# Patient Record
Sex: FEMALE | Race: WHITE | NOT HISPANIC OR LATINO | Employment: OTHER | URBAN - METROPOLITAN AREA
[De-identification: names, ages, dates, MRNs, and addresses within clinical notes are randomized per-mention and may not be internally consistent; named-entity substitution may affect disease eponyms.]

---

## 2017-01-03 ENCOUNTER — TELEPHONE (OUTPATIENT)
Dept: PSYCHIATRY | Facility: CLINIC | Age: 32
End: 2017-01-03

## 2017-01-03 DIAGNOSIS — F25.0 SCHIZOAFFECTIVE DISORDER, BIPOLAR TYPE (H): Primary | ICD-10-CM

## 2017-01-03 DIAGNOSIS — R46.89 AGGRESSION: ICD-10-CM

## 2017-01-03 RX ORDER — BENZTROPINE MESYLATE 0.5 MG/1
0.5 TABLET ORAL 2 TIMES DAILY
Qty: 60 TABLET | Refills: 0 | Status: SHIPPED | OUTPATIENT
Start: 2017-01-03 | End: 2017-01-30

## 2017-01-03 RX ORDER — LURASIDONE HYDROCHLORIDE 80 MG/1
160 TABLET, FILM COATED ORAL
Qty: 60 TABLET | Refills: 0 | Status: SHIPPED | OUTPATIENT
Start: 2017-01-03 | End: 2017-01-30

## 2017-01-03 RX ORDER — PERPHENAZINE 8 MG/1
8 TABLET ORAL 3 TIMES DAILY
Qty: 90 TABLET | Refills: 0 | Status: SHIPPED | OUTPATIENT
Start: 2017-01-03 | End: 2017-01-30

## 2017-01-03 NOTE — TELEPHONE ENCOUNTER
Per Dr. Kiser: Please refill all of them, thanks!    Meds filled under supervisor d/t pt's insurance

## 2017-01-03 NOTE — TELEPHONE ENCOUNTER
Last appt: 9/30/16  RTC: 6-8 weeks   Can: none  No show: 11/16/16  Pen: 1/26/17    Rec'd refill request for from [facility] for:  - Latuda 160 mg QD with dinner, 30 d/s last filled 12/2/16  - Perphenazine 8 mg TID, 30 d/s last filled 12/2/16  - Cognetin 0.5 mg BID, 30 d/s last filled 12/9/16    As pt has missed an appt, routed to provider for auth to RF per  Protocol.

## 2017-01-06 ENCOUNTER — HOSPITAL ENCOUNTER (OUTPATIENT)
Facility: CLINIC | Age: 32
Discharge: HOME | End: 2017-01-06
Attending: PSYCHIATRY & NEUROLOGY | Admitting: PSYCHIATRY & NEUROLOGY
Payer: MEDICAID

## 2017-01-06 ENCOUNTER — HOSPITAL ENCOUNTER (OUTPATIENT)
Dept: INTERVENTIONAL RADIOLOGY/VASCULAR | Facility: CLINIC | Age: 32
End: 2017-01-06
Attending: PSYCHIATRY & NEUROLOGY
Payer: MEDICAID

## 2017-01-06 PROCEDURE — 25000125 ZZHC RX 250: Performed by: RADIOLOGY

## 2017-01-06 PROCEDURE — 36598 INJ W/FLUOR EVAL CV DEVICE: CPT | Mod: RT

## 2017-01-06 PROCEDURE — 25500064 ZZH RX 255 OP 636: Performed by: RADIOLOGY

## 2017-01-06 RX ORDER — IOPAMIDOL 612 MG/ML
12 INJECTION, SOLUTION INTRAVASCULAR ONCE
Status: COMPLETED | OUTPATIENT
Start: 2017-01-06 | End: 2017-01-06

## 2017-01-06 RX ORDER — HEPARIN SODIUM (PORCINE) LOCK FLUSH IV SOLN 100 UNIT/ML 100 UNIT/ML
5 SOLUTION INTRAVENOUS ONCE
Status: COMPLETED | OUTPATIENT
Start: 2017-01-06 | End: 2017-01-06

## 2017-01-06 RX ADMIN — IOPAMIDOL 12 ML: 612 INJECTION, SOLUTION INTRAVENOUS at 14:30

## 2017-01-06 RX ADMIN — SODIUM CHLORIDE, PRESERVATIVE FREE 5 ML: 5 INJECTION INTRAVENOUS at 14:30

## 2017-01-27 ENCOUNTER — HOSPITAL ENCOUNTER (OUTPATIENT)
Dept: BEHAVIORAL HEALTH | Facility: CLINIC | Age: 32
End: 2017-01-27
Attending: PSYCHIATRY & NEUROLOGY
Payer: MEDICAID

## 2017-01-27 VITALS
RESPIRATION RATE: 16 BRPM | SYSTOLIC BLOOD PRESSURE: 122 MMHG | TEMPERATURE: 98.8 F | HEART RATE: 102 BPM | OXYGEN SATURATION: 96 % | DIASTOLIC BLOOD PRESSURE: 83 MMHG

## 2017-01-27 DIAGNOSIS — F25.1 SCHIZOAFFECTIVE DISORDER, DEPRESSIVE TYPE (H): ICD-10-CM

## 2017-01-27 RX ORDER — HEPARIN SODIUM (PORCINE) LOCK FLUSH IV SOLN 100 UNIT/ML 100 UNIT/ML
5 SOLUTION INTRAVENOUS
Status: ACTIVE | OUTPATIENT
Start: 2017-01-27 | End: 2017-01-27

## 2017-01-30 ENCOUNTER — OFFICE VISIT (OUTPATIENT)
Dept: PSYCHIATRY | Facility: CLINIC | Age: 32
End: 2017-01-30
Attending: PSYCHIATRY & NEUROLOGY
Payer: MEDICAID

## 2017-01-30 VITALS
SYSTOLIC BLOOD PRESSURE: 131 MMHG | BODY MASS INDEX: 44.83 KG/M2 | HEART RATE: 93 BPM | DIASTOLIC BLOOD PRESSURE: 92 MMHG | WEIGHT: 269.4 LBS

## 2017-01-30 DIAGNOSIS — F25.0 SCHIZOAFFECTIVE DISORDER, BIPOLAR TYPE (H): Primary | ICD-10-CM

## 2017-01-30 DIAGNOSIS — R46.89 AGGRESSION: ICD-10-CM

## 2017-01-30 PROCEDURE — 99212 OFFICE O/P EST SF 10 MIN: CPT | Mod: ZF

## 2017-01-30 RX ORDER — PERPHENAZINE 8 MG/1
8 TABLET ORAL 3 TIMES DAILY
Qty: 90 TABLET | Refills: 0 | Status: SHIPPED | OUTPATIENT
Start: 2017-01-30 | End: 2017-03-07

## 2017-01-30 RX ORDER — LURASIDONE HYDROCHLORIDE 80 MG/1
160 TABLET, FILM COATED ORAL
Qty: 60 TABLET | Refills: 0 | Status: SHIPPED | OUTPATIENT
Start: 2017-01-30 | End: 2017-02-27

## 2017-01-30 RX ORDER — BENZTROPINE MESYLATE 0.5 MG/1
0.5 TABLET ORAL 2 TIMES DAILY
Qty: 60 TABLET | Refills: 0 | Status: SHIPPED | OUTPATIENT
Start: 2017-01-30 | End: 2017-04-07

## 2017-01-30 RX ORDER — TRAZODONE HYDROCHLORIDE 50 MG/1
50 TABLET, FILM COATED ORAL AT BEDTIME
Qty: 30 TABLET | Refills: 2 | Status: SHIPPED | OUTPATIENT
Start: 2017-01-30 | End: 2017-04-07
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
## 2017-01-30 NOTE — NURSING NOTE
Chief Complaint   Patient presents with     Recheck Medication     schizaffective disorder, depressive type     Reviewed allergies, smoking status, and pharmacy preference  Administered abuse screening questions   Obtained weight, blood pressure and heart rate

## 2017-02-03 ENCOUNTER — ANESTHESIA (OUTPATIENT)
Dept: BEHAVIORAL HEALTH | Facility: CLINIC | Age: 32
End: 2017-02-03

## 2017-02-03 ENCOUNTER — ANESTHESIA EVENT (OUTPATIENT)
Dept: BEHAVIORAL HEALTH | Facility: CLINIC | Age: 32
End: 2017-02-03

## 2017-02-03 ENCOUNTER — HOSPITAL ENCOUNTER (OUTPATIENT)
Dept: BEHAVIORAL HEALTH | Facility: CLINIC | Age: 32
End: 2017-02-03
Attending: PSYCHIATRY & NEUROLOGY
Payer: MEDICAID

## 2017-02-03 VITALS
OXYGEN SATURATION: 96 % | TEMPERATURE: 98.4 F | SYSTOLIC BLOOD PRESSURE: 140 MMHG | HEART RATE: 96 BPM | DIASTOLIC BLOOD PRESSURE: 64 MMHG | RESPIRATION RATE: 20 BRPM

## 2017-02-03 DIAGNOSIS — F25.1 SCHIZOAFFECTIVE DISORDER, DEPRESSIVE TYPE (H): ICD-10-CM

## 2017-02-03 PROCEDURE — 40000671 ZZH STATISTIC ANESTHESIA CASE

## 2017-02-03 PROCEDURE — 25000125 ZZHC RX 250: Performed by: ANESTHESIOLOGY

## 2017-02-03 PROCEDURE — 25000128 H RX IP 250 OP 636: Performed by: PSYCHIATRY & NEUROLOGY

## 2017-02-03 PROCEDURE — 90870 ELECTROCONVULSIVE THERAPY: CPT

## 2017-02-03 RX ORDER — HEPARIN SODIUM (PORCINE) LOCK FLUSH IV SOLN 100 UNIT/ML 100 UNIT/ML
5 SOLUTION INTRAVENOUS
Status: COMPLETED | OUTPATIENT
Start: 2017-02-03 | End: 2017-02-03

## 2017-02-03 RX ORDER — ETOMIDATE 2 MG/ML
INJECTION INTRAVENOUS PRN
Status: DISCONTINUED | OUTPATIENT
Start: 2017-02-03 | End: 2017-02-03

## 2017-02-03 RX ADMIN — ETOMIDATE 14 MG: 2 INJECTION INTRAVENOUS at 11:58

## 2017-02-03 RX ADMIN — SODIUM CHLORIDE, PRESERVATIVE FREE 5 ML: 5 INJECTION INTRAVENOUS at 12:34

## 2017-02-03 RX ADMIN — SUCCINYLCHOLINE CHLORIDE 80 MG: 20 INJECTION, SOLUTION INTRAMUSCULAR; INTRAVENOUS at 11:58

## 2017-02-03 ASSESSMENT — PATIENT HEALTH QUESTIONNAIRE - PHQ9: SUM OF ALL RESPONSES TO PHQ QUESTIONS 1-9: 4

## 2017-02-03 NOTE — IP AVS SNAPSHOT
[facility]    [facility] [address] [address]    Phone:  [phone]                                       After Visit Summary   2/3/2017    Karolyn Banerjee    MRN: [id number]           After Visit Summary Signature Page     I have received my discharge instructions, and my questions have been answered. I have discussed any challenges I see with this plan with the nurse or doctor.    ..........................................................................................................................................  Patient/Patient Representative Signature      ..........................................................................................................................................  Patient Representative Print Name and Relationship to Patient    ..................................................               ................................................  Date                                            Time    ..........................................................................................................................................  Reviewed by Signature/Title    ...................................................              ..............................................  Date                                                            Time

## 2017-02-03 NOTE — ANESTHESIA PREPROCEDURE EVALUATION
Anesthesia Evaluation     .        ROS/MED HX    ENT/Pulmonary:     (+)asthma , cystic fibrosis . .    Neurologic:  - neg neurologic ROS     Cardiovascular:  - neg cardiovascular ROS       METS/Exercise Tolerance:     Hematologic:  - neg hematologic  ROS       Musculoskeletal:         GI/Hepatic:  - neg GI/hepatic ROS       Renal/Genitourinary:         Endo:  - neg endo ROS       Psychiatric:         Infectious Disease:         Malignancy:         Other:               Physical Exam  Normal systems: cardiovascular and pulmonary    Airway   Mallampati: II  TM distance: >3 FB  Neck ROM: full    Dental     Cardiovascular       Pulmonary                     Anesthesia Plan      History & Physical Review  History and physical reviewed and following examination; no interval change.    ASA Status:  3 .    NPO Status:  > 8 hours    Plan for General with Intravenous induction.          Postoperative Care  Postoperative pain management:  IV analgesics and Oral pain medications.      Consents  Anesthetic plan, risks, benefits and alternatives discussed with:  Patient..

## 2017-02-03 NOTE — IP AVS SNAPSHOT
MRN:[id number]                      After Visit Summary   2/3/2017    Karolyn Banerjee    MRN: [id number]           Visit Information        Provider Department      2/3/2017 10:15 AM Osiel Amos MD [facility]           Review of your medicines      CONTINUE these medicines which have NOT CHANGED        Dose / Directions    ACETAMINOPHEN PO        Dose:  1000 mg   Take 1,000 mg by mouth every 8 hours as needed for pain   Refills:  0       albuterol (5 MG/ML) 0.5% neb solution   Commonly known as:  PROVENTIL        Dose:  2.5 mg   Inhale 2.5 mg into the lungs every 4 hours as needed for wheezing or shortness of breath / dyspnea (2 puffs)   Refills:  0       benztropine 0.5 MG tablet   Commonly known as:  COGENTIN   Used for:  Schizoaffective disorder, bipolar type (H), Aggression        Dose:  0.5 mg   Take 1 tablet (0.5 mg) by mouth 2 times daily   Quantity:  60 tablet   Refills:  0       cetirizine 10 MG tablet   Commonly known as:  zyrTEC        Dose:  10 mg   Take 10 mg by mouth daily   Refills:  0       [employer] FISH OIL 1000 MG Caps   Used for:  Schizoaffective disorder, bipolar type (H)        Dose:  1000 mg   Take 1,000 mg by mouth daily   Quantity:  30 capsule   Refills:  5       diphenhydrAMINE 25 MG tablet   Commonly known as:  BENADRYL        Dose:  25 mg   Take 25 mg by mouth every 6 hours as needed for itching or allergies   Refills:  0       hydrOXYzine 25 MG tablet   Commonly known as:  ATARAX   Used for:  Schizoaffective disorder, bipolar type (H), Aggression        Dose:  25-50 mg   Take 1-2 tablets (25-50 mg) by mouth every 4 hours as needed for anxiety   Quantity:  60 tablet   Refills:  2       lurasidone 80 MG Tabs tablet   Commonly known as:  LATUDA   Used for:  Schizoaffective disorder, bipolar type (H), Aggression        Dose:  160 mg   Take 2 tablets (160 mg) by mouth daily (with dinner)   Quantity:  60 tablet   Refills:  0        norethindrone-ethinyl estradiol 1-20 MG-MCG per tablet   Commonly known as:  MICROGESTIN 1/20   Used for:  Unspecified contraceptive management        Dose:  1 tablet   Take 1 tablet by mouth daily   Quantity:  28 tablet   Refills:  0       OLANZapine 10 MG tablet   Commonly known as:  zyPREXA   Used for:  Schizoaffective disorder, bipolar type (H), Aggression        Dose:  10 mg   Take 1 tablet (10 mg) by mouth 2 times daily as needed   Quantity:  30 tablet   Refills:  0       perphenazine 8 MG tablet   Used for:  Schizoaffective disorder, bipolar type (H), Aggression        Dose:  8 mg   Take 1 tablet (8 mg) by mouth 3 times daily   Quantity:  90 tablet   Refills:  0       traZODone 50 MG tablet   Commonly known as:  DESYREL   Used for:  Schizoaffective disorder, bipolar type (H), Aggression        Dose:  50 mg   Take 1 tablet (50 mg) by mouth At Bedtime   Quantity:  30 tablet   Refills:  2       warfarin 5 MG tablet   Commonly known as:  COUMADIN   Used for:  Blood clot of axillary vein in shoulder area, right (H)        Dose:  10 mg   Take 2 tablets (10 mg) by mouth daily   Quantity:  100 tablet   Refills:  1                Protect others around you: Learn how to safely use, store and throw away your medicines at www.disposemymeds.org.         Follow-ups after your visit        Your next 10 appointments already scheduled     Mar 10, 2017 10:15 AM   Electroconvulsive Therapy with Osiel Amos MD   [facility] ([facility])    [address] [facility] [address]   [phone]            Apr 07, 2017  3:00 PM   Schizophrenia Follow Up with Mariel Kiser MD   [facility] ([facility])    [address]  [address] [facility] [address]   [phone]               Care Instructions        Further instructions from your care team       ECT Discharge Instructions      During your ECT  "series:      Do not drive or work heavy equipment until 7 days after your last treatment.    Do not drink alcohol or use street drugs (illicit drugs) while you are having treatments.    Do not make important decisions, including legal decisions.    After each treatment:      Get plenty of rest. A responsible adult must stay with your for at least 6 hours.    Avoid heavy or risky activities for 24 hours.    If you feel light-headed, sit for a few minutes before standing. Have someone help you get up.    If you have nausea (feel sick to your stomach): Drink only clear liquids such as apple juice, ginger ale, broth or 7UP, Be sure to drink plenty of liquids. Move to a normal diet as you feel able.     If you received Toradol, wait 6 hours before taking ibuprofen.    Call your doctor if:     You have a fever over 100F (37.7 C) (taken under the tongue), or a fever that last more than 24 hours.    Your IV site is red, swollen, very painful or is getting more tender.    You have nausea that gets very bad or does not improve.      If you have any symptoms after ECT, tell our staff before your next treatment.    The ECT Department can be reached at [phone].  The ECT Department is open Mondays, Wednesdays and Fridays from 7:00 AM to 2:00 PM.    To speak to a doctor, call: Dr. Osiel Amos: Office # [phone] and Fax # [phone]    Transported by: Staff      _________________________________________________  ________________________  Patient/Responsible party Signature      Date          ________________________________________________   ________________________  Nurse Signature        Date     Additional Information About Your Visit        Belly Ballot Information     Belly Ballot lets you send messages to your doctor, view your test results, renew your prescriptions, schedule appointments and more. To sign up, go to [email or web address] . Click on \"Log in\" on the left side of the screen, which will take you to the " "Welcome page. Then click on \"Sign up Now\" on the right side of the page.     You will be asked to enter the access code listed below, as well as some personal information. Please follow the directions to create your username and password.     Your access code is: [email or web address]  Expires: 2017  2:38 PM     Your access code will  in 90 days. If you need help or a new code, please call your [facility] clinic or [phone].        Care EveryWhere ID     This is your Care EveryWhere ID. This could be used by other organizations to access your [facility] medical records  [id number]        Your Vitals Were     Blood Pressure Pulse Temperature Respirations Pulse Oximetry       148/90 mmHg 98 98.4  F (36.9  C) (Tympanic) 18 95%        Primary Care Provider Office Phone # Fax #    Suzie Mariscal [phone]      Thank you!     Thank you for choosing [facility] for your care. Our goal is always to provide you with excellent care. Hearing back from our patients is one way we can continue to improve our services. Please take a few minutes to complete the written survey that you may receive in the mail after you visit with us. Thank you!             Medication List: This is a list of all your medications and when to take them. Check marks below indicate your daily home schedule. Keep this list as a reference.      Medications           Morning Afternoon Evening Bedtime As Needed    ACETAMINOPHEN PO   Take 1,000 mg by mouth every 8 hours as needed for pain                                albuterol (5 MG/ML) 0.5% neb solution   Commonly known as:  PROVENTIL   Inhale 2.5 mg into the lungs every 4 hours as needed for wheezing or shortness of breath / dyspnea (2 puffs)                                benztropine 0.5 MG tablet   Commonly known as:  COGENTIN   Take 1 tablet (0.5 mg) by mouth 2 times daily                                cetirizine 10 MG tablet   Commonly known as:  zyrTEC   Take 10 mg by mouth " daily                                CVS FISH OIL 1000 MG Caps   Take 1,000 mg by mouth daily                                diphenhydrAMINE 25 MG tablet   Commonly known as:  BENADRYL   Take 25 mg by mouth every 6 hours as needed for itching or allergies                                hydrOXYzine 25 MG tablet   Commonly known as:  ATARAX   Take 1-2 tablets (25-50 mg) by mouth every 4 hours as needed for anxiety                                lurasidone 80 MG Tabs tablet   Commonly known as:  LATUDA   Take 2 tablets (160 mg) by mouth daily (with dinner)                                norethindrone-ethinyl estradiol 1-20 MG-MCG per tablet   Commonly known as:  MICROGESTIN 1/20   Take 1 tablet by mouth daily                                OLANZapine 10 MG tablet   Commonly known as:  zyPREXA   Take 1 tablet (10 mg) by mouth 2 times daily as needed                                perphenazine 8 MG tablet   Take 1 tablet (8 mg) by mouth 3 times daily                                traZODone 50 MG tablet   Commonly known as:  DESYREL   Take 1 tablet (50 mg) by mouth At Bedtime                                warfarin 5 MG tablet   Commonly known as:  COUMADIN   Take 2 tablets (10 mg) by mouth daily

## 2017-02-03 NOTE — ANESTHESIA POSTPROCEDURE EVALUATION
Patient: Karolyn Banerjee    * No procedures listed *    Diagnosis:Schizoaffective disorder, depressive type (H) [F25.1]  Diagnosis Additional Information: No value filed.    Anesthesia Type:  General    Note:  Anesthesia Post Evaluation    Patient location during evaluation: PACU  Patient participation: Able to fully participate in evaluation  Level of consciousness: awake and alert  Pain management: adequate  Airway patency: patent  Cardiovascular status: acceptable  Respiratory status: acceptable  Hydration status: balanced  PONV: none     Anesthetic complications: None          Last vitals:  Filed Vitals:    02/03/17 1212 02/03/17 1222 02/03/17 1232   BP: 134/90 148/90 140/64   Pulse: 100 98 96   Temp: 36.9  C (98.4  F)     Resp: 18 18 20   SpO2: 93% 95% 96%         Electronically Signed By: Deandre Jackson MD  February 3, 2017  12:40 PM

## 2017-02-03 NOTE — DISCHARGE INSTRUCTIONS
ECT Discharge Instructions      During your ECT series:      Do not drive or work heavy equipment until 7 days after your last treatment.    Do not drink alcohol or use street drugs (illicit drugs) while you are having treatments.    Do not make important decisions, including legal decisions.    After each treatment:      Get plenty of rest. A responsible adult must stay with your for at least 6 hours.    Avoid heavy or risky activities for 24 hours.    If you feel light-headed, sit for a few minutes before standing. Have someone help you get up.    If you have nausea (feel sick to your stomach): Drink only clear liquids such as apple juice, ginger ale, broth or 7UP, Be sure to drink plenty of liquids. Move to a normal diet as you feel able.     If you received Toradol, wait 6 hours before taking ibuprofen.    Call your doctor if:     You have a fever over 100F (37.7 C) (taken under the tongue), or a fever that last more than 24 hours.    Your IV site is red, swollen, very painful or is getting more tender.    You have nausea that gets very bad or does not improve.      If you have any symptoms after ECT, tell our staff before your next treatment.    The ECT Department can be reached at [phone].  The ECT Department is open Mondays, Wednesdays and Fridays from 7:00 AM to 2:00 PM.    To speak to a doctor, call: Dr. Osiel Amos: Office # [phone] and Fax # [phone]    Transported by: Staff      _________________________________________________  ________________________  Patient/Responsible party Signature      Date          ________________________________________________   ________________________  Nurse Signature        Date
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
## 2017-02-27 ENCOUNTER — TELEPHONE (OUTPATIENT)
Dept: PSYCHIATRY | Facility: CLINIC | Age: 32
End: 2017-02-27

## 2017-02-27 DIAGNOSIS — R46.89 AGGRESSION: ICD-10-CM

## 2017-02-27 DIAGNOSIS — F25.0 SCHIZOAFFECTIVE DISORDER, BIPOLAR TYPE (H): ICD-10-CM

## 2017-02-27 RX ORDER — LURASIDONE HYDROCHLORIDE 80 MG/1
160 TABLET, FILM COATED ORAL
Qty: 60 TABLET | Refills: 1 | Status: SHIPPED | OUTPATIENT
Start: 2017-02-27 | End: 2017-04-07

## 2017-02-27 NOTE — TELEPHONE ENCOUNTER
Medication Requested: Latuda 80 mg tabs  Last Written RX Date: 1-30-17  Last Pharmacy Fill Date: 1-30-17  Last RX Quantity: 60,   # refills: 0    Last Office Visit: 1-30-17  Recommended RTC: 4 wks  Next Scheduled Office Visit: 4-07-17    Since last Visit: # of CX 0 ,# of NOS 0    Last Visit Recommendations for:  Medications: continue  Labs: 0      Will route to provider due to delay in follow up.    Kathleen M Doege RN

## 2017-03-06 ENCOUNTER — TELEPHONE (OUTPATIENT)
Dept: PSYCHIATRY | Facility: CLINIC | Age: 32
End: 2017-03-06

## 2017-03-06 DIAGNOSIS — R46.89 AGGRESSION: ICD-10-CM

## 2017-03-06 DIAGNOSIS — F25.0 SCHIZOAFFECTIVE DISORDER, BIPOLAR TYPE (H): ICD-10-CM

## 2017-03-06 NOTE — TELEPHONE ENCOUNTER
Medication Requested: Perphenazine 8 mg tabs  Last Written RX Date: 1-30-17  Last Pharmacy Fill Date: 1-30-17  Last RX Quantity: 90,   # refills: 0    Last Office Visit: 1-30-17  Recommended RTC: 4 wks  Next Scheduled Office Visit: 4-7-17    Since last Visit: # of CX 0 ,# of NOS 0    Last Visit Recommendations for:  Medications: continue  Labs: 0    Will route to provider due to delay in follow up.        Kathleen M Doege RN

## 2017-03-07 RX ORDER — PERPHENAZINE 8 MG/1
8 TABLET ORAL 3 TIMES DAILY
Qty: 93 TABLET | Refills: 0 | Status: SHIPPED | OUTPATIENT
Start: 2017-03-07 | End: 2017-04-07

## 2017-03-10 ENCOUNTER — ANESTHESIA (OUTPATIENT)
Dept: BEHAVIORAL HEALTH | Facility: CLINIC | Age: 32
End: 2017-03-10

## 2017-03-10 ENCOUNTER — ANESTHESIA EVENT (OUTPATIENT)
Dept: BEHAVIORAL HEALTH | Facility: CLINIC | Age: 32
End: 2017-03-10

## 2017-03-10 ENCOUNTER — HOSPITAL ENCOUNTER (OUTPATIENT)
Dept: BEHAVIORAL HEALTH | Facility: CLINIC | Age: 32
End: 2017-03-10
Attending: PSYCHIATRY & NEUROLOGY
Payer: MEDICAID

## 2017-03-10 VITALS
DIASTOLIC BLOOD PRESSURE: 79 MMHG | RESPIRATION RATE: 16 BRPM | HEART RATE: 101 BPM | SYSTOLIC BLOOD PRESSURE: 127 MMHG | OXYGEN SATURATION: 94 % | TEMPERATURE: 98.1 F

## 2017-03-10 DIAGNOSIS — F25.1 SCHIZOAFFECTIVE DISORDER, DEPRESSIVE TYPE (H): ICD-10-CM

## 2017-03-10 PROCEDURE — 90870 ELECTROCONVULSIVE THERAPY: CPT

## 2017-03-10 PROCEDURE — 25000125 ZZHC RX 250: Performed by: NURSE ANESTHETIST, CERTIFIED REGISTERED

## 2017-03-10 PROCEDURE — 25000128 H RX IP 250 OP 636: Performed by: PSYCHIATRY & NEUROLOGY

## 2017-03-10 PROCEDURE — 40000671 ZZH STATISTIC ANESTHESIA CASE

## 2017-03-10 RX ORDER — HEPARIN SODIUM (PORCINE) LOCK FLUSH IV SOLN 100 UNIT/ML 100 UNIT/ML
5 SOLUTION INTRAVENOUS
Status: CANCELLED
Start: 2017-03-10 | End: 2017-03-10

## 2017-03-10 RX ORDER — HEPARIN SODIUM (PORCINE) LOCK FLUSH IV SOLN 100 UNIT/ML 100 UNIT/ML
5 SOLUTION INTRAVENOUS
Status: COMPLETED | OUTPATIENT
Start: 2017-03-10 | End: 2017-03-10

## 2017-03-10 RX ORDER — ETOMIDATE 2 MG/ML
INJECTION INTRAVENOUS PRN
Status: DISCONTINUED | OUTPATIENT
Start: 2017-03-10 | End: 2017-03-10

## 2017-03-10 RX ADMIN — ETOMIDATE 14 MG: 2 INJECTION INTRAVENOUS at 11:19

## 2017-03-10 RX ADMIN — SUCCINYLCHOLINE CHLORIDE 50 MG: 20 INJECTION, SOLUTION INTRAMUSCULAR; INTRAVENOUS at 11:16

## 2017-03-10 RX ADMIN — SODIUM CHLORIDE, PRESERVATIVE FREE 5 ML: 5 INJECTION INTRAVENOUS at 11:51

## 2017-03-10 RX ADMIN — SUCCINYLCHOLINE CHLORIDE 30 MG: 20 INJECTION, SOLUTION INTRAMUSCULAR; INTRAVENOUS at 11:20

## 2017-03-10 NOTE — ANESTHESIA PREPROCEDURE EVALUATION
Anesthesia Evaluation     .        ROS/MED HX    ENT/Pulmonary:     (+)asthma , cystic fibrosis . .    Neurologic:  - neg neurologic ROS     Cardiovascular:  - neg cardiovascular ROS       METS/Exercise Tolerance:     Hematologic:  - neg hematologic  ROS       Musculoskeletal:         GI/Hepatic:  - neg GI/hepatic ROS       Renal/Genitourinary:         Endo:  - neg endo ROS       Psychiatric:     (+) psychiatric history depression      Infectious Disease:         Malignancy:         Other:               Physical Exam  Normal systems: cardiovascular and pulmonary    Airway   Mallampati: III  TM distance: >3 FB  Neck ROM: full    Dental     Cardiovascular       Pulmonary                         Anesthesia Plan      History & Physical Review  History and physical reviewed and following examination; no interval change.    ASA Status:  3 .    NPO Status:  > 8 hours    Plan for General with Intravenous induction.          Postoperative Care  Postoperative pain management:  IV analgesics and Oral pain medications.      Consents  Anesthetic plan, risks, benefits and alternatives discussed with:  Patient..
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
## 2017-03-10 NOTE — IP AVS SNAPSHOT
[facility]    [facility] [address] [address]    Phone:  [phone]                                       After Visit Summary   3/10/2017    Karolyn Banerjee    MRN: [id number]           After Visit Summary Signature Page     I have received my discharge instructions, and my questions have been answered. I have discussed any challenges I see with this plan with the nurse or doctor.    ..........................................................................................................................................  Patient/Patient Representative Signature      ..........................................................................................................................................  Patient Representative Print Name and Relationship to Patient    ..................................................               ................................................  Date                                            Time    ..........................................................................................................................................  Reviewed by Signature/Title    ...................................................              ..............................................  Date                                                            Time

## 2017-03-10 NOTE — ANESTHESIA POSTPROCEDURE EVALUATION
Patient: Karolyn Banerjee    * No procedures listed *    Diagnosis:Severe recurrent major depression without psychotic features (H) [F33.2]  Diagnosis Additional Information: No value filed.    Anesthesia Type:  General    Note:  Anesthesia Post Evaluation    Patient location during evaluation: PACU  Patient participation: Able to fully participate in evaluation  Level of consciousness: awake and alert  Pain management: adequate  Airway patency: patent  Cardiovascular status: acceptable  Respiratory status: acceptable  Hydration status: acceptable  PONV: none     Anesthetic complications: None          Last vitals:  Vitals:    03/10/17 1125 03/10/17 1130 03/10/17 1140   BP:   127/81   Pulse: 103 109 109   Resp: 16 16 16   Temp: 36.7  C (98.1  F)     SpO2: 99% 95% 94%         Electronically Signed By: Duane F. Szczepanski, MD  March 10, 2017  11:46 AM

## 2017-03-13 ENCOUNTER — MEDICAL CORRESPONDENCE (OUTPATIENT)
Dept: HEALTH INFORMATION MANAGEMENT | Facility: CLINIC | Age: 32
End: 2017-03-13

## 2017-03-16 ENCOUNTER — TELEPHONE (OUTPATIENT)
Dept: PSYCHIATRY | Facility: CLINIC | Age: 32
End: 2017-03-16

## 2017-03-16 NOTE — TELEPHONE ENCOUNTER
Rec'd fax from [facility] (Thea Webb), including:    - SILVER signed by legal guardian on 2/15/17  - [facility] Diagnostic Assessment form  - WHODAS 2.0    Copy of SILVER sent to scanning    Routed to Dr. Kiser    Upon completion, forms to be faxed to [facility] at [phone]

## 2017-03-20 ENCOUNTER — TRANSFERRED RECORDS (OUTPATIENT)
Dept: HEALTH INFORMATION MANAGEMENT | Facility: CLINIC | Age: 32
End: 2017-03-20

## 2017-03-20 DIAGNOSIS — F25.0 SCHIZOAFFECTIVE DISORDER, BIPOLAR TYPE (H): ICD-10-CM

## 2017-03-20 DIAGNOSIS — R46.89 AGGRESSION: ICD-10-CM

## 2017-03-21 ENCOUNTER — TELEPHONE (OUTPATIENT)
Dept: PSYCHIATRY | Facility: CLINIC | Age: 32
End: 2017-03-21

## 2017-03-21 NOTE — TELEPHONE ENCOUNTER
On 03/20/2017 Dr. Kiser faxed a diagnostic assessment form to [facility] to [phone]. I sent this document to scanning and a copy was kept in psychiatry until scanning completed/confirmed. Chela Wells LPN

## 2017-04-07 ENCOUNTER — OFFICE VISIT (OUTPATIENT)
Dept: PSYCHIATRY | Facility: CLINIC | Age: 32
End: 2017-04-07
Attending: PSYCHIATRY & NEUROLOGY
Payer: MEDICAID

## 2017-04-07 ENCOUNTER — TELEPHONE (OUTPATIENT)
Dept: PSYCHIATRY | Facility: CLINIC | Age: 32
End: 2017-04-07

## 2017-04-07 VITALS
HEART RATE: 83 BPM | DIASTOLIC BLOOD PRESSURE: 98 MMHG | SYSTOLIC BLOOD PRESSURE: 142 MMHG | BODY MASS INDEX: 44.86 KG/M2 | WEIGHT: 269.6 LBS

## 2017-04-07 DIAGNOSIS — R46.89 AGGRESSION: ICD-10-CM

## 2017-04-07 DIAGNOSIS — F25.0 SCHIZOAFFECTIVE DISORDER, BIPOLAR TYPE (H): ICD-10-CM

## 2017-04-07 DIAGNOSIS — Z51.81 ENCOUNTER FOR THERAPEUTIC DRUG MONITORING: Primary | ICD-10-CM

## 2017-04-07 PROCEDURE — 93005 ELECTROCARDIOGRAM TRACING: CPT | Mod: ZF | Performed by: PSYCHIATRY & NEUROLOGY

## 2017-04-07 PROCEDURE — 99212 OFFICE O/P EST SF 10 MIN: CPT | Mod: ZF

## 2017-04-07 RX ORDER — TRAZODONE HYDROCHLORIDE 50 MG/1
50 TABLET, FILM COATED ORAL AT BEDTIME
Qty: 30 TABLET | Refills: 2 | Status: SHIPPED | OUTPATIENT
Start: 2017-04-07 | End: 2017-07-25

## 2017-04-07 RX ORDER — LURASIDONE HYDROCHLORIDE 80 MG/1
160 TABLET, FILM COATED ORAL
Qty: 60 TABLET | Refills: 2 | Status: SHIPPED | OUTPATIENT
Start: 2017-04-07 | End: 2017-06-02

## 2017-04-07 RX ORDER — BENZTROPINE MESYLATE 0.5 MG/1
0.5 TABLET ORAL 2 TIMES DAILY
Qty: 60 TABLET | Refills: 2 | Status: SHIPPED | OUTPATIENT
Start: 2017-04-07 | End: 2017-07-11

## 2017-04-07 RX ORDER — PERPHENAZINE 8 MG/1
8 TABLET ORAL 3 TIMES DAILY
Qty: 93 TABLET | Refills: 2 | Status: SHIPPED | OUTPATIENT
Start: 2017-04-07 | End: 2017-06-02

## 2017-04-07 NOTE — TELEPHONE ENCOUNTER
Social Work Referral Response  [facility]    Data/Intervention:  Patient Name:  Karolyn Banerjee  /Age:  1985 (31 year old)    Referral Source:  Psychiatrist, Dr. Mariel Kiser  Reason for Referral:  [facility] referral    Collaborated With:    -Psychiatrist  -ACP    SW submitted online referral requesting [facility] with ACP.       Plan:  Provided ACP with writer's contact information and availability, and clinic main number due to SW extended absence.     Will route to patient's psychiatric provider(s) as an FYI.     Thank you for the referral!  Please call or page if needs or concerns arise.

## 2017-04-07 NOTE — PROGRESS NOTES
"PSYCHIATRY CLINIC PROGRESS NOTE   30 minute medication management     The initial DIAG EVAL date was prior to 2008.  Date of most recent Transfer Evaluation is 07/06/2016.    Guardian: Mary \"Swetha\" Chrsis (mother)- [phone]  Karolyn is committed with Torres and Peña-Wilkins in place (most recently reviewed in court and supported on 11/6/2015 for a 12 month term).    INTERIM HISTORY                                                 PSYCH CRITICAL ITEM HISTORY:  suicide attempt , suicidal ideation, SIB , psychosis [sxs include AH, delusions], mutiple psychotropic trials, violent behavior and legal guardianship (mother is legal guardian) More than 5 psychiatric hospitalizations.     Karolyn Banerjee is a 31 year old female who was last seen in clinic on01/30/17 at which time no medication changes were made.  The patient reports good treatment adherence.   History was provided by the patient and group home staff who were a adequate historian.  Since the last visit:  - Feeling more down today, can't tell me why  - Occassionally feelnng light headed and dizzy upon standing, difficult to know how frequent this is, no falls  - Yesterday had a significant conflict with peer at the .    Kristel (another  resident) said she didn't believe in zack, Karolyn tried to hit herself then cut herself with a knife (butter knife), then attempted to ingest dish detergent. Staff stopped her.    - Hearing AH of the \"mohsen at [employer]\".  Believes that this person on [employer] wants to ask her out and he is talking to his friends about her.  Unclear how often Karolyn is hearing this.  - Using prn olanzapine as much as 2 times per week, other times not at all for a week  - Denies thoughts of pregnancy  RECENT SYMPTOMS:   DAVID/HYPOMANIA: None  PSYCHOSIS:  hallucinations- AH of a catia voice from [employer] who is interested in her  , delusion that this man can read her thoughts  Denies-suicidal ideation, SIB, SIB urges, david and aggressive " urges    SUBSTANCE USE:     ALCOHOL-  never       TOBACCO- none        CAFFEINE- one sodas/day                      CANNABIS- none  OTHER ILLICIT DRUGS- none    Financial Support- social security disability     Living Situation- group home ([place])         Children- none     MEDICAL ROS:  Reports sleepiness, occasional light headedness  Denies muscle twitches, excessive diaphoresis, restlessness and tremor and akathisia, muscle stiffness and tremor [action or resting].    PSYCH and CD Critical Summary Points since July 2016           None  PAST PSYCH MED TRIALS   see EMR Problem List: Hx of psychiatric care     MEDICAL / SURGICAL HISTORY                                   MEDICAL TEAM:          PCP- Dr. Isamar Spear                    Therapist- none    Pregnant or breastfeeding:  NO      Contraception- is on OCP    Patient Active Problem List   Diagnosis     Mild cognitive impairment     Borderline personality disorder     Asthma     Nonorganic enuresis     Schizoaffective disorder, depressive type (H)     Fx humerus shaft-closed     Port catheter in place     MENTAL HEALTH     Suicidal ideation     Hx of psychiatric care     DVT (deep vein thrombosis) in pregnancy (H)       ALLERGY                                Clozapine; Risperdal; and Tape [adhesive tape]    MEDICATIONS                               Current Outpatient Prescriptions   Medication Sig Dispense Refill     perphenazine 8 MG tablet Take 1 tablet (8 mg) by mouth 3 times daily 93 tablet 0     lurasidone (LATUDA) 80 MG TABS tablet Take 2 tablets (160 mg) by mouth daily (with dinner) 60 tablet 1     benztropine (COGENTIN) 0.5 MG tablet Take 1 tablet (0.5 mg) by mouth 2 times daily 60 tablet 0     traZODone (DESYREL) 50 MG tablet Take 1 tablet (50 mg) by mouth At Bedtime 30 tablet 2     ACETAMINOPHEN PO Take 1,000 mg by mouth every 8 hours as needed for pain       OLANZapine (ZYPREXA) 10 MG tablet Take 1 tablet (10 mg) by mouth 2 times daily as needed 30  "tablet 0     hydrOXYzine (ATARAX) 25 MG tablet Take 1-2 tablets (25-50 mg) by mouth every 4 hours as needed for anxiety 60 tablet 2     diphenhydrAMINE (BENADRYL) 25 MG tablet Take 25 mg by mouth every 6 hours as needed for itching or allergies        Omega-3 Fatty Acids ([employer] FISH OIL) 1000 MG CAPS Take 1,000 mg by mouth daily 30 capsule 5     albuterol (PROVENTIL) (5 MG/ML) 0.5% nebulizer solution Inhale 2.5 mg into the lungs every 4 hours as needed for wheezing or shortness of breath / dyspnea (2 puffs)        cetirizine (ZYRTEC) 10 MG tablet Take 10 mg by mouth daily       norethindrone-ethinyl estradiol (MICROGESTIN 1/20) 1-20 MG-MCG per tablet Take 1 tablet by mouth daily 28 tablet 0       VITALS   BP (!) 142/98  Pulse 83  Wt 122.3 kg (269 lb 9.6 oz)  BMI 44.86 kg/m2   MENTAL STATUS EXAM                                                             Alertness: alert  and oriented  Appearance: casually groomed  Behavior/Demeanor: cooperative, with good  eye contact, somewhat less engaged with interview today  Speech: increased latency of response  Language: intact  Psychomotor: mildly slowed  Mood:  \"more down\"  Affect: blunted; was not congruent to mood; was not congruent to content  Thought Process/Associations: Somewhat perseverative, at times difficulty answering direct questions  Thought Content:  Denies suicidal ideation  Perception:  Reports auditory hallucinations [command-NO]  Insight: poor  Judgment: fair  Cognition:  does appear grossly intact however below average; formal cognitive testing was not done    LABS and DATA     ANTIPSYCHOTIC LABS   [glu, A1C, lipids (focus LDL), liver enzymes, WBC, ANEU, Hgb, plts]    q12 mo  Recent Labs   Lab Test  11/19/16   2132  10/20/15   0853   GLC  110*  130*     Recent Labs   Lab Test  10/20/15   0853  03/11/15   0820   CHOL  185  213*   TRIG  134  87   LDL  115  142*   HDL  43*  54     Recent Labs   Lab Test  10/20/15   0853  12/05/14   0831   AST  13  13   ALT  " 35  28   ALKPHOS  67  68     Recent Labs   Lab Test  11/23/16   0807  11/21/16   0554  11/19/16   2132   10/20/15   0853   WBC  6.2  6.1  8.2   < >  6.7   ANEU   --    --   6.2   --   5.0   HGB  15.2  13.9  14.0   < >  15.7   PLT  150  129*  153   < >  146*    < > = values in this interval not displayed.       PHQ9 TODAY = 6  PHQ-9 SCORE 8/15/2016 9/30/2016 1/30/2017   Total Score - - -   Total Score 5 6 4       PSYCHIATRIC DIAGNOSES                                                                                                 Schizoaffective disorder, bipolar type  Unspecified neurocognitive disorder    ASSESSMENT                                   Pertinent background:   See most recent Transfer Evaluation as dated above.      Additionally, she has multiple medication trials and hospitalizations treating aggression and perceptual disturbances (including clozapine with neutropenia x2).  ECT combined with multiple medications has been most helpful in marinating stability.  She is under commitment and Torres. Peña-Wilkins order also in place (all renewed in 11/13/2017 for 12 months), authorizes treatment up to two times per week for maintenance.  10/28/2013: full scale IQ test of 62.  After neutropenia with clozapine x's 2, has required multiple neuroleptics as well as ECT to maintain stability.      TODAY: Karolyn continues to experince interpersonal conflict at her GH as a significant source of stress, although staff work to minimize this.  Psychosis symptoms appear to be at or near baseline with persistent AH and no pseudocyesis.  She reports feeling more sad at our visit today, but it difficult to make a case for a current mood episode with hx given. Cognitive difficulties and low coping skills are contributing.  No change today.  Will defer to Dr. Amos regarding ECT frequency.  Pt is aware I am going on maternity leave, will be covered by Dr. Rangel prior to new resident in July.      Medical/abnormal labs:  "Karolyn continues to have eye \"flutter\" and feels as if her L eyelid is heavy, have recommended follow up with PCP for this, does not appear to be a typical neuroleptic associated abnormal movement but will continue to monitor.  Pt still has not completed AP monitoring labs, have reminded  staff of importance of this today, will also get EKG.                             Psychotropic drug interactions:   Hydroxyzine and olanzapine or perphenazine or trazodone may result in increased risk of QT interval prolongation.    Perpheazine and benztropine may result in decreased phenothiazine serum concentrations, decreased phenothiazine effectiveness, enhanced anticholinergic effects (ileus, hyperpyrexia, sedation, dry mouth).   Trazodone and perphenazine may result in hypotension.  Management:  Monitoring for adverse effects and patient is aware of risks     SUICIDE RISK ASSESSMENT:  Today Karolyn R Helppi rebecca passive/active SI. In addition, she has notable risk factors for self-harm, including SIB, previous suicide attempt and psychosis. However, risk is mitigated by sobriety, commitment to family, good social support, stable housing and h/o seeking help when needed. Therefore, based on all available evidence including the factors cited above, she does not appear to be at imminent risk for self-harm, does not meet criteria for a 72-hr hold, and therefore involuntary hospitalization will not be pursued at this  time. However, based on degree of symptoms, voluntary referral for frequent clinic visits was recommended. she accepted this offer.  Additional steps to minimize risk: coordination with group home staff during visit.     PLAN                                                                                                       1) PSYCHOTROPIC MEDICATIONS: no changes today, due to pt insurance medications must be ordered by attending      - lurasidone 160 mg with dinner      - olanzapine 10 mg BID prn for " psychosis      - perphenazine 8 mg TID      - benztropine 0.5 mg BID      - hydroxyzine 25-50 mg q4 hrs prn for anxiety      2) THERAPY:  No change, none currently     3) LABS NEXT DUE: Pt has not yet finished AP labs, reminded staff again today and had labs sent to [facility] as well as our lab here.  EKG today         RATING SCALES:    AIMS    4) REFERRALS [CD, medical, other]:  none    5) : Appreciate clinic  help in setting up [facility] services for pt    6) RTC: 4-8 weeks with Dr. Rangel    7) CRISIS NUMBERS: Provided routinely in the AVS    TREATMENT RISK STATEMENT:  The risks, benefits, alternatives and potential adverse effects have been discussed and are understood by the patient/ patient's guardian. The pt understands the risks of using street drugs or alcohol.  There are no medical contraindications, the pt agrees to treatment with the ability to do so.  The patient understands to call 911 or come to the nearest ED if life threatening or urgent symptoms present.       RESIDENT:   Mariel Kiser MD    Patient staffed in clinic with Dr. Bangura who will sign the note.  Supervisor is Dr. Dukes.    I saw the patient with the resident, and participated in key portions of the service, including the mental status examination and developing the plan of care. I reviewed key portions of the history with the resident. I agree with the findings and plan as documented in this note.    Lesly Bangura MD

## 2017-04-07 NOTE — MR AVS SNAPSHOT
After Visit Summary   2017    Karolyn Banerjee    MRN: [id number]           Patient Information     Date Of Birth          1985        Visit Information        Provider Department      2017 3:00 PM Mariel Kiser MD Psychiatry Clinic        Today's Diagnoses     Encounter for therapeutic drug monitoring    -  1    Schizoaffective disorder, bipolar type (H)        Aggression           Follow-ups after your visit        Follow-up notes from your care team     Return in about 6 weeks (around 2017).      Your next 10 appointments already scheduled     2017  3:25 PM CDT   Schizophrenia Follow Up with Akbar Rangel MD   Psychiatry Clinic ([facility])    [address] [facility] [address] [phone]              Who to contact     Please call your clinic at [phone] to:    Ask questions about your health    Make or cancel appointments    Discuss your medicines    Learn about your test results    Speak to your doctor   If you have compliments or concerns about an experience at your clinic, or if you wish to file a complaint, please contact [facility] Patient Relations at [phone] or email us at [email or web address]         Additional Information About Your Visit        MyChart Information     [email or web address] is an electronic gateway that provides easy, online access to your medical records. With [employer], you can request a clinic appointment, read your test results, renew a prescription or communicate with your care team.     To sign up for [email or web address] visit the website at [email or web address]   You will be asked to enter the access code listed below, as well as some personal information. Please follow the directions to create your username and password.     Your access code is: [email or web address]  Expires: 2017 12:25 PM     Your access code will  in 90 days. If you need  help or a new code, please contact your [facility] or call [phone] for assistance.        Care EveryWhere ID     This is your Care EveryWhere ID. This could be used by other organizations to access your [place] medical records  [id number]        Your Vitals Were     Pulse BMI (Body Mass Index)                83 44.86 kg/m2           Blood Pressure from Last 3 Encounters:   04/14/17 (!) 140/98   04/07/17 (!) 142/98   03/10/17 127/79    Weight from Last 3 Encounters:   04/07/17 122.3 kg (269 lb 9.6 oz)   01/30/17 122.2 kg (269 lb 6.4 oz)   11/22/16 126.9 kg (279 lb 12.8 oz)              We Performed the Following     EKG 12-lead complete w/read - Clinics     EKG 12-lead complete w/read - Clinics          Where to get your medicines      These medications were sent to [facility] [place], [place] - [address]  [address] [facility] [address]     Phone:  [phone]     benztropine 0.5 MG tablet    lurasidone 80 MG Tabs tablet    perphenazine 8 MG tablet    traZODone 50 MG tablet          Primary Care Provider Office Phone # Fax #    Suzie Mariscal [phone]       [address] [address]        Thank you!     Thank you for choosing PSYCHIATRY CLINIC  for your care. Our goal is always to provide you with excellent care. Hearing back from our patients is one way we can continue to improve our services. Please take a few minutes to complete the written survey that you may receive in the mail after your visit with us. Thank you!             Your Updated Medication List - Protect others around you: Learn how to safely use, store and throw away your medicines at www.disposemymeds.org.          This list is accurate as of: 4/7/17 11:59 PM.  Always use your most recent med list.                   Brand Name Dispense Instructions for use    ACETAMINOPHEN PO      Take 1,000 mg by mouth every 8 hours as  needed for pain       albuterol (5 MG/ML) 0.5% neb solution    PROVENTIL     Inhale 2.5 mg into the lungs every 4 hours as needed for wheezing or shortness of breath / dyspnea (2 puffs)       benztropine 0.5 MG tablet    COGENTIN    60 tablet    Take 1 tablet (0.5 mg) by mouth 2 times daily       cetirizine 10 MG tablet    zyrTEC     Take 10 mg by mouth daily       diphenhydrAMINE 25 MG tablet    BENADRYL     Take 25 mg by mouth every 6 hours as needed for itching or allergies       hydrOXYzine 25 MG tablet    ATARAX    60 tablet    Take 1-2 tablets (25-50 mg) by mouth every 4 hours as needed for anxiety       lurasidone 80 MG Tabs tablet    LATUDA    60 tablet    Take 2 tablets (160 mg) by mouth daily (with dinner)       OLANZapine 10 MG tablet    zyPREXA    30 tablet    Take 1 tablet (10 mg) by mouth 2 times daily as needed       perphenazine 8 MG tablet     93 tablet    Take 1 tablet (8 mg) by mouth 3 times daily       traZODone 50 MG tablet    DESYREL    30 tablet    Take 1 tablet (50 mg) by mouth At Bedtime

## 2017-04-10 ENCOUNTER — TELEPHONE (OUTPATIENT)
Dept: PSYCHIATRY | Facility: CLINIC | Age: 32
End: 2017-04-10

## 2017-04-10 LAB — INTERPRETATION ECG - MUSE: NORMAL

## 2017-04-10 NOTE — TELEPHONE ENCOUNTER
Lab orders by were successfully faxed to Carloz Busbybury fernanda at [phone] on 4/7/2017  Original placed back in provider's folder on 4/10/2017.Isabella Owens/LUCY

## 2017-04-10 NOTE — TELEPHONE ENCOUNTER
Brief Psychiatry Telephone Note    Phone call time: 5:30 PM  Clinic Provider: Dr. Mariel Kiser    S:  Group home staff member (Chrystal) called with question regarding potential follow-up labs from patient's most recent appointment 4/7.  Staff member specifically inquiring regarding whether patient needed further lab draws prior to next ECT appointment 4/14.  Staff member was under impression that labs could be drawn at [facility] in [place] and was asking that we place outpatient lab order.    A/P:  Staff member informed that we cannot place lab orders for [facility].  Staff recommended to call back tomorrow to discuss matter further w/ Dr. Kiser for clarification.    Harvey Joyce MD  PGY-1 Psychiatry Resident    Case was discussed with G2 resident Dr. Allen.  Mitchel Allen MD  PGY-2 Psychiatry Resident

## 2017-04-11 ENCOUNTER — TELEPHONE (OUTPATIENT)
Dept: PSYCHIATRY | Facility: CLINIC | Age: 32
End: 2017-04-11

## 2017-04-11 DIAGNOSIS — F25.0 SCHIZOAFFECTIVE DISORDER, BIPOLAR TYPE (H): ICD-10-CM

## 2017-04-11 DIAGNOSIS — Z51.81 ENCOUNTER FOR THERAPEUTIC DRUG MONITORING: Primary | ICD-10-CM

## 2017-04-11 ASSESSMENT — PATIENT HEALTH QUESTIONNAIRE - PHQ9: SUM OF ALL RESPONSES TO PHQ QUESTIONS 1-9: 6

## 2017-04-11 NOTE — TELEPHONE ENCOUNTER
"Per note dated 4/10/17 \"Lab orders by were successfully faxed to Carloz michael at [phone] on 4/7/2017\"    Rec'd different fax number for Carloz Robles below    Reprinted labs ordered on 4/7/17    Faxed to [facility] lab per message below  "

## 2017-04-11 NOTE — TELEPHONE ENCOUNTER
----- Message from Amber Ulloa sent at 4/11/2017  4:18 PM CDT -----  Regarding: Lab Orders Need to be FAXED  Contact: [phone]  Garrick Taylor    Chrystal, from the patient's group home, is calling to check on the request to re-fax Dr. Kiser's lab orders to the [facility] at [phone] (attn: Lab).  Chrystal says that they have an appointment in the AM to have labs drawn, and Dr. Kiser requested that these be done before her ECT treatment tomorrow.  Chrystal can be reached at [phone], with questions.      Thanks,  Amber

## 2017-04-12 ENCOUNTER — TRANSFERRED RECORDS (OUTPATIENT)
Dept: HEALTH INFORMATION MANAGEMENT | Facility: CLINIC | Age: 32
End: 2017-04-12

## 2017-04-13 ENCOUNTER — TELEPHONE (OUTPATIENT)
Dept: PSYCHIATRY | Facility: CLINIC | Age: 32
End: 2017-04-13

## 2017-04-13 DIAGNOSIS — Z51.81 ENCOUNTER FOR THERAPEUTIC DRUG MONITORING: ICD-10-CM

## 2017-04-13 DIAGNOSIS — F25.0 SCHIZOAFFECTIVE DISORDER, BIPOLAR TYPE (H): ICD-10-CM

## 2017-04-13 LAB
CHOL/HDLC RATIO - QUEST: 4.81
CHOLEST SERPL-MCNC: 207 MG/DL
HBA1C MFR BLD: 5 % (ref 0–5.7)
HDLC SERPL-MCNC: 43 MG/DL
LDLC SERPL CALC-MCNC: 135 MG/DL
NONHDLC SERPL-MCNC: 164 MG/DL
TRIGL SERPL-MCNC: 143 MG/DL

## 2017-04-13 RX ORDER — NORETHINDRONE ACETATE AND ETHINYL ESTRADIOL 1MG-20(21)
1 KIT ORAL
COMMUNITY
Start: 2017-03-08 | End: 2017-10-04

## 2017-04-13 NOTE — TELEPHONE ENCOUNTER
Received results of CBC w/diff recevied on 4/12/2017 from [facility] drawn 4/12/2017  Results entered into EMR by Isabella Owens/LUCY   on 4/13/2107  Routed to  and Claudia Mohamud, RNCC for review  Document placed in scanning

## 2017-04-13 NOTE — TELEPHONE ENCOUNTER
Received results on 4/12/2017 of Lipid Panel from [facility]  drawn 4/12/2017  Results entered into EMR by Isabella Owens/LUCY   on 4/13/2017  Routed to  and Claudia Mohamud, DHRUVCC for review  Document placed in scanning

## 2017-04-14 ENCOUNTER — ANESTHESIA EVENT (OUTPATIENT)
Dept: BEHAVIORAL HEALTH | Facility: CLINIC | Age: 32
End: 2017-04-14

## 2017-04-14 ENCOUNTER — ANESTHESIA (OUTPATIENT)
Dept: BEHAVIORAL HEALTH | Facility: CLINIC | Age: 32
End: 2017-04-14

## 2017-04-14 ENCOUNTER — TELEPHONE (OUTPATIENT)
Dept: PSYCHIATRY | Facility: CLINIC | Age: 32
End: 2017-04-14

## 2017-04-14 ENCOUNTER — HOSPITAL ENCOUNTER (OUTPATIENT)
Dept: BEHAVIORAL HEALTH | Facility: CLINIC | Age: 32
End: 2017-04-14
Attending: PSYCHIATRY & NEUROLOGY
Payer: MEDICAID

## 2017-04-14 VITALS
HEART RATE: 102 BPM | TEMPERATURE: 99 F | DIASTOLIC BLOOD PRESSURE: 98 MMHG | SYSTOLIC BLOOD PRESSURE: 140 MMHG | OXYGEN SATURATION: 94 % | RESPIRATION RATE: 20 BRPM

## 2017-04-14 DIAGNOSIS — F25.1 SCHIZOAFFECTIVE DISORDER, DEPRESSIVE TYPE (H): ICD-10-CM

## 2017-04-14 DIAGNOSIS — F25.0 SCHIZOAFFECTIVE DISORDER, BIPOLAR TYPE (H): ICD-10-CM

## 2017-04-14 DIAGNOSIS — Z51.81 ENCOUNTER FOR THERAPEUTIC DRUG MONITORING: ICD-10-CM

## 2017-04-14 LAB
ABSOLUTE BASOPHILS (EXTERNAL): 0
ALBUMIN SERPL-MCNC: 4 G/DL
ALBUMIN/GLOB SERPL: 1.3 {RATIO}
ALP SERPL-CCNC: 52 U/L
ALT SERPL-CCNC: 21 U/L
ANION GAP SERPL CALCULATED.3IONS-SCNC: 9 MMOL/L
AST SERPL-CCNC: 21 U/L
BASOPHILS NFR BLD AUTO: 0.2 %
BILIRUB SERPL-MCNC: 0.8 MG/DL
BUN SERPL-MCNC: 14 MG/DL
CALCIUM SERPL-MCNC: 9 MG/DL
CHLORIDE SERPLBLD-SCNC: 107 MMOL/L
CO2 SERPL-SCNC: 24 MMOL/L
CREAT SERPL-MCNC: 0.86 MG/DL
EOSINOPHIL # BLD AUTO: 0.1 10*3/UL
EOSINOPHIL NFR BLD AUTO: 1.5 %
ERYTHROCYTE [DISTWIDTH] IN BLOOD BY AUTOMATED COUNT: 13.4 %
GFR SERPL CREATININE-BSD FRML MDRD: >60 ML/MIN/1.73M2
GLOBULIN: 3.2 G/DL
GLUCOSE SERPL-MCNC: 83 MG/DL (ref 70–99)
HCT VFR BLD AUTO: 43.3 %
HEMOGLOBIN: 14.8 G/DL (ref 11.7–15.7)
LYMPHOCYTES # BLD AUTO: 1.2 10*3/UL
LYMPHOCYTES NFR BLD AUTO: 20.6 %
MCH RBC QN AUTO: 30.4 PG
MCHC RBC AUTO-ENTMCNC: 34.2 G/DL
MCV RBC AUTO: 89 FL
MONOCYTES # BLD AUTO: 0.4 10*3/UL
MONOCYTES NFR BLD AUTO: 7.3 %
NEUTROPHILS # BLD AUTO: 4.2 10*3/UL
NEUTROPHILS NFR BLD AUTO: 70.4 %
PLATELET COUNT - QUEST: 145 10^9/L (ref 150–450)
PMV BLD: 9.8 FL
POTASSIUM SERPL-SCNC: 4.3 MMOL/L
PROT SERPL-MCNC: 7.2 G/DL
RBC # BLD AUTO: 4.87 10^12/L
SODIUM SERPL-SCNC: 140 MMOL/L
WBC # BLD AUTO: 6 10^9/L

## 2017-04-14 PROCEDURE — 90870 ELECTROCONVULSIVE THERAPY: CPT

## 2017-04-14 PROCEDURE — 25000125 ZZHC RX 250: Performed by: ANESTHESIOLOGY

## 2017-04-14 PROCEDURE — 25000128 H RX IP 250 OP 636: Performed by: PSYCHIATRY & NEUROLOGY

## 2017-04-14 PROCEDURE — 40000671 ZZH STATISTIC ANESTHESIA CASE

## 2017-04-14 PROCEDURE — 25000128 H RX IP 250 OP 636: Performed by: ANESTHESIOLOGY

## 2017-04-14 RX ORDER — HEPARIN SODIUM (PORCINE) LOCK FLUSH IV SOLN 100 UNIT/ML 100 UNIT/ML
5 SOLUTION INTRAVENOUS
Status: COMPLETED | OUTPATIENT
Start: 2017-04-14 | End: 2017-04-14

## 2017-04-14 RX ORDER — HEPARIN SODIUM (PORCINE) LOCK FLUSH IV SOLN 100 UNIT/ML 100 UNIT/ML
5 SOLUTION INTRAVENOUS
Status: CANCELLED
Start: 2017-04-14 | End: 2017-04-14

## 2017-04-14 RX ORDER — LABETALOL HYDROCHLORIDE 5 MG/ML
INJECTION, SOLUTION INTRAVENOUS PRN
Status: DISCONTINUED | OUTPATIENT
Start: 2017-04-14 | End: 2017-04-14

## 2017-04-14 RX ORDER — ETOMIDATE 2 MG/ML
INJECTION INTRAVENOUS PRN
Status: DISCONTINUED | OUTPATIENT
Start: 2017-04-14 | End: 2017-04-14

## 2017-04-14 RX ADMIN — ETOMIDATE 14 MG: 2 INJECTION INTRAVENOUS at 12:42

## 2017-04-14 RX ADMIN — SUCCINYLCHOLINE CHLORIDE 80 MG: 20 INJECTION, SOLUTION INTRAMUSCULAR; INTRAVENOUS at 12:42

## 2017-04-14 RX ADMIN — LABETALOL HYDROCHLORIDE 5 MG: 5 INJECTION, SOLUTION INTRAVENOUS at 12:52

## 2017-04-14 RX ADMIN — SODIUM CHLORIDE, PRESERVATIVE FREE 5 ML: 5 INJECTION INTRAVENOUS at 13:17

## 2017-04-14 NOTE — TELEPHONE ENCOUNTER
Received results of CBC w/diff from [facility] drawn 4-  Results entered into EMR by Isabella Owens/LUCY   on 4/14/2017  Routed to  and Claudia Mohamud RN for review  Document placed in scanning

## 2017-04-14 NOTE — IP AVS SNAPSHOT
MRN:[id number]                      After Visit Summary   4/14/2017    Karolyn Banerjee    MRN: [id number]           Visit Information        Provider Department      4/14/2017 10:30 AM Osiel Amos MD [facility]           Review of your medicines      CONTINUE these medicines which have NOT CHANGED        Dose / Directions    ACETAMINOPHEN PO        Dose:  1000 mg   Take 1,000 mg by mouth every 8 hours as needed for pain   Refills:  0       albuterol (5 MG/ML) 0.5% neb solution   Commonly known as:  PROVENTIL        Dose:  2.5 mg   Inhale 2.5 mg into the lungs every 4 hours as needed for wheezing or shortness of breath / dyspnea (2 puffs)   Refills:  0       benztropine 0.5 MG tablet   Commonly known as:  COGENTIN   Used for:  Schizoaffective disorder, bipolar type (H), Aggression        Dose:  0.5 mg   Take 1 tablet (0.5 mg) by mouth 2 times daily   Quantity:  60 tablet   Refills:  2       cetirizine 10 MG tablet   Commonly known as:  zyrTEC        Dose:  10 mg   Take 10 mg by mouth daily   Refills:  0       diphenhydrAMINE 25 MG tablet   Commonly known as:  BENADRYL        Dose:  25 mg   Take 25 mg by mouth every 6 hours as needed for itching or allergies   Refills:  0       FISH OIL PO        Refills:  0       hydrOXYzine 25 MG tablet   Commonly known as:  ATARAX   Used for:  Schizoaffective disorder, bipolar type (H), Aggression        Dose:  25-50 mg   Take 1-2 tablets (25-50 mg) by mouth every 4 hours as needed for anxiety   Quantity:  60 tablet   Refills:  2       lurasidone 80 MG Tabs tablet   Commonly known as:  LATUDA   Used for:  Schizoaffective disorder, bipolar type (H), Aggression        Dose:  160 mg   Take 2 tablets (160 mg) by mouth daily (with dinner)   Quantity:  60 tablet   Refills:  2       norethindrone-ethinyl estradiol 1-20 MG-MCG per tablet   Commonly known as:  JUNEL FE 1/20        Dose:  1 tablet   Take 1 tablet by mouth   Refills:  0        OLANZapine 10 MG tablet   Commonly known as:  zyPREXA   Used for:  Schizoaffective disorder, bipolar type (H), Aggression        Dose:  10 mg   Take 1 tablet (10 mg) by mouth 2 times daily as needed   Quantity:  30 tablet   Refills:  0       perphenazine 8 MG tablet   Used for:  Schizoaffective disorder, bipolar type (H), Aggression        Dose:  8 mg   Take 1 tablet (8 mg) by mouth 3 times daily   Quantity:  93 tablet   Refills:  2       traZODone 50 MG tablet   Commonly known as:  DESYREL   Used for:  Schizoaffective disorder, bipolar type (H), Aggression        Dose:  50 mg   Take 1 tablet (50 mg) by mouth At Bedtime   Quantity:  30 tablet   Refills:  2                Protect others around you: Learn how to safely use, store and throw away your medicines at www.disposemymeds.org.         Follow-ups after your visit        Your next 10 appointments already scheduled     Jun 02, 2017  3:25 PM CDT   Schizophrenia Follow Up with Akbar Rangel MD   Psychiatry Clinic ([facility])    [address]  [address] [facility] [id number] [phone]               Care Instructions        Further instructions from your care team       ECT Discharge Instructions      During your ECT series:      Do not drive or work heavy equipment until 7 days after your last treatment.    Do not drink alcohol or use street drugs (illicit drugs) while you are having treatments.    Do not make important decisions, including legal decisions.    After each treatment:      Get plenty of rest. A responsible adult must stay with your for at least 6 hours.    Avoid heavy or risky activities for 24 hours.    If you feel light-headed, sit for a few minutes before standing. Have someone help you get up.    If you have nausea (feel sick to your stomach): Drink only clear liquids such as apple juice, ginger ale, broth or 7UP, Be sure to drink plenty of liquids. Move to a normal diet as you feel able.  "    If you received Toradol, wait 6 hours before taking ibuprofen.    Call your doctor if:     You have a fever over 100F (37.7 C) (taken under the tongue), or a fever that last more than 24 hours.    Your IV site is red, swollen, very painful or is getting more tender.    You have nausea that gets very bad or does not improve.      If you have any symptoms after ECT, tell our staff before your next treatment.    The ECT Department can be reached at [phone].  The ECT Department is open ,  and  from 7:00 AM to 2:00 PM.    Your next ECT treatment will be: in 4-6 weeks, Dr. Amos will discuss with clinic resident.  Call Dr. Amos's clinic if you have not heard back in a couple weeks.  Thank-You.    To speak to a doctor, call: Dr. Osiel Amos: Office # [phone] and fax # [phone]     Additional Information About Your Visit        [employer] Software Information     [employer] lets you send messages to your doctor, view your test results, renew your prescriptions, schedule appointments and more. To sign up, go to [email or web address] . Click on \"Log in\" on the left side of the screen, which will take you to the Welcome page. Then click on \"Sign up Now\" on the right side of the page.     You will be asked to enter the access code listed below, as well as some personal information. Please follow the directions to create your username and password.     Your access code is: [email or web address]  Expires: 2017 12:25 PM     Your access code will  in 90 days. If you need help or a new code, please call your [facility] clinic or [phone].        Care EveryWhere ID     This is your Care EveryWhere ID. This could be used by other organizations to access your [facility] medical records  [id number]        Your Vitals Were     Blood Pressure Pulse Temperature Respirations Pulse Oximetry       140/98 98 99  F (37.2  C) (Tympanic) 22 94%        Primary Care Provider Office Phone # Fax #    Suzie Roy " [place] [phone]      Thank you!     Thank you for choosing [facility] for your care. Our goal is always to provide you with excellent care. Hearing back from our patients is one way we can continue to improve our services. Please take a few minutes to complete the written survey that you may receive in the mail after you visit with us. Thank you!             Medication List: This is a list of all your medications and when to take them. Check marks below indicate your daily home schedule. Keep this list as a reference.      Medications           Morning Afternoon Evening Bedtime As Needed    ACETAMINOPHEN PO   Take 1,000 mg by mouth every 8 hours as needed for pain                                albuterol (5 MG/ML) 0.5% neb solution   Commonly known as:  PROVENTIL   Inhale 2.5 mg into the lungs every 4 hours as needed for wheezing or shortness of breath / dyspnea (2 puffs)                                benztropine 0.5 MG tablet   Commonly known as:  COGENTIN   Take 1 tablet (0.5 mg) by mouth 2 times daily                                cetirizine 10 MG tablet   Commonly known as:  zyrTEC   Take 10 mg by mouth daily                                diphenhydrAMINE 25 MG tablet   Commonly known as:  BENADRYL   Take 25 mg by mouth every 6 hours as needed for itching or allergies                                FISH OIL PO                                hydrOXYzine 25 MG tablet   Commonly known as:  ATARAX   Take 1-2 tablets (25-50 mg) by mouth every 4 hours as needed for anxiety                                lurasidone 80 MG Tabs tablet   Commonly known as:  LATUDA   Take 2 tablets (160 mg) by mouth daily (with dinner)                                norethindrone-ethinyl estradiol 1-20 MG-MCG per tablet   Commonly known as:  JUNEL FE 1/20   Take 1 tablet by mouth                                OLANZapine 10 MG tablet   Commonly known as:  zyPREXA   Take 1 tablet (10 mg) by mouth 2 times daily as needed                                 perphenazine 8 MG tablet   Take 1 tablet (8 mg) by mouth 3 times daily                                traZODone 50 MG tablet   Commonly known as:  DESYREL   Take 1 tablet (50 mg) by mouth At Bedtime

## 2017-04-14 NOTE — TELEPHONE ENCOUNTER
Received results of CMP from [facility] drawn 4/12/2017  Results entered into EMR by Isabella Owens/LUCY   on 4/14/2017  Routed to  and Claudia Mohamud, DHRUVCC for review  Document placed in scanning
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
## 2017-04-14 NOTE — ANESTHESIA PREPROCEDURE EVALUATION
Anesthesia Evaluation     . Pt has had prior anesthetic. Type: General           ROS/MED HX    ENT/Pulmonary:     (+)JOSE risk factors obese, Intermittent asthma Treatment: Inhaler prn,  , cystic fibrosis . .    Neurologic:       Cardiovascular:         METS/Exercise Tolerance:     Hematologic:         Musculoskeletal:         GI/Hepatic:         Renal/Genitourinary:         Endo:     (+) Obesity, .      Psychiatric:     (+) psychiatric history schizophrenia, depression and other (comment) (BL personality d/o)      Infectious Disease:         Malignancy:         Other:                     Physical Exam  Normal systems: cardiovascular, pulmonary and dental    Airway   Mallampati: II  TM distance: <3 FB  Neck ROM: full    Dental     Cardiovascular       Pulmonary                     Anesthesia Plan      History & Physical Review  History and physical reviewed and following examination; no interval change.    ASA Status:  3 .        Plan for General with Intravenous induction.          Postoperative Care      Consents  Anesthetic plan, risks, benefits and alternatives discussed with:  Patient..

## 2017-04-14 NOTE — IP AVS SNAPSHOT
[facility]    [facility] [address] [address]    Phone:  [phone]                                       After Visit Summary   4/14/2017    Karolyn Banerjee    MRN: [id number]           After Visit Summary Signature Page     I have received my discharge instructions, and my questions have been answered. I have discussed any challenges I see with this plan with the nurse or doctor.    ..........................................................................................................................................  Patient/Patient Representative Signature      ..........................................................................................................................................  Patient Representative Print Name and Relationship to Patient    ..................................................               ................................................  Date                                            Time    ..........................................................................................................................................  Reviewed by Signature/Title    ...................................................              ..............................................  Date                                                            Time

## 2017-04-14 NOTE — ANESTHESIA POSTPROCEDURE EVALUATION
Patient: Karolyn Banerjee    * No procedures listed *    Diagnosis:Schizoaffective disorder, depressive type (H) [F25.1]  Diagnosis Additional Information: No value filed.    Anesthesia Type:  No value filed.    Note:  Anesthesia Post Evaluation    Patient location during evaluation: PACU  Patient participation: Able to fully participate in evaluation  Level of consciousness: awake and alert  Pain management: adequate  Airway patency: patent  Respiratory status: acceptable  Hydration status: acceptable  PONV: none     Anesthetic complications: None          Last vitals:  Vitals:    04/14/17 1059   BP: (!) 140/98   Pulse: 92   Resp: 16   Temp: 37.1  C (98.7  F)   SpO2: 96%         Electronically Signed By: Shane Barrientos MD, MD  April 14, 2017  11:41 AM

## 2017-04-14 NOTE — DISCHARGE INSTRUCTIONS
ECT Discharge Instructions      During your ECT series:      Do not drive or work heavy equipment until 7 days after your last treatment.    Do not drink alcohol or use street drugs (illicit drugs) while you are having treatments.    Do not make important decisions, including legal decisions.    After each treatment:      Get plenty of rest. A responsible adult must stay with your for at least 6 hours.    Avoid heavy or risky activities for 24 hours.    If you feel light-headed, sit for a few minutes before standing. Have someone help you get up.    If you have nausea (feel sick to your stomach): Drink only clear liquids such as apple juice, ginger ale, broth or 7UP, Be sure to drink plenty of liquids. Move to a normal diet as you feel able.     If you received Toradol, wait 6 hours before taking ibuprofen.    Call your doctor if:     You have a fever over 100F (37.7 C) (taken under the tongue), or a fever that last more than 24 hours.    Your IV site is red, swollen, very painful or is getting more tender.    You have nausea that gets very bad or does not improve.      If you have any symptoms after ECT, tell our staff before your next treatment.    The ECT Department can be reached at [phone].  The ECT Department is open Mondays, Wednesdays and Fridays from 7:00 AM to 2:00 PM.    Your next ECT treatment will be: in 4-6 weeks, Dr. Amos will discuss with clinic resident.  Call Dr. Amos's clinic if you have not heard back in a couple weeks.  Thank-You.    To speak to a doctor, call: Dr. Osiel Amos: Office # [phone] and fax # [phone]

## 2017-05-03 ENCOUNTER — TELEPHONE (OUTPATIENT)
Dept: PSYCHIATRY | Facility: CLINIC | Age: 32
End: 2017-05-03

## 2017-05-03 NOTE — TELEPHONE ENCOUNTER
----- Message from Juan Salazar sent at 5/3/2017 10:44 AM CDT -----  Regarding: Pt Care  Contact: [phone]  Pt  [facility] call 5/3/17@10:45am regarding ECT Therapy scheduling. For further information please contact caller.    Thanks

## 2017-05-03 NOTE — TELEPHONE ENCOUNTER
Returned call to  staff.  Confirmed they were calling to schedule pt's next ECT treatment.  Shared that writer did not have access to the schedule in order to assist.  Directed them to contact ECT directly to schedule and phone number is provided.  Explained that I was uncertain if they would be able to schedule an appt if pt wasn't needing to return for a few weeks but that ECT staff could better able to speak to this when calling.

## 2017-05-05 ENCOUNTER — TELEPHONE (OUTPATIENT)
Dept: PSYCHIATRY | Facility: CLINIC | Age: 32
End: 2017-05-05

## 2017-05-05 NOTE — TELEPHONE ENCOUNTER
Social Work Note: Incoming Call  [facility]    Incoming Call From:  Ayanna with [facility]  Reason for Call:  Ayanna stated she was calling to inform that DA was completed for Karolyn this morning and that [facility] will be working to assign her an [facility] worker as soon as possible.     At request of provider, FELIX Houston submitted [facility] referral on 4/7/17 using [facility]'s online form.     Will route to patient's current psychiatric provider(s) as an FYI.   Please call or page with any immediate needs or concerns.    MARCELLE Barrett, [facility]   Direct: [phone]  Fax: [phone]

## 2017-05-08 ENCOUNTER — TELEPHONE (OUTPATIENT)
Dept: PSYCHIATRY | Facility: CLINIC | Age: 32
End: 2017-05-08

## 2017-05-08 DIAGNOSIS — R46.89 AGGRESSION: ICD-10-CM

## 2017-05-08 DIAGNOSIS — F25.0 SCHIZOAFFECTIVE DISORDER, BIPOLAR TYPE (H): ICD-10-CM

## 2017-05-08 NOTE — TELEPHONE ENCOUNTER
Medication Requested: Olazapine 10 mg tabs  Last Written RX Date: 7-6-16  Last Pharmacy Fill Date: 9-30-16  Last RX Quantity: 30,   # refills: 0    Last Office Visit: 4-7-17  Recommended RTC: 4-8-wks  Next Scheduled Office Visit: 6-2-17    Since last Visit: # of CX 0 ,# of NOS 0    Last Visit Recommendations for:  Medications: no change  Labs: 0    Will route to provider to determine the attending provider to enter as ordering due to insurance reasons.        Kathleen M Doege RN

## 2017-05-10 RX ORDER — OLANZAPINE 10 MG/1
10 TABLET ORAL 2 TIMES DAILY PRN
Qty: 30 TABLET | Refills: 0 | Status: SHIPPED | OUTPATIENT
Start: 2017-05-10 | End: 2017-06-02

## 2017-05-23 ENCOUNTER — TELEPHONE (OUTPATIENT)
Dept: PSYCHIATRY | Facility: CLINIC | Age: 32
End: 2017-05-23

## 2017-05-23 NOTE — TELEPHONE ENCOUNTER
"Social Work Note: Outgoing Call  [facility]    Outgoing Call To:  Joan at [facility], Karolyn's [facility] worker   Reason for Call:  SW returning Joan's call requesting to speak to SW for \"more info\" about pt and reason for referral.     Joan informed that she recently met with Karolyn for initial appt. Joan reported that Karolyn was interested [facility] because she wants help \"going places to do things\" and with getting a job. Joan reviewed with Karolyn the services an [facility] worker can provide (e.g. \"Homemaking skills\", building a resume) and that Karolyn did not appear interested in this support, reiterating that she wanted someone who could get her out of the house and take her out to do things. Based on initial visit, Joan stated that Karolyn doesn't appear to be a good fit for [facility] services at this time. Joan said she is planning to meet with her supervisor to review these concerns and identify next steps. Joan and Karolyn do not have any scheduled appts in the future.     Writer identified that she would follow up with Karolyn to review why she was referred for an [facility] worker and talk about Karolyn's expectations. Joan said she would be open to working with Karolyn as long as she had a clear understanding of services offered, realistic expectations, and is willing to work on developing skills to promote her mental health and well-being. Writer will follow up with Joan after speaking to Karolyn. Writer informed Karolyn's next clinic visit is on 6/2/17.     Plan:    Will route to patient's current psychiatric provider(s) as an FYI.   SW will reach out to Karolyn and then follow up with Joan.     MARCELLE Barrett, [facility]   Direct: [phone]          "

## 2017-05-25 ENCOUNTER — TELEPHONE (OUTPATIENT)
Dept: PSYCHIATRY | Facility: CLINIC | Age: 32
End: 2017-05-25

## 2017-05-26 ENCOUNTER — ANESTHESIA EVENT (OUTPATIENT)
Dept: BEHAVIORAL HEALTH | Facility: CLINIC | Age: 32
End: 2017-05-26

## 2017-05-26 ENCOUNTER — HOSPITAL ENCOUNTER (OUTPATIENT)
Dept: BEHAVIORAL HEALTH | Facility: CLINIC | Age: 32
End: 2017-05-26
Attending: PSYCHIATRY & NEUROLOGY
Payer: MEDICAID

## 2017-05-26 ENCOUNTER — ANESTHESIA (OUTPATIENT)
Dept: BEHAVIORAL HEALTH | Facility: CLINIC | Age: 32
End: 2017-05-26

## 2017-05-26 VITALS
TEMPERATURE: 98.5 F | OXYGEN SATURATION: 94 % | HEART RATE: 95 BPM | RESPIRATION RATE: 16 BRPM | SYSTOLIC BLOOD PRESSURE: 163 MMHG | DIASTOLIC BLOOD PRESSURE: 97 MMHG

## 2017-05-26 DIAGNOSIS — F25.1 SCHIZOAFFECTIVE DISORDER, DEPRESSIVE TYPE (H): ICD-10-CM

## 2017-05-26 PROCEDURE — 25000125 ZZHC RX 250: Performed by: ANESTHESIOLOGY

## 2017-05-26 PROCEDURE — 40000671 ZZH STATISTIC ANESTHESIA CASE

## 2017-05-26 PROCEDURE — 25000128 H RX IP 250 OP 636: Performed by: PSYCHIATRY & NEUROLOGY

## 2017-05-26 PROCEDURE — 90870 ELECTROCONVULSIVE THERAPY: CPT

## 2017-05-26 RX ORDER — HEPARIN SODIUM (PORCINE) LOCK FLUSH IV SOLN 100 UNIT/ML 100 UNIT/ML
5 SOLUTION INTRAVENOUS
Status: COMPLETED | OUTPATIENT
Start: 2017-05-26 | End: 2017-05-26

## 2017-05-26 RX ORDER — ETOMIDATE 2 MG/ML
INJECTION INTRAVENOUS PRN
Status: DISCONTINUED | OUTPATIENT
Start: 2017-05-26 | End: 2017-05-26

## 2017-05-26 RX ORDER — HEPARIN SODIUM (PORCINE) LOCK FLUSH IV SOLN 100 UNIT/ML 100 UNIT/ML
5 SOLUTION INTRAVENOUS
Status: CANCELLED
Start: 2017-05-26 | End: 2017-05-26

## 2017-05-26 RX ADMIN — SUCCINYLCHOLINE CHLORIDE 80 MG: 20 INJECTION, SOLUTION INTRAMUSCULAR; INTRAVENOUS at 10:28

## 2017-05-26 RX ADMIN — SODIUM CHLORIDE, PRESERVATIVE FREE 5 ML: 5 INJECTION INTRAVENOUS at 10:44

## 2017-05-26 RX ADMIN — ETOMIDATE 14 MG: 2 INJECTION INTRAVENOUS at 10:28

## 2017-05-26 NOTE — IP AVS SNAPSHOT
MRN:[id number]                      After Visit Summary   5/26/2017    Karolyn Banerjee    MRN: [id number]           Visit Information        Provider Department      5/26/2017 10:00 AM Osiel Amos MD [facility]           Review of your medicines      CONTINUE these medicines which have NOT CHANGED        Dose / Directions    ACETAMINOPHEN PO        Dose:  1000 mg   Take 1,000 mg by mouth every 8 hours as needed for pain   Refills:  0       albuterol (5 MG/ML) 0.5% neb solution   Commonly known as:  PROVENTIL        Dose:  2.5 mg   Inhale 2.5 mg into the lungs every 4 hours as needed for wheezing or shortness of breath / dyspnea (2 puffs)   Refills:  0       benztropine 0.5 MG tablet   Commonly known as:  COGENTIN   Used for:  Schizoaffective disorder, bipolar type (H), Aggression        Dose:  0.5 mg   Take 1 tablet (0.5 mg) by mouth 2 times daily   Quantity:  60 tablet   Refills:  2       cetirizine 10 MG tablet   Commonly known as:  zyrTEC        Dose:  10 mg   Take 10 mg by mouth daily   Refills:  0       diphenhydrAMINE 25 MG tablet   Commonly known as:  BENADRYL        Dose:  25 mg   Take 25 mg by mouth every 6 hours as needed for itching or allergies   Refills:  0       FISH OIL PO        Refills:  0       hydrOXYzine 25 MG tablet   Commonly known as:  ATARAX   Used for:  Schizoaffective disorder, bipolar type (H), Aggression        Dose:  25-50 mg   Take 1-2 tablets (25-50 mg) by mouth every 4 hours as needed for anxiety   Quantity:  60 tablet   Refills:  2       lurasidone 80 MG Tabs tablet   Commonly known as:  LATUDA   Used for:  Schizoaffective disorder, bipolar type (H), Aggression        Dose:  160 mg   Take 2 tablets (160 mg) by mouth daily (with dinner)   Quantity:  60 tablet   Refills:  2       norethindrone-ethinyl estradiol 1-20 MG-MCG per tablet   Commonly known as:  JUNEL FE 1/20        Dose:  1 tablet   Take 1 tablet by mouth   Refills:  0        OLANZapine 10 MG tablet   Commonly known as:  zyPREXA   Used for:  Schizoaffective disorder, bipolar type (H), Aggression        Dose:  10 mg   Take 1 tablet (10 mg) by mouth 2 times daily as needed   Quantity:  30 tablet   Refills:  0       perphenazine 8 MG tablet   Used for:  Schizoaffective disorder, bipolar type (H), Aggression        Dose:  8 mg   Take 1 tablet (8 mg) by mouth 3 times daily   Quantity:  93 tablet   Refills:  2       traZODone 50 MG tablet   Commonly known as:  DESYREL   Used for:  Schizoaffective disorder, bipolar type (H), Aggression        Dose:  50 mg   Take 1 tablet (50 mg) by mouth At Bedtime   Quantity:  30 tablet   Refills:  2                Protect others around you: Learn how to safely use, store and throw away your medicines at www.disposemymeds.org.         Follow-ups after your visit        Your next 10 appointments already scheduled     Jun 02, 2017  3:00 PM CDT   SOCIAL WORK with JOSE ANGEL Barrett   Psychiatry Clinic ([facility])    [address]  [address] [facility] [address]   [phone]            Jun 02, 2017  3:25 PM CDT   Schizophrenia Follow Up with Akbar Rangel MD   Psychiatry Clinic ([facility])    [address]  [address] [facility] [address]   [phone]            Jul 14, 2017 10:00 AM CDT   Electroconvulsive Therapy with Osiel Amos MD   [facility] ([facility])    [address]  [facility] [address]   [phone]               Care Instructions        Further instructions from your care team       ECT Discharge Instructions      During your ECT series:      Do not drive or work heavy equipment until 7 days after your last treatment.    Do not drink alcohol or use street drugs (illicit drugs) while you are having treatments.    Do not make important decisions, including legal  "decisions.    After each treatment:      Get plenty of rest. A responsible adult must stay with your for at least 6 hours.    Avoid heavy or risky activities for 24 hours.    If you feel light-headed, sit for a few minutes before standing. Have someone help you get up.    If you have nausea (feel sick to your stomach): Drink only clear liquids such as apple juice, ginger ale, broth or 7UP, Be sure to drink plenty of liquids. Move to a normal diet as you feel able.     If you received Toradol, wait 6 hours before taking ibuprofen.    Call your doctor if:     You have a fever over 100F (37.7 C) (taken under the tongue), or a fever that last more than 24 hours.    Your IV site is red, swollen, very painful or is getting more tender.    You have nausea that gets very bad or does not improve.      If you have any symptoms after ECT, tell our staff before your next treatment.    The ECT Department can be reached at [phone].  The ECT Department is open ,  and  from 7:00 AM to 2:00 PM.    To speak to a doctor, call: Dr Osiel Amos: Office # [phone] and Fax # [phone]    Transported by: Amber Do           Additional Information About Your Visit        [employer]izer Information     [facility] lets you send messages to your doctor, view your test results, renew your prescriptions, schedule appointments and more. To sign up, go to [email or web address] . Click on \"Log in\" on the left side of the screen, which will take you to the Welcome page. Then click on \"Sign up Now\" on the right side of the page.     You will be asked to enter the access code listed below, as well as some personal information. Please follow the directions to create your username and password.     Your access code is: [email or web address]  Expires: 2017 12:25 PM     Your access code will  in 90 days. If you need help or a new code, please call your [facility] clinic or [phone].        Care EveryWhere ID     This " is your Care EveryWhere ID. This could be used by other organizations to access your [facility] medical records  [id number]        Your Vitals Were     Blood Pressure Pulse Temperature Respirations Pulse Oximetry       162/92 (BP Location: Left arm) 95 98.5  F (36.9  C) (Temporal) 16 93%        Primary Care Provider Office Phone # Fax #    Suzie Mariscal [phone]      Thank you!     Thank you for choosing [facility] for your care. Our goal is always to provide you with excellent care. Hearing back from our patients is one way we can continue to improve our services. Please take a few minutes to complete the written survey that you may receive in the mail after you visit with us. Thank you!             Medication List: This is a list of all your medications and when to take them. Check marks below indicate your daily home schedule. Keep this list as a reference.      Medications           Morning Afternoon Evening Bedtime As Needed    ACETAMINOPHEN PO   Take 1,000 mg by mouth every 8 hours as needed for pain                                albuterol (5 MG/ML) 0.5% neb solution   Commonly known as:  PROVENTIL   Inhale 2.5 mg into the lungs every 4 hours as needed for wheezing or shortness of breath / dyspnea (2 puffs)                                benztropine 0.5 MG tablet   Commonly known as:  COGENTIN   Take 1 tablet (0.5 mg) by mouth 2 times daily                                cetirizine 10 MG tablet   Commonly known as:  zyrTEC   Take 10 mg by mouth daily                                diphenhydrAMINE 25 MG tablet   Commonly known as:  BENADRYL   Take 25 mg by mouth every 6 hours as needed for itching or allergies                                FISH OIL PO                                hydrOXYzine 25 MG tablet   Commonly known as:  ATARAX   Take 1-2 tablets (25-50 mg) by mouth every 4 hours as needed for anxiety                                lurasidone 80 MG Tabs tablet   Commonly known  as:  LATUDA   Take 2 tablets (160 mg) by mouth daily (with dinner)                                norethindrone-ethinyl estradiol 1-20 MG-MCG per tablet   Commonly known as:  JUNEL FE 1/20   Take 1 tablet by mouth                                OLANZapine 10 MG tablet   Commonly known as:  zyPREXA   Take 1 tablet (10 mg) by mouth 2 times daily as needed                                perphenazine 8 MG tablet   Take 1 tablet (8 mg) by mouth 3 times daily                                traZODone 50 MG tablet   Commonly known as:  DESYREL   Take 1 tablet (50 mg) by mouth At Bedtime

## 2017-05-26 NOTE — ANESTHESIA PREPROCEDURE EVALUATION
Anesthesia Evaluation     . Pt has had prior anesthetic. Type: General           ROS/MED HX    ENT/Pulmonary:     (+)JOSE risk factors obese, Intermittent asthma Treatment: Inhaler prn,  , cystic fibrosis . .    Neurologic:       Cardiovascular:         METS/Exercise Tolerance:     Hematologic:         Musculoskeletal:         GI/Hepatic:         Renal/Genitourinary:         Endo:     (+) Obesity, .      Psychiatric:     (+) psychiatric history schizophrenia, depression and other (comment) (BL personality d/o)      Infectious Disease:         Malignancy:         Other:                     Physical Exam  Normal systems: cardiovascular and pulmonary    Airway   Mallampati: III  TM distance: >3 FB    Dental   (+) chipped and missing    Cardiovascular       Pulmonary                     Anesthesia Plan      History & Physical Review  History and physical reviewed and following examination; no interval change.    ASA Status:  3 .    NPO Status:  > 8 hours    Plan for General with Intravenous induction.          Postoperative Care      Consents  Anesthetic plan, risks, benefits and alternatives discussed with:  Patient..

## 2017-05-26 NOTE — ANESTHESIA POSTPROCEDURE EVALUATION
Patient: Karolyn Banerjee    * No procedures listed *    Diagnosis:Schizoaffective disorder, depressive type (H) [F25.1]  Diagnosis Additional Information: No value filed.    Anesthesia Type:  General    Note:  Anesthesia Post Evaluation    Patient location during evaluation: ECT PACU  Patient participation: Able to fully participate in evaluation  Level of consciousness: awake and alert  Pain management: adequate  Airway patency: patent  Cardiovascular status: acceptable  Respiratory status: acceptable  Hydration status: acceptable  PONV: none             Last vitals:  Vitals:    05/26/17 1047 05/26/17 1057 05/26/17 1107   BP: (!) 174/112 (!) 162/92 (!) 163/97   Pulse:      Resp: 16 16 16   Temp:      SpO2: 93% 93% 94%         Electronically Signed By: Antonia Horton MD  May 26, 2017  11:15 AM
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
## 2017-05-26 NOTE — IP AVS SNAPSHOT
[facility]    [facility] [address] [address]    Phone:  [phone]                                       After Visit Summary   5/26/2017    Karolyn Banerjee    MRN: [id number]           After Visit Summary Signature Page     I have received my discharge instructions, and my questions have been answered. I have discussed any challenges I see with this plan with the nurse or doctor.    ..........................................................................................................................................  Patient/Patient Representative Signature      ..........................................................................................................................................  Patient Representative Print Name and Relationship to Patient    ..................................................               ................................................  Date                                            Time    ..........................................................................................................................................  Reviewed by Signature/Title    ...................................................              ..............................................  Date                                                            Time

## 2017-05-26 NOTE — DISCHARGE INSTRUCTIONS
ECT Discharge Instructions      During your ECT series:      Do not drive or work heavy equipment until 7 days after your last treatment.    Do not drink alcohol or use street drugs (illicit drugs) while you are having treatments.    Do not make important decisions, including legal decisions.    After each treatment:      Get plenty of rest. A responsible adult must stay with your for at least 6 hours.    Avoid heavy or risky activities for 24 hours.    If you feel light-headed, sit for a few minutes before standing. Have someone help you get up.    If you have nausea (feel sick to your stomach): Drink only clear liquids such as apple juice, ginger ale, broth or 7UP, Be sure to drink plenty of liquids. Move to a normal diet as you feel able.     If you received Toradol, wait 6 hours before taking ibuprofen.    Call your doctor if:     You have a fever over 100F (37.7 C) (taken under the tongue), or a fever that last more than 24 hours.    Your IV site is red, swollen, very painful or is getting more tender.    You have nausea that gets very bad or does not improve.      If you have any symptoms after ECT, tell our staff before your next treatment.    The ECT Department can be reached at [phone].  The ECT Department is open Mondays, Wednesdays and Fridays from 7:00 AM to 2:00 PM.    To speak to a doctor, call: Dr Osiel Amos: Office # [phone] and Fax # [phone]    Transported by: StaffAmber

## 2017-06-02 ENCOUNTER — OFFICE VISIT (OUTPATIENT)
Dept: PSYCHIATRY | Facility: CLINIC | Age: 32
End: 2017-06-02
Attending: PSYCHIATRY & NEUROLOGY
Payer: MEDICAID

## 2017-06-02 VITALS
SYSTOLIC BLOOD PRESSURE: 130 MMHG | WEIGHT: 266 LBS | BODY MASS INDEX: 44.26 KG/M2 | HEART RATE: 90 BPM | DIASTOLIC BLOOD PRESSURE: 95 MMHG

## 2017-06-02 DIAGNOSIS — R46.89 AGGRESSION: ICD-10-CM

## 2017-06-02 DIAGNOSIS — F25.0 SCHIZOAFFECTIVE DISORDER, BIPOLAR TYPE (H): Primary | ICD-10-CM

## 2017-06-02 PROCEDURE — 99212 OFFICE O/P EST SF 10 MIN: CPT | Mod: ZF

## 2017-06-02 NOTE — NURSING NOTE
Chief Complaint   Patient presents with     Recheck Medication     Schizoaffective disorder, bipolar type     Reviewed allergies, smoking status, and pharmacy preference  Administered some abuse screening questions   Obtained weight, blood pressure and heart rate

## 2017-06-02 NOTE — MR AVS SNAPSHOT
After Visit Summary   6/2/2017    Karolyn Banerjee    MRN: [id number]           Patient Information     Date Of Birth          1985        Visit Information        Provider Department      6/2/2017 3:25 PM Akbar Rangel MD [facility]        Today's Diagnoses     Schizoaffective disorder, bipolar type (H)    -  1    Aggression           Follow-ups after your visit        Follow-up notes from your care team     Return in about 6 weeks (around 7/14/2017).      Your next 10 appointments already scheduled     Jul 14, 2017 10:00 AM CDT   Electroconvulsive Therapy with Osiel Amos MD   [facility] ([facility])    [address] [address] [phone]            Aug 04, 2017  3:15 PM CDT   Schizophrenia Follow Up with Akbar Rangel MD   [facility] ([facility])    [address]  [address] [facility] [id number] [phone]              Who to contact     Please call your clinic at [phone] to:    Ask questions about your health    Make or cancel appointments    Discuss your medicines    Learn about your test results    Speak to your doctor   If you have compliments or concerns about an experience at your clinic, or if you wish to file a complaint, please contact [facility] Patient Relations at [phone] or email us at [email or web address]         Additional Information About Your Visit        MyChart Information     [employer] is an electronic gateway that provides easy, online access to your medical records. With [employer], you can request a clinic appointment, read your test results, renew a prescription or communicate with your care team.     To sign up for [employer] visit the website at [email or web address]   You will be asked to enter the access code listed below, as well as some personal  information. Please follow the directions to create your username and password.     Your access code is: [email or web address]  Expires: 2017 10:51 PM     Your access code will  in 90 days. If you need help or a new code, please contact your [facility] or call [phone] for assistance.        Care EveryWhere ID     This is your Care EveryWhere ID. This could be used by other organizations to access your [place] medical records  [id number]        Your Vitals Were     Pulse BMI (Body Mass Index)                90 44.26 kg/m2           Blood Pressure from Last 3 Encounters:   17 (!) 130/95   17 (!) 163/97   17 (!) 140/98    Weight from Last 3 Encounters:   17 120.7 kg (266 lb)   17 122.3 kg (269 lb 9.6 oz)   17 122.2 kg (269 lb 6.4 oz)              Today, you had the following     No orders found for display         Where to get your medicines      These medications were sent to [facility] [place], [place] -  [email or web address].  [email or web address] [facility] [address]     Phone:  [phone]     lurasidone 80 MG Tabs tablet    OLANZapine 10 MG tablet    perphenazine 8 MG tablet          Primary Care Provider Office Phone # Fax #    Suzie Mariscal [phone]       [address] [address]        Thank you!     Thank you for choosing [facility]  for your care. Our goal is always to provide you with excellent care. Hearing back from our patients is one way we can continue to improve our services. Please take a few minutes to complete the written survey that you may receive in the mail after your visit with us. Thank you!             Your Updated Medication List - Protect others around you: Learn how to safely use, store and throw away your medicines at www.disposemymeds.org.          This list is accurate as of: 17 11:59 PM.  Always use your most recent med list.                    Brand Name Dispense Instructions for use    ACETAMINOPHEN PO      Take 1,000 mg by mouth every 8 hours as needed for pain       albuterol (5 MG/ML) 0.5% neb solution    PROVENTIL     Inhale 2.5 mg into the lungs every 4 hours as needed for wheezing or shortness of breath / dyspnea (2 puffs)       benztropine 0.5 MG tablet    COGENTIN    60 tablet    Take 1 tablet (0.5 mg) by mouth 2 times daily       cetirizine 10 MG tablet    zyrTEC     Take 10 mg by mouth daily       diphenhydrAMINE 25 MG tablet    BENADRYL     Take 25 mg by mouth every 6 hours as needed for itching or allergies       FISH OIL PO          hydrOXYzine 25 MG tablet    ATARAX    60 tablet    Take 1-2 tablets (25-50 mg) by mouth every 4 hours as needed for anxiety       lurasidone 80 MG Tabs tablet    LATUDA    60 tablet    Take 2 tablets (160 mg) by mouth daily (with dinner)       norethindrone-ethinyl estradiol 1-20 MG-MCG per tablet    JUNEL FE 1/20     Take 1 tablet by mouth       OLANZapine 10 MG tablet    zyPREXA    30 tablet    Take 1 tablet (10 mg) by mouth 2 times daily as needed       perphenazine 8 MG tablet     93 tablet    Take 1 tablet (8 mg) by mouth 3 times daily       traZODone 50 MG tablet    DESYREL    30 tablet    Take 1 tablet (50 mg) by mouth At Bedtime
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
## 2017-06-07 ENCOUNTER — TELEPHONE (OUTPATIENT)
Dept: PSYCHIATRY | Facility: CLINIC | Age: 32
End: 2017-06-07

## 2017-06-07 NOTE — TELEPHONE ENCOUNTER
Social Work Note: Left Voicemail  [facility] Psychiatry Clinic    Left Voicemail Message for Karolyn Franco's ARMHS worker with ACP  Content of Voicemail:  FELIX LVM for Joan informing that Karolyn's group home called  and asked to reschedule SW appt from today, 6/7/17 at 2pm to Friday, 6/9 @ 11am. SW informed will follow up with Joan after meeting with patient to review role of ARMHS worker and pt's expectations, and clarify if she would like to continue working with Joan.     Included SW's direct contact information and requested a callback.     Will route to patient's current psychiatric provider(s) as an FYI.   Please call or page with any immediate needs or concerns.    MARCELLE Barrett, [facility]  Clinic   Direct: [phone]  Fax: [phone]

## 2017-06-09 ENCOUNTER — ALLIED HEALTH/NURSE VISIT (OUTPATIENT)
Dept: PSYCHIATRY | Facility: CLINIC | Age: 32
End: 2017-06-09
Attending: PSYCHIATRY & NEUROLOGY
Payer: MEDICAID

## 2017-06-09 DIAGNOSIS — Z71.89 COUNSELING AND COORDINATION OF CARE: Primary | ICD-10-CM

## 2017-06-09 NOTE — PROGRESS NOTES
Social Work Visit (Brief)  [facility] Psychiatry Clinic    Data/Intervention:  Patient Name:  Karolyn Banerjee  /Age:  1985 (31 year old)    Reason for Visit:  Karolyn came to meet SW in clinic today to review why provider referred to [facility] services and review role of ARMHS worker.     Karolyn was accompanied in clinic today by two female staff from her group home. SW met with all 3 together in an available clinic room.     Collaborated With:    -Karolyn, patient  -Staff (x2)  -Dr. Rangel, Psychiatry provider     SW identified reason for visit is to review Karolyn's thoughts about working with an ARMHS worker. SW reviewed role of ARMHS worker and provided examples of ways an ARMHS worker may provide support. SW also acknowledged and validated Karolyn's disappointment about her expectation that an ARMHS worker would drive her places in the community. SW and two staff present prompted Karolyn to identify activities that she enjoys that would not require transportation. Karolyn reported she enjoyed going for walks outside, coloring and art, cooking, and baking. Writer pointed out how regularly engaging in leisure activities will promote her mental health, well-being and recovery. Writer asked Karolyn if she was open to meeting with the ARMHS worker again. Karolyn agreed and made plan for SW to contact ARM worker.     Assessment:  Karolyn was minimally engaged and made infrequent eye contact with writer. Karolyn primarily provided brief responses to writer's questions, and occasionally declined to answer.     Karolyn appeared receptive to SW visit.     Plan:  Provided patient/family with writer's contact information and availability.   SW will follow up with Karolyn's ARMHS worker to review the above information and request that ARMHS worker call Karolyn to schedule their next appt.     Will route to patient's psychiatric provider(s) as an FYI.   Please call or page if needs or concerns arise.     MARCELLE Barrett, [facility]  Clinic    Direct: [phone]  Fax: [phone]    This is a non-billable encounter as it was solely for the purposes of outreach and/or care coordination.

## 2017-06-09 NOTE — MR AVS SNAPSHOT
After Visit Summary   6/9/2017    Karolyn Banerjee    MRN: [id number]           Patient Information     Date Of Birth          1985        Visit Information        Provider Department      6/9/2017 11:00 AM Andreina [facility] LGSW Psychiatry Clinic        Today's Diagnoses     Counseling and coordination of care    -  1       Follow-ups after your visit        Your next 10 appointments already scheduled     Jul 14, 2017 10:00 AM CDT   Electroconvulsive Therapy with Osiel Amos MD   [facility] ([facility])    [address] [facility] [id number] [phone]            Aug 04, 2017  3:15 PM CDT   Schizophrenia Follow Up with Akbar Rangel MD   Psychiatry Clinic ([facility])    [address] [facility] [id number] [phone]              Who to contact     Please call your clinic at [phone] to:    Ask questions about your health    Make or cancel appointments    Discuss your medicines    Learn about your test results    Speak to your doctor   If you have compliments or concerns about an experience at your clinic, or if you wish to file a complaint, please contact [facility] Patient Relations at [phone] or email us at [email or web address]         Additional Information About Your Visit        MyChart Information     CriticalBlue is an electronic gateway that provides easy, online access to your medical records. With CriticalBlue, you can request a clinic appointment, read your test results, renew a prescription or communicate with your care team.     To sign up for Mob[employer] visit the website at [email or web address]   You will be asked to enter the access code listed below, as well as some personal information. Please follow the directions to create your username and password.     Your access code is: [email or web address]  Expires:  2017 10:51 PM     Your access code will  in 90 days. If you need help or a new code, please contact your [facility] or call [phone] for assistance.        Care EveryWhere ID     This is your Care EveryWhere ID. This could be used by other organizations to access your [place] medical records  [id number]         Blood Pressure from Last 3 Encounters:   17 (!) 130/95   17 (!) 163/97   17 (!) 140/98    Weight from Last 3 Encounters:   17 120.7 kg (266 lb)   17 122.3 kg (269 lb 9.6 oz)   17 122.2 kg (269 lb 6.4 oz)              Today, you had the following     No orders found for display       Primary Care Provider Office Phone # Fax #    Suzie Mariscal [phone]       [address] [address]        Thank you!     Thank you for choosing PSYCHIATRY CLINIC  for your care. Our goal is always to provide you with excellent care. Hearing back from our patients is one way we can continue to improve our services. Please take a few minutes to complete the written survey that you may receive in the mail after your visit with us. Thank you!             Your Updated Medication List - Protect others around you: Learn how to safely use, store and throw away your medicines at www.disposemymeds.org.          This list is accurate as of: 17 11:59 PM.  Always use your most recent med list.                   Brand Name Dispense Instructions for use    ACETAMINOPHEN PO      Take 1,000 mg by mouth every 8 hours as needed for pain       albuterol (5 MG/ML) 0.5% neb solution    PROVENTIL     Inhale 2.5 mg into the lungs every 4 hours as needed for wheezing or shortness of breath / dyspnea (2 puffs)       benztropine 0.5 MG tablet    COGENTIN    60 tablet    Take 1 tablet (0.5 mg) by mouth 2 times daily       cetirizine 10 MG tablet    zyrTEC     Take 10 mg by mouth daily        diphenhydrAMINE 25 MG tablet    BENADRYL     Take 25 mg by mouth every 6 hours as needed for itching or allergies       FISH OIL PO          hydrOXYzine 25 MG tablet    ATARAX    60 tablet    Take 1-2 tablets (25-50 mg) by mouth every 4 hours as needed for anxiety       lurasidone 80 MG Tabs tablet    LATUDA    60 tablet    Take 2 tablets (160 mg) by mouth daily (with dinner)       norethindrone-ethinyl estradiol 1-20 MG-MCG per tablet    JUNEL FE 1/20     Take 1 tablet by mouth       OLANZapine 10 MG tablet    zyPREXA    30 tablet    Take 1 tablet (10 mg) by mouth 2 times daily as needed       perphenazine 8 MG tablet     93 tablet    Take 1 tablet (8 mg) by mouth 3 times daily       traZODone 50 MG tablet    DESYREL    30 tablet    Take 1 tablet (50 mg) by mouth At Bedtime

## 2017-06-11 RX ORDER — LURASIDONE HYDROCHLORIDE 80 MG/1
160 TABLET, FILM COATED ORAL
Qty: 60 TABLET | Refills: 2 | Status: SHIPPED | OUTPATIENT
Start: 2017-06-11 | End: 2017-08-07

## 2017-06-11 RX ORDER — OLANZAPINE 10 MG/1
10 TABLET ORAL 2 TIMES DAILY PRN
Qty: 30 TABLET | Refills: 0 | Status: SHIPPED | OUTPATIENT
Start: 2017-06-11 | End: 2017-08-07

## 2017-06-11 RX ORDER — PERPHENAZINE 8 MG/1
8 TABLET ORAL 3 TIMES DAILY
Qty: 93 TABLET | Refills: 2 | Status: SHIPPED | OUTPATIENT
Start: 2017-06-11 | End: 2017-08-07

## 2017-06-12 ENCOUNTER — TELEPHONE (OUTPATIENT)
Dept: PSYCHIATRY | Facility: CLINIC | Age: 32
End: 2017-06-12

## 2017-06-12 NOTE — TELEPHONE ENCOUNTER
Social Work Note: Left Voicemail  [facility] Psychiatry Clinic    Left Voicemail Message for Joan, ARMHS worker with ACP  Content of Voicemail:  FELIX and Joan did initially connect to briefly review meeting on Friday, but were disconnected. SW called back x3, and then LVM for Joan indicating that SW reviewed role of ARMHS worker and Karolyn was receptive to working on skills to promote her mental health. FELIX reviewed that Karolyn requested she would be most comfortable if Joan called her group home to schedule their next appt.      Included FELIX's direct contact information and requested a callback if any questions or concerns about this information or continuing [facility] services with Karolyn.     Will route to patient's current psychiatric provider(s) as an FYI.   Please call or page with any immediate needs or concerns.

## 2017-07-11 ENCOUNTER — HOSPITAL ENCOUNTER (EMERGENCY)
Facility: CLINIC | Age: 32
Discharge: HOME | End: 2017-07-11
Attending: PSYCHIATRY & NEUROLOGY | Admitting: PSYCHIATRY & NEUROLOGY
Payer: MEDICAID

## 2017-07-11 VITALS
RESPIRATION RATE: 18 BRPM | BODY MASS INDEX: 44.28 KG/M2 | HEART RATE: 95 BPM | SYSTOLIC BLOOD PRESSURE: 156 MMHG | TEMPERATURE: 97.8 F | WEIGHT: 266.1 LBS | OXYGEN SATURATION: 95 % | DIASTOLIC BLOOD PRESSURE: 88 MMHG

## 2017-07-11 DIAGNOSIS — F60.3 BORDERLINE PERSONALITY DISORDER (H): ICD-10-CM

## 2017-07-11 DIAGNOSIS — F25.1 SCHIZOAFFECTIVE DISORDER, DEPRESSIVE TYPE (H): ICD-10-CM

## 2017-07-11 DIAGNOSIS — G31.84 MILD COGNITIVE IMPAIRMENT: ICD-10-CM

## 2017-07-11 DIAGNOSIS — R46.89 AGGRESSION: ICD-10-CM

## 2017-07-11 DIAGNOSIS — F25.0 SCHIZOAFFECTIVE DISORDER, BIPOLAR TYPE (H): ICD-10-CM

## 2017-07-11 LAB
AMPHETAMINES UR QL SCN: NORMAL
BARBITURATES UR QL: NORMAL
BENZODIAZ UR QL: NORMAL
CANNABINOIDS UR QL SCN: NORMAL
COCAINE UR QL: NORMAL
ETHANOL UR QL SCN: NORMAL
HCG UR QL: NEGATIVE
OPIATES UR QL SCN: NORMAL

## 2017-07-11 PROCEDURE — 80320 DRUG SCREEN QUANTALCOHOLS: CPT | Performed by: EMERGENCY MEDICINE

## 2017-07-11 PROCEDURE — 99285 EMERGENCY DEPT VISIT HI MDM: CPT | Mod: 25

## 2017-07-11 PROCEDURE — 99284 EMERGENCY DEPT VISIT MOD MDM: CPT | Mod: Z6 | Performed by: PSYCHIATRY & NEUROLOGY

## 2017-07-11 PROCEDURE — 81025 URINE PREGNANCY TEST: CPT | Performed by: EMERGENCY MEDICINE

## 2017-07-11 PROCEDURE — 80307 DRUG TEST PRSMV CHEM ANLYZR: CPT | Mod: 59 | Performed by: EMERGENCY MEDICINE

## 2017-07-11 PROCEDURE — 80307 DRUG TEST PRSMV CHEM ANLYZR: CPT | Performed by: EMERGENCY MEDICINE

## 2017-07-11 PROCEDURE — 90791 PSYCH DIAGNOSTIC EVALUATION: CPT

## 2017-07-11 RX ORDER — BENZTROPINE MESYLATE 0.5 MG/1
0.5 TABLET ORAL 2 TIMES DAILY
Qty: 60 TABLET | Refills: 0 | Status: SHIPPED | OUTPATIENT
Start: 2017-07-11 | End: 2017-08-07

## 2017-07-11 RX ORDER — ALBUTEROL SULFATE 90 UG/1
2 AEROSOL, METERED RESPIRATORY (INHALATION) EVERY 6 HOURS PRN
COMMUNITY
End: 2017-10-04

## 2017-07-11 ASSESSMENT — ENCOUNTER SYMPTOMS
HALLUCINATIONS: 0
BACK PAIN: 0
FEVER: 0
NERVOUS/ANXIOUS: 0
SHORTNESS OF BREATH: 0
DIZZINESS: 0
ABDOMINAL PAIN: 0
CHEST TIGHTNESS: 0
DYSPHORIC MOOD: 1

## 2017-07-11 NOTE — ED AVS SNAPSHOT
[facility], Emergency Department    [address]    [place] [address]    Phone:  [phone]    Fax:  [phone]                                       Karolyn Banerjee   MRN: [id number]    Department:  [facility], Emergency Department   Date of Visit:  7/11/2017           Patient Information     Date Of Birth          1985        Your diagnoses for this visit were:     Schizoaffective disorder, depressive type (H)     Mild cognitive impairment     Borderline personality disorder        You were seen by Raheem Singh MD.        Discharge Instructions       Follow up with your established providers    Future Appointments        Provider Department Dept Phone Center    7/14/2017 10:00 AM Osiel Amos MD [facility] [phone] [place]    8/4/2017 3:15 PM Akbar Rangel MD Psych[facility]ry Clinic [phone] [facility]    8/7/2017 12:45 PM Dana Sandoval MD Psychiatry Clinic [phone] [facility]      24 Hour Appointment Hotline       To make an appointment at any [facility], call [phone] ([phone]). If you don't have a family doctor or clinic, we will help you find one. [facility] are conveniently located to serve the needs of you and your family.             Review of your medicines      Our records show that you are taking the medicines listed below. If these are incorrect, please call your family doctor or clinic.        Dose / Directions Last dose taken    ACETAMINOPHEN PO   Dose:  1000 mg        Take 1,000 mg by mouth every 8 hours as needed for pain   Refills:  0        * albuterol (5 MG/ML) 0.5% neb solution   Commonly known as:  PROVENTIL   Dose:  2.5 mg        Inhale 2.5 mg into the lungs every 4 hours as needed for wheezing or shortness of breath / dyspnea (2 puffs)   Refills:  0        * albuterol 108 (90 BASE) MCG/ACT Inhaler   Commonly known as:  PROAIR HFA/PROVENTIL HFA/VENTOLIN HFA   Dose:  2 puff        Inhale 2  puffs into the lungs every 6 hours as needed for shortness of breath / dyspnea or wheezing   Refills:  0        benztropine 0.5 MG tablet   Commonly known as:  COGENTIN   Dose:  0.5 mg   Quantity:  60 tablet        Take 1 tablet (0.5 mg) by mouth 2 times daily   Refills:  0        cetirizine 10 MG tablet   Commonly known as:  zyrTEC   Dose:  10 mg        Take 10 mg by mouth daily   Refills:  0        diphenhydrAMINE 25 MG tablet   Commonly known as:  BENADRYL   Dose:  25 mg        Take 25 mg by mouth every 6 hours as needed for itching or allergies   Refills:  0        FISH OIL PO        Refills:  0        hydrOXYzine 25 MG tablet   Commonly known as:  ATARAX   Dose:  25-50 mg   Quantity:  60 tablet        Take 1-2 tablets (25-50 mg) by mouth every 4 hours as needed for anxiety   Refills:  2        lurasidone 80 MG Tabs tablet   Commonly known as:  LATUDA   Dose:  160 mg   Quantity:  60 tablet        Take 2 tablets (160 mg) by mouth daily (with dinner)   Refills:  2        norethindrone-ethinyl estradiol 1-20 MG-MCG per tablet   Commonly known as:  JUNEL FE 1/20   Dose:  1 tablet        Take 1 tablet by mouth   Refills:  0        OLANZapine 10 MG tablet   Commonly known as:  zyPREXA   Dose:  10 mg   Quantity:  30 tablet        Take 1 tablet (10 mg) by mouth 2 times daily as needed   Refills:  0        perphenazine 8 MG tablet   Dose:  8 mg   Quantity:  93 tablet        Take 1 tablet (8 mg) by mouth 3 times daily   Refills:  2        traZODone 50 MG tablet   Commonly known as:  DESYREL   Dose:  50 mg   Quantity:  30 tablet        Take 1 tablet (50 mg) by mouth At Bedtime   Refills:  2        * Notice:  This list has 2 medication(s) that are the same as other medications prescribed for you. Read the directions carefully, and ask your doctor or other care provider to review them with you.            Procedures and tests performed during your visit     Drug abuse screen 6 urine (tox)    HCG qualitative urine      Orders  "Needing Specimen Collection     None      Pending Results     No orders found from 2017 to 2017.            Pending Culture Results     No orders found from 2017 to 2017.            Pending Results Instructions     If you had any lab results that were not finalized at the time of your Discharge, you can call the ED Lab Result RN at [phone]. You will be contacted by this team for any positive Lab results or changes in treatment. The nurses are available 7 days a week from 10A to 6:30P.  You can leave a message 24 hours per day and they will return your call.        Thank you for choosing [facility]       Thank you for choosing [facility] for your care. Our goal is always to provide you with excellent care. Hearing back from our patients is one way we can continue to improve our services. Please take a few minutes to complete the written survey that you may receive in the mail after you visit with us. Thank you!        [employer] Information     [facility] lets you send messages to your doctor, view your test results, renew your prescriptions, schedule appointments and more. To sign up, go to [email or web address] . Click on \"Log in\" on the left side of the screen, which will take you to the Welcome page. Then click on \"Sign up Now\" on the right side of the page.     You will be asked to enter the access code listed below, as well as some personal information. Please follow the directions to create your username and password.     Your access code is: [email or web address]  Expires: 2017 10:51 PM     Your access code will  in 90 days. If you need help or a new code, please call your [facility] clinic or [phone].        Care EveryWhere ID     This is your Care EveryWhere ID. This could be used by other organizations to access your [facility] medical records  [id number]        Equal Access to Services     KAYA STRICKLAND AH: lauren Ames, nando resendiz " michelet pizarro ah. So [facility] [phone].    ATENCIÓN: Si habla español, tiene a stauffer disposición servicios gratuitos de asistencia lingüística. Llame al [phone].    We comply with applicable federal civil rights laws and [place] laws. We do not discriminate on the basis of race, color, national origin, age, disability sex, sexual orientation or gender identity.            After Visit Summary       This is your record. Keep this with you and show to your community pharmacist(s) and doctor(s) at your next visit.

## 2017-07-11 NOTE — ED AVS SNAPSHOT
[facility], Emergency Department    [address]    [facility] [address]    Phone:  [phone]    Fax:  [phone]                                       Karolyn Banerjee   MRN: [id number]    Department:  [facility], Emergency Department   Date of Visit:  7/11/2017           After Visit Summary Signature Page     I have received my discharge instructions, and my questions have been answered. I have discussed any challenges I see with this plan with the nurse or doctor.    ..........................................................................................................................................  Patient/Patient Representative Signature      ..........................................................................................................................................  Patient Representative Print Name and Relationship to Patient    ..................................................               ................................................  Date                                            Time    ..........................................................................................................................................  Reviewed by Signature/Title    ...................................................              ..............................................  Date                                                            Time

## 2017-07-11 NOTE — TELEPHONE ENCOUNTER
Medication requested: Benztropine 0.5 mg tabs  Last refilled: 6-6-17  Qty: 60      Last seen: 6-2-17  RTC: 6-8 wks  Cancel: 0  No-show: 0  Next appt: 8-4-17    Refill decision: Refilled for 30 days per protocol.      Kathleen M Doege RN

## 2017-07-12 NOTE — ED PROVIDER NOTES
"  History     Chief Complaint   Patient presents with     Suicidal     Pt states that \"Meme is making fun of me and it is making me want to kill myself\". Pt went to pond and was going to go in. Police brought pt to hospital     The history is provided by the patient, medical records and a caregiver.     Karolyn Banerjee is a 31 year old female who comes in due to her getting upset over what another resident at the group home said to her.  This other resident picks on Karolyn and today told her that they went out to eat in order to get away from Karolyn and that they did not want Karolyn with them.  This led to Karolyn having suicidal thoughts of drowning herself in the pond near the group home.  This is her de facto plan.  She is well known to this provider due to her multiple visits to the ED.  She has been diagnosed with schizoaffective disorder, borderline personality disorder and mild intellectual disability.  She has a guardian who is her mom.  She has a Torres and Peña Wilkins as well.  She has been doing better behavior wise.  The staff states that this other resident is new to the group home and has targeted Karolyn for some reason.  The staff thinks that they might need to get the new resident another place to go due to her behavior toward Karolyn.  Karolyn has calmed down and is no longer suicidal.  She is calm and cooperative.      Please see the 's assessment in [facility] from today for further details.    I have reviewed the Medications, Allergies, Past Medical and Surgical History, and Social History in the Epic system.    Review of Systems   Constitutional: Negative for fever.   Eyes: Negative for visual disturbance.   Respiratory: Negative for chest tightness and shortness of breath.    Cardiovascular: Negative for chest pain.   Gastrointestinal: Negative for abdominal pain.   Musculoskeletal: Negative for back pain.   Neurological: Negative for dizziness.   Psychiatric/Behavioral: Positive for " dysphoric mood and suicidal ideas (now gone). Negative for hallucinations and self-injury. The patient is not nervous/anxious.    All other systems reviewed and are negative.      Physical Exam   BP: (!) 131/95  Heart Rate: 112  Temp: 99.3  F (37.4  C)  Resp: 18  Weight: 120.7 kg (266 lb 1.5 oz)  SpO2: 97 %  Physical Exam   Constitutional: She is oriented to person, place, and time. She appears well-developed and well-nourished.   HENT:   Head: Normocephalic and atraumatic.   Mouth/Throat: Oropharynx is clear and moist.   Eyes: Pupils are equal, round, and reactive to light.   Neck: Normal range of motion. Neck supple.   Cardiovascular: Normal rate, regular rhythm and normal heart sounds.    Pulmonary/Chest: Effort normal and breath sounds normal.   Abdominal: Soft. Bowel sounds are normal.   Musculoskeletal: Normal range of motion.   Neurological: She is alert and oriented to person, place, and time.   Skin: Skin is warm and dry.   Psychiatric: She has a normal mood and affect. Her speech is normal and behavior is normal. Judgment and thought content normal. She is not actively hallucinating. Thought content is not paranoid and not delusional. Cognition and memory are impaired. She expresses no homicidal and no suicidal ideation. She expresses no suicidal plans and no homicidal plans.   Karolyn is a 30 y/o female who looks her age.  She is well groomed with good eye contact.   Nursing note and vitals reviewed.      ED Course     ED Course     Procedures               Labs Ordered and Resulted from Time of ED Arrival Up to the Time of Departure from the ED   DRUG ABUSE SCREEN 6 CHEM DEP URINE ([facility])   HCG QUALITATIVE URINE            Assessments & Plan (with Medical Decision Making)   Karolyn will be discharged back to the group home.  She is not an imminent risk to herself or others. She has calmed down and is no longer suicidal.  She would like to go back.  She will follow up with her established providers.    I  have reviewed the nursing notes.    I have reviewed the findings, diagnosis, plan and need for follow up with the patient.    New Prescriptions    No medications on file       Final diagnoses:   Schizoaffective disorder, depressive type (H)   Mild cognitive impairment   Borderline personality disorder       7/11/2017   [facility], [place], EMERGENCY DEPARTMENT     Raheem Singh MD  07/11/17 [id number]

## 2017-07-12 NOTE — ED NOTES
Bed: ED17  Expected date:   Expected time:   Means of arrival:   Comments:  [facility], 33yr F mental health, [facility] cooperative

## 2017-07-14 ENCOUNTER — ANESTHESIA (OUTPATIENT)
Dept: BEHAVIORAL HEALTH | Facility: CLINIC | Age: 32
End: 2017-07-14

## 2017-07-14 ENCOUNTER — HOSPITAL ENCOUNTER (OUTPATIENT)
Dept: BEHAVIORAL HEALTH | Facility: CLINIC | Age: 32
End: 2017-07-14
Attending: PSYCHIATRY & NEUROLOGY
Payer: MEDICAID

## 2017-07-14 ENCOUNTER — ANESTHESIA EVENT (OUTPATIENT)
Dept: BEHAVIORAL HEALTH | Facility: CLINIC | Age: 32
End: 2017-07-14

## 2017-07-14 VITALS
RESPIRATION RATE: 16 BRPM | HEART RATE: 93 BPM | SYSTOLIC BLOOD PRESSURE: 148 MMHG | OXYGEN SATURATION: 97 % | TEMPERATURE: 98.5 F | DIASTOLIC BLOOD PRESSURE: 81 MMHG

## 2017-07-14 DIAGNOSIS — F25.1 SCHIZOAFFECTIVE DISORDER, DEPRESSIVE TYPE (H): ICD-10-CM

## 2017-07-14 PROCEDURE — 25000125 ZZHC RX 250: Performed by: STUDENT IN AN ORGANIZED HEALTH CARE EDUCATION/TRAINING PROGRAM

## 2017-07-14 PROCEDURE — 40000671 ZZH STATISTIC ANESTHESIA CASE

## 2017-07-14 PROCEDURE — 90870 ELECTROCONVULSIVE THERAPY: CPT

## 2017-07-14 PROCEDURE — 25000128 H RX IP 250 OP 636: Performed by: STUDENT IN AN ORGANIZED HEALTH CARE EDUCATION/TRAINING PROGRAM

## 2017-07-14 PROCEDURE — 25000128 H RX IP 250 OP 636: Performed by: PSYCHIATRY & NEUROLOGY

## 2017-07-14 RX ORDER — HEPARIN SODIUM (PORCINE) LOCK FLUSH IV SOLN 100 UNIT/ML 100 UNIT/ML
5 SOLUTION INTRAVENOUS
Status: COMPLETED | OUTPATIENT
Start: 2017-07-14 | End: 2017-07-14

## 2017-07-14 RX ORDER — ETOMIDATE 2 MG/ML
INJECTION INTRAVENOUS PRN
Status: DISCONTINUED | OUTPATIENT
Start: 2017-07-14 | End: 2017-07-14

## 2017-07-14 RX ORDER — HEPARIN SODIUM (PORCINE) LOCK FLUSH IV SOLN 100 UNIT/ML 100 UNIT/ML
5 SOLUTION INTRAVENOUS
Status: CANCELLED
Start: 2017-07-14 | End: 2017-07-14

## 2017-07-14 RX ADMIN — SUCCINYLCHOLINE CHLORIDE 80 MG: 20 INJECTION, SOLUTION INTRAMUSCULAR; INTRAVENOUS at 09:31

## 2017-07-14 RX ADMIN — ETOMIDATE 14 MG: 2 INJECTION INTRAVENOUS at 09:31

## 2017-07-14 RX ADMIN — SODIUM CHLORIDE, PRESERVATIVE FREE 5 ML: 5 INJECTION INTRAVENOUS at 09:49
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
## 2017-07-14 NOTE — IP AVS SNAPSHOT
[facility]    [facility] [address] [address]    Phone:  [phone]                                       After Visit Summary   7/14/2017    Karolyn Banerjee    MRN: [id number]           After Visit Summary Signature Page     I have received my discharge instructions, and my questions have been answered. I have discussed any challenges I see with this plan with the nurse or doctor.    ..........................................................................................................................................  Patient/Patient Representative Signature      ..........................................................................................................................................  Patient Representative Print Name and Relationship to Patient    ..................................................               ................................................  Date                                            Time    ..........................................................................................................................................  Reviewed by Signature/Title    ...................................................              ..............................................  Date                                                            Time

## 2017-07-14 NOTE — ANESTHESIA PREPROCEDURE EVALUATION
Anesthesia Evaluation     . Pt has had prior anesthetic. Type: General           ROS/MED HX    ENT/Pulmonary:     (+)JOES risk factors obese, Intermittent asthma Treatment: Inhaler prn,  , cystic fibrosis . .    Neurologic:       Cardiovascular:         METS/Exercise Tolerance:     Hematologic:         Musculoskeletal:         GI/Hepatic:         Renal/Genitourinary:         Endo:     (+) Obesity, .      Psychiatric:     (+) psychiatric history schizophrenia, depression and other (comment) (BL personality d/o)      Infectious Disease:         Malignancy:         Other:                     Physical Exam  Normal systems: cardiovascular and pulmonary    Airway   Mallampati: III  TM distance: >3 FB    Dental   (+) chipped and missing    Cardiovascular       Pulmonary                         Anesthesia Plan      History & Physical Review  History and physical reviewed and following examination; no interval change.    ASA Status:  3 .    NPO Status:  > 8 hours    Plan for General with Intravenous induction.          Postoperative Care      Consents  Anesthetic plan, risks, benefits and alternatives discussed with:  Patient..        Zaheer Van MD   July 14, 2017

## 2017-07-14 NOTE — IP AVS SNAPSHOT
MRN:[id number]                      After Visit Summary   7/14/2017    Karolyn Banerjee    MRN: [id number]           Visit Information        Provider Department      7/14/2017  9:15 AM Osiel Amos MD [facility]           Review of your medicines      CONTINUE these medicines which have NOT CHANGED        Dose / Directions    ACETAMINOPHEN PO        Dose:  1000 mg   Take 1,000 mg by mouth every 8 hours as needed for pain   Refills:  0       * albuterol (5 MG/ML) 0.5% neb solution   Commonly known as:  PROVENTIL        Dose:  2.5 mg   Inhale 2.5 mg into the lungs every 4 hours as needed for wheezing or shortness of breath / dyspnea (2 puffs)   Refills:  0       * albuterol 108 (90 BASE) MCG/ACT Inhaler   Commonly known as:  PROAIR HFA/PROVENTIL HFA/VENTOLIN HFA        Dose:  2 puff   Inhale 2 puffs into the lungs every 6 hours as needed for shortness of breath / dyspnea or wheezing   Refills:  0       benztropine 0.5 MG tablet   Commonly known as:  COGENTIN   Used for:  Schizoaffective disorder, bipolar type (H), Aggression        Dose:  0.5 mg   Take 1 tablet (0.5 mg) by mouth 2 times daily   Quantity:  60 tablet   Refills:  0       cetirizine 10 MG tablet   Commonly known as:  zyrTEC        Dose:  10 mg   Take 10 mg by mouth daily   Refills:  0       diphenhydrAMINE 25 MG tablet   Commonly known as:  BENADRYL        Dose:  25 mg   Take 25 mg by mouth every 6 hours as needed for itching or allergies   Refills:  0       FISH OIL PO        Refills:  0       hydrOXYzine 25 MG tablet   Commonly known as:  ATARAX   Used for:  Schizoaffective disorder, bipolar type (H), Aggression        Dose:  25-50 mg   Take 1-2 tablets (25-50 mg) by mouth every 4 hours as needed for anxiety   Quantity:  60 tablet   Refills:  2       lurasidone 80 MG Tabs tablet   Commonly known as:  LATUDA   Used for:  Schizoaffective disorder, bipolar type (H), Aggression        Dose:  160 mg   Take 2  tablets (160 mg) by mouth daily (with dinner)   Quantity:  60 tablet   Refills:  2       norethindrone-ethinyl estradiol 1-20 MG-MCG per tablet   Commonly known as:  JUNEL FE 1/20        Dose:  1 tablet   Take 1 tablet by mouth   Refills:  0       OLANZapine 10 MG tablet   Commonly known as:  zyPREXA   Used for:  Schizoaffective disorder, bipolar type (H), Aggression        Dose:  10 mg   Take 1 tablet (10 mg) by mouth 2 times daily as needed   Quantity:  30 tablet   Refills:  0       perphenazine 8 MG tablet   Used for:  Schizoaffective disorder, bipolar type (H), Aggression        Dose:  8 mg   Take 1 tablet (8 mg) by mouth 3 times daily   Quantity:  93 tablet   Refills:  2       traZODone 50 MG tablet   Commonly known as:  DESYREL   Used for:  Schizoaffective disorder, bipolar type (H), Aggression        Dose:  50 mg   Take 1 tablet (50 mg) by mouth At Bedtime   Quantity:  30 tablet   Refills:  2       * Notice:  This list has 2 medication(s) that are the same as other medications prescribed for you. Read the directions carefully, and ask your doctor or other care provider to review them with you.             Protect others around you: Learn how to safely use, store and throw away your medicines at www.disposemymeds.org.         Follow-ups after your visit        Your next 10 appointments already scheduled     Aug 04, 2017  3:15 PM CDT   Schizophrenia Follow Up with Akbar Rangel MD   [facility] ([facility])    [address]  [address] [facility] [address]   [phone]            Aug 07, 2017 12:45 PM CDT   Schizophrenia Follow Up with Dana Sandoval MD   [facility] ([facility])    [address]  [address] [facility] [address]   [phone]            Aug 25, 2017  9:15 AM CDT   Electroconvulsive Therapy with Osiel Amos MD   [facility] ([facility]  "[facility])    [address]  [facility] [address]   [phone]               Care Instructions        Further instructions from your care team       ECT Discharge Instructions      During your ECT series:      Do not drive or work heavy equipment until 7 days after your last treatment.    Do not drink alcohol or use street drugs (illicit drugs) while you are having treatments.    Do not make important decisions, including legal decisions.    After each treatment:      Get plenty of rest. A responsible adult must stay with your for at least 6 hours.    Avoid heavy or risky activities for 24 hours.    If you feel light-headed, sit for a few minutes before standing. Have someone help you get up.    If you have nausea (feel sick to your stomach): Drink only clear liquids such as apple juice, ginger ale, broth or 7UP, Be sure to drink plenty of liquids. Move to a normal diet as you feel able.     If you received Toradol, wait 6 hours before taking ibuprofen.    Call your doctor if:     You have a fever over 100F (37.7 C) (taken under the tongue), or a fever that last more than 24 hours.    Your IV site is red, swollen, very painful or is getting more tender.    You have nausea that gets very bad or does not improve.      If you have any symptoms after ECT, tell our staff before your next treatment.    The ECT Department can be reached at [phone].  The ECT Department is open Mondays, Wednesdays and Fridays from 7:00 AM to 2:00 PM.       Additional Information About Your Visit        [employer] Information     [employer] lets you send messages to your doctor, view your test results, renew your prescriptions, schedule appointments and more. To sign up, go to [email or web address] . Click on \"Log in\" on the left side of the screen, which will take you to the Welcome page. Then click on \"Sign up Now\" on the right side of the page.     You will be asked to enter the access code listed below, as well " as some personal information. Please follow the directions to create your username and password.     Your access code is: [email or web address]  Expires: 2017 10:51 PM     Your access code will  in 90 days. If you need help or a new code, please call your [facility] clinic or [phone].        Care EveryWhere ID     This is your Care EveryWhere ID. This could be used by other organizations to access your [facility] medical records  [id number]        Your Vitals Were     Blood Pressure Pulse Temperature Respirations Last Period Pulse Oximetry    148/81 96 98.5  F (36.9  C) (Temporal) 16 (LMP Unknown) 97%       Primary Care Provider Office Phone # Fax #    Suzie Mariscal [phone]      Equal Access to Services     [facility] : Hadii reginald herndon Sotawanda, waaxda luqadaha, qaybta kaalmada adeegyada, nando pizarro . So [facility] [phone].    ATENCIÓN: Si habla español, tiene a stauffer disposición servicios gratuitos de asistencia lingüística. Llame al [phone].    We comply with applicable federal civil rights laws and [place] laws. We do not discriminate on the basis of race, color, national origin, age, disability sex, sexual orientation or gender identity.            Thank you!     Thank you for choosing [facility] for your care. Our goal is always to provide you with excellent care. Hearing back from our patients is one way we can continue to improve our services. Please take a few minutes to complete the written survey that you may receive in the mail after you visit with us. Thank you!             Medication List: This is a list of all your medications and when to take them. Check marks below indicate your daily home schedule. Keep this list as a reference.      Medications           Morning Afternoon Evening Bedtime As Needed    ACETAMINOPHEN PO   Take 1,000 mg by mouth every 8 hours as needed for pain                                * albuterol (5 MG/ML) 0.5%  neb solution   Commonly known as:  PROVENTIL   Inhale 2.5 mg into the lungs every 4 hours as needed for wheezing or shortness of breath / dyspnea (2 puffs)                                * albuterol 108 (90 BASE) MCG/ACT Inhaler   Commonly known as:  PROAIR HFA/PROVENTIL HFA/VENTOLIN HFA   Inhale 2 puffs into the lungs every 6 hours as needed for shortness of breath / dyspnea or wheezing                                benztropine 0.5 MG tablet   Commonly known as:  COGENTIN   Take 1 tablet (0.5 mg) by mouth 2 times daily                                cetirizine 10 MG tablet   Commonly known as:  zyrTEC   Take 10 mg by mouth daily                                diphenhydrAMINE 25 MG tablet   Commonly known as:  BENADRYL   Take 25 mg by mouth every 6 hours as needed for itching or allergies                                FISH OIL PO                                hydrOXYzine 25 MG tablet   Commonly known as:  ATARAX   Take 1-2 tablets (25-50 mg) by mouth every 4 hours as needed for anxiety                                lurasidone 80 MG Tabs tablet   Commonly known as:  LATUDA   Take 2 tablets (160 mg) by mouth daily (with dinner)                                norethindrone-ethinyl estradiol 1-20 MG-MCG per tablet   Commonly known as:  JUNEL FE 1/20   Take 1 tablet by mouth                                OLANZapine 10 MG tablet   Commonly known as:  zyPREXA   Take 1 tablet (10 mg) by mouth 2 times daily as needed                                perphenazine 8 MG tablet   Take 1 tablet (8 mg) by mouth 3 times daily                                traZODone 50 MG tablet   Commonly known as:  DESYREL   Take 1 tablet (50 mg) by mouth At Bedtime                                * Notice:  This list has 2 medication(s) that are the same as other medications prescribed for you. Read the directions carefully, and ask your doctor or other care provider to review them with you.

## 2017-07-14 NOTE — DISCHARGE INSTRUCTIONS
ECT Discharge Instructions      During your ECT series:      Do not drive or work heavy equipment until 7 days after your last treatment.    Do not drink alcohol or use street drugs (illicit drugs) while you are having treatments.    Do not make important decisions, including legal decisions.    After each treatment:      Get plenty of rest. A responsible adult must stay with your for at least 6 hours.    Avoid heavy or risky activities for 24 hours.    If you feel light-headed, sit for a few minutes before standing. Have someone help you get up.    If you have nausea (feel sick to your stomach): Drink only clear liquids such as apple juice, ginger ale, broth or 7UP, Be sure to drink plenty of liquids. Move to a normal diet as you feel able.     If you received Toradol, wait 6 hours before taking ibuprofen.    Call your doctor if:     You have a fever over 100F (37.7 C) (taken under the tongue), or a fever that last more than 24 hours.    Your IV site is red, swollen, very painful or is getting more tender.    You have nausea that gets very bad or does not improve.      If you have any symptoms after ECT, tell our staff before your next treatment.    The ECT Department can be reached at [phone].  The ECT Department is open Mondays, Wednesdays and Fridays from 7:00 AM to 2:00 PM.

## 2017-07-14 NOTE — ANESTHESIA POSTPROCEDURE EVALUATION
Patient: Karolyn Banerjee    * No procedures listed *    Diagnosis:Schizoaffective disorder, depressive type (H) [F25.1]  Diagnosis Additional Information: No value filed.    Anesthesia Type:  General    Note:  Anesthesia Post Evaluation    Patient location during evaluation: ECT PACU  Patient participation: Able to fully participate in evaluation  Level of consciousness: awake  Pain management: adequate  Airway patency: patent  Cardiovascular status: acceptable  Respiratory status: acceptable  Hydration status: acceptable  PONV: none     Anesthetic complications: None          Last vitals:  Vitals:    07/14/17 0921 07/14/17 0945 07/14/17 0950   BP:  130/90 (!) 135/94   Pulse:  101 97   Resp: 16 16 16   Temp:      SpO2:  98% 99%         Electronically Signed By: Zaheer Van MD  July 14, 2017  10:01 AM

## 2017-07-25 DIAGNOSIS — R46.89 AGGRESSION: ICD-10-CM

## 2017-07-25 DIAGNOSIS — F25.0 SCHIZOAFFECTIVE DISORDER, BIPOLAR TYPE (H): ICD-10-CM

## 2017-07-25 RX ORDER — TRAZODONE HYDROCHLORIDE 50 MG/1
50 TABLET, FILM COATED ORAL AT BEDTIME
Qty: 30 TABLET | Refills: 0 | Status: SHIPPED | OUTPATIENT
Start: 2017-07-25 | End: 2017-08-07

## 2017-07-25 NOTE — TELEPHONE ENCOUNTER
Medication requested: Trazodone 50 mg tabs  Last refilled: 6-16-17  Qty: 30      Last seen: 6-2-17  RTC: 6-8 wks  Cancel: 0  No-show: 0  Next appt: 8-4-17    Refill decision: Refilled for 30 days per protocol.      Kathleen M Doege RN

## 2017-08-07 ENCOUNTER — OFFICE VISIT (OUTPATIENT)
Dept: PSYCHIATRY | Facility: CLINIC | Age: 32
End: 2017-08-07
Attending: PSYCHIATRY & NEUROLOGY
Payer: MEDICAID

## 2017-08-07 VITALS
DIASTOLIC BLOOD PRESSURE: 85 MMHG | BODY MASS INDEX: 43.9 KG/M2 | SYSTOLIC BLOOD PRESSURE: 121 MMHG | WEIGHT: 263.8 LBS | HEART RATE: 92 BPM

## 2017-08-07 DIAGNOSIS — F25.0 SCHIZOAFFECTIVE DISORDER, BIPOLAR TYPE (H): Primary | ICD-10-CM

## 2017-08-07 DIAGNOSIS — R46.89 AGGRESSION: ICD-10-CM

## 2017-08-07 PROCEDURE — 99212 OFFICE O/P EST SF 10 MIN: CPT | Mod: ZF

## 2017-08-07 RX ORDER — LURASIDONE HYDROCHLORIDE 80 MG/1
160 TABLET, FILM COATED ORAL
Qty: 60 TABLET | Refills: 1 | Status: SHIPPED | OUTPATIENT
Start: 2017-08-07 | End: 2018-04-09

## 2017-08-07 RX ORDER — BENZTROPINE MESYLATE 0.5 MG/1
0.5 TABLET ORAL 2 TIMES DAILY
Qty: 60 TABLET | Refills: 1 | Status: SHIPPED | OUTPATIENT
Start: 2017-08-07 | End: 2018-04-09

## 2017-08-07 RX ORDER — OLANZAPINE 10 MG/1
10 TABLET ORAL 2 TIMES DAILY PRN
Qty: 30 TABLET | Refills: 0 | Status: SHIPPED | OUTPATIENT
Start: 2017-08-07 | End: 2018-04-09

## 2017-08-07 RX ORDER — PERPHENAZINE 8 MG/1
8 TABLET ORAL 3 TIMES DAILY
Qty: 93 TABLET | Refills: 1 | Status: ON HOLD | OUTPATIENT
Start: 2017-08-07 | End: 2017-09-25

## 2017-08-07 RX ORDER — TRAZODONE HYDROCHLORIDE 50 MG/1
50 TABLET, FILM COATED ORAL AT BEDTIME
Qty: 30 TABLET | Refills: 1 | Status: SHIPPED | OUTPATIENT
Start: 2017-08-07 | End: 2018-04-09

## 2017-08-07 ASSESSMENT — ABNORMAL INVOLUNTARY MOVEMENT SCALE (AIMS)
PATIENT_WEARS_DENTURES: NO
TONGUE: 0
AIMS_PATIENT_AWARENESS: NO AWARENESS
CURRENT_PROBLEMS_TEETH_DENTURES: NO
EDENTIA: NO
AIMS_DISAPPEAR_SLEEPING: YES

## 2017-08-07 ASSESSMENT — PATIENT HEALTH QUESTIONNAIRE - PHQ9: SUM OF ALL RESPONSES TO PHQ QUESTIONS 1-9: 7

## 2017-08-07 NOTE — PATIENT INSTRUCTIONS
-continue current medications, no changes  -continue ECT  -return to clinic in 8 weeks or sooner if needed        SAFETY PLAN    Completed on 8/7/17 at which time the following was reported: no suicidal thoughts .  It was determined that ongoing outpatient care was appropriate in addition to establishing the following care plan:     1. Triggers which can possibly cause thoughts of self-harm:   Thoughts- thinking she would be better off dead  Mood or behavior- feeling upset about fights with roommates  Situation-conflict with roommates       2. Coping strategies:  ice pack on face- reviewed  paced breathing- reviewed and discussed briefly  intense exercise- reviewed  other- coloring, reading or taking a nap, going to [employer] and spending time with boyfriend     3. Ways to increase safety:  -go up to room instead of trying to go outside  -ask for a PRN  -talking to staff about unsafe thoughts    4. People I can call for help: (friends, family and/or professionals)    Name: Yamileth Alberto (boyfriend)  Phone: [phone]    Name: Staff who are in the   Phone: N/A    Name: Dana Sandoval DO              Phone: [facility] [phone] (clinic)                                                                                                 [phone] (after hours)

## 2017-08-07 NOTE — NURSING NOTE
Chief Complaint   Patient presents with     Recheck Medication     Schizoaffective disorder, bipolar type      Reviewed allergies, smoking status, and pharmacy preference  DID NOT Administer abuse screening questions   Obtained weight, blood pressure and heart rate

## 2017-08-07 NOTE — PROGRESS NOTES
PSYCHIATRY CLINIC TRANSFER NOTE   The initial diagnostic evaluation was on prior to 2008.  Date of the most recent transfer of care montse is 8/7/17.    Pertinent Background:  This patient first experienced mental health issues as a child and has received treatment for Schizoaffective disorder bipolar type, borderline personality disorder and unspecified neurocognitive disorder.  See transfer evaluation for detailed history.  Notably, she has multiple medication trials and hospitalizations treating aggression and perceptual disturbances (including clozapine with neutropenia x2).  ECT combined with multiple medications has been most helpful in maintaining stability.  She is under commitment and Torres. Peña-Wilkins order also in place (all renewed in 11/3/2016 for 12 months), authorizes treatment up to two times per week for maintenance.  10/28/2013: full scale IQ test of 62.  After neutropenia with clozapine x's 2, has required multiple neuroleptics as well as ECT to maintain stability.       Psych critical item history includes suicide attempt [multiple], suicidal ideation, SIB [indicated per chart review, pt denies], psychosis [sxs include AH, delusions relating to pregnancy], mutiple psychotropic trials, severe med reaction, psych hosp (>5), commitment and ECT.    INTERIM HISTORY                                                 Karolyn Banerjee is a 31 year old female who was last seen for medication management on 6/2/17 at which time no changes were made.  The patient reports good treatment adherence.  History was provided by the patient and  staff who was a fair historian.  Since the last visit:  -was evaluated in the ED on July 11th due to altercation with roommate, reported SI at that time with plan to walk into the pond and drown self, did go outside and was stopped by staff and police. SI resolved while in the ED and she was discharged to home, denies several times having any thoughts of wanting to harm  "self since that time despite reporting otherwise on the PHQ-9  -reports her mood is \"okay\" and denies feeling depressed overall  -last ECT 7/14/17, plan for every 6 weeks (next 8/25/17), she reports it helps with her AH but does not help her mood much  -taking olanzapine PRN \"almost every day\", mostly due to hearing voices, these are not command in nature,will often make negative comments about her boyfriend  -no thoughts of being pregnant  -staff have noted a trend in patient requiring PRN medications or having increasing SI following conflicts with peers at the , as what had occurred on 7/11/17 prior to ED visit  -will use coping techniques such as reading or take a nap, coloring  -seeing her boyfriend 2x per week, has plans to go out to breakfast for her birthday, this is a positive and safe relationship for her, she notes that her mood is much improved when she is spending time with him  -likes to read love stories  -sleeping well, no concerns   -going to [facility] every Tuesday and Thursday     RECENT SYMPTOMS:   DEPRESSION:  reports-depressed mood, anhedonia, low energy, insomnia , excessive guilt and poor concentration /memory;  DENIES- suicidal ideation , weight/ appetite change (decrease or increase) and psychomotor change observed by others (none)  JUDE/HYPOMANIA:  reports-none;  DENIES- increased energy, decreased sleep need, increased activity and grandiosity  PSYCHOSIS:  reports-auditory hallucinations without commands [details in Interim History];  DENIES- delusions and visual hallucinations  DYSREGULATION:  reports-none;  DENIES- suicidal ideation, violent ideation and SIB  PANIC ATTACK:  none   ANXIETY:  some worry in context of depression and increased voices  TRAUMA RELATED:  none  COMPULSIVE:  none  SLEEP:  no difficulties    EATING DISORDER: none    RECENT SUBSTANCE USE:     ALCOHOL- none          TOBACCO- none               CAFFEINE- 2 sodas/day  OPIOIDS- none       NARCAN KIT- N/A     "   CANNABIS- none          OTHER ILLICIT DRUGS- none     CURRENT SOCIAL HISTORY:  FINANCIAL SUPPORT- social security disability       CHILDREN- none       LIVING SITUATION- lives in group home ([facility]) in [place]      SOCIAL/ SPIRITUAL SUPPORT- her boyfriend Remy, [employer] worker, CM, [facility] staff       FEELS SAFE AT HOME- Yes     MEDICAL ROS:  Reports sedation      Denies GI sxs [N/V/D], weight gain, sexual dysfunction [none] and akathisia, muscle problems [stiffness], unusual movements, polydipsia, polyphagia and Parkinsonian-type symptoms [shuffling gait, masked facies, stooped posture, speech change, writing change]    SUBSTANCE USE HISTORY                                                                             Past Use- none  Treatment [#, most recent] - none  Medical Consequences [withdrawal, sz etc] - none  HIV/Hepatitis- none  Legal Consequences- none    PSYCHIATRIC HISTORY     SIB [method, most recent]- none per pt, historically per chart review has had SIB  Suicidal Ideation Hx [passive, active]- has a history of having suicidal thoughts, often passive, does have active SI with plan to drown self in the pond behind group home  Suicide Attempt [#, recent, method]:   #- several per chart review, pt states only attempt was when she recently walked towards the pond   Most Recent- July 2017    Violence/Aggression Hx- pt denies, per chart review had some aggression during hospitalization  Psychosis Hx- AH that are not command and delusions related to pregnancy  Psych Hosp [ #, most recent, committed]- multiple, last was in October of 2015, currently on commitment with Torres and Peña Wilkins  ECT [#, most recent]- yes, ongoing for many years, currently [id number]    Eating Disorder: none    Outpatient Programs [ DBT, Day Treatment, Eating Disorder Tx etc] : denies     SOCIAL and FAMILY HISTORY                                          patient reported   Financial Support- social security disability  Living  Situation/Family/Relationships- living in a group home, has a boyfriend Remy for 3 years;  Children- none  Trauma History (self-report)- none  Legal- none  Cultural/ Social/ Spiritual Support- boyfriend,  staff, [facility] worker, is Pat[place]  Early History/Education-  Born in [place], moved to [place] when she was a baby due to her asthma, lived in [place] until starting living in group homes, completed HS in [place]; has 8 brothers and 2 sisters--she is second oldest    Family Mental Health History-  Per chart review pts father with mild developmental disorder, she has an uncle with schizophrenia; one brother with depression    PAST PSYCH MED TRIALS   see EMR Problem List: Hx of psychiatric care    MEDICAL / SURGICAL HISTORY                                   CARE TEAM:   PCP- Dr. Isamar Spear          Therapist- none    [facility]: Joan through [facility] meets 1x/week    Pregnant or breastfeeding:  No      Contraception- on OCP    Neurologic Hx [head injury etc]:  Denies seizure hx or head injury with LOC  Patient Active Problem List   Diagnosis     Mild cognitive impairment     Borderline personality disorder     Asthma     Nonorganic enuresis     Schizoaffective disorder, depressive type (H)     Fx humerus shaft-closed     Port catheter in place     MENTAL HEALTH     Suicidal ideation     Hx of psychiatric care     DVT (deep vein thrombosis) in pregnancy (H)       ALLERGY                                Clozapine; Risperdal; and Tape [adhesive tape]  MEDICATIONS                               Current Outpatient Prescriptions   Medication Sig Dispense Refill     traZODone (DESYREL) 50 MG tablet Take 1 tablet (50 mg) by mouth At Bedtime 30 tablet 0     benztropine (COGENTIN) 0.5 MG tablet Take 1 tablet (0.5 mg) by mouth 2 times daily 60 tablet 0     lurasidone (LATUDA) 80 MG TABS tablet Take 2 tablets (160 mg) by mouth daily (with dinner) 60 tablet 2     OLANZapine (ZYPREXA) 10 MG tablet Take 1 tablet (10 mg) by mouth 2 times daily  as needed (Patient taking differently: Take 10 mg by mouth 2 times daily as needed (Per group home list order is every 2 hrs prn) ) 30 tablet 0     perphenazine 8 MG tablet Take 1 tablet (8 mg) by mouth 3 times daily 93 tablet 2     Omega-3 Fatty Acids (FISH OIL PO)        norethindrone-ethinyl estradiol (JUNEL FE 1/20) 1-20 MG-MCG per tablet Take 1 tablet by mouth       ACETAMINOPHEN PO Take 1,000 mg by mouth every 8 hours as needed for pain       hydrOXYzine (ATARAX) 25 MG tablet Take 1-2 tablets (25-50 mg) by mouth every 4 hours as needed for anxiety 60 tablet 2     diphenhydrAMINE (BENADRYL) 25 MG tablet Take 25 mg by mouth every 6 hours as needed for itching or allergies        cetirizine (ZYRTEC) 10 MG tablet Take 10 mg by mouth daily       albuterol (PROAIR HFA/PROVENTIL HFA/VENTOLIN HFA) 108 (90 BASE) MCG/ACT Inhaler Inhale 2 puffs into the lungs every 6 hours as needed for shortness of breath / dyspnea or wheezing       albuterol (PROVENTIL) (5 MG/ML) 0.5% nebulizer solution Inhale 2.5 mg into the lungs every 4 hours as needed for wheezing or shortness of breath / dyspnea (2 puffs)        VITALS   /85  Pulse 92  Wt 119.7 kg (263 lb 12.8 oz)  LMP  (LMP Unknown)  BMI 43.9 kg/m2   MENTAL STATUS EXAM                                                           Alertness: alert , oriented and slow to respond  Appearance: adequately groomed  Behavior/Demeanor: cooperative and calm, with fair  eye contact   Speech: increased latency of response  Language: no problems  Psychomotor: slowed somewhat  Mood: description consistent with euthymia, some increase in depression symptoms around stressors/conflicts  Affect: flat; was congruent to mood; was congruent to content  Thought Process/Associations: unremarkable  Thought Content:  Reports none;  Denies suicidal ideation, violent ideation and delusions  Perception:  Reports auditory hallucinations without commands [details in Interim History];  Denies visual  hallucinations  Insight: limited  Judgment: fair  Cognition: does  appear grossly intact however below average; formal cognitive testing was not done    LABS and DATA   RATING SCALES:  AIMS: done 8/7/17 with total score of 0    PHQ9 TODAY = 7  PHQ-9 SCORE 9/30/2016 1/30/2017 4/7/2017   Total Score - - -   Total Score 6 4 6     ANTIPSYCHOTIC LABS   [glu, A1C, lipids (focus LDL), liver enzymes, WBC, ANEU, Hgb, plts]    q12 mo  Recent Labs   Lab Test  04/12/17   1024  11/19/16   2132   GLC  83  110*   A1C  5.0   --      Recent Labs   Lab Test  04/12/17   1024  10/20/15   0853   CHOL  207*  185   TRIG  143  134   LDL  135*  115   HDL  43  43*     Recent Labs   Lab Test  04/12/17   1024  10/20/15   0853   AST  21  13   ALT  21  35   ALKPHOS  52  67     Recent Labs   Lab Test  04/12/17   1024  11/23/16   0807   11/19/16   2132   WBC  6.0  6.2   < >  8.2   ANEU  4.2   --    --   6.2   HGB  14.8  15.2   < >  14.0   PLT  145*  150   < >  153    < > = values in this interval not displayed.     DIAGNOSIS     Schizoaffective disorder, bipolar type, currently with mild symptoms of depression and psychosis symptoms are at baseline with chronic AH  Unspecified neurocognitive disorder     ASSESSMENT                                     TODAY Karolyn reports a stability in symptoms over all. She continues to have some auditory hallucinations that appear to be at her baseline, she reports that ECT helps with these, which then causes her to require less PRN medications. She does not report that ECT helps with mood but previous notes have indicated that  staff feel she is less irritable since starting ECT. She was evaluated in the ED almost a month ago due to having suicidal thoughts with plan to go into the pond behind the group home and drown herself, she did go outside and needed to be stopped by staff. This occurred, like many other occurrences in the past, following a conflict with another peer at the Group Home. She denies having  any safety concerns today and reports not having any SI with plan since her evaluation in the ED. Given her continued report of some mild symptoms of depression may consider an addition of antidepressant to target mood if needed, however further observation warranted at this point and she denies feeling depressed a majority of the time and reports that she has several days per week where she does not feel depressed (usually when spending time with her boyfriend). She is tolerating her current medications, which still requiring some PRN doses of olanzapine, asked group home to monitor how many doses she is using and to track this around ECT treatments as well. Will plan to evaluate patient and file forms for continued commitment and roth along with Casey Wilkins at the next appointment, no changes today. Will also plan to recheck lipid panel in October to follow up with abnormal results from April.              SUICIDE RISK ASSESSMENT [details described above]:  Today Karolyn Banerjee reports having some passive thoughts about death but no SI with plan or intent since being evaluated in the ED on July 11th.  In addition, she has notable risk factors for self-harm including hallucinations [NOT command], relationship conflict and previous suicide attempt.  However, risk is mitigated by no plan or intent, describes a safety plan, h/o seeking help when needed, future oriented, none to minimal alcohol use , good social support   and stable housing.  Based on all available evidence she does not appear to be at imminent risk for self-harm therefore does not meet criteria for a 72-hr hold/  involuntary hospitalization.  However, based on degree of symptoms close psych FU was recommended which the pt did agree to.  Additional steps to minimize risk include: SAFETY PLAN completed 8/7/17 and frequent clinic visits.  Safety Plan placed in AVS: Yes.    [place] PRESCRIPTION MONITORING PROGRAM [] was not checked today:  not using  controlled substances.    PSYCHOTROPIC DRUG INTERACTIONS:   Hydroxyzine and olanzapine or perphenazine or trazodone may result in increased risk of QT interval prolongation.    Perpheazine and benztropine may result in decreased phenothiazine serum concentrations, decreased phenothiazine effectiveness, enhanced anticholinergic effects (ileus, hyperpyrexia, sedation, dry mouth).   Trazodone and perphenazine may result in hypotension.  MANAGEMENT:  Monitoring for adverse effects, routine vitals, periodic EKGs and patient is aware of risks     PLAN                                                                                                       1) PSYCHOTROPIC MEDICATIONS: no changes today, due to pt insurance medications must be ordered by attending      - lurasidone 160 mg with dinner      - olanzapine 10 mg BID prn for psychosis      - perphenazine 8 mg TID      - benztropine 0.5 mg BID      - trazodone 50mg at bedtime      - hydroxyzine 25-50 mg q4 hrs prn for anxiety     Continue ECT treatments [id number] with Dr. Amos    2) THERAPY:    None currently  TREATMENT PLAN   Date revised  -  n/a   Date next due- next visit    3) NEXT DUE:    Labs- AP labs due Oct 2017 2/2 abnormal TG level when last checked April 2017  EKG- PRN with medication changes (last completed April 2017 which showed QTc of 424ms)  Rating Scales- AIMS next due in Feb 2018    4) REFERRALS:    NONE    5) RTC: 8 weeks, will need to fill out renewal for Commitment and Torres plus Peña Wilkins following this appointment    6) CRISIS NUMBERS:   Provided routinely in AVS.  Especially emphasized:  [facility] [phone] (clinic)    [phone] (after hours)    TREATMENT RISK STATEMENT:  The risks, benefits, alternatives and potential adverse effects have been discussed and are understood by the pt. The pt understands the risks of using street drugs or alcohol. There are no medical contraindications, the pt agrees to treatment with the ability  to do so. The pt knows to call the clinic for any problems or to access emergency care if needed.  Medical and substance use concerns are documented above.  Psychotropic drug interaction check was done, including changes made today.    WHODAS 2.0  08/07/17  total score = pt declined to fill out; [a 12-item WHODAS 2.0 assessment has not been completed by the pt and/or recorded in EPIC].    RESIDENT:   Dana [place], DO    Patient staffed in clinic with Dr. Durant who will sign the note.  Supervisor is Dr. Toussaint.  I saw the patient with the resident, and participated in key portions of the service, including the mental status examination and developing the plan of care. I reviewed key portions of the history with the resident. I agree with the findings and plan as documented in this note.    Tika Durant

## 2017-08-07 NOTE — MR AVS SNAPSHOT
After Visit Summary   8/7/2017    Karolyn Banerjee    MRN: [id number]           Patient Information     Date Of Birth          1985        Visit Information        Provider Department      8/7/2017 12:45 PM Dana Sandoval MD [facility]        Today's Diagnoses     Schizoaffective disorder, bipolar type (H)    -  1    Aggression          Care Instructions    -continue current medications, no changes  -continue ECT  -return to clinic in 8 weeks or sooner if needed        SAFETY PLAN    Completed on 8/7/17 at which time the following was reported: no suicidal thoughts .  It was determined that ongoing outpatient care was appropriate in addition to establishing the following care plan:     1. Triggers which can possibly cause thoughts of self-harm:   Thoughts- thinking she would be better off dead  Mood or behavior- feeling upset about fights with roommates  Situation-conflict with roommates       2. Coping strategies:  ice pack on face- reviewed  paced breathing- reviewed and discussed briefly  intense exercise- reviewed  other- coloring, reading or taking a nap, going to [facility] and spending time with boyfriend     3. Ways to increase safety:  -go up to room instead of trying to go outside  -ask for a PRN  -talking to staff about unsafe thoughts    4. People I can call for help: (friends, family and/or professionals)    Name: Call Remy (boyfriend)  Phone: [phone]    Name: Staff who are in the   Phone: N/A    Name: Dana Sandoval DO              Phone: [facility] [phone] (clinic)                                                                                                 [phone] (after hours)            Follow-ups after your visit        Follow-up notes from your care team     Return in about 2 months (around 10/7/2017) for 60mfu.      Your next 10 appointments already scheduled     Aug 25, 2017  9:15 AM CDT   Electroconvulsive Therapy with Osiel Amos,  MD [facility] ([facility])    [address]  [facility] [id number] [phone]            Oct 02, 2017 12:45 PM CDT   Schizophrenia Follow Up with Dana Sandoval MD   Psychiatry Clinic ([facility])    [address]  [address]  [facility] [id number] [phone]              Who to contact     Please call your clinic at [phone] to:    Ask questions about your health    Make or cancel appointments    Discuss your medicines    Learn about your test results    Speak to your doctor   If you have compliments or concerns about an experience at your clinic, or if you wish to file a complaint, please contact [facility] Patient Relations at [phone] or email us at [email or web address]         Additional Information About Your Visit        BioStable Information     [employer] is an electronic gateway that provides easy, online access to your medical records. With BioStable, you can request a clinic appointment, read your test results, renew a prescription or communicate with your care team.     To sign up for Bio[employer] visit the website at [email or web address]   You will be asked to enter the access code listed below, as well as some personal information. Please follow the directions to create your username and password.     Your access code is: [email or web address]  Expires: 2017 10:51 PM     Your access code will  in 90 days. If you need help or a new code, please contact your [facility] or call [phone] for assistance.        Care EveryWhere ID     This is your Care EveryWhere ID. This could be used by other organizations to access your [place] medical records  [id number]        Your Vitals Were     Pulse Last Period BMI (Body Mass Index)             92 (LMP Unknown) 43.9 kg/m2          Blood Pressure from Last 3  Encounters:   08/07/17 121/85   07/14/17 148/81   07/11/17 156/88    Weight from Last 3 Encounters:   08/07/17 119.7 kg (263 lb 12.8 oz)   07/11/17 120.7 kg (266 lb 1.5 oz)   06/02/17 120.7 kg (266 lb)                 Today's Medication Changes          These changes are accurate as of: 8/7/17 11:59 PM.  If you have any questions, ask your nurse or doctor.               These medicines have changed or have updated prescriptions.        Dose/Directions    OLANZapine 10 MG tablet   Commonly known as:  zyPREXA   This may have changed:  reasons to take this   Used for:  Schizoaffective disorder, bipolar type (H), Aggression        Dose:  10 mg   Take 1 tablet (10 mg) by mouth 2 times daily as needed   Quantity:  30 tablet   Refills:  0            Where to get your medicines      These medications were sent to [facility] [address]  [address], [facility] [address]     Phone:  [phone]     benztropine 0.5 MG tablet    lurasidone 80 MG Tabs tablet    OLANZapine 10 MG tablet    perphenazine 8 MG tablet    traZODone 50 MG tablet                Primary Care Provider Office Phone # Fax #    Suzie Mariscal [phone]       [address] [address]        Equal Access to Services     KAYA STRICKLAND AH: Hadii reginald argueta hadasho Sot[place]anda, waaxda luqadaha, qaybta kaalmada nando church. So [facility] [phone].    ATENCIÓN: Si habla español, tiene a stauffer disposición servicios gratuitos de asistencia lingüística. Kiera al [phone].    We comply with applicable federal civil rights laws and [place] laws. We do not discriminate on the basis of race, color, national origin, age, disability sex, sexual orientation or gender identity.            Thank you!     Thank you for choosing [facility]  for your care. Our goal is always to provide you with excellent care. Hearing back from our patients is  one way we can continue to improve our services. Please take a few minutes to complete the written survey that you may receive in the mail after your visit with us. Thank you!             Your Updated Medication List - Protect others around you: Learn how to safely use, store and throw away your medicines at www.disposemymeds.org.          This list is accurate as of: 8/7/17 11:59 PM.  Always use your most recent med list.                   Brand Name Dispense Instructions for use Diagnosis    ACETAMINOPHEN PO      Take 1,000 mg by mouth every 8 hours as needed for pain        * albuterol (5 MG/ML) 0.5% neb solution    PROVENTIL     Inhale 2.5 mg into the lungs every 4 hours as needed for wheezing or shortness of breath / dyspnea (2 puffs)        * albuterol 108 (90 BASE) MCG/ACT Inhaler    PROAIR HFA/PROVENTIL HFA/VENTOLIN HFA     Inhale 2 puffs into the lungs every 6 hours as needed for shortness of breath / dyspnea or wheezing        benztropine 0.5 MG tablet    COGENTIN    60 tablet    Take 1 tablet (0.5 mg) by mouth 2 times daily    Schizoaffective disorder, bipolar type (H), Aggression       cetirizine 10 MG tablet    zyrTEC     Take 10 mg by mouth daily        diphenhydrAMINE 25 MG tablet    BENADRYL     Take 25 mg by mouth every 6 hours as needed for itching or allergies        FISH OIL PO           hydrOXYzine 25 MG tablet    ATARAX    60 tablet    Take 1-2 tablets (25-50 mg) by mouth every 4 hours as needed for anxiety    Schizoaffective disorder, bipolar type (H), Aggression       lurasidone 80 MG Tabs tablet    LATUDA    60 tablet    Take 2 tablets (160 mg) by mouth daily (with dinner)    Schizoaffective disorder, bipolar type (H), Aggression       norethindrone-ethinyl estradiol 1-20 MG-MCG per tablet    JUNEL FE 1/20     Take 1 tablet by mouth        OLANZapine 10 MG tablet    zyPREXA    30 tablet    Take 1 tablet (10 mg) by mouth 2 times daily as needed    Schizoaffective disorder, bipolar type (H),  Aggression       perphenazine 8 MG tablet     93 tablet    Take 1 tablet (8 mg) by mouth 3 times daily    Schizoaffective disorder, bipolar type (H), Aggression       traZODone 50 MG tablet    DESYREL    30 tablet    Take 1 tablet (50 mg) by mouth At Bedtime    Schizoaffective disorder, bipolar type (H), Aggression       * Notice:  This list has 2 medication(s) that are the same as other medications prescribed for you. Read the directions carefully, and ask your doctor or other care provider to review them with you.

## 2017-08-14 ENCOUNTER — TELEPHONE (OUTPATIENT)
Dept: PSYCHIATRY | Facility: CLINIC | Age: 32
End: 2017-08-14

## 2017-08-14 NOTE — TELEPHONE ENCOUNTER
Received a fax from [facility] requesting an order for year-long refills on the medications that patient takes.  They are required by law to update prescriber orders for the group homes they serve.  The attached prescription form indicated that the meds were last filled on 7/26/17, but per med tab, refills were ordered during the most recent office visit on 8/7/17.  These medications were sent to [facility] in [place].    Called patient, who confirmed that she now gets her medications from [facility].  Faxed the request back to [facility] with the information that patient is using another pharmacy.

## 2017-08-15 NOTE — TELEPHONE ENCOUNTER
"Kya Sullivan Victoria, RN        Phone Number: [phone]                     Khadar from [facility] is calling to let us know they are the new pharmacy for this pt's group home.  States they sent us physicians orders to be signed and they were sent back to [facility] stating \"Pt uses a different pharmacy\".  He'll resend the physicians orders shortly to get a signature from us.       Feel free to call them at [phone] with any additional questions.         "

## 2017-08-15 NOTE — TELEPHONE ENCOUNTER
Called Khadar who explained that in two weeks, they will be taking over as the pharmacy for the patient's group home, and patient will no longer be getting medications from [facility]  Explained that orders were sent to [facility] on 8/7/17, but mostly for 30 d/s, so no refills can really be transferred.  He said that he will be going to the group home to take an inventory of the medications so they can determine when they need to start sending refills.      Upon review of med tab, only olanzapine is 30 d/s with no refill, but all other medications (Trilafon, Latuda, Cogentin, Desyrel) have 1 refill on file.    Will forward the form/prescription order to Dr. Sandoval for approval and signature.

## 2017-08-16 NOTE — TELEPHONE ENCOUNTER
Faxed the signed order form to Khadar [facility] (fax # [phone]).

## 2017-08-25 ENCOUNTER — ANESTHESIA EVENT (OUTPATIENT)
Dept: BEHAVIORAL HEALTH | Facility: CLINIC | Age: 32
End: 2017-08-25

## 2017-08-25 ENCOUNTER — ANESTHESIA (OUTPATIENT)
Dept: BEHAVIORAL HEALTH | Facility: CLINIC | Age: 32
End: 2017-08-25

## 2017-08-25 ENCOUNTER — HOSPITAL ENCOUNTER (OUTPATIENT)
Dept: BEHAVIORAL HEALTH | Facility: CLINIC | Age: 32
End: 2017-08-25
Attending: PSYCHIATRY & NEUROLOGY
Payer: MEDICAID

## 2017-08-25 ENCOUNTER — CARE COORDINATION (OUTPATIENT)
Dept: PSYCHIATRY | Facility: CLINIC | Age: 32
End: 2017-08-25

## 2017-08-25 VITALS
DIASTOLIC BLOOD PRESSURE: 92 MMHG | RESPIRATION RATE: 18 BRPM | OXYGEN SATURATION: 93 % | TEMPERATURE: 98.9 F | HEART RATE: 106 BPM | SYSTOLIC BLOOD PRESSURE: 139 MMHG

## 2017-08-25 DIAGNOSIS — F25.1 SCHIZOAFFECTIVE DISORDER, DEPRESSIVE TYPE (H): ICD-10-CM

## 2017-08-25 PROCEDURE — 40000671 ZZH STATISTIC ANESTHESIA CASE

## 2017-08-25 PROCEDURE — 90870 ELECTROCONVULSIVE THERAPY: CPT

## 2017-08-25 PROCEDURE — 25000128 H RX IP 250 OP 636: Performed by: ANESTHESIOLOGY

## 2017-08-25 PROCEDURE — 25000128 H RX IP 250 OP 636: Performed by: PSYCHIATRY & NEUROLOGY

## 2017-08-25 PROCEDURE — 25000125 ZZHC RX 250: Performed by: ANESTHESIOLOGY

## 2017-08-25 RX ORDER — HEPARIN SODIUM (PORCINE) LOCK FLUSH IV SOLN 100 UNIT/ML 100 UNIT/ML
5 SOLUTION INTRAVENOUS
Status: COMPLETED | OUTPATIENT
Start: 2017-08-25 | End: 2017-08-25

## 2017-08-25 RX ORDER — HEPARIN SODIUM (PORCINE) LOCK FLUSH IV SOLN 100 UNIT/ML 100 UNIT/ML
5 SOLUTION INTRAVENOUS
Status: CANCELLED
Start: 2017-08-25 | End: 2017-08-25

## 2017-08-25 RX ADMIN — METHOHEXITAL SODIUM 16 MG: 500 INJECTION, POWDER, LYOPHILIZED, FOR SOLUTION INTRAMUSCULAR; INTRAVENOUS; RECTAL at 12:17

## 2017-08-25 RX ADMIN — Medication 100 MG: at 12:17

## 2017-08-25 RX ADMIN — SODIUM CHLORIDE, PRESERVATIVE FREE 5 ML: 5 INJECTION INTRAVENOUS at 12:29

## 2017-08-25 NOTE — IP AVS SNAPSHOT
[facility]    [facility] [address] [address]    Phone:  [phone]                                       After Visit Summary   8/25/2017    Karolyn Banerjee    MRN: [id number]           After Visit Summary Signature Page     I have received my discharge instructions, and my questions have been answered. I have discussed any challenges I see with this plan with the nurse or doctor.    ..........................................................................................................................................  Patient/Patient Representative Signature      ..........................................................................................................................................  Patient Representative Print Name and Relationship to Patient    ..................................................               ................................................  Date                                            Time    ..........................................................................................................................................  Reviewed by Signature/Title    ...................................................              ..............................................  Date                                                            Time

## 2017-08-25 NOTE — PROGRESS NOTES
Spoke with Thea from [facility], who said that the letter can be faxed to her attention and she will make sure that it gets to the correct .  Patient's CM is Linda, who is out of the office, and Hien is covering for her.    Faxed the signed letter to her at [phone].    Will send the letter to scanning.

## 2017-08-25 NOTE — PROGRESS NOTES
Message received from Bre, from [facility], asking for a letter for court advocating for continued ECT.

## 2017-08-25 NOTE — ANESTHESIA PREPROCEDURE EVALUATION
Anesthesia Evaluation     . Pt has had prior anesthetic. Type: General           ROS/MED HX    ENT/Pulmonary:     (+)JOSE risk factors obese, Intermittent asthma Treatment: Inhaler prn,  , . .    Neurologic:       Cardiovascular:         METS/Exercise Tolerance:     Hematologic:         Musculoskeletal:         GI/Hepatic:         Renal/Genitourinary:         Endo:     (+) Obesity, .      Psychiatric:     (+) psychiatric history schizophrenia, depression and other (comment) (BL personality d/o)      Infectious Disease:         Malignancy:         Other:                     Physical Exam  Normal systems: cardiovascular and pulmonary    Airway   Mallampati: III  TM distance: >3 FB    Dental   (+) chipped and missing    Cardiovascular       Pulmonary                         Anesthesia Plan      History & Physical Review  History and physical reviewed and following examination; no interval change.    ASA Status:  3 .    NPO Status:  > 8 hours    Plan for General with Intravenous induction.          Postoperative Care      Consents  Anesthetic plan, risks, benefits and alternatives discussed with:  Patient..        Darcy Bingham MD   July 14, 2017

## 2017-08-25 NOTE — ANESTHESIA PREPROCEDURE EVALUATION
Anesthesia Evaluation     . Pt has had prior anesthetic. Type: General           ROS/MED HX    ENT/Pulmonary:     (+)JOSE risk factors obese, Intermittent asthma Treatment: Inhaler prn,  , cystic fibrosis . .    Neurologic:       Cardiovascular:         METS/Exercise Tolerance:     Hematologic:         Musculoskeletal:         GI/Hepatic:         Renal/Genitourinary:         Endo:     (+) Obesity, .      Psychiatric:     (+) psychiatric history schizophrenia, depression and other (comment) ( personality d/o)      Infectious Disease:         Malignancy:         Other:                     Physical Exam  Normal systems: cardiovascular and pulmonary    Airway   Mallampati: III  TM distance: >3 FB    Dental   (+) chipped and missing    Cardiovascular       Pulmonary                         Anesthesia Plan      History & Physical Review  History and physical reviewed and following examination; no interval change.    ASA Status:  3 .    NPO Status:  > 8 hours    Plan for General with Intravenous induction.          Postoperative Care      Consents  Anesthetic plan, risks, benefits and alternatives discussed with:  Patient..        Zaheer Van MD   July 14, 2017  Anesthesia Evaluation     . Pt has had prior anesthetic. Type: General           ROS/MED HX    ENT/Pulmonary:     (+)JOSE risk factors obese, Intermittent asthma Treatment: Inhaler prn,  , cystic fibrosis . .    Neurologic:       Cardiovascular:         METS/Exercise Tolerance:     Hematologic:         Musculoskeletal:         GI/Hepatic:         Renal/Genitourinary:         Endo:     (+) Obesity, .      Psychiatric:     (+) psychiatric history schizophrenia, depression and other (comment) (BL personality d/o)      Infectious Disease:         Malignancy:         Other:                     Physical Exam  Normal systems: cardiovascular and pulmonary    Airway   Mallampati: III  TM distance: >3 FB    Dental   (+) chipped and  missing    Cardiovascular       Pulmonary                         Anesthesia Plan      History & Physical Review  History and physical reviewed and following examination; no interval change.    ASA Status:  3 .    NPO Status:  > 8 hours    Plan for General with Intravenous induction.          Postoperative Care      Consents  Anesthetic plan, risks, benefits and alternatives discussed with:  Patient..        Darcy Bingham MD   July 14, 2017

## 2017-08-25 NOTE — ANESTHESIA POSTPROCEDURE EVALUATION
Patient: Karolyn Banerjee    * No procedures listed *    Diagnosis:Schizoaffective disorder, depressive type (H) [F25.1]  Diagnosis Additional Information: No value filed.    Anesthesia Type:  General    Note:  Anesthesia Post Evaluation    Patient location during evaluation: PACU  Patient participation: Able to fully participate in evaluation  Level of consciousness: awake and alert  Pain management: adequate  Airway patency: patent  Cardiovascular status: acceptable  Respiratory status: acceptable  Hydration status: acceptable  PONV: none     Anesthetic complications: None          Last vitals:  Vitals:    08/25/17 1010 08/25/17 1226 08/25/17 1231   BP: 165/74 (!) 150/107 147/88   Pulse: 98 111 113   Resp: 16 20 16   Temp: 36.9  C (98.4  F) 37.2  C (98.9  F)    SpO2: 97% 97% 97%         Electronically Signed By: Darcy Bingham MD  August 25, 2017  12:36 PM

## 2017-08-25 NOTE — IP AVS SNAPSHOT
MRN:[id number]                      After Visit Summary   8/25/2017    Karolyn Banerjee    MRN: [id number]           Visit Information        Provider Department      8/25/2017  9:15 AM Osiel Amos MD [facility]           Review of your medicines      CONTINUE these medicines which have NOT CHANGED        Dose / Directions    ACETAMINOPHEN PO        Dose:  1000 mg   Take 1,000 mg by mouth every 8 hours as needed for pain   Refills:  0       * albuterol (5 MG/ML) 0.5% neb solution   Commonly known as:  PROVENTIL        Dose:  2.5 mg   Inhale 2.5 mg into the lungs every 4 hours as needed for wheezing or shortness of breath / dyspnea (2 puffs)   Refills:  0       * albuterol 108 (90 BASE) MCG/ACT Inhaler   Commonly known as:  PROAIR HFA/PROVENTIL HFA/VENTOLIN HFA        Dose:  2 puff   Inhale 2 puffs into the lungs every 6 hours as needed for shortness of breath / dyspnea or wheezing   Refills:  0       benztropine 0.5 MG tablet   Commonly known as:  COGENTIN   Used for:  Schizoaffective disorder, bipolar type (H), Aggression        Dose:  0.5 mg   Take 1 tablet (0.5 mg) by mouth 2 times daily   Quantity:  60 tablet   Refills:  1       cetirizine 10 MG tablet   Commonly known as:  zyrTEC        Dose:  10 mg   Take 10 mg by mouth daily   Refills:  0       diphenhydrAMINE 25 MG tablet   Commonly known as:  BENADRYL        Dose:  25 mg   Take 25 mg by mouth every 6 hours as needed for itching or allergies   Refills:  0       FISH OIL PO        Refills:  0       hydrOXYzine 25 MG tablet   Commonly known as:  ATARAX   Used for:  Schizoaffective disorder, bipolar type (H), Aggression        Dose:  25-50 mg   Take 1-2 tablets (25-50 mg) by mouth every 4 hours as needed for anxiety   Quantity:  60 tablet   Refills:  2       lurasidone 80 MG Tabs tablet   Commonly known as:  LATUDA   Used for:  Schizoaffective disorder, bipolar type (H), Aggression        Dose:  160 mg   Take 2  tablets (160 mg) by mouth daily (with dinner)   Quantity:  60 tablet   Refills:  1       norethindrone-ethinyl estradiol 1-20 MG-MCG per tablet   Commonly known as:  JUNEL FE 1/20        Dose:  1 tablet   Take 1 tablet by mouth   Refills:  0       OLANZapine 10 MG tablet   Commonly known as:  zyPREXA   Used for:  Schizoaffective disorder, bipolar type (H), Aggression        Dose:  10 mg   Take 1 tablet (10 mg) by mouth 2 times daily as needed   Quantity:  30 tablet   Refills:  0       perphenazine 8 MG tablet   Used for:  Schizoaffective disorder, bipolar type (H), Aggression        Dose:  8 mg   Take 1 tablet (8 mg) by mouth 3 times daily   Quantity:  93 tablet   Refills:  1       traZODone 50 MG tablet   Commonly known as:  DESYREL   Used for:  Schizoaffective disorder, bipolar type (H), Aggression        Dose:  50 mg   Take 1 tablet (50 mg) by mouth At Bedtime   Quantity:  30 tablet   Refills:  1       * Notice:  This list has 2 medication(s) that are the same as other medications prescribed for you. Read the directions carefully, and ask your doctor or other care provider to review them with you.             Protect others around you: Learn how to safely use, store and throw away your medicines at www.disposemymeds.org.         Follow-ups after your visit        Your next 10 appointments already scheduled     Oct 02, 2017 12:45 PM CDT   Schizophrenia Follow Up with Dana Sandoval MD   Psychiatry Clinic ([facility])    [address]  [address] [facility] [address]   [phone]            Oct 06, 2017  9:15 AM CDT   Electroconvulsive Therapy with Osiel Amos MD   [facility] ([facility])    [address] [facility] [address]   [phone]               Care Instructions        Further instructions from your care team       ECT Discharge Instructions      During your ECT series:      Do  "not drive or work heavy equipment until 7 days after your last treatment.    Do not drink alcohol or use street drugs (illicit drugs) while you are having treatments.    Do not make important decisions, including legal decisions.    After each treatment:      Get plenty of rest. A responsible adult must stay with your for at least 6 hours.    Avoid heavy or risky activities for 24 hours.    If you feel light-headed, sit for a few minutes before standing. Have someone help you get up.    If you have nausea (feel sick to your stomach): Drink only clear liquids such as apple juice, ginger ale, broth or 7UP, Be sure to drink plenty of liquids. Move to a normal diet as you feel able.     If you received Toradol, wait 6 hours before taking ibuprofen.    Call your doctor if:     You have a fever over 100F (37.7 C) (taken under the tongue), or a fever that last more than 24 hours.    Your IV site is red, swollen, very painful or is getting more tender.    You have nausea that gets very bad or does not improve.      If you have any symptoms after ECT, tell our staff before your next treatment.    The ECT Department can be reached at [phone].  The ECT Department is open Mondays, Wednesdays and Fridays from 7:00 AM to 2:00 PM.     To speak to a doctor, call: Dr Amos [phone]    Other: Instruct group home staff to NOT give AM meds before ECT    Transported by: patients staffJoan [phone]      _________________________________________________  ________________________  Patient/Responsible party Signature      Date          ________________________________________________   ________________________  Nurse Signature        Date     Additional Information About Your Visit        [email or web address] Information     [email or web address] lets you send messages to your doctor, view your test results, renew your prescriptions, schedule appointments and more. To sign up, go to [email or web address] . Click on \"Log in\" on the left side of the " "screen, which will take you to the Welcome page. Then click on \"Sign up Now\" on the right side of the page.     You will be asked to enter the access code listed below, as well as some personal information. Please follow the directions to create your username and password.     Your access code is: [email or web address]  Expires: 2017 10:51 PM     Your access code will  in 90 days. If you need help or a new code, please call your [facility] clinic or [phone].        Care EveryWhere ID     This is your Care EveryWhere ID. This could be used by other organizations to access your [facility] medical records  [id number]        Your Vitals Were     Blood Pressure Pulse Temperature Respirations Pulse Oximetry       139/92 106 98.9  F (37.2  C) (Temporal) 18 93%        Primary Care Provider Office Phone # Fax #    Suzie Mariscal [phone]      Equal Access to Services     [facility] : Hadii aad ku hadasho Soomaali, waaxda luqadaha, qaybta kaalmada adeegyada, waxay shinein hayharvinder pizarro . So [facility] [phone].    ATENCIÓN: Si habla español, tiene a stauffer disposición servicios gratuitos de asistencia lingüística. Llame al [phone].    We comply with applicable federal civil rights laws and [place] laws. We do not discriminate on the basis of race, color, national origin, age, disability sex, sexual orientation or gender identity.            Thank you!     Thank you for choosing [facility] for your care. Our goal is always to provide you with excellent care. Hearing back from our patients is one way we can continue to improve our services. Please take a few minutes to complete the written survey that you may receive in the mail after you visit with us. Thank you!             Medication List: This is a list of all your medications and when to take them. Check marks below indicate your daily home schedule. Keep this list as a reference.      Medications           Morning Afternoon " Evening Bedtime As Needed    ACETAMINOPHEN PO   Take 1,000 mg by mouth every 8 hours as needed for pain                                * albuterol (5 MG/ML) 0.5% neb solution   Commonly known as:  PROVENTIL   Inhale 2.5 mg into the lungs every 4 hours as needed for wheezing or shortness of breath / dyspnea (2 puffs)                                * albuterol 108 (90 BASE) MCG/ACT Inhaler   Commonly known as:  PROAIR HFA/PROVENTIL HFA/VENTOLIN HFA   Inhale 2 puffs into the lungs every 6 hours as needed for shortness of breath / dyspnea or wheezing                                benztropine 0.5 MG tablet   Commonly known as:  COGENTIN   Take 1 tablet (0.5 mg) by mouth 2 times daily                                cetirizine 10 MG tablet   Commonly known as:  zyrTEC   Take 10 mg by mouth daily                                diphenhydrAMINE 25 MG tablet   Commonly known as:  BENADRYL   Take 25 mg by mouth every 6 hours as needed for itching or allergies                                FISH OIL PO                                hydrOXYzine 25 MG tablet   Commonly known as:  ATARAX   Take 1-2 tablets (25-50 mg) by mouth every 4 hours as needed for anxiety                                lurasidone 80 MG Tabs tablet   Commonly known as:  LATUDA   Take 2 tablets (160 mg) by mouth daily (with dinner)                                norethindrone-ethinyl estradiol 1-20 MG-MCG per tablet   Commonly known as:  JUNEL FE 1/20   Take 1 tablet by mouth                                OLANZapine 10 MG tablet   Commonly known as:  zyPREXA   Take 1 tablet (10 mg) by mouth 2 times daily as needed                                perphenazine 8 MG tablet   Take 1 tablet (8 mg) by mouth 3 times daily                                traZODone 50 MG tablet   Commonly known as:  DESYREL   Take 1 tablet (50 mg) by mouth At Bedtime                                * Notice:  This list has 2 medication(s) that are the same as other medications  prescribed for you. Read the directions carefully, and ask your doctor or other care provider to review them with you.

## 2017-08-25 NOTE — LETTER
August 25, 2017    RE: Karolyn Banerjee  [address]  [place] [address]       To whom it may concern,    This letter is in support of the renewal of the Price Wilkins Order for Ms. Karolyn Banerjee, a 32-year old woman diagnosed with schizoaffective disorder, depressed type, and with a history of frequent suicidal ideation and attempts.  She has been in treatment at the [facility] since 2008.  I saw her for an evaluation on 8/7/17.  The discussion with her care team and a review of her history warrants the renewal of her Price Wilkins Order given her ongoing positive response to ECT.     The ECT treatments have been helpful in reducing aggression and irritability.  She remains on several neuroleptic medications, and together with ECT, she has experienced some stability and diminished perceptual disturbances, as well as a reduction in aggression towards others and reduced suicidal ideation.  She has had several medication trials and have had life-threatening side effects, but has tolerated the current medications together with ECT.  Historically, medications alone have not been adequate in managing her symptoms.    In my professional opinion, ECT should continue as currently ordered, which is up to two times a week as needed.  She will not necessarily have ECT at this frequency throughout the coming year, but may be required should she have acute decompensation.   The care team and this provider will regularly assess the use of ECT in the treatment of Ms. Banerjee to ensure that the benefits continue to outweigh potential complications.  She is currently receiving ECT treatments approximately every 6 weeks, which have been adequate at this time.      Thank you for your consideration on this matter.      Sincerely,      Dana Sandoval, DO

## 2017-08-25 NOTE — PROGRESS NOTES
Pt states she vomited in bathroom around 11am, staff from [facility] had left her to wait in the car, so the vomiting was unwitnessed. States she feels better now and wants to continue ect.
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
## 2017-08-25 NOTE — DISCHARGE INSTRUCTIONS
ECT Discharge Instructions      During your ECT series:      Do not drive or work heavy equipment until 7 days after your last treatment.    Do not drink alcohol or use street drugs (illicit drugs) while you are having treatments.    Do not make important decisions, including legal decisions.    After each treatment:      Get plenty of rest. A responsible adult must stay with your for at least 6 hours.    Avoid heavy or risky activities for 24 hours.    If you feel light-headed, sit for a few minutes before standing. Have someone help you get up.    If you have nausea (feel sick to your stomach): Drink only clear liquids such as apple juice, ginger ale, broth or 7UP, Be sure to drink plenty of liquids. Move to a normal diet as you feel able.     If you received Toradol, wait 6 hours before taking ibuprofen.    Call your doctor if:     You have a fever over 100F (37.7 C) (taken under the tongue), or a fever that last more than 24 hours.    Your IV site is red, swollen, very painful or is getting more tender.    You have nausea that gets very bad or does not improve.      If you have any symptoms after ECT, tell our staff before your next treatment.    The ECT Department can be reached at [phone].  The ECT Department is open Mondays, Wednesdays and Fridays from 7:00 AM to 2:00 PM.     To speak to a doctor, call: Dr Amos [phone]    Other: Instruct group home staff to NOT give AM meds before ECT    Transported by: patients staff, Joan [phone]      _________________________________________________  ________________________  Patient/Responsible party Signature      Date          ________________________________________________   ________________________  Nurse Signature        Date

## 2017-08-31 ENCOUNTER — TELEPHONE (OUTPATIENT)
Dept: PSYCHIATRY | Facility: CLINIC | Age: 32
End: 2017-08-31

## 2017-08-31 NOTE — TELEPHONE ENCOUNTER
----- Message from Shona Arreaga RN sent at 8/28/2017  1:50 PM CDT -----  Regarding: FW: examiner's statement needed/Dandre  Contact: [phone]      ----- Message -----     From: Amber Gomez     Sent: 8/28/2017   1:41 PM       To: Shona Arreaga RN  Subject: examiner's statement needed/Dandre alvarez), from pt's group home called because pt needs an examiners statement done. Not sure if this is something that should be scheduled under a [facility] or if it's something you can help complete with pt without an appt. Let me know if you want me to go ahead and try to schedule.    Thanks

## 2017-09-01 NOTE — TELEPHONE ENCOUNTER
Scheduled an appointment for a 60-minute evaluation for 9/6 at 9:05 am.  Patient only needs a 30-minute appointment.  Called and left  for Sole ([phone]) to reschedule.

## 2017-09-05 ENCOUNTER — TELEPHONE (OUTPATIENT)
Dept: PSYCHIATRY | Facility: CLINIC | Age: 32
End: 2017-09-05

## 2017-09-05 NOTE — TELEPHONE ENCOUNTER
Received a call from Shahzad pharmacist at [facility], regarding insurance requirement for medications to be ordered by an attending MD.  Asked him to resubmit under Dr. Kerr, who is Dr. Sandoval's supervisor.    Will route to Dr. Sandoval for FYI.

## 2017-09-06 ENCOUNTER — OFFICE VISIT (OUTPATIENT)
Dept: PSYCHIATRY | Facility: CLINIC | Age: 32
End: 2017-09-06
Attending: PSYCHIATRY & NEUROLOGY
Payer: MEDICAID

## 2017-09-06 VITALS
SYSTOLIC BLOOD PRESSURE: 136 MMHG | DIASTOLIC BLOOD PRESSURE: 92 MMHG | BODY MASS INDEX: 43.97 KG/M2 | HEART RATE: 130 BPM | WEIGHT: 264.2 LBS

## 2017-09-06 DIAGNOSIS — R46.89 AGGRESSION: ICD-10-CM

## 2017-09-06 DIAGNOSIS — F25.0 SCHIZOAFFECTIVE DISORDER, BIPOLAR TYPE (H): ICD-10-CM

## 2017-09-06 PROCEDURE — 99212 OFFICE O/P EST SF 10 MIN: CPT | Mod: ZF

## 2017-09-06 RX ORDER — TRAZODONE HYDROCHLORIDE 50 MG/1
50 TABLET, FILM COATED ORAL AT BEDTIME
Qty: 30 TABLET | Refills: 1 | Status: CANCELLED | OUTPATIENT
Start: 2017-09-06

## 2017-09-06 ASSESSMENT — PATIENT HEALTH QUESTIONNAIRE - PHQ9: SUM OF ALL RESPONSES TO PHQ QUESTIONS 1-9: 2

## 2017-09-06 NOTE — MR AVS SNAPSHOT
After Visit Summary   9/6/2017    Karolyn Banerjee    MRN: [id number]           Patient Information     Date Of Birth          1985        Visit Information        Provider Department      9/6/2017 9:05 AM Dana Sandoval MD [facility]iat[facility]        Today's Diagnoses     Schizoaffective disorder, bipolar type (H)        Aggression          Care Instructions    -continue current medications no changes  -continue ECT with Dr. Amos  -get lab work completed in October (before next appointment) you will need to be fasting for this lab work (nothing by mouth except for water after midnight) and this can be completed at any [facility] facility   -return to clinic in 2 months or sooner if needed            Follow-ups after your visit        Follow-up notes from your care team     Return in about 2 months (around 11/6/2017) for 30 MFU.      Your next 10 appointments already scheduled     Oct 06, 2017 10:15 AM CDT   Electroconvulsive Therapy with Osiel Amos MD   [facility] ([facility])    [address] [facility] [address] [phone]            Nov 06, 2017 12:45 PM CST   Schizophrenia Follow Up with Dana Sandoval MD   Psychiatry Clinic ([facility])    [address] [facility] [address] [phone]              Who to contact     Please call your clinic at [phone] to:    Ask questions about your health    Make or cancel appointments    Discuss your medicines    Learn about your test results    Speak to your doctor   If you have compliments or concerns about an experience at your clinic, or if you wish to file a complaint, please contact [facility] Patient Relations at [phone] or email us at [email or web address]         Additional Information About Your Visit        MyChart Information     MyChart is an electronic  gateway that provides easy, online access to your medical records. With [employer], you can request a clinic appointment, read your test results, renew a prescription or communicate with your care team.     To sign up for [employer] visit the website at [email or web address]   You will be asked to enter the access code listed below, as well as some personal information. Please follow the directions to create your username and password.     Your access code is: [email or web address]  Expires: 2017 10:51 PM     Your access code will  in 90 days. If you need help or a new code, please contact your [facility] or call [phone] for assistance.        Care EveryWhere ID     This is your Care EveryWhere ID. This could be used by other organizations to access your [place] medical records  [id number]        Your Vitals Were     Pulse                   130            Blood Pressure from Last 3 Encounters:   17 (!) 136/92   17 (!) 139/92   17 121/85    Weight from Last 3 Encounters:   17 119.7 kg (263 lb 12.8 oz)   17 120.7 kg (266 lb 1.5 oz)   17 120.7 kg (266 lb)              Today, you had the following     No orders found for display       Primary Care Provider Office Phone # Fax #    Suzie Mariscal [phone]       [address] [address]        Equal Access to Services     KAYA STRICKLAND AH: Hadii aad ku hadasho Soomaali, waaxda luqadaha, qaybta kaalmada adeegyada, nando idiin haychristinen janay pizarro . So [facility] [phone].    ATENCIÓN: Si habla español, tiene a stauffer disposición servicios gratuitos de asistencia lingüística. Llame al [phone].    We comply with applicable federal civil rights laws and [place] laws. We do not discriminate on the basis of race, color, national origin, age, disability sex, sexual orientation or gender identity.            Thank you!     Thank you  for choosing PSYCHIATRY CLINIC  for your care. Our goal is always to provide you with excellent care. Hearing back from our patients is one way we can continue to improve our services. Please take a few minutes to complete the written survey that you may receive in the mail after your visit with us. Thank you!             Your Updated Medication List - Protect others around you: Learn how to safely use, store and throw away your medicines at www.disposemymeds.org.          This list is accurate as of: 9/6/17 10:28 AM.  Always use your most recent med list.                   Brand Name Dispense Instructions for use Diagnosis    ACETAMINOPHEN PO      Take 1,000 mg by mouth every 8 hours as needed for pain        * albuterol (5 MG/ML) 0.5% neb solution    PROVENTIL     Inhale 2.5 mg into the lungs every 4 hours as needed for wheezing or shortness of breath / dyspnea (2 puffs)        * albuterol 108 (90 BASE) MCG/ACT Inhaler    PROAIR HFA/PROVENTIL HFA/VENTOLIN HFA     Inhale 2 puffs into the lungs every 6 hours as needed for shortness of breath / dyspnea or wheezing        benztropine 0.5 MG tablet    COGENTIN    60 tablet    Take 1 tablet (0.5 mg) by mouth 2 times daily    Schizoaffective disorder, bipolar type (H), Aggression       cetirizine 10 MG tablet    zyrTEC     Take 10 mg by mouth daily        diphenhydrAMINE 25 MG tablet    BENADRYL     Take 25 mg by mouth every 6 hours as needed for itching or allergies        FISH OIL PO           hydrOXYzine 25 MG tablet    ATARAX    60 tablet    Take 1-2 tablets (25-50 mg) by mouth every 4 hours as needed for anxiety    Schizoaffective disorder, bipolar type (H), Aggression       lurasidone 80 MG Tabs tablet    LATUDA    60 tablet    Take 2 tablets (160 mg) by mouth daily (with dinner)    Schizoaffective disorder, bipolar type (H), Aggression       norethindrone-ethinyl estradiol 1-20 MG-MCG per tablet    JUNEL FE 1/20     Take 1 tablet by mouth        OLANZapine 10 MG  tablet    zyPREXA    30 tablet    Take 1 tablet (10 mg) by mouth 2 times daily as needed    Schizoaffective disorder, bipolar type (H), Aggression       perphenazine 8 MG tablet     93 tablet    Take 1 tablet (8 mg) by mouth 3 times daily    Schizoaffective disorder, bipolar type (H), Aggression       traZODone 50 MG tablet    DESYREL    30 tablet    Take 1 tablet (50 mg) by mouth At Bedtime    Schizoaffective disorder, bipolar type (H), Aggression       * Notice:  This list has 2 medication(s) that are the same as other medications prescribed for you. Read the directions carefully, and ask your doctor or other care provider to review them with you.

## 2017-09-06 NOTE — NURSING NOTE
Chief Complaint   Patient presents with     Recheck Medication     Schizoaffective disorder, bipolar type      Reviewed allergies, smoking status, and pharmacy preference     Obtained weight, blood pressure and heart rate

## 2017-09-06 NOTE — PROGRESS NOTES
PSYCHIATRY CLINIC PROGRESS NOTE   The initial diagnostic evaluation was on prior to 2008.  Date of the most recent transfer of care montse is 8/7/17.    Pertinent Background:  This patient first experienced mental health issues as a child and has received treatment for Schizoaffective disorder bipolar type, borderline personality disorder and unspecified neurocognitive disorder.  See transfer evaluation for detailed history.  Notably, she has multiple medication trials and hospitalizations treating aggression and perceptual disturbances (including clozapine with neutropenia x2).  ECT combined with multiple medications has been most helpful in maintaining stability.  She is under commitment and Torres. Peña-Wilkins order also in place (all renewed in 11/3/2016 for 12 months), authorizes treatment up to two times per week for maintenance.  10/28/2013: full scale IQ test of 62.  After neutropenia with clozapine x's 2, has required multiple neuroleptics as well as ECT to maintain stability.       Psych critical item history includes suicide attempt [multiple], suicidal ideation, SIB [indicated per chart review, pt denies], psychosis [sxs include AH, delusions relating to pregnancy], mutiple psychotropic trials, severe med reaction, psych hosp (>5), commitment and ECT.    INTERIM HISTORY                                                 Karolyn Banerjee is a 32 year old female who was last seen for medication management on 8/7/17 at which time no changes were made.  The patient reports good treatment adherence.  History was provided by the patient and  who was a fair historian.  Since the last visit:  -feeling too tired, when she wakes up in the morning and doesn't have a busy day will feel more tired, does get adequate sleep, sometimes has bad dreams   -a few days ago one of her roommates wasn't following the rules recently and she became upset with her did engage verbally but did not get into physical  "altercations, did not tell staff about this  -mood has been \"okay\", denies having any safety concerns or suicidal thoughts  -having some auditory hallucinations \"a couple days a week\" that tell her to break up with her boyfriend  -olanzapine is \"sometimes helpful\" for this  -still continues to feel safe in relationship with her boyfriend (Sees him Tues and Thursday) Chani   -continues to color and read books   - reports group home staff have noticed increasing irritability and self harm and SI thoughts the few days before she is due for her next ECT treatment, altercation with roommates have also increased days prior to ECT appointments. Pt reports after ECT treatments she only feels tired but is unsure if it helps with her mood or other symptoms, denies any other discomforts or side effects from ECT    RECENT SYMPTOMS:   DEPRESSION:  reports-low energy and insomnia ;  DENIES- suicidal ideation , depressed mood, anhedonia, weight/ appetite change (decrease or increase), excessive guilt, psychomotor change observed by others (none) and poor concentration /memory  JUDE/HYPOMANIA:  reports-none;  DENIES- increased energy, decreased sleep need, increased activity and grandiosity  PSYCHOSIS:  reports-auditory hallucinations with commands [details in Interim History];  DENIES- delusions and visual hallucinations  DYSREGULATION:  reports-none;  DENIES- suicidal ideation, violent ideation and SIB  PANIC ATTACK:  none   ANXIETY:  some worry in context of depression and increased voices  TRAUMA RELATED:  none  COMPULSIVE:  none  SLEEP:  as above    EATING DISORDER: none    RECENT SUBSTANCE USE:     ALCOHOL- none          TOBACCO- none               CAFFEINE- 2 sodas/day  OPIOIDS- none       NARCAN KIT- N/A       CANNABIS- none          OTHER ILLICIT DRUGS- none     CURRENT SOCIAL HISTORY:  FINANCIAL SUPPORT- social security disability       CHILDREN- none       LIVING SITUATION- lives in group home ([facility]) in " [place]      SOCIAL/ SPIRITUAL SUPPORT- her boyfriend JAC Alberto worker, CM, [facility] staff       FEELS SAFE AT HOME- Yes     MEDICAL ROS:  Reports sedation and constipation      Denies GI sxs [N/V/D], weight gain, sexual dysfunction [none] and akathisia, muscle problems [stiffness], unusual movements, polydipsia, polyphagia and Parkinsonian-type symptoms [shuffling gait, masked facies, stooped posture, speech change, writing change]  PSYCH and CD Critical Summary Points since July 2017           None    PAST PSYCH MED TRIALS   see EMR Problem List: Hx of psychiatric care    MEDICAL / SURGICAL HISTORY                                   CARE TEAM:   PCP- Dr. Isamar Spear          Therapist- none    [facility]: [facility] through [facility] meets 1x/week    Pregnant or breastfeeding:  No      Contraception- on OCP    Neurologic Hx [head injury etc]:  Denies seizure hx or head injury with LOC  Patient Active Problem List   Diagnosis     Mild cognitive impairment     Borderline personality disorder     Asthma     Nonorganic enuresis     Schizoaffective disorder, depressive type (H)     Fx humerus shaft-closed     Port catheter in place     MENTAL HEALTH     Suicidal ideation     Hx of psychiatric care     DVT (deep vein thrombosis) in pregnancy (H)       ALLERGY                                Clozapine; Risperdal; and Tape [adhesive tape]  MEDICATIONS                               Current Outpatient Prescriptions   Medication Sig Dispense Refill     traZODone (DESYREL) 50 MG tablet Take 1 tablet (50 mg) by mouth At Bedtime 30 tablet 1     benztropine (COGENTIN) 0.5 MG tablet Take 1 tablet (0.5 mg) by mouth 2 times daily 60 tablet 1     lurasidone (LATUDA) 80 MG TABS tablet Take 2 tablets (160 mg) by mouth daily (with dinner) 60 tablet 1     OLANZapine (ZYPREXA) 10 MG tablet Take 1 tablet (10 mg) by mouth 2 times daily as needed 30 tablet 0     perphenazine 8 MG tablet Take 1 tablet (8 mg) by mouth 3 times daily 93 tablet 1     albuterol  (PROAIR HFA/PROVENTIL HFA/VENTOLIN HFA) 108 (90 BASE) MCG/ACT Inhaler Inhale 2 puffs into the lungs every 6 hours as needed for shortness of breath / dyspnea or wheezing       Omega-3 Fatty Acids (FISH OIL PO)        norethindrone-ethinyl estradiol (JUNEL FE 1/20) 1-20 MG-MCG per tablet Take 1 tablet by mouth       ACETAMINOPHEN PO Take 1,000 mg by mouth every 8 hours as needed for pain       hydrOXYzine (ATARAX) 25 MG tablet Take 1-2 tablets (25-50 mg) by mouth every 4 hours as needed for anxiety 60 tablet 2     diphenhydrAMINE (BENADRYL) 25 MG tablet Take 25 mg by mouth every 6 hours as needed for itching or allergies        albuterol (PROVENTIL) (5 MG/ML) 0.5% nebulizer solution Inhale 2.5 mg into the lungs every 4 hours as needed for wheezing or shortness of breath / dyspnea (2 puffs)        cetirizine (ZYRTEC) 10 MG tablet Take 10 mg by mouth daily       VITALS   BP (!) 136/92  Pulse 130   MENTAL STATUS EXAM                                                           Alertness: alert , oriented and slow to respond  Appearance: adequately groomed  Behavior/Demeanor: cooperative and calm, with fair  eye contact   Speech: increased latency of response  Language: no problems  Psychomotor: slowed somewhat  Mood: description consistent with euthymia, some increase in depression symptoms around stressors/conflicts  Affect: flat; was congruent to mood; was congruent to content  Thought Process/Associations: unremarkable  Thought Content:  Reports none;  Denies suicidal ideation, violent ideation and delusions  Perception:  Reports auditory hallucinations with commands [details in Interim History];  Denies visual hallucinations  Insight: limited  Judgment: fair  Cognition: does  appear grossly intact however below average; formal cognitive testing was not done    LABS and DATA   RATING SCALES:  AIMS: done 8/7/17 with total score of 0    EKG:  April 2017 which showed QTc of 424ms; next EKG due April 2018    PHQ9 TODAY =  2  PHQ-9 SCORE 1/30/2017 4/7/2017 8/7/2017   Total Score - - -   Total Score 4 6 7     ANTIPSYCHOTIC LABS   [glu, A1C, lipids (focus LDL), liver enzymes, WBC, ANEU, Hgb, plts]    q12 mo  Recent Labs   Lab Test  04/12/17   1024  11/19/16   2132   GLC  83  110*   A1C  5.0   --      Recent Labs   Lab Test  04/12/17   1024  10/20/15   0853   CHOL  207*  185   TRIG  143  134   LDL  135*  115   HDL  43  43*     Recent Labs   Lab Test  04/12/17   1024  10/20/15   0853   AST  21  13   ALT  21  35   ALKPHOS  52  67     Recent Labs   Lab Test  04/12/17   1024  11/23/16   0807   11/19/16   2132   WBC  6.0  6.2   < >  8.2   ANEU  4.2   --    --   6.2   HGB  14.8  15.2   < >  14.0   PLT  145*  150   < >  153    < > = values in this interval not displayed.     DIAGNOSIS     Schizoaffective disorder, bipolar type, currently with mild symptoms of depression and psychosis symptoms are at baseline with chronic AH  Unspecified neurocognitive disorder     ASSESSMENT                                     TODAY Karolyn reports a stability in symptoms over all. She continues to have some auditory hallucinations that appear to be at her baseline, she had previously reported that ECT helps with these, which then causes her to require less PRN medications, however today she states she is unsure. She does not report that ECT helps with mood but previous notes have indicated that  staff feel she is less irritable since starting ECT, also there is a noticeable change in her level of irritability, aggression and suicidal/self harm urges/thoughts the days prior to her ECT appointments which improve after her treatment. She was evaluated in the ED almost 2 months ago due to having suicidal thoughts with plan to go into the pond behind the group home and drown herself, she did go outside and needed to be stopped by staff. This occurred, like many other occurrences in the past, following a conflict with another peer at the Group Home just days prior  to her next ECT treatment. Which again, after ECT she tends to have less conflict with others. She denies having any safety concerns today, including SI. Given her continued report of some mild symptoms of depression may consider an addition of antidepressant to target mood if needed, however further observation warranted at this point and she denies feeling depressed a majority of the time and reports that she has several days per week where she does not feel depressed (usually when spending time with her boyfriend). She is tolerating her current medications, which still requiring some PRN doses of olanzapine, asked group home to monitor how many doses she is using and to track this around ECT treatments as well. Will also continue to evaluate the need for treatment with 3 neuroleptic medications, however previous trials towards simplified medication regimens or monotherapy led to decompensation.No medication changes today. Forms for continued commitment and roth along with Casey Wilkins were completed and faxed today after appointment. Will also plan to recheck lipid panel in October to follow up with abnormal results from April.              SUICIDE RISK ASSESSMENT [details described above]:  Today Karolyn Banerjee reports having no suicidal ideation.  In addition, she has notable risk factors for self-harm including hallucinations [command], relationship conflict and previous suicide attempt.  However, risk is mitigated by no plan or intent, describes a safety plan, h/o seeking help when needed, future oriented, none to minimal alcohol use, good social support  and stable housing.  Based on all available evidence she does not appear to be at imminent risk for self-harm therefore does not meet criteria for a 72-hr hold/  involuntary hospitalization.  However, based on degree of symptoms close psych FU was recommended which the pt did agree to.  Additional steps to minimize risk include: SAFETY PLAN completed  8/7/17 and frequent clinic visits.  Safety Plan placed in AVS: Yes at previous appointment.    [place] PRESCRIPTION MONITORING PROGRAM [] was not checked today:  not using controlled substances.    PSYCHOTROPIC DRUG INTERACTIONS:   Hydroxyzine and olanzapine or perphenazine or trazodone may result in increased risk of QT interval prolongation.    Perpheazine and benztropine may result in decreased phenothiazine serum concentrations, decreased phenothiazine effectiveness, enhanced anticholinergic effects (ileus, hyperpyrexia, sedation, dry mouth).   Trazodone and perphenazine may result in hypotension.  MANAGEMENT:  Monitoring for adverse effects, routine vitals, periodic EKGs and patient is aware of risks     PLAN                                                                                                       1) PSYCHOTROPIC MEDICATIONS: no changes today, due to pt insurance medications must be ordered by attending      - lurasidone 160 mg with dinner      - olanzapine 10 mg BID prn for psychosis      - perphenazine 8 mg TID      - benztropine 0.5 mg BID      - trazodone 50mg at bedtime      - hydroxyzine 25-50 mg q4 hrs prn for anxiety     Continue ECT treatments r1tecye with Dr. Amos    2) THERAPY:    None currently  TREATMENT PLAN   Date revised  -  n/a   Date next due- next visit    3) NEXT DUE:    Labs-Lipid panel due Oct 2017; Full AP labs due April 2018  EKG- April 2018 or PRN with medication changes  Rating Scales- AIMS next due in Feb 2018    4) REFERRALS:    NONE    5) RTC: 8 weeks    6) CRISIS NUMBERS:   Provided routinely in AVS.  Especially emphasized:  [facility] [phone] (clinic)    [phone] (after hours)    TREATMENT RISK STATEMENT:  The risks, benefits, alternatives and potential adverse effects have been discussed and are understood by the pt. The pt understands the risks of using street drugs or alcohol. There are no medical contraindications, the pt agrees to treatment with the  ability to do so. The pt knows to call the clinic for any problems or to access emergency care if needed.  Medical and substance use concerns are documented above.  Psychotropic drug interaction check was done, including changes made today.    RESIDENT:   Dana Sandoval, DO    Patient staffed in clinic with Dr. Durant who will sign the note.  Supervisor is Dr. Toussaint.  I saw the patient with the resident, and participated in key portions of the service, including the mental status examination and developing the plan of care. I reviewed key portions of the history with the resident. I agree with the findings and plan as documented in this note.    Tika Durant

## 2017-09-15 NOTE — PROGRESS NOTES
Evaluation forms for Nisha faxed to Bre,  at [facility] on 9/6/17.  Original completed by Dr. Sandoval forwarded to scanning.

## 2017-09-19 ENCOUNTER — HOSPITAL ENCOUNTER (INPATIENT)
Facility: CLINIC | Age: 32
LOS: 5 days | Discharge: NURSING, REHAB OR LONG-TERM CARE | End: 2017-09-25
Attending: EMERGENCY MEDICINE | Admitting: PSYCHIATRY & NEUROLOGY
Payer: MEDICAID

## 2017-09-19 DIAGNOSIS — R45.851 SUICIDAL IDEATION: ICD-10-CM

## 2017-09-19 DIAGNOSIS — F25.1 SCHIZOAFFECTIVE DISORDER, DEPRESSIVE TYPE (H): ICD-10-CM

## 2017-09-19 LAB
AMPHETAMINES UR QL SCN: NEGATIVE
BARBITURATES UR QL: NEGATIVE
BENZODIAZ UR QL: NEGATIVE
CANNABINOIDS UR QL SCN: NEGATIVE
COCAINE UR QL: NEGATIVE
ETHANOL UR QL SCN: NEGATIVE
HCG UR QL: NEGATIVE
OPIATES UR QL SCN: NEGATIVE

## 2017-09-19 PROCEDURE — 80320 DRUG SCREEN QUANTALCOHOLS: CPT | Performed by: FAMILY MEDICINE

## 2017-09-19 PROCEDURE — 99284 EMERGENCY DEPT VISIT MOD MDM: CPT | Mod: Z6 | Performed by: EMERGENCY MEDICINE

## 2017-09-19 PROCEDURE — 81025 URINE PREGNANCY TEST: CPT | Performed by: FAMILY MEDICINE

## 2017-09-19 PROCEDURE — 80307 DRUG TEST PRSMV CHEM ANLYZR: CPT | Performed by: FAMILY MEDICINE

## 2017-09-19 PROCEDURE — 99285 EMERGENCY DEPT VISIT HI MDM: CPT | Mod: 25 | Performed by: EMERGENCY MEDICINE

## 2017-09-19 ASSESSMENT — ENCOUNTER SYMPTOMS
DECREASED CONCENTRATION: 1
DYSPHORIC MOOD: 1
CONSTITUTIONAL NEGATIVE: 1
HALLUCINATIONS: 1
NERVOUS/ANXIOUS: 1

## 2017-09-19 NOTE — IP AVS SNAPSHOT
MRN:[id number]                      After Visit Summary   9/19/2017    Karolyn Banerjee    MRN: [id number]           Thank you!     Thank you for choosing [facility] for your care. Our goal is always to provide you with excellent care.        Patient Information     Date Of Birth          1985        Designated Caregiver       Most Recent Value    Caregiver    Will someone help with your care after discharge? yes    Name of designated caregiver Staff at [facility]    Phone number of caregiver [phone]    Caregiver address [address] [id number]      About your hospital stay     You were admitted on:  September 20, 2017 You last received care in the:  [facility]    You were discharged on:  September 25, 2017       Who to Call     For medical emergencies, please call 911.  For non-urgent questions about your medical care, please call your primary care provider or clinic, [phone]          Attending Provider     Provider Specialty    Shane Flores MD Emergency Medicine    [place], Tim Ulrich MD Emergency Medicine    Robinson, Sharif CORADO MD Emergency Medicine    [facility], Candido Dela Cruz MD Psychiatry    [facility], Cristobal Mcgee MD Psychiatry       Primary Care Provider Office Phone # Fax #    Suzie Mariscal [phone]      Your next 10 appointments already scheduled     Oct 06, 2017 10:15 AM CDT   Electroconvulsive Therapy with Osiel Amos MD   [facility] ([facility])    [address] [facility] [id number] [phone]            Nov 06, 2017 12:45 PM CST   Schizophrenia Follow Up with Dana Sandoval MD   Psychiatry Clinic ([facility])    [address]  [address] [facility] [address] [phone]              Further instructions from your care team        Behavioral Discharge Planning and  Instructions      Summary:  You were admitted on 9/19/2017  due to Schizophrenia.  You were treated by Dr Cristobal Price MD and discharged on 9/24/17 from [facility] to Group Home [facility] in [place] with staff transporting you.      Principal Diagnosis: Schizophrenia; Cognitive Impairment      Health Care Follow-up Appointments:  Dr. Dana Sandoval - Next Appt on November 3rd at 12:45pm  [facility]  [address] [place] [address]  [phone]    Return to your group home [facility] [phone]  [address] [place] [address]    Major Treatments, Procedures and Findings:  You were provided with: a psychiatric assessment, assessed for medical stability, medication evaluation and/or management, group therapy and milieu management    Symptoms to Report: mood getting worse or thoughts of suicide    Early warning signs can include: increased thoughts or behaviors of suicide or self-harm  increased unusual thinking, such as paranoia or hearing voices    Safety and Wellness:  Take all medicines as directed.  Make no changes unless your doctor suggests them.      Follow treatment recommendations.  Refrain from alcohol and non-prescribed drugs.  If there is a concern for safety, call 911.    Resources:   Crisis Intervention: 769.749.7280 or 299-385-6394 (TTY: 395.473.6606).  Call anytime for help.  [employer] [place] [employer] Mental Illness (www.[place].chelo.org): 579.977.1585 or 907-598-1583.    The treatment team has appreciated the opportunity to work with you.     If you have any questions or concerns our unit number is [phone].        Pending Results     No orders found from 9/17/2017 to 9/20/2017.            Admission Information     Date & Time Provider Department Dept. Phone    9/19/2017 Cristobal Price MD  10[facility] [phone]      Your Vitals Were     Blood Pressure Pulse Temperature Respirations Height Weight    141/88 95 99.1  F  "(37.3  C) (Oral) 18 1.638 m (5' 4.5\") 120.3 kg (265 lb 4.8 oz)    Pulse Oximetry BMI (Body Mass Index)                97% 44.84 kg/m2          "[employer] Information     "[employer] lets you send messages to your doctor, view your test results, renew your prescriptions, schedule appointments and more. To sign up, go to [email or web address] . Click on \"Log in\" on the left side of the screen, which will take you to the Welcome page. Then click on \"Sign up Now\" on the right side of the page.     You will be asked to enter the access code listed below, as well as some personal information. Please follow the directions to create your username and password.     Your access code is: [email or web address]  Expires: 2017  9:39 AM     Your access code will  in 90 days. If you need help or a new code, please call your [facility] clinic or [phone].        Care EveryWhere ID     This is your Care EveryWhere ID. This could be used by other organizations to access your [facility] medical records  [id number]        Equal Access to Services     KAYA STRICKLAND : Isaac Roman, wajaiden gallegos, qageni kaw[employer], nando mcguire. So [facility] [phone].    ATENCIÓN: Si habla español, tiene a stauffer disposición servicios gratuitos de asistencia lingüística. Kiera al [phone].    We comply with applicable federal civil rights laws and [place] laws. We do not discriminate on the basis of race, color, national origin, age, disability sex, sexual orientation or gender identity.               Review of your medicines      CONTINUE these medicines which may have CHANGED, or have new prescriptions. If we are uncertain of the size of tablets/capsules you have at home, strength may be listed as something that might have changed.        Dose / Directions    perphenazine 16 MG tablet   This may have changed:    - medication strength  - how much to take  - when to take this   Used for:  " Schizoaffective disorder, depressive type (H)        Dose:  16 mg   Take 1 tablet (16 mg) by mouth 2 times daily   Quantity:  60 tablet   Refills:  0         CONTINUE these medicines which have NOT CHANGED        Dose / Directions    ACETAMINOPHEN PO        Dose:  1000 mg   Take 1,000 mg by mouth every 8 hours as needed for pain   Refills:  0       * albuterol (5 MG/ML) 0.5% neb solution   Commonly known as:  PROVENTIL        Dose:  2.5 mg   Inhale 2.5 mg into the lungs every 4 hours as needed for wheezing or shortness of breath / dyspnea (2 puffs)   Refills:  0       * albuterol 108 (90 BASE) MCG/ACT Inhaler   Commonly known as:  PROAIR HFA/PROVENTIL HFA/VENTOLIN HFA        Dose:  2 puff   Inhale 2 puffs into the lungs every 6 hours as needed for shortness of breath / dyspnea or wheezing   Refills:  0       benztropine 0.5 MG tablet   Commonly known as:  COGENTIN   Used for:  Schizoaffective disorder, bipolar type (H), Aggression        Dose:  0.5 mg   Take 1 tablet (0.5 mg) by mouth 2 times daily   Quantity:  60 tablet   Refills:  1       cetirizine 10 MG tablet   Commonly known as:  zyrTEC        Dose:  10 mg   Take 10 mg by mouth daily   Refills:  0       diphenhydrAMINE 25 MG tablet   Commonly known as:  BENADRYL        Dose:  25 mg   Take 25 mg by mouth every 6 hours as needed for itching or allergies   Refills:  0       FISH OIL PO        Dose:  1000 mg   Take 1,000 mg by mouth daily   Refills:  0       hydrOXYzine 25 MG tablet   Commonly known as:  ATARAX   Used for:  Schizoaffective disorder, bipolar type (H), Aggression        Dose:  25-50 mg   Take 1-2 tablets (25-50 mg) by mouth every 4 hours as needed for anxiety   Quantity:  60 tablet   Refills:  2       lurasidone 80 MG Tabs tablet   Commonly known as:  LATUDA   Used for:  Schizoaffective disorder, bipolar type (H), Aggression        Dose:  160 mg   Take 2 tablets (160 mg) by mouth daily (with dinner)   Quantity:  60 tablet   Refills:  1        norethindrone-ethinyl estradiol 1-20 MG-MCG per tablet   Commonly known as:  JUNEL FE 1/20        Dose:  1 tablet   Take 1 tablet by mouth   Refills:  0       OLANZapine 10 MG tablet   Commonly known as:  zyPREXA   Used for:  Schizoaffective disorder, bipolar type (H), Aggression        Dose:  10 mg   Take 1 tablet (10 mg) by mouth 2 times daily as needed   Quantity:  30 tablet   Refills:  0       traZODone 50 MG tablet   Commonly known as:  DESYREL   Used for:  Schizoaffective disorder, bipolar type (H), Aggression        Dose:  50 mg   Take 1 tablet (50 mg) by mouth At Bedtime   Quantity:  30 tablet   Refills:  1       * Notice:  This list has 2 medication(s) that are the same as other medications prescribed for you. Read the directions carefully, and ask your doctor or other care provider to review them with you.         Where to get your medicines      These medications were sent to [facility] [address]  [address] [facility] [address]     Phone:  [phone]     perphenazine 16 MG tablet                Protect others around you: Learn how to safely use, store and throw away your medicines at www.disposemymeds.org.             Medication List: This is a list of all your medications and when to take them. Check marks below indicate your daily home schedule. Keep this list as a reference.      Medications           Morning Afternoon Evening Bedtime As Needed    ACETAMINOPHEN PO   Take 1,000 mg by mouth every 8 hours as needed for pain                                * albuterol (5 MG/ML) 0.5% neb solution   Commonly known as:  PROVENTIL   Inhale 2.5 mg into the lungs every 4 hours as needed for wheezing or shortness of breath / dyspnea (2 puffs)                                * albuterol 108 (90 BASE) MCG/ACT Inhaler   Commonly known as:  PROAIR HFA/PROVENTIL HFA/VENTOLIN HFA   Inhale 2 puffs into the lungs every 6 hours as needed for shortness of breath /  dyspnea or wheezing                                benztropine 0.5 MG tablet   Commonly known as:  COGENTIN   Take 1 tablet (0.5 mg) by mouth 2 times daily   Last time this was given:  0.5 mg on 9/25/2017  9:00 AM                                cetirizine 10 MG tablet   Commonly known as:  zyrTEC   Take 10 mg by mouth daily   Last time this was given:  10 mg on 9/25/2017  9:00 AM                                diphenhydrAMINE 25 MG tablet   Commonly known as:  BENADRYL   Take 25 mg by mouth every 6 hours as needed for itching or allergies                                FISH OIL PO   Take 1,000 mg by mouth daily   Last time this was given:  1,000 mg on 9/25/2017  9:00 AM                                hydrOXYzine 25 MG tablet   Commonly known as:  ATARAX   Take 1-2 tablets (25-50 mg) by mouth every 4 hours as needed for anxiety                                lurasidone 80 MG Tabs tablet   Commonly known as:  LATUDA   Take 2 tablets (160 mg) by mouth daily (with dinner)   Last time this was given:  160 mg on 9/24/2017  6:51 PM                                norethindrone-ethinyl estradiol 1-20 MG-MCG per tablet   Commonly known as:  JUNEL FE 1/20   Take 1 tablet by mouth   Last time this was given:  1 tablet on 9/25/2017  9:01 AM                                OLANZapine 10 MG tablet   Commonly known as:  zyPREXA   Take 1 tablet (10 mg) by mouth 2 times daily as needed   Last time this was given:  10 mg on 9/24/2017 12:42 PM                                perphenazine 16 MG tablet   Take 1 tablet (16 mg) by mouth 2 times daily   Last time this was given:  16 mg on 9/25/2017  9:00 AM                                traZODone 50 MG tablet   Commonly known as:  DESYREL   Take 1 tablet (50 mg) by mouth At Bedtime   Last time this was given:  50 mg on 9/24/2017  9:45 PM                                * Notice:  This list has 2 medication(s) that are the same as other medications prescribed for you. Read the directions  carefully, and ask your doctor or other care provider to review them with you.

## 2017-09-19 NOTE — IP AVS SNAPSHOT
[address] [address]    Phone:  [phone]                                       After Visit Summary   9/19/2017    Karolyn Banerjee    MRN: [id number]           After Visit Summary Signature Page     I have received my discharge instructions, and my questions have been answered. I have discussed any challenges I see with this plan with the nurse or doctor.    ..........................................................................................................................................  Patient/Patient Representative Signature      ..........................................................................................................................................  Patient Representative Print Name and Relationship to Patient    ..................................................               ................................................  Date                                            Time    ..........................................................................................................................................  Reviewed by Signature/Title    ...................................................              ..............................................  Date                                                            Time

## 2017-09-20 PROBLEM — T14.91XA SUICIDE ATTEMPT (H): Status: ACTIVE | Noted: 2017-09-20

## 2017-09-20 PROCEDURE — 25000132 ZZH RX MED GY IP 250 OP 250 PS 637: Performed by: EMERGENCY MEDICINE

## 2017-09-20 PROCEDURE — 12400001 ZZH R&B MH UMMC

## 2017-09-20 PROCEDURE — 25000132 ZZH RX MED GY IP 250 OP 250 PS 637: Performed by: PSYCHIATRY & NEUROLOGY

## 2017-09-20 RX ORDER — PERPHENAZINE 2 MG/1
8 TABLET ORAL 3 TIMES DAILY
Status: DISCONTINUED | OUTPATIENT
Start: 2017-09-20 | End: 2017-09-21

## 2017-09-20 RX ORDER — BISACODYL 10 MG
10 SUPPOSITORY, RECTAL RECTAL DAILY PRN
Status: DISCONTINUED | OUTPATIENT
Start: 2017-09-20 | End: 2017-09-25 | Stop reason: HOSPADM

## 2017-09-20 RX ORDER — DIPHENHYDRAMINE HCL 25 MG
25 TABLET ORAL EVERY 6 HOURS PRN
Status: DISCONTINUED | OUTPATIENT
Start: 2017-09-20 | End: 2017-09-25 | Stop reason: HOSPADM

## 2017-09-20 RX ORDER — ALBUTEROL SULFATE 90 UG/1
2 AEROSOL, METERED RESPIRATORY (INHALATION) EVERY 6 HOURS PRN
Status: DISCONTINUED | OUTPATIENT
Start: 2017-09-20 | End: 2017-09-25 | Stop reason: HOSPADM

## 2017-09-20 RX ORDER — PERPHENAZINE 2 MG/1
8 TABLET ORAL 2 TIMES DAILY
Status: DISCONTINUED | OUTPATIENT
Start: 2017-09-20 | End: 2017-09-20

## 2017-09-20 RX ORDER — ACETAMINOPHEN 500 MG
1000 TABLET ORAL EVERY 6 HOURS PRN
Status: DISCONTINUED | OUTPATIENT
Start: 2017-09-20 | End: 2017-09-25 | Stop reason: HOSPADM

## 2017-09-20 RX ORDER — TRAZODONE HYDROCHLORIDE 50 MG/1
50 TABLET, FILM COATED ORAL AT BEDTIME
Status: DISCONTINUED | OUTPATIENT
Start: 2017-09-20 | End: 2017-09-25 | Stop reason: HOSPADM

## 2017-09-20 RX ORDER — LURASIDONE HYDROCHLORIDE 80 MG/1
160 TABLET, FILM COATED ORAL
Status: DISCONTINUED | OUTPATIENT
Start: 2017-09-20 | End: 2017-09-25 | Stop reason: HOSPADM

## 2017-09-20 RX ORDER — OLANZAPINE 10 MG/1
10 TABLET ORAL
Status: DISCONTINUED | OUTPATIENT
Start: 2017-09-20 | End: 2017-09-20

## 2017-09-20 RX ORDER — NORETHINDRONE ACETATE AND ETHINYL ESTRADIOL 1MG-20(21)
1 KIT ORAL DAILY
Status: DISCONTINUED | OUTPATIENT
Start: 2017-09-21 | End: 2017-09-25 | Stop reason: HOSPADM

## 2017-09-20 RX ORDER — BENZTROPINE MESYLATE 0.5 MG/1
0.5 TABLET ORAL 2 TIMES DAILY
Status: DISCONTINUED | OUTPATIENT
Start: 2017-09-20 | End: 2017-09-20

## 2017-09-20 RX ORDER — CETIRIZINE HYDROCHLORIDE 5 MG/1
10 TABLET ORAL DAILY
Status: DISCONTINUED | OUTPATIENT
Start: 2017-09-21 | End: 2017-09-25 | Stop reason: HOSPADM

## 2017-09-20 RX ORDER — OLANZAPINE 5 MG/1
10 TABLET ORAL ONCE
Status: COMPLETED | OUTPATIENT
Start: 2017-09-20 | End: 2017-09-20

## 2017-09-20 RX ORDER — BENZTROPINE MESYLATE 0.5 MG/1
0.5 TABLET ORAL 2 TIMES DAILY
Status: DISCONTINUED | OUTPATIENT
Start: 2017-09-20 | End: 2017-09-25 | Stop reason: HOSPADM

## 2017-09-20 RX ORDER — OLANZAPINE 10 MG/1
10 TABLET ORAL 2 TIMES DAILY PRN
Status: DISCONTINUED | OUTPATIENT
Start: 2017-09-20 | End: 2017-09-25 | Stop reason: HOSPADM

## 2017-09-20 RX ORDER — ALUMINA, MAGNESIA, AND SIMETHICONE 2400; 2400; 240 MG/30ML; MG/30ML; MG/30ML
30 SUSPENSION ORAL EVERY 4 HOURS PRN
Status: DISCONTINUED | OUTPATIENT
Start: 2017-09-20 | End: 2017-09-25 | Stop reason: HOSPADM

## 2017-09-20 RX ADMIN — OLANZAPINE 10 MG: 10 TABLET, FILM COATED ORAL at 22:21

## 2017-09-20 RX ADMIN — BENZTROPINE MESYLATE 0.5 MG: 0.5 TABLET ORAL at 10:08

## 2017-09-20 RX ADMIN — LURASIDONE HYDROCHLORIDE 160 MG: 80 TABLET, FILM COATED ORAL at 19:14

## 2017-09-20 RX ADMIN — OLANZAPINE 10 MG: 5 TABLET, FILM COATED ORAL at 01:43

## 2017-09-20 RX ADMIN — PERPHENAZINE 8 MG: 8 TABLET, FILM COATED ORAL at 10:08

## 2017-09-20 RX ADMIN — PERPHENAZINE 8 MG: 2 TABLET, FILM COATED ORAL at 19:16

## 2017-09-20 RX ADMIN — BENZTROPINE MESYLATE 0.5 MG: 0.5 TABLET ORAL at 19:14

## 2017-09-20 RX ADMIN — TRAZODONE HYDROCHLORIDE 50 MG: 50 TABLET ORAL at 19:16

## 2017-09-20 ASSESSMENT — ACTIVITIES OF DAILY LIVING (ADL)
LAUNDRY: UNABLE TO COMPLETE
DRESS: INDEPENDENT
ORAL_HYGIENE: INDEPENDENT
GROOMING: INDEPENDENT

## 2017-09-20 NOTE — ED PROVIDER NOTES
History     Chief Complaint   Patient presents with     Suicidal     feeling increasingly depressed for past month; plan to drown self in pond by her house. Pt's brother completed suicide,  was yesterday.     HPI  Karolyn Banerjee is a 32 year old female with a history of schizoaffective disorder who presents to the ED for evaluation of suicidal ideation. Patient presents with a group home staff member who notes that the patient was walking barefoot to the pond outside the group home today around 6 PM. She also notes that the patients brother committed suicide last week and notes that she feels the patient was triggered by the event. The patient notes that she is also having auditory hallucinations from a mohsen from [employer] who is telling her to dump her current boyfriend and to date him instead.     I have reviewed the Medications, Allergies, Past Medical and Surgical History, and Social History in the [facility] system.    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 Gardner     Mild developmental delay      Myopia      Neuroleptic-induced tardive dyskinesia      Nonorganic enuresis      Refractive amblyopia      Schizoaffective disorder, bipolar type (H)     Follows with Dr. Cooley at her group home.      Sleep disorder        Past Surgical History:   Procedure Laterality Date     HRW PORT A CATH Right      NO HISTORY OF SURGERY         Family History   Problem Relation Age of Onset     MENTAL ILLNESS Father      mild developmental delay     Schizophrenia Other      uncle       Social History   Substance Use Topics     Smoking status: Never Smoker     Smokeless tobacco: Never Used     Alcohol use No       No current  facility-administered medications for this encounter.      Current Outpatient Prescriptions   Medication     traZODone (DESYREL) 50 MG tablet     benztropine (COGENTIN) 0.5 MG tablet     lurasidone (LATUDA) 80 MG TABS tablet     OLANZapine (ZYPREXA) 10 MG tablet     perphenazine 8 MG tablet     albuterol (PROAIR HFA/PROVENTIL HFA/VENTOLIN HFA) 108 (90 BASE) MCG/ACT Inhaler     Omega-3 Fatty Acids (FISH OIL PO)     norethindrone-ethinyl estradiol (JUNEL FE 1/20) 1-20 MG-MCG per tablet     ACETAMINOPHEN PO     hydrOXYzine (ATARAX) 25 MG tablet     diphenhydrAMINE (BENADRYL) 25 MG tablet     albuterol (PROVENTIL) (5 MG/ML) 0.5% nebulizer solution     cetirizine (ZYRTEC) 10 MG tablet          Allergies   Allergen Reactions     Clozapine      Agranulocytosis x 2, cannot be re trialed      Risperdal Other (See Comments)     dystonia     Tape [Adhesive Tape] Rash     Plastic tape       Review of Systems   Constitutional: Negative.    Psychiatric/Behavioral: Positive for behavioral problems, decreased concentration, dysphoric mood, hallucinations (auditory ) and suicidal ideas. Negative for self-injury. The patient is nervous/anxious.    All other systems reviewed and are negative.      Physical Exam   BP: 131/83  Pulse: 94  Temp: 98.2  F (36.8  C)  Resp: 16  SpO2: 93 %  Physical Exam   Constitutional: She is oriented to person, place, and time. No distress.   Cardiovascular: Normal rate, regular rhythm and normal heart sounds.    Pulmonary/Chest: Breath sounds normal.   Neurological: She is alert and oriented to person, place, and time.   Psychiatric: Her speech is delayed. She is slowed and withdrawn. Cognition and memory are normal. She expresses impulsivity and inappropriate judgment. She exhibits a depressed mood. She expresses suicidal ideation. She expresses suicidal plans.   Nursing note and vitals reviewed.      ED Course     ED Course     Procedures               Labs Ordered and Resulted from Time of ED Arrival  Up to the Time of Departure from the ED   DRUG ABUSE SCREEN 6 CHEM DEP URINE ([facility])   HCG QUALITATIVE URINE            Assessments & Plan (with Medical Decision Making)   32-year-old female with history of schizoaffective disorder here endorsing suicidal ideation with a plan to drown herself.  Stressors include the suicide of her brother a few days ago.  She is in a group home and staff are concerned and say that she attempted to walk away from the facility barefoot and the patient said she was walking towards a pond in order to drown herself.  Here she is cooperative. She does endorse hearing voices that are a person making comments about her boyfriend- not command hallucinations.  Given her unpredictability and the recent stress of the death of her sibling, I will admit her to inpatient psych. She is medically stable and has not done anything to harm herself. Patient is cooperative.    I have reviewed the nursing notes.    I have reviewed the findings, diagnosis, plan and need for follow up with the patient.    New Prescriptions    No medications on file       Final diagnoses:   Suicidal ideation   Schizoaffective disorder, depressive type (H)   I, Sherry Conner, am serving as a trained medical scribe to document services personally performed by Jaspreet Flores MD, based on the provider's statements to me.    IJaspreet MD, was physically present and have reviewed and verified the accuracy of this note documented by Sherry Conner.     9/19/2017   [facility] [place], EMERGENCY DEPARTMENT     Shane Flores MD  09/20/17 0011       Shane Flores MD  09/20/17 0121

## 2017-09-20 NOTE — PROGRESS NOTES
09/20/17 1716   Patient Belongings   Did you bring any home meds/supplements to the hospital?  No   Patient Belongings other (see comments)   Disposition of Belongings On patient, in locker, in security   Belongings Search Yes   Clothing Search Yes   Second Staff JAQUI Grady       In locker: [place] Identification Card, multicolored pouch, black pouch, green pouch, loose change, 1 key and keychain, pink tennis shoes, black shirt, black leggings, bra, socks, purple purse, 2 tweezers, 2 nail clippers, 2 compacts w/ mirror, 1 hair brush w/ mirror, miscellaneous cards    On patient: underwear, eyeglasses    In security envelope #[id number]: [place] EBT card #[id number]    A               Admission:  I am responsible for any personal items that are not sent to the safe or pharmacy.  [facility] is not responsible for loss, theft or damage of any property in my possession.    Signature:  _________________________________ Date: _______  Time: _____                                              Staff Signature:  ____________________________ Date: ________  Time: _____      2nd Staff person, if patient is unable/unwilling to sign:    Signature: ________________________________ Date: ________  Time: _____     Discharge:  [facility] has returned all of my personal belongings:    Signature: _________________________________ Date: ________  Time: _____                                          Staff Signature:  ____________________________ Date: ________  Time: _____

## 2017-09-20 NOTE — PHARMACY-ADMISSION MEDICATION HISTORY
Admission medication history interview status for the 9/19/2017 admission is complete. See Epic admission navigator for allergy information, pharmacy, prior to admission medications and immunization status.     Medication history interview sources:  Patient unable to help me with her medications, I called [facility] [phone] for the following information:    Changes made to PTA medication list (reason)  Added: dose of omega 3  Deleted: none  Changed: none    Additional medication history information (including reliability of information, actions taken by pharmacist):[facility] was able to provide me with the following medications: benzotropine, cetirizine, lurasidone, Junel BCP, olanzapine, omega 3, perphenazine, and trazadone. They did not has prescriptions for tylenol, albuterol, benadryl, or hydroxyzine.        Prior to Admission medications    Medication Sig Last Dose Taking? Auth Provider   traZODone (DESYREL) 50 MG tablet Take 1 tablet (50 mg) by mouth At Bedtime   Dana Sandoval MD   benztropine (COGENTIN) 0.5 MG tablet Take 1 tablet (0.5 mg) by mouth 2 times daily   Dana Sandoval MD   lurasidone (LATUDA) 80 MG TABS tablet Take 2 tablets (160 mg) by mouth daily (with dinner)   Dana Sandovla MD   OLANZapine (ZYPREXA) 10 MG tablet Take 1 tablet (10 mg) by mouth 2 times daily as needed   Dana Sandoval MD   perphenazine 8 MG tablet Take 1 tablet (8 mg) by mouth 3 times daily   Dana Sandoval MD   albuterol (PROAIR HFA/PROVENTIL HFA/VENTOLIN HFA) 108 (90 BASE) MCG/ACT Inhaler Inhale 2 puffs into the lungs every 6 hours as needed for shortness of breath / dyspnea or wheezing   Reported, Patient   Omega-3 Fatty Acids (FISH OIL PO) Take 1,000 mg by mouth daily    Reported, Patient   norethindrone-ethinyl estradiol (JUNEL FE 1/20) 1-20 MG-MCG per tablet Take 1 tablet by mouth   Reported, Patient   ACETAMINOPHEN PO Take 1,000 mg by mouth every 8 hours as needed for pain    Reported, Patient   hydrOXYzine (ATARAX) 25 MG tablet Take 1-2 tablets (25-50 mg) by mouth every 4 hours as needed for anxiety   Sabas Ley,    diphenhydrAMINE (BENADRYL) 25 MG tablet Take 25 mg by mouth every 6 hours as needed for itching or allergies    Reported, Patient   albuterol (PROVENTIL) (5 MG/ML) 0.5% nebulizer solution Inhale 2.5 mg into the lungs every 4 hours as needed for wheezing or shortness of breath / dyspnea (2 puffs)    Reported, Patient   cetirizine (ZYRTEC) 10 MG tablet Take 10 mg by mouth daily   Reported, Patient         Medication history completed by: Matthias Archibald September 20, 2017

## 2017-09-21 LAB
ALBUMIN SERPL-MCNC: 3.5 G/DL (ref 3.4–5)
ALP SERPL-CCNC: 76 U/L (ref 40–150)
ALT SERPL W P-5'-P-CCNC: 21 U/L (ref 0–50)
ANION GAP SERPL CALCULATED.3IONS-SCNC: 12 MMOL/L (ref 3–14)
AST SERPL W P-5'-P-CCNC: 18 U/L (ref 0–45)
BASOPHILS # BLD AUTO: 0 10E9/L (ref 0–0.2)
BASOPHILS NFR BLD AUTO: 0 %
BILIRUB SERPL-MCNC: 0.5 MG/DL (ref 0.2–1.3)
BUN SERPL-MCNC: 14 MG/DL (ref 7–30)
CALCIUM SERPL-MCNC: 8.7 MG/DL (ref 8.5–10.1)
CHLORIDE SERPL-SCNC: 108 MMOL/L (ref 94–109)
CHOLEST SERPL-MCNC: 181 MG/DL
CO2 SERPL-SCNC: 21 MMOL/L (ref 20–32)
CREAT SERPL-MCNC: 0.64 MG/DL (ref 0.52–1.04)
DEPRECATED CALCIDIOL+CALCIFEROL SERPL-MC: 29 UG/L (ref 20–75)
DIFFERENTIAL METHOD BLD: ABNORMAL
EOSINOPHIL # BLD AUTO: 0.1 10E9/L (ref 0–0.7)
EOSINOPHIL NFR BLD AUTO: 1.9 %
ERYTHROCYTE [DISTWIDTH] IN BLOOD BY AUTOMATED COUNT: 12.5 % (ref 10–15)
GFR SERPL CREATININE-BSD FRML MDRD: >90 ML/MIN/1.7M2
GLUCOSE SERPL-MCNC: 80 MG/DL (ref 70–99)
HCT VFR BLD AUTO: 44.6 % (ref 35–47)
HDLC SERPL-MCNC: 44 MG/DL
HGB BLD-MCNC: 15.3 G/DL (ref 11.7–15.7)
IMM GRANULOCYTES # BLD: 0.1 10E9/L (ref 0–0.4)
IMM GRANULOCYTES NFR BLD: 0.8 %
LDLC SERPL CALC-MCNC: 108 MG/DL
LYMPHOCYTES # BLD AUTO: 1.4 10E9/L (ref 0.8–5.3)
LYMPHOCYTES NFR BLD AUTO: 22 %
MCH RBC QN AUTO: 30.5 PG (ref 26.5–33)
MCHC RBC AUTO-ENTMCNC: 34.3 G/DL (ref 31.5–36.5)
MCV RBC AUTO: 89 FL (ref 78–100)
MONOCYTES # BLD AUTO: 0.6 10E9/L (ref 0–1.3)
MONOCYTES NFR BLD AUTO: 9.1 %
NEUTROPHILS # BLD AUTO: 4.2 10E9/L (ref 1.6–8.3)
NEUTROPHILS NFR BLD AUTO: 66.2 %
NONHDLC SERPL-MCNC: 137 MG/DL
NRBC # BLD AUTO: 0 10*3/UL
NRBC BLD AUTO-RTO: 0 /100
PLATELET # BLD AUTO: 118 10E9/L (ref 150–450)
PLATELET # BLD EST: ABNORMAL 10*3/UL
POTASSIUM SERPL-SCNC: 5 MMOL/L (ref 3.4–5.3)
PROT SERPL-MCNC: 7.1 G/DL (ref 6.8–8.8)
RBC # BLD AUTO: 5.02 10E12/L (ref 3.8–5.2)
RBC MORPH BLD: NORMAL
SODIUM SERPL-SCNC: 141 MMOL/L (ref 133–144)
TRIGL SERPL-MCNC: 144 MG/DL
TSH SERPL DL<=0.005 MIU/L-ACNC: 2.55 MU/L (ref 0.4–4)
VIT B12 SERPL-MCNC: 387 PG/ML (ref 193–986)
WBC # BLD AUTO: 6.3 10E9/L (ref 4–11)

## 2017-09-21 PROCEDURE — 25000132 ZZH RX MED GY IP 250 OP 250 PS 637: Performed by: PSYCHIATRY & NEUROLOGY

## 2017-09-21 PROCEDURE — 99222 1ST HOSP IP/OBS MODERATE 55: CPT | Mod: AI | Performed by: PSYCHIATRY & NEUROLOGY

## 2017-09-21 PROCEDURE — 82607 VITAMIN B-12: CPT | Performed by: PSYCHIATRY & NEUROLOGY

## 2017-09-21 PROCEDURE — 84443 ASSAY THYROID STIM HORMONE: CPT | Performed by: PSYCHIATRY & NEUROLOGY

## 2017-09-21 PROCEDURE — 36416 COLLJ CAPILLARY BLOOD SPEC: CPT | Performed by: PSYCHIATRY & NEUROLOGY

## 2017-09-21 PROCEDURE — 12400001 ZZH R&B MH UMMC

## 2017-09-21 PROCEDURE — 82306 VITAMIN D 25 HYDROXY: CPT | Performed by: PSYCHIATRY & NEUROLOGY

## 2017-09-21 PROCEDURE — 90853 GROUP PSYCHOTHERAPY: CPT

## 2017-09-21 PROCEDURE — 80061 LIPID PANEL: CPT | Performed by: PSYCHIATRY & NEUROLOGY

## 2017-09-21 PROCEDURE — 99207 ZZC CDG-CODE INCORRECT PER BILLING BASED ON TIME: CPT | Performed by: PSYCHIATRY & NEUROLOGY

## 2017-09-21 PROCEDURE — 80053 COMPREHEN METABOLIC PANEL: CPT | Performed by: PSYCHIATRY & NEUROLOGY

## 2017-09-21 PROCEDURE — 36415 COLL VENOUS BLD VENIPUNCTURE: CPT | Performed by: PSYCHIATRY & NEUROLOGY

## 2017-09-21 PROCEDURE — 85025 COMPLETE CBC W/AUTO DIFF WBC: CPT | Performed by: PSYCHIATRY & NEUROLOGY

## 2017-09-21 RX ORDER — OLANZAPINE 10 MG/1
10 TABLET ORAL 2 TIMES DAILY PRN
Status: DISCONTINUED | OUTPATIENT
Start: 2017-09-21 | End: 2017-09-25 | Stop reason: HOSPADM

## 2017-09-21 RX ORDER — CHLORAL HYDRATE 500 MG
1000 CAPSULE ORAL DAILY
Status: DISCONTINUED | OUTPATIENT
Start: 2017-09-21 | End: 2017-09-25 | Stop reason: HOSPADM

## 2017-09-21 RX ORDER — PERPHENAZINE 16 MG/1
16 TABLET ORAL 2 TIMES DAILY
Status: DISCONTINUED | OUTPATIENT
Start: 2017-09-22 | End: 2017-09-25 | Stop reason: HOSPADM

## 2017-09-21 RX ADMIN — LURASIDONE HYDROCHLORIDE 160 MG: 80 TABLET, FILM COATED ORAL at 18:26

## 2017-09-21 RX ADMIN — BENZTROPINE MESYLATE 0.5 MG: 0.5 TABLET ORAL at 08:33

## 2017-09-21 RX ADMIN — PERPHENAZINE 8 MG: 2 TABLET, FILM COATED ORAL at 14:07

## 2017-09-21 RX ADMIN — BENZTROPINE MESYLATE 0.5 MG: 0.5 TABLET ORAL at 18:26

## 2017-09-21 RX ADMIN — PERPHENAZINE 8 MG: 2 TABLET, FILM COATED ORAL at 08:35

## 2017-09-21 RX ADMIN — TRAZODONE HYDROCHLORIDE 50 MG: 50 TABLET ORAL at 19:48

## 2017-09-21 RX ADMIN — CETIRIZINE HYDROCHLORIDE 10 MG: 5 TABLET, FILM COATED ORAL at 08:34

## 2017-09-21 RX ADMIN — NORETHINDRONE ACETATE AND ETHINYL ESTRADIOL AND FERROUS FUMARATE 1 TABLET: KIT at 08:35

## 2017-09-21 RX ADMIN — Medication 1000 MG: at 13:12

## 2017-09-21 ASSESSMENT — ACTIVITIES OF DAILY LIVING (ADL)
HYGIENE/GROOMING: INDEPENDENT
ORAL_HYGIENE: INDEPENDENT
ORAL_HYGIENE: INDEPENDENT
DRESS: SCRUBS (BEHAVIORAL HEALTH)
GROOMING: INDEPENDENT
LAUNDRY: UNABLE TO COMPLETE
DRESS: SCRUBS (BEHAVIORAL HEALTH)

## 2017-09-21 NOTE — PLAN OF CARE
Problem: Patient Care Overview  Goal: Team Discussion  Team Plan:   BEHAVIORAL TEAM DISCUSSION     Participants: Dr. Cristobal Price; Nelda ZARCO; Alex VELASQUEZ/SHAI/ Sarahy MCDANIELS RN     Progress: Patient has attended several groups today.  She is asking for a private room but will not be given one and we don't have one.     Continued Stay Criteria/Rationale: New admit     Medical/Physical: See Consult     Precautions:   Behavioral Orders   Procedures     Code 1 - Restrict to Unit     Routine Programming       As clinically indicated     Status 15       Every 15 minutes.     Suicide precautions     Plan: Stabilize her mood and send her back to the group home to continue ECT as an outpatient.     Rationale for change in precautions or plan: No change        Problem: General Plan of Care (Inpatient Behavioral)  Goal: Team Discussion  Team Plan:   BEHAVIORAL TEAM DISCUSSION     Participants: Dr. Cristobal Price; Nelda ZARCO; Alex VELASQUEZ/SHAI/ Sarahy MCDANIELS RN     Progress: Patient has attended several groups today.  She is asking for a private room but will not be given one and we don't have one.     Continued Stay Criteria/Rationale: New admit     Medical/Physical: See Consult     Precautions:   Behavioral Orders   Procedures     Code 1 - Restrict to Unit     Routine Programming       As clinically indicated     Status 15       Every 15 minutes.     Suicide precautions     Plan: Stabilize her mood and send her back to the group home to continue ECT as an outpatient.     Rationale for change in precautions or plan: No change

## 2017-09-21 NOTE — PROGRESS NOTES
Mgr William of patient's group home called again and needed the time of admit to Station [facility].  She said that the patient gets only one ECT every 4-6 weeks.  She did not have the date of the last ECT.
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
## 2017-09-21 NOTE — PROGRESS NOTES
Left message with the Manager of patient's group home MDM Stacey (Jamir) [phone] who requested the date she was admitted.
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
## 2017-09-21 NOTE — PROGRESS NOTES
Attended 0.5 of 3.0 OT groups offered. Presented to groups focused on positive coping skills and creative expressions. Sat with the groups attentive but, did not add to discussion. Winnie affect with no interaction. Pt was given and completed a written self assessment. OT purpose was explained with a value of having involvement in tx plan, and provided options to meet self identified goals. Will assess further in the areas of organization, problem solving, and concentration. Will encourage consistent group attendance and participation.

## 2017-09-21 NOTE — PLAN OF CARE
Problem: General Plan of Care (Inpatient Behavioral)  Goal: Individualization/Patient Specific Goal (IP Behavioral)  The patient and/or their representative will achieve their patient-specific goals related to the plan of care.    The patient-specific goals include:   Reasons for hospitalization:  Suicidal I wanted to drown myself  My brother   Auditory Hallucinations  Goals for discharge  Safe to go home.  Illness Management Recovery model: Personal Plan of Care     Patient completed Personal Plan of Care, identifying reasons for hospitalization and goals for discharge. Form reviewed in team meeting and signed by patient, physician, writer and RN. Form given to [facility] to be scanned into [employer].

## 2017-09-21 NOTE — PROGRESS NOTES
"Pt has been in and out of the milieu.  She has had some peer interaction and attended some groups.  Pt reports her depression level is 8/9 of 10 with 10 the most.  She denies feeling any anxiety.  Pt reports that she is hopeful that the increase in \"the new medicine with help\" with the suicidal thinking.  Pt is hoping that the medication increase will be helpful and she can be discharged soon.  Pt denies any SI or SIB thinking at this time.  She does admit to hearing voices but reports that \"they are ok\" and was not willing to say what they are saying.     09/21/17 1407   Behavioral Health   Hallucinations auditory   Thinking distractable   Orientation person: oriented;place: oriented;date: oriented   Insight (limited)   Judgement (limited)   Eye Contact into space;at examiner   Affect blunted, flat   Mood depressed   Physical Appearance/Attire attire appropriate to age and situation   Hygiene neglected grooming - unclean body, hair, teeth   Suicidality (denies SI at this time)   Self Injury (denies SIB thinking at this time)   Elopement (none observed)   Activity (in the milieu/some peer interaction/attended groups)   Speech clear;coherent  (slow to respond)   Psychomotor / Gait balanced;steady   Activities of Daily Living   Hygiene/Grooming independent   Oral Hygiene independent   Dress scrubs (behavioral health)   Room Organization independent        09/21/17 1407   Behavioral Health   Hallucinations auditory   Thinking distractable   Orientation person: oriented;place: oriented;date: oriented   Insight (limited)   Judgement (limited)   Eye Contact into space;at examiner   Affect blunted, flat   Mood depressed   Physical Appearance/Attire attire appropriate to age and situation   Hygiene neglected grooming - unclean body, hair, teeth   Suicidality (denies SI at this time)   Self Injury (denies SIB thinking at this time)   Elopement (none observed)   Activity (in the milieu/some peer interaction/attended groups) "   Speech clear;coherent  (slow to respond)   Psychomotor / Gait balanced;steady   Activities of Daily Living   Hygiene/Grooming independent   Oral Hygiene independent   Dress scrubs (behavioral health)   Room Organization independent

## 2017-09-22 PROCEDURE — 12400007 ZZH R&B MH INTERMEDIATE UMMC

## 2017-09-22 PROCEDURE — 25000132 ZZH RX MED GY IP 250 OP 250 PS 637: Performed by: PSYCHIATRY & NEUROLOGY

## 2017-09-22 PROCEDURE — 99231 SBSQ HOSP IP/OBS SF/LOW 25: CPT | Performed by: PSYCHIATRY & NEUROLOGY

## 2017-09-22 PROCEDURE — 97150 GROUP THERAPEUTIC PROCEDURES: CPT | Mod: GO

## 2017-09-22 PROCEDURE — 25000128 H RX IP 250 OP 636: Performed by: PSYCHIATRY & NEUROLOGY

## 2017-09-22 PROCEDURE — 90853 GROUP PSYCHOTHERAPY: CPT

## 2017-09-22 PROCEDURE — 90686 IIV4 VACC NO PRSV 0.5 ML IM: CPT | Performed by: PSYCHIATRY & NEUROLOGY

## 2017-09-22 RX ADMIN — PERPHENAZINE 16 MG: 16 TABLET, FILM COATED ORAL at 20:16

## 2017-09-22 RX ADMIN — LURASIDONE HYDROCHLORIDE 160 MG: 80 TABLET, FILM COATED ORAL at 18:12

## 2017-09-22 RX ADMIN — CETIRIZINE HYDROCHLORIDE 10 MG: 5 TABLET, FILM COATED ORAL at 08:47

## 2017-09-22 RX ADMIN — NORETHINDRONE ACETATE AND ETHINYL ESTRADIOL AND FERROUS FUMARATE 1 TABLET: KIT at 08:50

## 2017-09-22 RX ADMIN — BENZTROPINE MESYLATE 0.5 MG: 0.5 TABLET ORAL at 08:47

## 2017-09-22 RX ADMIN — TRAZODONE HYDROCHLORIDE 50 MG: 50 TABLET ORAL at 20:16

## 2017-09-22 RX ADMIN — BENZTROPINE MESYLATE 0.5 MG: 0.5 TABLET ORAL at 18:12

## 2017-09-22 RX ADMIN — PERPHENAZINE 16 MG: 16 TABLET, FILM COATED ORAL at 08:47

## 2017-09-22 RX ADMIN — Medication 1000 MG: at 08:47

## 2017-09-22 RX ADMIN — INFLUENZA A VIRUS A/MICHIGAN/45/2015 X-275 (H1N1) ANTIGEN (FORMALDEHYDE INACTIVATED), INFLUENZA A VIRUS A/HONG KONG/4801/2014 X-263B (H3N2) ANTIGEN (FORMALDEHYDE INACTIVATED), INFLUENZA B VIRUS B/PHUKET/3073/2013 ANTIGEN (FORMALDEHYDE INACTIVATED), AND INFLUENZA B VIRUS B/BRISBANE/60/2008 ANTIGEN (FORMALDEHYDE INACTIVATED) 0.5 ML: 15; 15; 15; 15 INJECTION, SUSPENSION INTRAMUSCULAR at 12:16

## 2017-09-22 ASSESSMENT — ACTIVITIES OF DAILY LIVING (ADL)
LAUNDRY: UNABLE TO COMPLETE
LAUNDRY: UNABLE TO COMPLETE
ORAL_HYGIENE: INDEPENDENT;PROMPTS
GROOMING: INDEPENDENT
DRESS: SCRUBS (BEHAVIORAL HEALTH)
ORAL_HYGIENE: INDEPENDENT
DRESS: INDEPENDENT;PROMPTS
GROOMING: INDEPENDENT;PROMPTS

## 2017-09-22 NOTE — PROGRESS NOTES
Behavioral Health  Note    Behavioral Health  Spirituality Group Note    [address]    Name: Karolyn Banerjee YOB: 1985   MRN: [id number] Age: 32 year old      Patient attended -led group, which included discussion of spirituality, coping with illness and building resilience.    Patient attended group for 1 hrs.    patient minimally participated, but was respectful to the group process.  Topic was rest.      Poonam Russ  Chaplain Resident  Pager [phone]

## 2017-09-22 NOTE — PROGRESS NOTES
"[facility], [place], [place]   Psychiatric Progress Note        Interim History:   Reason for Hospitalization:Karolyn is a 32 year old female with the above mentioned psychiatric history who was admitted under re-commitment for stabilization of mood and suicidal ideation.    Subjective: \"I'm ok\"     As per today's interview: Patient was seen walking around in the milieu, where she was seen attending groups. She was calm upon approach. Denied any current SI/HI/AH/VH. Denied any intent or plans to harm herself. Denied any side effects from her mediations. Paden safe in hospital.          Medications:       fish oil-omega-3 fatty acids  1,000 mg Oral Daily     perphenazine  16 mg Oral BID     benztropine  0.5 mg Oral BID     cetirizine  10 mg Oral Daily     lurasidone  160 mg Oral Daily with supper     norethindrone-ethinyl estradiol  1 tablet Oral Daily     traZODone  50 mg Oral At Bedtime          Allergies:     Allergies   Allergen Reactions     Clozapine      Agranulocytosis x 2, cannot be re trialed      Risperdal Other (See Comments)     dystonia     Tape [Adhesive Tape] Rash     Plastic tape          Labs:     Recent Results (from the past 48 hour(s))   CBC with platelets differential    Collection Time: 09/21/17  7:50 AM   Result Value Ref Range    WBC 6.3 4.0 - 11.0 10e9/L    RBC Count 5.02 3.8 - 5.2 10e12/L    Hemoglobin 15.3 11.7 - 15.7 g/dL    Hematocrit 44.6 35.0 - 47.0 %    MCV 89 78 - 100 fl    MCH 30.5 26.5 - 33.0 pg    MCHC 34.3 31.5 - 36.5 g/dL    RDW 12.5 10.0 - 15.0 %    Platelet Count 118 (L) 150 - 450 10e9/L    Diff Method Automated Method     % Neutrophils 66.2 %    % Lymphocytes 22.0 %    % Monocytes 9.1 %    % Eosinophils 1.9 %    % Basophils 0.0 %    % Immature Granulocytes 0.8 %    Nucleated RBCs 0 0 /100    Absolute Neutrophil 4.2 1.6 - 8.3 10e9/L    Absolute Lymphocytes 1.4 0.8 - 5.3 10e9/L    Absolute Monocytes 0.6 0.0 - 1.3 10e9/L    Absolute Eosinophils 0.1 0.0 - 0.7 " "10e9/L    Absolute Basophils 0.0 0.0 - 0.2 10e9/L    Abs Immature Granulocytes 0.1 0 - 0.4 10e9/L    Absolute Nucleated RBC 0.0     RBC Morphology Normal     Platelet Estimate Decreased    Comprehensive metabolic panel    Collection Time: 09/21/17  7:50 AM   Result Value Ref Range    Sodium 141 133 - 144 mmol/L    Potassium 5.0 3.4 - 5.3 mmol/L    Chloride 108 94 - 109 mmol/L    Carbon Dioxide 21 20 - 32 mmol/L    Anion Gap 12 3 - 14 mmol/L    Glucose 80 70 - 99 mg/dL    Urea Nitrogen 14 7 - 30 mg/dL    Creatinine 0.64 0.52 - 1.04 mg/dL    GFR Estimate >90 >60 mL/min/1.7m2    GFR Estimate If Black >90 >60 mL/min/1.7m2    Calcium 8.7 8.5 - 10.1 mg/dL    Bilirubin Total 0.5 0.2 - 1.3 mg/dL    Albumin 3.5 3.4 - 5.0 g/dL    Protein Total 7.1 6.8 - 8.8 g/dL    Alkaline Phosphatase 76 40 - 150 U/L    ALT 21 0 - 50 U/L    AST 18 0 - 45 U/L   Lipid panel    Collection Time: 09/21/17  7:50 AM   Result Value Ref Range    Cholesterol 181 <200 mg/dL    Triglycerides 144 <150 mg/dL    HDL Cholesterol 44 (L) >49 mg/dL    LDL Cholesterol Calculated 108 (H) <100 mg/dL    Non HDL Cholesterol 137 (H) <130 mg/dL   TSH with free T4 reflex and/or T3 as indicated    Collection Time: 09/21/17  7:50 AM   Result Value Ref Range    TSH 2.55 0.40 - 4.00 mU/L   Vitamin B12    Collection Time: 09/21/17  9:46 AM   Result Value Ref Range    Vitamin B12 387 193 - 986 pg/mL   Vitamin D Deficiency    Collection Time: 09/21/17  9:46 AM   Result Value Ref Range    Vitamin D Deficiency screening 29 20 - 75 ug/L          Psychiatric Examination:   /88  Pulse 95  Temp 98  F (36.7  C) (Oral)  Resp 16  Ht 1.638 m (5' 4.5\")  Wt 120.2 kg (265 lb 1.6 oz)  SpO2 97%  BMI 44.8 kg/m2  Weight is 265 lbs 1.6 oz  Body mass index is 44.8 kg/(m^2).  Appearance: Obese white female. Wears glasses. Mildly groomed. No acute distress.   Attitude: Poorly attentive, minimally interactive.   Eye Contact:  Poor   Mood:  \"Sad\"   Affect:  Flat  Speech:  Low in tone, " slow in rate, with increased latency in response.   Language: fluent and intact in English  Psychomotor, Gait, Musculoskeletal:  Unremarkable for any tremors, rigidity or restlessness.   Throught Process:  Sparse, and concrete.   Associations:  No loosening   Thought Content:  Denies suicidal ideation, no active intent or plan. Some intermittent AH, no VH. No HI.   Insight:  Poor   Judgement:  Poor   Oriented to:  Person, place.   Attention Span and Concentration:  Limited   Recent and Remote Memory: Not formally tested   Fund of Knowledge:  Below average       Assessment & Plan       Principal Diagnosis:   Schizoaffective disorder, depressive type   Borderline Personality Disorder  Unspecified Intellectual Disability     Assessment:   (Initial Assessment 9/21): Karolyn is a 32 year old female with the above mentioned psychiatric history who was admitted under re-commitment for stabilization of mood and suicidal ideation. After reviewing her chart is seems she has multiple prior admissions for similar circumstances, with attempting/ideating [place] her group home her de facto method. Recently a contributory stressor is the death of her brother (by suicide). She denies being close with her brother, but is saddened by his death. She has intermittent psychotic symptoms and has been followed by an outpatient provider along with court mandated ECT every 1.5 months (next ECT as per Dr. Amos's notes is on 10/3). The ECT's appear to be to attenuate her delusions and depressive moods, although is it unclear how much true benefit the patient is having from ECT. There is some thought that the secondary effect of cognitive dulling from ECT allows her to be more easily managed in her group (opposed to targeting her psychiatric symptoms). Neverthless, it appears as though she will continue with ECT in Oct 3rd. I will inform Dr. Amos about the patient's being in the hospital if we project she will be here till  10/3 (the time of her next ECT). I discussed with the patient the need to increasing her perphenazine to further provide mood stability, and attenuation of her delusions/AH.     Update 9/22: Patient denies any ongoing SI/HI/AH/VH. Tolerating medication changes. Will plan to discharge patient on Monday. Patient will plan to have ECT on 10/3 (outpatient basis).     Plan:   - Perphenazine 16 mg BID  - Continue Latuda 160 mg   - Cogentin 0.5 BID  - Trazodone 50 mg HS      Legal Status: Committed    Safety Assessment:   Checks: Status 15  Precautions: Suicide  Self-harm  Pt has not required locked seclusion or restraints in the past 24 hours to maintain safety, please refer to RN documentation for further details.       The risks, benefits, alternatives and side effects have been discussed and are understood by the patient and other caregivers.       Anticipated Disposition/Discharge Date: Next Monday     Attestation:  Patient has been seen and evaluated by me,  Cristobal Price MD   Spent 15 minutes on encounter, >50% of which was spent in counseling and/or coordination of care, consisting of reviewing changes, medication changes, depressive symptoms, and safety.

## 2017-09-22 NOTE — PLAN OF CARE
"Problem: Depressive Symptoms  Goal: Social and Therapeutic (Depression)  Signs and symptoms of listed problems will be absent or manageable.         Patient attended 3 of 3 scheduled OT sessions today. Pt. Participated in a Life Skills session which focussed on a review of the week and planning for the weekend. Pt. Actively participated in goal directed task session. Pt was quick to engage in group game activity.  Demonstrated an understanding of the rules  and strategy of the game.  Appeared to enjoy the group interaction. Pt very attentive to details of the activity and stresses fairness with peers.  Affect flat but brightens at times when relating to humor of peers.  Talked about the death of her brother and how uncomfortable she felt with others giving their condolences during the . Stated that she feels that she needs to \"deal with my depression and not just hold it in like my brother did\".      "

## 2017-09-22 NOTE — PROGRESS NOTES
Pt is withdrawn from peers and mostly isolative to her room. Pt displays a flat affect. No behavorial concerns to be noted this shift.        09/21/17 5307   Behavioral Health   Thinking poor concentration   Orientation person: oriented;place: oriented   Insight poor   Judgement impaired   Eye Contact into space   Affect blunted, flat   Mood depressed   Physical Appearance/Attire disheveled   Hygiene neglected grooming - unclean body, hair, teeth   Suicidality (WDL) WDL   Self Injury (WDL) WDL   Elopement (WDL) WDL   Activity isolative;withdrawn   Speech (WDL) WDL   Psychomotor Gait (WDL) WDL   Activities of Daily Living   Hygiene/Grooming independent   Oral Hygiene independent   Dress scrubs (behavioral health)   Laundry unable to complete   Room Organization independent

## 2017-09-23 PROCEDURE — 90853 GROUP PSYCHOTHERAPY: CPT

## 2017-09-23 PROCEDURE — 25000132 ZZH RX MED GY IP 250 OP 250 PS 637: Performed by: PSYCHIATRY & NEUROLOGY

## 2017-09-23 PROCEDURE — 12400007 ZZH R&B MH INTERMEDIATE UMMC

## 2017-09-23 RX ADMIN — PERPHENAZINE 16 MG: 16 TABLET, FILM COATED ORAL at 20:46

## 2017-09-23 RX ADMIN — TRAZODONE HYDROCHLORIDE 50 MG: 50 TABLET ORAL at 20:46

## 2017-09-23 RX ADMIN — PERPHENAZINE 16 MG: 16 TABLET, FILM COATED ORAL at 08:33

## 2017-09-23 RX ADMIN — BENZTROPINE MESYLATE 0.5 MG: 0.5 TABLET ORAL at 18:55

## 2017-09-23 RX ADMIN — LURASIDONE HYDROCHLORIDE 160 MG: 80 TABLET, FILM COATED ORAL at 18:54

## 2017-09-23 RX ADMIN — Medication 1000 MG: at 08:35

## 2017-09-23 RX ADMIN — CETIRIZINE HYDROCHLORIDE 10 MG: 5 TABLET, FILM COATED ORAL at 08:35

## 2017-09-23 RX ADMIN — BENZTROPINE MESYLATE 0.5 MG: 0.5 TABLET ORAL at 08:35

## 2017-09-23 RX ADMIN — NORETHINDRONE ACETATE AND ETHINYL ESTRADIOL AND FERROUS FUMARATE 1 TABLET: KIT at 08:33

## 2017-09-23 ASSESSMENT — ACTIVITIES OF DAILY LIVING (ADL)
ORAL_HYGIENE: INDEPENDENT
DRESS: INDEPENDENT
ORAL_HYGIENE: INDEPENDENT
DRESS: SCRUBS (BEHAVIORAL HEALTH)
GROOMING: HANDWASHING
GROOMING: INDEPENDENT

## 2017-09-23 NOTE — PROGRESS NOTES
09/23/17 1129   Behavioral Health   Hallucinations appears responding   Thinking poor concentration   Orientation time: oriented;date: oriented;place: oriented;person: oriented   Memory baseline memory   Insight poor   Judgement impaired   Eye Contact at examiner   Affect blunted, flat   Mood mood is calm   Physical Appearance/Attire attire appropriate to age and situation   Hygiene neglected grooming - unclean body, hair, teeth   Suicidality other (see comments)  (denied)   Self Injury other (see comment)  (denied)   Elopement (nothing was observed)   Activity isolative   Speech coherent;clear   Medication Sensitivity no observed side effects;no stated side effects   Psychomotor / Gait balanced;steady   Psycho Education   Type of Intervention 1:1 intervention   Response participates, initiates socially appropriate   Hours 0.5   Treatment Detail check in    Activities of Daily Living   Hygiene/Grooming handwashing   Oral Hygiene independent   Dress scrubs (behavioral health)   Room Organization independent   Activity   Activity Assistance Provided independent   Behavioral Health Interventions   Depression maintain safety precautions;monitor need to revise level of observation;maintain safe secure environment   Social and Therapeutic Interventions (Depression) encourage socialization with peers;encourage effective boundaries with peers;encourage participation in therapeutic groups and milieu activities     Pt was isolative for most of the shift. Pt denied SI/SIB, depressed or anxious. Pt goal today is to attended groups.

## 2017-09-23 NOTE — PLAN OF CARE
Problem: Depressive Symptoms  Goal: Depressive Symptoms  Signs and symptoms of listed problems will be absent or manageable.   Outcome: Therapy, progress toward functional goals is gradual     RN ASSESSMENT     Pt out for meals and medications. Withdrawn and little interaction with peers otherwise. Did not attend community meeting. Affect flat. Avoids eye contact. Energy appears low. During check-in with her staff, pt looked at the floor and denied all symptoms when asked, but appears depressed. Denied SI/SIB. Med-compliant. Appetite appears good. Reports good sleep. No physical complaints. Continue with current treatment plan and recommendations. Continue to monitor and reassess symptoms. Monitor response to medications. Monitor progress towards treatment goals. Encourage groups and participation.

## 2017-09-24 PROCEDURE — 90853 GROUP PSYCHOTHERAPY: CPT

## 2017-09-24 PROCEDURE — 25000132 ZZH RX MED GY IP 250 OP 250 PS 637: Performed by: PSYCHIATRY & NEUROLOGY

## 2017-09-24 PROCEDURE — 12400007 ZZH R&B MH INTERMEDIATE UMMC

## 2017-09-24 RX ADMIN — PERPHENAZINE 16 MG: 16 TABLET, FILM COATED ORAL at 08:04

## 2017-09-24 RX ADMIN — TRAZODONE HYDROCHLORIDE 50 MG: 50 TABLET ORAL at 21:45

## 2017-09-24 RX ADMIN — BENZTROPINE MESYLATE 0.5 MG: 0.5 TABLET ORAL at 08:04

## 2017-09-24 RX ADMIN — PERPHENAZINE 16 MG: 16 TABLET, FILM COATED ORAL at 21:45

## 2017-09-24 RX ADMIN — LURASIDONE HYDROCHLORIDE 160 MG: 80 TABLET, FILM COATED ORAL at 18:51

## 2017-09-24 RX ADMIN — OLANZAPINE 10 MG: 10 TABLET, FILM COATED ORAL at 12:42

## 2017-09-24 RX ADMIN — Medication 1000 MG: at 08:04

## 2017-09-24 RX ADMIN — CETIRIZINE HYDROCHLORIDE 10 MG: 5 TABLET, FILM COATED ORAL at 08:04

## 2017-09-24 RX ADMIN — NORETHINDRONE ACETATE AND ETHINYL ESTRADIOL AND FERROUS FUMARATE 1 TABLET: KIT at 08:04

## 2017-09-24 RX ADMIN — BENZTROPINE MESYLATE 0.5 MG: 0.5 TABLET ORAL at 18:51

## 2017-09-24 ASSESSMENT — ACTIVITIES OF DAILY LIVING (ADL)
DRESS: PROMPTS
GROOMING: PROMPTS
ORAL_HYGIENE: PROMPTS
LAUNDRY: UNABLE TO COMPLETE

## 2017-09-24 NOTE — PROGRESS NOTES
09/23/17 2251   Behavioral Health   Hallucinations auditory   Thinking poor concentration   Orientation person: oriented;place: oriented;date: oriented   Memory baseline memory   Insight poor   Judgement impaired   Eye Contact at examiner   Affect blunted, flat   Mood mood is calm;depressed   Physical Appearance/Attire attire appropriate to age and situation;posture slouched   Hygiene neglected grooming - unclean body, hair, teeth   Suicidality other (see comments)  (Denies)   Self Injury other (see comment)  (Denies)   Elopement (nothing to report)   Activity isolative;withdrawn   Speech clear;coherent   Medication Sensitivity no observed side effects   Psychomotor / Gait balanced;steady;slow   Psycho Education   Type of Intervention 1:1 intervention   Response participates, initiates socially appropriate   Hours 0.5   Treatment Detail (CHeck In)   Activities of Daily Living   Hygiene/Grooming independent   Oral Hygiene independent   Dress independent   Room Organization independent   Behavioral Health Interventions   Depression provide emotional support   Social and Therapeutic Interventions (Depression) encourage socialization with peers   Patient was in her room sleeping/napping most of the shift.  Patient did ome out for dinner and staying in the lounge area for about an hour watching TV. The patient then returned to her room and remained their the rest of the shift.  The patient was slow to respond to conversation/questions during our check in and did not seem interested in engaging.  The patient endorsed auditory hallucinations, but did not or would not talk about them in detail with me.  When trying to converse with the patient she would often not answer and stare blankly.  The patient did deny SI or SiB urges.  The patient reported that she did not shower and did not seem responsive to psychoeducational conversation about the importance of hygrine.

## 2017-09-24 NOTE — PROGRESS NOTES
"Pt has been visible all shift and attended both of the groups. Pt reported hearing voices that were making her \"agitated\" around 1330. Pt received a medication, then took a shower about 15 minutes later with staff's encouragement. Pt said she felt better at 1350. Pt denies SI and SIB thoughts.     09/24/17 1301   Behavioral Health   Hallucinations denies / not responding to hallucinations  (as of 1200)   Thinking (fair)   Orientation person: oriented;place: oriented;date: oriented;time: oriented   Memory baseline memory   Insight poor   Judgement impaired   Eye Contact at examiner   Affect blunted, flat   Mood depressed;anxious   Physical Appearance/Attire appears stated age;attire appropriate to age and situation;untidy   Hygiene neglected grooming - unclean body, hair, teeth   Suicidality (denies)   Self Injury (denies)   Elopement (no signs of eloping)   Activity (WDL) WDL   Activity (attended groups but very quiet)   Speech clear;coherent   Medication Sensitivity no stated side effects;no observed side effects   Psychomotor / Gait balanced;steady   Psycho Education   Type of Intervention 1:1 intervention   Response (not able to do so at this time)   Hours 0.5   Treatment Detail (self-reflection and planning)   Behavioral Health Interventions   Depression maintain safety precautions;monitor need to revise level of observation;maintain safe secure environment;encourage nutrition and hydration;encourage participation / independence with adls;provide emotional support;establish therapeutic relationship;assist with developing and utilizing healthy coping strategies;assess patient response to medication;assess medication adherance   Social and Therapeutic Interventions (Depression) encourage socialization with peers;encourage effective boundaries with peers;encourage participation in therapeutic groups and milieu activities     "

## 2017-09-24 NOTE — PLAN OF CARE
Problem: Depressive Symptoms  Goal: Depressive Symptoms  Signs and symptoms of listed problems will be absent or manageable.      Attended OT group. Participated in activity requiring quick and creative thinking. Comments were more concrete and rule based. She also noted details on task that were accurate. She appeared clam and interested in participating. Asked for clarifications when seeming to need them. Pleasant with redirections.

## 2017-09-25 VITALS
HEIGHT: 65 IN | TEMPERATURE: 99.1 F | WEIGHT: 265.3 LBS | DIASTOLIC BLOOD PRESSURE: 88 MMHG | HEART RATE: 95 BPM | OXYGEN SATURATION: 97 % | SYSTOLIC BLOOD PRESSURE: 141 MMHG | RESPIRATION RATE: 18 BRPM | BODY MASS INDEX: 44.2 KG/M2

## 2017-09-25 PROCEDURE — 99238 HOSP IP/OBS DSCHRG MGMT 30/<: CPT | Performed by: PSYCHIATRY & NEUROLOGY

## 2017-09-25 PROCEDURE — 90853 GROUP PSYCHOTHERAPY: CPT

## 2017-09-25 PROCEDURE — H2032 ACTIVITY THERAPY, PER 15 MIN: HCPCS

## 2017-09-25 PROCEDURE — 25000132 ZZH RX MED GY IP 250 OP 250 PS 637: Performed by: PSYCHIATRY & NEUROLOGY

## 2017-09-25 RX ORDER — PERPHENAZINE 16 MG/1
16 TABLET ORAL 2 TIMES DAILY
Qty: 60 TABLET | Refills: 0 | Status: SHIPPED | OUTPATIENT
Start: 2017-09-25 | End: 2017-09-25

## 2017-09-25 RX ORDER — PERPHENAZINE 16 MG/1
16 TABLET ORAL 2 TIMES DAILY
Qty: 60 TABLET | Refills: 0 | Status: SHIPPED | OUTPATIENT
Start: 2017-09-25 | End: 2017-10-26

## 2017-09-25 RX ADMIN — NORETHINDRONE ACETATE AND ETHINYL ESTRADIOL AND FERROUS FUMARATE 1 TABLET: KIT at 09:01

## 2017-09-25 RX ADMIN — PERPHENAZINE 16 MG: 16 TABLET, FILM COATED ORAL at 09:00

## 2017-09-25 RX ADMIN — CETIRIZINE HYDROCHLORIDE 10 MG: 5 TABLET, FILM COATED ORAL at 09:00

## 2017-09-25 RX ADMIN — Medication 1000 MG: at 09:00

## 2017-09-25 RX ADMIN — BENZTROPINE MESYLATE 0.5 MG: 0.5 TABLET ORAL at 09:00

## 2017-09-25 ASSESSMENT — ACTIVITIES OF DAILY LIVING (ADL)
GROOMING: PROMPTS
ORAL_HYGIENE: PROMPTS
DRESS: PROMPTS

## 2017-09-25 NOTE — PROGRESS NOTES
Patient discharged to  with a plan to follow up psychiatry and ECT. Discharge AVS reviewed, as well as discharge medications.  staff did not have any more questions and is aware to call the unit should further questions arise.

## 2017-09-25 NOTE — PROGRESS NOTES
The patient is discharging back to her group home today.  Their staff will be here at 2pm to transport her.  Any medication(s) need to be escripted to [facility].

## 2017-09-25 NOTE — DISCHARGE SUMMARY
[facility], [place]   Psychiatric Discharge Summary      Karolyn Banerjee MRN# [id number]   Age: 32 year old YOB: 1985     Date of Admission:  9/19/2017  Date of Discharge:  09/25/17  Admitting Physician:  Cristobal Price MD    Discharge Physician:  Cristobal Price MD          Summary/Hospital Course/Disposition:   Summary: Karolyn is a 32 year old female with history of Schizoaffective Disorder, depressive type was admitted on  9/19/2017 under recommitment for stabilization of mood and suicidal ideation. After reviewing her chart, it seems she has multiple prior admissions for similar circumstances, with attempting/ideating [place] her [place] her de facto method. Recently a contributory stressor is the death of her brother (by suicide). She denies being close with her brother, but is saddened by his death. She has intermittent psychotic symptoms and has been followed by an outpatient provider along with court mandated ECT every 1.5 months (next ECT as per Dr. Amos's notes is on 10/3). The ECT's appear to be to attenuate her delusions and depressive moods, although is it unclear how much true benefit the patient is having from ECT. There is some thought that the secondary effect of cognitive dulling from ECT allows her to be more easily managed in her group (opposed to targeting her psychiatric symptoms). Neverthless, it appears as though she will continue with ECT in Oct 3rd. I discussed with the patient the need to increasing her perphenazine to further provide mood stability, and attenuation of her delusions/AH. She seemed to understand and agree to medication change. She tolerated medication change, and appeared to subjectively benefit. She was observed to attend groups. She denied any suicidal ideation, intent or plan. She denied any HI/AH/VH. She denied any intolerable side effects to her medications.     Med Changes: Perphenazine was  increased to 16 mg BID.    Disposition: Patient discharged back to [facility].         DIagnoses:   Schizoaffective disorder, depressive type   Borderline Personality Disorder  Unspecified Intellectual Disability         Labs:   No results found for this or any previous visit (from the past 24 hour(s)).         Consults:   None            Discharge Medications:        Review of your medicines      CONTINUE these medicines which may have CHANGED, or have new prescriptions. If we are uncertain of the size of tablets/capsules you have at home, strength may be listed as something that might have changed.       Dose / Directions    perphenazine 16 MG tablet   This may have changed:    - medication strength  - how much to take  - when to take this   Used for:  Schizoaffective disorder, depressive type (H)        Dose:  16 mg   Take 1 tablet (16 mg) by mouth 2 times daily   Quantity:  60 tablet   Refills:  0         CONTINUE these medicines which have NOT CHANGED       Dose / Directions    ACETAMINOPHEN PO        Dose:  1000 mg   Take 1,000 mg by mouth every 8 hours as needed for pain   Refills:  0       * albuterol (5 MG/ML) 0.5% neb solution   Commonly known as:  PROVENTIL        Dose:  2.5 mg   Inhale 2.5 mg into the lungs every 4 hours as needed for wheezing or shortness of breath / dyspnea (2 puffs)   Refills:  0       * albuterol 108 (90 BASE) MCG/ACT Inhaler   Commonly known as:  PROAIR HFA/PROVENTIL HFA/VENTOLIN HFA        Dose:  2 puff   Inhale 2 puffs into the lungs every 6 hours as needed for shortness of breath / dyspnea or wheezing   Refills:  0       benztropine 0.5 MG tablet   Commonly known as:  COGENTIN   Used for:  Schizoaffective disorder, bipolar type (H), Aggression        Dose:  0.5 mg   Take 1 tablet (0.5 mg) by mouth 2 times daily   Quantity:  60 tablet   Refills:  1       cetirizine 10 MG tablet   Commonly known as:  zyrTEC        Dose:  10 mg   Take 10 mg by mouth daily   Refills:  0        diphenhydrAMINE 25 MG tablet   Commonly known as:  BENADRYL        Dose:  25 mg   Take 25 mg by mouth every 6 hours as needed for itching or allergies   Refills:  0       FISH OIL PO        Dose:  1000 mg   Take 1,000 mg by mouth daily   Refills:  0       hydrOXYzine 25 MG tablet   Commonly known as:  ATARAX   Used for:  Schizoaffective disorder, bipolar type (H), Aggression        Dose:  25-50 mg   Take 1-2 tablets (25-50 mg) by mouth every 4 hours as needed for anxiety   Quantity:  60 tablet   Refills:  2       lurasidone 80 MG Tabs tablet   Commonly known as:  LATUDA   Used for:  Schizoaffective disorder, bipolar type (H), Aggression        Dose:  160 mg   Take 2 tablets (160 mg) by mouth daily (with dinner)   Quantity:  60 tablet   Refills:  1       norethindrone-ethinyl estradiol 1-20 MG-MCG per tablet   Commonly known as:  JUNEL FE 1/20        Dose:  1 tablet   Take 1 tablet by mouth   Refills:  0       OLANZapine 10 MG tablet   Commonly known as:  zyPREXA   Used for:  Schizoaffective disorder, bipolar type (H), Aggression        Dose:  10 mg   Take 1 tablet (10 mg) by mouth 2 times daily as needed   Quantity:  30 tablet   Refills:  0       traZODone 50 MG tablet   Commonly known as:  DESYREL   Used for:  Schizoaffective disorder, bipolar type (H), Aggression        Dose:  50 mg   Take 1 tablet (50 mg) by mouth At Bedtime   Quantity:  30 tablet   Refills:  1       * Notice:  This list has 2 medication(s) that are the same as other medications prescribed for you. Read the directions carefully, and ask your doctor or other care provider to review them with you.         Where to get your medicines      These medications were sent to [facility] - [place], [place] - [address]  [address] [facility] [address]     Phone:  [phone]      perphenazine 16 MG tablet                  Mental Status Examination:   Appearance: Obese white female. Wears glasses. Mildly groomed. No acute  "distress.   Attitude:  Inattentive , withdrawn, minimally interactive.   Eye Contact:  Poor   Mood:  \"Better\"   Affect:  Flat  Speech:  Low in tone, slow in rate, with increased latency in response.   Language: fluent and intact in English  Psychomotor, Gait, Musculoskeletal:  Unremarkable for any tremors, rigidity or restlessness.   Throught Process:  Sparse, and concrete.   Associations:  No loosening   Thought Content:  No suicidal ideation, no active intent or plan. Some intermittent AH, no VH. No HI.   Insight:  Poor   Judgement:  Poor   Oriented to:  Person, place.   Attention Span and Concentration:  Limited   Recent and Remote Memory: Not formally tested   Fund of Knowledge:  Below average         Discharge Plan:   No discharge procedures on file.    1.) Perphenazine was increased to 16 mg BID  2. ) Patient discharged back to group home.     Cristobal Price MD  [facility] Psychiatry    Spent  20  minutes on encounter, >50% of which was spent in counseling and/or coordination of care, consisting of discussing medication effects, and discharge plan.     "

## 2017-09-25 NOTE — PLAN OF CARE
Problem: Depressive Symptoms  Goal: Depressive Symptoms  Signs and symptoms of listed problems will be absent or manageable.   Outcome: Therapy, progress toward functional goals is gradual     RN ASSESSMENT     Pt continues to isolate to room much of evening, napping. Visible for meds, meals, snacks. Affect flat, mood appears depressed. Eye contact poor, stares down at floor. Reported AH earlier today; received Zyprexa 10 mg po PRN which she stated had helped. Reported that AH's increase her anxiety. Denied AH this evening however. Denied all other MH sx as well and appears reluctant to divulge information. Med-compliant. Irritable x 1 with staff while requesting different scrubs this evening but redirectable. Hygiene adequate. Sleep and appetite good. Denies physical complaints. Continue with current treatment plan and recommendations. Continue to monitor and reassess symptoms. Monitor response to medications. Monitor progress towards treatment goals. Encourage groups and participation.

## 2017-09-25 NOTE — DISCHARGE INSTRUCTIONS
Behavioral Discharge Planning and Instructions      Summary:  You were admitted on 9/19/2017  due to Schizophrenia.  You were treated by Dr Cristobal Price MD and discharged on 9/24/17 from [facility] to [facility] in [place] with staff transporting you.      Principal Diagnosis: Schizophrenia; Cognitive Impairment      Health Care Follow-up Appointments:  Dr. Dana Sandoval - Next Appt on November 3rd at 12:45pm  [facility]  [address] [place] [address]  [phone]    Return to your group home [facility] [phone]  [address] [place] [address]    Major Treatments, Procedures and Findings:  You were provided with: a psychiatric assessment, assessed for medical stability, medication evaluation and/or management, group therapy and milieu management    Symptoms to Report: mood getting worse or thoughts of suicide    Early warning signs can include: increased thoughts or behaviors of suicide or self-harm  increased unusual thinking, such as paranoia or hearing voices    Safety and Wellness:  Take all medicines as directed.  Make no changes unless your doctor suggests them.      Follow treatment recommendations.  Refrain from alcohol and non-prescribed drugs.  If there is a concern for safety, call 911.    Resources:   Crisis Intervention: 367.921.5525 or 102-809-7980 (TTY: 101.783.3304).  Call anytime for help.  National [employer] on Mental Illness (www.[place].chelo.org): 588.887.8794 or 987-433-5942.    The treatment team has appreciated the opportunity to work with you.     If you have any questions or concerns our unit number is [phone].

## 2017-09-26 ENCOUNTER — CARE COORDINATION (OUTPATIENT)
Dept: PSYCHIATRY | Facility: CLINIC | Age: 32
End: 2017-09-26

## 2017-09-26 ENCOUNTER — TELEPHONE (OUTPATIENT)
Dept: PSYCHIATRY | Facility: CLINIC | Age: 32
End: 2017-09-26

## 2017-09-26 NOTE — TELEPHONE ENCOUNTER
A court order was faxed from [facility]  to [facility] on 9/25/2017.  The order authorizes the release of records from [facility] to the [facility].  I sent the order to medical records via the [employer] system on 9/26/2017.Isabella Owens/LUCY

## 2017-09-26 NOTE — PROGRESS NOTES
Rcvd Discharge Summary:   Compare Pt Discharge summary to that in EPIC:  Able to get meds filled:   Mood:   SE:     DC Plan:  Admit Date:   Discharge date:   Next appt:   Referrals (therapy, daytx, homecare, ect):   Crisis Plan:    Contact Numbers Reviewed:    TCM:   Direct contact made with pt within 2 business days?   Pt is schedule in clinic within 14 days of discharge?   Pt qualifies for TCM visit?        Called patient but she was not available at the group home as she has an activity.  Advised to call at 1:30 pm to speak with Sarah, the supervisor.

## 2017-09-26 NOTE — PROGRESS NOTES
Called and left  for  asking for a return call to follow up on how patient is doing after being discharged from the hospital.

## 2017-10-02 NOTE — PROGRESS NOTES
Dana Sandoval MD Valena, Victoria, RN FYI I put my urgent slots on Wednesday 0905 on hold for her to schedule a follow up, are you able to call the  and see if she can make this appointment?              Called the patient's group home and spoke with Sarah - she took down the appointment time and said that she will figure out staffing to make sure that patient can attend the appointment.    Will route to Dr. Sandoval for FYI.

## 2017-10-04 ENCOUNTER — HOSPITAL ENCOUNTER (INPATIENT)
Facility: CLINIC | Age: 32
LOS: 7 days | Discharge: NURSING, REHAB OR LONG-TERM CARE | End: 2017-10-11
Attending: FAMILY MEDICINE | Admitting: PSYCHIATRY & NEUROLOGY
Payer: MEDICAID

## 2017-10-04 ENCOUNTER — OFFICE VISIT (OUTPATIENT)
Dept: PSYCHIATRY | Facility: CLINIC | Age: 32
End: 2017-10-04
Attending: PSYCHIATRY & NEUROLOGY
Payer: MEDICAID

## 2017-10-04 VITALS
SYSTOLIC BLOOD PRESSURE: 168 MMHG | BODY MASS INDEX: 44.89 KG/M2 | HEART RATE: 92 BPM | DIASTOLIC BLOOD PRESSURE: 93 MMHG | WEIGHT: 265.6 LBS

## 2017-10-04 DIAGNOSIS — F25.0 SCHIZOAFFECTIVE DISORDER, BIPOLAR TYPE (H): ICD-10-CM

## 2017-10-04 DIAGNOSIS — R07.9 CHEST PAIN, UNSPECIFIED TYPE: ICD-10-CM

## 2017-10-04 DIAGNOSIS — F25.0 SCHIZOAFFECTIVE DISORDER, BIPOLAR TYPE (H): Primary | ICD-10-CM

## 2017-10-04 DIAGNOSIS — R45.851 SUICIDAL IDEATION: ICD-10-CM

## 2017-10-04 DIAGNOSIS — F25.1 SCHIZOAFFECTIVE DISORDER, DEPRESSIVE TYPE (H): ICD-10-CM

## 2017-10-04 LAB — INTERPRETATION ECG - MUSE: NORMAL

## 2017-10-04 PROCEDURE — 25000132 ZZH RX MED GY IP 250 OP 250 PS 637

## 2017-10-04 PROCEDURE — 93010 ELECTROCARDIOGRAM REPORT: CPT | Mod: Z6 | Performed by: FAMILY MEDICINE

## 2017-10-04 PROCEDURE — 12400007 ZZH R&B MH INTERMEDIATE UMMC

## 2017-10-04 PROCEDURE — 99285 EMERGENCY DEPT VISIT HI MDM: CPT | Mod: 25 | Performed by: FAMILY MEDICINE

## 2017-10-04 PROCEDURE — 93005 ELECTROCARDIOGRAM TRACING: CPT | Performed by: FAMILY MEDICINE

## 2017-10-04 RX ORDER — ACETAMINOPHEN 500 MG
1000 TABLET ORAL EVERY 8 HOURS PRN
COMMUNITY
End: 2017-10-04

## 2017-10-04 RX ORDER — CHLORAL HYDRATE 500 MG
1000 CAPSULE ORAL DAILY
Status: DISCONTINUED | OUTPATIENT
Start: 2017-10-05 | End: 2017-10-11 | Stop reason: HOSPADM

## 2017-10-04 RX ORDER — BENZTROPINE MESYLATE 0.5 MG/1
0.5 TABLET ORAL 2 TIMES DAILY
Status: DISCONTINUED | OUTPATIENT
Start: 2017-10-04 | End: 2017-10-11 | Stop reason: HOSPADM

## 2017-10-04 RX ORDER — PERPHENAZINE 16 MG/1
16 TABLET ORAL 2 TIMES DAILY
Status: DISCONTINUED | OUTPATIENT
Start: 2017-10-04 | End: 2017-10-11 | Stop reason: HOSPADM

## 2017-10-04 RX ORDER — NORETHINDRONE ACETATE AND ETHINYL ESTRADIOL .02; 1 MG/1; MG/1
1 TABLET ORAL DAILY
COMMUNITY

## 2017-10-04 RX ORDER — NORETHINDRONE ACETATE AND ETHINYL ESTRADIOL .02; 1 MG/1; MG/1
1 TABLET ORAL DAILY
Status: DISCONTINUED | OUTPATIENT
Start: 2017-10-05 | End: 2017-10-11 | Stop reason: HOSPADM

## 2017-10-04 RX ORDER — LURASIDONE HYDROCHLORIDE 80 MG/1
160 TABLET, FILM COATED ORAL
Status: DISCONTINUED | OUTPATIENT
Start: 2017-10-04 | End: 2017-10-11 | Stop reason: HOSPADM

## 2017-10-04 RX ORDER — OLANZAPINE 10 MG/2ML
10 INJECTION, POWDER, FOR SOLUTION INTRAMUSCULAR
Status: DISCONTINUED | OUTPATIENT
Start: 2017-10-04 | End: 2017-10-11 | Stop reason: HOSPADM

## 2017-10-04 RX ORDER — OLANZAPINE 10 MG/1
10 TABLET ORAL
Status: DISCONTINUED | OUTPATIENT
Start: 2017-10-04 | End: 2017-10-11 | Stop reason: HOSPADM

## 2017-10-04 RX ORDER — CETIRIZINE HYDROCHLORIDE 10 MG/1
10 TABLET ORAL DAILY
Status: DISCONTINUED | OUTPATIENT
Start: 2017-10-05 | End: 2017-10-11 | Stop reason: HOSPADM

## 2017-10-04 RX ORDER — HYDROXYZINE HYDROCHLORIDE 25 MG/1
25-50 TABLET, FILM COATED ORAL EVERY 4 HOURS PRN
Status: DISCONTINUED | OUTPATIENT
Start: 2017-10-04 | End: 2017-10-11 | Stop reason: HOSPADM

## 2017-10-04 RX ORDER — TRAZODONE HYDROCHLORIDE 50 MG/1
50 TABLET, FILM COATED ORAL AT BEDTIME
Status: DISCONTINUED | OUTPATIENT
Start: 2017-10-04 | End: 2017-10-11 | Stop reason: HOSPADM

## 2017-10-04 RX ORDER — ACETAMINOPHEN 325 MG/1
650 TABLET ORAL EVERY 4 HOURS PRN
Status: DISCONTINUED | OUTPATIENT
Start: 2017-10-04 | End: 2017-10-11 | Stop reason: HOSPADM

## 2017-10-04 RX ADMIN — BENZTROPINE MESYLATE 0.5 MG: 0.5 TABLET ORAL at 20:46

## 2017-10-04 RX ADMIN — TRAZODONE HYDROCHLORIDE 50 MG: 50 TABLET ORAL at 20:48

## 2017-10-04 RX ADMIN — HYDROXYZINE HYDROCHLORIDE 25 MG: 25 TABLET ORAL at 20:46

## 2017-10-04 RX ADMIN — PERPHENAZINE 16 MG: 16 TABLET, FILM COATED ORAL at 22:17

## 2017-10-04 RX ADMIN — LURASIDONE HYDROCHLORIDE 160 MG: 80 TABLET, FILM COATED ORAL at 20:46

## 2017-10-04 ASSESSMENT — ACTIVITIES OF DAILY LIVING (ADL)
WHICH_OF_THE_ABOVE_FUNCTIONAL_RISKS_HAD_A_RECENT_ONSET_OR_CHANGE?: AMBULATION
AMBULATION: 0-->INDEPENDENT
SWALLOWING: 0-->SWALLOWS FOODS/LIQUIDS WITHOUT DIFFICULTY
COGNITION: 0 - NO COGNITION ISSUES REPORTED
TOILETING: 0-->INDEPENDENT
BATHING: 0-->INDEPENDENT
RETIRED_EATING: 0-->INDEPENDENT
DRESS: 0-->INDEPENDENT
FALL_HISTORY_WITHIN_LAST_SIX_MONTHS: YES
TRANSFERRING: 0-->INDEPENDENT
RETIRED_COMMUNICATION: 0-->UNDERSTANDS/COMMUNICATES WITHOUT DIFFICULTY

## 2017-10-04 ASSESSMENT — PATIENT HEALTH QUESTIONNAIRE - PHQ9: SUM OF ALL RESPONSES TO PHQ QUESTIONS 1-9: 7

## 2017-10-04 NOTE — IP AVS SNAPSHOT
[address] [address]    Phone:  [phone]                                       After Visit Summary   10/4/2017    Karolyn Banerjee    MRN: [id number]           After Visit Summary Signature Page     I have received my discharge instructions, and my questions have been answered. I have discussed any challenges I see with this plan with the nurse or doctor.    ..........................................................................................................................................  Patient/Patient Representative Signature      ..........................................................................................................................................  Patient Representative Print Name and Relationship to Patient    ..................................................               ................................................  Date                                            Time    ..........................................................................................................................................  Reviewed by Signature/Title    ...................................................              ..............................................  Date                                                            Time

## 2017-10-04 NOTE — PROGRESS NOTES
"PSYCHIATRY CLINIC PROGRESS NOTE   The initial diagnostic evaluation was on prior to 2008.  Date of the most recent transfer of care montse is 8/7/17.    Guardian: Mary \"Swetha\" Chriss (mother)- [phone] (okay to leave message) or [phone]    Pertinent Background:  This patient first experienced mental health issues as a child and has received treatment for Schizoaffective disorder bipolar type, borderline personality disorder and unspecified neurocognitive disorder.  See transfer evaluation for detailed history.  Notably, she has multiple medication trials and hospitalizations treating aggression and perceptual disturbances (including clozapine with neutropenia x2).  ECT combined with multiple medications has been most helpful in maintaining stability.  She is under commitment and Torres. Peña-Wilkins order also in place (paperwork filed for renewal in September, decision to be made November 2017), authorizes treatment up to two times per week for maintenance.  10/28/2013: full scale IQ test of 62.  After neutropenia with clozapine x's 2, has required multiple neuroleptics as well as ECT to maintain stability.       Psych critical item history includes suicide attempt [multiple], suicidal ideation, SIB [indicated per chart review, pt denies], psychosis [sxs include AH, delusions relating to pregnancy], mutiple psychotropic trials, severe med reaction, psych hosp (>5), commitment and ECT.    INTERIM HISTORY                                                 Karolyn Banerjee is a 32 year old female who was last seen for medication management on 9/6/17 at which time no changes were made, however was hospitalized 2 weeks later and medication changes made as below.  The patient reports good treatment adherence.  History was provided by the patient and  staff Jamir who was a fair historians.  Since the last visit:  -was hospitalized 9/19/17-9/25/17 for suicidal ideation following the passing of her brother " "(completed suicide) a week prior, her perphenazine was increased during hospital stay, no other changes  -has not been doing well since she left the hospital, tried to run away 2 days ago, was planning on going to the highway and walk into traffic, but it was raining so much that she decided to go back home with staff  -has been isolating in her room and sleeping   -does not feel that her medications are helping, wants to be able to enjoy things  -she does have suicidal thoughts except when she is with her boyfriend on Tuesday and Thursday, states she has a lot of plans, thinks of \"alot of different ways\" and depending on how depressed she is would determine which plan she would use, endorses having suicidal thoughts currently during appointment with plan and intent  -auditory hallucinations have not been as severe recently, therefore she has been using less PRNs, she believes this could be attributed to medication change during inpatient stay  -BP elevated today, states she is having some intermittent pain in her chest that has been present since hospitalization, no other symptoms, no headaches, vision changes, she has this in the morning after medications, gets better throughout the day and then returns in the evening    RECENT SYMPTOMS:   DEPRESSION:  reports-suicidal ideation with plan; with intent [details in Interim History], low energy and insomnia ;  DENIES- anhedonia, weight/ appetite change (decrease or increase), excessive guilt, psychomotor change observed by others (none) and poor concentration /memory  JUDE/HYPOMANIA:  reports-none;  DENIES- increased energy, decreased sleep need, increased activity and grandiosity  PSYCHOSIS:  reports-auditory hallucinations with commands [details in Interim History];  DENIES- delusions and visual hallucinations  DYSREGULATION:  reports-SIB (urges);  DENIES- violent ideation  PANIC ATTACK:  none   ANXIETY:  none  TRAUMA RELATED:  none  COMPULSIVE:  none  SLEEP:  as " above    EATING DISORDER: none    RECENT SUBSTANCE USE:     ALCOHOL- none          TOBACCO- none               CAFFEINE- 2 sodas/day  OPIOIDS- none       NARCAN KIT- N/A       CANNABIS- none          OTHER ILLICIT DRUGS- none     CURRENT SOCIAL HISTORY:  FINANCIAL SUPPORT- social security disability       CHILDREN- none       LIVING SITUATION- lives in group home ([facility]) in [place]      SOCIAL/ SPIRITUAL SUPPORT- her boyfriend JAC Alberto worker, CM, [facility] staff       FEELS SAFE AT HOME- Yes     MEDICAL ROS:  Reports sedation      Denies GI sxs [N/V/D], weight gain, sexual dysfunction [none] and akathisia, muscle problems [stiffness], unusual movements, polydipsia, polyphagia and Parkinsonian-type symptoms [shuffling gait, masked facies, stooped posture, speech change, writing change]  PSYCH and CD Critical Summary Points since July 2017           Pt hospitalized for suicidal ideation/attempt September 19-25 following death of brother  Perphenazine increased to 16mg BID during hospitalization    PAST PSYCH MED TRIALS   see EMR Problem List: Hx of psychiatric care    MEDICAL / SURGICAL HISTORY                                   CARE TEAM:   PCP- Dr. Isamar Spear          Therapist- none    [facility]: [facility] through [facility] meets 1x/week    Pregnant or breastfeeding:  No      Contraception- on OCP    Neurologic Hx [head injury etc]:  Denies seizure hx or head injury with LOC  Patient Active Problem List   Diagnosis     Mild cognitive impairment     Borderline personality disorder     Asthma     Nonorganic enuresis     Schizoaffective disorder, depressive type (H)     Fx humerus shaft-closed     Port catheter in place     MENTAL HEALTH     Suicidal ideation     Hx of psychiatric care     DVT (deep vein thrombosis) in pregnancy (H)     Suicide attempt       ALLERGY                                Clozapine; Risperdal; and Tape [adhesive tape]  MEDICATIONS                               Current Outpatient Prescriptions   Medication  Sig Dispense Refill     perphenazine 16 MG tablet Take 1 tablet (16 mg) by mouth 2 times daily 60 tablet 0     traZODone (DESYREL) 50 MG tablet Take 1 tablet (50 mg) by mouth At Bedtime 30 tablet 1     benztropine (COGENTIN) 0.5 MG tablet Take 1 tablet (0.5 mg) by mouth 2 times daily 60 tablet 1     lurasidone (LATUDA) 80 MG TABS tablet Take 2 tablets (160 mg) by mouth daily (with dinner) 60 tablet 1     OLANZapine (ZYPREXA) 10 MG tablet Take 1 tablet (10 mg) by mouth 2 times daily as needed 30 tablet 0     albuterol (PROAIR HFA/PROVENTIL HFA/VENTOLIN HFA) 108 (90 BASE) MCG/ACT Inhaler Inhale 2 puffs into the lungs every 6 hours as needed for shortness of breath / dyspnea or wheezing       Omega-3 Fatty Acids (FISH OIL PO) Take 1,000 mg by mouth daily        norethindrone-ethinyl estradiol (JUNEL FE 1/20) 1-20 MG-MCG per tablet Take 1 tablet by mouth       ACETAMINOPHEN PO Take 1,000 mg by mouth every 8 hours as needed for pain       hydrOXYzine (ATARAX) 25 MG tablet Take 1-2 tablets (25-50 mg) by mouth every 4 hours as needed for anxiety 60 tablet 2     diphenhydrAMINE (BENADRYL) 25 MG tablet Take 25 mg by mouth every 6 hours as needed for itching or allergies        albuterol (PROVENTIL) (5 MG/ML) 0.5% nebulizer solution Inhale 2.5 mg into the lungs every 4 hours as needed for wheezing or shortness of breath / dyspnea (2 puffs)        cetirizine (ZYRTEC) 10 MG tablet Take 10 mg by mouth daily       VITALS   BP (!) 168/93  Pulse 92  Wt 120.5 kg (265 lb 9.6 oz)  BMI 44.89 kg/m2   MENTAL STATUS EXAM                                                           Alertness: alert , oriented and slow to respond  Appearance: adequately groomed  Behavior/Demeanor: cooperative and calm, with fair  eye contact   Speech: increased latency of response  Language: no problems  Psychomotor: slowed somewhat  Mood: depressed  Affect: flat; was congruent to mood; was congruent to content  Thought Process/Associations:  unremarkable  Thought Content:  Reports suicidal ideation with plan; with intent [details in Interim History];  Denies violent ideation and delusions  Perception:  Reports auditory hallucinations with commands [details in Interim History];  Denies visual hallucinations  Insight: limited  Judgment: limited  Cognition: does  appear grossly intact however below average; formal cognitive testing was not done    LABS and DATA   RATING SCALES:  AIMS: done 8/7/17 with total score of 0    EKG:  April 2017 which showed QTc of 424ms; next EKG due April 2018    PHQ9 TODAY = 7  PHQ-9 SCORE 4/7/2017 8/7/2017 9/6/2017   Total Score - - -   Total Score 6 7 2     ANTIPSYCHOTIC LABS   [glu, A1C, lipids (focus LDL), liver enzymes, WBC, ANEU, Hgb, plts]    q12 mo  Recent Labs   Lab Test  09/21/17   0750  04/12/17   1024   GLC  80  83   A1C   --   5.0     Recent Labs   Lab Test  09/21/17   0750  04/12/17   1024   CHOL  181  207*   TRIG  144  143   LDL  108*  135*   HDL  44*  43     Recent Labs   Lab Test  09/21/17   0750  04/12/17   1024   AST  18  21   ALT  21  21   ALKPHOS  76  52     Recent Labs   Lab Test  09/21/17   0750  04/12/17   1024   WBC  6.3  6.0   ANEU  4.2  4.2   HGB  15.3  14.8   PLT  118*  145*     DIAGNOSIS     Schizoaffective disorder, bipolar type, currently with suicidal ideation and plan, psychosis symptoms at baseline  Unspecified neurocognitive disorder     ASSESSMENT                                     TODAY Karolyn reports a worsening of her symptoms in regards to mood and suicidal ideation. She reports that she has almost daily suicidal thoughts, is having them currently and reported having various plans, would not disclose specifics but stated she did leave her group home two days ago with the intent [place] a car. She was stopped by staff prior to making attempt and was agreeable to returning to group home due to the rain. Per group home staff reports, her mood and  suicidal statements and actions have increased in frequency and intensity since the death of Corby brother (completed suicide) in the middle of September. Her  staff (Jamir) who is present today has been working with Karolyn for several years and reports that Karolyn appears to have decompensated to where current symptoms are similar to her presentation when she was requiring weekly ECT treatments. Pt was recently hospitalized from 9/19/17-9/25/17 where her perphenazine was increased but otherwise no other medication changes were made. She has been tolerating this medication change and reports her auditory hallucinations have improved with this medication change but is now complaining of some intermittent chest pain (BP is also elevated) that she has not had before (see interim hx above). She is scheduled for ECT this Friday and it was discussed with Karolyn and her  staff that hospitalization for safety was recommended along with recommendation that Karolyn receive an ECT treatment during hospitalization along with evaluation for need of increased frequency of ECT treatments during inpatient stay given increasing mood symptoms and suicidality. Given reports of chest pain will have her escorted to ED for medical clearance with recommendation of inpatient psychiatric admission. Writer also contacted pts mother and legal guardian Mary to update her on plan.              SUICIDE RISK ASSESSMENT [details described above]:  Today Karolyn Banerjee reports active suicidal ideation with many plans and intent.  In addition, she has notable risk factors for self-harm including suicide plan, worsening symptoms, hopelessness, new/ worsening medical issue, recent loss, relationship conflict, previous suicide attempt and recent inpt stay.  However, risk is mitigated by none to minimal alcohol use  and stable housing.  Based on all available evidence she does appear to be at imminent risk for self-harm therefore does meet  criteria for a 72-hr hold/  involuntary hospitalization.  However, based on degree of symptoms voluntary hospitalization was recommended which the pt did agree to.      [place] PRESCRIPTION MONITORING PROGRAM [] was not checked today:  not using controlled substances.    PSYCHOTROPIC DRUG INTERACTIONS:   Hydroxyzine and olanzapine or perphenazine or trazodone may result in increased risk of QT interval prolongation.    Perpheazine and benztropine may result in decreased phenothiazine serum concentrations, decreased phenothiazine effectiveness, enhanced anticholinergic effects (ileus, hyperpyrexia, sedation, dry mouth).   Trazodone and perphenazine may result in hypotension.  MANAGEMENT:  Monitoring for adverse effects, routine vitals, periodic EKGs and patient is aware of risks     PLAN                                                                                                       1) Recommend inpatient psychiatric hospitalization to maintain safety, will have pt escorted to ED by security for medical clearance    2) PSYCHOTROPIC MEDICATIONS: no changes today, due to pt insurance medications must be ordered by attending      - lurasidone 160 mg with dinner      - olanzapine 10 mg BID prn for psychosis      - perphenazine 16 mg BID      - benztropine 0.5 mg BID      - trazodone 50mg at bedtime      - hydroxyzine 25-50 mg q4 hrs prn for anxiety     Continue ECT treatments, currently [id number], with Dr. Amos, evaluate need for more frequent treatments    2) THERAPY:    None currently  TREATMENT PLAN   Date revised  -  n/a   Date next due- next visit    3) NEXT DUE:    Labs-neuroleptic labs due September 2018  EKG- April 2018 or PRN with medication changes  Rating Scales- AIMS next due in Feb 2018    4) REFERRALS:    NONE    5) RTC: TBD following pending hospitalization    6) CRISIS NUMBERS:   Provided routinely in AVS.  Especially emphasized:  [facility] [phone] (clinic)    [phone] (after  hours)    TREATMENT RISK STATEMENT:  The risks, benefits, alternatives and potential adverse effects have been discussed and are understood by the pt. The pt understands the risks of using street drugs or alcohol. There are no medical contraindications, the pt agrees to treatment with the ability to do so. The pt knows to call the clinic for any problems or to access emergency care if needed.  Medical and substance use concerns are documented above.  Psychotropic drug interaction check was done, including changes made today.    PSYCHIATRY CLINIC [facility] FOLLOW-UP  Hospital:  [facility]   Date of Admission: 9/19/17  Date of Discharge: 9/25/17  Reason(s) for Admission: suicidal ideation and attempt       Problems taking medications regularly:  No, managed by  staff  Medication changes since discharge: none  Problems adhering to non-medication therapy:  no  Medications reviewed by: Dr. Sandoval, Dr. Durant    I have reviewed the RN telephone encounter dated: Not Applicable    Summary of hospitalization:  Pt was hospitalized [facility], this occurred in context of brothers suicide 1 week prior. Medication changes while inpt were increase in perphenazine to 16mg BID. Pt stabilized and denied having any safety concerns including SI/HI, reported psychosis at baseline. She was discharged back to group home with plan to follow up with outpatient provider and continue ECT every 6 weeks.  Diagnostic Tests/Treatments reviewed.  Follow up needed: YES- continued ECT on 10/6/17  Other Healthcare Providers Involved in Patient s Care: Not Applicable  Update since discharge: Has continued to report increasing suicidal ideation with plan and intent, had another attempt 2 days ago where she left group home with plan to walk into traffic but was stopped by  staff.     Post Discharge Medication Reconciliation: Discharge medications reconciled and changed,  per note/orders (see AVS).    Plan of care communicated with patient and family:  YES  This writer will contact home health nurse to further coordinate care: Not Applicable    RESIDENT:   Dana Sandoval, DO    Patient staffed in clinic with Dr. Durant who will sign the note.  Supervisor is Dr. Toussaint.  I saw the patient with the resident, and participated in key portions of the service, including the mental status examination and developing the plan of care. I reviewed key portions of the history with the resident. I agree with the findings and plan as documented in this note.  Unfortunately, this pt is still very depressed and SI is stronger than her baseline thoughts of death. She is never free of these thoughts, but they are more intense now. We hope that she can receive one ECT treatment before discharge and then DC to regular maintenance regimen.  In the wake of her brother's suicide, her current mental status is not surprising. This pt has historically done better when tx'd with ECT and there already was a treatment set up for the 6th.      Tika Durant

## 2017-10-04 NOTE — ED PROVIDER NOTES
History     Chief Complaint   Patient presents with     Suicidal     suicidal thoughts, reports started to walk to the highway with polans to jump in front of a car,  discharged from inpatient Sept 25th,  brought down from psych clinic, Dr ingram is arranging for admission bed     Chest Pain     c/o intermittent lecft sided chest pain, no pain currently usually ocurs after takling her medication     HPI  Karolyn Banerjee is a 32 year old female with a history of borderline personality disorder, schizoaffective disorder, DVT, suicidal ideation, asthma and mild cognitive impairment who presents to the Emergency Department for evaluation of suicidal ideation and chest pain. The patient presents from clinic with her PCA for medical clearance before admission for suicidal ideation. She presented to her outpatient psychiatrist today reporting that she had suicidal plans yesterday. She says she started to walk to the highway with plans to jump in front of a car, but ended up returning to the group home. In regard to her chest pain, she states she experiences chest discomfort after taking her medications in the morning. She says it usually resolves by itself after a few hours and has been happening for the past few months.    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 Gardner     Mild developmental delay      Myopia      Neuroleptic-induced tardive dyskinesia      Nonorganic enuresis      Refractive amblyopia      Schizoaffective disorder, bipolar type (H)     Follows with Dr. Cooley at her group home.      Sleep disorder        Past Surgical History:   Procedure Laterality Date     HRW PORT A CATH Right      NO HISTORY OF SURGERY          Family History   Problem Relation Age of Onset     MENTAL ILLNESS Father      mild developmental delay     Schizophrenia Other      uncle       Social History   Substance Use Topics     Smoking status: Never Smoker     Smokeless tobacco: Never Used     Alcohol use No       No current facility-administered medications for this encounter.      Current Outpatient Prescriptions   Medication     perphenazine 16 MG tablet     traZODone (DESYREL) 50 MG tablet     benztropine (COGENTIN) 0.5 MG tablet     lurasidone (LATUDA) 80 MG TABS tablet     OLANZapine (ZYPREXA) 10 MG tablet     albuterol (PROAIR HFA/PROVENTIL HFA/VENTOLIN HFA) 108 (90 BASE) MCG/ACT Inhaler     Omega-3 Fatty Acids (FISH OIL PO)     norethindrone-ethinyl estradiol (JUNEL FE 1/20) 1-20 MG-MCG per tablet     ACETAMINOPHEN PO     hydrOXYzine (ATARAX) 25 MG tablet     diphenhydrAMINE (BENADRYL) 25 MG tablet     albuterol (PROVENTIL) (5 MG/ML) 0.5% nebulizer solution     cetirizine (ZYRTEC) 10 MG tablet        Allergies   Allergen Reactions     Clozapine      Agranulocytosis x 2, cannot be re trialed      Risperdal Other (See Comments)     dystonia     Tape [Adhesive Tape] Rash     Plastic tape         I have reviewed the Medications, Allergies, Past Medical and Surgical History, and Social History in the Epic system.    Review of Systems   Cardiovascular: Positive for chest pain (discomfort following morning medications).   Psychiatric/Behavioral: Positive for suicidal ideas.   All other systems were reviewed and are negative      Physical Exam   BP: 138/81  Pulse: 84  Temp: 98.1  F (36.7  C)  Resp: 16  SpO2: 94 %  Physical Exam   Constitutional: She is oriented to person, place, and time. She appears well-developed and well-nourished.   HENT:   Head: Normocephalic and atraumatic.   Mouth/Throat: Oropharynx is clear and moist.   Eyes: EOM are normal. Pupils are equal, round, and reactive to light.   Neck: Normal range of motion. Neck supple. No  tracheal deviation present. No thyromegaly present.   Cardiovascular: Normal rate, regular rhythm, normal heart sounds and intact distal pulses.  Exam reveals no gallop and no friction rub.    No murmur heard.  Pulmonary/Chest: Effort normal and breath sounds normal. She exhibits no tenderness.   Abdominal: Soft. Bowel sounds are normal. She exhibits no distension and no mass. There is no tenderness.   Musculoskeletal: She exhibits no edema or tenderness.   Neurological: She is alert and oriented to person, place, and time. No cranial nerve deficit. Coordination normal.   Skin: Skin is warm and dry. No rash noted.   Psychiatric: Her speech is normal and behavior is normal. Cognition and memory are impaired (baseline cognitive impairment). She expresses impulsivity. She exhibits a depressed mood. She expresses suicidal ideation. She expresses suicidal plans.   Nursing note and vitals reviewed.      ED Course     ED Course     Procedures                EKG Interpretation:      Interpreted by Loyd Prabhakar  Time reviewed:1045   Symptoms at time of EKG: Intermittent chest pain   Rhythm: Normal sinus   Rate: Normal  Axis: Normal  Ectopy: None  Conduction: Normal  ST Segments/ T Waves: No ST-T wave changes, pooR R wave progression, and No acute ischemic changes  Q Waves: None  Comparison to prior: Unchanged from 4/2017    Clinical Impression: Sinus rhythm, poor R-wave progression, otherwise negative and no acute changes        Critical Care time:  none             Labs Ordered and Resulted from Time of ED Arrival Up to the Time of Departure from the ED - No data to display         Assessments & Plan (with Medical Decision Making)   Patient with an extensive history of mental illness was seen by her psychiatrist today, complained of severe mental health symptoms including suicidal ideation with active plan and intent.  A bed was ordered, and she is being held in the emergency department until a bed is available.  We were  asked to help provide medical clearance.  She currently is asymptomatic and denies any significant medical problems other than asthma.  He did report chest pains which occur after she takes her medications in the morning.  She does not have the pain at present.  An EKG was obtained which shows a poor R-wave progression, however this is not an acute change when compared to prior EKGs.  No evidence of acute ischemia or other emergent concerns.  I do not believe she needs further emergent workup.  The patient appears medically stable for psychiatric admission as planned by her outpatient psychiatrist.    I have reviewed the nursing notes.    I have reviewed the findings, diagnosis, plan and need for follow up with the patient.    New Prescriptions    No medications on file       Final diagnoses:   Schizoaffective disorder, bipolar type (H)   Suicidal ideation   I, Devonte Huntley, am serving as a trained medical scribe to document services personally performed by Dirk Prabhakar MD, based on the provider's statements to me.      I, Dirk Prabhakar MD, was physically present and have reviewed and verified the accuracy of this note documented by Devonte Huntley.       10/4/2017   [facility], [place], EMERGENCY DEPARTMENT     Loyd Prabhakar MD  10/04/17 [id number]

## 2017-10-04 NOTE — PHARMACY-ADMISSION MEDICATION HISTORY
Admission medication history interview status for the October 4, 2017 admission is complete. See Epic admission navigator for allergy information, pharmacy, prior to admission medications and immunization status.     Medication history interview sources:  Patient's Group Home ([place]; [phone]), [facility] ([phone])    Medication compliance: good per group home staff    Changes made to PTA medication list (reason)  Added: none  Deleted: acetaminophen, albuterol, diphenhydramine (group home had no record of these medications)  Changed: Junel FE 1/20 one tab daily --> Microgestin 1/20 (without iron) one tab daily (order correction, verified correct product with pharmacy)    Additional medication history information (including reliability of information, actions taken by pharmacist):  - Patient unable to provide a reliable medication history, she reported taking her morning medications, but was unable to provide specifics. Verified last doses with group home staff.     Prior to Admission medications    Medication Sig Last Dose Taking? Auth Provider   norethindrone-ethinyl estradiol (MICROGESTIN 1/20) 1-20 MG-MCG per tablet Take 1 tablet by mouth daily 10/4/2017 at AM Yes Unknown, Entered By History   perphenazine 16 MG tablet Take 1 tablet (16 mg) by mouth 2 times daily 10/4/2017 at AM Yes Candido Case MD   traZODone (DESYREL) 50 MG tablet Take 1 tablet (50 mg) by mouth At Bedtime 10/3/2017 at PM Yes Dana Sandoval MD   benztropine (COGENTIN) 0.5 MG tablet Take 1 tablet (0.5 mg) by mouth 2 times daily 10/4/2017 at AM Yes Dana Sandoval MD   lurasidone (LATUDA) 80 MG TABS tablet Take 2 tablets (160 mg) by mouth daily (with dinner) 10/3/2017 at 5 pm Yes Dana Sandoval MD   OLANZapine (ZYPREXA) 10 MG tablet Take 1 tablet (10 mg) by mouth 2 times daily as needed 10/3/2017 at prn Yes Dana Sandoval MD   Omega-3 Fatty Acids (FISH OIL PO) Take 1,000 mg by mouth daily   10/4/2017 at AM Yes Reported, Patient   cetirizine (ZYRTEC) 10 MG tablet Take 10 mg by mouth daily 10/4/2017 at AM Yes Reported, Patient   hydrOXYzine (ATARAX) 25 MG tablet Take 1-2 tablets (25-50 mg) by mouth every 4 hours as needed for anxiety no active prescription  Sabas Ley, DO       Time spent: 20 minutes    Medication history completed by:   Alejandra Vallejo, PharmD

## 2017-10-04 NOTE — H&P
"  -----------------------------------------------------------------------------------------------------------  Psychiatry History & Physical      Karolyn Banerjee MRN# [id number]   Age: 32 year old YOB: 1985     Date of Admission: 10/4/2017     Interviewed at 3:06 PM          Contacts:   Primary Outpatient Psychiatrist: Dana Sandoval DO, [facility]  Primary Physician:  Suzie Mariscal  Therapist: None  Legal Guardian (Mother): Mary \"Swetha\" Nilsa @ [phone] (home), [phone] ([facility])   Group Home: [facility] [phone]         Chief Complaint:   \"Staff brought me here because they worried about me\"         History of Present Illness:   History obtained from patient interview, chart review, and patient's Mother.      This patient is a 32 year old female with a history of schizoaffective disorder-bipolar type, borderline personality disorder, and unspecified neurocognitive disorder (IQ 62), who presents with suicidal ideation in the context of a recent completed suicide in the family.       The patient was at her baseline until recently, when she learned of the completed suicide of her brother on 2017. The patient initially appeared to be processing the grief appropriately. However, upon returning to her group home after the , she appeared struck by the reality of the events, and stated to staff \"he took his own life\". Shortly after, her mood worsened considerably, and she began to feel suicidal herself. She was admitted at [facility] from -17 with suicidal ideation. During this hospitalization, her Perphenazine was increased to 16 mg per day, and her SI improved. This last  (10/1), her Mother noticed the patient appeared to not be doing well. She kept her eyes closed much of the time, which typically indicates she is struggling with auditory hallucinations.     On Monday, the patient ran away from the group home in the rain with the " "intention of running into traffic. She was stopped by staff prior to doing this. Staff also note she has been isolating in her room and sleeping much of the day. She feels like her medications are not working. She notes she has frequent suicidal thoughts, except when she is with her boyfriend on Tuesdays and Thursdays. She endorsed having plans, and thinks of \"a lot of different ways\" she could hurt herself. She continues to have AH, although they are not as severe recetly, and she has bene using less prns. She was seen in clinic today, and inpatient hospitalization was recommended given her current SI.    Upon interview, the patient provided minimal answers, and there was considerable delay in responding to questions. She did respond affirmatively that she has been suicidal recently, with the most recent plan of walking into traffic. She endorses AH, and she denies VH and paranoia. She also endorses low mood, but she was unable to elaborate. She notes that her biggest problem is \"boredom\". When asked why she was brought to the hospital, she stated \"Staff brought me here because they worried about me\". She identifies boredom as a trigger for her.     Of note, the patient has a price-beyer, and she has received ECT numerous times since 2007. She currently receives maintenance ECT every 6 weeks for attenuation of psychosis and impulsivity, with her next ECT due this Friday (10/6). Her Mother feels that her symptoms worsen just prior to ECT, as if the effects are wearing off, and she is wondering if ECT needs to be done more frequently. There has been concern that ECT has been used to induce cognitive impairment that allows her to be more easily managed, in addition to targeting her psychiatric symptoms.     The patient was evaluated in the ED prior to admission for intermittent chest pain. No acute changes on EKG, and she was medically cleared for admission to inpatient psychiatric unit.         Psychiatric Review " of Systems:     Depression:   Reports: depressed mood, suicidal ideation, decreased interest,  guilt, hopelessness, helplessness, impaired concentration, decreased energy, irritability.   Denies: changes in sleep, changes in appetite  Ernestina:   Denies: sleeplessness, impulsiveness (spending, driving recklessly, change in sexual activity), racing thoughts, increased goal-directed activities, pressured speech, grandiose delusions.  Psychosis:   Reports:  auditory hallucinations  Denies: visual hallucinations, paranoia, grandiosity, ideas of reference, thought insertions, thought broadcasting.  Anxiety:   Reports: worries         Medical Review of Systems:   The Review of Systems is negative other than noted in the HPI         Psychiatric History:     Prior Diagnoses: Schizoaffective disorder-bipolar type, borderline personality disorder, unspecified neurocognitive disorder with an IQ of 62.     Past Hospitalizations: Numerous, most recently from 09/19-09/25/2017 at [facility]    Prior use of Psychotropic Medication: Clozapine: had 2 trials that resulted in agranulocytosis; no longer eligible for retrial                           Ziprasidone                            olanzapine                            Quetiapine                           Divalproex                           aripiprazole (ineffective)                           risperidone (dystonia)                           Haloperidol (developed parkinsonism)                           Sertraline                           Venlafaxine    ECT/TMS: Receives ECT q6 weeks (next due 10/6/2017),  Total treatments 227 since 2007. Júnior authorized up to 2 treatments per week for maintenance.     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 Gardner     Mild developmental delay      Myopia      Neuroleptic-induced tardive dyskinesia      Nonorganic enuresis      Refractive amblyopia      Schizoaffective disorder, bipolar type (H)     Follows with Dr. Cooley at her group home.      Sleep disorder      Past Surgical History:   Procedure Laterality Date     HRW PORT A CATH Right      NO HISTORY OF SURGERY            Allergies:      Allergies   Allergen Reactions     Clozapine      Agranulocytosis x 2, cannot be re trialed      Risperdal Other (See Comments)     dystonia     Tape [Adhesive Tape] Rash     Plastic tape          Medications:     Current Outpatient Prescriptions   Medication Sig Dispense Refill     norethindrone-ethinyl estradiol (MICROGESTIN 1/20) 1-20 MG-MCG per tablet Take 1 tablet by mouth daily       perphenazine 16 MG tablet Take 1 tablet (16 mg) by mouth 2 times daily 60 tablet 0     traZODone (DESYREL) 50 MG tablet Take 1 tablet (50 mg) by mouth At Bedtime 30 tablet 1     benztropine (COGENTIN) 0.5 MG tablet Take 1 tablet (0.5 mg) by mouth 2 times daily 60 tablet 1     lurasidone (LATUDA) 80 MG TABS tablet Take 2 tablets (160 mg) by mouth daily (with dinner) 60 tablet 1     OLANZapine (ZYPREXA) 10 MG tablet Take 1 tablet (10 mg) by mouth 2 times daily as needed 30 tablet 0     Omega-3 Fatty Acids (FISH OIL PO) Take 1,000 mg by mouth daily        cetirizine (ZYRTEC) 10 MG tablet Take 10 mg by mouth daily       hydrOXYzine (ATARAX) 25 MG tablet Take 1-2 tablets (25-50 mg) by mouth every 4 hours as needed for anxiety 60 tablet 2           Social History:     The patient currently resides in a  group home, [facility] in [place]. She has a boyfriend (Remy), whom she enjoys spending time with. She also has an [employer] worker and a . Her Mother, Swetha Banerjee, is her guardian. She is on social security disability.          Family History:     Recent completed suicide in brother (on 09/11/2017)    Family History   Problem Relation Age of Onset     MENTAL ILLNESS Father      mild developmental delay     Schizophrenia Other      uncle            Labs:     No results found for this or any previous visit (from the past 24 hour(s)).         Psychiatric Examination:     /81  Pulse 84  Temp 98.1  F (36.7  C) (Oral)  Resp 16  SpO2 94%    Appearance:  awake, alert, wearing hospital scrubs, watching TV  Attitude:  guarded  Eye Contact:  fair  Mood:  depressed  Affect:  mood congruent, blunted  Speech:  clear, coherent, poverty of speech, marked speech latency  Psychomotor Behavior:  no evidence of tardive dyskinesia, dystonia, or tics  Thought Process:  logical and linear  Associations:  no loose associations  Thought Content: Reports suicidal ideation and AH. Denies VH and paranoia  Insight:  limited  Judgment:  limited  Oriented to:  time, person, and place  Attention Span and Concentration:  fair  Recent and Remote Memory:  fair  Language:  english with appropriate syntax and vocabulary  Fund of Knowledge: delayed  Muscle Strength and Tone: normal  Gait and Station: Did not assess         Physical Exam:     See ED assessment note by Loyd Prabhakar MD, on 10/04/2017          Assessment   This patient is a 32 year old female with history of schizoaffective disorder-bipolar type, borderline personality disorder, and unspecified neurocognitive disorder (IQ 62) who presented to [facility] ED on 10/04/2017 with suicidal ideation with a plan to run into traffic. This is in the context of her brother recently completing suicide. MSE was notable for marked speech latency, and blunted affect, and poverty of  speech. The patient's Family and staff from her group home all report the patient has exhibited worsening depressive symptoms after the death of her brother. She endorses continued AH, but these appear to be no worse than her baseline. She is currently under Commitment, Torres, and Casey-Calixto, and she receives q6 week maintenance ECT. She will benefit from further evaluation to determine if she is a candidate for increasing the frequency of her maintenance ECT.     Reason for inpatient hospitalization is due to suicidal ideation with a plan. Disposition pending clinical stabilization, medication optimization, and formulation of safe discharge plan.          Plan   Admit to Unit [address] with Attending Physician Apurva. To be staffed by Dr. Mixon in the AM.     Principal psychiatric diagnosis:   # Schizoaffective disorder - bipolar type    Secondary psychiatric diagnoses:   # Borderline personality disorder  # Unspecified neurocognitive disorder (IQ 62)    Medications:   New: Olanzapine 10 mg po/IM for psychiatric emergencies  Continued:  - Lurasidone 160 mg with dinner  - Perphenazine 16 mg BId  - Benztropine 0.5 mg bid  - Trazodone 50 mg HS  - Hydroxyzine 25-50 mg q4hrs prn for anxiety  - Fish oil  -ECT q6 weeks with Dr. Amos  Held: None    Laboratory/Imaging: Utox, UPT    Relevant psychosocial stressors: Recent suicide in family    Legal Status: Torres  Committed, and Casey-Calixto    Safety Assessment:      Pt has not required locked seclusion or restraints in the past 24 hours to maintain safety, please refer to RN documentation for further details.  Precautions: Suicide    - Patient will be treated in therapeutic milieu with appropriate individual and group therapies.  - medications as above    Medical diagnoses:      # Contraception   - Continue PTA Microgestin 1/20    # Seasonal allergies  - Continue PTA cetirizine 10 mg daily    # History of DVT: Not currently on anticoagulation  # History of Asthma:  Stable, not currently on asthma medications    Consult: none  Labs: None         Dispo: unknown pending medication management and clinical stabilization    -------------------------------------------------------  Tarik Tineo MD  Psychiatry PGY-1  [phone]      Attestation:  10/5/17  Please see progress note 10/5/17.  Robe Mixon MD

## 2017-10-04 NOTE — MR AVS SNAPSHOT
After Visit Summary   10/4/2017    Karolyn Banerjee    MRN: [id number]           Patient Information     Date Of Birth          1985        Visit Information        Provider Department      10/4/2017 9:05 AM Dana Sandoval MD Psychiatry Clinic        Today's Diagnoses     Schizoaffective disorder, bipolar type (H)    -  1      Care Instructions    -pt to present to ED for medical clearance and voluntary inpatient psychiatric hospitalization          Follow-ups after your visit        Your next 10 appointments already scheduled     Oct 06, 2017 10:15 AM CDT   Electroconvulsive Therapy with Osiel Amos MD   [facility] ([facility])    [address] [facility] [id number] [phone]            Nov 06, 2017 12:45 PM CST   Schizophrenia Follow Up with Dana Sandoval MD   Psychiatry Clinic ([facility])    [address] [facility] [id number] [phone]              Who to contact     Please call your clinic at [phone] to:    Ask questions about your health    Make or cancel appointments    Discuss your medicines    Learn about your test results    Speak to your doctor   If you have compliments or concerns about an experience at your clinic, or if you wish to file a complaint, please contact [facility] Patient Relations at [phone] or email us at [email or web address]         Additional Information About Your Visit        MyChart Information     theScoret is an electronic gateway that provides easy, online access to your medical records. With [employer], you can request a clinic appointment, read your test results, renew a prescription or communicate with your care team.     To sign up for [employer] visit the website at [email or web address]   You will be asked to enter the access code listed below, as well as some  personal information. Please follow the directions to create your username and password.     Your access code is: [email or web address]  Expires: 2017  9:39 AM     Your access code will  in 90 days. If you need help or a new code, please contact your [facility] or call [phone] for assistance.        Care EveryWhere ID     This is your Care EveryWhere ID. This could be used by other organizations to access your [place] medical records  [id number]        Your Vitals Were     Pulse BMI (Body Mass Index)                92 44.89 kg/m2           Blood Pressure from Last 3 Encounters:   10/04/17 137/85   10/04/17 (!) 168/93   17 141/88    Weight from Last 3 Encounters:   10/04/17 120.5 kg (265 lb 9.6 oz)   17 120.3 kg (265 lb 4.8 oz)   17 119.8 kg (264 lb 3.2 oz)              Today, you had the following     No orders found for display       Primary Care Provider Office Phone # Fax #    Suzie Mariscal [phone]       [place] [address]        Equal Access to Services     KAYA STRICKLAND AH: Hadii aad ku hadasho Soomaali, waaxda luqadaha, qaybta kaalmada adeegyada, waxay shinein haychristinen janay mcguire. So [facility] [phone].    ATENCIÓN: Si habla español, tiene a stauffer disposición servicios gratuitos de asistencia lingüística. Llame al [phone].    We comply with applicable federal civil rights laws and [place] laws. We do not discriminate on the basis of race, color, national origin, age, disability, sex, sexual orientation, or gender identity.            Thank you!     Thank you for choosing PSYCHIATRY CLINIC  for your care. Our goal is always to provide you with excellent care. Hearing back from our patients is one way we can continue to improve our services. Please take a few minutes to complete the written survey that you may receive in the mail after your visit with us. Thank you!              Your Updated Medication List - Protect others around you: Learn how to safely use, store and throw away your medicines at www.disposemymeds.org.          This list is accurate as of: 10/4/17  7:54 PM.  Always use your most recent med list.                   Brand Name Dispense Instructions for use Diagnosis    benztropine 0.5 MG tablet    COGENTIN    60 tablet    Take 1 tablet (0.5 mg) by mouth 2 times daily    Schizoaffective disorder, bipolar type (H), Aggression       cetirizine 10 MG tablet    zyrTEC     Take 10 mg by mouth daily        FISH OIL PO      Take 1,000 mg by mouth daily        hydrOXYzine 25 MG tablet    ATARAX    60 tablet    Take 1-2 tablets (25-50 mg) by mouth every 4 hours as needed for anxiety    Schizoaffective disorder, bipolar type (H), Aggression       lurasidone 80 MG Tabs tablet    LATUDA    60 tablet    Take 2 tablets (160 mg) by mouth daily (with dinner)    Schizoaffective disorder, bipolar type (H), Aggression       norethindrone-ethinyl estradiol 1-20 MG-MCG per tablet    MICROGESTIN 1/20     Take 1 tablet by mouth daily        OLANZapine 10 MG tablet    zyPREXA    30 tablet    Take 1 tablet (10 mg) by mouth 2 times daily as needed    Schizoaffective disorder, bipolar type (H), Aggression       perphenazine 16 MG tablet     60 tablet    Take 1 tablet (16 mg) by mouth 2 times daily    Schizoaffective disorder, depressive type (H)       traZODone 50 MG tablet    DESYREL    30 tablet    Take 1 tablet (50 mg) by mouth At Bedtime    Schizoaffective disorder, bipolar type (H), Aggression

## 2017-10-04 NOTE — IP AVS SNAPSHOT
MRN:[id number]                      After Visit Summary   10/4/2017    Karolyn Banerjee    MRN: [id number]           Thank you!     Thank you for choosing [facility] for your care. Our goal is always to provide you with excellent care.        Patient Information     Date Of Birth          1985        Designated Caregiver       Most Recent Value    Caregiver    Will someone help with your care after discharge? yes    Name of designated caregiver Staff at Group home    Phone number of caregiver n/a    Caregiver address n/a      About your hospital stay     You were admitted on:  October 4, 2017 You last received care in the:  [facility]    You were discharged on:  October 11, 2017       Who to Call     For medical emergencies, please call 911.  For non-urgent questions about your medical care, please call your primary care provider or clinic, [phone]          Attending Provider     Provider Specialty    Loyd Prabhakar MD [facility]    Nancy De Luna MD Emergency Medicine    Christina Arreola MD Internal Medicine    Robe Mixon MD Psychiatry       Primary Care Provider Office Phone # Fax #    Suzie Mariscal [phone]      Your next 10 appointments already scheduled     Nov 06, 2017 12:45 PM CST   Schizophrenia Follow Up with Dana Sandoval MD   Psychiatry Clinic ([facility])    [place] [id number] [facility] [address] [phone]              Further instructions from your care team       Behavioral Discharge Planning and Instructions    Summary:   You were admitted to [facility] on 10/4/17 with worsening depression, auditory hallucination and suicidal ideation      under the care of Dr. Mixon with         You met with Dr. Mixon and his team daily for ongoing psychiatric assessment and medication management.  You had opportunities to participate in therapeutic groups on the unit.   At this time you  report your mood has stabilized and you report you are not having thoughts or intent to harm yourself or others. You will be discharged home and will resume care with your outpatient providers.    Disposition:    [facility]:  [phone] [address] [place] [address]      Diagnosis:   Schizophrenia; Cognitive Impairment    Major Treatments, Procedures and Findings:   Medications were  managed throughout your stay. An internal medicine consult was completed during your stay. You had the opportunity to participate in treatment programming while on the unit including occupational therapy, mental health support and education and spiritual services.   Patient completed 2 ECT treatment without complication.    Symptoms to Report:   Please report if you are experiencing increased aggression and/or confusion, problematic loss of sleep, worsening mood, or thoughts of suicide to your treatment team or notify your primary provider.   IF THE SYMPTOMS YOU ARE EXPERIENCING ARE A MEDICAL EMERGENCY, CALL 911 IMMEDIATELY    Lifestyle Adjustment:   1. Adjust your lifestyle to get enough sleep, relaxation, exercise and good nutrition.  Continue to develop healthy coping skills to decrease stress and promote a healthy  lifestyle.  2. Abstain from all substances of abuse.  3. Take medications as prescribed.  Please work with your doctor to discuss any concerns you have with your medications or side effects you may be experiencing.  4. Follow up with appointments as scheduled.      Health Care Follow-up   Appointments: Dr. Dana Sandoval - 11/3/17 12:45pm  [facility] 2nd Floor [facility]  [address]  [place] [address]  [phone]     Resources:   [facility] Crisis: home-based therapeutic intervention from the Mobile Crisis Unit, or utilization of the crisis clinic for emergency counseling located at the [facility]. Please call [phone]  *Crisis Connection:  "([phone])  24-hour confidential telephone counseling   *[facility] Emergency Room: [phone]    General Medication Instructions:   See your medication sheet(s) for instructions.   Take all medicines as directed.  Make no changes unless your doctor suggests them.   Go to all your doctor visits.  Be sure to have all your required lab tests. This way, your medicines can be refilled on time.  Do not use any drugs not prescribed by your doctor.    The treatment team has appreciated the opportunity to work with you.  We wish you the best in the future.    If you have any questions or concerns our unit number is [phone].       Pending Results     Date and Time Order Name Status Description    10/9/2017 1026 X-ray Chest 2 vws* Preliminary             Statement of Approval     Ordered          10/11/17 1053  I have reviewed and agree with all the recommendations and orders detailed in this document.  EFFECTIVE NOW     Approved and electronically signed by:  Dave Aguero MD             Admission Information     Date & Time Provider Department Dept. Phone    10/4/2017 Robe Mixon MD  [facility] [phone]      Your Vitals Were     Blood Pressure Pulse Temperature Respirations Weight Pulse Oximetry    141/88 (BP Location: Left arm) 80 98.8  F (37.1  C) (Temporal) 20 121.1 kg (267 lb) 92%    BMI (Body Mass Index)                   45.12 kg/m2           MyChart Information     [employer] lets you send messages to your doctor, view your test results, renew your prescriptions, schedule appointments and more. To sign up, go to [email or web address] . Click on \"Log in\" on the left side of the screen, which will take you to the Welcome page. Then click on \"Sign up Now\" on the right side of the page.     You will be asked to enter the access code listed below, as well as some personal information. Please follow the directions to create your username and password.     Your access code " is: [email or web address]  Expires: 2017  9:39 AM     Your access code will  in 90 days. If you need help or a new code, please call your [place] clinic or [phone].        Care EveryWhere ID     This is your Care EveryWhere ID. This could be used by other organizations to access your [place] medical records  [id number]        Equal Access to Services     MAISHA STRICKLAND : Hadii aad ku hadasho Soomaali, waaxda luqadaha, qaybta kaalmada adeegyada, waxay shinein haychristinen maksonya neal laKingsleyharvinder . So [facility] [phone].    ATENCIÓN: Si clemencia pegn, tiene a stauffer disposición servicios gratuitos de asistencia lingüística. Llame al [phone].    We comply with applicable federal civil rights laws and [place] laws. We do not discriminate on the basis of race, color, national origin, age, disability, sex, sexual orientation, or gender identity.               Review of your medicines      CONTINUE these medicines which have NOT CHANGED        Dose / Directions    benztropine 0.5 MG tablet   Commonly known as:  COGENTIN   Used for:  Schizoaffective disorder, bipolar type (H), Aggression        Dose:  0.5 mg   Take 1 tablet (0.5 mg) by mouth 2 times daily   Quantity:  60 tablet   Refills:  1       cetirizine 10 MG tablet   Commonly known as:  zyrTEC        Dose:  10 mg   Take 10 mg by mouth daily   Refills:  0       FISH OIL PO        Dose:  1000 mg   Take 1,000 mg by mouth daily   Refills:  0       hydrOXYzine 25 MG tablet   Commonly known as:  ATARAX   Used for:  Schizoaffective disorder, bipolar type (H), Aggression        Dose:  25-50 mg   Take 1-2 tablets (25-50 mg) by mouth every 4 hours as needed for anxiety   Quantity:  60 tablet   Refills:  2       lurasidone 80 MG Tabs tablet   Commonly known as:  LATUDA   Used for:  Schizoaffective disorder, bipolar type (H), Aggression        Dose:  160 mg   Take 2 tablets (160 mg) by mouth daily (with dinner)   Quantity:  60 tablet   Refills:  1       norethindrone-ethinyl  estradiol 1-20 MG-MCG per tablet   Commonly known as:  MICROGESTIN 1/20        Dose:  1 tablet   Take 1 tablet by mouth daily   Refills:  0       OLANZapine 10 MG tablet   Commonly known as:  zyPREXA   Used for:  Schizoaffective disorder, bipolar type (H), Aggression        Dose:  10 mg   Take 1 tablet (10 mg) by mouth 2 times daily as needed   Quantity:  30 tablet   Refills:  0       perphenazine 16 MG tablet   Used for:  Schizoaffective disorder, depressive type (H)        Dose:  16 mg   Take 1 tablet (16 mg) by mouth 2 times daily   Quantity:  60 tablet   Refills:  0       traZODone 50 MG tablet   Commonly known as:  DESYREL   Used for:  Schizoaffective disorder, bipolar type (H), Aggression        Dose:  50 mg   Take 1 tablet (50 mg) by mouth At Bedtime   Quantity:  30 tablet   Refills:  1                Protect others around you: Learn how to safely use, store and throw away your medicines at www.disposemymeds.org.             Medication List: This is a list of all your medications and when to take them. Check marks below indicate your daily home schedule. Keep this list as a reference.      Medications           Morning Afternoon Evening Bedtime As Needed    benztropine 0.5 MG tablet   Commonly known as:  COGENTIN   Take 1 tablet (0.5 mg) by mouth 2 times daily   Last time this was given:  0.5 mg on 10/11/2017  9:28 AM                                cetirizine 10 MG tablet   Commonly known as:  zyrTEC   Take 10 mg by mouth daily   Last time this was given:  10 mg on 10/11/2017  9:28 AM                                FISH OIL PO   Take 1,000 mg by mouth daily   Last time this was given:  1,000 mg on 10/11/2017  9:28 AM                                hydrOXYzine 25 MG tablet   Commonly known as:  ATARAX   Take 1-2 tablets (25-50 mg) by mouth every 4 hours as needed for anxiety   Last time this was given:  50 mg on 10/10/2017  1:53 PM                                lurasidone 80 MG Tabs tablet   Commonly known  as:  LATUDA   Take 2 tablets (160 mg) by mouth daily (with dinner)   Last time this was given:  160 mg on 10/10/2017  4:47 PM                                norethindrone-ethinyl estradiol 1-20 MG-MCG per tablet   Commonly known as:  MICROGESTIN 1/20   Take 1 tablet by mouth daily   Last time this was given:  1 tablet on 10/11/2017  9:31 AM                                OLANZapine 10 MG tablet   Commonly known as:  zyPREXA   Take 1 tablet (10 mg) by mouth 2 times daily as needed   Last time this was given:  10 mg on 10/9/2017  3:11 PM                                perphenazine 16 MG tablet   Take 1 tablet (16 mg) by mouth 2 times daily   Last time this was given:  16 mg on 10/11/2017  9:27 AM                                traZODone 50 MG tablet   Commonly known as:  DESYREL   Take 1 tablet (50 mg) by mouth At Bedtime   Last time this was given:  50 mg on 10/10/2017  7:21 PM

## 2017-10-05 LAB
ALBUMIN SERPL-MCNC: 3.6 G/DL (ref 3.4–5)
ALP SERPL-CCNC: 64 U/L (ref 40–150)
ALT SERPL W P-5'-P-CCNC: 22 U/L (ref 0–50)
ANION GAP SERPL CALCULATED.3IONS-SCNC: 9 MMOL/L (ref 3–14)
AST SERPL W P-5'-P-CCNC: 18 U/L (ref 0–45)
BASOPHILS # BLD AUTO: 0 10E9/L (ref 0–0.2)
BASOPHILS NFR BLD AUTO: 0 %
BILIRUB SERPL-MCNC: 0.4 MG/DL (ref 0.2–1.3)
BUN SERPL-MCNC: 11 MG/DL (ref 7–30)
CALCIUM SERPL-MCNC: 9.3 MG/DL (ref 8.5–10.1)
CHLORIDE SERPL-SCNC: 105 MMOL/L (ref 94–109)
CO2 SERPL-SCNC: 22 MMOL/L (ref 20–32)
CREAT SERPL-MCNC: 0.78 MG/DL (ref 0.52–1.04)
DIFFERENTIAL METHOD BLD: NORMAL
EOSINOPHIL # BLD AUTO: 0.1 10E9/L (ref 0–0.7)
EOSINOPHIL NFR BLD AUTO: 1.6 %
ERYTHROCYTE [DISTWIDTH] IN BLOOD BY AUTOMATED COUNT: 12.7 % (ref 10–15)
GFR SERPL CREATININE-BSD FRML MDRD: 86 ML/MIN/1.7M2
GLUCOSE SERPL-MCNC: 81 MG/DL (ref 70–99)
HCT VFR BLD AUTO: 45.8 % (ref 35–47)
HGB BLD-MCNC: 15.5 G/DL (ref 11.7–15.7)
IMM GRANULOCYTES # BLD: 0 10E9/L (ref 0–0.4)
IMM GRANULOCYTES NFR BLD: 0.3 %
LYMPHOCYTES # BLD AUTO: 1.4 10E9/L (ref 0.8–5.3)
LYMPHOCYTES NFR BLD AUTO: 20.5 %
MCH RBC QN AUTO: 31.1 PG (ref 26.5–33)
MCHC RBC AUTO-ENTMCNC: 33.8 G/DL (ref 31.5–36.5)
MCV RBC AUTO: 92 FL (ref 78–100)
MONOCYTES # BLD AUTO: 0.7 10E9/L (ref 0–1.3)
MONOCYTES NFR BLD AUTO: 10.2 %
NEUTROPHILS # BLD AUTO: 4.7 10E9/L (ref 1.6–8.3)
NEUTROPHILS NFR BLD AUTO: 67.4 %
NRBC # BLD AUTO: 0 10*3/UL
NRBC BLD AUTO-RTO: 0 /100
PLATELET # BLD AUTO: 154 10E9/L (ref 150–450)
POTASSIUM SERPL-SCNC: 4.2 MMOL/L (ref 3.4–5.3)
PROT SERPL-MCNC: 7.7 G/DL (ref 6.8–8.8)
RBC # BLD AUTO: 4.99 10E12/L (ref 3.8–5.2)
SODIUM SERPL-SCNC: 136 MMOL/L (ref 133–144)
WBC # BLD AUTO: 7 10E9/L (ref 4–11)

## 2017-10-05 PROCEDURE — 85025 COMPLETE CBC W/AUTO DIFF WBC: CPT

## 2017-10-05 PROCEDURE — 99223 1ST HOSP IP/OBS HIGH 75: CPT | Mod: GC | Performed by: PSYCHIATRY & NEUROLOGY

## 2017-10-05 PROCEDURE — 36415 COLL VENOUS BLD VENIPUNCTURE: CPT

## 2017-10-05 PROCEDURE — 12400007 ZZH R&B MH INTERMEDIATE UMMC

## 2017-10-05 PROCEDURE — 80053 COMPREHEN METABOLIC PANEL: CPT

## 2017-10-05 PROCEDURE — 25000132 ZZH RX MED GY IP 250 OP 250 PS 637

## 2017-10-05 PROCEDURE — 90853 GROUP PSYCHOTHERAPY: CPT

## 2017-10-05 RX ADMIN — PERPHENAZINE 16 MG: 16 TABLET, FILM COATED ORAL at 20:05

## 2017-10-05 RX ADMIN — Medication 1000 MG: at 08:38

## 2017-10-05 RX ADMIN — CETIRIZINE HYDROCHLORIDE 10 MG: 10 TABLET, FILM COATED ORAL at 08:38

## 2017-10-05 RX ADMIN — LURASIDONE HYDROCHLORIDE 160 MG: 80 TABLET, FILM COATED ORAL at 16:59

## 2017-10-05 RX ADMIN — TRAZODONE HYDROCHLORIDE 50 MG: 50 TABLET ORAL at 20:05

## 2017-10-05 RX ADMIN — BENZTROPINE MESYLATE 0.5 MG: 0.5 TABLET ORAL at 08:39

## 2017-10-05 RX ADMIN — BENZTROPINE MESYLATE 0.5 MG: 0.5 TABLET ORAL at 20:05

## 2017-10-05 RX ADMIN — NORETHINDRONE ACETATE AND ETHINYL ESTRADIOL 1 TABLET: 1; 20 TABLET ORAL at 09:42

## 2017-10-05 RX ADMIN — PERPHENAZINE 16 MG: 16 TABLET, FILM COATED ORAL at 08:38

## 2017-10-05 ASSESSMENT — ACTIVITIES OF DAILY LIVING (ADL)
HYGIENE/GROOMING: INDEPENDENT
DRESS: SCRUBS (BEHAVIORAL HEALTH);INDEPENDENT
HYGIENE/GROOMING: INDEPENDENT
ORAL_HYGIENE: INDEPENDENT
ORAL_HYGIENE: INDEPENDENT
DRESS: INDEPENDENT
LAUNDRY: WITH SUPERVISION

## 2017-10-05 NOTE — PROGRESS NOTES
"INITIAL OT ATTENDANCE: Attended 2.0  Of 3 OT groups today & remained for full session.    Pt. attended and actively participated in an Occupational Therapy Life Skills session which focussed on exercise.  Pt. participated in a one mile walk and a discussion on the guidelines and  benefits of exercise.  She said she normally does not engage in physical activity and initially found the activity \"boring\" but she continued to participate and as the activity increased in complexity, her affect brightened and she laughed in context of recognizing that she was making some mistakes in movement (crossing midline) and not able to correct on her own.  She adapted upper extremity techniques to accommodate her \"port\" with minimal verbal cueing.    She initiated conversation with OTR and was spontaneous in communication during group session.      Pt has been given a Self Assessment form, explained the purpose of including them in their treatment plan and offered options for meeting their needs.  Note: unlike prior hospitalizations and encounters with OTR, the pt independently completed the self-assessment form.  She completed it in less than 10 minutes, faster than other peers in attendance. All questions were answered and her handwriting quality was neat and organized.       Pt identified reason for hospitalization (\"trying to kill myself\"), minimal coping skills (talking to someone, relaxation, hobbies), FAIR supports outside the hospital (boyfriend, mom, staff) and personal strengths (\"listening\").      Pt verbalized understanding of symptoms on function. Pt identified experiencing the following symptoms, selected from checklist provided:     Feelings - sadness,   mood swings,   helpless, depressed,  despair, overwhelmed, restless,       Thoughts - negative,   trouble   making decisions, hearing voices,  self-harm, suicidal,      Behaviors - withdrawal / spending too much time alone,    suicidal gestures     Pt identifies as " need area:    Identify & Express Feelings  Improve self-esteem/confidence  Improve decision making  Improve problem solving skills    In the past week, pt identified being   NOT BOTHERED due to lack of energy because of poor sleep and identified sleep satisfaction as EXCELLENT      Over the last 2 weeks, pt identified severity of insomnia as follows:     MILD  difficulty falling asleep, staying asleep  & waking up too early     Pt considers sleep problems as   SOMEWHAT INTERFERING   with daily function.      OT PLAN:  Encourage ongoing attendance to support pt self-stated goals. Provide opportunities for education, reinforcement and practice of positive coping skills, engage in graded opportunities for success, and provide ongoing assessment as needed.

## 2017-10-05 NOTE — PLAN OF CARE
Problem: General Plan of Care (Inpatient Behavioral)  Goal: Individualization/Patient Specific Goal (IP Behavioral)  The patient and/or their representative will achieve their patient-specific goals related to the plan of care.    The patient-specific goals include:  Patient's goals:  Be less depressed     Plan for admission:  1. Stabilization of mood disorder symptoms  2. Absence of SI- safe with self  3. Medication management per MD's  4. Coordination of care with outpatient providers, family  5. Psychiatric follow up care in place

## 2017-10-05 NOTE — PLAN OF CARE
Problem: Patient Care Overview  Goal: Team Discussion  Team Plan:   BEHAVIORAL TEAM DISCUSSION     Participants:  Dr. Mixon, Dr. Aguero, Dr. Tineo, Dr. Kat, Mia Ortiz MA.LP, med students, Pharm student     Continued Stay Criteria/Rationale:   Worsening depression, HI, SI     Medical/Physical:  H/O DVT, H/O Asthma     Precautions:   Behavioral Orders   Procedures     Assault precautions     Code 1 - Restrict to Unit     Electroconvulsive therapy       Patient was admitted to [facility] on 10/4/2017. Scheduled for outpatient ECT.     Casey Calixto     Routine Programming       As clinically indicated     Self Injury Precaution     Status 15       Every 15 minutes.     Suicide precautions     Plan:   Patient will have ongoing psychiatric assessment.  Medications will be reviewed and adjusted per MD as indicated. Outpatient providers and family will be contacted for care coordination.  Hospital staff will provide a safe environment and a therapeutic milieu. Patient will be encouraged to participate in unit groups and activities.   CTC will continue to assess needs and  ensure appropriate follow up care is in place.        Rationale for change in precautions or plan:   No changes in plan/precautions at this time     Illness Management Recovery model: Personal Plan of Care     Patient will complete Personal Plan of Care, identifying reasons for hospitalization and goals for discharge. Form will be reviewed in team meeting and signed by patient, physician, writer and RN. Form will be given to [facility] to be scanned into EPIC.    Problem: General Plan of Care (Inpatient Behavioral)  Goal: Team Discussion  Team Plan:   BEHAVIORAL TEAM DISCUSSION     Participants:  Dr. Mixon, Dr. Aguero, Dr. Tineo, Dr. Kat, Mia Ortiz MA.LP, med students, Pharm student     Continued Stay Criteria/Rationale:   Worsening depression, HI, SI     Medical/Physical:  H/O DVT, H/O Asthma     Precautions:   Behavioral Orders   Procedures      Assault precautions     Code 1 - Restrict to Unit     Electroconvulsive therapy       Patient was admitted to [facility] on 10/4/2017. Scheduled for outpatient ECT.     Peña Calixto     Routine Programming       As clinically indicated     Self Injury Precaution     Status 15       Every 15 minutes.     Suicide precautions     Plan:   Patient will have ongoing psychiatric assessment.  Medications will be reviewed and adjusted per MD as indicated. Outpatient providers and family will be contacted for care coordination.  Hospital staff will provide a safe environment and a therapeutic milieu. Patient will be encouraged to participate in unit groups and activities.   CTC will continue to assess needs and  ensure appropriate follow up care is in place.        Rationale for change in precautions or plan:   No changes in plan/precautions at this time     Illness Management Recovery model: Personal Plan of Care     Patient will complete Personal Plan of Care, identifying reasons for hospitalization and goals for discharge. Form will be reviewed in team meeting and signed by patient, physician, writer and RN. Form will be given to [facility] to be scanned into [employer].

## 2017-10-05 NOTE — PROGRESS NOTES
----------------------------------------------------------------------------------------------------------  [facility], [place]   Psychiatric Progress Note  Hospital Day #1     Assessment    Presentation: Karolyn Banerjee, a 32 year old female, with history of schizoaffective disorder - bipolar type and an unspecified neurocognitive disorder (IQ 62), presented to the ED due to suicidal ideation in the context of a recent suicide in her family and a plan to run out into traffic.    Diagnostic Impression: Patient was admitted with SI and a plan to run out into traffic. The patient's most recent  psychiatric hospitalization was in 9/2017 for similar circumstances shortly after learning about her brother's suicide.  She also has a history of numerous hospitalizations in the past with similar presentations, with impulsivity and SI after psychosocial stressors. The patient has a history of neurocognitive disorder, and this may be inhibiting her ability to learn and apply appropriate coping skills. A major psychosocial stressor that is likely contributing to her decompensation is the recent completed suicide by her brother. 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 Discussion with the patient's mother suggests that she tends to be worse just before ECT, and her mother wonders if she may need ECT more often. She continues to endorse AH, although these appear to be unchanged from her baseline. She will benefit from assessment of her current ECT schedule and consideration of its frequency.    Hospital course: Karolyn Banerjee was admitted to station 20 as a committed patient under the care of attending Dr. Mixon.  PTA medications were continued. After discussion with treatment team, decision was made to continue scheduled ECT during this hospitalization.    Medical  course The patient was medically evaluated in the ED for chest pain. No acute changes on EKG, and she was medically cleared to be admitted to a psychiatric unit.    Plan     Principal Diagnosis:   # Schizoaffective disorder - bipolar type    -Continue ECT as scheduled on 10/6. Ongoing assessment of frequency of ECT.     Secondary psychiatric diagnoses of concern this admission:   # Unspecified neurocognitive disorder (IQ 62)    Psychotropic Medications:  - Lurasidone 160 mg with dinner  - Perphenazine 16 mg BID  - Benztropine 0.5 mg BID  - Olanzapine 10 mg PO/IM PRN for emergencies  - Trazodone 50 mg HS  - Hydroxyzine 25-50 mg q4hrs PRN for anxiety  - ECT q6wks with Dr. Amos    Laboratory/Imaging: Utox, UPT pending, CMP, CBC   Consults: none  Patient will be treated in therapeutic milieu with appropriate individual and group therapies as described.      Medical diagnoses to be addressed this admission:    # Contraception   - Continue PTA Microgestin 1/20     # Seasonal allergies  - Continue PTA cetirizine 10 mg daily     # History of DVT: Not currently on anticoagulation  # History of Asthma: Stable, not currently on asthma medications    Consults: none    Relevant psychosocial stressors: Her brother committed suicide in September, the patient currently lives in a group home.    Legal Status: Committed, Torres, Peña-Wilkins    Safety Assessment:   Checks: Status 15  Precautions: Suicide  Pt has not required locked seclusion or restraints in the past 24 hours to maintain safety, please refer to RN documentation for further details.     The risks, benefits, alternatives and side effects have been discussed and are understood by the patient and other caregivers.    Anticipated Disposition/Discharge Date: TBD pending medication management and clinical stabilization.    ------------------------------------------------------------------------------  Scribed by Vivian Alfaro, MS3, for Dr. Tarik Tineo, Resident.    I  "have reviewed and edited the documentation recorded by the scribe.  This documentation accurately reflects the services I personally performed and treatment decisions made by me in consultation with the attending physician Robe Mixon MD.    Tarik Tineo MD  Psychiatry PGY-1  [phone]        Attestation:  I have reviewed the resident admit note and confirmed by my exam today the HPI, past psych, PMH, ROS, meds, allergies, family and social histories.  I have also reviewed all available labs and vital signs.  I, Robe Mixon, saw and evaluated the patient with the resident physician.  I agree with the findings and plan of care as documented in the resident note.  I have reviewed all labs and vital signs.           Interim History:   The patient's care was discussed with the treatment team and chart notes were reviewed. VSS. Slept 7 hours.  PRNs: hydroxyzine 25 mg     Patient interview: Patient was interviewed in her room this morning while she was lying in bed. There was marked speech latency when responding to questions, and she answered with brief responses. She reported that she was feeling \"down\" today and that it was unchanged since yesterday. She had slept \"okay\". Patient endorses AH, and says that they are about the same as her baseline. They have been telling her to break up with her boyfriend because they love her more and she should be with them instead. She says that she knows the voice is a man at her [employer]. When asked about her medications, she says she doesn't know how they're doing, and that she sometimes experiences chest pain after taking them. We also discussed ECT and the possibility of increasing the frequency and she said that she doesn't really think it helps much and that it makes her tired all day. She did say that it might help with the AH.      Review of systems:     ROS was negative unless noted above.          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    Appearance:  awake and alert, wearing scrubs, lying in bed covered with a blanket  Attitude:  guarded  Eye Contact:  fair  Mood: \"down\"  Affect:  mood congruent, intensity is blunted and guarded  Speech: clear and coherent with marked speech latency, poverty of speech  Psychomotor Behavior:  no evidence of tardive dyskinesia, dystonia, or tics  Thought Process:  logical and linear, some circumstantial  Associations:  no loose associations  Thought Content:  SI and AH, denies VH and paranoia.  Insight:  limited  Judgment:  limited  Oriented to:  time, person, and place  Attention Span and Concentration:  fair  Recent and Remote Memory:  fair  Language: english with appropriate syntax and vocabulary  Fund of Knowledge: delayed  Muscle Strength and Tone: grossly normal, did not formally assess  Gait and Station: did not assess         Labs:     "

## 2017-10-05 NOTE — PROGRESS NOTES
"INITIAL PSYCHOSOCIAL ASSESSMENT   I have reviewed the chart met with the patient, and developed Care Plan.  Information for assessment was obtained from: Patient/patient chart    Presenting Problem:   The patient is a 32 year old female with a history of schizoaffective disorder-bipolar type, and unspecified neurocognitive disorder (IQ 62), who presents with suicidal ideation in the context of a recent completed suicide in the family.        The patient was at her baseline until she learned of the completed suicide of her brother on 2017. The patient initially appeared to be processing the grief appropriately. However, upon returning to her group home after the , she appeared struck by the reality of the events, and stated to staff \"he took his own life\". Shortly after, her mood worsened considerably, and she began to feel suicidal herself. She was admitted at [facility] from -17 with suicidal ideation.This last  (10/1), her Mother noticed the patient appeared to not be doing well. She kept her eyes closed much of the time, which typically indicates she is struggling with auditory hallucinations.  On Monday, the patient ran away from the group home in the rain with the intention of running into traffic. She was stopped by staff prior to doing this. Staff also note she has been isolating in her room and sleeping much of the day. She feels like her medications are not working. She notes she has frequent suicidal thoughts, except when she is with her boyfriend 2x/week. She endorsed having plans, and thinks of \"a lot of different ways\" she could hurt herself.    The following areas have been assessed:  History of Mental Health and Chemical Dependency:  Multiple inpatient admissions over many years at [facility]- last admission .  17- 17.  Ongoing ECT treatments here at  administered by Dr. Osiel Amos.   No current or past history of alcohol or illicit substance use.     Family " Description (Constellation, Family Psychiatric History):  Patient was born/raised in [place]. She is second oldest of 11 children.  As noted,  one of her brothers recently committed suicide.   Patient is never , no children    Family history is significant for father with mild developmental disorder and uncle with schizophrenia.     Significant Life Events (Illness, Abuse, Trauma, Death):  None known     Living Situation:  Patient resides in a group home in [place].  She has lived there since May 29, 2014.  [facility] is [facility] at [phone]     Educational Background:  High School Graduate w/ Special Education     Occupational History:  Disabled     Financial Status:  SSDI     Legal Issues:  Patient's mother Mary Banerjee is patient's legal guardian  Patient currently under a [facility] Recommitment as mentally ill with a Torres Order and also a [facility] through November 6th, 2017.     Ethnic/Cultural Issues:  None     Spiritual Orientation:  Cheondoism     Makstr Service History:  None     Social Functioning (organization, interests):  Shopping, walking, being with family.     Current Treatment Providers are:  Dr. Dana Sandoval - Next Appt on November 3rd at 12:45pm  [facility] [phone]   Sandra Bone - KEVIN Marfa  [phone]  DD  is [facility]    [facility] Assessment/Plan:  Patient will have ongoing psychiatric assessment.  Medications will be reviewed and adjusted per MD as indicated. Patient's mother and outpatient providers will be contacted for care coordination.  Hospital staff will provide a safe environment and a therapeutic milieu. Patient will be encouraged to participate in unit groups and activities.    [facility] will continue to assess needs  and ensure appropriate follow up care is in place.

## 2017-10-05 NOTE — PROGRESS NOTES
10/04/17 1956   Patient Belongings   Did you bring any home meds/supplements to the hospital?  No   Patient Belongings clothing;shoes   Belongings Search Yes   Clothing Search Yes   Second Staff Crystal CORRALES Pt locker: Mountain Dew, Black pants, bra, t-shirt, Shoes, socks    No belongings sent to security   A               Admission:  I am responsible for any personal items that are not sent to the safe or pharmacy.  [facility] is not responsible for loss, theft or damage of any property in my possession.    Signature:  _________________________________ Date: _______  Time: _____                                              Staff Signature:  ____________________________ Date: ________  Time: _____      2nd Staff person, if patient is unable/unwilling to sign:    Signature: ________________________________ Date: ________  Time: _____     Discharge:  [facility] has returned all of my personal belongings:    Signature: _________________________________ Date: ________  Time: _____                                          Staff Signature:  ____________________________ Date: ________  Time: _____

## 2017-10-05 NOTE — PROGRESS NOTES
"Pt was admitted to floor for having SI. She told writer that she had a plan to run again from her Groups home to the highway and jump in front of a car. She is unable to identify a stress other then  \"beign so bored all day\". She feels it will be much better if  she gets a job. She has been able to contract for safety since arriving on the unit. She has been calm cooperative and pleasant since arriving on the unit.    "

## 2017-10-05 NOTE — PROGRESS NOTES
10/05/17 1453   Behavioral Health   Hallucinations denies / not responding to hallucinations   Thinking poor concentration   Orientation date: oriented;person: oriented;place: oriented   Memory baseline memory   Insight poor   Judgement impaired   Eye Contact at examiner   Affect blunted, flat   Mood mood is calm   Physical Appearance/Attire appears stated age   Suicidality other (see comments)  (DNAIEL)   Self Injury other (see comment)  (DANIEL)   Elopement (Does not appear)   Activity withdrawn;other (see comment)  (Participates in groups )   Speech clear   Medication Sensitivity no observed side effects   Psychomotor / Gait balanced;steady   Psycho Education   Type of Intervention 1:1 intervention   Response participates with encouragement   Hours 0.5   Activities of Daily Living   Hygiene/Grooming independent   Oral Hygiene independent   Dress independent   Laundry with supervision   Room Organization independent   Behavioral Health Interventions   Depression build upon strengths;establish therapeutic relationship;provide emotional support   Social and Therapeutic Interventions (Depression) encourage socialization with peers;encourage participation in therapeutic groups and milieu activities      Pt was visible in the milieu for the majority of the shift. Pt attended and participated in the community meeting and two OT groups. When this author approached the pt to check in, pt ignored staff and continued to color. Due to this, pt was unable to be assessed for SI, SIB, and depression.

## 2017-10-06 ENCOUNTER — ANESTHESIA (OUTPATIENT)
Dept: BEHAVIORAL HEALTH | Facility: CLINIC | Age: 32
End: 2017-10-06

## 2017-10-06 ENCOUNTER — ANESTHESIA EVENT (OUTPATIENT)
Dept: BEHAVIORAL HEALTH | Facility: CLINIC | Age: 32
End: 2017-10-06

## 2017-10-06 PROCEDURE — 25000128 H RX IP 250 OP 636: Performed by: ANESTHESIOLOGY

## 2017-10-06 PROCEDURE — 12400007 ZZH R&B MH INTERMEDIATE UMMC

## 2017-10-06 PROCEDURE — 90853 GROUP PSYCHOTHERAPY: CPT

## 2017-10-06 PROCEDURE — 25000125 ZZHC RX 250: Performed by: ANESTHESIOLOGY

## 2017-10-06 PROCEDURE — 90870 ELECTROCONVULSIVE THERAPY: CPT

## 2017-10-06 PROCEDURE — 25000132 ZZH RX MED GY IP 250 OP 250 PS 637

## 2017-10-06 PROCEDURE — 40000671 ZZH STATISTIC ANESTHESIA CASE

## 2017-10-06 PROCEDURE — 97150 GROUP THERAPEUTIC PROCEDURES: CPT | Mod: GO

## 2017-10-06 RX ORDER — HEPARIN SODIUM (PORCINE) LOCK FLUSH IV SOLN 100 UNIT/ML 100 UNIT/ML
5 SOLUTION INTRAVENOUS
Status: ACTIVE | OUTPATIENT
Start: 2017-10-06 | End: 2017-10-06

## 2017-10-06 RX ORDER — ETOMIDATE 2 MG/ML
INJECTION INTRAVENOUS PRN
Status: DISCONTINUED | OUTPATIENT
Start: 2017-10-06 | End: 2017-10-06

## 2017-10-06 RX ADMIN — ETOMIDATE 16 MG: 2 INJECTION INTRAVENOUS at 12:21

## 2017-10-06 RX ADMIN — TRAZODONE HYDROCHLORIDE 50 MG: 50 TABLET ORAL at 23:00

## 2017-10-06 RX ADMIN — Medication 1000 MG: at 13:15

## 2017-10-06 RX ADMIN — PERPHENAZINE 16 MG: 16 TABLET, FILM COATED ORAL at 22:59

## 2017-10-06 RX ADMIN — CETIRIZINE HYDROCHLORIDE 10 MG: 10 TABLET, FILM COATED ORAL at 13:14

## 2017-10-06 RX ADMIN — BENZTROPINE MESYLATE 0.5 MG: 0.5 TABLET ORAL at 23:00

## 2017-10-06 RX ADMIN — PERPHENAZINE 16 MG: 16 TABLET, FILM COATED ORAL at 13:14

## 2017-10-06 RX ADMIN — Medication 100 MG: at 12:21

## 2017-10-06 RX ADMIN — NORETHINDRONE ACETATE AND ETHINYL ESTRADIOL 1 TABLET: 1; 20 TABLET ORAL at 13:15

## 2017-10-06 RX ADMIN — LURASIDONE HYDROCHLORIDE 160 MG: 80 TABLET, FILM COATED ORAL at 23:01

## 2017-10-06 ASSESSMENT — ACTIVITIES OF DAILY LIVING (ADL)
ORAL_HYGIENE: INDEPENDENT
DRESS: INDEPENDENT
GROOMING: INDEPENDENT

## 2017-10-06 NOTE — PROGRESS NOTES
"Brief Psychiatry Progress Note  10/6/2017    Subjective: Patient was met out in milieu and spoken with in her room. She had some speech latency and gave brief responses to questions. Patient was expecting to be taken to ECT later today. She reports that things have been going \"okay\" in the hospital, but that emotionally she feels worse than she did at admission. She says that the reason for this is that she does not have anything to do here and she is bored. Patient denies SI today.    -It was noted in ECT that staff was not able to obtain venous access through her port-a-cath.     Objective:  /76  Pulse 110  Temp 98.2  F (36.8  C) (Oral)  Resp 18  Wt 121.1 kg (267 lb)  SpO2 97%  BMI 45.12 kg/m2    Appearance:  awake and alert, wearing scrubs, met in hallway, spoke while sitting on the edge of her bed  Attitude:  guarded  Eye Contact:  fair  Mood: \"okay\"  Affect:  mood congruent, intensity is blunted and guarded  Speech: clear and coherent with marked speech latency, poverty of speech  Psychomotor Behavior:  no evidence of tardive dyskinesia, dystonia, or tics  Thought Process:  logical and linear, some circumstantial  Associations:  no loose associations  Thought Content: denies SI  Insight:  limited  Judgment:  limited  Oriented to:  time, person, and place  Attention Span and Concentration:  fair  Recent and Remote Memory:  fair  Language: english with appropriate syntax and vocabulary  Fund of Knowledge: delayed  Muscle Strength and Tone: grossly normal, did not formally assess  Gait and Station: normal    Labs:  CBC wnl  CMP wnl    Assessment and Plan:  - continue with ECT as scheduled  - continue medications without changes:   - Lurasidone 160 mg with dinner   - Perphenazine 16 mg BID   - Benztropine 0.5 mg BID   - Olanzapine 10 mg PO/IM PRN for emergencies   - Trazodone 50 mg HS   - Hydroxyzine 25-50 mg q4hrs PRN for anxiety  - will assess status after the weekend to see the effect of ECT  - Will " touch base with IR early next week for evaluation of Port-A-Cath    --------------------------------------------------------------------------------  Scribed by Vivian Alfaro, MS3, for Dr. Tarik Tineo, Resident.    I have reviewed and edited the documentation recorded by the scribe.  This documentation accurately reflects the services I personally performed and treatment decisions made by me.    Tarik Tineo MD  Psychiatry PGY-1  [phone]

## 2017-10-06 NOTE — PROGRESS NOTES
Participated in 1.5 of 3 OT groups today due to ECT. Pt independent in selection of creative activities and with clean up of work space.
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
## 2017-10-06 NOTE — PROGRESS NOTES
Pt was out in milieu and saw instances of pt socializing with peers. However, when staffs approach, pt does not acknowledge us and is not responsive therefore writer unable to talk with pt. She was in milieu most of shift coloring and watching movies. No major concerns or complaints were heard. Otherwise, calm but reserved and guarded this shift.          10/05/17 5362   Behavioral Health   Hallucinations denies / not responding to hallucinations   Thinking distractable   Orientation person: oriented;place: oriented;date: oriented;time: oriented   Memory baseline memory   Insight poor   Judgement impaired   Eye Contact at examiner   Affect blunted, flat   Mood depressed   Physical Appearance/Attire attire appropriate to age and situation;appears stated age   Hygiene other (see comment)  (adequate)   Elopement (none observed)   Activity other (see comment)  (present in milieu)   Speech clear;coherent   Medication Sensitivity no observed side effects;no stated side effects   Psychomotor / Gait balanced;steady   Activities of Daily Living   Hygiene/Grooming independent   Oral Hygiene independent   Dress scrubs (behavioral health);independent   Room Organization independent

## 2017-10-06 NOTE — ANESTHESIA POSTPROCEDURE EVALUATION
Patient: Karolyn Banerjee    * No procedures listed *    Diagnosis:Schizoaffective disorder, depressive type (H) [F25.1]  Diagnosis Additional Information: No value filed.    Anesthesia Type:  General    Note:  Anesthesia Post Evaluation    Patient location during evaluation: ECT PACU and ED  Patient participation: Able to fully participate in evaluation  Level of consciousness: awake  Pain management: adequate  Airway patency: patent  Cardiovascular status: acceptable  Respiratory status: acceptable  Hydration status: acceptable  PONV: none     Anesthetic complications: None          Last vitals:  Vitals:    10/06/17 1234 10/06/17 1239 10/06/17 1249   BP: (!) 160/112 (!) 138/100 (!) 143/100   Pulse: 110 110 103   Resp: 16 16 20   Temp: 37.3  C (99.2  F)     SpO2:  95% 95%         Electronically Signed By: Angélica Barrera MD  October 6, 2017  12:52 PM

## 2017-10-06 NOTE — PLAN OF CARE
Problem: General Plan of Care (Inpatient Behavioral)  Goal: Individualization/Patient Specific Goal (IP Behavioral)  The patient and/or their representative will achieve their patient-specific goals related to the plan of care.    The patient-specific goals include: Illness Management Recovery model: Objectives    Patient will identify reason(s) for hospitalization from their perspective.  Patient will identify a minimum of three goals for discharge.  Patient will identify a minimum of three triggers that may increase their symptoms.  Patient will identify a minimum of three coping skills they can do to stay well.   Patient will identify their support system to demonstrate readiness for discharge.  Outcome: No Change  48 hour assessment- Pt had ECT today.  Upon return to unit from ECT pt agitated.  Not wanting to talk.  Pt did eat.  Prior to ECT pt affect bright talked to staff about Mountain Dew.  Pt keeps to self, no physical aggression.

## 2017-10-06 NOTE — ANESTHESIA PREPROCEDURE EVALUATION
Anesthesia Evaluation     . Pt has had prior anesthetic. Type: General           ROS/MED HX    ENT/Pulmonary:     (+)JOSE risk factors obese, Intermittent asthma Treatment: Inhaler prn,  , . .    Neurologic:       Cardiovascular:         METS/Exercise Tolerance:     Hematologic:         Musculoskeletal:         GI/Hepatic:         Renal/Genitourinary:         Endo:     (+) Obesity, .      Psychiatric:     (+) psychiatric history schizophrenia, depression and other (comment) (BL personality d/o)      Infectious Disease:         Malignancy:         Other:                     Physical Exam  Normal systems: cardiovascular and pulmonary    Airway   Mallampati: III  TM distance: >3 FB    Dental   (+) chipped and missing    Cardiovascular       Pulmonary                         Anesthesia Plan      History & Physical Review  History and physical reviewed and following examination; no interval change.    ASA Status:  3 .    NPO Status:  > 8 hours    Plan for General with Intravenous induction.          Postoperative Care      Consents  Anesthetic plan, risks, benefits and alternatives discussed with:  Patient..        Angélica Barrera MD   July 14, 2017

## 2017-10-06 NOTE — PROGRESS NOTES
Patient will be going to ECT tomorrow morning at 10:00 am and she is to remain NPO from midnight. Patient was informed and is aware.

## 2017-10-07 PROCEDURE — 12400007 ZZH R&B MH INTERMEDIATE UMMC

## 2017-10-07 PROCEDURE — 90853 GROUP PSYCHOTHERAPY: CPT

## 2017-10-07 PROCEDURE — 25000132 ZZH RX MED GY IP 250 OP 250 PS 637

## 2017-10-07 RX ADMIN — BENZTROPINE MESYLATE 0.5 MG: 0.5 TABLET ORAL at 08:46

## 2017-10-07 RX ADMIN — NORETHINDRONE ACETATE AND ETHINYL ESTRADIOL 1 TABLET: 1; 20 TABLET ORAL at 08:46

## 2017-10-07 RX ADMIN — LURASIDONE HYDROCHLORIDE 160 MG: 80 TABLET, FILM COATED ORAL at 16:48

## 2017-10-07 RX ADMIN — CETIRIZINE HYDROCHLORIDE 10 MG: 10 TABLET, FILM COATED ORAL at 08:45

## 2017-10-07 RX ADMIN — BENZTROPINE MESYLATE 0.5 MG: 0.5 TABLET ORAL at 20:35

## 2017-10-07 RX ADMIN — Medication 1000 MG: at 08:46

## 2017-10-07 RX ADMIN — PERPHENAZINE 16 MG: 16 TABLET, FILM COATED ORAL at 20:34

## 2017-10-07 RX ADMIN — PERPHENAZINE 16 MG: 16 TABLET, FILM COATED ORAL at 08:45

## 2017-10-07 RX ADMIN — TRAZODONE HYDROCHLORIDE 50 MG: 50 TABLET ORAL at 20:35

## 2017-10-07 ASSESSMENT — ACTIVITIES OF DAILY LIVING (ADL)
GROOMING: INDEPENDENT
DRESS: INDEPENDENT
ORAL_HYGIENE: INDEPENDENT

## 2017-10-07 NOTE — PROGRESS NOTES
"This writer was unable to have a 1:1 with the pt because she went to bed while this writer was on break.  The pt went to community meeting, but couldn't respond to any of the questions posed to her in  Any other way besides \"I don't know.\"  The pt had no visitors, ate dinner and was mostly isolative to her room.  She was appropriate and pleasant upon approach, but wasn't social with any of the other pts.  "

## 2017-10-07 NOTE — PROGRESS NOTES
"Karolyn participated in a Health & Coping group this a.m. on imaginative writing. She wrote a story of her future starting the day of discharge. Made a series of loose comments about her boyfriend, whom she plans to , as well as an \"other mohsen\" who has reportedly been saying strange things to her at [employer], \"bugging [her],\" for example \"talking about [her] body.\"     "

## 2017-10-07 NOTE — PROGRESS NOTES
10/07/17 1316   Behavioral Health   Hallucinations denies / not responding to hallucinations   Thinking poor concentration   Orientation person: oriented   Memory baseline memory   Insight insight appropriate to situation   Judgement impaired   Affect blunted, flat   Mood mood is calm   Suicidality other (see comments)  (pt denies)   Self Injury other (see comment)  (pt denies)   Activities of Daily Living   Hygiene/Grooming independent   Oral Hygiene independent   Dress independent   Room Organization independent   Behavioral Health Interventions   Depression maintain safety precautions;maintain safe secure environment;provide emotional support   Social and Therapeutic Interventions (Depression) encourage socialization with peers;encourage effective boundaries with peers;encourage participation in therapeutic groups and milieu activities

## 2017-10-08 PROCEDURE — 12400007 ZZH R&B MH INTERMEDIATE UMMC

## 2017-10-08 PROCEDURE — 25000132 ZZH RX MED GY IP 250 OP 250 PS 637

## 2017-10-08 RX ADMIN — CETIRIZINE HYDROCHLORIDE 10 MG: 10 TABLET, FILM COATED ORAL at 08:09

## 2017-10-08 RX ADMIN — BENZTROPINE MESYLATE 0.5 MG: 0.5 TABLET ORAL at 08:09

## 2017-10-08 RX ADMIN — PERPHENAZINE 16 MG: 16 TABLET, FILM COATED ORAL at 19:22

## 2017-10-08 RX ADMIN — NORETHINDRONE ACETATE AND ETHINYL ESTRADIOL 1 TABLET: 1; 20 TABLET ORAL at 08:10

## 2017-10-08 RX ADMIN — Medication 1000 MG: at 08:09

## 2017-10-08 RX ADMIN — LURASIDONE HYDROCHLORIDE 160 MG: 80 TABLET, FILM COATED ORAL at 17:35

## 2017-10-08 RX ADMIN — PERPHENAZINE 16 MG: 16 TABLET, FILM COATED ORAL at 08:09

## 2017-10-08 RX ADMIN — BENZTROPINE MESYLATE 0.5 MG: 0.5 TABLET ORAL at 19:22

## 2017-10-08 RX ADMIN — TRAZODONE HYDROCHLORIDE 50 MG: 50 TABLET ORAL at 19:22

## 2017-10-08 ASSESSMENT — ACTIVITIES OF DAILY LIVING (ADL)
HYGIENE/GROOMING: INDEPENDENT
DRESS: SCRUBS (BEHAVIORAL HEALTH);INDEPENDENT
ORAL_HYGIENE: INDEPENDENT

## 2017-10-08 NOTE — PROGRESS NOTES
10/08/17 1421   Behavioral Health   Hallucinations denies / not responding to hallucinations   Thinking distractable;poor concentration   Orientation person: oriented;place: oriented;date: oriented   Memory baseline memory   Insight poor   Judgement impaired   Eye Contact at examiner   Affect blunted, flat   Mood mood is calm   Physical Appearance/Attire attire appropriate to age and situation   Hygiene well groomed   Suicidality other (see comments)  (denies)   Self Injury other (see comment)  (denies)   Elopement (nothing to report)   Activity withdrawn   Speech (slow responce)   Medication Sensitivity no observed side effects   Psychomotor / Gait balanced;steady   Psycho Education   Type of Intervention 1:1 intervention   Response participates, initiates socially appropriate   Hours 0.5   Treatment Detail (Check In)   Behavioral Health Interventions   Depression provide emotional support   Social and Therapeutic Interventions (Depression) encourage socialization with peers   Psychotic Symptoms provide emotional support   Social and Therapeutic Interventions (Psychotic Symptoms) encourage socialization with peers     Patient was our of her room for most of the shift, but took a 2 hours nap in the late morning.  When in the milieu the patient was isolative from peers.  The patient attended community group and had a goal to attend the following group, but only made it to the last 10 minutes of the focus group.  The patient had a slow response speed and appear to not follow conversations or fully understand questions completely.  The patient would answer question or converse with simple words and non-complete sentences.  The patient denies SI and SiB.  The patient is not patience when making request with staff and has a tense affect during most interactions.

## 2017-10-08 NOTE — PLAN OF CARE
"Problem: Psychotic Symptoms  Goal: Psychotic Symptoms  Signs and symptoms of listed problems will be absent or manageable.  Outcome: No Change  Patient reported a voice, described it as a male voice which she hears when listening to music, and also this was the voice that talks to her in the Oriental orthodox when she goes in the back to the bathroom alone. She said that her mother who has been with her does not hear the voice. Patient thinks that it is a real voice and a real person, who follows her in the Oriental orth[employer] and talks about her body, her dress, and that she is \"fat\". The voice also tells her that he is better than her boy friend Remy and that she soul be with him. Patient said that she has a boy friend for 3 yrs and that her mother thinks he is good for her.   Patient denied depression or anxiety, denied SI at this time. Denied felling angry or frustrated. Her mood was blunted and calm. She responds to questions with marked delay. No eye contact for most part. Patient denied pain and all other physical issues, appetite is very good. Patient denied problems with sleep at night. She took medications as prescribed, no side effects reported or assessed.       "

## 2017-10-09 ENCOUNTER — APPOINTMENT (OUTPATIENT)
Dept: GENERAL RADIOLOGY | Facility: CLINIC | Age: 32
End: 2017-10-09
Payer: MEDICAID

## 2017-10-09 ENCOUNTER — CARE COORDINATION (OUTPATIENT)
Dept: PSYCHIATRY | Facility: CLINIC | Age: 32
End: 2017-10-09

## 2017-10-09 PROCEDURE — 99232 SBSQ HOSP IP/OBS MODERATE 35: CPT | Mod: GC | Performed by: PSYCHIATRY & NEUROLOGY

## 2017-10-09 PROCEDURE — 97150 GROUP THERAPEUTIC PROCEDURES: CPT | Mod: GO

## 2017-10-09 PROCEDURE — 25000132 ZZH RX MED GY IP 250 OP 250 PS 637

## 2017-10-09 PROCEDURE — 12400007 ZZH R&B MH INTERMEDIATE UMMC

## 2017-10-09 PROCEDURE — 71020 XR CHEST 2 VW: CPT

## 2017-10-09 PROCEDURE — 90853 GROUP PSYCHOTHERAPY: CPT

## 2017-10-09 RX ADMIN — PERPHENAZINE 16 MG: 16 TABLET, FILM COATED ORAL at 08:56

## 2017-10-09 RX ADMIN — BENZTROPINE MESYLATE 0.5 MG: 0.5 TABLET ORAL at 08:56

## 2017-10-09 RX ADMIN — NORETHINDRONE ACETATE AND ETHINYL ESTRADIOL 1 TABLET: 1; 20 TABLET ORAL at 08:57

## 2017-10-09 RX ADMIN — OLANZAPINE 10 MG: 10 TABLET, FILM COATED ORAL at 15:11

## 2017-10-09 RX ADMIN — Medication 1000 MG: at 08:56

## 2017-10-09 RX ADMIN — LURASIDONE HYDROCHLORIDE 160 MG: 80 TABLET, FILM COATED ORAL at 17:25

## 2017-10-09 RX ADMIN — CETIRIZINE HYDROCHLORIDE 10 MG: 10 TABLET, FILM COATED ORAL at 08:56

## 2017-10-09 ASSESSMENT — ACTIVITIES OF DAILY LIVING (ADL)
ORAL_HYGIENE: INDEPENDENT
DRESS: INDEPENDENT;SCRUBS (BEHAVIORAL HEALTH)
HYGIENE/GROOMING: INDEPENDENT

## 2017-10-09 NOTE — PROGRESS NOTES
Pt participated in OT clinic and was able to initiate task, follow through with plan and ask for help as needed.    Attended 2.50 of 3.0 OT groups today.

## 2017-10-09 NOTE — PROGRESS NOTES
10/09/17 1300   General Information   Date Initially Attended OT 10/05/17   Clinical Impression   Affect Flat;Restricted   Orientation Oriented to person, place and time   Appearance and ADLs General cleanliness observed in most areas   Attention to Internal Stimuli No observed signs;Needs further assessment   Interaction Skills Interacts appropriately with staff;Initiates appropriately with staff;Interacts appropriately with peers   Ability to Communicate Needs Independent;Does so with prompts   Verbal Content [place]   Ability to Maintain Boundaries Maintains appropriate physical boundaries;Maintains appropriate verbal boundaries   Participation Initiates participation;Independently participates;Participates with minimal encouragement   Concentration Concentrates 20-30 minutes;Needs further assessment   Ability to Concentrate With structure   Follows and Comprehends Directions Independently follows 2 step verbal directions;Needs further assessment   Memory Delayed and immediate recall intact   Organization Independently organizes simple tasks   Decision Making Independent;Needs further assessment   Planning and Problem Solving Occasionally needs assist/feedback   Ability to Apply and Learn Concepts Comprehends concepts, but needs assist to apply;Needs further assessment   Frustrations / Stress Tolerance Needs further assessment   Level of Insight Identifies needs with structure/support   Self Esteem Takes risks with support and encouragement;Accepts positive feedback   Social Supports Identifies utilizing supports

## 2017-10-09 NOTE — PROGRESS NOTES
Met with patient and team today.  Patient reports that she has been feeling good- affect much brighter and speech more spontaneous.  Patient reported how she is looking forward to discharge and seeing her boyfriend (whom she sees Tues and Thurs at [facility])  MD';s informed her of plan to get one more ECT  On Wed. and then discharge back to her .  I spoke to  Manager Mau ([phone]).  She stated that they are comfortable with taking patient back home on Wednesday and will pick her up.  She requested I call her back on Wed.to arrange a time.  Patient and team informed.

## 2017-10-09 NOTE — PROGRESS NOTES
FW: court testimony/Lori  Received: Today       Shona Arreaga, RN  Shona Arreaga, RN       Phone Number: [phone]                       Previous Messages       ----- Message -----      From: Amber Gomez      Sent: 10/9/2017  11:39 AM        To: Yaneth Figueroa RN   Subject: court testimony/Lori                           Joan, from [facility] Attorney's office is the caller. She said she is trying to line up a provider for testimony via telephone for pt's hearing on Friday at 9:15am (could be resident or their attending). She said there is no guarantee they will contact the doctor, but in case there are questions about the pt's care she needs a pager number, or direct line to contact them. Please follow up with Joan about this when you can. Ok to lvm.

## 2017-10-09 NOTE — PROGRESS NOTES
Port Access:  Accessed patient right arm port on Station #[id number] at about 12:00 today.  Flushed easily with 20 ml of Normal Saline.  No discomfort per patient and no infiltration noted.  Attempted to get blood return when accessing but no blood return noted.  According to unit staff patient will be getting a new port sometime on 10/10/17.  Patient tolerated procedure well without any complaints.

## 2017-10-09 NOTE — PROGRESS NOTES
----------------------------------------------------------------------------------------------------------  [facility], [place]   Psychiatric Progress Note  Hospital Day #5     Assessment    Presentation: Karolyn Banerjee, a 32 year old female, with history of schizoaffective disorder - bipolar type and an unspecified neurocognitive disorder (IQ 62), presented to the ED due to suicidal ideation in the context of a recent suicide in her family and a plan to run out into traffic.    Diagnostic Impression: Patient was admitted with SI and a plan to run out into traffic. The patient's most recent  psychiatric hospitalization was in 9/2017 for similar circumstances shortly after learning about her brother's suicide.  She also has a history of numerous hospitalizations in the past with similar presentations, with impulsivity and SI after psychosocial stressors. The patient has a history of neurocognitive disorder, and this may be inhibiting her ability to learn and apply appropriate coping skills. A major psychosocial stressor that is likely contributing to her decompensation is the recent completed suicide by her brother. 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 Discussion with the patient's mother suggests that she tends to be worse just before ECT, and her mother wonders if she may need ECT more often. She continues to endorse AH, although these appear to be unchanged from her baseline. She will benefit from assessment of her current ECT schedule and consideration of its frequency.    Hospital course: Karolyn Banerjee was admitted to station 20 as a committed patient under the care of attending Dr. Mixon.  PTA medications were continued. After discussion with treatment team, decision was made to continue scheduled ECT during this hospitalization. Patient  underwent scheduled ECT on 10/6. The procedure went well, though her port-a-cath was noted at the time to be clogged. After ECT, patient's mood improved, and her SI resolved. Her affect appeared more bright, and she was more verbal during interviews.     Medical course The patient was medically evaluated in the ED for chest pain. No acute changes on EKG, and she was medically cleared to be admitted to a psychiatric unit.    Plan     Principal Diagnosis:   # Schizoaffective disorder - bipolar type    - Had ECT as scheduled on 10/6.    - Plan to have another ECT session on 10/11.    Secondary psychiatric diagnoses of concern this admission:   # Unspecified neurocognitive disorder (IQ 62)    Psychotropic Medications:  - Lurasidone 160 mg with dinner  - Perphenazine 16 mg BID  - Benztropine 0.5 mg BID  - Olanzapine 10 mg PO/IM PRN for emergencies  - Trazodone 50 mg HS  - Hydroxyzine 25-50 mg q4hrs PRN for anxiety  - ECT q6wks with Dr. Amos  *Will hold AM meds morning of ECT    Laboratory/Imaging: Utox, UPT pending  Consults: none  Patient will be treated in therapeutic milieu with appropriate individual and group therapies as described.      Medical diagnoses to be addressed this admission:    # Contraception   - Continue PTA Microgestin 1/20     # Seasonal allergies  - Continue PTA cetirizine 10 mg daily     # History of DVT: Not currently on anticoagulation  # History of Asthma: Stable, not currently on asthma medications    Consults: none    Relevant psychosocial stressors: Her brother committed suicide in September, the patient currently lives in a group home.    Legal Status: Committed, Torres, Peña-Wilkins    Safety Assessment:   Checks: Status 15  Precautions: Suicide  Pt has not required locked seclusion or restraints in the past 24 hours to maintain safety, please refer to RN documentation for further details.     The risks, benefits, alternatives and side effects have been discussed and are understood by  "the patient and other caregivers.    Anticipated Disposition/Discharge Date: Likely 10/11 after ECT. Dispo group home.     ------------------------------------------------------------------------------  Scribed by Vivian Alfaro, MS3, for Dr. Tarik Tineo, Resident.    I have reviewed and edited the documentation recorded by the scribe.  This documentation accurately reflects the services I personally performed and treatment decisions made by me in consultation with the attending physician Robe Mixon MD.    Tarik Tineo MD  Psychiatry PGY-1  [phone]        Attestation:  I, Robe Mixon, saw and evaluated the patient with the resident physician.  I agree with the findings and plan of care as documented in the resident note.  I have reviewed all labs and vital signs.         Interim History:   The patient's care was discussed with the treatment team and chart notes were reviewed. VSS. Slept 6 hours.  PRNs: none     Staff report: The patient was noted to still be quiet and slow to respond, but was pleasant and involved all weekend. Many commented that she is better than they've seen her in the past. She attended OT groups and community meetings.    Patient interview: Patient was interviewed in the workroom. Speech latency that was noted previously was still there, but not as marked. Patient reported that her mood over the weekend was \"fine\" and that the ECT treatment and weekend had both been fine. She asked about when she would be able to go and when she was told Thursday she asked if it could be Wednesday instead so that she'd be able to go see her boyfriend on Thursday. She explained that she doesn't think that she is depressed, but that she has episodes when she feels down on the days when she has nothing to do - normally she is sees her boyfriend Tue/Thu, her mom on Sundays, and goes shopping on Wednesdays. Monday, Friday, and Saturday are bad days for her. A consult from IR was discussed to address " "the port clog and she was aware and expecting that. The team also discussed an additional ECT session on Wednesday to which she was amenable.      Review of systems:     ROS was negative unless noted above.          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    Appearance:  awake and alert, wearing scrubs  Attitude:  guarded  Eye Contact:  fair  Mood: \"fine\"  Affect:  mood congruent, intensity is blunted and guarded  Speech: clear and coherent with some speech latency, poverty of speech  Psychomotor Behavior:  no evidence of tardive dyskinesia, dystonia, or tics  Thought Process:  logical and linear, some circumstantial  Associations:  no loose associations  Thought Content: denied SI/SIB today  Insight:  limited  Judgment:  limited  Oriented to:  time, person, and place  Attention Span and Concentration:  fair  Recent and Remote Memory:  fair  Language: english with appropriate syntax and vocabulary  Fund of Knowledge: delayed  Muscle Strength and Tone: grossly normal, did not formally assess  Gait and Station: did not assess         Labs:   CBC wnl  CMP wnl          "

## 2017-10-09 NOTE — PROGRESS NOTES
Spoke with Joan who shares that pt has upcoming Torres and Peña Wilkins hearing this Friday morning.  May not need to contact provider at all but sometimes questions arise especially regarding ECT.  Will notify Dr. Amos to see if he would be agreeable to being contact for Friday's hearing and seek permission to provided 's office with his pager number.  Will call back with response and leave detailed msg if Joan is unavailable.

## 2017-10-09 NOTE — PROGRESS NOTES
Pt calm and pleasant this shift. Still slow to respond or does not respond to writer during check-in. However, pt came to community meeting and contributed that she hopes to d/c this week. Pt out and about in milieu and with others watching sports and movies this shift. Otherwise, pt was intrusive in wanting to know who coded and what happened but pt was redirectable in conversation. No SI/SIB observed or stated this shift.        10/08/17 2127   Behavioral Health   Hallucinations denies / not responding to hallucinations   Thinking distractable   Orientation place: oriented;time: oriented;date: oriented   Memory baseline memory   Insight poor   Judgement impaired   Eye Contact at examiner   Affect blunted, flat   Mood mood is calm;depressed   Physical Appearance/Attire appears stated age;attire appropriate to age and situation   Hygiene other (see comment)  (appropriate)   Suicidality other (see comments)  (none stated/observed)   Self Injury other (see comment)  (none stated/observed)   Elopement (none)   Activity other (see comment)  (present in milieu)   Speech other (see comments)  (slow)   Medication Sensitivity no observed side effects;no stated side effects   Psychomotor / Gait steady;balanced   Psycho Education   Type of Intervention structured groups   Response participates, initiates socially appropriate   Hours 0.5   Activities of Daily Living   Hygiene/Grooming independent   Oral Hygiene independent   Dress scrubs (behavioral health);independent   Room Organization independent

## 2017-10-10 ENCOUNTER — APPOINTMENT (OUTPATIENT)
Dept: INTERVENTIONAL RADIOLOGY/VASCULAR | Facility: CLINIC | Age: 32
End: 2017-10-10
Payer: MEDICAID

## 2017-10-10 PROCEDURE — 12400007 ZZH R&B MH INTERMEDIATE UMMC

## 2017-10-10 PROCEDURE — 25000128 H RX IP 250 OP 636: Performed by: RADIOLOGY

## 2017-10-10 PROCEDURE — 25000132 ZZH RX MED GY IP 250 OP 250 PS 637: Performed by: PSYCHIATRY & NEUROLOGY

## 2017-10-10 PROCEDURE — 90853 GROUP PSYCHOTHERAPY: CPT

## 2017-10-10 PROCEDURE — 27210904 IR PORT CHECK RIGHT

## 2017-10-10 PROCEDURE — 99232 SBSQ HOSP IP/OBS MODERATE 35: CPT | Mod: GC | Performed by: PSYCHIATRY & NEUROLOGY

## 2017-10-10 PROCEDURE — 25000132 ZZH RX MED GY IP 250 OP 250 PS 637

## 2017-10-10 PROCEDURE — H2032 ACTIVITY THERAPY, PER 15 MIN: HCPCS

## 2017-10-10 PROCEDURE — 97150 GROUP THERAPEUTIC PROCEDURES: CPT | Mod: GO

## 2017-10-10 RX ORDER — HEPARIN SODIUM (PORCINE) LOCK FLUSH IV SOLN 100 UNIT/ML 100 UNIT/ML
5 SOLUTION INTRAVENOUS EVERY 8 HOURS
Status: DISCONTINUED | OUTPATIENT
Start: 2017-10-10 | End: 2017-10-11 | Stop reason: HOSPADM

## 2017-10-10 RX ORDER — IOPAMIDOL 612 MG/ML
4 INJECTION, SOLUTION INTRATHECAL ONCE
Status: COMPLETED | OUTPATIENT
Start: 2017-10-10 | End: 2017-10-10

## 2017-10-10 RX ADMIN — IOPAMIDOL 4 ML: 612 INJECTION, SOLUTION INTRATHECAL at 09:45

## 2017-10-10 RX ADMIN — LURASIDONE HYDROCHLORIDE 160 MG: 80 TABLET, FILM COATED ORAL at 16:47

## 2017-10-10 RX ADMIN — Medication 1000 MG: at 08:21

## 2017-10-10 RX ADMIN — BENZTROPINE MESYLATE 0.5 MG: 0.5 TABLET ORAL at 08:21

## 2017-10-10 RX ADMIN — NORETHINDRONE ACETATE AND ETHINYL ESTRADIOL 1 TABLET: 1; 20 TABLET ORAL at 08:22

## 2017-10-10 RX ADMIN — CETIRIZINE HYDROCHLORIDE 10 MG: 10 TABLET, FILM COATED ORAL at 08:21

## 2017-10-10 RX ADMIN — TRAZODONE HYDROCHLORIDE 50 MG: 50 TABLET ORAL at 19:21

## 2017-10-10 RX ADMIN — PERPHENAZINE 16 MG: 16 TABLET, FILM COATED ORAL at 01:12

## 2017-10-10 RX ADMIN — HYDROXYZINE HYDROCHLORIDE 50 MG: 25 TABLET ORAL at 13:53

## 2017-10-10 RX ADMIN — SODIUM CHLORIDE, PRESERVATIVE FREE 5 ML: 5 INJECTION INTRAVENOUS at 17:34

## 2017-10-10 RX ADMIN — PERPHENAZINE 16 MG: 16 TABLET, FILM COATED ORAL at 19:21

## 2017-10-10 RX ADMIN — SODIUM CHLORIDE, PRESERVATIVE FREE 5 ML: 5 INJECTION INTRAVENOUS at 09:40

## 2017-10-10 RX ADMIN — TRAZODONE HYDROCHLORIDE 50 MG: 50 TABLET ORAL at 01:12

## 2017-10-10 RX ADMIN — PERPHENAZINE 16 MG: 16 TABLET, FILM COATED ORAL at 08:20

## 2017-10-10 RX ADMIN — BENZTROPINE MESYLATE 0.5 MG: 0.5 TABLET ORAL at 19:21

## 2017-10-10 RX ADMIN — BENZTROPINE MESYLATE 0.5 MG: 0.5 TABLET ORAL at 01:12

## 2017-10-10 ASSESSMENT — ACTIVITIES OF DAILY LIVING (ADL)
DRESS: INDEPENDENT
GROOMING: INDEPENDENT
ORAL_HYGIENE: INDEPENDENT
LAUNDRY: WITH SUPERVISION

## 2017-10-10 NOTE — PROGRESS NOTES
Pt more isolative and withdrawn this shift. Mostly kept to room, only coming out for dinner therefore unable to check-in. However, no concerns or major complaints heard from pt.        10/09/17 6648   Behavioral Health   Hallucinations denies / not responding to hallucinations   Thinking distractable   Orientation person: oriented;place: oriented;date: oriented;time: oriented   Memory baseline memory   Insight poor   Judgement impaired   Eye Contact at examiner   Affect blunted, flat   Mood mood is calm   Physical Appearance/Attire appears stated age;attire appropriate to age and situation   Hygiene well groomed   Elopement (none)   Activity withdrawn;isolative   Speech clear;coherent   Medication Sensitivity no stated side effects;no observed side effects   Psychomotor / Gait balanced;steady   Activities of Daily Living   Hygiene/Grooming independent   Oral Hygiene independent   Dress independent;scrubs (behavioral health)   Room Organization independent

## 2017-10-10 NOTE — IR NOTE
Interventional Radiology Intra-procedural Nursing Note    Patient Name: Karolyn Banerjee  Medical Record Number: [id number]  Today's Date: October 10, 2017    Start Time: 0922  End of procedure time: 0945  Procedure: Port check  Report given to: Desiree bartlett on station 20  Time pt departs:  0950  :     Other Notes: Port was accessed, heparinized and left access. Report to Desiree BARTLETT and ok'd to leave accessed.    Terri Cristobal

## 2017-10-10 NOTE — PROGRESS NOTES
10/10/17 1405   Behavioral Health   Hallucinations denies / not responding to hallucinations   Thinking distractable;poor concentration   Orientation person: oriented;place: oriented;date: oriented;time: oriented   Memory baseline memory   Insight poor   Judgement impaired   Eye Contact at examiner   Affect blunted, flat   Mood mood is calm   Physical Appearance/Attire attire appropriate to age and situation   Hygiene neglected grooming - unclean body, hair, teeth   Suicidality (none stated)   Self Injury (none stated)   Elopement (none shown)   Activity withdrawn   Speech clear;coherent   Medication Sensitivity no stated side effects   Psychomotor / Gait balanced   Psycho Education   Type of Intervention 1:1 intervention   Response participates with cues/redirection   Hours 0.5   Activities of Daily Living   Hygiene/Grooming independent   Oral Hygiene independent   Dress independent   Laundry with supervision   Room Organization independent   pt visible in milieu and withdrawn. Pt attending groups and participating. Pt went off unit with staff to get her port checked out. Pt needs hygiene care. Pt had no incidences and was calm and cooperative during shift.

## 2017-10-10 NOTE — PROGRESS NOTES
Pt participated in dance/movement therapy (DMT) with some apprehension, though affect brightened with greater physical engagement and social connection. Minimal verbal expression, but both led and followed movement with some physical creative expression.  Pt was most responsive to interactive movement (non-verbal social communication and connection.)

## 2017-10-10 NOTE — PROCEDURES
Interventional Radiology Brief Post Procedure Note    Procedure: IR PORT CHECK RIGHT    Proceduralist: Sherwin Iverson MD    Assistant: None    Time Out: Prior to the start of the procedure and with procedural staff participation, I verbally confirmed the patient s identity using two indicators, relevant allergies, that the procedure was appropriate and matched the consent or emergent situation, and that the correct equipment/implants were available. Immediately prior to starting the procedure I conducted the Time Out with the procedural staff and re-confirmed the patient s name, procedure, and site/side. (The Joint Commission universal protocol was followed.)  Yes    Medications   Medication Event Details Admin User Admin Time       Sedation: None.    Findings: Port aspirates and flushes normally.  DSA revealed no evidence of a fibrin sheath.      Estimated Blood Loss: Minimal    Fluoroscopy Time: 0.1 minute(s)    SPECIMENS: None    Complications: 1. None     Condition: Stable    Plan: Port is working at this point.  DSA revealed no evidence of fibrin sheath.  Port maybe positional, if aspiration is an issue please try changing patient position.     Comments: See dictated procedure note for full details.    Sherwin Iverson MD

## 2017-10-10 NOTE — PLAN OF CARE
Problem: Psychotic Symptoms  Goal: Psychotic Symptoms  Signs and symptoms of listed problems will be absent or manageable.   Outcome: Improving  Pt s mood was calm and had full range of affect. Pt was social with others briefly in the lounge. Pt denied having SI or SIB. Pt went to bed early. Pt was sleeping and did not take her hs medications.

## 2017-10-10 NOTE — PLAN OF CARE
Problem: Depressive Symptoms  Goal: Social and Therapeutic (Depression)  Signs and symptoms of listed problems will be absent or manageable.         Pt. Attended and actively participated in 2 of 2 scheduled OT sessions today. Pt. Actively participated in goal directed task session. Pt asked directly for needed supplies. Worked independently on task.  Accepted compliments on her work. Pt. Attended and actively participated in group life skills activity. Pt was quick to engage in group game activity.  Demonstrated an understanding of the rules  and strategy of the game.  Appeared to enjoy the group interaction. Pt.was cooperative and pleasant throughout session. Affect appeared brighter.  Several times talked about her boyfriend  and looking forward to seeing him.  Pt.was cooperative and pleasant throughout session.

## 2017-10-11 ENCOUNTER — ANESTHESIA EVENT (OUTPATIENT)
Dept: BEHAVIORAL HEALTH | Facility: CLINIC | Age: 32
End: 2017-10-11

## 2017-10-11 ENCOUNTER — ANESTHESIA (OUTPATIENT)
Dept: BEHAVIORAL HEALTH | Facility: CLINIC | Age: 32
End: 2017-10-11

## 2017-10-11 VITALS
RESPIRATION RATE: 20 BRPM | WEIGHT: 267 LBS | OXYGEN SATURATION: 92 % | BODY MASS INDEX: 45.12 KG/M2 | SYSTOLIC BLOOD PRESSURE: 141 MMHG | DIASTOLIC BLOOD PRESSURE: 88 MMHG | HEART RATE: 80 BPM | TEMPERATURE: 98.8 F

## 2017-10-11 PROCEDURE — 25000132 ZZH RX MED GY IP 250 OP 250 PS 637: Performed by: PSYCHIATRY & NEUROLOGY

## 2017-10-11 PROCEDURE — 90870 ELECTROCONVULSIVE THERAPY: CPT

## 2017-10-11 PROCEDURE — 25000128 H RX IP 250 OP 636: Performed by: PSYCHIATRY & NEUROLOGY

## 2017-10-11 PROCEDURE — H2032 ACTIVITY THERAPY, PER 15 MIN: HCPCS

## 2017-10-11 PROCEDURE — GZB2ZZZ ELECTROCONVULSIVE THERAPY, BILATERAL-SINGLE SEIZURE: ICD-10-PCS | Performed by: PSYCHIATRY & NEUROLOGY

## 2017-10-11 PROCEDURE — 40000671 ZZH STATISTIC ANESTHESIA CASE

## 2017-10-11 PROCEDURE — 25000128 H RX IP 250 OP 636: Performed by: ANESTHESIOLOGY

## 2017-10-11 PROCEDURE — 90853 GROUP PSYCHOTHERAPY: CPT

## 2017-10-11 PROCEDURE — 25000125 ZZHC RX 250: Performed by: ANESTHESIOLOGY

## 2017-10-11 PROCEDURE — 99238 HOSP IP/OBS DSCHRG MGMT 30/<: CPT | Mod: 25 | Performed by: PSYCHIATRY & NEUROLOGY

## 2017-10-11 RX ORDER — HEPARIN SODIUM (PORCINE) LOCK FLUSH IV SOLN 100 UNIT/ML 100 UNIT/ML
5 SOLUTION INTRAVENOUS
Status: COMPLETED | OUTPATIENT
Start: 2017-10-11 | End: 2017-10-11

## 2017-10-11 RX ORDER — HEPARIN SODIUM (PORCINE) LOCK FLUSH IV SOLN 100 UNIT/ML 100 UNIT/ML
5 SOLUTION INTRAVENOUS
Status: CANCELLED
Start: 2017-10-11 | End: 2017-10-11

## 2017-10-11 RX ORDER — ETOMIDATE 2 MG/ML
INJECTION INTRAVENOUS PRN
Status: DISCONTINUED | OUTPATIENT
Start: 2017-10-11 | End: 2017-10-11

## 2017-10-11 RX ADMIN — CETIRIZINE HYDROCHLORIDE 10 MG: 10 TABLET, FILM COATED ORAL at 09:28

## 2017-10-11 RX ADMIN — BENZTROPINE MESYLATE 0.5 MG: 0.5 TABLET ORAL at 09:28

## 2017-10-11 RX ADMIN — NORETHINDRONE ACETATE AND ETHINYL ESTRADIOL 1 TABLET: 1; 20 TABLET ORAL at 09:31

## 2017-10-11 RX ADMIN — Medication 100 MG: at 08:36

## 2017-10-11 RX ADMIN — ETOMIDATE 16 MG: 2 INJECTION INTRAVENOUS at 08:36

## 2017-10-11 RX ADMIN — SODIUM CHLORIDE, PRESERVATIVE FREE 5 ML: 5 INJECTION INTRAVENOUS at 08:59

## 2017-10-11 RX ADMIN — Medication 1000 MG: at 09:28

## 2017-10-11 RX ADMIN — PERPHENAZINE 16 MG: 16 TABLET, FILM COATED ORAL at 09:27

## 2017-10-11 ASSESSMENT — ACTIVITIES OF DAILY LIVING (ADL)
ORAL_HYGIENE: INDEPENDENT
GROOMING: INDEPENDENT;SHOWER
DRESS: INDEPENDENT

## 2017-10-11 NOTE — ANESTHESIA PREPROCEDURE EVALUATION
Anesthesia Evaluation     . Pt has had prior anesthetic. Type: General           ROS/MED HX    ENT/Pulmonary:     (+)JOSE risk factors obese, Intermittent asthma Treatment: Inhaler prn,  , cystic fibrosis . .    Neurologic:       Cardiovascular:         METS/Exercise Tolerance:     Hematologic:         Musculoskeletal:         GI/Hepatic:         Renal/Genitourinary:         Endo:     (+) Obesity, .      Psychiatric:     (+) psychiatric history schizophrenia, depression and other (comment) (BL personality d/o)      Infectious Disease:         Malignancy:         Other:                     Physical Exam  Normal systems: cardiovascular, pulmonary and dental    Airway   Mallampati: II  TM distance: >3 FB  Neck ROM: full    Dental     Cardiovascular       Pulmonary                     Anesthesia Plan      History & Physical Review  History and physical reviewed and following examination; no interval change.    ASA Status:  2 .        Plan for General with Intravenous induction.          Postoperative Care      Consents  Anesthetic plan, risks, benefits and alternatives discussed with:  Patient..

## 2017-10-11 NOTE — PROGRESS NOTES
Notified by intake staff that Joan with [facility] Attorney's office was calling.  Text page placed to Dr. Amos for approval to provide her with his pager number for hearing on Friday.

## 2017-10-11 NOTE — PROGRESS NOTES
Spoke with Dr. Amos via phone.  He will be in ECT during time of hearing.  Asks if Dr. Sandoval may be more available or Dr. Durant if she is not.  Will forward to Dr. Sandoval.

## 2017-10-11 NOTE — PROGRESS NOTES
Pt s mood was calm and had full range of affect. Pt was out in the dining area coloring this afternoon. Pt participated in group. Pt was evaluated by IR this morning for her port and was flushed with heparin per physician order this afternoon. Pt denied having SI or SIB but anxious. Pt is scheduled for ECT in the morning. NPO after 12 midnight.

## 2017-10-11 NOTE — PROGRESS NOTES
The pt was withdrawn but visible in the milieu.  She played cards with this writer and talked about looking forward to discharging.

## 2017-10-11 NOTE — PROGRESS NOTES
call regarding this pt  Received: Today       Celeste Barboza, Shona HUTCHISON RN       Phone Number: [phone]                     Hi Joan Franco from the [facility] attorney's office called regarding this pt, she said she spoke with you yesterday, and she was wondering what Dr. Amos's availability is on Friday, and what a good contact number for him on Friday would be. The attached number is Joan's. Thanks!
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
## 2017-10-11 NOTE — DISCHARGE INSTRUCTIONS
Behavioral Discharge Planning and Instructions    Summary:   You were admitted to [facility] on 10/4/17 with worsening depression, auditory hallucination and suicidal ideation      under the care of Dr. Mixon with         You met with Dr. Mixon and his team daily for ongoing psychiatric assessment and medication management.  You had opportunities to participate in therapeutic groups on the unit.   At this time you report your mood has stabilized and you report you are not having thoughts or intent to harm yourself or others. You will be discharged home and will resume care with your outpatient providers.    Disposition:    [facility]:  [phone] [address] [place] [address]      Diagnosis:   Schizophrenia; Cognitive Impairment    Major Treatments, Procedures and Findings:   Medications were  managed throughout your stay. An internal medicine consult was completed during your stay. You had the opportunity to participate in treatment programming while on the unit including occupational therapy, mental health support and education and spiritual services.   Patient completed 2 ECT treatment without complication.    Symptoms to Report:   Please report if you are experiencing increased aggression and/or confusion, problematic loss of sleep, worsening mood, or thoughts of suicide to your treatment team or notify your primary provider.   IF THE SYMPTOMS YOU ARE EXPERIENCING ARE A MEDICAL EMERGENCY, CALL 911 IMMEDIATELY    Lifestyle Adjustment:   1. Adjust your lifestyle to get enough sleep, relaxation, exercise and good nutrition.  Continue to develop healthy coping skills to decrease stress and promote a healthy  lifestyle.  2. Abstain from all substances of abuse.  3. Take medications as prescribed.  Please work with your doctor to discuss any concerns you have with your medications or side effects you may be experiencing.  4. Follow up with appointments as scheduled.      Health Care  Follow-up   Appointments: Dr. Dana Sandoval - 11/3/17 12:45pm  [facility] [address]  [address]  [place] [address]  [phone]     Resources:   [facility] Crisis: home-based therapeutic intervention from the Mobile Crisis Unit, or utilization of the crisis clinic for emergency counseling located at the [facility]. Please call [phone]  *[facility]: ([phone])  24-hour confidential telephone counseling   *[facility] Emergency Room: [phone]    General Medication Instructions:   See your medication sheet(s) for instructions.   Take all medicines as directed.  Make no changes unless your doctor suggests them.   Go to all your doctor visits.  Be sure to have all your required lab tests. This way, your medicines can be refilled on time.  Do not use any drugs not prescribed by your doctor.    The treatment team has appreciated the opportunity to work with you.  We wish you the best in the future.    If you have any questions or concerns our unit number is [phone].

## 2017-10-11 NOTE — DISCHARGE SUMMARY
----------------------------------------------------------------------------------------------------------  [facility], [place]   Discharge Summary  Hospital Day #7      Karolyn Banerjee MRN# [id number]   Age: 32 year old YOB: 1985     Date of Admission:  10/4/2017  Date of Discharge:  10/11/2017  Admitting Physician:  Robe Mixon MD  Discharge Physician:  Robe Mixon MD         Event Leading to Hospitalization:     Presentation: Karolyn Banerjee, a 32 year old female, with history of schizoaffective disorder - bipolar type and an unspecified neurocognitive disorder (IQ 62), presented to the ED due to suicidal ideation in the context of a recent suicide in her family and a plan to run out into traffic.     Diagnostic Impression: Patient was admitted with SI and a plan to run out into traffic. The patient's most recent [facility] psychiatric hospitalization was in 9/2017 for similar circumstances shortly after learning about her brother's suicide.  She also has a history of numerous hospitalizations in the past with similar presentations, with impulsivity and SI after psychosocial stressors. The patient has a history of neurocognitive disorder, and this may be inhibiting her ability to learn and apply appropriate coping skills. A major psychosocial stressor that is likely contributing to her decompensation is the recent completed suicide by her brother. 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 Discussion with the patient's mother suggests that she tends to be worse just before ECT, and her mother wonders if she may need ECT more often. She continues to endorse AH, although these appear to be unchanged from her baseline. She will benefit from assessment of her current ECT schedule and consideration of its frequency.       See Admission note by  Robe Mixon MD on 10/04/2017 for additional details.          Diagnoses:     # Schizoaffective disorder - bipolar type  # Unspecified neurocognitive disorder         Labs:     CBC wnl  CMP wnl         Consults:     None         Hospital Course:   Psychiatric Course:  Karolyn Banerjee was admitted to station 20 as a committed patient under the care of attending Dr. Mixon.  PTA medications were continued. After discussion with treatment team, decision was made to continue scheduled ECT during this hospitalization. Patient underwent scheduled ECT on 10/6. After ECT, patient's mood improved, and her SI resolved. Her affect appeared more bright, and she was more verbal during interviews. Two days after ECT she was more withdrawn and less communicative again. She underwent an additional ECT treatment on 10/11 with resolution of SI and improvement in psychotic symptoms (AH). No medication changes were made during this hospitalization. Note: patient's commitment and Peña-Wilkins will  before her next scheduled ECT treatment.      Medical Course:   -The patient was medically evaluated in the ED for chest pain. No acute changes on EKG, and she was medically cleared to be admitted to a psychiatric unit.  -Unable to obtain blood return from Port-A-Cath. Evaluated by IR. Port-A-Cath was found to be functional, but in a difficult position.     Suicide Risk Assessment: Today Karolyn Banerjee reports no SI. In addition, Karolyn Banerjee has notable risk factors for self-harm, including previous suicide attempts, psychosis and unspecified neurocognitive disorder. However, risk is mitigated by sobriety and ability to volunteer a safety plan. Therefore, based on all available evidence including the factors cited above, Karolyn Banerjee does not appear to be at imminent risk for self-harm, and is appropriate for outpatient level of care.     Karolyn Banerjee was discharged to group home. At the time of discharge Karolyn BAXTER  Chriss was determined to not be a danger to herself or others.    This document serves as a transfer of care to Karolyn Banerjee's outpatient providers.         Discharge Medications:        Review of your medicines      CONTINUE these medicines which have NOT CHANGED       Dose / Directions    benztropine 0.5 MG tablet   Commonly known as:  COGENTIN   Used for:  Schizoaffective disorder, bipolar type (H), Aggression        Dose:  0.5 mg   Take 1 tablet (0.5 mg) by mouth 2 times daily   Quantity:  60 tablet   Refills:  1       cetirizine 10 MG tablet   Commonly known as:  zyrTEC        Dose:  10 mg   Take 10 mg by mouth daily   Refills:  0       FISH OIL PO        Dose:  1000 mg   Take 1,000 mg by mouth daily   Refills:  0       hydrOXYzine 25 MG tablet   Commonly known as:  ATARAX   Used for:  Schizoaffective disorder, bipolar type (H), Aggression        Dose:  25-50 mg   Take 1-2 tablets (25-50 mg) by mouth every 4 hours as needed for anxiety   Quantity:  60 tablet   Refills:  2       lurasidone 80 MG Tabs tablet   Commonly known as:  LATUDA   Used for:  Schizoaffective disorder, bipolar type (H), Aggression        Dose:  160 mg   Take 2 tablets (160 mg) by mouth daily (with dinner)   Quantity:  60 tablet   Refills:  1       norethindrone-ethinyl estradiol 1-20 MG-MCG per tablet   Commonly known as:  MICROGESTIN 1/20        Dose:  1 tablet   Take 1 tablet by mouth daily   Refills:  0       OLANZapine 10 MG tablet   Commonly known as:  zyPREXA   Used for:  Schizoaffective disorder, bipolar type (H), Aggression        Dose:  10 mg   Take 1 tablet (10 mg) by mouth 2 times daily as needed   Quantity:  30 tablet   Refills:  0       perphenazine 16 MG tablet   Used for:  Schizoaffective disorder, depressive type (H)        Dose:  16 mg   Take 1 tablet (16 mg) by mouth 2 times daily   Quantity:  60 tablet   Refills:  0       traZODone 50 MG tablet   Commonly known as:  DESYREL   Used for:  Schizoaffective disorder,  "bipolar type (H), Aggression        Dose:  50 mg   Take 1 tablet (50 mg) by mouth At Bedtime   Quantity:  30 tablet   Refills:  1                  Psychiatric Examination:   Appearance:  awake and alert, wearing scrubs, sat hunched over  Attitude:  guarded  Eye Contact:  fair  Mood: \"fine\"  Affect:  intensity is flat  Speech: clear and coherent with marked speech latency  Psychomotor Behavior:  no evidence of tardive dyskinesia, dystonia, or tics  Thought Process: mostly linear  Associations:  somehwat loose  Thought Content: marked poverty of content, no SI/SIB, no AH  Insight:  limited  Judgment:  limited  Oriented to:  time, person, and place  Attention Span and Concentration:  fair  Recent and Remote Memory:  fair  Language: english with appropriate syntax and vocabulary  Fund of Knowledge: delayed  Muscle Strength and Tone: normal  Gait and Station: normal station, gait is slowed         Discharge Plan:     Appointments: Dr. Dana Sandoval - 11/3/17 12:45pm  [facility] 2nd Floor [facility]  [address] [place] [id number]  [phone]      ------------------------------------------------------------------------------------------------------  Patient seen and discussed with attending psychiatrist Robe Mixon, who agrees with my assessment and plan.    Dave Aguero PGY-2  pg [phone]   10:49 AM 10/11/2017     ATTENDING:  Robe STAPLETON, saw and evaluated the patient with the resident physician.  I agree with the findings and plan of care as documented in the resident note.  I have reviewed all labs and vital signs.  I, Robe Mixon, have reviewed this summary and agree with the findings and discharge plan as written.        "

## 2017-10-11 NOTE — ANESTHESIA POSTPROCEDURE EVALUATION
Patient: Karolyn Banerjee    * No procedures listed *    Diagnosis:Schizoaffective disorder, depressive type (H) [F25.1]  Diagnosis Additional Information: No value filed.    Anesthesia Type:  General    Note:  Anesthesia Post Evaluation    Patient location during evaluation: PACU  Patient participation: Able to fully participate in evaluation  Level of consciousness: awake and alert  Pain management: adequate  Airway patency: patent  Cardiovascular status: acceptable  Respiratory status: acceptable  Hydration status: acceptable  PONV: none     Anesthetic complications: None          Last vitals:  Vitals:    10/10/17 0809 10/10/17 2000 10/11/17 0638   BP:  154/84 142/89   Pulse:      Resp:      Temp: 36.8  C (98.3  F)  36.8  C (98.3  F)   SpO2:   92%         Electronically Signed By: Shane Barrientos MD, MD  October 11, 2017  8:06 AM

## 2017-10-11 NOTE — PROGRESS NOTES
Patient is being discharged back to  today.  Patient completed ECT this morning without complication.  She has continued to do well on the unit and appears to be stable.  Patient reports mood is good and she denies any further thoughts of self harm   I spoke to  staff and they pick her up this afternoon.  Dr. Tineo spoke to patient's mother and she is comfortable with this plan.  Patient will resume outpatient care with Dr Sandoval.

## 2017-10-12 ENCOUNTER — CARE COORDINATION (OUTPATIENT)
Dept: PSYCHIATRY | Facility: CLINIC | Age: 32
End: 2017-10-12

## 2017-10-12 NOTE — PROGRESS NOTES
Rec'd responses from Dr. Sandoval and Dr. Durant that they may be available tomorrow but can not be guaranteed.  Contacted 's office speaking with Galina.  Provided contact information for Dr. Sandoval, Dr. Durant, and Dr. Amos.  Explained that each provider may not be readily available d/t other commitments.  Also provided my direct number if unable to reach any of these providers.

## 2017-10-12 NOTE — PROGRESS NOTES
"  Rcvd Discharge Summary: yes  Compare Pt Discharge summary to that in EPIC: slightly different - the date for the follow up is listed as 11/3 in EPIC but 11/6 in the document provided the patient   Able to get meds filled: yes   Mood: \"good\"  Her group home staff said that she has been doing very well, and participated in a day program beginning yesterday.  Her mood is reported to be stable.  SE: none reported, staff (Sarah) reported that she has never had problems with her meds    DC Plan:  Admit Date: 10/04/17  Discharge date: 10/11/17  Next appt: 10/18/17 at 9:35 am with Dr. Sandoval, 11/6 at 12:45 pm on discharge papers  Referrals (therapy, daytx, homecare, ect): continue with ECT  Crisis Plan: in place   Contact Numbers Reviewed: in AVS     TCM:   Direct contact made with pt within 2 business days? yes  Pt is schedule in clinic within 14 days of discharge? yes  Pt qualifies for TCM visit? no       Patient has a hearing on Friday, 10/13 for Brian and Júnior.  "

## 2017-10-12 NOTE — PROGRESS NOTES
Pt discharged as per order.  Staff from pts group home came and picked pt up. Pt denied SI. Pt had ECT pt alert and oriented upon return to unit, denied headache, nausea,  Ate breakfast.  Reviewed discharge paperwork with pt.  Pt discharged with all belongings, discharge paperwork.

## 2017-10-21 ENCOUNTER — HOSPITAL ENCOUNTER (EMERGENCY)
Facility: CLINIC | Age: 32
Discharge: HOME | End: 2017-10-21
Attending: PSYCHIATRY & NEUROLOGY | Admitting: PSYCHIATRY & NEUROLOGY
Payer: MEDICAID

## 2017-10-21 VITALS
DIASTOLIC BLOOD PRESSURE: 94 MMHG | HEART RATE: 84 BPM | TEMPERATURE: 96.8 F | RESPIRATION RATE: 16 BRPM | SYSTOLIC BLOOD PRESSURE: 142 MMHG | OXYGEN SATURATION: 96 %

## 2017-10-21 DIAGNOSIS — Z65.8 RELATIONAL PROBLEM: ICD-10-CM

## 2017-10-21 DIAGNOSIS — F60.3 BORDERLINE PERSONALITY DISORDER (H): ICD-10-CM

## 2017-10-21 PROCEDURE — 80307 DRUG TEST PRSMV CHEM ANLYZR: CPT | Mod: 59 | Performed by: FAMILY MEDICINE

## 2017-10-21 PROCEDURE — 81025 URINE PREGNANCY TEST: CPT | Performed by: FAMILY MEDICINE

## 2017-10-21 PROCEDURE — 99284 EMERGENCY DEPT VISIT MOD MDM: CPT | Mod: Z6 | Performed by: PSYCHIATRY & NEUROLOGY

## 2017-10-21 PROCEDURE — 80320 DRUG SCREEN QUANTALCOHOLS: CPT | Performed by: FAMILY MEDICINE

## 2017-10-21 PROCEDURE — 90791 PSYCH DIAGNOSTIC EVALUATION: CPT

## 2017-10-21 PROCEDURE — 80307 DRUG TEST PRSMV CHEM ANLYZR: CPT | Performed by: FAMILY MEDICINE

## 2017-10-21 PROCEDURE — 99285 EMERGENCY DEPT VISIT HI MDM: CPT | Mod: 25 | Performed by: PSYCHIATRY & NEUROLOGY

## 2017-10-21 ASSESSMENT — ENCOUNTER SYMPTOMS
CARDIOVASCULAR NEGATIVE: 1
HALLUCINATIONS: 0
CONSTITUTIONAL NEGATIVE: 1
HEMATOLOGIC/LYMPHATIC NEGATIVE: 1
GASTROINTESTINAL NEGATIVE: 1
EYES NEGATIVE: 1
MUSCULOSKELETAL NEGATIVE: 1
ENDOCRINE NEGATIVE: 1
RESPIRATORY NEGATIVE: 1
NEUROLOGICAL NEGATIVE: 1
NERVOUS/ANXIOUS: 1

## 2017-10-21 NOTE — ED NOTES
Per [facility] police report, pt was standing in traffic and actively asking people to hit her. Pt comes into [facility] ED voluntarily.

## 2017-10-21 NOTE — ED AVS SNAPSHOT
[facility], Emergency Department    [address]    [place] [address]    Phone:  [phone]    Fax:  [phone]                                       Karolyn Banerjee   MRN: [id number]    Department:  [facility], Emergency Department   Date of Visit:  10/21/2017           Patient Information     Date Of Birth          1985        Your diagnoses for this visit were:     Borderline personality disorder     Relational problem        You were seen by Chester Lou MD.      Follow-up Information     Follow up with Suzie Mariscal.    Specialty:  Family Practice    Contact information:    [facility]  [address] [facility] [address] [phone]          Discharge Instructions       Work on using your Support and Coping Plan  Follow-up established care and services.    Future Appointments        Provider Department Dept Phone Center    11/6/2017 12:45 PM Dana Sandoval MD Psychiatry Clinic [phone] [facility]      24 Hour Appointment Hotline       To make an appointment at any [facility], call [phone] ([phone]). If you don't have a family doctor or clinic, we will help you find one. [facility] are conveniently located to serve the needs of you and your family.             Review of your medicines      Our records show that you are taking the medicines listed below. If these are incorrect, please call your family doctor or clinic.        Dose / Directions Last dose taken    benztropine 0.5 MG tablet   Commonly known as:  COGENTIN   Dose:  0.5 mg   Quantity:  60 tablet        Take 1 tablet (0.5 mg) by mouth 2 times daily   Refills:  1        cetirizine 10 MG tablet   Commonly known as:  zyrTEC   Dose:  10 mg        Take 10 mg by mouth daily   Refills:  0        FISH OIL PO   Dose:  1000 mg        Take 1,000 mg by mouth daily   Refills:  0        hydrOXYzine 25 MG tablet   Commonly known as:  ATARAX   Dose:  25-50 mg   Quantity:   60 tablet        Take 1-2 tablets (25-50 mg) by mouth every 4 hours as needed for anxiety   Refills:  2        lurasidone 80 MG Tabs tablet   Commonly known as:  LATUDA   Dose:  160 mg   Quantity:  60 tablet        Take 2 tablets (160 mg) by mouth daily (with dinner)   Refills:  1        norethindrone-ethinyl estradiol 1-20 MG-MCG per tablet   Commonly known as:  MICROGESTIN 1/20   Dose:  1 tablet        Take 1 tablet by mouth daily   Refills:  0        OLANZapine 10 MG tablet   Commonly known as:  zyPREXA   Dose:  10 mg   Quantity:  30 tablet        Take 1 tablet (10 mg) by mouth 2 times daily as needed   Refills:  0        perphenazine 16 MG tablet   Dose:  16 mg   Quantity:  60 tablet        Take 1 tablet (16 mg) by mouth 2 times daily   Refills:  0        traZODone 50 MG tablet   Commonly known as:  DESYREL   Dose:  50 mg   Quantity:  30 tablet        Take 1 tablet (50 mg) by mouth At Bedtime   Refills:  1                Procedures and tests performed during your visit     Drug abuse screen 6 urine (tox)    HCG qualitative urine      Orders Needing Specimen Collection     None      Pending Results     No orders found from 10/19/2017 to 10/22/2017.            Pending Culture Results     No orders found from 10/19/2017 to 10/22/2017.            Pending Results Instructions     If you had any lab results that were not finalized at the time of your Discharge, you can call the ED Lab Result RN at [phone]. You will be contacted by this team for any positive Lab results or changes in treatment. The nurses are available 7 days a week from 10A to 6:30P.  You can leave a message 24 hours per day and they will return your call.        Thank you for choosing [facility]       Thank you for choosing [facility] for your care. Our goal is always to provide you with excellent care. Hearing back from our patients is one way we can continue to improve our services. Please take a few minutes to complete the written survey that you  "may receive in the mail after you visit with us. Thank you!        [facility] Information     [facility] lets you send messages to your doctor, view your test results, renew your prescriptions, schedule appointments and more. To sign up, go to [email or web address] . Click on \"Log in\" on the left side of the screen, which will take you to the Welcome page. Then click on \"Sign up Now\" on the right side of the page.     You will be asked to enter the access code listed below, as well as some personal information. Please follow the directions to create your username and password.     Your access code is: [email or web address]  Expires: 2017  9:39 AM     Your access code will  in 90 days. If you need help or a new code, please call your [facility] clinic or [phone].        Care EveryWhere ID     This is your Care EveryWhere ID. This could be used by other organizations to access your [facility] medical records  [id number]        Equal Access to Services     MAISHA STRICKLAND : Hadii reginald christiansono Sotawanda, waaxda luqadaha, qaybta kaalmada adejerry, nando pizarro . So [facility] [phone].    ATENCIÓN: Si habla español, tiene a stauffer disposición servicios gratuitos de asistencia lingüística. Llame al [phone].    We comply with applicable federal civil rights laws and [place] laws. We do not discriminate on the basis of race, color, national origin, age, disability, sex, sexual orientation, or gender identity.            After Visit Summary       This is your record. Keep this with you and show to your community pharmacist(s) and doctor(s) at your next visit.                  "

## 2017-10-21 NOTE — ED NOTES
Bed: HW01  Expected date:   Expected time:   Means of arrival:   Comments:  Margaret PD  32 female  LUCIEN salas

## 2017-10-21 NOTE — ED NOTES
Pt arrives to [facility]. Psych Associate explains process to pt; they will meet with a doctor and a mental health accessor and a plan will be determined from there.  Pt offered nutrition, fluids and comfort measures and told it may be a 2-5 hour time frame for complete assessment.

## 2017-10-21 NOTE — ED AVS SNAPSHOT
[facility], Emergency Department    [address]    [facility] [address]    Phone:  [phone]    Fax:  [phone]                                       Karolyn Banerjee   MRN: [id number]    Department:  [facility], Emergency Department   Date of Visit:  10/21/2017           After Visit Summary Signature Page     I have received my discharge instructions, and my questions have been answered. I have discussed any challenges I see with this plan with the nurse or doctor.    ..........................................................................................................................................  Patient/Patient Representative Signature      ..........................................................................................................................................  Patient Representative Print Name and Relationship to Patient    ..................................................               ................................................  Date                                            Time    ..........................................................................................................................................  Reviewed by Signature/Title    ...................................................              ..............................................  Date                                                            Time

## 2017-10-22 NOTE — ED PROVIDER NOTES
History     Chief Complaint   Patient presents with     Suicidal     The history is provided by the patient and medical records.     Karolyn Banerjee is a 32 year old female who is here accompanied by staff from her group home. Patient had gone out for a walk and started to go out in the streets, asking drivers to run her over. Patient was brought here by EMS as she was making suicidal threats. She reports conflict with her roommate (Jenni who was telling patient to mind her own business). She was hospitalized recently and discharged 10 days ago due to making suicidal threats. She has history of borderline personality disorder and schizoaffective disorder. She reports wanting to be admitted as she feels depressed. I discussed that her depression is chronic and does not justify admission. She then reported that she is suicidal. She admits the trigger is conflict with her group home peers. I discussed with her that admission will not alter the situation as she will still need to deal with the conflict when discharged home. She is not exhibiting a thought disorder, nor psychosis. She has been taking her meds.    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 Gardner     Mild developmental delay      Myopia      Neuroleptic-induced tardive dyskinesia      Nonorganic enuresis      Refractive amblyopia      Schizoaffective disorder, bipolar type (H)     Follows with Dr. Cooley at her group home.      Sleep disorder      PAST SURGICAL HISTORY  Past Surgical History:   Procedure Laterality Date     HRW PORT A CATH Right      NO HISTORY OF SURGERY       FAMILY HISTORY  Family History   Problem Relation Age of Onset     MENTAL ILLNESS Father      mild  developmental delay     Schizophrenia Other      uncle     SOCIAL HISTORY  Social History   Substance Use Topics     Smoking status: Never Smoker     Smokeless tobacco: Never Used     Alcohol use No     MEDICATIONS  No current facility-administered medications for this encounter.      Current Outpatient Prescriptions   Medication     norethindrone-ethinyl estradiol (MICROGESTIN 1/20) 1-20 MG-MCG per tablet     perphenazine 16 MG tablet     traZODone (DESYREL) 50 MG tablet     benztropine (COGENTIN) 0.5 MG tablet     lurasidone (LATUDA) 80 MG TABS tablet     OLANZapine (ZYPREXA) 10 MG tablet     Omega-3 Fatty Acids (FISH OIL PO)     hydrOXYzine (ATARAX) 25 MG tablet     cetirizine (ZYRTEC) 10 MG tablet     ALLERGIES  Allergies   Allergen Reactions     Clozapine      Agranulocytosis x 2, cannot be re trialed      Risperdal Other (See Comments)     dystonia     Tape [Adhesive Tape] Rash     Plastic tape         I have reviewed the Medications, Allergies, Past Medical and Surgical History, and Social History in the Epic system.    Review of Systems   Constitutional: Negative.    HENT: Negative.    Eyes: Negative.    Respiratory: Negative.    Cardiovascular: Negative.    Gastrointestinal: Negative.    Endocrine: Negative.    Genitourinary: Negative.    Musculoskeletal: Negative.    Skin: Negative.    Neurological: Negative.    Hematological: Negative.    Psychiatric/Behavioral: Positive for behavioral problems and suicidal ideas. Negative for hallucinations. The patient is nervous/anxious.    All other systems reviewed and are negative.      Physical Exam   BP: (!) 130/93  Pulse: 93  Temp: 98.5  F (36.9  C)  Resp: 16  SpO2: 98 %      Physical Exam   Constitutional: She appears well-developed and well-nourished.   HENT:   Head: Normocephalic.   Eyes: Pupils are equal, round, and reactive to light.   Pulmonary/Chest: Effort normal.   Musculoskeletal: Normal range of motion.   Neurological: She is alert.   Psychiatric: Her  "speech is normal. Thought content normal. Her affect is blunt and inappropriate. She is withdrawn. She is not agitated, not aggressive, not hyperactive and not combative. Thought content is not paranoid and not delusional. Cognition and memory are normal. She expresses inappropriate judgment. She expresses no homicidal ideation. She is inattentive.   Nursing note and vitals reviewed.      ED Course     ED Course     Procedures      Labs Ordered and Resulted from Time of ED Arrival Up to the Time of Departure from the ED   DRUG ABUSE SCREEN 6 CHEM DEP URINE ([facility])   HCG QUALITATIVE URINE            Assessments & Plan (with Medical Decision Making)   Patient with borderline personality disorder and limited coping. She was having conflict with her roommate. She now feels better upon learning that a supportive staff is here. She was shown her \"Support and Coping Plan\" signed on discharge on 10/11/17. She is agreeing to abiding to the coping strategies listed. She can be discharged. She is to follow-up established care and services.    I have reviewed the nursing notes.    I have reviewed the findings, diagnosis, plan and need for follow up with the patient.    New Prescriptions    No medications on file       Final diagnoses:   Borderline personality disorder   Relational problem       10/21/2017   [facility], [place], EMERGENCY DEPARTMENT     Chester Lou MD  10/21/17 2116    "

## 2017-10-26 DIAGNOSIS — F25.1 SCHIZOAFFECTIVE DISORDER, DEPRESSIVE TYPE (H): ICD-10-CM

## 2017-10-27 RX ORDER — PERPHENAZINE 16 MG/1
16 TABLET ORAL 2 TIMES DAILY
Qty: 60 TABLET | Refills: 0 | Status: SHIPPED | OUTPATIENT
Start: 2017-10-27 | End: 2017-11-22

## 2017-10-27 NOTE — TELEPHONE ENCOUNTER
Medication requested: perphenazine 16 MG tablet  Last refilled: 9-25-17  Qty: 60      Last seen: 10-4-17  RTC: tbd  Cancel: 1  No-show: 0  Next appt: 11-6-17    Refill decision: Refill pended and routed to the provider for review/determination due to cancellation x1.      Kathleen M Doege RN

## 2017-11-06 ENCOUNTER — OFFICE VISIT (OUTPATIENT)
Dept: PSYCHIATRY | Facility: CLINIC | Age: 32
End: 2017-11-06
Attending: PSYCHIATRY & NEUROLOGY
Payer: MEDICAID

## 2017-11-06 VITALS
SYSTOLIC BLOOD PRESSURE: 121 MMHG | BODY MASS INDEX: 44.92 KG/M2 | DIASTOLIC BLOOD PRESSURE: 79 MMHG | WEIGHT: 265.8 LBS | HEART RATE: 86 BPM

## 2017-11-06 DIAGNOSIS — F25.0 SCHIZOAFFECTIVE DISORDER, BIPOLAR TYPE (H): Primary | ICD-10-CM

## 2017-11-06 PROCEDURE — 99212 OFFICE O/P EST SF 10 MIN: CPT | Mod: ZF

## 2017-11-06 ASSESSMENT — PATIENT HEALTH QUESTIONNAIRE - PHQ9: SUM OF ALL RESPONSES TO PHQ QUESTIONS 1-9: 4

## 2017-11-06 NOTE — MR AVS SNAPSHOT
After Visit Summary   2017    Karolyn Banerjee    MRN: [id number]           Patient Information     Date Of Birth          1985        Visit Information        Provider Department      2017 12:45 PM Dana Sandoval MD Psychiatry Clinic        Today's Diagnoses     Schizoaffective disorder, bipolar type (H)    -  1      Care Instructions    -continue current medications, no changes  -please contact clinic with new  information and any update regarding court proceedings for commitment, Torres and Peña Wilkins  -return to clinic in 4 weeks or sooner if needed            Follow-ups after your visit        Follow-up notes from your care team     Return in about 4 weeks (around 2017) for 30 MFU.      Who to contact     Please call your clinic at [phone] to:    Ask questions about your health    Make or cancel appointments    Discuss your medicines    Learn about your test results    Speak to your doctor   If you have compliments or concerns about an experience at your clinic, or if you wish to file a complaint, please contact [facility] Patient Relations at [phone] or email us at [email or web address]         Additional Information About Your Visit        MyChart Information     WiFastt is an electronic gateway that provides easy, online access to your medical records. With [employer], you can request a clinic appointment, read your test results, renew a prescription or communicate with your care team.     To sign up for WiFastt visit the website at [email or web address]   You will be asked to enter the access code listed below, as well as some personal information. Please follow the directions to create your username and password.     Your access code is: [email or web address]  Expires: 2017  8:39 AM     Your access code will  in 90 days. If you need help or a new code, please contact your [facility] or call [phone] for assistance.        Care EveryWhere ID     This is your Care EveryWhere ID. This could be used by other organizations to access your [place] medical records  [id number]        Your Vitals Were     Pulse BMI (Body Mass Index)                86 44.92 kg/m2           Blood Pressure from Last 3 Encounters:   11/06/17 121/79   10/21/17 (!) 142/94   10/11/17 141/88    Weight from Last 3 Encounters:   11/06/17 120.6 kg (265 lb 12.8 oz)   10/10/17 121.1 kg (267 lb)   10/04/17 120.5 kg (265 lb 9.6 oz)              Today, you had the following     No orders found for display       Primary Care Provider Office Phone # Fax #    Suzie Mariscal [phone]       [place] [address]        Equal Access to Services     KAYA STRICKLAND : Hadii aad ku hadasho Soomaali, waaxda luqadaha, qaybta kaalmada adeegyada, waxay shinein hayharvinder pizarro . So [facility] [phone].    ATENCIÓN: Si habla español, tiene a stauffer disposición servicios gratuitos de asistencia lingüística. Kiera al [phone].    We comply with applicable federal civil rights laws and [place] laws. We do not discriminate on the basis of race, color, national origin, age, disability, sex, sexual orientation, or gender identity.            Thank you!     Thank you for choosing PSYCHIATRY CLINIC  for your care. Our goal is always to provide you with excellent care. Hearing back from our patients is one way we can continue to improve our services. Please take a few minutes to complete the written survey that you may receive in the mail after your visit with us. Thank you!             Your Updated Medication List - Protect others around you: Learn how to safely use, store and throw away your medicines at www.disposemymeds.org.          This list is accurate as of: 11/6/17 11:59 PM.  Always use your most recent med list.                   Brand Name Dispense  Instructions for use Diagnosis    benztropine 0.5 MG tablet    COGENTIN    60 tablet    Take 1 tablet (0.5 mg) by mouth 2 times daily    Schizoaffective disorder, bipolar type (H), Aggression       cetirizine 10 MG tablet    zyrTEC     Take 10 mg by mouth daily        FISH OIL PO      Take 1,000 mg by mouth daily        hydrOXYzine 25 MG tablet    ATARAX    60 tablet    Take 1-2 tablets (25-50 mg) by mouth every 4 hours as needed for anxiety    Schizoaffective disorder, bipolar type (H), Aggression       lurasidone 80 MG Tabs tablet    LATUDA    60 tablet    Take 2 tablets (160 mg) by mouth daily (with dinner)    Schizoaffective disorder, bipolar type (H), Aggression       norethindrone-ethinyl estradiol 1-20 MG-MCG per tablet    MICROGESTIN 1/20     Take 1 tablet by mouth daily        OLANZapine 10 MG tablet    zyPREXA    30 tablet    Take 1 tablet (10 mg) by mouth 2 times daily as needed    Schizoaffective disorder, bipolar type (H), Aggression       perphenazine 16 MG tablet     60 tablet    Take 1 tablet (16 mg) by mouth 2 times daily    Schizoaffective disorder, depressive type (H)       traZODone 50 MG tablet    DESYREL    30 tablet    Take 1 tablet (50 mg) by mouth At Bedtime    Schizoaffective disorder, bipolar type (H), Aggression

## 2017-11-06 NOTE — PATIENT INSTRUCTIONS
-continue current medications, no changes  -please contact clinic with new  information and any update regarding court proceedings for commitment, Brian and Casey Wilkins  -return to clinic in 4 weeks or sooner if needed

## 2017-11-06 NOTE — PROGRESS NOTES
"PSYCHIATRY CLINIC PROGRESS NOTE   The initial diagnostic evaluation was on prior to 2008.  Date of the most recent transfer of care montse is 8/7/17.    Guardian: Mary \"Swetha\" Chriss (mother)- [phone] (okay to leave message) or [phone]    Pertinent Background:  This patient first experienced mental health issues as a child and has received treatment for Schizoaffective disorder bipolar type, borderline personality disorder and unspecified neurocognitive disorder.  See transfer evaluation for detailed history.  Notably, she has multiple medication trials and hospitalizations treating aggression and perceptual disturbances (including clozapine with neutropenia x2).  ECT combined with multiple medications has been most helpful in maintaining stability.  She is under commitment and Torres. Peña-Wilkins order also in place (paperwork filed for renewal in September, decision to be made November 2017), authorizes treatment up to two times per week for maintenance.  10/28/2013: full scale IQ test of 62.  After neutropenia with clozapine x's 2, has required multiple neuroleptics as well as ECT to maintain stability.       Psych critical item history includes suicide attempt [multiple], suicidal ideation, SIB [indicated per chart review, pt denies], psychosis [sxs include AH, delusions relating to pregnancy], mutiple psychotropic trials, severe med reaction, psych hosp (>5), commitment and ECT.    INTERIM HISTORY                                                 Karolyn Banerjee is a 32 year old female who was last seen for medication management on 10/4/17 for hospital follow up at which time no changes were made, however she was agreeable to voluntary hospitalization from that appointment due to continued suicidal ideation with several plans and intent. The patient reports good treatment adherence.  History was provided by the patient and a new  staff person who was a fair to poor historians.  Since the last " visit:  -this clinic attempted to have pt reschedule for earlier appointment given recent hospitalization but did not receive call back from patient or  staff  -was hospitalized 10/4/17- 10/11/17, no medication changes were made but she received 2 ECT treatments to target continued depression and suicidal ideation, both of which improved following second ECT treatment  -she, however, states she is unsure if ECT is helpful for her symptoms  -had her hearing for renewal of commitment, Brian and Casey Wilkins, they did not receive any notice yet about the decision therefore no upcoming appointment for ECT  -continues to see her boyfriend on Tuesday and Thursday and recently went to lunch with him and a friend  -no safety concerns today, denies having any recent suicidal thoughts or plans  -was evaluated in ED at the end of October for increasing SI with plan after getting into an argument with another  member  -no recent SIB  -denies having any side effects to medications  -no physical health concerns    RECENT SYMPTOMS:   DEPRESSION:  reports-anhedonia, low energy, insomnia , hypersomnia and poor concentration /memory;  DENIES- suicidal ideation , depressed mood, weight/ appetite change (decrease or increase), excessive guilt and psychomotor change observed by others (none)  JUDE/HYPOMANIA:  reports-none;  DENIES- increased energy, decreased sleep need, increased activity and grandiosity  PSYCHOSIS:  reports-auditory hallucinations with commands [details in Interim History];  DENIES- delusions and visual hallucinations  DYSREGULATION:  reports-mood dysregulation and impulsive;  DENIES- suicidal ideation, violent ideation and SIB  PANIC ATTACK:  none   ANXIETY:  none  TRAUMA RELATED:  none  COMPULSIVE:  none  SLEEP:  as above    EATING DISORDER: none    RECENT SUBSTANCE USE:     ALCOHOL- none          TOBACCO- none               CAFFEINE- 2 sodas/day  OPIOIDS- none       NARCAN KIT- N/A       CANNABIS- none           OTHER ILLICIT DRUGS- none     CURRENT SOCIAL HISTORY:  FINANCIAL SUPPORT- social security disability       CHILDREN- none       LIVING SITUATION- lives in group home ([facility]) in [place]      SOCIAL/ SPIRITUAL SUPPORT- her boyfriend Remy, [employer] worker, CM, [facility] staff       FEELS SAFE AT HOME- Yes     MEDICAL ROS:  Reports sedation      Denies GI sxs [N/V/D], weight gain, sexual dysfunction [none] and akathisia, muscle problems [stiffness], unusual movements, polydipsia, polyphagia and Parkinsonian-type symptoms [shuffling gait, masked facies, stooped posture, speech change, writing change]  PSYCH and CD Critical Summary Points since July 2017           Pt hospitalized for suicidal ideation/attempt September 19-25 following death of brother  Perphenazine increased to 16mg BID during hospitalization  Inpatient psychiatric treatment 10/4/17 to 10/11/17 due to active suicidal ideation with plans and intent - no medication change, received ECT x 2    PAST PSYCH MED TRIALS   see EMR Problem List: Hx of psychiatric care    MEDICAL / SURGICAL HISTORY                                   CARE TEAM:   PCP- Dr. Isamar Spear          Therapist- none    [facility]: Joan through [facility] meets 1x/week  through [facility] recently changed    Pregnant or breastfeeding:  No      Contraception- on OCP    Neurologic Hx [head injury etc]:  Denies seizure hx or head injury with LOC  Patient Active Problem List   Diagnosis     Mild cognitive impairment     Borderline personality disorder     Asthma     Nonorganic enuresis     Schizoaffective disorder, depressive type (H)     Fx humerus shaft-closed     Port catheter in place     MENTAL HEALTH     Suicidal ideation     Hx of psychiatric care     DVT (deep vein thrombosis) in pregnancy (H)     Suicide attempt       ALLERGY                                Clozapine; Risperdal; and Tape [adhesive tape]  MEDICATIONS                               Current Outpatient Prescriptions   Medication  Sig Dispense Refill     perphenazine 16 MG tablet Take 1 tablet (16 mg) by mouth 2 times daily 60 tablet 0     norethindrone-ethinyl estradiol (MICROGESTIN 1/20) 1-20 MG-MCG per tablet Take 1 tablet by mouth daily       traZODone (DESYREL) 50 MG tablet Take 1 tablet (50 mg) by mouth At Bedtime 30 tablet 1     benztropine (COGENTIN) 0.5 MG tablet Take 1 tablet (0.5 mg) by mouth 2 times daily 60 tablet 1     lurasidone (LATUDA) 80 MG TABS tablet Take 2 tablets (160 mg) by mouth daily (with dinner) 60 tablet 1     OLANZapine (ZYPREXA) 10 MG tablet Take 1 tablet (10 mg) by mouth 2 times daily as needed 30 tablet 0     Omega-3 Fatty Acids (FISH OIL PO) Take 1,000 mg by mouth daily        hydrOXYzine (ATARAX) 25 MG tablet Take 1-2 tablets (25-50 mg) by mouth every 4 hours as needed for anxiety 60 tablet 2     cetirizine (ZYRTEC) 10 MG tablet Take 10 mg by mouth daily       VITALS   /79  Pulse 86  Wt 120.6 kg (265 lb 12.8 oz)  BMI 44.92 kg/m2   MENTAL STATUS EXAM                                                           Alertness: alert , oriented and slow to respond  Appearance: adequately groomed  Behavior/Demeanor: cooperative and calm, with fair  eye contact   Speech: increased latency of response  Language: no problems  Psychomotor: slowed somewhat  Mood: depressed  Affect: flat; was congruent to mood; was congruent to content  Thought Process/Associations: unremarkable  Thought Content: Denies suicidal ideation currently [details re: recent suicidal ideation in Interim History]; violent ideation and delusions  Perception:  Reports auditory hallucinations with commands [details in Interim History];  Denies visual hallucinations  Insight: limited  Judgment: limited  Cognition: does  appear grossly intact however below average; formal cognitive testing was not done    LABS and DATA   RATING SCALES:  AIMS: done 8/7/17 with total score of 0    EKG:  April 2017 which showed QTc of 424ms; next EKG due April  2018    PHQ9 TODAY = 7  PHQ-9 SCORE 8/7/2017 9/6/2017 10/4/2017   Total Score - - -   Total Score 7 2 7     ANTIPSYCHOTIC LABS   [glu, A1C, lipids (focus LDL), liver enzymes, WBC, ANEU, Hgb, plts]    q12 mo  Recent Labs   Lab Test  10/05/17   1108  09/21/17   0750  04/12/17   1024   GLC  81  80  83   A1C   --    --   5.0     Recent Labs   Lab Test  09/21/17   0750  04/12/17   1024   CHOL  181  207*   TRIG  144  143   LDL  108*  135*   HDL  44*  43     Recent Labs   Lab Test  10/05/17   1108  09/21/17   0750   AST  18  18   ALT  22  21   ALKPHOS  64  76     Recent Labs   Lab Test  10/05/17   1108  09/21/17   0750   WBC  7.0  6.3   ANEU  4.7  4.2   HGB  15.5  15.3   PLT  154  118*     DIAGNOSIS     Schizoaffective disorder, bipolar type, depressed episode, mild to moderate, with psychosis symptoms at baseline  Unspecified neurocognitive disorder     ASSESSMENT                                     TODAY Karolyn reports stability in symptoms since being discharged from the hospital which include chronic intermittent suicidal thoughts in context of relationship conflicts but today denies having any safety concerns. She continues to wait for official ruling on renewal of commitment, Torres and Peña Wilkins. She advocates for no medication changes today, which is reasonable. She is unsure if ECT treatments have been helpful, but did have some benefit noted with the 2 treatments she received while hospitalized. She had a new  staff member with her today at her appointment who was not as familiar with Karolyn's case but did check during appointment to see if they had received any word from the court regarding her hearing, which they did not. They agree to contact clinic should they receive update and then will plan to discuss next date of ECT treatment. Would plan to resume maintenance treatment schedule every 6 weeks as she had been receiving prior to increased stressor surrouding her brother's death but again will wait  for official decision of Casey Wilkins prior to scheduling. She will return to clinic in 4 weeks or sooner if needed.              SUICIDE RISK ASSESSMENT [details described above]:  Today Karolyn Banerjee reports no suicidal ideation or urges for self harm or thoughts to harm others.  In addition, she has notable risk factors for self-harm including recent loss, relationship conflict, previous suicide attempt and recent inpt stay.  However, risk is mitigated by no plan or intent, symptom improvement, none to minimal alcohol use  and stable housing.  Based on all available evidence she does not appear to be at imminent risk for self-harm therefore does not meet criteria for a 72-hr hold/involuntary hospitalization.  However, based on degree of symptoms close psych FU was recommended which the pt did agree to.      [place] PRESCRIPTION MONITORING PROGRAM [] was not checked today:  not using controlled substances.    PSYCHOTROPIC DRUG INTERACTIONS:   Hydroxyzine and olanzapine or perphenazine or trazodone may result in increased risk of QT interval prolongation.    Perphenazine and benztropine may result in decreased phenothiazine serum concentrations, decreased phenothiazine effectiveness, enhanced anticholinergic effects (ileus, hyperpyrexia, sedation, dry mouth).   Trazodone and perphenazine may result in hypotension.  MANAGEMENT:  Monitoring for adverse effects, routine vitals, periodic EKGs and patient is aware of risks     PLAN                                                                                                       1) PSYCHOTROPIC MEDICATIONS: no changes today, due to pt insurance medications must be ordered by attending      - lurasidone 160 mg with dinner      - olanzapine 10 mg BID prn for psychosis      - perphenazine 16 mg BID      - benztropine 0.5 mg BID      - trazodone 50mg at bedtime      - hydroxyzine 25-50 mg q4 hrs prn for anxiety     Continue ECT treatments (pending renewal of Price  Franky), currently [id number], with Dr. Amos, continue to evaluate need for more frequent treatments    2) THERAPY:    None currently  TREATMENT PLAN   Date revised  -  n/a   Date next due- next visit    3) NEXT DUE:    Labs-neuroleptic labs due September 2018  EKG- April 2018 or PRN with medication changes  Rating Scales- AIMS next due in Feb 2018    4) REFERRALS:    NONE    5) RTC: 4 weeks    6) CRISIS NUMBERS:   Provided routinely in AVS.  Especially emphasized:  [facility] [phone] (clinic)    [phone] (after hours)    TREATMENT RISK STATEMENT:  The risks, benefits, alternatives and potential adverse effects have been discussed and are understood by the pt/pt's guardian. The pt understands the risks of using street drugs or alcohol. There are no medical contraindications, the pt agrees to treatment with the ability to do so. The pt knows to call the clinic for any problems or to access emergency care if needed.  Medical and substance use concerns are documented above.  Psychotropic drug interaction check was done, including changes made today.    PSYCHIATRY CLINIC [facility] FOLLOW-UP  Hospital:  [facility]   Date of Admission: 10/4/17  Date of Discharge: 10/11/17  Reason(s) for Admission: suicidal ideation and attempt       Problems taking medications regularly:  No, managed by  staff  Medication changes since discharge: none  Problems adhering to non-medication therapy:  no  Medications reviewed by: Dr. Sandoval    I have reviewed the RN telephone encounter dated: They were not able to reach pt with several attempts.    Summary of hospitalization:  Per discharge summary signed by Dr. Mixon: Psychiatric Course:  Karolyn Banerjee was admitted to station 20 as a committed patient under the care of attending Dr. Mixon.  PTA medications were continued. After discussion with treatment team, decision was made to continue scheduled ECT during this hospitalization. Patient  underwent scheduled ECT on 10/6. After ECT, patient's mood improved, and her SI resolved. Her affect appeared more bright, and she was more verbal during interviews. Two days after ECT she was more withdrawn and less communicative again. She underwent an additional ECT treatment on 10/11 with resolution of SI and improvement in psychotic symptoms (AH). No medication changes were made during this hospitalization. Note: patient's commitment and Peña-Wilkins will  before her next scheduled ECT treatment.       Medical Course:   -The patient was medically evaluated in the ED for chest pain. No acute changes on EKG, and she was medically cleared to be admitted to a psychiatric unit.  -Unable to obtain blood return from Port-A-Cath. Evaluated by IR. Port-A-Cath was found to be functional, but in a difficult position.   Diagnostic Tests/Treatments reviewed.   Follow up needed: YES- continued ECT pending decision on renewal of Pñea Wilkins ruling  Other Healthcare Providers Involved in Patient s Care: Not Applicable  Update since discharge: Has continued to have stability in symptoms, has had periods of increasing SI in the context of arguments with other residents of the group home which is chronic for her, was evaluated 1x in the ED due to this after discharge and was discharged back to [facility]. Currently reports doing well with no SI, psychosis symptoms as baseline. Awaiting results of commitment, Torres and Peña Wilkins renewal hearing.    Post Discharge Medication Reconciliation: Discharge medications reconciled and changed, per note/orders (see AVS).    Plan of care communicated with patient and family:  YES  This writer will contact home health nurse to further coordinate care: Not Applicable    RESIDENT:   Dana Sandoval, DO    Patient staffed in clinic with Dr. Kerr who will sign the note.  Supervisor is Dr. Toussaint.    Supervisor Attestation:  I saw the patient with the resident, and  participated in key portions of the service, including the mental status examination and developing the plan of care. I reviewed key portions of the history with the resident. I agree with the findings and plan as documented in this note.  Freddy Kerr MD

## 2017-11-22 DIAGNOSIS — F25.1 SCHIZOAFFECTIVE DISORDER, DEPRESSIVE TYPE (H): ICD-10-CM

## 2017-11-22 RX ORDER — PERPHENAZINE 16 MG/1
16 TABLET ORAL 2 TIMES DAILY
Qty: 60 TABLET | Refills: 0 | Status: SHIPPED | OUTPATIENT
Start: 2017-11-22 | End: 2018-01-19

## 2017-11-22 NOTE — TELEPHONE ENCOUNTER
Medication requested: Perphenazine 16 mg tabs  Last refilled: 10-27-17  Qty: 60      Last seen: 11-6-17  RTC: 4 wks  Cancel: 0  No-show: 0  Next appt: none    Refill decision: 30 day piter refill due to no scheduled appointment sent to the pharmacy - including instructions for patient to call the clinic and schedule an appointment.  Scheduling notified to contact pt for appointment.      Kathleen M Doege RN

## 2017-11-29 ENCOUNTER — OFFICE VISIT (OUTPATIENT)
Dept: PSYCHIATRY | Facility: CLINIC | Age: 32
End: 2017-11-29
Attending: PSYCHIATRY & NEUROLOGY
Payer: MEDICAID

## 2017-11-29 VITALS
SYSTOLIC BLOOD PRESSURE: 120 MMHG | HEART RATE: 112 BPM | WEIGHT: 267.8 LBS | DIASTOLIC BLOOD PRESSURE: 76 MMHG | BODY MASS INDEX: 45.26 KG/M2

## 2017-11-29 DIAGNOSIS — F25.1 SCHIZOAFFECTIVE DISORDER, DEPRESSIVE TYPE (H): Primary | ICD-10-CM

## 2017-11-29 PROCEDURE — 99212 OFFICE O/P EST SF 10 MIN: CPT | Mod: ZF

## 2017-11-29 ASSESSMENT — PATIENT HEALTH QUESTIONNAIRE - PHQ9: SUM OF ALL RESPONSES TO PHQ QUESTIONS 1-9: 7

## 2017-11-29 NOTE — NURSING NOTE
Chief Complaint   Patient presents with     Recheck Medication     Schizoaffective disorder,     Reviewed allergies, smoking status, and pharmacy preference   Obtained weight, blood pressure and heart rate

## 2017-11-29 NOTE — PATIENT INSTRUCTIONS
-continue current medications, no change  -will coordinate with ECT to get Karolyn on schedule  -return to clinic in 6 weeks or sooner if needed    Thank you for coming to the PSYCHIATRY CLINIC.    Lab Testing:  If you had lab testing today and your results are reassuring or normal they will be mailed to you or sent through [employer] within 7 days.   If the lab tests need quick action we will call you with the results.  The phone number we will call with results is # [phone] (home) . If this is not the best number please call our clinic and change the number.    Medication Refills:  If you need any refills please call your pharmacy and they will contact us. Our fax number for refills is [phone]. Please allow three business for refill processing.   If you need to  your refill at a new pharmacy, please contact the new pharmacy directly. The new pharmacy will help you get your medications transferred.     Scheduling:  If you have any concerns about today's visit or wish to schedule another appointment please call our office during normal business hours [phone] (8-5:00 M-F)    Contact Us:  Please call [phone] during business hours (8-5:00 M-F).  If after clinic hours, or on the weekend, please call  [phone].    Financial Assistance [phone]  [employer] Billing [phone]  [facility] Billing [phone]  Medical Records [phone]      MENTAL HEALTH CRISIS NUMBERS:  [facility]:   [facility] - [phone]   Crisis Residence [facility] - [phone]   Walk-In Counseling Center [facility] - [phone]   COPE 24/7 [facility] Mobile Team for Adults - [[phone]]; Child - [[phone]]     Crisis Connection - [phone]     [facility]:   [facility] - [phone]   Walk-in counseling [facility] - [phone]   Walk-in counseling [facility] - [phone]   Crisis Residence [facility]  Located within [facility] [phone]   Urgent Care Adult Mental Health:   --Drop-in, 24/7 crisis line, and [place] [facility] Mobile Team [[phone]]    CRISIS TEXT LINE: Text [phone] from anywhere, anytime, any crisis 24/7;    OR SEE www.[email or web address]     Poison Control Center - [phone]    CHILD: [facility] needs assessment team - [phone]     [facility] - [phone]; or [facility] - [phone]    If you have a medical emergency please call 911or go to the nearest ER.                    _____________________________________________    Again thank you for choosing PSYCHIATRY CLINIC and please let us know how we can best partner with you to improve you and your family's health.  You may be receiving a survey in the mail regarding this appointment. We would love to have your feedback, both positive and negative, so please fill out the survey and return it using the provided envolpe. The survey is done by an external company, so your answers are anonymous.

## 2017-11-29 NOTE — MR AVS SNAPSHOT
After Visit Summary   11/29/2017    Karolyn Banerjee    MRN: [id number]           Patient Information     Date Of Birth          1985        Visit Information        Provider Department      11/29/2017 9:25 AM Dana Sandoval MD Psychiatry Clinic        Care Instructions    -continue current medications, no change  -will coordinate with ECT to get Karolyn on schedule  -return to clinic in 6 weeks or sooner if needed    Thank you for coming to the PSYCHIATRY CLINIC.    Lab Testing:  If you had lab testing today and your results are reassuring or normal they will be mailed to you or sent through [employer] within 7 days.   If the lab tests need quick action we will call you with the results.  The phone number we will call with results is # [phone] (home) . If this is not the best number please call our clinic and change the number.    Medication Refills:  If you need any refills please call your pharmacy and they will contact us. Our fax number for refills is [phone]. Please allow three business for refill processing.   If you need to  your refill at a new pharmacy, please contact the new pharmacy directly. The new pharmacy will help you get your medications transferred.     Scheduling:  If you have any concerns about today's visit or wish to schedule another appointment please call our office during normal business hours [phone] (8-5:00 M-F)    Contact Us:  Please call [phone] during business hours (8-5:00 M-F).  If after clinic hours, or on the weekend, please call  [phone].    Financial Assistance [phone]  [employer] Billing [phone]  [facility] Billing [phone]  Medical Records [phone]      MENTAL HEALTH CRISIS NUMBERS:  [facility]:   [facility] - [phone]   Crisis Residence [facility] - [phone]   Walk-In Counseling Center [facility] - [phone]   COPE 24/7 [facility] Mobile Team for Adults -  [[phone]]; Child - [[phone]]     Crisis Connection - [phone]     [facility]:   [facility] - [phone]   Walk-in counseling [facility] - [phone]   Walk-in counseling [facility] - [phone]   Crisis Residence [facility] - [phone]   Urgent Care Adult Mental Health:   --Drop-in, 24/7 crisis line, and [place] Mobile Team [[phone]]    CRISIS TEXT LINE: Text [phone] from anywhere, anytime, any crisis 24/7;    OR SEE www.crisistextline.org     Poison Control Center - [phone]    CHILD: [facility] needs assessment team - [phone]     [facility] Lif[facility] - [phone]; or [employer] [facility] - [phone]    If you have a medical emergency please call 911or go to the nearest ER.                    _____________________________________________    Again thank you for choosing PSYCHIATRY CLINIC and please let us know how we can best partner with you to improve you and your family's health.  You may be receiving a survey in the mail regarding this appointment. We would love to have your feedback, both positive and negative, so please fill out the survey and return it using the provided envolpe. The survey is done by an external company, so your answers are anonymous.             Follow-ups after your visit        Follow-up notes from your care team     Return in about 6 weeks (around 1/10/2018) for 30 MFU.      Your next 10 appointments already scheduled     Jan 18, 2018  9:55 AM CST   Schizophrenia Follow Up with Dana Sandoval MD   Psychiatry Clinic ([facility])    [address] [facility] [address] [phone]              Who to contact     Please call your clinic at [phone] to:    Ask questions about your health    Make or cancel appointments    Discuss your medicines    Learn about your test results    Speak to your doctor   If  you have compliments or concerns about an experience at your clinic, or if you wish to file a complaint, please contact [facility] Patient Relations at [phone] or email us at [email or web address]         Additional Information About Your Visit        [employer] Information     [employer] is an electronic gateway that provides easy, online access to your medical records. With [employer], you can request a clinic appointment, read your test results, renew a prescription or communicate with your care team.     To sign up for [employer] visit the website at [email or web address]   You will be asked to enter the access code listed below, as well as some personal information. Please follow the directions to create your username and password.     Your access code is: [email or web address]  Expires: 2017  8:39 AM     Your access code will  in 90 days. If you need help or a new code, please contact your [facility] or call [phone] for assistance.        Care EveryWhere ID     This is your Care EveryWhere ID. This could be used by other organizations to access your [place] medical records  [id number]        Your Vitals Were     Pulse BMI (Body Mass Index)                112 45.26 kg/m2           Blood Pressure from Last 3 Encounters:   17 120/76   17 121/79   10/21/17 (!) 142/94    Weight from Last 3 Encounters:   17 121.5 kg (267 lb 12.8 oz)   17 120.6 kg (265 lb 12.8 oz)   10/10/17 121.1 kg (267 lb)              Today, you had the following     No orders found for display       Primary Care Provider Office Phone # Fax #    Suzie Roy Loida [phone]       [address] [address]        Equal Access to Services     KAYA STRICKLAND : Isaac herndon Sotawanda, waaxda luqadaha, qaybta kaalmada adeegyajanice, nando mcguire. So [facility]  [phone].    ATENCIÓN: Si clemencia peng, tiene a stauffer disposición servicios gratuitos de asistencia lingüística. Kiera biswas [phone].    We comply with applicable federal civil rights laws and [place] laws. We do not discriminate on the basis of race, color, national origin, age, disability, sex, sexual orientation, or gender identity.            Thank you!     Thank you for choosing PSYCHIATRY CLINIC  for your care. Our goal is always to provide you with excellent care. Hearing back from our patients is one way we can continue to improve our services. Please take a few minutes to complete the written survey that you may receive in the mail after your visit with us. Thank you!             Your Updated Medication List - Protect others around you: Learn how to safely use, store and throw away your medicines at www.disposemymeds.org.          This list is accurate as of: 11/29/17 10:06 AM.  Always use your most recent med list.                   Brand Name Dispense Instructions for use Diagnosis    benztropine 0.5 MG tablet    COGENTIN    60 tablet    Take 1 tablet (0.5 mg) by mouth 2 times daily    Schizoaffective disorder, bipolar type (H), Aggression       cetirizine 10 MG tablet    zyrTEC     Take 10 mg by mouth daily        FISH OIL PO      Take 1,000 mg by mouth daily        hydrOXYzine 25 MG tablet    ATARAX    60 tablet    Take 1-2 tablets (25-50 mg) by mouth every 4 hours as needed for anxiety    Schizoaffective disorder, bipolar type (H), Aggression       lurasidone 80 MG Tabs tablet    LATUDA    60 tablet    Take 2 tablets (160 mg) by mouth daily (with dinner)    Schizoaffective disorder, bipolar type (H), Aggression       norethindrone-ethinyl estradiol 1-20 MG-MCG per tablet    MICROGESTIN 1/20     Take 1 tablet by mouth daily        OLANZapine 10 MG tablet    zyPREXA    30 tablet    Take 1 tablet (10 mg) by mouth 2 times daily as needed    Schizoaffective disorder, bipolar type (H), Aggression        perphenazine 16 MG tablet     60 tablet    Take 1 tablet (16 mg) by mouth 2 times daily    Schizoaffective disorder, depressive type (H)       traZODone 50 MG tablet    DESYREL    30 tablet    Take 1 tablet (50 mg) by mouth At Bedtime    Schizoaffective disorder, bipolar type (H), Aggression

## 2017-11-29 NOTE — PROGRESS NOTES
"PSYCHIATRY CLINIC PROGRESS NOTE   The initial diagnostic evaluation was on prior to 2008.  Date of the most recent transfer of care montse is 8/7/17.    Guardian: Mary \"Swetha\" Chriss (mother)- [phone] (okay to leave message) or [phone]    Pertinent Background:  This patient first experienced mental health issues as a child and has received treatment for Schizoaffective disorder bipolar type, borderline personality disorder and unspecified neurocognitive disorder.  See transfer evaluation for detailed history.  Notably, she has multiple medication trials and hospitalizations treating aggression and perceptual disturbances (including clozapine with neutropenia x2).  ECT combined with multiple medications has been most helpful in maintaining stability.  She is under commitment and Torres--renewed until Nov 2, 2018. Peña-Wilkins order also in place  authorizes treatment up to three times per week for 4 weeks and then up to 1x per week for maintenance.  10/28/2013: full scale IQ test of 62.  After neutropenia with clozapine x's 2, has required multiple neuroleptics as well as ECT to maintain stability.       Psych critical item history includes suicide attempt [multiple], suicidal ideation, SIB [indicated per chart review, pt denies], psychosis [sxs include AH, delusions relating to pregnancy], mutiple psychotropic trials, severe med reaction, psych hosp (>5), commitment and ECT.    INTERIM HISTORY                                                 Karolyn Banerjee is a 32 year old female who was last seen for medication management on 11/6/17 for hospital follow up at which time no changes were made. The patient reports good treatment adherence.  History was provided by the patient and  staff who was a fair historians.  Since the last visit:  -she reports she has not been doing too well,  staff state she has been doing better, no irritability, poor behavior or trying to leave the house and run away "   -medications are going well, no missed doses, she continues to take some PRN olanzapine for her auditory hallucinations about 1x per day, her AH have not changed, will at times have commands, the PRN is effective  -has physically been feeling okay   -recommitment was put in place in October along with Brian and Casey Wilkins  -has not had ECT treatment since this was received, will coordinate with ECT and Dr. Amos  -continues to see her boyfriend on Tuesdays and Thursday  -having some passive suicidal thoughts today, no plan or intent     RECENT SYMPTOMS:   DEPRESSION:  reports-suicidal ideation without plan; without intent [details in Interim History], depressed mood, anhedonia, low energy, insomnia , hypersomnia, excessive guilt and poor concentration /memory;  DENIES- weight/ appetite change (decrease or increase) and psychomotor change observed by others (none)  JUDE/HYPOMANIA:  reports-none;  DENIES- increased energy, decreased sleep need, increased activity and grandiosity  PSYCHOSIS:  reports-auditory hallucinations with commands [details in Interim History];  DENIES- delusions and visual hallucinations  DYSREGULATION:  reports-suicidal ideation without plan; without intent [details in Interim History], mood dysregulation and impulsive;  DENIES- violent ideation and SIB  PANIC ATTACK:  none   ANXIETY:  none  TRAUMA RELATED:  none  COMPULSIVE:  none  SLEEP:  no complaints    EATING DISORDER: none    RECENT SUBSTANCE USE:     ALCOHOL- none          TOBACCO- none               CAFFEINE- 2 sodas/day  OPIOIDS- none       NARCAN KIT- N/A       CANNABIS- none          OTHER ILLICIT DRUGS- none     CURRENT SOCIAL HISTORY:  FINANCIAL SUPPORT- social security disability       CHILDREN- none       LIVING SITUATION- lives in group home ([facility]) in [place]      SOCIAL/ SPIRITUAL SUPPORT- her boyfriend JAC Alberto worker, CM, GH staff       FEELS SAFE AT HOME- Yes     MEDICAL ROS:  Reports sedation      Denies GI  sxs [N/V/D], weight gain, sexual dysfunction [none] and akathisia, muscle problems [stiffness], unusual movements, polydipsia, polyphagia and Parkinsonian-type symptoms [shuffling gait, masked facies, stooped posture, speech change, writing change]  PSYCH and CD Critical Summary Points since July 2017           Pt hospitalized for suicidal ideation/attempt September 19-25 following death of brother  Perphenazine increased to 16mg BID during hospitalization  Inpatient psychiatric treatment 10/4/17 to 10/11/17 due to active suicidal ideation with plans and intent - no medication change, received ECT x 2  Commitment, Torres and Peña Wilkins renewed until 11/2/2018  PAST PSYCH MED TRIALS   see EMR Problem List: Hx of psychiatric care    MEDICAL / SURGICAL HISTORY                                   CARE TEAM:   PCP- Dr. Isamar Spear          Therapist- none    ARMHS: Joan through [facility] meets 1x/week  through [facility] recently changed    Pregnant or breastfeeding:  No      Contraception- on OCP    Neurologic Hx [head injury etc]:  Denies seizure hx or head injury with LOC  Patient Active Problem List   Diagnosis     Mild cognitive impairment     Borderline personality disorder     Asthma     Nonorganic enuresis     Schizoaffective disorder, depressive type (H)     Fx humerus shaft-closed     Port catheter in place     MENTAL HEALTH     Suicidal ideation     Hx of psychiatric care     DVT (deep vein thrombosis) in pregnancy (H)     Suicide attempt       ALLERGY                                Clozapine; Risperdal; and Tape [adhesive tape]  MEDICATIONS                               Current Outpatient Prescriptions   Medication Sig Dispense Refill     perphenazine 16 MG tablet Take 1 tablet (16 mg) by mouth 2 times daily 60 tablet 0     norethindrone-ethinyl estradiol (MICROGESTIN 1/20) 1-20 MG-MCG per tablet Take 1 tablet by mouth daily       traZODone (DESYREL) 50 MG tablet Take 1 tablet (50 mg) by mouth At  Bedtime 30 tablet 1     benztropine (COGENTIN) 0.5 MG tablet Take 1 tablet (0.5 mg) by mouth 2 times daily 60 tablet 1     lurasidone (LATUDA) 80 MG TABS tablet Take 2 tablets (160 mg) by mouth daily (with dinner) 60 tablet 1     OLANZapine (ZYPREXA) 10 MG tablet Take 1 tablet (10 mg) by mouth 2 times daily as needed 30 tablet 0     Omega-3 Fatty Acids (FISH OIL PO) Take 1,000 mg by mouth daily        hydrOXYzine (ATARAX) 25 MG tablet Take 1-2 tablets (25-50 mg) by mouth every 4 hours as needed for anxiety 60 tablet 2     cetirizine (ZYRTEC) 10 MG tablet Take 10 mg by mouth daily       VITALS   /76  Pulse 112  Wt 121.5 kg (267 lb 12.8 oz)  BMI 45.26 kg/m2   MENTAL STATUS EXAM                                                           Alertness: alert , oriented and slow to respond  Appearance: adequately groomed  Behavior/Demeanor: cooperative and calm, with fair  eye contact   Speech: increased latency of response  Language: no problems  Psychomotor: slowed somewhat  Mood: depressed  Affect: flat; was congruent to mood; was congruent to content  Thought Process/Associations: unremarkable  Thought Content: Denies suicidal ideation currently [details re: recent suicidal ideation in Interim History]; violent ideation and delusions  Perception:  Reports auditory hallucinations with commands [details in Interim History];  Denies visual hallucinations  Insight: limited  Judgment: limited  Cognition: does  appear grossly intact however below average; formal cognitive testing was not done    LABS and DATA   RATING SCALES:  AIMS: done 8/7/17 with total score of 0    EKG:  April 2017 which showed QTc of 424ms; next EKG due April 2018    PHQ9 TODAY = 7  PHQ-9 SCORE 9/6/2017 10/4/2017 11/6/2017   Total Score - - -   Total Score 2 7 4     ANTIPSYCHOTIC LABS   [glu, A1C, lipids (focus LDL), liver enzymes, WBC, ANEU, Hgb, plts]    q12 mo  Recent Labs   Lab Test  10/05/17   1108  09/21/17   0750  04/12/17   1024   GLC  81   80  83   A1C   --    --   5.0     Recent Labs   Lab Test  09/21/17   0750  04/12/17   1024   CHOL  181  207*   TRIG  144  143   LDL  108*  135*   HDL  44*  43     Recent Labs   Lab Test  10/05/17   1108  09/21/17   0750   AST  18  18   ALT  22  21   ALKPHOS  64  76     Recent Labs   Lab Test  10/05/17   1108  09/21/17   0750   WBC  7.0  6.3   ANEU  4.7  4.2   HGB  15.5  15.3   PLT  154  118*     DIAGNOSIS     Schizoaffective disorder, bipolar type, depressed episode, mild to moderate, with psychosis symptoms at baseline  Unspecified neurocognitive disorder     ASSESSMENT                                     TODAY Karolyn reports stability in symptoms since being discharged from the hospital which include chronic intermittent suicidal thoughts in context of relationship conflicts and today reports these thoughts are passive without plan or intent. Per  staff she has not had any suicidal behavior recently of trying to run from the house into traffic or towards the pond. Received renewal of commitment, Torres and Peña Wilkins. She advocates for no medication changes today, which is reasonable. She is unsure if ECT treatments have been helpful, but did have some benefit noted with the 2 treatments she received while hospitalized and writer will be in contact with Dr. Amos regarding resuming ECT. She will return to clinic in 6 weeks or sooner if needed.              SUICIDE RISK ASSESSMENT [details described above]:  Today Karolyn Banerjee reports passive suicidal ideation, no plan or intent and no urges for self harm or thoughts to harm others.  In addition, she has notable risk factors for self-harm including recent loss, relationship conflict, previous suicide attempt and recent inpt stay.  However, risk is mitigated by no plan or intent, symptom improvement, none to minimal alcohol use  and stable housing.  Based on all available evidence she does not appear to be at imminent risk for self-harm therefore does not  meet criteria for a 72-hr hold/involuntary hospitalization.  However, based on degree of symptoms close psych FU was recommended which the pt did agree to.      [place] PRESCRIPTION MONITORING PROGRAM [] was not checked today:  not using controlled substances.    PSYCHOTROPIC DRUG INTERACTIONS:   Hydroxyzine and olanzapine or perphenazine or trazodone may result in increased risk of QT interval prolongation.    Perphenazine and benztropine may result in decreased phenothiazine serum concentrations, decreased phenothiazine effectiveness, enhanced anticholinergic effects (ileus, hyperpyrexia, sedation, dry mouth).   Trazodone and perphenazine may result in hypotension.  MANAGEMENT:  Monitoring for adverse effects, routine vitals, periodic EKGs and patient is aware of risks     PLAN                                                                                                       1) PSYCHOTROPIC MEDICATIONS: no changes today, due to pt insurance medications must be ordered by attending      - lurasidone 160 mg with dinner      - olanzapine 10 mg BID prn for psychosis      - perphenazine 16 mg BID      - benztropine 0.5 mg BID      - trazodone 50mg at bedtime      - hydroxyzine 25-50 mg q4 hrs prn for anxiety     Coordinated with Dr. Amos for pt to continue maintenance ECT treatments- plan for tx in next week, then in 4 weeks and then resume every 6 weeks maintenance     2) THERAPY:    None currently    3) NEXT DUE:    Labs-neuroleptic labs due September 2018  EKG- April 2018 or PRN with medication changes  Rating Scales- AIMS next due in Feb 2018    4) REFERRALS:    NONE    5) RTC: 6 weeks    6) CRISIS NUMBERS:   Provided routinely in AVS.  Especially emphasized:  [facility] [phone] (clinic)    [phone] (after hours)    TREATMENT RISK STATEMENT:  The risks, benefits, alternatives and potential adverse effects have been discussed and are understood by the pt/pt's guardian. The pt understands the risks  of using street drugs or alcohol. There are no medical contraindications, the pt agrees to treatment with the ability to do so. The pt knows to call the clinic for any problems or to access emergency care if needed.  Medical and substance use concerns are documented above.  Psychotropic drug interaction check was done, including changes made today.    RESIDENT:   Dana Sandoval, DO    Patient staffed in clinic with Dr. Durant who will sign the note.  Supervisor is Dr. Toussaint.    I saw the patient with the resident, and participated in key portions of the service, including the mental status examination and developing the plan of care. I reviewed key portions of the history with the resident. I agree with the findings and plan as documented in this note.    Tika Durant

## 2017-12-11 ENCOUNTER — HOSPITAL ENCOUNTER (OUTPATIENT)
Dept: BEHAVIORAL HEALTH | Facility: CLINIC | Age: 32
End: 2017-12-11
Attending: PSYCHIATRY & NEUROLOGY
Payer: MEDICAID

## 2017-12-11 VITALS
OXYGEN SATURATION: 97 % | RESPIRATION RATE: 16 BRPM | TEMPERATURE: 98.8 F | DIASTOLIC BLOOD PRESSURE: 102 MMHG | SYSTOLIC BLOOD PRESSURE: 148 MMHG | HEART RATE: 103 BPM

## 2017-12-11 DIAGNOSIS — F25.1 SCHIZOAFFECTIVE DISORDER, DEPRESSIVE TYPE (H): ICD-10-CM

## 2017-12-11 PROCEDURE — 90870 ELECTROCONVULSIVE THERAPY: CPT

## 2017-12-11 RX ORDER — HEPARIN SODIUM (PORCINE) LOCK FLUSH IV SOLN 100 UNIT/ML 100 UNIT/ML
5 SOLUTION INTRAVENOUS
Status: ACTIVE | OUTPATIENT
Start: 2017-12-11 | End: 2017-12-11

## 2017-12-11 RX ORDER — HEPARIN SODIUM (PORCINE) LOCK FLUSH IV SOLN 100 UNIT/ML 100 UNIT/ML
5 SOLUTION INTRAVENOUS
Status: CANCELLED
Start: 2017-12-11 | End: 2017-12-11

## 2017-12-11 NOTE — PROGRESS NOTES
ECT Progress Note    Unable to access port or IV through peripheral UE vein, so ECT cancelled. No worsening of behavior despite 8 week interval since last ECT.     Colt Amos MD

## 2017-12-11 NOTE — DISCHARGE INSTRUCTIONS
ECT Discharge Instructions      During your ECT series:      Do not drive or work heavy equipment until 7 days after your last treatment.    Do not drink alcohol or use street drugs (illicit drugs) while you are having treatments.    Do not make important decisions, including legal decisions.    After each treatment:      Get plenty of rest. A responsible adult must stay with your for at least 6 hours.    Avoid heavy or risky activities for 24 hours.    If you feel light-headed, sit for a few minutes before standing. Have someone help you get up.    If you have nausea (feel sick to your stomach): Drink only clear liquids such as apple juice, ginger ale, broth or 7UP, Be sure to drink plenty of liquids. Move to a normal diet as you feel able.     If you received Toradol, wait 6 hours before taking ibuprofen.    Call your doctor if:     You have a fever over 100F (37.7 C) (taken under the tongue), or a fever that last more than 24 hours.    Your IV site is red, swollen, very painful or is getting more tender.    You have nausea that gets very bad or does not improve.      If you have any symptoms after ECT, tell our staff before your next treatment.    The ECT Department can be reached at [phone].  The ECT Department is open Mondays, Wednesdays and Fridays from 7:00 AM to 2:00 PM.  Dr. Amos CONTACT PHONE:  [phone] office, [phone]

## 2017-12-14 NOTE — ADDENDUM NOTE
Encounter addended by: Osiel Amos MD on: 12/14/2017  9:53 AM<BR>     Actions taken: Sign clinical note

## 2017-12-21 ENCOUNTER — TELEPHONE (OUTPATIENT)
Dept: PSYCHIATRY | Facility: CLINIC | Age: 32
End: 2017-12-21

## 2017-12-21 DIAGNOSIS — F25.9 SCHIZOAFFECTIVE DISORDER, UNSPECIFIED TYPE (H): Primary | ICD-10-CM

## 2017-12-21 DIAGNOSIS — F25.0 SCHIZOAFFECTIVE DISORDER, BIPOLAR TYPE (H): ICD-10-CM

## 2017-12-21 NOTE — TELEPHONE ENCOUNTER
"From Dr. Amos:      We couldn't get venous access at last ECT. I was not sure if we even wanted to continue with ECT, but this sounds like the  at least wants her to get one. Yaneth, please make a referral to Interventional Rad for a port check (has a PortaCath in her R arm, which is hard to access), and get her in to see Dr. Sandoval to determine if this really represents \"worsening behavior\" that should get an ECT.        "

## 2017-12-21 NOTE — TELEPHONE ENCOUNTER
Called Bre (lizzie Block) at [phone].  She reported that they are concerned because patient has not had ECT since October, and she is increasingly symptomatic.  She has been engaging in high-risk behaviors such as trying to walk towards oncoming traffic and [place], saying that she will drown herself.  She has been prevented from doing these by staff.  She stated that it was her understanding that there was a problem with the port, which is why patient has not had ECT.      Will route to Dr. Sandoval and Dr. Amos for recommendation.

## 2017-12-21 NOTE — TELEPHONE ENCOUNTER
----- Message from Shona Arreaga RN sent at 12/21/2017  3:13 PM CST -----  Regarding: FW: ECT treatment  Contact: [phone]  Pt of Dr. Sandoval  ----- Message -----     From: Michelle Doherty     Sent: 12/21/2017   2:37 PM       To: Shona Arreaga RN  Subject: ECT treatment                                    Sole, calling from [facility], regarding ECT for Karolyn, she stated that there has not been any since Oct 4, 2017. Karolyn, is becoming very symptomatic. Please give Sole, a call ASAP.    Thanks!

## 2017-12-22 NOTE — TELEPHONE ENCOUNTER
Called IR and spoke with DHRUV Perdomo (direct line - [phone]).  She had the following questions to determine what exactly patient needs done:  - Can the person accessing the port get a return?  - Are they hitting metal?  - Is the port flipped?  - In what way is it hard to access?    Will route to Dr. Amos for assistance.

## 2018-01-04 ENCOUNTER — OFFICE VISIT (OUTPATIENT)
Dept: PSYCHIATRY | Facility: CLINIC | Age: 33
End: 2018-01-04
Attending: PSYCHIATRY & NEUROLOGY
Payer: MEDICAID

## 2018-01-04 VITALS
WEIGHT: 263.4 LBS | BODY MASS INDEX: 44.51 KG/M2 | SYSTOLIC BLOOD PRESSURE: 138 MMHG | DIASTOLIC BLOOD PRESSURE: 91 MMHG | HEART RATE: 91 BPM

## 2018-01-04 DIAGNOSIS — F25.0 SCHIZOAFFECTIVE DISORDER, BIPOLAR TYPE (H): Primary | ICD-10-CM

## 2018-01-04 PROCEDURE — G0463 HOSPITAL OUTPT CLINIC VISIT: HCPCS | Mod: ZF

## 2018-01-04 ASSESSMENT — PATIENT HEALTH QUESTIONNAIRE - PHQ9: SUM OF ALL RESPONSES TO PHQ QUESTIONS 1-9: 6

## 2018-01-04 NOTE — MR AVS SNAPSHOT
After Visit Summary   1/4/2018    Karolyn Banerjee    MRN: [id number]           Patient Information     Date Of Birth          1985        Visit Information        Provider Department      1/4/2018 10:35 AM Dana Sandoval MD Psychiatry Clinic        Today's Diagnoses     Schizoaffective disorder, bipolar type (H)    -  1      Care Instructions    -continue current medications, no changes  -will not resume ECT at this time, will continue to monitor symptoms  -return to clinic in 4 weeks or sooner if needed            Follow-ups after your visit        Follow-up notes from your care team     Return in about 4 weeks (around 2/1/2018) for 30 MFU.      Your next 10 appointments already scheduled     Feb 15, 2018  9:25 AM CST   Schizophrenia Follow Up with Dana Sandoval MD   Psychiatry Clinic ([facility])    [place] [id number] [id number] [phone]              Who to contact     Please call your clinic at [phone] to:    Ask questions about your health    Make or cancel appointments    Discuss your medicines    Learn about your test results    Speak to your doctor   If you have compliments or concerns about an experience at your clinic, or if you wish to file a complaint, please contact [facility] Patient Relations at [phone] or email us at [email or web address]         Additional Information About Your Visit        MyChart Information     IPDIA is an electronic gateway that provides easy, online access to your medical records. With IPDIA, you can request a clinic appointment, read your test results, renew a prescription or communicate with your care team.     To sign up for IM5t visit the website at [email or web address]   You will be asked to enter the access code listed below, as well as some personal information. Please follow the directions to create your username  and password.     Your access code is: [email or web address]  Expires: 4/15/2018  1:56 PM     Your access code will  in 90 days. If you need help or a new code, please contact your [facility] or call [phone] for assistance.        Care EveryWhere ID     This is your Care EveryWhere ID. This could be used by other organizations to access your [facility] medical records  [id number]        Your Vitals Were     Pulse BMI (Body Mass Index)                91 44.51 kg/m2           Blood Pressure from Last 3 Encounters:   18 (!) 138/91   17 (!) 148/102   17 120/76    Weight from Last 3 Encounters:   18 119.5 kg (263 lb 6.4 oz)   17 121.5 kg (267 lb 12.8 oz)   17 120.6 kg (265 lb 12.8 oz)               Primary Care Provider Office Phone # Fax #    Suzie Mariscal [phone]       [place] [id number]        Equal Access to Services     MAISHA STRICKLAND : Hadii aad ku hadasho Soomaali, waaxda luqadaha, qaybta kaalmada adeegyada, nando riojasin hayaan janay pizarro . So [facility] [phone].    ATENCIÓN: Si habla español, tiene a stauffer disposición servicios gratuitos de asistencia lingüística. Llame al [phone].    We comply with applicable federal civil rights laws and [place] laws. We do not discriminate on the basis of race, color, national origin, age, disability, sex, sexual orientation, or gender identity.            Thank you!     Thank you for choosing PSYCHIATRY CLINIC  for your care. Our goal is always to provide you with excellent care. Hearing back from our patients is one way we can continue to improve our services. Please take a few minutes to complete the written survey that you may receive in the mail after your visit with us. Thank you!             Your Updated Medication List - Protect others around you: Learn how to safely use, store and throw away your medicines at  www.disposemymeds.org.          This list is accurate as of: 1/4/18 11:59 PM.  Always use your most recent med list.                   Brand Name Dispense Instructions for use Diagnosis    benztropine 0.5 MG tablet    COGENTIN    60 tablet    Take 1 tablet (0.5 mg) by mouth 2 times daily    Schizoaffective disorder, bipolar type (H), Aggression       cetirizine 10 MG tablet    zyrTEC     Take 10 mg by mouth daily        FISH OIL PO      Take 1,000 mg by mouth daily        hydrOXYzine 25 MG tablet    ATARAX    60 tablet    Take 1-2 tablets (25-50 mg) by mouth every 4 hours as needed for anxiety    Schizoaffective disorder, bipolar type (H), Aggression       lurasidone 80 MG Tabs tablet    LATUDA    60 tablet    Take 2 tablets (160 mg) by mouth daily (with dinner)    Schizoaffective disorder, bipolar type (H), Aggression       norethindrone-ethinyl estradiol 1-20 MG-MCG per tablet    MICROGESTIN 1/20     Take 1 tablet by mouth daily        OLANZapine 10 MG tablet    zyPREXA    30 tablet    Take 1 tablet (10 mg) by mouth 2 times daily as needed    Schizoaffective disorder, bipolar type (H), Aggression       perphenazine 16 MG tablet     60 tablet    Take 1 tablet (16 mg) by mouth 2 times daily    Schizoaffective disorder, depressive type (H)       traZODone 50 MG tablet    DESYREL    30 tablet    Take 1 tablet (50 mg) by mouth At Bedtime    Schizoaffective disorder, bipolar type (H), Aggression

## 2018-01-04 NOTE — PROGRESS NOTES
"PSYCHIATRY CLINIC PROGRESS NOTE   The initial diagnostic evaluation was on prior to 2008.  Date of the most recent transfer of care montse is 8/7/17.    Guardian: Mary \"Swetha\" Chriss (mother)- [phone] (okay to leave message) or [phone]    Pertinent Background:  This patient first experienced mental health issues as a child and has received treatment for Schizoaffective disorder bipolar type, borderline personality disorder and unspecified neurocognitive disorder.  See transfer evaluation for detailed history.  Notably, she has multiple medication trials and hospitalizations treating aggression and perceptual disturbances (including clozapine with neutropenia x2).  ECT combined with multiple medications has been most helpful in maintaining stability.  She is under commitment and Torres--renewed until Nov 2, 2018. Peña-Wilkins order also in place  authorizes treatment up to three times per week for 4 weeks and then up to 1x per week for maintenance.  10/28/2013: full scale IQ test of 62.  After neutropenia with clozapine x's 2, has required multiple neuroleptics as well as ECT to maintain stability.       Psych critical item history includes suicide attempt [multiple], suicidal ideation, SIB [indicated per chart review, pt denies], psychosis [sxs include AH, delusions relating to pregnancy], mutiple psychotropic trials, severe med reaction, psych hosp (>5), commitment and ECT.    INTERIM HISTORY                                                 Karolyn Banerjee is a 32 year old female who was last seen for medication management on 11/29/17 at which time no changes were made. The patient reports good treatment adherence.  History was provided by the patient and  staff who was a fair historians.  Since the last visit:  -she reports she has not been doing too well,  staff state she has been doing better, no irritability, poor behavior or trying to leave the house and run away   -has not been hearing " voices as often, needing less PRN olanzapine, per  staff has been doing well since end of December  -only one incidence since being discharged from the hospital of trying to run into traffic or towards [place] to harm herself, she reports this was in response to an argument with another [facility] member and that she often does this impulsively and not because she is suicidal  -denies any suicidal ideation today   -discussed possibility of not resuming ECT since she has remained stable and near baseline without any treatments since October, will continue to monitor closely   -she identified several other coping mechanisms she can use when she gets into an argument with other  members including going to her room, coloring, reading and also reviewed TIPP skills today   -denies having any side effects from medications other than feeling tired at times but is able to still do activities she wants to do during the day   -has been physically feeling well, no concerns    RECENT SYMPTOMS:   DEPRESSION:  reports-depressed mood, anhedonia, low energy, insomnia , hypersomnia and poor concentration /memory;  DENIES- suicidal ideation , weight/ appetite change (decrease or increase), excessive guilt and psychomotor change observed by others (none)  JUDE/HYPOMANIA:  reports-none;  DENIES- increased energy, decreased sleep need, increased activity and grandiosity  PSYCHOSIS:  reports-auditory hallucinations with commands [details in Interim History];  DENIES- delusions and visual hallucinations  DYSREGULATION:  reports-mood dysregulation and impulsive;  DENIES- suicidal ideation, violent ideation and SIB  PANIC ATTACK:  none   ANXIETY:  none  TRAUMA RELATED:  none  COMPULSIVE:  none  SLEEP:  no complaints    EATING DISORDER: none    RECENT SUBSTANCE USE:     ALCOHOL- none          TOBACCO- none               CAFFEINE- 2 sodas/day  OPIOIDS- none       NARCAN KIT- N/A       CANNABIS- none          OTHER ILLICIT DRUGS- none     CURRENT  SOCIAL HISTORY:  FINANCIAL SUPPORT- social security disability       CHILDREN- none       LIVING SITUATION- lives in group home ([facility]) in [place]      SOCIAL/ SPIRITUAL SUPPORT- her boyfriend JAC Alberto worker, CM, [facility] staff    FEELS SAFE AT HOME- Yes     MEDICAL ROS:  Reports sedation      Denies GI sxs [N/V/D], weight gain, sexual dysfunction [none] and akathisia, muscle problems [stiffness], unusual movements, polydipsia, polyphagia and Parkinsonian-type symptoms [shuffling gait, masked facies, stooped posture, speech change, writing change]  PSYCH and CD Critical Summary Points since July 2017           Pt hospitalized for suicidal ideation/attempt September 19-25 following death of brother  Perphenazine increased to 16mg BID during hospitalization  Inpatient psychiatric treatment 10/4/17 to 10/11/17 due to active suicidal ideation with plans and intent - no medication change, received ECT x 2  Commitment, Brian and Casey Wilkins renewed until 11/2/2018  Has not had ECT treatment since October 11th with continued stability  PAST PSYCH MED TRIALS   see EMR Problem List: Hx of psychiatric care    MEDICAL / SURGICAL HISTORY                                   CARE TEAM:   PCP- Dr. Isamar Spear          Therapist- none    [facility]: Joan through [facility] meets 1x/week   through [facility] recently changed  Pregnant or breastfeeding:  No      Contraception- on OCP    Neurologic Hx [head injury etc]:  Denies seizure hx or head injury with LOC  Patient Active Problem List   Diagnosis     Mild cognitive impairment     Borderline personality disorder     Asthma     Nonorganic enuresis     Schizoaffective disorder, depressive type (H)     Fx humerus shaft-closed     Port catheter in place     MENTAL HEALTH     Suicidal ideation     Hx of psychiatric care     DVT (deep vein thrombosis) in pregnancy (H)     Suicide attempt       ALLERGY                                Clozapine; Risperdal; and Tape [adhesive  tape]  MEDICATIONS                               Current Outpatient Prescriptions   Medication Sig Dispense Refill     perphenazine 16 MG tablet Take 1 tablet (16 mg) by mouth 2 times daily 60 tablet 0     norethindrone-ethinyl estradiol (MICROGESTIN 1/20) 1-20 MG-MCG per tablet Take 1 tablet by mouth daily       traZODone (DESYREL) 50 MG tablet Take 1 tablet (50 mg) by mouth At Bedtime 30 tablet 1     benztropine (COGENTIN) 0.5 MG tablet Take 1 tablet (0.5 mg) by mouth 2 times daily 60 tablet 1     lurasidone (LATUDA) 80 MG TABS tablet Take 2 tablets (160 mg) by mouth daily (with dinner) 60 tablet 1     OLANZapine (ZYPREXA) 10 MG tablet Take 1 tablet (10 mg) by mouth 2 times daily as needed 30 tablet 0     Omega-3 Fatty Acids (FISH OIL PO) Take 1,000 mg by mouth daily        hydrOXYzine (ATARAX) 25 MG tablet Take 1-2 tablets (25-50 mg) by mouth every 4 hours as needed for anxiety 60 tablet 2     cetirizine (ZYRTEC) 10 MG tablet Take 10 mg by mouth daily       VITALS   BP (!) 138/91  Pulse 91  Wt 119.5 kg (263 lb 6.4 oz)  BMI 44.51 kg/m2   MENTAL STATUS EXAM                                                           Alertness: alert , oriented and slow to respond  Appearance: adequately groomed  Behavior/Demeanor: cooperative and calm, with fair  eye contact   Speech: increased latency of response  Language: no problems  Psychomotor: slowed somewhat  Mood: description consistent with euthymia   Affect: flat; but did brighten more than during previous appointments was congruent to mood; was congruent to content  Thought Process/Associations: unremarkable  Thought Content: Denies suicidal ideation currently [details re: recent suicidal ideation in Interim History]; violent ideation and delusions  Perception:  Reports auditory hallucinations with commands [details in Interim History];  Denies visual hallucinations  Insight: limited  Judgment: limited  Cognition: does  appear grossly intact however below average;  formal cognitive testing was not done    LABS and DATA   RATING SCALES:  AIMS: done 8/7/17 with total score of 0    EKG:  April 2017 which showed QTc of 424ms; next EKG due April 2018    PHQ9 TODAY = 6  PHQ-9 SCORE 10/4/2017 11/6/2017 11/29/2017   Total Score - - -   Total Score 7 4 7     ANTIPSYCHOTIC LABS   [glu, A1C, lipids (focus LDL), liver enzymes, WBC, ANEU, Hgb, plts]    q12 mo  Recent Labs   Lab Test  10/05/17   1108  09/21/17   0750  04/12/17   1024   GLC  81  80  83   A1C   --    --   5.0     Recent Labs   Lab Test  09/21/17   0750  04/12/17   1024   CHOL  181  207*   TRIG  144  143   LDL  108*  135*   HDL  44*  43     Recent Labs   Lab Test  10/05/17   1108  09/21/17   0750   AST  18  18   ALT  22  21   ALKPHOS  64  76     Recent Labs   Lab Test  10/05/17   1108  09/21/17   0750   WBC  7.0  6.3   ANEU  4.7  4.2   HGB  15.5  15.3   PLT  154  118*     DIAGNOSIS     Schizoaffective disorder, bipolar type, depressed episode, mild to moderate, with psychosis symptoms at baseline  Unspecified neurocognitive disorder     ASSESSMENT                                     TODAY Karolyn reports continued stability in symptoms since being discharged from the hospital which include chronic intermittent suicidal thoughts in context of relationship conflicts and today reports she does not have these thoughts and often will have suicidal behavior as an impulsive reaction to stressors and identified several others coping skills to use in those times of high stress. Per  staff she has only had 1 incidence of suicidal behavior of trying to run from the house into traffic or towards the pond since being discharged and this in the context of not having any ECT since early October. Discussed with Karolyn and  staff recommendation to observe behavior and utilize other interventions and monitor for emergence of symptom worsening that my warrant resuming ECT but not to resume any maintenance treatments at this time. Karolyn was  in agreement with this plan.  received new order for this and will alert guardian.  In light of not resuming ECT no medication changes will be made today and she will be closely monitored for decompensation. Also encouraged her to consider pursuing psychotherapy to further address her symptoms going forward which she will think about but is not interested at this time. She will return to clinic in 4 weeks or sooner if needed.              SUICIDE RISK ASSESSMENT [details described above]:  Today Karolyn Banerjee reports no suicidal ideation, no plan or intent and no urges for self harm or thoughts to harm others.  In addition, she has notable risk factors for self-harm including recent loss, relationship conflict, previous suicide attempt and recent inpt stay.  However, risk is mitigated by no plan or intent, symptom improvement, none to minimal alcohol use  and stable housing.  Based on all available evidence she does not appear to be at imminent risk for self-harm therefore does not meet criteria for a 72-hr hold/involuntary hospitalization.  However, based on degree of symptoms close psych FU was recommended which the pt did agree to.      [place] PRESCRIPTION MONITORING PROGRAM [] was not checked today:  not using controlled substances.    PSYCHOTROPIC DRUG INTERACTIONS:   Hydroxyzine and olanzapine or perphenazine or trazodone may result in increased risk of QT interval prolongation.    Perphenazine and benztropine may result in decreased phenothiazine serum concentrations, decreased phenothiazine effectiveness, enhanced anticholinergic effects (ileus, hyperpyrexia, sedation, dry mouth).   Trazodone and perphenazine may result in hypotension.  MANAGEMENT:  Monitoring for adverse effects, routine vitals, periodic EKGs and patient is aware of risks     PLAN                                                                                                       1) PSYCHOTROPIC MEDICATIONS: no changes today, due to pt  insurance medications must be ordered by attending      - lurasidone 160 mg with dinner      - olanzapine 10 mg BID prn for psychosis      - perphenazine 16 mg BID      - benztropine 0.5 mg BID      - trazodone 50mg at bedtime      - hydroxyzine 25-50 mg q4 hrs prn for anxiety     Coordinated with Dr. Amos regarding resuming ECT maintenance, will not resume at this time and continue to monitor for worsening of symptoms    2) THERAPY:    None currently    3) NEXT DUE:    Labs-neuroleptic labs due September 2018  EKG- April 2018 or PRN with medication changes  Rating Scales- AIMS next due in Feb 2018    4) REFERRALS:    NONE    5) RTC: 4 weeks    6) CRISIS NUMBERS:   Provided routinely in AVS.  Especially emphasized:  [facility] [phone] (clinic)    [phone] (after hours)    TREATMENT RISK STATEMENT:  The risks, benefits, alternatives and potential adverse effects have been discussed and are understood by the pt/pt's guardian. The pt understands the risks of using street drugs or alcohol. There are no medical contraindications, the pt agrees to treatment with the ability to do so. The pt knows to call the clinic for any problems or to access emergency care if needed.  Medical and substance use concerns are documented above.  Psychotropic drug interaction check was done, including changes made today.    RESIDENT:   Dana Sandoval,     Patient staffed in clinic with Dr. Durant who will sign the note.  Supervisor is Dr. Toussaint.    I saw the patient with the resident, and participated in key portions of the service, including the mental status examination and developing the plan of care. I reviewed key portions of the history with the resident. I agree with the findings and plan as documented in this note.    Tika Durant

## 2018-01-04 NOTE — PATIENT INSTRUCTIONS
-continue current medications, no changes  -will not resume ECT at this time, will continue to monitor symptoms  -return to clinic in 4 weeks or sooner if needed

## 2018-01-10 ENCOUNTER — HOSPITAL ENCOUNTER (OUTPATIENT)
Dept: INTERVENTIONAL RADIOLOGY/VASCULAR | Facility: CLINIC | Age: 33
Discharge: HOME | End: 2018-01-10
Attending: PSYCHIATRY & NEUROLOGY | Admitting: PSYCHIATRY & NEUROLOGY
Payer: MEDICAID

## 2018-01-10 DIAGNOSIS — F25.0 SCHIZOAFFECTIVE DISORDER, BIPOLAR TYPE (H): ICD-10-CM

## 2018-01-10 PROCEDURE — 25000128 H RX IP 250 OP 636: Performed by: PHYSICIAN ASSISTANT

## 2018-01-10 PROCEDURE — 27210904 IR PORT CHECK RIGHT

## 2018-01-10 RX ORDER — IOPAMIDOL 612 MG/ML
5 INJECTION, SOLUTION INTRATHECAL ONCE
Status: COMPLETED | OUTPATIENT
Start: 2018-01-10 | End: 2018-01-10

## 2018-01-10 RX ORDER — HEPARIN SODIUM (PORCINE) LOCK FLUSH IV SOLN 100 UNIT/ML 100 UNIT/ML
5 SOLUTION INTRAVENOUS
Status: CANCELLED | OUTPATIENT
Start: 2018-01-10

## 2018-01-10 RX ORDER — HEPARIN SODIUM,PORCINE 10 UNIT/ML
5 VIAL (ML) INTRAVENOUS EVERY 24 HOURS
Status: CANCELLED | OUTPATIENT
Start: 2018-01-10

## 2018-01-10 RX ORDER — HEPARIN SODIUM (PORCINE) LOCK FLUSH IV SOLN 100 UNIT/ML 100 UNIT/ML
5 SOLUTION INTRAVENOUS ONCE
Status: COMPLETED | OUTPATIENT
Start: 2018-01-10 | End: 2018-01-10

## 2018-01-10 RX ADMIN — IOPAMIDOL 5 ML: 612 INJECTION, SOLUTION INTRATHECAL at 12:03

## 2018-01-10 RX ADMIN — SODIUM CHLORIDE, PRESERVATIVE FREE 5 ML: 5 INJECTION INTRAVENOUS at 12:00

## 2018-01-10 NOTE — PROCEDURES
Procedure/Surgery Information   [facility], [place]    Bedside Procedure Note  Date of Service (when I performed the procedure): 01/10/2018    Karolyn Banerjee is a 32 year old female patient.  1. 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 Gardner     Mild developmental delay      Myopia      Neuroleptic-induced tardive dyskinesia      Nonorganic enuresis      Refractive amblyopia      Schizoaffective disorder, bipolar type (H)     Follows with Dr. Cooley at her group home.      Sleep disorder                           Procedures     Harvey Schneider     Interventional Radiology Brief Post Procedure Note    Procedure: IR PORT CHECK RIGHT    Proceduralist: Merlyn Sumner PA-C    Assistant: FREEMAN Cooper    Time Out: Prior to the start of the procedure and with procedural staff participation, I verbally confirmed the patient s identity using two indicators, relevant allergies, that the procedure was appropriate and matched the consent or emergent situation, and that the correct equipment/implants were available. Immediately prior to starting the procedure I conducted the Time Out with the procedural staff and re-confirmed the patient s name, procedure, and site/side. (The Joint Commission universal protocol was followed.)  Yes    Medications   Medication Event Details Admin User Admin Time   heparin 100 UNIT/ML injection 5 mL Medication Given by Other Clinician Dose: 5 mL; Route: Intracatheter; Scheduled Time: 12:15 PM; Comment: Given by JOSELIN Hunt Sasha, RN 1/10/2018 12:00 PM   iopamidol (ISOVUE-M-300) solution 5 mL Medication Given by Other Clinician Dose: 5 mL; Route:  Intravenous; Scheduled Time: 12:15 PM; Comment: Given by JOSELIN Hunt Sasha, RN 1/10/2018 12:03 PM       Sedation: None.    Findings:  Fluoroscopic evaluation reveals significant fibrin sheath at the catheter tip.  Port flushes well and is ok to use for IV infusion.     Estimated Blood Loss: None    Fluoroscopy Time:  0.1 minute(s)    SPECIMENS: None    Complications: 1. None     Condition: Stable    Plan: Patient to be scheduled at soonest convenience for TPA infusion.      Comments: See dictated procedure note for full details.    Harvey Schneider PA-C

## 2018-01-19 DIAGNOSIS — F25.1 SCHIZOAFFECTIVE DISORDER, DEPRESSIVE TYPE (H): ICD-10-CM

## 2018-01-19 RX ORDER — PERPHENAZINE 16 MG/1
16 TABLET ORAL 2 TIMES DAILY
Qty: 60 TABLET | Refills: 0 | Status: SHIPPED | OUTPATIENT
Start: 2018-01-19 | End: 2018-02-16

## 2018-01-19 NOTE — TELEPHONE ENCOUNTER
Medication requested: perphenazine 16 MG   Last refilled: UNK  Qty: 60    Last seen: 1/4/18  RTC: 4WEEKS  Cancel: 0  No-show: 0  Next appt: 2/15/18    Refill decision:Refilled for 30 days per protocol

## 2018-01-31 ENCOUNTER — CARE COORDINATION (OUTPATIENT)
Dept: PSYCHIATRY | Facility: CLINIC | Age: 33
End: 2018-01-31

## 2018-01-31 NOTE — PROGRESS NOTES
RE: ECT appt/[place]  Received: Today       Ines Palacios Katherine J, RN       Phone Number: [phone]                     Dandre/Joan     I spoke to Anat, as per the messages below. She says that pt's port should have been cleared today, so they would like to restart ECT treatments. She says that they would like to schedule the actual ECT, not a new consultation/office visit. She says that pt has been experiencing behaviors, so that are hoping to get something set up sooner rather than later. She states that the best number to call would be for the [facility] and any staff should be able to schedule.     [facility] number: [phone]            Previous Messages       ----- Message -----      From: Osiel Amos MD      Sent: 1/14/2018   2:14 PM        To: Yaneth Figueroa RN, Psychiatry Intake-[facility]   Subject: RE: ECT appt/[place]                             Schedule for Monday ECT evaluation   ----- Message -----      From: Yaneth Figueroa RN      Sent: 1/11/2018  12:07 PM        To: Osiel Amos MD   Subject: FW: ECT appt/[place] [place], Dr. Amos!     Would you be able to assist me with this?       Thank you!   VV       ----- Message -----      From: Amber Gomez      Sent: 1/11/2018  11:46 AM        To: Yaneth Figueroa RN   Subject: ECT appt/[place]                                 Bre, pt's care taker at [facility] is the caller. She is calling about scheduling an ECT appt. Please follow up when you can and you can ask for Anat when you call. Ok to leave detailed message.

## 2018-02-01 NOTE — PROGRESS NOTES
"No. Karolyn is followed in the clinic by Dr. Sandoval and we both (as well as the faculty attending supervising Shahzad) have thought that there is no indication for ECT as of her last visit. We need something more specific than a statement made by a  staff that Karolyn is \"having behaviors\" to determine if there has been a significant change that would warrant a change in our current treatment plan. Get more specific information and it can be discussed at her next appointment.  "

## 2018-02-02 NOTE — PROGRESS NOTES
Call to  staff and spoke with Anders.  Relayed reason for call.  Explained that in order for Dr. Amos to make a decision about whether or not pt needs ECT she needs to be seen at an appt.  Staff update that earlier today they scheduled an appt with Dr. Amos for this Monday.  Writer was unaware of this at time of call.  Explained that at appts staff need to provide more detailed information on what type of behaviors they are seeing and when they occur.  Anders states that pt is court ordered to receive ECT treatments.  Discuss that this is only if Dr. Amos determines that the treatments are needed.  Will update Dr. Amos and verify that appt on Monday should be kept as he is not pt's regular outpatient psychiatrist.  Next appt with Dr. Sandoval is 2/15/18.

## 2018-02-03 NOTE — PROGRESS NOTES
I can see patient as an ECT consult, but won't resume ECT unless it is recommended by the treating psychiatrist who is Dr. Sandoval, so there's not much point. Usually, the ECT consult is to see people whose psychiatrist believes ECT is indicated and I evaluate that as a 2nd opinion and also determine if the patient is medically safe for treatments, knows all of the side effects, benefits and alternatives to ECT, and has capacity to consent to treatment. Since she has had ECT in the past, there are (probably) no new medical issues that would make ECT a bigger risk and there is a standing court order, all of these are already in place and it's unltimately going to be Dr. Sandoval's (and her supervisor, of course) call. So, they can come but nothing is going to change until after the appointment on 2/15. As always, if the staff thinks recent behavior is a big change, they can bring her to the ED for possible admission and the inpatient psychiatrist would consult me if they believe she needs ECT.

## 2018-02-05 ENCOUNTER — TRANSFERRED RECORDS (OUTPATIENT)
Dept: HEALTH INFORMATION MANAGEMENT | Facility: CLINIC | Age: 33
End: 2018-02-05

## 2018-02-05 ENCOUNTER — HOSPITAL ENCOUNTER (INPATIENT)
Facility: CLINIC | Age: 33
LOS: 9 days | Discharge: NURSING, REHAB OR LONG-TERM CARE | End: 2018-02-14
Attending: PSYCHIATRY & NEUROLOGY | Admitting: PSYCHIATRY & NEUROLOGY
Payer: MEDICAID

## 2018-02-05 ENCOUNTER — OFFICE VISIT (OUTPATIENT)
Dept: PSYCHIATRY | Facility: CLINIC | Age: 33
End: 2018-02-05
Attending: PSYCHIATRY & NEUROLOGY
Payer: MEDICAID

## 2018-02-05 VITALS
HEART RATE: 94 BPM | DIASTOLIC BLOOD PRESSURE: 90 MMHG | BODY MASS INDEX: 44.51 KG/M2 | WEIGHT: 263.4 LBS | SYSTOLIC BLOOD PRESSURE: 127 MMHG

## 2018-02-05 DIAGNOSIS — E24.8 OTHER CUSHING'S SYNDROME (H): Primary | ICD-10-CM

## 2018-02-05 DIAGNOSIS — F25.1 SCHIZOAFFECTIVE DISORDER, DEPRESSIVE TYPE (H): Primary | ICD-10-CM

## 2018-02-05 DIAGNOSIS — F25.1 SCHIZOAFFECTIVE DISORDER, DEPRESSIVE TYPE (H): ICD-10-CM

## 2018-02-05 DIAGNOSIS — F60.3 BORDERLINE PERSONALITY DISORDER (H): ICD-10-CM

## 2018-02-05 PROBLEM — R45.851 SUICIDAL IDEATIONS: Status: ACTIVE | Noted: 2018-02-05

## 2018-02-05 PROCEDURE — G0463 HOSPITAL OUTPT CLINIC VISIT: HCPCS | Mod: ZF

## 2018-02-05 PROCEDURE — 12400001 ZZH R&B MH UMMC

## 2018-02-05 PROCEDURE — 25000132 ZZH RX MED GY IP 250 OP 250 PS 637: Performed by: STUDENT IN AN ORGANIZED HEALTH CARE EDUCATION/TRAINING PROGRAM

## 2018-02-05 RX ORDER — NORETHINDRONE ACETATE AND ETHINYL ESTRADIOL .02; 1 MG/1; MG/1
1 TABLET ORAL DAILY
Status: DISCONTINUED | OUTPATIENT
Start: 2018-02-06 | End: 2018-02-14 | Stop reason: HOSPADM

## 2018-02-05 RX ORDER — HYDROXYZINE HYDROCHLORIDE 25 MG/1
25-50 TABLET, FILM COATED ORAL EVERY 4 HOURS PRN
Status: DISCONTINUED | OUTPATIENT
Start: 2018-02-05 | End: 2018-02-14 | Stop reason: HOSPADM

## 2018-02-05 RX ORDER — TRAZODONE HYDROCHLORIDE 50 MG/1
50 TABLET, FILM COATED ORAL AT BEDTIME
Status: DISCONTINUED | OUTPATIENT
Start: 2018-02-05 | End: 2018-02-14 | Stop reason: HOSPADM

## 2018-02-05 RX ORDER — BENZTROPINE MESYLATE 0.5 MG/1
0.5 TABLET ORAL 2 TIMES DAILY
Status: DISCONTINUED | OUTPATIENT
Start: 2018-02-05 | End: 2018-02-14 | Stop reason: HOSPADM

## 2018-02-05 RX ORDER — PERPHENAZINE 16 MG/1
16 TABLET ORAL 2 TIMES DAILY
Status: DISCONTINUED | OUTPATIENT
Start: 2018-02-05 | End: 2018-02-14 | Stop reason: HOSPADM

## 2018-02-05 RX ORDER — OLANZAPINE 10 MG/1
10 TABLET ORAL 2 TIMES DAILY PRN
Status: DISCONTINUED | OUTPATIENT
Start: 2018-02-05 | End: 2018-02-14 | Stop reason: HOSPADM

## 2018-02-05 RX ORDER — CHLORAL HYDRATE 500 MG
1000 CAPSULE ORAL DAILY
Status: DISCONTINUED | OUTPATIENT
Start: 2018-02-05 | End: 2018-02-14 | Stop reason: HOSPADM

## 2018-02-05 RX ORDER — CETIRIZINE HYDROCHLORIDE 10 MG/1
10 TABLET ORAL DAILY
Status: DISCONTINUED | OUTPATIENT
Start: 2018-02-05 | End: 2018-02-14 | Stop reason: HOSPADM

## 2018-02-05 RX ORDER — LURASIDONE HYDROCHLORIDE 80 MG/1
160 TABLET, FILM COATED ORAL
Status: DISCONTINUED | OUTPATIENT
Start: 2018-02-05 | End: 2018-02-14 | Stop reason: HOSPADM

## 2018-02-05 RX ADMIN — CETIRIZINE HYDROCHLORIDE 10 MG: 10 TABLET, FILM COATED ORAL at 19:50

## 2018-02-05 RX ADMIN — TRAZODONE HYDROCHLORIDE 50 MG: 50 TABLET ORAL at 19:53

## 2018-02-05 RX ADMIN — Medication 1000 MG: at 19:51

## 2018-02-05 RX ADMIN — BENZTROPINE MESYLATE 0.5 MG: 0.5 TABLET ORAL at 20:39

## 2018-02-05 RX ADMIN — LURASIDONE HYDROCHLORIDE 160 MG: 80 TABLET, FILM COATED ORAL at 19:51

## 2018-02-05 RX ADMIN — PERPHENAZINE 16 MG: 16 TABLET, FILM COATED ORAL at 19:51

## 2018-02-05 RX ADMIN — HYDROXYZINE HYDROCHLORIDE 50 MG: 25 TABLET ORAL at 21:56

## 2018-02-05 ASSESSMENT — ACTIVITIES OF DAILY LIVING (ADL)
RETIRED_COMMUNICATION: 0-->UNDERSTANDS/COMMUNICATES WITHOUT DIFFICULTY
TRANSFERRING: 0 - INDEPENDENT
DRESS: 0-->INDEPENDENT
COGNITION: 0 - NO COGNITION ISSUES REPORTED
SWALLOWING: 0-->SWALLOWS FOODS/LIQUIDS WITHOUT DIFFICULTY
DRESS: 0 - INDEPENDENT
TRANSFERRING: 0-->INDEPENDENT
BATHING: 0-->INDEPENDENT
TOILETING: 0-->INDEPENDENT
RETIRED_EATING: 0-->INDEPENDENT
BATHING: 0 - INDEPENDENT
AMBULATION: 0 - INDEPENDENT
EATING: 0 - INDEPENDENT
COMMUNICATION: 0 - UNDERSTANDS/COMMUNICATES WITHOUT DIFFICULTY
FALL_HISTORY_WITHIN_LAST_SIX_MONTHS: NO
TOILETING: 0 - INDEPENDENT
AMBULATION: 0-->INDEPENDENT
SWALLOWING: 0 - SWALLOWS FOODS/LIQUIDS WITHOUT DIFFICULTY
CHANGE_IN_FUNCTIONAL_STATUS_SINCE_ONSET_OF_CURRENT_ILLNESS/INJURY: NO

## 2018-02-05 ASSESSMENT — PAIN SCALES - GENERAL: PAINLEVEL: NO PAIN (0)

## 2018-02-05 NOTE — PROGRESS NOTES
02/05/18 1748   Patient Belongings   Did you bring any home meds/supplements to the hospital?  No   Patient Belongings clothing;keys;purse;shoes;glasses   Disposition of Belongings Locker   Belongings Search Yes   Clothing Search Yes   Second Staff Talon HUTCHISON   General Info Comment ([facility]ocker- Purple purse, red jacket, black boots, gloves, cosmetic kit, black coin purse, green coin purse, socks, shirt, pants, bra, underwear, key chain with 1 key, glasses)   SECURITY- [id number] ( EB- [id number])  NO MONEY/NO CELL PHONE  A               Admission:  I am responsible for any personal items that are not sent to the safe or pharmacy.  [facility] is not responsible for loss, theft or damage of any property in my possession.    Signature:  _________________________________ Date: _______  Time: _____                                              Staff Signature:  ____________________________ Date: ________  Time: _____      2nd Staff person, if patient is unable/unwilling to sign:    Signature: ________________________________ Date: ________  Time: _____     Discharge:  [facility] has returned all of my personal belongings:    Signature: _________________________________ Date: ________  Time: _____                                          Staff Signature:  ____________________________ Date: ________  Time: _____

## 2018-02-05 NOTE — IP AVS SNAPSHOT
MRN:[id number]                      After Visit Summary   2/5/2018    Karolyn Banerjee    MRN: [id number]           Thank you!     Thank you for choosing [facility] for your care. Our goal is always to provide you with excellent care.        Patient Information     Date Of Birth          1985        Designated Caregiver       Most Recent Value    Caregiver    Will someone help with your care after discharge? yes    Name of designated caregiver n/a    Phone number of caregiver n/a    Caregiver address n/a      About your hospital stay     You were admitted on:  February 5, 2018 You last received care in the:  [facility]    You were discharged on:  February 14, 2018       Who to Call     For medical emergencies, please call 911.  For non-urgent questions about your medical care, please call your primary care provider or clinic, [phone]          Attending Provider     Provider Specialty    Robe Mixon MD Psychiatry       Primary Care Provider Office Phone # Fax #    Suzie Mariscal [phone]      Your next 10 appointments already scheduled     Feb 21, 2018 12:30 PM CST   New Sleep Patient with Yfn Kirkpatrick MD   [facility] ([facility])    [address] [address]   [phone]              Additional Services     Endocrinology Adult Referral           SLEEP EVALUATION & MANAGEMENT REFERRAL - ADULT -[facility] [phone]  (Age 18 and up)       Please be aware that coverage of these services is subject to the terms and limitations of your health insurance plan.  Call member services at your health plan with any benefit or coverage questions.      Please bring the following to your appointment:    >>   List of current medications   >>   This referral request   >>   Any documents/labs given to you for this referral                      Further instructions from your care team        Behavioral Discharge Planning and Instructions    Summary:   You were admitted to [facility] on 2/6/18 with worsening depression and suicidal ideation  under the care of Dr. Mixon.         You met with Dr. Mixon and his team daily for ongoing psychiatric assessment and medication management.  You had opportunities to participate in therapeutic groups on the unit.   At this time you report your mood has stabilized and you report you are not having thoughts or intent to harm yourself or others. You will be discharged home and will resume care with your outpatient providers.      Disposition:    [facility]:  [phone] [address] [place] [address]     Diagnosis:   Schizoaffective Disorder     Major Treatments, Procedures and Findings:   Medications were  managed throughout your stay. An internal medicine consult was completed during your stay. You had the opportunity to participate in treatment programming while on the unit including occupational therapy, mental health support and education and spiritual services.     Symptoms to Report:   Please report if you are experiencing increased aggression and/or confusion, problematic loss of sleep, worsening mood, or thoughts of suicide to your treatment team or notify your primary provider.   IF THE SYMPTOMS YOU ARE EXPERIENCING ARE A MEDICAL EMERGENCY, CALL 911 IMMEDIATELY     Lifestyle Adjustment:   1. Adjust your lifestyle to get enough sleep, relaxation, exercise and good nutrition.  Continue to develop healthy coping skills to decrease stress and promote a healthy  lifestyle.  2. Abstain from all substances of abuse.  3. Take medications as prescribed.  Please work with your doctor to discuss any concerns you have with your medications or side effects you may be experiencing.  4. Follow up with appointments as scheduled.       Health Care Follow-up   Appointments: Dr. Dana Sandoval - 2/15/18 at 9:25am  [facility]  "Clinic [address]  [address]  [place] [address]  [phone]    [facility]: Dr Casimiro Coulter-  2/21/18 At 12:30pm  [facility] [place] ([facility] [place])  [address], [place] [address]  [phone]    : Bre Cortes  [phone]      Resources:   [facility] Crisis: home-based therapeutic intervention from the Mobile Crisis Unit, or utilization of the crisis clinic for emergency counseling located at the [place] campus. Please call [phone]  *[facility]risis [facility]: ([phone])  24-hour confidential telephone counseling   *[facility] Emergency Room: [phone]     General Medication Instructions:   See your medication sheet(s) for instructions.   Take all medicines as directed.  Make no changes unless your doctor suggests them.   Go to all your doctor visits.  Be sure to have all your required lab tests. This way, your medicines can be refilled on time.  Do not use any drugs not prescribed by your doctor.     The treatment team has appreciated the opportunity to work with you.  We wish you the best in the future.    If you have any questions or concerns our unit number is [phone].       Pending Results     No orders found from 2/3/2018 to 2/6/2018.            Statement of Approval     Ordered          02/14/18 0727  I have reviewed and agree with all the recommendations and orders detailed in this document.  EFFECTIVE NOW     Approved and electronically signed by:  Tarik Tineo MD             Admission Information     Date & Time Provider Department Dept. Phone    2/5/2018 Robe Mixon MD [facility] [phone]      Your Vitals Were     Blood Pressure Pulse Temperature Respirations Height Weight    148/95 (BP Location: Left arm) 86 98.4  F (36.9  C) (Oral) 16 1.651 m (5' 5\") 120.7 kg (266 lb)    Pulse Oximetry BMI (Body Mass Index)                97% 44.26 kg/m2          MyChart Information  " "   [facility] lets you send messages to your doctor, view your test results, renew your prescriptions, schedule appointments and more. To sign up, go to [email or web address] . Click on \"Log in\" on the left side of the screen, which will take you to the Welcome page. Then click on \"Sign up Now\" on the right side of the page.     You will be asked to enter the access code listed below, as well as some personal information. Please follow the directions to create your username and password.     Your access code is: [email or web address]  Expires: 4/15/2018  1:56 PM     Your access code will  in 90 days. If you need help or a new code, please call your [facility] clinic or [phone].        Care EveryWhere ID     This is your Care EveryWhere ID. This could be used by other organizations to access your [facility] medical records  [id number]        Equal Access to Services     KAYA STRICKLAND : Isaac Roman, lauren gallegos, [employer], nando pizarro . So [facility] [phone].    ATENCIÓN: Si minla español, tiene a stauffer disposición servicios gratuitos de asistencia lingüística. Llame al [phone].    We comply with applicable federal civil rights laws and [place] laws. We do not discriminate on the basis of race, color, national origin, age, disability, sex, sexual orientation, or gender identity.               Review of your medicines      START taking        Dose / Directions    BuPROPion HCl ER (XL) 450 MG Tb24   Used for:  Schizoaffective disorder, depressive type (H)        Dose:  450 mg   Take 450 mg by mouth daily   Quantity:  30 tablet   Refills:  0         CONTINUE these medicines which have NOT CHANGED        Dose / Directions    benztropine 0.5 MG tablet   Commonly known as:  COGENTIN   Used for:  Schizoaffective disorder, bipolar type (H), Aggression        Dose:  0.5 mg   Take 1 tablet (0.5 mg) by mouth 2 times daily   Quantity:  60 tablet   Refills:  1    "    cetirizine 10 MG tablet   Commonly known as:  zyrTEC        Dose:  10 mg   Take 10 mg by mouth daily   Refills:  0       FISH OIL PO        Dose:  1000 mg   Take 1,000 mg by mouth daily   Refills:  0       hydrOXYzine 25 MG tablet   Commonly known as:  ATARAX   Used for:  Schizoaffective disorder, bipolar type (H), Aggression        Dose:  25-50 mg   Take 1-2 tablets (25-50 mg) by mouth every 4 hours as needed for anxiety   Quantity:  60 tablet   Refills:  2       lurasidone 80 MG Tabs tablet   Commonly known as:  LATUDA   Used for:  Schizoaffective disorder, bipolar type (H), Aggression        Dose:  160 mg   Take 2 tablets (160 mg) by mouth daily (with dinner)   Quantity:  60 tablet   Refills:  1       norethindrone-ethinyl estradiol 1-20 MG-MCG per tablet   Commonly known as:  MICROGESTIN 1/20        Dose:  1 tablet   Take 1 tablet by mouth daily   Refills:  0       OLANZapine 10 MG tablet   Commonly known as:  zyPREXA   Used for:  Schizoaffective disorder, bipolar type (H), Aggression        Dose:  10 mg   Take 1 tablet (10 mg) by mouth 2 times daily as needed   Quantity:  30 tablet   Refills:  0       perphenazine 16 MG tablet   Used for:  Schizoaffective disorder, depressive type (H)        Dose:  16 mg   Take 1 tablet (16 mg) by mouth 2 times daily   Quantity:  60 tablet   Refills:  0       traZODone 50 MG tablet   Commonly known as:  DESYREL   Used for:  Schizoaffective disorder, bipolar type (H), Aggression        Dose:  50 mg   Take 1 tablet (50 mg) by mouth At Bedtime   Quantity:  30 tablet   Refills:  1            Where to get your medicines      These medications were sent to [facility], "[facility]. - [place], [place] - [address]  [address] [facility] [address]     Phone:  [phone]     BuPROPion HCl ER (XL) 450 MG Tb24                Protect others around you: Learn how to safely use, store and throw away your medicines at www.disposemymeds.org.             Medication List:  This is a list of all your medications and when to take them. Check marks below indicate your daily home schedule. Keep this list as a reference.      Medications           Morning Afternoon Evening Bedtime As Needed    benztropine 0.5 MG tablet   Commonly known as:  COGENTIN   Take 1 tablet (0.5 mg) by mouth 2 times daily   Last time this was given:  0.5 mg on 2/14/2018  8:15 AM                                BuPROPion HCl ER (XL) 450 MG Tb24   Take 450 mg by mouth daily   Last time this was given:  450 mg on 2/14/2018  8:14 AM                                cetirizine 10 MG tablet   Commonly known as:  zyrTEC   Take 10 mg by mouth daily   Last time this was given:  10 mg on 2/14/2018  8:14 AM                                FISH OIL PO   Take 1,000 mg by mouth daily   Last time this was given:  1,000 mg on 2/14/2018  8:14 AM                                hydrOXYzine 25 MG tablet   Commonly known as:  ATARAX   Take 1-2 tablets (25-50 mg) by mouth every 4 hours as needed for anxiety   Last time this was given:  50 mg on 2/5/2018  9:56 PM                                lurasidone 80 MG Tabs tablet   Commonly known as:  LATUDA   Take 2 tablets (160 mg) by mouth daily (with dinner)   Last time this was given:  160 mg on 2/13/2018  5:00 PM                                norethindrone-ethinyl estradiol 1-20 MG-MCG per tablet   Commonly known as:  MICROGESTIN 1/20   Take 1 tablet by mouth daily   Last time this was given:  1 tablet on 2/14/2018  8:15 AM                                OLANZapine 10 MG tablet   Commonly known as:  zyPREXA   Take 1 tablet (10 mg) by mouth 2 times daily as needed   Last time this was given:  10 mg on 2/11/2018  1:20 PM                                perphenazine 16 MG tablet   Take 1 tablet (16 mg) by mouth 2 times daily   Last time this was given:  16 mg on 2/14/2018  8:14 AM                                traZODone 50 MG tablet   Commonly known as:  DESYREL   Take 1 tablet (50 mg) by mouth  At Bedtime   Last time this was given:  50 mg on 2/13/2018  8:14 PM

## 2018-02-05 NOTE — PROGRESS NOTES
"PSYCHIATRY  CLINIC PROGRESS NOTE   February 5, 2018   C.C. : Urgent evaluation of suicide attempt  Treatment History:   32-year-old single white female resident in a group home with diagnoses of treatment resistant schizoaffective disorder, mild MR (IQ equals 62), and borderline personality disorder.  Has history of repeated episodes of suicidal threats to run in front of traffic or drown herself in a pond near her group home in response to minor interpersonal slights and perceived lack of attention from staff, leading to multiple ED visits and hospitalizations.  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  She has a history of receiving ECT, at times during clear episodes of active psychosis (delusions of being pregnant from kissing her boyfriend) or depression, and also has undergone long series of maintenance ECT at intervals ranging from weekly to every 6 weeks.  Her last ECT was in October, then had a delay related to renewal of her commitment and Peña Schaefer order, then in December 2017 presented for a maintenance treatment but IV access was unobtainable so ECT canceled.  Subsequently she saw Dr. Sandoval in the psychiatry clinic in January and was felt to be doing well despite not getting ECT for 3 months, and ECT was discontinued.  Interval History:   This was an urgent clinic visit scheduled with this writer, the ECT provider, at the insistence of her group home staff who called last week to report that Karolyn was \"having behaviors\" and needed to resume ECT immediately.  They did not want to wait until her next scheduled appointment with Dr. Sandoval on February 15, so an ECT evaluation was scheduled.  The patient was accompanied by Allison, a worker at her group home [facility] operated by [facility].  Karolyn stated that lately she has been feeling \"like I need to go in " "(the hospital)\" because she has been feeling like killing herself.  She states that she has been feeling more depressed since before Sorin and is staying in her room much more of the time, experiencing low mood and crying and losing interest in her usual activities.  It turns out that the \"behaviors\" which precipitated this visit were that she ran out in front of traffic on January 27, threatening to kill herself, but returned to the house only after a staff member when out in the street to alert the cars to avoid her, and karolyn was concerned that the staff would get injured.  Apparently the police were called, but they have a past history with Karolyn and this group home and they did not come out to the house.  Karolyn says that she needs to come in the hospital \"for a few weeks\" because that \"makes me feel better.\"    It is unclear what the exact trigger for that suicidal gesture was, but there is another resident at the group home named Meme that frequently irritates Karolyn and the other 2 residents, as well as being quite difficult for the staff.  Reportedly Meme is higher functioning than the other women in the house and acts entitled, superior to others and bullies them.  She was moved from another group home due to this behavior.  Meme attends a day program 5 days a week, but on the weekends is mostly at the group home and has much more contact with Karolyn during that time.  Allison reports that Karolyn often blames Meme for causing her behavior outbursts, but is also often inconsistent and gives many other reasons for her breakdowns.  For example, she may ask to play a game when staff are busy with other things and tell her to engage in some solitary activity like coloring until they can get to her, but Karolyn is impatient and may have a meltdown at that time.  In the past these episodes of behavioral dyscontrol and suicidal threats have been seen as an indication for admission, but she often stabilizes " "in the ED and is sent back home or clears rapidly during her brief hospitalization.  Staff will also call to try to arrange an outpatient ECT but it is not clear that having ECT is any protection against these episodes.    When I confronted Karolyn, with the discrepancy between her report of being depressed since before Christmas and her good presentation to Dr. Sandoval a month ago, she says that she lied during the clinic visit but cannot really explain why.  At one point she said that she wanted not to be in the hospital over Christmas, even though the visit occurred early in January.  Today she scored 21 on the CUDOS but only reports moderate interference with her life and this is likely an inaccurate measure of her depression severity.  She continues to hear voices but they are not clearly worse than baseline, and she denies any active delusional thinking.  She attends [facility] twice weekly where she can visit with her boyfriend Remy and she denies any problems with that relationship.  She reports that she has been having problems with her eyes \"rolling up\" which sounds like an oculogyric crisis, perhaps related to her current scheduled medication which includes Latuda 160 mg daily, perphenazine 16 mg twice daily, and as needed olanzapine 10 mg which she has been taking several times a week recently.  I attempted to examine her extraocular movements and she seem to be able to track laterally but tended to move her entire head, rather than just her eyes, to look up or down.  She had been to her PCP earlier today for an annual physical, which I think was going to be needed to resume ECT, and based on her past history, I did not want to admit her, and explained that we cannot just put her in the hospital to take a break from the group home.  She then suggested that she needed a medication change and I said I would discuss this with Dr. Sandoval and get back to her.  I thought it might be possible to arrange some " "kind of respite care or a better plan could be developed to minimize the impact of Meme's behavior on Karolyn.  At that point, Karolyn got very upset, and began yelling that she was \"really really depressed\" and that \"no one is listening to me.\"  She grabbed her coat and said she was going to run out into the street in front of traffic and kill herself.  She left the room and made it to the elevator lob and was returned to the clinic by Allison.  At that point I felt he had no choice but to admit her, so I called intake and arrange for admission to [facility] under Dr. Mixon's care, where she has been many times before.  ECT Procedure Notes  5/26: Has not been seen in clinic since early April, will see Dr. Kiser next week. Reports voices and has been taking prn OLZ 10 mg about weekly, she is unable to say whether AH are increased generally over the past month or only occasionally. Admits to some behavior problems but no documentation and patient unable/unwilling to discuss details. No problems with last treatment.   7/14: Reports ongoing hallucinations in the evenings. Had an incident 3 days ago when offended by a peer, she ran towards the pond and threatened to drown self. Was brought to ED and by that time denied SI and was sent home. Denies she has been depressed or having SI other than that episode. No problems with last ECT.   8/25: No problems reported by staff. Pt. vomited at 1100 (75 min before ECT), but had all her meds this AM. Karolyn denies any behavior outbursts, SI or hallucinations. Said she was just hungry and thirsty now.  10/6: Pt. admitted to [facility] earlier this week as she was thinking about drowning herself in the pond by her GH. Had an admission in Sept. after her brother committed suicide, so that may be an additional stressor now. Did not respond to my questions about her mental state. No problems with last ECT.  10/11: Better up on [facility], now denies SI, says that she just had an " "argument with peer Meme, that she \"almost went to my room\" rather than running to the pond. No problems with last ECT.  12/11: 1st ECT since Oct., court order renewed. No problems with last ECT. Staff not present and did not provide report on interval behavior. [ECT staff unable to access port or peripheral IV so ECT canceled]       .  Current Outpatient Rx   Medication Sig Dispense Refill     perphenazine 16 MG tablet Take 1 tablet (16 mg) by mouth 2 times daily 60 tablet 0     norethindrone-ethinyl estradiol (MICROGESTIN 1/20) 1-20 MG-MCG per tablet Take 1 tablet by mouth daily       traZODone (DESYREL) 50 MG tablet Take 1 tablet (50 mg) by mouth At Bedtime 30 tablet 1     benztropine (COGENTIN) 0.5 MG tablet Take 1 tablet (0.5 mg) by mouth 2 times daily 60 tablet 1     lurasidone (LATUDA) 80 MG TABS tablet Take 2 tablets (160 mg) by mouth daily (with dinner) 60 tablet 1     OLANZapine (ZYPREXA) 10 MG tablet Take 1 tablet (10 mg) by mouth 2 times daily as needed 30 tablet 0     Omega-3 Fatty Acids (FISH OIL PO) Take 1,000 mg by mouth daily        hydrOXYzine (ATARAX) 25 MG tablet Take 1-2 tablets (25-50 mg) by mouth every 4 hours as needed for anxiety 60 tablet 2     cetirizine (ZYRTEC) 10 MG tablet Take 10 mg by mouth daily         DIAGNOSIS: Schizoaffective disorder depressed type; borderline personality disorder; mild MR  PLAN: Admit to [facility]   Meds: Consider reduction of antipsychotics due to apparent oculogyric crises.  It is likely that the current doses of either Latuda or perphenazine alone are sufficient to maximally block D2 receptors, and that additional antipsychotics are providing nonspecific sedation, so alternatives like gabapentin, clonidine, or even benzodiazepines might be helpful.  Increasing her benztropine may have a negative cognitive impact, so intervention for the oculogyric crises might be attempted with amantadine.  If thought to be experiencing a legitimate syndromal depressive " episode, antidepressant history could be reviewed but I do not believe she is responded consistently to these.  I briefly reviewed her medication history in EPIC, and it appears she has not been treated with Lamictal or lithium in the past 7 years, and either 1 of those might be an effective adjunct to antipsychotics.  Continuing ECT: I would prefer not to resume ECT during the hospitalization unless she is clearly experiencing a suicidal depression because that would only reinforce the opinion that ECT is necessary for her optimal function.  Medical: Clarify whether her PCP visit today was to renew her annual ECT preop, obtain records including EKG from that  Other Therapies: Explore whether respite care would be available, or whether a behavioral specialist might be consulted to address the interpersonal conflicts and house staff deal with those at [facility].  Clinic follow-up: With Dr. Sandoval after discharge    Colt Amos MD    of Psychiatry   Medical Director, ECT Service, [facility]    .Medical Record Review:   Total duration of the service was 105 minutes (1530 to 1715).  Clinical work consists of E/M established patient care, coordinating care with group home staff, counseling the patient on emotional response to bullying peer, boredom, and treatment options; arrange admission discussing discussing case with admitting resident and [facility] staff, escorting patient to unit, and assessing treatment safety.     Date of the face to face visit is 2/5/18.

## 2018-02-05 NOTE — IP AVS SNAPSHOT
[address] [address]    Phone:  [phone]                                       After Visit Summary   2/5/2018    Karolyn Banerjee    MRN: [id number]           After Visit Summary Signature Page     I have received my discharge instructions, and my questions have been answered. I have discussed any challenges I see with this plan with the nurse or doctor.    ..........................................................................................................................................  Patient/Patient Representative Signature      ..........................................................................................................................................  Patient Representative Print Name and Relationship to Patient    ..................................................               ................................................  Date                                            Time    ..........................................................................................................................................  Reviewed by Signature/Title    ...................................................              ..............................................  Date                                                            Time

## 2018-02-05 NOTE — H&P
"History and Physical    Karolyn Banerjee MRN# [id number]   Age: 32 year old YOB: 1985     Date of Admission:  2/5/2018          Contacts:   Primary Outpatient Psychiatrist: Dr. Shahzad Sandoval   Primary Physician:  Suzie Mariscal  [facility]: [facility] at [phone]  Family Members:   \"Swetha\" Chriss (Mother/Guardian) - [phone] (okay to leave message) or [phone]         Chief Complaint:   \"I started getting more depressed before Sorin\"         History of Present Illness:   History obtained from patient interview, chart review.  Pt interviewed on 2/5/2018 at approximately 5:30PM.    This patient is a 32 year old female with history of treatment-refractory schizoaffective disorder, bipolar type, borderline personality disorder, and unspecified neurocognitive disorder (IQ 62) who was voluntarily admitted directly from clinic during an ECT consultation visit for suicidal ideations. Patient is currently under commitment and Torres, which has been renewed until 11/2/2018. Mother is guardian.     Per clinic report:  Clinical is obtained from Dr. Amos, who patient was seeing for ECT consultation. Patient is normally followed by Dr. Sandoval in clinic. She has a history of refractory schizoaffective disorder, for which she received maintenance ECT up until October 2017. Due to a variety of reasons ([facility] [facility] not working, no perceived acute need for treatment), she has not received ECT since then. In the past week, patient's group home has been calling frequently with concerns of escalating behaviors. Today, patient had a pre-ECT physical done at an outside clinic. From her consultation visit with Dr. Amos today, it was not clear if patient really needed ECT. She apparently had been stressed due to interactions with some other residents at the group home. Dr. Amos recommended to defer ECT and offered to explore respite options, but patient became very upset stating that she needed " to be admitted. She stated that she had been very depressed/suicidal and had minimized symptoms during an earlier office visit with Dr. Sandoval in early January. She stated that she was suicidal and had plans to run into traffic. At one point, she attempted to leave the clinic and needed to be stopped. She was subsequently admitted directly to station 20 for safety.     Per patient report:    Patient states that she had started feeling more depressed before Sorin. She states that she lied about her symptoms because she wanted to be home for the holidays. She has had frequent thoughts of suicide, either by drowning herself in a pond or walking in front of a car. She states that she had done both in the past. She only stopped walking into traffic because she was worried that one of her caretakers would get hurt trying to stop her. States that she has been sleeping a lot and feeling more irritable. Feels that she has to smile and pretend to be happy around her boyfriend. She also endorses hearing voices and states that the caleb sometimes talks to her. She does not volunteer any acute stressors, but does admit disliking one of the other group home residents, who makes rude comments. When asked about her brother's suicide, which resulted in her being hospitalized in October 2017, she states that she does not really care about him as she was never close.     The risks, benefits, alternatives and side effects have been discussed and are understood by the patient and other caregivers.       Psychiatric Review of Systems:   Depressive Sx: Irritable, Low mood, Anhedonia, Decreased energy, Concentration issues, Slowed movement/thinking and SI, hypersomnia  Manic Sx: none recently  Psychosis: AH  Anxiety Sx: worries  PTSD: trauma, nightmares         Medical Review of Systems:   The Review of Systems is negative other than noted in the HPI         Psychiatric History:     Per Dr. Sandoval's note dated 1/4/2018: Pt hospitalized  for suicidal ideation/attempt September 19-25 following death of brother  Perphenazine increased to 16mg BID during hospitalization  Inpatient psychiatric treatment 10/4/17 to 10/11/17 due to active suicidal ideation with plans and intent - no medication change, received ECT x 2  Sayra, Brian and Casey Wilkins renewed until 11/2/2018  Has not had ECT treatment since October 11th with continued stability.    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 Gardner     Mild developmental delay      Myopia      Neuroleptic-induced tardive dyskinesia      Nonorganic enuresis      Refractive amblyopia      Schizoaffective disorder, bipolar type (H)     Follows with Dr. Cooley at her group home.      Sleep disorder      Past Surgical History:   Procedure Laterality Date     HRW PORT A CATH Right      NO HISTORY OF SURGERY       No History of seizures or head trauma.       Allergies:      Allergies   Allergen Reactions     Clozapine      Agranulocytosis x 2, cannot be re trialed      Risperdal Other (See Comments)     dystonia     Tape [Adhesive Tape] Rash     Plastic tape          Medications:     Current Outpatient Prescriptions   Medication Sig Dispense Refill     perphenazine 16 MG tablet Take 1 tablet (16 mg) by mouth 2 times daily 60 tablet 0     norethindrone-ethinyl estradiol (MICROGESTIN 1/20) 1-20 MG-MCG per tablet Take 1 tablet by mouth daily       traZODone (DESYREL) 50 MG tablet Take 1 tablet (50 mg) by mouth At Bedtime 30 tablet 1     benztropine (COGENTIN) 0.5 MG tablet Take 1 tablet  "(0.5 mg) by mouth 2 times daily 60 tablet 1     lurasidone (LATUDA) 80 MG TABS tablet Take 2 tablets (160 mg) by mouth daily (with dinner) 60 tablet 1     OLANZapine (ZYPREXA) 10 MG tablet Take 1 tablet (10 mg) by mouth 2 times daily as needed 30 tablet 0     Omega-3 Fatty Acids (FISH OIL PO) Take 1,000 mg by mouth daily        hydrOXYzine (ATARAX) 25 MG tablet Take 1-2 tablets (25-50 mg) by mouth every 4 hours as needed for anxiety 60 tablet 2     cetirizine (ZYRTEC) 10 MG tablet Take 10 mg by mouth daily             Social History:     Per Dr. Sandoval's note 1/4/2018:   FINANCIAL SUPPORT- social security disability       CHILDREN- none       LIVING SITUATION- lives in group home ([facility]) in [place] Has been there since May 2014  SOCIAL/ SPIRITUAL SUPPORT- her boyfriend JAC Alberto worker, CM, [facility] staff    FEELS SAFE AT HOME- Yes       Family History:   Father with mild developmental disorder and uncle with schizophrenia  Brother committed suicide    Family History   Problem Relation Age of Onset     MENTAL ILLNESS Father      mild developmental delay     Schizophrenia Other      uncle            Labs:     No results found for this or any previous visit (from the past 24 hour(s)).         Psychiatric Examination:     Temp 98.7  F (37.1  C) (Oral)  Resp 18  Ht 1.651 m (5' 5\")  Wt 119.7 kg (264 lb)  SpO2 97%  BMI 43.93 kg/m2    Appearance:  awake, alert and poorly groomed  Attitude:  mostly cooperative  Eye Contact:  poor , downcast  Mood:  depressed  Affect:  mood congruent, intensity is blunted and nonreactive  Speech:  decreased prosody and paucity of speech  Psychomotor Behavior:  Unable to assess for ocular dystonia (see physical exam)  Thought Process:  linear, vague at times  Associations:  no loose associations  Thought Content:  active suicidal ideation present and endorses AH; denies HI/VH; not responding to internal stimuli  Insight:  partial  Judgment:  limited  Oriented to:  time, person, and " place  Attention Span and Concentration:  adequate  Recent and Remote Memory:  fair  Language:  english with appropriate syntax and vocabulary  Fund of Knowledge: delayed  Muscle Strength and Tone: normal  Gait and Station: Normal         Physical Exam:     Gen: no acute distress  Head: NCAT  Eyes: PERRL; unable to fully assess EOM as patient would not comply for this (patient repeated declined to attempt to look up due to worries that her eyes would become stuck); EOM intact in all other directions; denies pain/current discomfort  Nose: no rhinorrhea  Throat: dry mucous membranes  Resp: CTAB; no wheezes, crackles, or rhonchi  CV: RRR; no r/m/g  Abdomen: soft; non-tender; normoactive BS  Extremities: 2+ radial pulses; no peripheral edema  Neuro:  Fluent speech; alert and attentive; moves all extremities; slow but steady gait        Assessment   This patient is a 32 year old female with history of treatment-refractory schizoaffective disorder, bipolar type, borderline personality disorder, and unspecified neurocognitive disorder (IQ 62) who was voluntarily admitted directly from clinic during an ECT consultation visit for suicidal ideations. Patient is currently under commitment, Torres, and Peña-Wilkins which has been renewed until 11/2/2018. Hospitalization is in the context of unclear stressors, but notably patient has had difficulties with another resident at her group home. She minimizes any distress about her brother's suicide last year. Substance use does not appear to be playing an active role. On exam, she has blunted affect and notably slowed speech. Presentation is consistent with historic diagnosis of schizoaffective disorder, with an active depressive episode. Concurrent borderline personality disorder and intellectual delay likely contributing with limited coping skills.     Reason for inpatient hospitalization is ongoing suicidality. Disposition pending clinical stabilization, medication  optimization, and formulation of safe discharge plan. It continues to be unclear at this time whether patient needs to be restarted on ECT as historically, patient has had episodic impulsiveness, suicidality, and mood dysregulation that rapidly clear without medication changes or ECT. She did have a pre-ECT physical done at an outside clinic earlier today.         Plan   Admit to Unit 20 with Attending Physician Dr. Mixon  Legal Status: Voluntary (with Guardian)    Safety Assessment:      Pt has not required locked seclusion or restraints in the past 24 hours to maintain safety, please refer to RN documentation for further details.    Precautions: suicide/self-injury    Principal psychiatric diagnosis:   # Schizoaffective Disorder, bipolar type, current depressive episode    Secondary psychiatric diagnoses:   # Borderline Personality Disorder  # Unspecified Neurocognitive Disorder (IQ 62)    Medications:   Outpatient medications held:    None    Outpatient medications continued:   - Latuda 160mg with dinner  - Olanzapine 10mg BID PRN  - Perphenazine 16mg BID  - Trazodone 50mg QHS  - Cogentin 0.5mg BID  - Atarax 25-50mg Q4H PRN    New medications initiated:   - IM/PO Olanzapine 10mg Q2H PRN aggression/agitation     - Patient will be treated in therapeutic milieu with appropriate individual and group therapies.  - medications as above    Medical diagnoses:      # r/o ocular dystonia - patient had mentioned having difficulties looking up when talking with Dr. Amos. Patient would not comply with exam with writer out of fears that her eyes would get stuck, but denied any ongoing pain or discomfort. Notably, she is on multiple antipsychotics and had her perphenazine increased in October. Can consider dose of IM Cogentin if patient develops visible signs    # Health maintenance - continue PTA omega 3 and oral contraceptive  # Seasonal allergies - cetirizine 10mg QDAY    Consult: None  Labs: UDS pending; defer to  primary as routine labs have been checked in September     Dispo: unknown pending medication management and clinical stabilization    -------------------------------------------------------  Ángel Pyle MD  Psychiatry PGY-2  [phone]      Attestation:  2/6/18  Please see progress note 2/6/18.  Robe Mixon MD

## 2018-02-05 NOTE — MR AVS SNAPSHOT
After Visit Summary   2018    Karolyn Banerjee    MRN: [id number]           Patient Information     Date Of Birth          1985        Visit Information        Provider Department      2018 3:30 PM Osiel Amos MD [facility]        Today's Diagnoses     Schizoaffective disorder, depressive type (H)    -  1    Borderline personality disorder           Follow-ups after your visit        Your next 10 appointments already scheduled     Feb 15, 2018  9:25 AM CST   Schizophrenia Follow Up with Dana Sandoval MD   Psychiatry Clinic ([facility])    [address]  [address] [facility] [id number] [phone]              Who to contact     Please call your clinic at [phone] to:    Ask questions about your health    Make or cancel appointments    Discuss your medicines    Learn about your test results    Speak to your doctor   If you have compliments or concerns about an experience at your clinic, or if you wish to file a complaint, please contact [facility] Patient Relations at [phone] or email us at [email or web address]         Additional Information About Your Visit        MyChart Information     [employer] is an electronic gateway that provides easy, online access to your medical records. With sendwithus, you can request a clinic appointment, read your test results, renew a prescription or communicate with your care team.     To sign up for [employer] visit the website at [email or web address]   You will be asked to enter the access code listed below, as well as some personal information. Please follow the directions to create your username and password.     Your access code is: [email or web address]  Expires: 4/15/2018  1:56 PM     Your access code will  in 90 days. If you need help or a new code, please contact your [facility] or call [phone] for  assistance.        Care EveryWhere ID     This is your Care EveryWhere ID. This could be used by other organizations to access your [place] medical records  [id number]        Your Vitals Were     Pulse BMI (Body Mass Index)                94 44.51 kg/m2           Blood Pressure from Last 3 Encounters:   02/05/18 127/90   01/04/18 (!) 138/91   12/11/17 (!) 148/102    Weight from Last 3 Encounters:   02/06/18 115.9 kg (255 lb 8 oz)   02/05/18 119.5 kg (263 lb 6.4 oz)   01/04/18 119.5 kg (263 lb 6.4 oz)              Today, you had the following     No orders found for display       Primary Care Provider Office Phone # Fax #    Suzei Mariscal [phone]       [address] [address]        Equal Access to Services     KAYA STRICKLAND : Hadii aad ku hadasho Sotawanda, waaxda luqadaha, qaybta kaalmada adeegyada, nando pizarro . So [facility] [place] [facility] [phone].    ATENCIÓN: Si habla español, tiene a stauffer disposición servicios gratuitos de asistencia lingüística. Duyame al [phone].    We comply with applicable federal civil rights laws and [place] laws. We do not discriminate on the basis of race, color, national origin, age, disability, sex, sexual orientation, or gender identity.            Thank you!     Thank you for choosing PSYCHIATRY CLINIC  for your care. Our goal is always to provide you with excellent care. Hearing back from our patients is one way we can continue to improve our services. Please take a few minutes to complete the written survey that you may receive in the mail after your visit with us. Thank you!             Your Updated Medication List - Protect others around you: Learn how to safely use, store and throw away your medicines at www.disposemymeds.org.          This list is accurate as of 2/5/18  5:30 PM.  Always use your most recent med list.                   Brand Name Dispense Instructions for use Diagnosis     benztropine 0.5 MG tablet    COGENTIN    60 tablet    Take 1 tablet (0.5 mg) by mouth 2 times daily    Schizoaffective disorder, bipolar type (H), Aggression       cetirizine 10 MG tablet    zyrTEC     Take 10 mg by mouth daily        FISH OIL PO      Take 1,000 mg by mouth daily        hydrOXYzine 25 MG tablet    ATARAX    60 tablet    Take 1-2 tablets (25-50 mg) by mouth every 4 hours as needed for anxiety    Schizoaffective disorder, bipolar type (H), Aggression       lurasidone 80 MG Tabs tablet    LATUDA    60 tablet    Take 2 tablets (160 mg) by mouth daily (with dinner)    Schizoaffective disorder, bipolar type (H), Aggression       norethindrone-ethinyl estradiol 1-20 MG-MCG per tablet    MICROGESTIN 1/20     Take 1 tablet by mouth daily        OLANZapine 10 MG tablet    zyPREXA    30 tablet    Take 1 tablet (10 mg) by mouth 2 times daily as needed    Schizoaffective disorder, bipolar type (H), Aggression       perphenazine 16 MG tablet     60 tablet    Take 1 tablet (16 mg) by mouth 2 times daily    Schizoaffective disorder, depressive type (H)       traZODone 50 MG tablet    DESYREL    30 tablet    Take 1 tablet (50 mg) by mouth At Bedtime    Schizoaffective disorder, bipolar type (H), Aggression

## 2018-02-05 NOTE — NURSING NOTE
Chief Complaint   Patient presents with     Recheck Medication     Reviewed Allergies, Medications, Pharmacy, Smoking Status, and Pain Level  Obtained Weight, Blood Pressure, Heart Rate x2

## 2018-02-06 LAB
ANION GAP SERPL CALCULATED.3IONS-SCNC: 9 MMOL/L (ref 3–14)
BASOPHILS # BLD AUTO: 0 10E9/L (ref 0–0.2)
BASOPHILS NFR BLD AUTO: 0.2 %
BUN SERPL-MCNC: 14 MG/DL (ref 7–30)
CALCIUM SERPL-MCNC: 8.1 MG/DL (ref 8.5–10.1)
CHLORIDE SERPL-SCNC: 107 MMOL/L (ref 94–109)
CO2 SERPL-SCNC: 25 MMOL/L (ref 20–32)
CREAT SERPL-MCNC: 0.67 MG/DL (ref 0.52–1.04)
DIFFERENTIAL METHOD BLD: NORMAL
EOSINOPHIL # BLD AUTO: 0.1 10E9/L (ref 0–0.7)
EOSINOPHIL NFR BLD AUTO: 1.8 %
ERYTHROCYTE [DISTWIDTH] IN BLOOD BY AUTOMATED COUNT: 12.4 % (ref 10–15)
GFR SERPL CREATININE-BSD FRML MDRD: >90 ML/MIN/1.7M2
GLUCOSE SERPL-MCNC: 114 MG/DL (ref 70–99)
HCT VFR BLD AUTO: 40.3 % (ref 35–47)
HGB BLD-MCNC: 13.8 G/DL (ref 11.7–15.7)
IMM GRANULOCYTES # BLD: 0 10E9/L (ref 0–0.4)
IMM GRANULOCYTES NFR BLD: 0.5 %
LYMPHOCYTES # BLD AUTO: 1.7 10E9/L (ref 0.8–5.3)
LYMPHOCYTES NFR BLD AUTO: 29.1 %
MCH RBC QN AUTO: 30.8 PG (ref 26.5–33)
MCHC RBC AUTO-ENTMCNC: 34.2 G/DL (ref 31.5–36.5)
MCV RBC AUTO: 90 FL (ref 78–100)
MONOCYTES # BLD AUTO: 0.6 10E9/L (ref 0–1.3)
MONOCYTES NFR BLD AUTO: 9.4 %
NEUTROPHILS # BLD AUTO: 3.5 10E9/L (ref 1.6–8.3)
NEUTROPHILS NFR BLD AUTO: 59 %
NRBC # BLD AUTO: 0 10*3/UL
NRBC BLD AUTO-RTO: 0 /100
PLATELET # BLD AUTO: NORMAL 10E9/L (ref 150–450)
POTASSIUM SERPL-SCNC: 4.3 MMOL/L (ref 3.4–5.3)
RBC # BLD AUTO: 4.48 10E12/L (ref 3.8–5.2)
SODIUM SERPL-SCNC: 141 MMOL/L (ref 133–144)
WBC # BLD AUTO: 6 10E9/L (ref 4–11)

## 2018-02-06 PROCEDURE — 36416 COLLJ CAPILLARY BLOOD SPEC: CPT | Performed by: NURSE PRACTITIONER

## 2018-02-06 PROCEDURE — 90853 GROUP PSYCHOTHERAPY: CPT

## 2018-02-06 PROCEDURE — 25000132 ZZH RX MED GY IP 250 OP 250 PS 637: Performed by: STUDENT IN AN ORGANIZED HEALTH CARE EDUCATION/TRAINING PROGRAM

## 2018-02-06 PROCEDURE — 12400001 ZZH R&B MH UMMC

## 2018-02-06 PROCEDURE — 85025 COMPLETE CBC W/AUTO DIFF WBC: CPT | Performed by: NURSE PRACTITIONER

## 2018-02-06 PROCEDURE — 80048 BASIC METABOLIC PNL TOTAL CA: CPT | Performed by: NURSE PRACTITIONER

## 2018-02-06 PROCEDURE — 99223 1ST HOSP IP/OBS HIGH 75: CPT | Mod: AI | Performed by: PSYCHIATRY & NEUROLOGY

## 2018-02-06 PROCEDURE — 93005 ELECTROCARDIOGRAM TRACING: CPT | Performed by: NURSE PRACTITIONER

## 2018-02-06 RX ADMIN — PERPHENAZINE 16 MG: 16 TABLET, FILM COATED ORAL at 09:16

## 2018-02-06 RX ADMIN — LURASIDONE HYDROCHLORIDE 160 MG: 80 TABLET, FILM COATED ORAL at 16:59

## 2018-02-06 RX ADMIN — NORETHINDRONE ACETATE AND ETHINYL ESTRADIOL 1 TABLET: 1; 20 TABLET ORAL at 10:19

## 2018-02-06 RX ADMIN — BENZTROPINE MESYLATE 0.5 MG: 0.5 TABLET ORAL at 09:16

## 2018-02-06 RX ADMIN — OLANZAPINE 10 MG: 10 TABLET, FILM COATED ORAL at 01:17

## 2018-02-06 RX ADMIN — BENZTROPINE MESYLATE 0.5 MG: 0.5 TABLET ORAL at 21:08

## 2018-02-06 RX ADMIN — PERPHENAZINE 16 MG: 16 TABLET, FILM COATED ORAL at 21:08

## 2018-02-06 RX ADMIN — CETIRIZINE HYDROCHLORIDE 10 MG: 10 TABLET, FILM COATED ORAL at 09:16

## 2018-02-06 RX ADMIN — TRAZODONE HYDROCHLORIDE 50 MG: 50 TABLET ORAL at 21:08

## 2018-02-06 RX ADMIN — Medication 1000 MG: at 09:16

## 2018-02-06 ASSESSMENT — ACTIVITIES OF DAILY LIVING (ADL)
LAUNDRY: WITH SUPERVISION
HYGIENE/GROOMING: INDEPENDENT
GROOMING: INDEPENDENT
DRESS: INDEPENDENT
DRESS: INDEPENDENT
ORAL_HYGIENE: INDEPENDENT
LAUNDRY: WITH SUPERVISION
ORAL_HYGIENE: INDEPENDENT

## 2018-02-06 NOTE — PLAN OF CARE
Problem: Patient Care Overview  Goal: Individualization & Mutuality  Patient's goals:  Feel better- less depressed    Plan for admission:  1. Stabilization of mood disorder symptoms  2. Absence of SI- safe with self  3. Medication management per MD's  4. Coordination of care with outpatient providers, family  5. Psychiatric follow up care in place

## 2018-02-06 NOTE — PROGRESS NOTES
02/06/18 1425   Behavioral Health   Hallucinations auditory   Thinking poor concentration   Orientation person: oriented;place: oriented;date: oriented;time: oriented   Memory baseline memory   Insight poor   Judgement impaired   Eye Contact at examiner   Affect blunted, flat   Mood anxious;mood is calm   Physical Appearance/Attire disheveled   Hygiene neglected grooming - unclean body, hair, teeth   Suicidality thoughts only   Self Injury thoughts only   Elopement (none stated)   Activity isolative;withdrawn   Speech clear;coherent   Medication Sensitivity no stated side effects   Psychomotor / Gait balanced   Psycho Education   Type of Intervention 1:1 intervention   Response participates, initiates socially appropriate   Hours 0.5   Activities of Daily Living   Hygiene/Grooming independent   Oral Hygiene independent   Dress independent   Laundry with supervision   Room Organization independent   pt isolative in room most of day. Pt attended one group. Pt withdrawn. Pt eating well and has poor hygiene. Pt has SI thoughts only. Pt reading in milieu at times.

## 2018-02-06 NOTE — PROGRESS NOTES
02/06/18 1700   General Information   Date Initially Attended OT 02/06/18   Clinical Impression   Affect Flat   Orientation Oriented to person, place and time   Appearance and ADLs General cleanliness observed in most areas   Attention to Internal Stimuli No observed signs   Interaction Skills Interacts appropriately with staff;Initiates appropriately with staff;Interacts with prompts, minimal response;Withdrawn   Ability to Communicate Needs Independent;Does so with prompts   Verbal Content Appropriate to topic;[place]   Ability to Maintain Boundaries Maintains appropriate physical boundaries;Maintains appropriate verbal boundaries   Participation Initiates participation;Independently participates   Concentration Concentrates 20-30 minutes;Concentrates 10-20 minutes;Needs further assessment   Ability to Concentrate With structure;Needs further assessment   Follows and Comprehends Directions Independently follows 1 step verbal directions;Needs further assessment   Memory Delayed and immediate recall intact   Organization Independently organizes simple tasks   Decision Making Independent;Needs further assessment   Planning and Problem Solving Needs further assessment   Ability to Apply and Learn Concepts Applies within group structure   Frustrations / Stress Tolerance Needs further assessment   Level of Insight Identifies needs with structure/support   Self Esteem Can identify positives   Social Supports Identifies utilizing supports

## 2018-02-06 NOTE — PROGRESS NOTES
"   02/06/18 1700   Occupational Therapy   Type of Intervention structured groups   Response Participates   Hours 1.5     INITIAL OT ATTENDANCE: See flowsheet for functional participation in OT groups since admission.  Participated in mental health management group focused on proactive goal setting and identification of areas of interest. Pt verbalized understanding of goal setting activity through identification of a desired life experience she would like to attain (\"living independently\"). Pt will be given Self Assessment form, explain the purpose of including them in their treatment plan and offer options for meeting their needs.    "

## 2018-02-06 NOTE — PROGRESS NOTES
"Pt was admitted to unit for having a increase in SI. She would not talk to writer other then to say \"I am depressed and nobody cares\" She was withdrawn and with a flat affect. She was able to contract for safety.  "

## 2018-02-06 NOTE — PLAN OF CARE
Problem: Patient Care Overview  Goal: Team Discussion  Team Plan:   BEHAVIORAL TEAM DISCUSSION    Participants:   Dr. Mixon, Dr. Flores, , Med students, pharm student    Continued Stay Criteria/Rationale:   Depression with suicidal ideation    Medical/Physical:  Asthma, Astigmatism, refractive ambliopia, Humeral shaft fracture, nonorganic enuresis    Precautions:   Behavioral Orders   Procedures     Code 1 - Restrict to Unit     Routine Programming     As clinically indicated     Self Injury Precaution     Status 15     Every 15 minutes.     Suicide precautions     Plan:   Patient will have ongoing psychiatric assessment.  Medications will be reviewed and adjusted per MD as indicated. Outpatient providers/guardian will be contacted for care coordination.  Hospital staff will provide a safe environment and a therapeutic milieu. Patient will be encouraged to participate in unit groups and activities.    CTC will continue to assess needs and  ensure appropriate follow up care is in place.       Rationale for change in precautions or plan:   No change in plan/precautions    Illness Management Recovery model: Personal Plan of Care    Patient will complete Personal Plan of Care, identifying reasons for hospitalization and goals for discharge. Form will be reviewed in team meeting and signed by patient, physician, writer and RN. Form will be given to [facility] to be scanned into [facility].

## 2018-02-06 NOTE — PROGRESS NOTES
----------------------------------------------------------------------------------------------------------  [facility], [place]   Psychiatric Progress Note     Assessment    Presentation: This patient is a 32 year old female with history of treatment-refractory schizoaffective disorder, bipolar type, borderline personality disorder, and unspecified neurocognitive disorder (IQ 62) who was voluntarily admitted directly from clinic during an ECT consultation visit for suicidal ideations. Patient is currently under commitment with Reina Wilkins, which has been renewed until 11/2/2018. Mother is guardian.     Diagnostic Impression: This patient is a 32 year old female with history of treatment-refractory schizoaffective disorder, bipolar type, borderline personality disorder, and unspecified neurocognitive disorder (IQ 62) who was voluntarily admitted directly from clinic during an ECT consultation visit for suicidal ideations. Patient is currently under commitment, Brian, and Júnior which has been renewed until 11/2/2018. Hospitalization is in the context of unclear stressors, but notably patient has had difficulties with another resident at her group home. She minimizes any distress about her brother's suicide last year. Substance use does not appear to be playing an active role. On exam, she has blunted affect and notably slowed speech. Presentation is consistent with historic diagnosis of schizoaffective disorder, with an active depressive episode. Concurrent borderline personality disorder and intellectual delay likely contributing with limited coping skills. Reason for inpatient hospitalization is ongoing suicidality. Disposition pending clinical stabilization, medication optimization, and formulation of safe discharge plan.    Hospital course: Karolyn Banerjee was admitted to station 20 as a voluntary patient (under commitment until 11/2/2018 with Edil  Franky and on a provisional discharge) and placed on Status 15 minute checks, Suicide and Self-Injury precautions.  PTA medications were restarted.  Affect markedly downcast, depressed, with psychomotor retardation and speech delay.  Endorsed continued SI, reported that ECT had been discontinued upon her own request, however stated she was indifferent as to whether it was restarted.    Medical course: Patient to be evaluated for Cushings (due to features/body habitus) and to receive clearance for ECT.    Plan     Principal Diagnosis: Schizoaffective Disorder, bipolar type, current depressive episode    Medications:  Lurasidone 160 mg with dinner  Perphenazine 16 mg BID  Trazodone 50 mg qHS  Cogentin 0.5 mg BID  Olanzapine 10 mg BID PRN  Hydroxyzine 25-50 mg q4 hrs PRN    Laboratory/Imaging: Urine HCG and UTox pending  Consults: None  Patient will be treated in therapeutic milieu with appropriate individual and group therapies as described.    Secondary psychiatric diagnoses of concern this admission: Borderline Personality Disorder, Unspecified Neurocognitive Disorder (IQ 62)    Medical diagnoses to be addressed this admission:  Seasonal allergies, R/o Ocular dystonia?, R/o Cushings, Medical clearance for ECT, eval/maintenance of Port-a-Cath as needed  Consults: [facility] IM Consult Service    Relevant psychosocial stressors: Conflict with a peer at her group home, Suicide of her brother in the near-term recent past (last year)    Legal Status: Torres  Committed  Casey Wilkins    Safety Assessment:   Checks: Status 15  Precautions: Suicide  Self-harm  Pt has not required locked seclusion or restraints in the past 24 hours to maintain safety, please refer to RN documentation for further details.    The risks, benefits, alternatives and side effects have been discussed and are understood by the patient and other caregivers.    Anticipated Disposition/Discharge Date: TBD pending assessment, stabilization, management and  "ascertainment of optimal discharge supports/regimen    Patient has been seen and evaluated by me, Boom Flores DO, Psychiatry Resident, PGY2.    Boom Flores DO  Resident Psychiatrist, PGY-2  [facility]    ATTENDING:  I have reviewed the resident admit note and confirmed by my exam today the HPI, past psych, PMH, ROS, meds, allergies, family and social histories.   I, Robe Mixon, saw and evaluated the patient with the resident physician.  I agree with the findings and plan of care as documented in the resident note.  I have reviewed all labs and vital signs.         Interim History:   The patient's care was discussed with the treatment team and chart notes were reviewed.     Sleep: 4 hrs  PRN's: Olanzapine 10 mg x1 and Hydroxyzine 50 mg x1  Staff Notes: Per RN Note: \"Pt was admitted to unit for having a increase in SI. She would not talk to writer other then to say \"I am depressed and nobody cares.\" She was withdrawn and with a flat affect. She was able to contract for safety.\"    Treatment Meeting: Team met with the patient in the conference room this AM.  Affect was markedly downcast, staring at the table, was cooperative with interview however significant paucity of speech and speech latency.  When asked how she was doing today, replied: \"Not very good.\"  Allowed that her present depression and SI were worse than other episodes but couldn't characterize how.  Denied AH or VH, and denied paranoia.  Allowed that music has secret messages but not ones which are specific for her.  Stated she feels fine physically.  Regarding ECT, did not feel that it had been helpful for her and stated it had been discontinued by her request.  When asked how she felt about resuming it replied: \"I don't care.\"  Stated she had seen her boyfriend last Thursday and that things with him are fine. When asked about her Port-a-Cath at first stated she couldn't remember the last time it was checked but then stated it'd been looked " "at a few days ago.  As the meeting ended, agreed the team would speak with her mother and consider restarting ECT.    Addendum: Later in the AM, this writer spoke with the patient's mother Swetha.  She provided her consent to restart ECT, and voiced her observations that Karolyn does seem to derive benefit from ECT with improved stability and behaviors.  Mom did report that Karolyn's Port-a-Cath had been noted to have some fibrin in it, as such would need attention prior to ECT.  Informed Swetha that the team was going to pursue a medical work-up for Cushings and an ECT clearance prior to resuming treatments and would keep her informed of new developments.    Review of systems:     The Review of Systems is negative other than noted in the HPI         Medications:     Current Facility-Administered Medications   Medication     benztropine (COGENTIN) tablet 0.5 mg     cetirizine (zyrTEC) tablet 10 mg     lurasidone (LATUDA) tablet 160 mg     norethindrone-ethinyl estradiol (MICROGESTIN 1/20) 1-20 MG-MCG per tablet 1 tablet     OLANZapine (zyPREXA) tablet 10 mg     fish oil-omega-3 fatty acids capsule 1,000 mg     perphenazine tablet 16 mg     traZODone (DESYREL) tablet 50 mg     hydrOXYzine (ATARAX) tablet 25-50 mg             Allergies:     Allergies   Allergen Reactions     Clozapine      Agranulocytosis x 2, cannot be re trialed      Risperdal Other (See Comments)     dystonia     Tape [Adhesive Tape] Rash     Plastic tape            Psychiatric Examination:   Temp 97.4  F (36.3  C) (Oral)  Resp 18  Ht 1.651 m (5' 5\")  Wt 115.9 kg (255 lb 8 oz)  SpO2 97%  BMI 42.52 kg/m2  Weight is 255 lbs 8 oz  Body mass index is 42.52 kg/(m^2).    Appearance:  awake, alert, poorly groomed and disheveled - obese with moon facies and central adiposity  Attitude:  somewhat cooperative  Eye Contact:  poor   Mood:  sad  and depressed  Affect:  mood congruent, intensity is blunted and fixed mobility  Speech:  increased speech latency " and paucity of speech with some mumbling  Psychomotor Behavior:  no evidence of tardive dyskinesia, dystonia, or tics and intact station, gait and muscle tone  Thought Process:  logical and concrete  Associations:  no loose associations  Thought Content:  active suicidal ideation present, no auditory hallucinations present and no visual hallucinations present  Insight:  limited  Judgment:  fair  Oriented to:  time, person, and place  Attention Span and Concentration:  limited  Recent and Remote Memory:  limited  Language: fluent English with appropriate vocabulary and syntax  Fund of Knowledge: delayed  Muscle Strength and Tone: normal  Gait and Station: Normal         Labs:   No results found for this or any previous visit (from the past 24 hour(s)).    Antipsychotic Labs:  Recent Labs   Lab Test  09/21/17   0750  04/12/17   1024  10/20/15   0853   CHOL  181  207*  185   TRIG  144  143  134   LDL  108*  135*  115   HDL  44*  43  43*     Recent Labs   Lab Test  10/05/17   1108  09/21/17   0750  04/12/17   1024   GLC  81  80  83   A1C   --    --   5.0     Recent Labs   Lab Test  10/05/17   1108  09/21/17   0750  04/12/17   1024   WBC  7.0  6.3  6.0   ANEU  4.7  4.2  4.2   HGB  15.5  15.3  14.8   PLT  154  118*  145*       Laverne Labs:  Recent Labs   Lab Test  11/11/13   1902  10/29/13   1238  10/02/13   0915   LITHIUM  0.8  TEST DIDN'T PASS [facility] PATIENT DECLINED TEST  0.7     Recent Labs   Lab Test  10/05/17   1108  09/21/17   0750  04/12/17   1024   CR  0.78  0.64  0.86   GFRESTIMATED  86  >90  >60   BUN  11  14  14   NA  136  141  140   POTASSIUM  4.2  5.0  4.3   GIOVANY  9.3  8.7  9.0   GLC  81  80  83     Recent Labs   Lab Test  04/12/17   1024   A1C  5.0     Recent Labs   Lab Test  09/21/17   0750  10/20/15   0853  06/14/14   0908  11/12/13   0638   TSH  2.55  1.73  2.02  2.13     Recent Labs   Lab Test  10/05/17   1108  09/21/17   0750  04/12/17   1024   WBC  7.0  6.3  6.0   ANEU  4.7  4.2  4.2   HGB  15.5   15.3  14.8   PLT  154  118*  145*     Recent Labs   Lab Test  02/10/16   1346  10/21/15   0650  12/30/14   1248   SG  1.001*  1.011  1.008     No lab results found.    Valproate Labs:  Recent Labs   Lab Test  04/06/14   0743  05/15/13   0817  05/01/13   1245   ED  91  96  57     Recent Labs   Lab Test  10/05/17   1108  09/21/17   0750  04/12/17   1024   AST  18  18  21   ALT  22  21  21   ALKPHOS  64  76  52   BILITOTAL  0.4  0.5  0.8   PROTTOTAL  7.7  7.1  7.2   ALBUMIN  3.6  3.5  4.0     Recent Labs   Lab Test  10/05/17   1108  09/21/17   0750  04/12/17   1024   WBC  7.0  6.3  6.0   ANEU  4.7  4.2  4.2   HGB  15.5  15.3  14.8   PLT  154  118*  145*

## 2018-02-06 NOTE — CONSULTS
Internal Medicine Consult - Initial Visit       Karolyn Banerjee MRN# [id number]   YOB: 1985 Age: 32 year old   Date of Admission: 2/5/2018  PCP: Suzie Mariscal  Date of Service: 2/6/2018    Referring Provider: Robe Mixon MD  Reason for Consult: Work up for Cushing's disease          Assessment and Recommendations:   Karolyn Banerjee is a 32 year old female with a history of 32 year old female with past medical history significant for mild asthma, refractive amblyopia, treatment-refractory schizoaffective disorder, bipolar type, borderline personality disorder, and unspecified neurocognitive disorder (IQ 62) admitted for suicidal ideations.      # SI, complex psychiatric illnesses - Management per Psychiatry   (ECT clearance complete, please refer to ECT note completed by this writer on 2/6/18)    # Possible Cushing syndrome - No recent steroid use.  K wnl. Mildly elevated SBP to 140s last 24 hr. CBC pending.  Given that patient is suffering from severe depression, likely that DST would be low dx yield.   - 24 hr UFC ordered  - Late night (2300) salivary cortisol level ordered, RN to collect   - Will consider Endo consult for further assistance pending workup     Medicine will follow along pending work up for Cushings as above.  Thank you for this consult.         Mariel Fleming CNP  Hospitalist Service   Pager: [phone]       Reason for Visit:   Work up for Cushing's disease          History of Present Illness:   History is obtained from the patient, patient's mother (also her legal guardian), and medical record.     This patient is a 32 year old female with a history of mild asthma, refractive amblyopia, treatment-refractory schizoaffective disorder, bipolar type, borderline personality disorder, and unspecified neurocognitive disorder (IQ 62) admitted for suicidal ideations.        Internal Medicine service was asked to see patient for Cushing's disease work up. Karolyn banerjee  resting in bed and minimally interactive with me.  She provides brief, limited answers to my questions.  Collateral information was provided by Swetha, patient's mother and legal guardian.  Karolyn has not recently taken any steroids, IV or orally.  No recent illnesses.  No underlying thyroid dysfunction.      Limited ROS.  Karolyn reports some mild abdominal pain.  No nausea, vomiting, or diarrhea.  No fevers or chills.           Review of Systems:   A 10 point ROS was performed and negative unless otherwise noted in HPI.           Past Medical History:   Reviewed and updated in Epic.  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 Gardner     Mild developmental delay      Myopia      Neuroleptic-induced tardive dyskinesia      Nonorganic enuresis      Refractive amblyopia      Schizoaffective disorder, bipolar type (H)     Follows with Dr. Cooley at her group home.      Sleep disorder              Past Surgical History:   Reviewed and updated in Epic.  Past Surgical History:   Procedure Laterality Date     HRW PORT A CATH Right      NO HISTORY OF SURGERY               Social History:   Reviewed and updated in [employer].  Social History     Social History     Marital status: Single     Spouse name: N/A     Number of children: N/A     Years of education: N/A     Occupational History     Not on file.     Social History Main Topics     Smoking status: Never Smoker     Smokeless tobacco: Never Used     Alcohol use No     Drug use: No     Sexual activity: No     Other Topics Concern     Not on file     Social History Narrative    Lives in group home([facility] for ~6 years), mother is guardian.         Had deficits in high school, but graduated with A's and B's with special programming.  "        Enjoys going to [employer], and shopping. She also enjoys walks.         Verbalizes that she is single but recent chart review indicates being with Remy, boyfriend, for two years. They last saw each other 1 month ago.         Denies any tobacco, alcohol, or recreational drug use.                           Family History:   Reviewed and updated in Epic.  Family History   Problem Relation Age of Onset     MENTAL ILLNESS Father      mild developmental delay     Schizophrenia Other      uncle             Allergies:     Allergies   Allergen Reactions     Clozapine      Agranulocytosis x 2, cannot be re trialed      Risperdal Other (See Comments)     dystonia     Tape [Adhesive Tape] Rash     Plastic tape             Medications:     Current Facility-Administered Medications   Medication     benztropine (COGENTIN) tablet 0.5 mg     cetirizine (zyrTEC) tablet 10 mg     lurasidone (LATUDA) tablet 160 mg     norethindrone-ethinyl estradiol (MICROGESTIN 1/20) 1-20 MG-MCG per tablet 1 tablet     OLANZapine (zyPREXA) tablet 10 mg     fish oil-omega-3 fatty acids capsule 1,000 mg     perphenazine tablet 16 mg     traZODone (DESYREL) tablet 50 mg     hydrOXYzine (ATARAX) tablet 25-50 mg            Physical Exam:   Temperature 97.4  F (36.3  C), temperature source Oral, resp. rate 18, height 1.651 m (5' 5\"), weight 115.9 kg (255 lb 8 oz), SpO2 97 %, not currently breastfeeding.  Body mass index is 42.52 kg/(m^2).    GENERAL: Alert and oriented x 3. Well nourished, well developed.  Noted to have central obesity.   HEENT: Normocephalic, atraumatic. Anicteric sclera. Mucous membranes moist. Round moon-like facies.    CV: RRR. S1, S2. No murmurs appreciated.   RESPIRATORY: Effort normal on room air. Lungs CTAB with no wheezing, rales, or rhonchi.   GI: Abdomen soft and non distended, bowel sounds present x all 4 quadrants. Slight diffuse TTP, no rebound or guarding.   NEUROLOGICAL: No focal deficits. Follows commands.  " Strength 5/5 in upper and lower extremities.   MUSCULOSKELETAL: No joint swelling or tenderness. Moves all extremities.   EXTREMITIES: No gross deformities. No peripheral edema. Intact bilateral pedal pulses.   SKIN: Grossly warm, dry, and intact. No jaundice. No rashes.  No striated band or skin discolorations.             Data:   I personally reviewed the following studies:  ROUTINE IP LABS (Last four results)  CMP   Recent Labs  Lab 02/06/18  1655      POTASSIUM 4.3   CHLORIDE 107   CO2 25   ANIONGAP 9   *   BUN 14   CR 0.67   GIOVANY 8.1*     CBC   Recent Labs  Lab 02/06/18  1655   WBC 6.0   RBC 4.48   HGB 13.8   HCT 40.3   MCV 90   MCH 30.8   MCHC 34.2   RDW 12.4   PLT Platelets clumped, platelet count unavailable     INR No lab results found in last 7 days.        Unresulted Labs Ordered in the Past 30 Days of this Admission     No orders found for last 61 day(s).

## 2018-02-06 NOTE — PROGRESS NOTES
INITIAL PSYCHOSOCIAL ASSESSMENT   I have reviewed the chart met with the patient, and developed Care Plan.  Information for assessment was obtained from: Patient/patient chart    Presenting Problem:   The patient is a 32 year old female with history of treatment-refractory schizoaffective disorder, bipolar type, and unspecified neurocognitive disorder (IQ 62) who was admitted directly from clinic during an ECT consultation  visit for worsening depression and suicidal ideation. Patient had reported that she was having thoughts of running in to traffic, or drowning herself.. Patient is currently under commitment Torres, and Peña Schaefer.    The following areas have been assessed:  History of Mental Health and Chemical Dependency:  Patient has an extensive psychiatric history. Multiple inpatient admissions over many years at [facility]- last admission . 10/17. She has a history of aggressive behavior towards others and has a h/o SIB.  Patient has no history of suicide attempt.  Patient is currently on Civil Commitment, Torres and Peña Wilkins renewed until 11/2/2018    No current or past history of alcohol or illicit substance use.      Family Description (Constellation, Family Psychiatric History):  Patient was born/raised in [place]. She is second oldest of 11 children.  As noted,  one of her brothers recently committed suicide.     Patient is never , no children     Family history is significant for father with mild developmental disorder and uncle with schizophrenia.       Significant Life Events (Illness, Abuse, Trauma, Death):  Brother committed suicide      Living Situation:  Patient resides at [facility] in [place].  She has lived there since May 29, 2014.  [facility] at [phone]      Educational Background:  High School Graduate w/ Special Education      Occupational History:  Disabled      Financial Status:  SSDI      Legal Issues:  Patient's mother Mary Taylornestor is patient's legal  guardian  Patient currently under a [facility] Commitment as mentally ill with a Torres Order and also a [employer]       Ethnic/Cultural Issues:  None      Spiritual Orientation:  Shinto       Service History:  None      Social Functioning (organization, interests):  Shopping, walking, being with family.      Current Treatment Providers are:  Psychiatry: Dr. Dana Sandoval -[facility] -   Sandra Thurman  [phone]   : [facility] Assessment/Plan:  Patient will have ongoing psychiatric assessment.  Medications will be reviewed and adjusted per MD as indicated. Patient's mother and outpatient providers will be contacted for care coordination.  Hospital staff will provide a safe environment and a therapeutic milieu. Patient will be encouraged to participate in unit groups and activities.   CTC will continue to assess needs and ensure appropriate follow up care is in place.

## 2018-02-07 LAB — INTERPRETATION ECG - MUSE: NORMAL

## 2018-02-07 PROCEDURE — 25000132 ZZH RX MED GY IP 250 OP 250 PS 637: Performed by: STUDENT IN AN ORGANIZED HEALTH CARE EDUCATION/TRAINING PROGRAM

## 2018-02-07 PROCEDURE — 90853 GROUP PSYCHOTHERAPY: CPT

## 2018-02-07 PROCEDURE — 12400001 ZZH R&B MH UMMC

## 2018-02-07 PROCEDURE — 97150 GROUP THERAPEUTIC PROCEDURES: CPT | Mod: GO

## 2018-02-07 PROCEDURE — 25000128 H RX IP 250 OP 636: Performed by: NURSE PRACTITIONER

## 2018-02-07 PROCEDURE — 99231 SBSQ HOSP IP/OBS SF/LOW 25: CPT | Mod: GC | Performed by: PSYCHIATRY & NEUROLOGY

## 2018-02-07 RX ORDER — HEPARIN SODIUM,PORCINE 10 UNIT/ML
5-10 VIAL (ML) INTRAVENOUS
Status: DISCONTINUED | OUTPATIENT
Start: 2018-02-07 | End: 2018-02-14 | Stop reason: HOSPADM

## 2018-02-07 RX ORDER — LIDOCAINE 40 MG/G
CREAM TOPICAL
Status: DISCONTINUED | OUTPATIENT
Start: 2018-02-07 | End: 2018-02-14 | Stop reason: HOSPADM

## 2018-02-07 RX ORDER — HEPARIN SODIUM,PORCINE 10 UNIT/ML
5-10 VIAL (ML) INTRAVENOUS EVERY 24 HOURS
Status: DISCONTINUED | OUTPATIENT
Start: 2018-02-07 | End: 2018-02-12

## 2018-02-07 RX ORDER — HEPARIN SODIUM (PORCINE) LOCK FLUSH IV SOLN 100 UNIT/ML 100 UNIT/ML
5 SOLUTION INTRAVENOUS
Status: DISCONTINUED | OUTPATIENT
Start: 2018-02-07 | End: 2018-02-14 | Stop reason: HOSPADM

## 2018-02-07 RX ADMIN — CETIRIZINE HYDROCHLORIDE 10 MG: 10 TABLET, FILM COATED ORAL at 08:14

## 2018-02-07 RX ADMIN — LURASIDONE HYDROCHLORIDE 160 MG: 80 TABLET, FILM COATED ORAL at 17:00

## 2018-02-07 RX ADMIN — HEPARIN SODIUM (PORCINE) LOCK FLUSH IV SOLN 100 UNIT/ML 5 ML: 100 SOLUTION at 21:29

## 2018-02-07 RX ADMIN — BENZTROPINE MESYLATE 0.5 MG: 0.5 TABLET ORAL at 08:14

## 2018-02-07 RX ADMIN — PERPHENAZINE 16 MG: 16 TABLET, FILM COATED ORAL at 20:47

## 2018-02-07 RX ADMIN — TRAZODONE HYDROCHLORIDE 50 MG: 50 TABLET ORAL at 20:47

## 2018-02-07 RX ADMIN — PERPHENAZINE 16 MG: 16 TABLET, FILM COATED ORAL at 08:14

## 2018-02-07 RX ADMIN — Medication 1000 MG: at 08:14

## 2018-02-07 RX ADMIN — BENZTROPINE MESYLATE 0.5 MG: 0.5 TABLET ORAL at 20:47

## 2018-02-07 RX ADMIN — NORETHINDRONE ACETATE AND ETHINYL ESTRADIOL 1 TABLET: 1; 20 TABLET ORAL at 08:14

## 2018-02-07 ASSESSMENT — ACTIVITIES OF DAILY LIVING (ADL)
HYGIENE/GROOMING: INDEPENDENT
GROOMING: INDEPENDENT
ORAL_HYGIENE: INDEPENDENT
DRESS: INDEPENDENT;SCRUBS (BEHAVIORAL HEALTH)
ORAL_HYGIENE: INDEPENDENT
DRESS: SCRUBS (BEHAVIORAL HEALTH)

## 2018-02-07 NOTE — CONSULTS
ENDOCRINE CONSULT NOTE      ASSESSMENT AND RECOMMENDATIONS:   Karolyn is a 32-year-old female with multiple psychiatric problems including schizoaffective disorder, bipolar disorder, borderline personality disorder and neurocognitive disorder, history of DVT of upper extremity, asthma, obesity and history of fracture humerus who was admitted for severe depression, suicidal idea and plan for ECT and Endocrinology was consulted given concern for Cushing syndrome.    ##Concern for Cushing syndrome  Depression and obesity could be symptom and sign of hypercortisolism.  On exam today, patient has no high discrimination signs of Cushing syndrome including facial plethora, easily bruising, and purplish striae.  However, given her depression and obesity, it is reasonable to screen for Cushing's syndrome.  Cortisol may not be suppressed by overnight dexamethasone suppression test in obesity and depression. So in her case, agree with late night salivary test and urine free cortisol.  Of note, depression also can increase urine free cortisol up to 60 mcg per day.  --Late-night salivary test  --24 hour urine free cortisol (on the day of collection, exclude first void urine. Collect every urine after that until the next day including first void urine, to complete 24 hours urine collection)   --Urine collection plan discussed with primary team  --Depression and stress can cause falsely positive test results, if result returned normal, we can exclude Cushing's syndrome.  However, if result returned abnormal, we will need to repeat test as an outpatient.    We will continue to follow. Please do not hesitate to call with question or concern.     Patient seen and examined with staff endocrinologist Dr Orozco.     Rod Riley MD  Diabetes, Metabolism and Endocrinology Fellow  Pager: [phone]  ----------------------------------------------------------------------------------------------------------------------    Chief  complaint:  Karoyln is a 32 year old female seen in consultation at the request of Robe Coreas MD for concern for Cushing syndrome.      HISTORY OF PRESENT ILLNESS  Karolyn is a 32-year-old female with multiple psychiatric problems including schizoaffective disorder, bipolar disorder, borderline personality disorder and neurocognitive disorder, history of DVT of upper extremity, asthma, obesity and history of fracture humerus who was admitted for severe depression, suicidal idea and plan for ECT and Endocrinology was consulted given concern for Cushing syndrome.    Given her depression and obesity with round face, there is a concern about Cushing's syndrome.  History from the patient is limited given her neurocognitive disorder.  Patient reports no known history of Cushing syndrome.  No family history of Cushing syndrome.  She is not on any over-the-counter steroids.  She lives in a group home and takes only prescription medication.  Patient has no problems climbing the stairs.  No significant changes in her weight or hair.  No easily bruising.  No headache.    Per chart review, patient had a history of amenorrhea in 2006.  As part of the evaluation, 24-hour urine free cortisol was done as well as morning serum cortisol.  The result returned within normal limits.  Patient was planned to see endocrinologist as an outpatient but has not seen one.  She is now on birth control, her period was regular until 5-6 months ago.    She does not smoke and does not drink alcohol.        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 Gardner     Mild developmental delay      Myopia  "     Neuroleptic-induced tardive dyskinesia      Nonorganic enuresis      Refractive amblyopia      Schizoaffective disorder, bipolar type (H)     Follows with Dr. Cooley at her group home.      Sleep disorder      Past Surgical History:   Procedure Laterality Date     HRW PORT A CATH Right      NO HISTORY OF SURGERY         Medications  Current Facility-Administered Medications   Medication     benztropine (COGENTIN) tablet 0.5 mg     cetirizine (zyrTEC) tablet 10 mg     lurasidone (LATUDA) tablet 160 mg     norethindrone-ethinyl estradiol (MICROGESTIN 1/20) 1-20 MG-MCG per tablet 1 tablet     OLANZapine (zyPREXA) tablet 10 mg     fish oil-omega-3 fatty acids capsule 1,000 mg     perphenazine tablet 16 mg     traZODone (DESYREL) tablet 50 mg     hydrOXYzine (ATARAX) tablet 25-50 mg         Allergies  Allergies   Allergen Reactions     Clozapine      Agranulocytosis x 2, cannot be re trialed      Risperdal Other (See Comments)     dystonia     Tape [Adhesive Tape] Rash     Plastic tape         Family History  family history includes MENTAL ILLNESS in her father; Schizophrenia in an other family member.    Social History  Social History   Substance Use Topics     Smoking status: Never Smoker     Smokeless tobacco: Never Used     Alcohol use No       Physical Exam  Temp 97.1  F (36.2  C) (Oral)  Resp 18  Ht 1.651 m (5' 5\")  Wt 115.9 kg (255 lb 8 oz)  SpO2 97%  BMI 42.52 kg/m2  Body mass index is 42.52 kg/(m^2).  GENERAL : Obese female with round face, no facial plethora  SKIN: Normal color, normal temperature, texture.  Mild facial hair, less than 5 mm white straie at lower abdomen, no purple striae.   No bruises  EYES: EOMI, No scleral icterus,  No proptosis, conjunctival redness, stare, retraction  MOUTH: Moist, pink; pharynx clear  NECK: No visible masses. No palpable adenopathy, or masses. No carotid bruits. THYROID:  Normal, nontender, smooth / firm texture,  no nodules, no Bruit   RESP: Lungs clear to " auscultation bilaterally  CARDIAC: Regular rate and rhythm, normal S1 S2, without murmurs, rubs or gallops  ABDOMEN: Normal bowel sounds; soft, nontender, no HSM or masses       NEURO: awake, alert, responds appropriately to questions.  Cranial nerves intact.  Moves all extremities; Gait normal. DTRs  2 /4 ,   EXTREMITIES: No clubbing, cyanosis or edema.  BACK: No buffalo hump    DATA REVIEW  Labs/Imaging  1/28/2006  A.m. cortisol 12  Urine free cortisol 24.6    TSH   Date Value Ref Range Status   09/21/2017 2.55 0.40 - 4.00 mU/L Final   10/20/2015 1.73 0.40 - 4.00 mU/L Final   06/14/2014 2.02 0.4 - 5.0 mU/L Final   11/12/2013 2.13 0.4 - 5.0 mU/L Final   06/02/2013 2.38 0.4 - 5.0 mU/L Final     T4 Free   Date Value Ref Range Status   07/26/2008 1.06 0.70 - 1.85 ng/dL Final   01/28/2006 1.07 0.70 - 1.85 ng/dL Final   ]  Lab Results   Component Value Date    A1C 5.0 04/12/2017       Last Basic Metabolic Panel:  Lab Results   Component Value Date     02/06/2018      Lab Results   Component Value Date    POTASSIUM 4.3 02/06/2018     Lab Results   Component Value Date    CHLORIDE 107 02/06/2018     Lab Results   Component Value Date    GIOVANY 8.1 02/06/2018     Lab Results   Component Value Date    CO2 25 02/06/2018     Lab Results   Component Value Date    BUN 14 02/06/2018     Lab Results   Component Value Date    CR 0.67 02/06/2018     Lab Results   Component Value Date     02/06/2018       Endocrine Staff Note    The patient was seen and examined by me with Dr. Riley.  Her note details our mutual findings and plan.    Michelle Orozco MD

## 2018-02-07 NOTE — PROGRESS NOTES
Patient reports continued depression and anxiety with passive SI thoughts. Patient was isolative and withdrawn for most of the shift but did play cards with peers at one point. Patient presented with flat affect and low energy and communicated minimally with staff.     02/06/18 2100   Behavioral Health   Hallucinations auditory   Thinking poor concentration   Orientation person: oriented;place: oriented;date: oriented;time: oriented   Memory baseline memory   Insight poor   Judgement impaired   Eye Contact at examiner   Affect blunted, flat   Mood depressed;anxious   Physical Appearance/Attire disheveled   Hygiene neglected grooming - unclean body, hair, teeth   Suicidality thoughts only   Self Injury thoughts only   Activity isolative;withdrawn   Speech clear;coherent   Medication Sensitivity no stated side effects;no observed side effects   Psychomotor / Gait balanced;steady   Activities of Daily Living   Hygiene/Grooming independent   Oral Hygiene independent   Dress independent   Laundry with supervision   Room Organization independent

## 2018-02-07 NOTE — PROGRESS NOTES
Pt participated in OT clinic and was able to initiate task, follow through with plan and ask for help as needed.    Good planning also for future engagement in tasks during outside of group hours.   Attended Health Promotion group activity focused on education of Ariel Chi and Qi Gong for stress management, exercise and relaxation.  Pt trained in basic moves of health and wellness techniques and followed one step instructions with demonstration.   Movements consistently restricted and delayed; pt was able to make some modifications to range of motion and positioning with additional verbal cueing.     Pt has been given a Self Assessment form, explained the purpose of including them in their treatment plan and offered options for meeting their needs.  She identified reason for hospitalization (depressed for a while), goals for hospitalization (to feel better), identified life supports (boyfriend, mom & dad) and prorities/goals for hospitalization:  Improve quality of sleep, self awareness, focus & concentration, experience healing, find teddy and take healthy risks.      OT PLAN:  Encourage ongoing attendance to support pt self-stated goals. Provide opportunities for education, reinforcement and practice of positive coping skills, engage in graded opportunities for success, and provide ongoing assessment as needed.

## 2018-02-07 NOTE — PLAN OF CARE
Problem: Depressive Symptoms  Goal: Depressive Symptoms  Signs and symptoms of listed problems will be absent or manageable    Pt will have a decrease in depressive symptoms  Pt will have a decrease in SIB  [facility]   Illness Management Recovery model:  Warning Signs.    Patient identified the following early warning signs which may indicate that a relapse of their illness is startin. Increase in SI thoughts  2. Feel valencia depressed  3.     2  Desiree galvez

## 2018-02-07 NOTE — CONSULTS
"  [facility]  Internal Medicine Consult    Karolyn Banerjee MRN# [id number]   Age: 32 year old YOB: 1985     Date of Admission: 2/5/2018  Date of Consult:  2/6/2018    Requesting Service: Behavioral Health - Robe Mixon MD  Reason for Consult: Pre ECT Medicine Evaluation   Indication for ECT: Severe depression, SI     Reason for admission: SI  HPI: Karolyn Banerjee is a 32 year old female with past medical history significant for treatment-refractory schizoaffective disorder, bipolar type, borderline personality disorder, and unspecified neurocognitive disorder (IQ 62) admitted for suicidal ideations.      Review of Systems:   Cardiovascular: pt states \" once and awhile my chest hurts\"   Pulmonary: No shortness of breath or cough  Neurological: Negative     Past Medical History:   Prior Anesthesia: Yes   If yes, any complications: No    Prior ECT: Yes  If yes,   Last session in October 2017  Response: stable   Side Effects: none     Hx obtained from pt's mother, as below:  Cardiovascular: CAD No, MI No, HTN No, CHF No, pacemaker or ICD No  Pulmonary: Asthma/COPD Yes - possible asthma, Prior respiratory failure or need for emergent intubation No, On theophylline No (note that theophylline use is a contraindication to proceeding with ECT, needs to be tapered off prior)  Neurological: Brain tumor No, TIA/CVA No, Dementia No, Neuromuscular Disease (including post polio syndrome) No, Seizures and/or Epilepsy No, Head Injury No, Intracranial Hemorrhage No    Past Surgical History:   Past Surgical History:   Procedure Laterality Date     HRW PORT A CATH Right      NO HISTORY OF SURGERY         Allergies:      Allergies   Allergen Reactions     Clozapine      Agranulocytosis x 2, cannot be re trialed      Risperdal Other (See Comments)     dystonia     Tape [Adhesive Tape] Rash     Plastic tape       Medication list reviewed.    Physical Exam:  Temp 97.4  F (36.3  C) (Oral)  Resp 18  " "Ht 1.651 m (5' 5\")  Wt 115.9 kg (255 lb 8 oz)  SpO2 97%  BMI 42.52 kg/m2   Cardiovascular: RRR. S1, S2. No murmurs, rubs, gallops.   Pulmonary: Effort normal. Lungs CTAB with no wheezing, rales, rhonchi.   Neurological: Alert and interactive. No focal deficits. PERRLA.  No nystagmus, EOMs intact in all fields. Equal strength in upper and lower extremities bilaterally.     Data:  EKG on 2/6/18 with sinus rhythm, low voltage QRS.  Read-out mentions age indeterminate inferior infarct, though no apparent acute ST elevation or T wave inversion.  Unable to appreciate any Q waves in II, III, and aVF.      Head Imaging: Last head CT 2014, no acute findings.      Labs were obtained within the last 30 days on 2/6/18 and are as follows:   CBC:  Recent Labs   Lab Test  02/06/18   1655   WBC  6.0   RBC  4.48   HGB  13.8   HCT  40.3   MCV  90   MCH  30.8   MCHC  34.2   RDW  12.4   PLT  PENDING     CMP:  Recent Labs   Lab Test  02/06/18   1655  10/05/17   1108   NA  141  136   POTASSIUM  4.3  4.2   CHLORIDE  107  105   GIOVANY  8.1*  9.3   CO2  25  22   BUN  14  11   CR  0.67  0.78   GLC  114*  81   AST   --   18   ALT   --   22   BILITOTAL   --   0.4   ALBUMIN   --   3.6   PROTTOTAL   --   7.7   ALKPHOS   --   64     HCG: Negative    Recommendations:   Diuretics and oral hypoglycemics should be held the morning of ECT.   All other antihypertensive medications can be continued.     Patient does not have absolute medical contraindications to proceeding with ECT at this time. Treatment plan per psychiatry.       Mariel Fleming  [facility]  Hospitalist Service  Pager: [phone]    "

## 2018-02-07 NOTE — PROGRESS NOTES
----------------------------------------------------------------------------------------------------------  [facility], [place]   Psychiatric Progress Note  Hospital Day #2     Assessment    Presentation: This patient is a 32 year old female with history of treatment-refractory schizoaffective disorder, bipolar type, borderline personality disorder, and unspecified neurocognitive disorder (IQ 62) who was voluntarily admitted directly from clinic during an ECT consultation visit for suicidal ideations. Patient is currently under commitment with Reina Wilkins, which has been renewed until 11/2/2018. Mother is guardian.      Diagnostic Impression: This patient is a 32 year old female with history of treatment-refractory schizoaffective disorder, bipolar type, borderline personality disorder, and unspecified neurocognitive disorder (IQ 62) who was voluntarily admitted directly from clinic during an ECT consultation visit for suicidal ideations. Patient is currently under commitment, Brian, and Júnior which has been renewed until 11/2/2018. Hospitalization is in the context of unclear stressors, but notably patient has had difficulties with another resident at her group home. She minimizes any distress about her brother's suicide last year. Substance use does not appear to be playing an active role. On exam, she has blunted affect and notably slowed speech. Presentation is consistent with historic diagnosis of schizoaffective disorder, with an active depressive episode. Concurrent borderline personality disorder and intellectual delay likely contributing with limited coping skills. Reason for inpatient hospitalization is ongoing suicidality. Disposition pending clinical stabilization, medication optimization, and formulation of safe discharge plan.     Hospital course: Karolyn Banerjee was admitted to station 20 as a voluntary patient (under commitment until 11/2/2018  with Brian/Casey Wilkins and on a provisional discharge) and placed on Status 15 minute checks, Suicide and Self-Injury precautions.  PTA medications were restarted.  Affect markedly downcast, depressed, with psychomotor retardation and speech delay.  Endorsed continued SI, reported that ECT had been discontinued upon her own request, however stated she was indifferent as to whether it was restarted.     Medical course: Patient to be evaluated for Cushings (due to features/body habitus) and to receive clearance for ECT.    Plan     Principal Diagnosis:   # Schizoaffective Disorder, bipolar type, current depressive episode    Secondary psychiatric diagnoses of concern this admission:   # Unspecified neurocognitive disorder (IQ 62)  # History of borderline personality disorder, per chart review    Psychotropic Medications:  Lurasidone 160 mg with dinner  Perphenazine 16 mg BID  Trazodone 50 mg qHS  Cogentin 0.5 mg BID  Olanzapine 10 mg BID PRN  Hydroxyzine 25-50 mg q4 hrs PRN    Laboratory/Imaging: None  Consults: None  Patient will be treated in therapeutic milieu with appropriate individual and group therapies as described.      Medical diagnoses to be addressed this admission:    # R/o Cushings disease  - IM and Endocrinology consult  - Timed salivary cortisol and 24 hour free urine cortisol    # Seasonal allergies  - Cetirizine 10 mg daily    # Contraception  - OCP    # Likely JOSE  - Will need sleep study as an outpatient    Consults: IM and Endocrinology    Relevant psychosocial stressors: Conflict with a peer at her group home, Suicide of her brother in the near-term recent past (last year)    Legal Status: Committed with Brian and Júnior    Safety Assessment:   Checks: Status 15  Precautions: Suicide  Self-harm  Pt has not required locked seclusion or restraints in the past 24 hours to maintain safety, please refer to RN documentation for further details.     The risks, benefits, alternatives and  "side effects have been discussed and are understood by the patient and other caregivers.    Anticipated Disposition/Discharge Date: Unknown at this time, pending stabilization and improvement of depression and suicidal ideation, anticipate she will return to her group home    -------------------------------------------------------------------------------------  Patient seen and discussed with attending, Dr. Apurva Tineo MD  Psychiatry PGY1  Pager [phone]    Attestation:  I, Robe Mixon, saw and evaluated the patient with the resident physician.  I agree with the findings and plan of care as documented in the resident note.  I have reviewed all labs and vital signs.           Interim History:   The patient's care was discussed with the treatment team and chart notes were reviewed.  Slept: 7  PRNs: none    Staff Report: Reports continued depression, anxiety, and passive SI. Isolative for most of the day, but did play cards with a peer.     Interview:  Patient was interviewed in the conference room this morning. She reports she is doing \"okay\", and she slept okay last night. She denied feeling tired this AM. She notes that her depressive symptoms are still pretty bad, and she continues to have SI. She denied any physical problems. She had no other concerns for the team this morning.       Review of systems:     ROS was negative unless noted above.          Allergies:     Allergies   Allergen Reactions     Clozapine      Agranulocytosis x 2, cannot be re trialed      Risperdal Other (See Comments)     dystonia     Tape [Adhesive Tape] Rash     Plastic tape            Psychiatric Examination:   Temp 97.4  F (36.3  C) (Oral)  Resp 18  Ht 1.651 m (5' 5\")  Wt 115.9 kg (255 lb 8 oz)  SpO2 97%  BMI 42.52 kg/m2  Weight is 255 lbs 8 oz  Body mass index is 42.52 kg/(m^2).    Appearance:  awake, alert, poorly groomed, unkept  Attitude:  cooperative  Eye Contact:  poor   Mood: Sad and depressed  Affect:  mood " congruent and intensity is blunted  Speech:  Speech latency, some mumbling  Psychomotor Behavior:  no evidence of tardive dyskinesia, dystonia, or tics  Thought Process:  logical, linear  Associations:  no loose associations  Thought Content:  Active suicidal ideation  Insight:  limited  Judgment:  limited  Oriented to:  time, person, and place  Attention Span and Concentration:  fair  Recent and Remote Memory:  intact  Language: Conversant in English  Fund of Knowledge: delayed  Muscle Strength and Tone: grossly normal  Gait and Station: Normal         Labs:   BMP with Glucose 114 and Calcium 8.1, otherwise wnl  CBC wnl

## 2018-02-08 PROCEDURE — 82542 COL CHROMOTOGRAPHY QUAL/QUAN: CPT | Performed by: PSYCHIATRY & NEUROLOGY

## 2018-02-08 PROCEDURE — 25000132 ZZH RX MED GY IP 250 OP 250 PS 637: Performed by: STUDENT IN AN ORGANIZED HEALTH CARE EDUCATION/TRAINING PROGRAM

## 2018-02-08 PROCEDURE — 82530 CORTISOL FREE: CPT | Performed by: NURSE PRACTITIONER

## 2018-02-08 PROCEDURE — 12400007 ZZH R&B MH INTERMEDIATE UMMC

## 2018-02-08 PROCEDURE — 99232 SBSQ HOSP IP/OBS MODERATE 35: CPT | Mod: GC | Performed by: PSYCHIATRY & NEUROLOGY

## 2018-02-08 PROCEDURE — 82530 CORTISOL FREE: CPT | Performed by: PSYCHIATRY & NEUROLOGY

## 2018-02-08 PROCEDURE — 90853 GROUP PSYCHOTHERAPY: CPT

## 2018-02-08 PROCEDURE — 25000132 ZZH RX MED GY IP 250 OP 250 PS 637

## 2018-02-08 RX ORDER — BUPROPION HYDROCHLORIDE 150 MG/1
150 TABLET ORAL DAILY
Status: DISCONTINUED | OUTPATIENT
Start: 2018-02-08 | End: 2018-02-09

## 2018-02-08 RX ADMIN — PERPHENAZINE 16 MG: 16 TABLET, FILM COATED ORAL at 08:59

## 2018-02-08 RX ADMIN — BUPROPION HYDROCHLORIDE 150 MG: 150 TABLET, FILM COATED, EXTENDED RELEASE ORAL at 13:14

## 2018-02-08 RX ADMIN — TRAZODONE HYDROCHLORIDE 50 MG: 50 TABLET ORAL at 20:54

## 2018-02-08 RX ADMIN — NORETHINDRONE ACETATE AND ETHINYL ESTRADIOL 1 TABLET: 1; 20 TABLET ORAL at 09:00

## 2018-02-08 RX ADMIN — PERPHENAZINE 16 MG: 16 TABLET, FILM COATED ORAL at 20:54

## 2018-02-08 RX ADMIN — Medication 1000 MG: at 08:59

## 2018-02-08 RX ADMIN — CETIRIZINE HYDROCHLORIDE 10 MG: 10 TABLET, FILM COATED ORAL at 08:59

## 2018-02-08 RX ADMIN — BENZTROPINE MESYLATE 0.5 MG: 0.5 TABLET ORAL at 20:54

## 2018-02-08 RX ADMIN — BENZTROPINE MESYLATE 0.5 MG: 0.5 TABLET ORAL at 08:59

## 2018-02-08 RX ADMIN — LURASIDONE HYDROCHLORIDE 160 MG: 80 TABLET, FILM COATED ORAL at 16:55

## 2018-02-08 ASSESSMENT — ACTIVITIES OF DAILY LIVING (ADL)
HYGIENE/GROOMING: INDEPENDENT
ORAL_HYGIENE: INDEPENDENT
DRESS: INDEPENDENT
LAUNDRY: WITH SUPERVISION

## 2018-02-08 NOTE — PROGRESS NOTES
02/08/18 1517   Behavioral Health   Hallucinations other (see comment)  (None observed)   Thinking poor concentration   Orientation place: oriented;person: oriented;time: oriented;date: oriented   Memory baseline memory   Insight poor   Judgement impaired   Eye Contact at examiner   Affect blunted, flat   Mood depressed;other (see comments)  (Sad)   Physical Appearance/Attire attire appropriate to age and situation   Hygiene well groomed   Suicidality thoughts only   1. Wish to be Dead No  (Pt stated sometimes)   2. Non-Specific Active Suicidal Thoughts  No   Self Injury other (see comment)  (Denied)   Elopement (None observed)   Activity other (see comment)  (Engaged in group activities)   Speech coherent;clear   Medication Sensitivity no observed side effects   Psychomotor / Gait balanced;steady   Psycho Education   Type of Intervention 1:1 intervention   Response participates, initiates socially appropriate   Hours 0.5   Activities of Daily Living   Hygiene/Grooming independent   Oral Hygiene independent   Dress independent   Laundry with supervision   Room Organization independent   Behavioral Health Interventions   Depression build upon strengths;assist with developing and utilizing healthy coping strategies;establish therapeutic relationship;provide emotional support   Social and Therapeutic Interventions (Depression) encourage participation in therapeutic groups and milieu activities;encourage socialization with peers     Pt was visible in the milieu for the majority of the shift. Pt attended and participated in the community and all of the therapeutic groups. Pt was semi social with other pts. Pt had flat and blunted affect and expressed feeling sad and down. Pt reported having suicidal thoughts and stated that people would be better off without her. Pt stated that she has plans to run into in coming vehicle.

## 2018-02-08 NOTE — PROGRESS NOTES
Patient reports continued depression and auditory hallucinations with chronic SI thoughts. Patient states she thinks about repeating her past suicide attempts but that she feels safe while in the hospital because she does not see a way she could harm herself here. Patient states her mood and hallucinations have remained steady for the past few days and she believes her medications are ineffective. Patient was visible in the milieu but withdrawn with flat affect.     02/07/18 2200   Behavioral Health   Hallucinations auditory   Thinking poor concentration   Orientation person: oriented;place: oriented;date: oriented;time: oriented   Memory baseline memory   Insight poor   Judgement impaired   Eye Contact at examiner   Affect blunted, flat   Mood depressed   Physical Appearance/Attire disheveled   Hygiene neglected grooming - unclean body, hair, teeth   Suicidality thoughts only   1. Wish to be Dead No   2. Non-Specific Active Suicidal Thoughts  No   Self Injury other (see comment)  (Denies)   Activity withdrawn   Speech clear;coherent   Medication Sensitivity no stated side effects;no observed side effects   Psychomotor / Gait balanced;steady   Activities of Daily Living   Hygiene/Grooming independent   Oral Hygiene independent   Dress scrubs (behavioral health)   Room Organization independent

## 2018-02-08 NOTE — PROGRESS NOTES
Behavioral Health  Note   Behavioral Health  Spirituality Group Note     Unit 20    Name: Karolyn Banerjee    YOB: 1985   MRN: [id number]    Age: 32 year old     Patient attended -led group, which included discussion of spirituality, coping with illness and building resilience.   Patient attended group for 1.0 hrs.   patient minimally participated, but was respectful to the group process.     [facility]  Staff    Page [phone]

## 2018-02-08 NOTE — PROGRESS NOTES
----------------------------------------------------------------------------------------------------------  [facility], [place]   Psychiatric Progress Note  Hospital Day #3     Assessment    Presentation: This patient is a 32 year old female with history of treatment-refractory schizoaffective disorder, bipolar type, borderline personality disorder, and unspecified neurocognitive disorder (IQ 62) who was voluntarily admitted directly from clinic during an ECT consultation visit for suicidal ideations. Patient is currently under commitment with Reina Wilkins, which has been renewed until 11/2/2018. Mother is guardian.      Diagnostic Impression: This patient is a 32 year old female with history of treatment-refractory schizoaffective disorder, bipolar type, and unspecified neurocognitive disorder (IQ 62) who was voluntarily admitted directly from clinic during an ECT consultation visit for suicidal ideations. Patient is currently under commitment, Brian, and Casey-Wilkins which has been renewed until 11/2/2018. Hospitalization is in the context of unclear stressors, but notably patient has had difficulties with another resident at her group home. She minimizes any distress about her brother's suicide last year. Substance use does not appear to be playing an active role. On exam, she has blunted affect and notably slowed speech. Presentation is consistent with historic diagnosis of schizoaffective disorder, with an active depressive episode. Concurrent intellectual delay likely contributing with limited coping skills. Reason for inpatient hospitalization is ongoing suicidality. Disposition pending clinical stabilization, medication optimization, and formulation of safe discharge plan.     Hospital course: Karolyn Banerjee was admitted to station 20 as a voluntary patient (under commitment until 11/2/2018 with Brian/Casey Wilkins and on a provisional discharge) and placed  on Status 15 minute checks, Suicide and Self-Injury precautions.  PTA medications were restarted.  Affect markedly downcast, depressed, with psychomotor retardation and speech delay.  Endorsed continued SI, reported that ECT had been discontinued upon her own request, however stated she was indifferent as to whether it was restarted. Wellbutrin was started to target low mood. Per chart review, the only antidepressants she has been on were sertraline and venlafaxine.      Medical course: Patient to be evaluated for Cushings (due to features/body habitus) and to receive clearance for ECT.    Plan     Principal Diagnosis:   # Schizoaffective Disorder, bipolar type, current depressive episode  - Possible ECT next week, pending improvement on Wellbutrin    Secondary psychiatric diagnoses of concern this admission:   # Unspecified neurocognitive disorder (IQ 62)    Psychotropic Medications:  -Lurasidone 160 mg with dinner  -Perphenazine 16 mg BID  -Trazodone 50 mg qHS  -Cogentin 0.5 mg BID  -Olanzapine 10 mg BID PRN  -Hydroxyzine 25-50 mg q4 hrs PRN  -Wellbutrin  mg daily    Laboratory/Imaging: None  Consults: None  Patient will be treated in therapeutic milieu with appropriate individual and group therapies as described.      Medical diagnoses to be addressed this admission:    # R/o Cushings disease  - IM and Endocrinology consult  - Timed salivary cortisol and 24 hour free urine cortisol    # Seasonal allergies  - Cetirizine 10 mg daily    # Contraception  - OCP    # Likely JOSE  - Will need sleep study as an outpatient    Consults: IM and Endocrinology    Relevant psychosocial stressors: Conflict with a peer at her group home, Suicide of her brother in the near-term recent past (last year)    Legal Status: Committed with Torres and Peña-Wilkins    Safety Assessment:   Checks: Status 15  Precautions: Suicide  Self-harm  Pt has not required locked seclusion or restraints in the past 24 hours to maintain safety,  "please refer to RN documentation for further details.     The risks, benefits, alternatives and side effects have been discussed and are understood by the patient and other caregivers.    Anticipated Disposition/Discharge Date: Unknown at this time, pending stabilization and improvement of depression and suicidal ideation, anticipate she will return to her group home    -------------------------------------------------------------------------------------  Note scribed by Rashawn Murdock, MS3, on behalf of Tarik Tineo, PGY1    I have reviewed and edited the documentation recorded by the scribe.  This documentation accurately reflects the services I personally performed and treatment decisions made by me in consultation with the attending physician Robe Mixon MD.  Tarik Tineo MD  Psychiatry PGY-1  [phone]        Attestation:    I, Robe Mixon, saw and evaluated the patient with the resident physician.  I agree with the findings and plan of care as documented in the resident note.  I have reviewed all labs and vital signs.       Interim History:   The patient's care was discussed with the treatment team and chart notes were reviewed. VSS  Slept: 7  PRNs: none    Staff Report:  Continued isolative and withdrawn behavior. Participates in group activities. Reports continued depression, anxiety, auditory hallucinations, and passive SI. Says she feels safe in the inpatient [place].    Interview:  Patient was interviewed in the conference room this morning. She reports she is doing \"ok\", but that yesterday was \"not so good\", and her mood has still be quite low. She endorsed continued suicidal thoughts. Denies any physical symptoms. She responded to questions with only single word answers. We discussed starting Wellbutrin to target mood symptoms, and the patient was agreeable to starting this medication.    Chart review of previous medications:  -Sertraline in 6545-5719 - ineffective  -Venlafaxine in 5911-4833 - " "ineffective  -Geodon 2013 - ineffective  -Zyprexa - ineffective, weight gain  -Seroquel - ineffective  -Abilify 2007 - ineffective  -Risperidone - dystonic reaction  -Clozapine - agranulocytosis in 2007 and 2013  -Lithium 2008 and 2013 - stopped for unclear reasons  -Cymbalta 2008 - ineffective  -Depakote 2013 - ineffective  -Haldol 2014 - helpful, but with parkinsomism      Review of systems:     ROS was negative unless noted above.          Allergies:     Allergies   Allergen Reactions     Clozapine      Agranulocytosis x 2, cannot be re trialed      Risperdal Other (See Comments)     dystonia     Tape [Adhesive Tape] Rash     Plastic tape            Psychiatric Examination:   BP (!) 150/96  Pulse 104  Temp 98.2  F (36.8  C) (Oral)  Resp 16  Ht 1.651 m (5' 5\")  Wt 118.8 kg (262 lb)  SpO2 97%  BMI 43.6 kg/m2  Weight is 262 lbs 0 oz  Body mass index is 43.6 kg/(m^2).    Appearance:  awake, alert and unkempt  Attitude:  cooperative  Eye Contact:  poor   Mood:  depressed  Affect:  mood congruent, intensity is blunted, constricted mobility and restricted range  Speech:  increased speech latency and mumbling  Psychomotor Behavior:  no evidence of tardive dyskinesia, dystonia, or tics  Thought Process:  logical and linear  Associations:  no loose associations  Thought Content:  passive suicidal ideation present and auditory hallucinations present  Insight:  good  Judgment:  intact  Oriented to:  time, person, and place  Attention Span and Concentration:  intact  Recent and Remote Memory:  intact  Language: conversant in English  Fund of Knowledge: appropriate  Muscle Strength and Tone: normal  Gait and Station: Normal           Labs:   BMP with Glucose 114 and Calcium 8.1, otherwise wnl  CBC wnl  24 hr urine free cortisol and salivary cortisol pending  "

## 2018-02-09 LAB
COLLECT TME SPEC: 50
CORTIS SAL-MCNC: 0.24 UG/DL

## 2018-02-09 PROCEDURE — 97150 GROUP THERAPEUTIC PROCEDURES: CPT | Mod: GO

## 2018-02-09 PROCEDURE — 12400007 ZZH R&B MH INTERMEDIATE UMMC

## 2018-02-09 PROCEDURE — 25000132 ZZH RX MED GY IP 250 OP 250 PS 637

## 2018-02-09 PROCEDURE — 90853 GROUP PSYCHOTHERAPY: CPT

## 2018-02-09 PROCEDURE — 99232 SBSQ HOSP IP/OBS MODERATE 35: CPT | Mod: GC | Performed by: PSYCHIATRY & NEUROLOGY

## 2018-02-09 PROCEDURE — 25000132 ZZH RX MED GY IP 250 OP 250 PS 637: Performed by: STUDENT IN AN ORGANIZED HEALTH CARE EDUCATION/TRAINING PROGRAM

## 2018-02-09 RX ORDER — BUPROPION HYDROCHLORIDE 300 MG/1
300 TABLET ORAL DAILY
Status: DISCONTINUED | OUTPATIENT
Start: 2018-02-10 | End: 2018-02-12

## 2018-02-09 RX ADMIN — NORETHINDRONE ACETATE AND ETHINYL ESTRADIOL 1 TABLET: 1; 20 TABLET ORAL at 13:30

## 2018-02-09 RX ADMIN — Medication 1000 MG: at 08:06

## 2018-02-09 RX ADMIN — BUPROPION HYDROCHLORIDE 150 MG: 150 TABLET, FILM COATED, EXTENDED RELEASE ORAL at 08:07

## 2018-02-09 RX ADMIN — CETIRIZINE HYDROCHLORIDE 10 MG: 10 TABLET, FILM COATED ORAL at 08:06

## 2018-02-09 RX ADMIN — LURASIDONE HYDROCHLORIDE 160 MG: 80 TABLET, FILM COATED ORAL at 17:19

## 2018-02-09 RX ADMIN — PERPHENAZINE 16 MG: 16 TABLET, FILM COATED ORAL at 08:06

## 2018-02-09 RX ADMIN — TRAZODONE HYDROCHLORIDE 50 MG: 50 TABLET ORAL at 20:15

## 2018-02-09 RX ADMIN — BENZTROPINE MESYLATE 0.5 MG: 0.5 TABLET ORAL at 08:06

## 2018-02-09 RX ADMIN — PERPHENAZINE 16 MG: 16 TABLET, FILM COATED ORAL at 20:15

## 2018-02-09 RX ADMIN — BENZTROPINE MESYLATE 0.5 MG: 0.5 TABLET ORAL at 20:15

## 2018-02-09 ASSESSMENT — ACTIVITIES OF DAILY LIVING (ADL)
DRESS: SCRUBS (BEHAVIORAL HEALTH);INDEPENDENT
HYGIENE/GROOMING: INDEPENDENT
GROOMING: INDEPENDENT
DRESS: INDEPENDENT
ORAL_HYGIENE: INDEPENDENT
ORAL_HYGIENE: INDEPENDENT

## 2018-02-09 NOTE — PROGRESS NOTES
Pt. attended and actively participated in an Occupational Therapy Life Skills session which focussed on exercise.  Pt. participated in a one mile walk and a discussion on the guidelines and  benefits of exercise.  She required assistance for safety to roll up pants legs to decrease length of fabric & acknowledged having difficulty with upper extremity movements due to her port.     Active participant in structured activity, contributing to group cooking task.  She worked well with peers, reading instructions aloud and assuring instructions were followed in the exact order.  She also calculated simple math for measuring raw ingredients in her head, giving verbal instructions to others to follow through with task.  Consistent functional performance in context of tasks and restricted affect during all interaction.  In between sections of cooking task, she spent time reading.

## 2018-02-09 NOTE — PROGRESS NOTES
Patients Port-a-cath accessed with little blood return and was accessed twice. Patient handled procedure with no complications.

## 2018-02-09 NOTE — PROGRESS NOTES
----------------------------------------------------------------------------------------------------------  [facility], [place]   Psychiatric Progress Note  Hospital Day #4     Assessment    Presentation: This patient is a 32 year old female with history of treatment-refractory schizoaffective disorder, bipolar type, borderline personality disorder, and unspecified neurocognitive disorder (IQ 62) who was voluntarily admitted directly from clinic during an ECT consultation visit for suicidal ideations. Patient is currently under commitment with Reina Wilkins, which has been renewed until 11/2/2018. Mother is guardian.      Diagnostic Impression: This patient is a 32 year old female with history of treatment-refractory schizoaffective disorder, bipolar type, and unspecified neurocognitive disorder (IQ 62) who was voluntarily admitted directly from clinic during an ECT consultation visit for suicidal ideations. Patient is currently under commitment, Brian, and Casey-Wilkins which has been renewed until 11/2/2018. Hospitalization is in the context of unclear stressors, but notably patient has had difficulties with another resident at her group home. She minimizes any distress about her brother's suicide last year. Substance use does not appear to be playing an active role. On exam, she has blunted affect and notably slowed speech. Presentation is consistent with historic diagnosis of schizoaffective disorder, with an active depressive episode. Concurrent intellectual delay likely contributing with limited coping skills. Reason for inpatient hospitalization is ongoing suicidality. Disposition pending clinical stabilization, medication optimization, and formulation of safe discharge plan.     Hospital course: Karolyn Banerjee was admitted to station 20 as a voluntary patient (under commitment until 11/2/2018 with Brian/Casey Wilkins and on a provisional discharge) and placed  on Status 15 minute checks, Suicide and Self-Injury precautions.  PTA medications were restarted.  Affect markedly downcast, depressed, with psychomotor retardation and speech delay.  Endorsed continued SI, reported that ECT had been discontinued upon her own request, however stated she was indifferent as to whether it was restarted. Wellbutrin was started to target low mood. Per chart review, the only antidepressants she has been on in the past were sertraline and venlafaxine.      Medical course: Patient to be evaluated for Cushings (due to features/body habitus) and to receive clearance for ECT.    Plan     Principal Diagnosis:   # Schizoaffective Disorder, bipolar type, current depressive episode  - Possible ECT next week, pending improvement on Wellbutrin    Secondary psychiatric diagnoses of concern this admission:   # Unspecified neurocognitive disorder (IQ 62)    Psychotropic Medications:  -Lurasidone 160 mg with dinner  -Perphenazine 16 mg BID  -Trazodone 50 mg qHS  -Cogentin 0.5 mg BID  -Olanzapine 10 mg BID PRN  -Hydroxyzine 25-50 mg q4 hrs PRN  -Increase Wellbutrin XR to 300 mg daily    Laboratory/Imaging: None  Consults: None  Patient will be treated in therapeutic milieu with appropriate individual and group therapies as described.      Medical diagnoses to be addressed this admission:    # R/o Cushings disease  - IM and Endocrinology consult  - Timed salivary cortisol and 24 hour free urine cortisol         -Patient has been refusing the second round of 24 hour free urine cortisol    # Seasonal allergies  - Cetirizine 10 mg daily    # Contraception  - OCP    # Likely JOSE  - Will need sleep study as an outpatient    Consults: IM and Endocrinology    Relevant psychosocial stressors: Conflict with a peer at her group home, Suicide of her brother in the near-term recent past (last year)    Legal Status: Committed with Torres and PeñaEmilyWilkins    Safety Assessment:   Checks: Status 15  Precautions:  "Suicide  Self-harm  Pt has not required locked seclusion or restraints in the past 24 hours to maintain safety, please refer to RN documentation for further details.     The risks, benefits, alternatives and side effects have been discussed and are understood by the patient and other caregivers.    Anticipated Disposition/Discharge Date: Unknown at this time, pending stabilization and improvement of depression and suicidal ideation, anticipate she will return to her group home    -------------------------------------------------------------------------------------  Note scribed by Rashawn Murdock, MS3, on behalf of Tarik Tineo, PGY1    I have reviewed and edited the documentation recorded by the scribe.  This documentation accurately reflects the services I personally performed and treatment decisions made by me in consultation with the attending physician Robe Mixon MD.  Tarik Tineo MD  Psychiatry PGY-1  [phone]      Attestation:  I, Robe Mixon, saw and evaluated the patient with the resident physician.  I agree with the findings and plan of care as documented in the resident note.  I have reviewed all labs and vital signs.         Interim History:   The patient's care was discussed with the treatment team and chart notes were reviewed. VSS  Slept: 7  PRNs: none    Staff Report:     Continued SI, with reported plans of running into traffic. Participated in all group activities and was somewhat social. Expressed feeling sad. No observed hallucinations. One of the other patients braided her hair for her, and she was seen to be considerably more bright after this interaction.    Interview:  Patient was interviewed in the conference room this morning. She initially reports she is doing \"ok\", with \"a little\" improvement from yesterday. She reports that her SI is less intense than before, and that she is able to push suicidal thoughts out of her mind. Endorses hearing voice of a man from [place] telling " her she is too fat to date. Smiles and laughs when talking about her boyfriend. Makes eye contact with the team throughout the interview. She responded to questions with full sentences, and she was more talkative than on previous days. We discussed how she thought the Wellbutrin was going; she reports that it seems to be helping, and she feels less depressed. She thinks she could benefit from a higher dose.     Collateral:  Patient's mother Mary Banerjee (mobile: [phone]) was contacted and updated on Karolyn's care, including her improved symptoms. We let her know that Karolyn is now taking Wellbutrin, and risks and benefits were discussed. Mother reported that she thought Karolyn might have been on Wellbutrin in the past but she is not sure when. No records of Wellbutrin found in chart. Possibility of ECT next week was also discussed. Mother had no further questions or concerns at this time.    A member of the patient's group home was also contacted regarding the patient's progress. The group home staff are very concerned that the patient is not receiving ECT, and they firmly suggest that she receive it during this hospitalization. They note that she has been doing worse lately, specifically she is more irritable, having more auditory hallucinations, and using more PRNs. When asked about the specific events in which they are seeing irritability, they note it primarily occurs during disagreements between the patient and another resident of her group home. We discussed the indications for ECT, and it would be considered if the patient does not continue to improve.     Review of systems:     ROS was negative unless noted above.          Allergies:     Allergies   Allergen Reactions     Clozapine      Agranulocytosis x 2, cannot be re trialed      Risperdal Other (See Comments)     dystonia     Tape [Adhesive Tape] Rash     Plastic tape            Psychiatric Examination:   BP (!) 144/94  Pulse 89  Temp 98.5  F  "(36.9  C) (Oral)  Resp 16  Ht 1.651 m (5' 5\")  Wt 118.8 kg (262 lb)  SpO2 97%  BMI 43.6 kg/m2  Weight is 262 lbs 0 oz  Body mass index is 43.6 kg/(m^2).    Appearance:  Awake, alert, hair is braided  Attitude:  cooperative  Eye Contact:  fair, better  Mood:  \"okay\"  Affect:  Initially blunted and depressed. Later, smiling and laughing at times  Speech:  Clear, coherent  Psychomotor Behavior:  no evidence of tardive dyskinesia, dystonia, or tics and intact station, gait and muscle tone  Thought Process:  logical, linear and goal oriented  Associations:  no loose associations  Thought Content:  passive suicidal ideation present, improved from yesterday  Insight:  fair  Judgment:  fair  Oriented to:  time, person, and place  Attention Span and Concentration:  intact  Recent and Remote Memory:  intact  Language: conversant in English  Fund of Knowledge: appropriate  Muscle Strength and Tone: normal  Gait and Station: Normal         Labs:   BMP with Glucose 114 and Calcium 8.1, otherwise wnl  CBC wnl  24 hr urine free cortisol and salivary cortisol pending  "

## 2018-02-09 NOTE — PLAN OF CARE
Problem: Depressive Symptoms  Goal: Depressive Symptoms  Signs and symptoms of listed problems will be absent or manageable    Pt will have a decrease in depressive symptoms  Pt will have a decrease in SIB  Pt will contract for saftey   Outcome: Improving  48 hour assessment-  Pt asked if her mood is any better. Pt paused then said she wasn't sure.  Pt at times brightens upon approach. Pt present in lounge much more. Pt much more interactive with other patients.  Pt refusing to do 24 hour urine collection. MD's informed.

## 2018-02-09 NOTE — PROGRESS NOTES
Patient met wit team this morning. Affect appearinf s/w bright with more spontaneous speech.  Patient smiled and giggled when talking about her bf.  Patient stated that she thinks she is feeling better with less depressed mood and less frequent and intense SI.  Dr. Mixon has opted to hold off on ECT since patient is improving.  Will contact  re: coordination of care.

## 2018-02-10 PROCEDURE — 25000132 ZZH RX MED GY IP 250 OP 250 PS 637

## 2018-02-10 PROCEDURE — 25000132 ZZH RX MED GY IP 250 OP 250 PS 637: Performed by: STUDENT IN AN ORGANIZED HEALTH CARE EDUCATION/TRAINING PROGRAM

## 2018-02-10 PROCEDURE — 12400007 ZZH R&B MH INTERMEDIATE UMMC

## 2018-02-10 RX ADMIN — BUPROPION HYDROCHLORIDE 300 MG: 300 TABLET, FILM COATED, EXTENDED RELEASE ORAL at 08:19

## 2018-02-10 RX ADMIN — LURASIDONE HYDROCHLORIDE 160 MG: 80 TABLET, FILM COATED ORAL at 17:33

## 2018-02-10 RX ADMIN — BENZTROPINE MESYLATE 0.5 MG: 0.5 TABLET ORAL at 08:19

## 2018-02-10 RX ADMIN — BENZTROPINE MESYLATE 0.5 MG: 0.5 TABLET ORAL at 20:01

## 2018-02-10 RX ADMIN — TRAZODONE HYDROCHLORIDE 50 MG: 50 TABLET ORAL at 20:01

## 2018-02-10 RX ADMIN — PERPHENAZINE 16 MG: 16 TABLET, FILM COATED ORAL at 08:19

## 2018-02-10 RX ADMIN — CETIRIZINE HYDROCHLORIDE 10 MG: 10 TABLET, FILM COATED ORAL at 08:19

## 2018-02-10 RX ADMIN — NORETHINDRONE ACETATE AND ETHINYL ESTRADIOL 1 TABLET: 1; 20 TABLET ORAL at 08:21

## 2018-02-10 RX ADMIN — Medication 1000 MG: at 08:19

## 2018-02-10 RX ADMIN — PERPHENAZINE 16 MG: 16 TABLET, FILM COATED ORAL at 20:01

## 2018-02-10 ASSESSMENT — ACTIVITIES OF DAILY LIVING (ADL)
HYGIENE/GROOMING: INDEPENDENT
ORAL_HYGIENE: INDEPENDENT
DRESS: SCRUBS (BEHAVIORAL HEALTH)
LAUNDRY: WITH SUPERVISION
DRESS: INDEPENDENT
ORAL_HYGIENE: INDEPENDENT
HYGIENE/GROOMING: INDEPENDENT

## 2018-02-10 NOTE — PROGRESS NOTES
Brief endocrine note  Chart reviewed, patient not seen    Late night saliva positive 0.236 (<0.181)    ASSESSMENT AND RECOMMENDATIONS:   Karolyn is a 32-year-old female with multiple psychiatric problems including schizoaffective disorder, bipolar disorder, borderline personality disorder and neurocognitive disorder, history of DVT of upper extremity, asthma, obesity and history of fracture humerus who was admitted for severe depression, suicidal idea and plan for ECT and Endocrinology was consulted given concern for Cushing syndrome.     ##Concern for Cushing syndrome  Depression and obesity could be symptom and sign of hypercortisolism..  Cortisol may not be suppressed by overnight dexamethasone suppression test in obesity and depression. So in her case, agree with late night salivary test and urine free cortisol.  Of note, depression also can increase urine free cortisol up to 60 mcg per day.  Depression and stress can cause falsely positive test results, if result returned normal, we can exclude Cushing's syndrome.  However, if result returned abnormal, we will need to repeat test as an outpatient. Today, late-night salivary test returned positive  -- if patient discharge, please have patient follow up in endocrine clinic for re-evaluation of Cushing syndrome.    We will sign off, please call with questions or concern.    Pt discussed with Dr. Orozco.    Rod Riley MD  Endocrine Fellow  [phone]    .Endocrine Attending Note  The management of this patient was discussed with me and I agree with recommendation.    Michelle Orozco

## 2018-02-10 NOTE — PROGRESS NOTES
02/10/18 1442   Behavioral Health   Hallucinations denies / not responding to hallucinations   Thinking intact   Orientation person: oriented;place: oriented;date: oriented;time: oriented   Memory baseline memory   Insight poor   Judgement impaired   Eye Contact at examiner   Affect blunted, flat   Mood mood is calm   Physical Appearance/Attire neat   Hygiene well groomed   Suicidality other (see comments)  (None observed)   1. Wish to be Dead No   2. Non-Specific Active Suicidal Thoughts  No   Self Injury other (see comment)  (None observed)   Elopement (None observed)   Activity isolative   Speech clear;coherent   Medication Sensitivity no observed side effects   Psychomotor / Gait balanced;steady   Psycho Education   Type of Intervention 1:1 intervention   Response participates, initiates socially appropriate   Hours 0.5   Activities of Daily Living   Hygiene/Grooming independent   Oral Hygiene independent   Dress independent   Laundry with supervision   Room Organization independent   Behavioral Health Interventions   Depression build upon strengths;establish therapeutic relationship;provide emotional support   Social and Therapeutic Interventions (Depression) encourage participation in therapeutic groups and milieu activities;encourage socialization with peers     Pt was isolative in her room for the first half of the shift and out in the milieu for the other half. Pt ate meals and attended and engaged in the community meeting. Pt napped in her room until lunch. After lunch, pt stayed in the milieu, where she socialized with other pts and talked on the phone.

## 2018-02-10 NOTE — PROGRESS NOTES
Pt calm and pleasant this shift. Still appearing flat and blunted. Was out with peers and coloring but did not see direct interaction or socialization with them. Says doesn't have any goals for this weekend. Keeping to self mostly but was able to join in community meeting.        02/09/18 8123   Behavioral Health   Hallucinations denies / not responding to hallucinations   Thinking distractable   Orientation date: oriented;place: oriented;person: oriented;time: oriented   Memory baseline memory   Insight poor   Judgement impaired   Eye Contact at examiner   Affect blunted, flat   Mood mood is calm   Physical Appearance/Attire appears stated age;attire appropriate to age and situation   Hygiene other (see comment)  (adequate)   1. Wish to be Dead No   2. Non-Specific Active Suicidal Thoughts  No   Self Injury other (see comment)  (none stated/observed)   Elopement (none)   Activity withdrawn;other (see comment)  (present in milieu)   Speech coherent;other (see comments)  (slow in response)   Medication Sensitivity no stated side effects;no observed side effects   Psychomotor / Gait balanced;steady   Activities of Daily Living   Hygiene/Grooming independent   Oral Hygiene independent   Dress scrubs (behavioral health);independent   Room Organization independent

## 2018-02-11 PROCEDURE — 12400007 ZZH R&B MH INTERMEDIATE UMMC

## 2018-02-11 PROCEDURE — 90853 GROUP PSYCHOTHERAPY: CPT

## 2018-02-11 PROCEDURE — 25000132 ZZH RX MED GY IP 250 OP 250 PS 637

## 2018-02-11 PROCEDURE — 25000132 ZZH RX MED GY IP 250 OP 250 PS 637: Performed by: STUDENT IN AN ORGANIZED HEALTH CARE EDUCATION/TRAINING PROGRAM

## 2018-02-11 RX ADMIN — BUPROPION HYDROCHLORIDE 300 MG: 300 TABLET, FILM COATED, EXTENDED RELEASE ORAL at 07:42

## 2018-02-11 RX ADMIN — BENZTROPINE MESYLATE 0.5 MG: 0.5 TABLET ORAL at 21:13

## 2018-02-11 RX ADMIN — NORETHINDRONE ACETATE AND ETHINYL ESTRADIOL 1 TABLET: 1; 20 TABLET ORAL at 07:43

## 2018-02-11 RX ADMIN — TRAZODONE HYDROCHLORIDE 50 MG: 50 TABLET ORAL at 21:13

## 2018-02-11 RX ADMIN — OLANZAPINE 10 MG: 10 TABLET, FILM COATED ORAL at 13:20

## 2018-02-11 RX ADMIN — LURASIDONE HYDROCHLORIDE 160 MG: 80 TABLET, FILM COATED ORAL at 17:51

## 2018-02-11 RX ADMIN — CETIRIZINE HYDROCHLORIDE 10 MG: 10 TABLET, FILM COATED ORAL at 07:42

## 2018-02-11 RX ADMIN — PERPHENAZINE 16 MG: 16 TABLET, FILM COATED ORAL at 07:42

## 2018-02-11 RX ADMIN — Medication 1000 MG: at 07:42

## 2018-02-11 RX ADMIN — PERPHENAZINE 16 MG: 16 TABLET, FILM COATED ORAL at 21:13

## 2018-02-11 RX ADMIN — BENZTROPINE MESYLATE 0.5 MG: 0.5 TABLET ORAL at 07:42

## 2018-02-11 ASSESSMENT — ACTIVITIES OF DAILY LIVING (ADL)
ORAL_HYGIENE: INDEPENDENT
HYGIENE/GROOMING: PROMPTS
DRESS: SCRUBS (BEHAVIORAL HEALTH);INDEPENDENT

## 2018-02-11 NOTE — PROGRESS NOTES
02/11/18 1300   Occupational Therapy   Type of Intervention structured groups   Response Participates   Hours 1   Treatment Detail mental health mindful yoga: demonstrated understanding with occasional verbal cueing

## 2018-02-11 NOTE — PROGRESS NOTES
Patient reports continued depression but denies current SI/SIB. Patient was visible in the milieu but withdrawn with flat affect for most of the shift. Patient states little has changed in the past few days.     02/10/18 2100   Behavioral Health   Hallucinations denies / not responding to hallucinations   Thinking intact   Orientation person: oriented;place: oriented;date: oriented;time: oriented   Memory baseline memory   Insight poor   Judgement impaired   Eye Contact at examiner   Affect blunted, flat   Mood depressed;mood is calm   Physical Appearance/Attire appears stated age;attire appropriate to age and situation   Hygiene well groomed   Suicidality other (see comments)  (Denies)   1. Wish to be Dead No   2. Non-Specific Active Suicidal Thoughts  No   Self Injury other (see comment)  (Denies)   Activity isolative;withdrawn   Speech clear;coherent   Medication Sensitivity no stated side effects;no observed side effects   Psychomotor / Gait balanced;steady   Activities of Daily Living   Hygiene/Grooming independent   Oral Hygiene independent   Dress scrubs (behavioral health)   Room Organization independent

## 2018-02-11 NOTE — PROGRESS NOTES
Brief Internal Medicine Note, 2/11/18:    IM following for workup for Cushing's disease.     Karolyn Banerjee is a 32 year old female with a history of 32 year old female with past medical history significant for mild asthma, refractive amblyopia, treatment-refractory schizoaffective disorder, bipolar type, borderline personality disorder, and unspecified neurocognitive disorder (IQ 62) admitted for suicidal ideations.     # Possible Cushing syndrome - No recent steroid use.  K wnl. Mildly hypertensive since admission.  Endocrine consulted for assistance, late night salivary cortisol POSITIVE.  Given that patient is suffering from severe depression, determined that DST would be low dx yield.  Per Endocrine, patient will need to follow up in Endocrine clinic as outpatient - please refer to note dated 2/10/18 from Dr. Michelle Orozco.  Referral placed.      Medicine will sign off, no further recommendations at this time.  Please feel free to reconsult if patient's symptoms worsen or if new problems arise.  Thank you for the opportunity to care for this patient.       Mariel Fleming, [facility]  Hospitalist Service   Pager: [phone]

## 2018-02-11 NOTE — PLAN OF CARE
Problem: Depressive Symptoms  Goal: Depressive Symptoms  Signs and symptoms of listed problems will be absent or manageable    Pt will have a decrease in depressive symptoms  Pt will have a decrease in SIB  Pt will contract for saftey   Outcome: Improving  Patient awake this morning and out for breakfast. Asked staff to braid hair.  More social.  Independent with cares.  Became more anxious in afternoon with loud yelling from another patient did request prn which usually she would not request.  But given several redirection not to be holding hands with another male patient.  Did announce that she was on birth control to others on unit.  Will watch closely for closeness of male peer.

## 2018-02-12 PROCEDURE — 12400007 ZZH R&B MH INTERMEDIATE UMMC

## 2018-02-12 PROCEDURE — 97150 GROUP THERAPEUTIC PROCEDURES: CPT | Mod: GO

## 2018-02-12 PROCEDURE — 25000132 ZZH RX MED GY IP 250 OP 250 PS 637

## 2018-02-12 PROCEDURE — 25000132 ZZH RX MED GY IP 250 OP 250 PS 637: Performed by: STUDENT IN AN ORGANIZED HEALTH CARE EDUCATION/TRAINING PROGRAM

## 2018-02-12 PROCEDURE — 90853 GROUP PSYCHOTHERAPY: CPT

## 2018-02-12 PROCEDURE — 40000275 ZZH STATISTIC RCP TIME EA 10 MIN

## 2018-02-12 PROCEDURE — 99232 SBSQ HOSP IP/OBS MODERATE 35: CPT | Mod: GC | Performed by: PSYCHIATRY & NEUROLOGY

## 2018-02-12 RX ORDER — BUPROPION HYDROCHLORIDE 150 MG/1
150 TABLET ORAL ONCE
Status: COMPLETED | OUTPATIENT
Start: 2018-02-12 | End: 2018-02-12

## 2018-02-12 RX ADMIN — TRAZODONE HYDROCHLORIDE 50 MG: 50 TABLET ORAL at 21:14

## 2018-02-12 RX ADMIN — PERPHENAZINE 16 MG: 16 TABLET, FILM COATED ORAL at 21:14

## 2018-02-12 RX ADMIN — NORETHINDRONE ACETATE AND ETHINYL ESTRADIOL 1 TABLET: 1; 20 TABLET ORAL at 08:01

## 2018-02-12 RX ADMIN — BENZTROPINE MESYLATE 0.5 MG: 0.5 TABLET ORAL at 08:01

## 2018-02-12 RX ADMIN — BENZTROPINE MESYLATE 0.5 MG: 0.5 TABLET ORAL at 21:14

## 2018-02-12 RX ADMIN — PERPHENAZINE 16 MG: 16 TABLET, FILM COATED ORAL at 08:01

## 2018-02-12 RX ADMIN — BUPROPION HYDROCHLORIDE 300 MG: 300 TABLET, FILM COATED, EXTENDED RELEASE ORAL at 08:01

## 2018-02-12 RX ADMIN — Medication 1000 MG: at 08:01

## 2018-02-12 RX ADMIN — CETIRIZINE HYDROCHLORIDE 10 MG: 10 TABLET, FILM COATED ORAL at 08:01

## 2018-02-12 RX ADMIN — BUPROPION HYDROCHLORIDE 150 MG: 150 TABLET, FILM COATED, EXTENDED RELEASE ORAL at 10:29

## 2018-02-12 RX ADMIN — LURASIDONE HYDROCHLORIDE 160 MG: 80 TABLET, FILM COATED ORAL at 17:03

## 2018-02-12 ASSESSMENT — ACTIVITIES OF DAILY LIVING (ADL)
DRESS: INDEPENDENT
DRESS: SCRUBS (BEHAVIORAL HEALTH);INDEPENDENT
ORAL_HYGIENE: INDEPENDENT
LAUNDRY: WITH SUPERVISION
ORAL_HYGIENE: INDEPENDENT
GROOMING: INDEPENDENT
GROOMING: INDEPENDENT

## 2018-02-12 NOTE — PROGRESS NOTES
Pt was present in milieu most of shift but withdrawn. Claims that she is doing good and denying being depressed or sad. Says she doesn't want ECT and does want to discharge soon. She still has AH but no SI/SIB. Slow and minimal in response. Prompted to shower but says she only does it specific days.        02/11/18 2220   Behavioral Health   Hallucinations auditory   Thinking distractable;poor concentration   Orientation person: oriented;place: oriented;date: oriented;time: oriented   Memory baseline memory   Insight poor   Judgement impaired   Eye Contact at examiner   Affect blunted, flat   Mood mood is calm   Physical Appearance/Attire disheveled   Hygiene neglected grooming - unclean body, hair, teeth   Suicidality other (see comments)  (denies)   1. Wish to be Dead No   2. Non-Specific Active Suicidal Thoughts  No   Self Injury other (see comment)  (denies)   Elopement (none)   Activity withdrawn;isolative   Speech coherent;clear   Medication Sensitivity no observed side effects;no stated side effects   Psychomotor / Gait balanced;steady   Activities of Daily Living   Hygiene/Grooming prompts   Oral Hygiene independent   Dress scrubs (behavioral health);independent   Room Organization independent

## 2018-02-12 NOTE — PLAN OF CARE
Problem: Depressive Symptoms  Goal: Depressive Symptoms  Signs and symptoms of listed problems will be absent or manageable    Pt will have a decrease in depressive symptoms  Pt will have a decrease in SIB  [facility]   Illness Management Recovery model:  Triggers.     Patient identified the following triggers which may exacerbate their illness:    1. Conflicts at group group home  2.   3.     2/12/18  Desiree galvez

## 2018-02-12 NOTE — PROGRESS NOTES
Pt affect brightens upon approach. Pt showered this morning.  Pt states she showers on Mons, Tues, Suns.  Pt smiling when telling staff this.  Pt states she feels her mood has improved.  Pt talked about items being missing form her group home. Pt reports frustration about this.l

## 2018-02-12 NOTE — PROGRESS NOTES
----------------------------------------------------------------------------------------------------------  [facility], [place]   Psychiatric Progress Note  Hospital Day #7     Assessment    Presentation: This patient is a 32 year old female with history of treatment-refractory schizoaffective disorder, bipolar type, borderline personality disorder, and unspecified neurocognitive disorder (IQ 62) who was voluntarily admitted directly from clinic during an ECT consultation visit for suicidal ideations. Patient is currently under commitment with Reina Wilkins, which has been renewed until 11/2/2018. Mother is guardian.      Diagnostic Impression: This patient is a 32 year old female with history of treatment-refractory schizoaffective disorder, bipolar type, and unspecified neurocognitive disorder (IQ 62) who was voluntarily admitted directly from clinic during an ECT consultation visit for suicidal ideations. Patient is currently under commitment, Brian, and Casey-Wilkins which has been renewed until 11/2/2018. Hospitalization is in the context of unclear stressors, but notably patient has had difficulties with another resident at her group home. She minimizes any distress about her brother's suicide last year. Substance use does not appear to be playing an active role. On exam, she has blunted affect and notably slowed speech. Presentation is consistent with historic diagnosis of schizoaffective disorder, with an active depressive episode. Concurrent intellectual delay likely contributing with limited coping skills. Reason for inpatient hospitalization is ongoing suicidality. Disposition pending clinical stabilization, medication optimization, and formulation of safe discharge plan.     Hospital course: Karolyn Banerjee was admitted to station 20 as a voluntary patient (under commitment until 11/2/2018 with Brian/Casey Wilkins and on a provisional discharge) and placed  on Status 15 minute checks, Suicide and Self-Injury precautions.  PTA medications were restarted.  Affect markedly downcast, depressed, with psychomotor retardation and speech delay.  Endorsed continued SI, reported that ECT had been discontinued upon her own request, however stated she was indifferent as to whether it was restarted. Wellbutrin was started to target low mood. Per chart review, the only antidepressants she has been on in the past were sertraline and venlafaxine.      Medical course: Patient to be evaluated for Cushings (due to features/body habitus) and to receive clearance for ECT.    Plan     Principal Diagnosis:   # Schizoaffective Disorder, bipolar type, current depressive episode    Secondary psychiatric diagnoses of concern this admission:   # Unspecified neurocognitive disorder (IQ 62)    Psychotropic Medications:  -Lurasidone 160 mg with dinner  -Perphenazine 16 mg BID  -Trazodone 50 mg qHS  -Cogentin 0.5 mg BID  -Olanzapine 10 mg BID PRN  -Hydroxyzine 25-50 mg q4 hrs PRN  -Increase Wellbutrin XR to 450 mg daily    Laboratory/Imaging: None  Consults: None  Patient will be treated in therapeutic milieu with appropriate individual and group therapies as described.      Medical diagnoses to be addressed this admission:    # R/o Cushings disease  - IM and Endocrinology consult  - salivary cortisol inconclusive  - 24 hour free urine cortisol         -Patient has been refusing the second round of 24 hour free urine cortisol  - Follow up with Endocrinology as an outpatient (referral placed)    # Seasonal allergies  - Cetirizine 10 mg daily    # Contraception  - OCP    # Likely JOSE  - pulse ox overnight  - Will need sleep study as an outpatient (referral placed)    Consults: IM and Endocrinology    Relevant psychosocial stressors: Conflict with a peer at her group home, Suicide of her brother in the near-term recent past (last year)    Legal Status: Committed with [employer]    Safety  "Assessment:   Checks: Status 15  Precautions: Suicide  Self-harm  Pt has not required locked seclusion or restraints in the past 24 hours to maintain safety, please refer to RN documentation for further details.     The risks, benefits, alternatives and side effects have been discussed and are understood by the patient and other caregivers.    Anticipated Disposition/Discharge Date: Unknown at this time, pending stabilization and improvement of depression and suicidal ideation, likely 1-4 days, anticipate she will return to her group home    -------------------------------------------------------------------------------------  Note scribed by Rashawn Murdock, MS3, on behalf of Tarik Tineo, PGY1    I have reviewed and edited the documentation recorded by the scribe.  This documentation accurately reflects the services I personally performed and treatment decisions made by me in consultation with the attending physician Robe Mixon MD.  Tarik Tineo MD  Psychiatry PGY-1  [phone]    Attestation:  I, Robe Mixon, saw and evaluated the patient with the resident physician.  I agree with the findings and plan of care as documented in the resident note.  I have reviewed all labs and vital signs.         Interim History:   The patient's care was discussed with the treatment team and chart notes were reviewed. VSS  Slept: 6.5  PRNs: Zyprexa 2/11 13:20 for     Staff Report:     Withdrawn and semi-social. Converses with other patients but often stays in her room. Notes that little has changed with her mood in the past couple days. Endorses continued auditory hallucinations. Says she does not want ECT.    Interview:  Patient was interviewed in the conference room this morning. She initially says she is \"ok\", and later says her mood is \"pretty good\". Overall, she feels better than on admission. When asked if she can tell how much of her depression remains, she replied \"not really\". Denies trouble sleeping but says " "that she does not feel rested upon waking. When asked if she continues to have SI, she says \"not really\". Denies any significant events over the weekend. Thinks Wellbutrin is helping and has not noticed any side effects. Notably, she was much less talkative than during our interview late last week, and she responded to questions with only single word or brief answers. She says she is ready to go back to the group home. She denied any other concerns for the team this morning.     Collateral: We talked to patient's mother over the phone and discussed plans to increase Wellbutrin dose and aim to discharge by the end of the week. We also discussed getting an Endo referral to evaluate for Cushing as well as a sleep study to evaluate for JOSE. She was agreeable to plans. She also mentioned that Karolyn told her that she feels \"a little bit\" better, but that she also said she feels that her suicidal ideations will never go away. Mother also had a question about potentially giving Karolyn and her other daughter Daily Essential Nutrients for depression, which she said a psychiatrist recommended to her. She said she will call again tomorrow with more information about it.    Review of systems:     ROS was negative unless noted above.          Allergies:     Allergies   Allergen Reactions     Clozapine      Agranulocytosis x 2, cannot be re trialed      Risperdal Other (See Comments)     dystonia     Tape [Adhesive Tape] Rash     Plastic tape            Psychiatric Examination:   BP (!) 148/95 (BP Location: Left arm)  Pulse 86  Temp 98.4  F (36.9  C) (Oral)  Resp 16  Ht 1.651 m (5' 5\")  Wt 120.7 kg (266 lb)  SpO2 97%  BMI 44.26 kg/m2  Weight is 266 lbs 0 oz  Body mass index is 44.26 kg/(m^2).    Appearance:  awake, alert and dressed in hospital scrubs, unkept  Attitude:  guarded  Eye Contact:  poor   Mood: \"ok\", \"pretty good\"  Affect:  intensity is blunted, dysthmic  Speech:  mumbling and marked speech latency, minimal " spontaneous speech  Psychomotor Behavior:  no evidence of tardive dyskinesia, dystonia, or tics, some psychomotor slowing  Thought Process:  logical and linear  Associations:  no loose associations  Thought Content:  Denies current suicidal ideation  Insight:  fair  Judgment:  fair  Oriented to:  time, person, and place  Attention Span and Concentration:  intact  Recent and Remote Memory:  intact  Language: conversant in English  Fund of Knowledge: appropriate  Muscle Strength and Tone: grossly normal  Gait and Station: Normal         Labs:   BMP with Glucose 114 and Calcium 8.1, otherwise wnl  CBC wnl  Salivary cortisol: 0.236 (H), contaminated with blood  24 hr urine free cortisol pending

## 2018-02-13 LAB
COLLECT DURATION TIME UR: 24 H
CORTIS 24H UR-MRATE: 11 MCG/24 H (ref 3.5–45)
CORTISONE 24H UR-MRATE: 73 MCG/24 H (ref 17–129)
SPECIMEN VOL 24H UR: 1775 ML

## 2018-02-13 PROCEDURE — H2032 ACTIVITY THERAPY, PER 15 MIN: HCPCS

## 2018-02-13 PROCEDURE — 25000132 ZZH RX MED GY IP 250 OP 250 PS 637

## 2018-02-13 PROCEDURE — 99232 SBSQ HOSP IP/OBS MODERATE 35: CPT | Mod: GC | Performed by: PSYCHIATRY & NEUROLOGY

## 2018-02-13 PROCEDURE — 90853 GROUP PSYCHOTHERAPY: CPT

## 2018-02-13 PROCEDURE — 12400007 ZZH R&B MH INTERMEDIATE UMMC

## 2018-02-13 PROCEDURE — 25000132 ZZH RX MED GY IP 250 OP 250 PS 637: Performed by: STUDENT IN AN ORGANIZED HEALTH CARE EDUCATION/TRAINING PROGRAM

## 2018-02-13 RX ORDER — BUPROPION HYDROCHLORIDE 450 MG/1
450 TABLET, FILM COATED, EXTENDED RELEASE ORAL DAILY
Qty: 30 TABLET | Refills: 0 | Status: SHIPPED | OUTPATIENT
Start: 2018-02-14 | End: 2018-03-22

## 2018-02-13 RX ADMIN — CETIRIZINE HYDROCHLORIDE 10 MG: 10 TABLET, FILM COATED ORAL at 08:10

## 2018-02-13 RX ADMIN — BUPROPION HYDROCHLORIDE 450 MG: 300 TABLET, FILM COATED, EXTENDED RELEASE ORAL at 08:09

## 2018-02-13 RX ADMIN — BENZTROPINE MESYLATE 0.5 MG: 0.5 TABLET ORAL at 20:14

## 2018-02-13 RX ADMIN — NORETHINDRONE ACETATE AND ETHINYL ESTRADIOL 1 TABLET: 1; 20 TABLET ORAL at 08:11

## 2018-02-13 RX ADMIN — Medication 1000 MG: at 08:10

## 2018-02-13 RX ADMIN — BENZTROPINE MESYLATE 0.5 MG: 0.5 TABLET ORAL at 08:10

## 2018-02-13 RX ADMIN — LURASIDONE HYDROCHLORIDE 160 MG: 80 TABLET, FILM COATED ORAL at 17:00

## 2018-02-13 RX ADMIN — PERPHENAZINE 16 MG: 16 TABLET, FILM COATED ORAL at 20:14

## 2018-02-13 RX ADMIN — TRAZODONE HYDROCHLORIDE 50 MG: 50 TABLET ORAL at 20:14

## 2018-02-13 RX ADMIN — PERPHENAZINE 16 MG: 16 TABLET, FILM COATED ORAL at 08:10

## 2018-02-13 ASSESSMENT — ACTIVITIES OF DAILY LIVING (ADL)
GROOMING: INDEPENDENT
ORAL_HYGIENE: INDEPENDENT
DRESS: INDEPENDENT
GROOMING: INDEPENDENT
LAUNDRY: WITH SUPERVISION
ORAL_HYGIENE: INDEPENDENT
DRESS: SCRUBS (BEHAVIORAL HEALTH)

## 2018-02-13 NOTE — PROGRESS NOTES
02/13/18 1600   Occupational Therapy   Type of Intervention structured groups   Response Initiates, socially acceptable   Hours 3.5       Participated in Life Skills Leisure Activity Group designed to facilitate interaction with others, spontaneity, enjoyment and maximize cognitive skills of developing strategy, problem solving, enhancing memory as well as provide resources for ongoing use.  Participated in mental health management group focused on proactive goal setting and identification of areas of interest. Pt verbalized understanding of goal setting activity through identification of a desired life experience she would like to attain, area of growth she would like to achieve and a way she would like to contribute to society.   Pt was observed taking notes and contributed appropriately to group process.   Attended OT Mental Health Management CBT group focused on emotional regulation and reframing.  Participated in discussion and completed worksheet provided.      Patient focused heavily during discussion groups on wanting to attain more independence in her life.

## 2018-02-13 NOTE — PROGRESS NOTES
----------------------------------------------------------------------------------------------------------  [facility], [place]   Psychiatric Progress Note  Hospital Day #8     Assessment    Presentation: This patient is a 32 year old female with history of treatment-refractory schizoaffective disorder, bipolar type, and unspecified neurocognitive disorder (IQ 62) who was voluntarily admitted directly from clinic during an ECT consultation visit for suicidal ideations. Patient is currently under commitment with Reina Wilkins, which has been renewed until 11/2/2018. Mother is guardian.      Diagnostic Impression: This patient is a 32 year old female with history of treatment-refractory schizoaffective disorder, bipolar type, and unspecified neurocognitive disorder (IQ 62) who was voluntarily admitted directly from clinic during an ECT consultation visit for suicidal ideations. Patient is currently under commitment, Brian, and Júnior which has been renewed until 11/2/2018. Hospitalization is in the context of unclear stressors, but notably patient has had difficulties with another resident at her group home. She minimizes any distress about her brother's suicide last year. Substance use does not appear to be playing an active role. Patient has likely sleep-apnea, with desaturations to the mid 80s noted on overnight pulse oximetry, which is likely contributing to her sedation and depressive symptoms. On exam, she has blunted affect and notably slowed speech. Presentation is consistent with historic diagnosis of schizoaffective disorder, with an active depressive episode. Concurrent intellectual delay likely contributing with limited coping skills. Reason for inpatient hospitalization is ongoing suicidality. Disposition pending clinical stabilization, medication optimization, and formulation of safe discharge plan.     Hospital course: Karolyn Banerjee was admitted to  station 20 as a voluntary patient (under commitment until 11/2/2018 with Brian/Casey Wilkins and on a provisional discharge) and placed on Status 15 minute checks, Suicide and Self-Injury precautions.  PTA medications were restarted.  Affect markedly downcast, depressed, with psychomotor retardation and speech delay.  Endorsed continued SI, reported that ECT had been discontinued upon her own request, however stated she was indifferent as to whether it was restarted. Wellbutrin was started to target low mood, and it was titrated to a dose of 450 mg daily. Per chart review, the only antidepressants she has been on in the past were sertraline and venlafaxine.      Medical course:   - Evaluated by endocrinology for Cushing's disease. Salivary cortisol was inconclusive. Recommended to follow up in Endocrine clinic as an outpatient  - Performed overnight pulse oximetry to evaluate for JOSE. Patient has frequent desats to the mid 80s. Referral placed for a sleep study as an outpatient      Plan     Principal Diagnosis:   # Schizoaffective Disorder, bipolar type, current depressive episode    Secondary psychiatric diagnoses of concern this admission:   # Unspecified neurocognitive disorder (IQ 62)    Psychotropic Medications:  -Lurasidone 160 mg with dinner  -Perphenazine 16 mg BID  -Trazodone 50 mg qHS  -Cogentin 0.5 mg BID  -Olanzapine 10 mg BID PRN  -Hydroxyzine 25-50 mg q4 hrs PRN  -Wellbutrin  mg daily    Laboratory/Imaging: None  Consults: None  Patient will be treated in therapeutic milieu with appropriate individual and group therapies as described.      Medical diagnoses to be addressed this admission:    # R/o Cushings disease  - IM and Endocrinology consult  - salivary cortisol inconclusive  - 24 hour free urine cortisol         -Patient has been refusing the second round of 24 hour free urine cortisol  - Follow up with Endocrinology as an outpatient (referral placed)    # Seasonal allergies  - Cetirizine  10 mg daily    # Contraception  - OCP    # Likely JOSE  Pulse ox overnight showed desaturations to 84-88%.  - Will need sleep study as an outpatient (referral placed)    Consults: IM and Endocrinology    Relevant psychosocial stressors: Conflict with a peer at her group home, Suicide of her brother in the near-term recent past (last year)    Legal Status: Committed with [facility] and PeñaEm[facility]Wilkins    Safety Assessment:   Checks: Status 15  Precautions: Suicide  Self-harm  Pt has not required locked seclusion or restraints in the past 24 hours to maintain safety, please refer to RN documentation for further details.     The risks, benefits, alternatives and side effects have been discussed and are understood by the patient and other caregivers.    Anticipated Disposition/Discharge Date: Plan for discharge tomorrow 11am back to group home    -------------------------------------------------------------------------------------  Note scribed by Rashawn Murdock, MS3, on behalf of Tarik Tineo, PGY1    I have reviewed and edited the documentation recorded by the scribe.  This documentation accurately reflects the services I personally performed and treatment decisions made by me in consultation with the attending physician Robe Mixon MD.  Tarik Tineo MD  Psychiatry PGY-1  [phone]    Attestation:  I, Robe Mixon, saw and evaluated the patient with the resident physician.  I agree with the findings and plan of care as documented in the resident note.  I have reviewed all labs and vital signs.       Interim History:   The patient's care was discussed with the treatment team and chart notes were reviewed. VSS  Slept: 5.5  PRNs: none    Staff Report:     Social with peers, smiles when talking. Eating and interacting in milieu. Some verbal conflict with another patient with poor boundaries. Advocated for herself but was noticeably distressed afterward. Reported that peer was calling her fat. Pulse ox monitoring  "overnight woke her up when tone went off due to saturations dipping down to 84-88. She then declined pulse oximetry and was able to return to sleep.     Interview:  Patient was interviewed in the conference room this morning. She says she is feeling \"pretty good\" and says that her mood is better. She expressed some frustration about the overnight pulse oximetry, as it was frequently beeping and waking her up at night. We explained that the pulse oximetry was to help us evaluate her for JOSE. The possible contribution of JOSE to her depression was discussed and patient expressed understanding. Patient denied SI and AH. She was noticeably more talkative than yesterday, answering with longer sentences and speaking spontaneously at times. She smiled when telling us that she wanted to go back to her group home so she could see her boyfriend. She feels ready to discharge back to her group home. Denied any new concerns.    Collateral: We talked to patient's mother (Mary Banerjee, [phone]) over the phone and discussed Karolyn's progression and possibility of discharge this week. We also discussed Daily Essential Nutrients, which she ordered online for Karolyn. We told her that it was likely not to harm Karolyn and may help her feel a bit better, but that a regular OTC multivitamin with minerals would likely have the same effect at a much lower cost. Mother decided that she would continue with DEN because she trusted it more than supplements at drug/grocery stores. Lastly, we discussed the importance of getting an outpatient sleep study for Karolyn and mother was agreeable.     Review of systems:     ROS was negative unless noted above.          Allergies:     Allergies   Allergen Reactions     Clozapine      Agranulocytosis x 2, cannot be re trialed      Risperdal Other (See Comments)     dystonia     Tape [Adhesive Tape] Rash     Plastic tape            Psychiatric Examination:   BP (!) 148/95 (BP Location: Left arm)  " "Pulse 86  Temp 98.7  F (37.1  C) (Oral)  Resp 16  Ht 1.651 m (5' 5\")  Wt 120.7 kg (266 lb)  SpO2 97%  BMI 44.26 kg/m2  Weight is 266 lbs 0 oz  Body mass index is 44.26 kg/(m^2).    Appearance:  awake, alert and dressed in hospital scrubs, somewhat unkept  Attitude:  cooperative  Eye Contact:  fair  Mood:  \"pretty good; better\"  Affect:  appropriate and in normal range, smiling at times  Speech:  clear, coherent and increased speech latency  Psychomotor Behavior:  no evidence of tardive dyskinesia, dystonia, or tics  Thought Process:  logical, linear and goal oriented  Associations:  no loose associations  Thought Content:  denies suicidal ideation  Insight:  fair  Judgment:  fair  Oriented to:  time, person, and place  Attention Span and Concentration:  intact  Recent and Remote Memory:  intact  Language: conversant in English  Fund of Knowledge: appropriate  Muscle Strength and Tone: normal  Gait and Station: Normal         Labs:   BMP with Glucose 114 and Calcium 8.1, otherwise wnl  CBC wnl  Salivary cortisol: 0.236 (H), contaminated with blood  24 hr urine free cortisol pending  Overnight pulse ox -- desaturations to 84-88  "

## 2018-02-13 NOTE — PLAN OF CARE
Problem: Patient Care Overview  Goal: Team Discussion  Team Plan:   BEHAVIORAL TEAM DISCUSSION    Participants:  Dr. Mixon, Dr. Ambriz, Dr. Tineo, Mia Ortiz MA.LP, med students    Progress:   Patient's mood improving, affect brighter.  She denies AH and suicidal ideation.    Continued Stay Criteria/Rationale:   Coordination of care with     Medical/Physical:   R/O Sleep apnea, Cushings Disease      Precautions:   Behavioral Orders   Procedures     Code 1 - Restrict to Unit     Routine Programming     As clinically indicated     Self Injury Precaution     Status 15     Every 15 minutes.     Suicide precautions     Plan:  Patient will be referred for sleep study.  [place] [facility].    Rationale for change in precautions or plan:  No change in plan/precautions

## 2018-02-13 NOTE — PROGRESS NOTES
At 2300 RT here to start oximetry. At 2330 remains sleeping soundly, snoring loudly,respiratory rate 32 to 36, heart rate 90 to 110,oxygen saturations inconsistent [96 to 80 % in the span of two minutes] dropping to 84 to 88 several times a minute with consequential alarming of oximeter. Alarms generally disturbing to unit. No orders as to how to address low saturations. MD notified. OK to decrease low limits to 80%. And observe patient frequently. At 0030 patient screaming loudly. Says she can't stand tape on her finger. MD notified. OK to discontinue oximetry based on patients refusal. Patient distressed by events. Walked in charles,had a cold drink and then able to return to room and sleep.

## 2018-02-13 NOTE — PROGRESS NOTES
02/13/18 1437   Behavioral Health   Hallucinations denies / not responding to hallucinations   Thinking poor concentration   Orientation person: oriented;place: oriented;date: oriented;time: oriented   Memory baseline memory   Insight poor   Judgement impaired   Eye Contact at examiner   Affect full range affect   Mood mood is calm   Physical Appearance/Attire attire appropriate to age and situation   Hygiene (adequate)   Suicidality (none stated)   1. Wish to be Dead No   2. Non-Specific Active Suicidal Thoughts  No   Enviromental Risk Factors (nnone stated)   Self Injury (none stated)   Elopement (none stated)   Activity isolative   Speech clear;coherent   Medication Sensitivity no stated side effects   Psychomotor / Gait balanced   Psycho Education   Type of Intervention 1:1 intervention   Response participates, initiates socially appropriate   Hours 0.5   Activities of Daily Living   Hygiene/Grooming independent   Oral Hygiene independent   Dress independent   Laundry with supervision   Room Organization independent   pt attending groups with brighter affect. Pt had a verbal conflict with another pt (N.B-O.) about a craft project they did yesterday. Pt was able to walk away with staff redirecting. Pt making phone calls. Pt denies SI and SIB.

## 2018-02-13 NOTE — PROGRESS NOTES
02/12/18 2000   Behavioral Health   Hallucinations denies / not responding to hallucinations   Thinking poor concentration   Orientation person: oriented;place: oriented   Memory baseline memory   Insight poor   Judgement impaired   Eye Contact at examiner   Affect blunted, flat   Mood mood is calm   Physical Appearance/Attire neat   Hygiene well groomed   Suicidality other (see comments)  (Rerfused to check in with this staff)   Self Injury other (see comment)  (Refused to check in with this staff)   Elopement (None reported or observed )   Activity other (see comment)  (visible in the milue and socialized with others)   Speech clear;coherent   Activities of Daily Living   Hygiene/Grooming independent   Oral Hygiene independent   Dress independent   Laundry with supervision   Room Organization independent       Pt refused to check in with this staff.  Pt seems calm, ate meals, visible in the milieu and socialized with others.

## 2018-02-13 NOTE — PROGRESS NOTES
Spoke to patient's  Director re: discharging patient tomorrow. She expressed concerns that patient did not receive ECT while admitted and I informed her that MD's did not feel it was warranted at this time. I informed her of MD's concern that patient may have sleep apnea and that we will arrange for an outpatient sleep study.  They will plan to pick patient up tomorrow ~ 11:00am.  Meds will be called into [facility] per their request.  MD's and patient notified.

## 2018-02-14 VITALS
RESPIRATION RATE: 16 BRPM | TEMPERATURE: 98.4 F | SYSTOLIC BLOOD PRESSURE: 148 MMHG | WEIGHT: 266 LBS | OXYGEN SATURATION: 97 % | BODY MASS INDEX: 44.32 KG/M2 | HEIGHT: 65 IN | DIASTOLIC BLOOD PRESSURE: 95 MMHG | HEART RATE: 86 BPM

## 2018-02-14 PROCEDURE — 25000132 ZZH RX MED GY IP 250 OP 250 PS 637

## 2018-02-14 PROCEDURE — 99238 HOSP IP/OBS DSCHRG MGMT 30/<: CPT | Mod: GC | Performed by: PSYCHIATRY & NEUROLOGY

## 2018-02-14 PROCEDURE — 25000132 ZZH RX MED GY IP 250 OP 250 PS 637: Performed by: STUDENT IN AN ORGANIZED HEALTH CARE EDUCATION/TRAINING PROGRAM

## 2018-02-14 RX ADMIN — Medication 1000 MG: at 08:14

## 2018-02-14 RX ADMIN — BUPROPION HYDROCHLORIDE 450 MG: 300 TABLET, FILM COATED, EXTENDED RELEASE ORAL at 08:14

## 2018-02-14 RX ADMIN — PERPHENAZINE 16 MG: 16 TABLET, FILM COATED ORAL at 08:14

## 2018-02-14 RX ADMIN — BENZTROPINE MESYLATE 0.5 MG: 0.5 TABLET ORAL at 08:15

## 2018-02-14 RX ADMIN — NORETHINDRONE ACETATE AND ETHINYL ESTRADIOL 1 TABLET: 1; 20 TABLET ORAL at 08:15

## 2018-02-14 RX ADMIN — CETIRIZINE HYDROCHLORIDE 10 MG: 10 TABLET, FILM COATED ORAL at 08:14

## 2018-02-14 ASSESSMENT — ACTIVITIES OF DAILY LIVING (ADL)
ORAL_HYGIENE: INDEPENDENT
GROOMING: INDEPENDENT
DRESS: INDEPENDENT;STREET CLOTHES

## 2018-02-14 NOTE — DISCHARGE INSTRUCTIONS
Behavioral Discharge Planning and Instructions    Summary:   You were admitted to [facility] on 2/6/18 with worsening depression and suicidal ideation  under the care of Dr. Mixon.         You met with Dr. Mixon and his team daily for ongoing psychiatric assessment and medication management.  You had opportunities to participate in therapeutic groups on the unit.   At this time you report your mood has stabilized and you report you are not having thoughts or intent to harm yourself or others. You will be discharged home and will resume care with your outpatient providers.      Disposition:    [facility]:  [phone] [address] [place] [address]     Diagnosis:   Schizoaffective Disorder     Major Treatments, Procedures and Findings:   Medications were  managed throughout your stay. An internal medicine consult was completed during your stay. You had the opportunity to participate in treatment programming while on the unit including occupational therapy, mental health support and education and spiritual services.     Symptoms to Report:   Please report if you are experiencing increased aggression and/or confusion, problematic loss of sleep, worsening mood, or thoughts of suicide to your treatment team or notify your primary provider.   IF THE SYMPTOMS YOU ARE EXPERIENCING ARE A MEDICAL EMERGENCY, CALL 911 IMMEDIATELY     Lifestyle Adjustment:   1. Adjust your lifestyle to get enough sleep, relaxation, exercise and good nutrition.  Continue to develop healthy coping skills to decrease stress and promote a healthy  lifestyle.  2. Abstain from all substances of abuse.  3. Take medications as prescribed.  Please work with your doctor to discuss any concerns you have with your medications or side effects you may be experiencing.  4. Follow up with appointments as scheduled.       Health Care Follow-up   Appointments: Dr. Dana Sandoval - 2/15/18 at 9:25am  [facility]  2nd Floor [facility]  [address]  [place] [address]  [phone]    Appoitment: Dr Casimiro Coulter- Sleep Study Consult  2/21/18 at 12:30pm  [facility]  [address], [place] [address]  [phone]    : Bre Cortes  [phone]      Resources:   [facility] Crisis: home-based therapeutic intervention from the Mobile Crisis Unit, or utilization of the crisis clinic for emergency counseling located at the [facility]. Please call [phone]  *[facility]: ([phone])  24-hour confidential telephone counseling   *[facility] Emergency Room: [phone]     General Medication Instructions:   See your medication sheet(s) for instructions.   Take all medicines as directed.  Make no changes unless your doctor suggests them.   Go to all your doctor visits.  Be sure to have all your required lab tests. This way, your medicines can be refilled on time.  Do not use any drugs not prescribed by your doctor.     The treatment team has appreciated the opportunity to work with you.  We wish you the best in the future.    If you have any questions or concerns our unit number is [phone].

## 2018-02-14 NOTE — PROGRESS NOTES
"The pt is excited to be leaving tomorrow.  The pt and this writer played Monopoly, and the pt stated that she was \"ready to go and see my boyfriend.\"  \"I'm not feeling like killing myself anymore.\"  The pt did come to the tail end of community meeting, where she shared that arts and crafts was a way for her to stay grounded.  She denies any more feeling of SI/SIB urges.  Her hygiene was adequate, and she ate dinner well.  She didn't have any visitors, but remained upbeat.  "

## 2018-02-14 NOTE — DISCHARGE SUMMARY
----------------------------------------------------------------------------------------------------------  [facility] [place]   Discharge Summary  Hospital Day #9      Karolyn Banerjee MRN# [id number]   Age: 32 year old YOB: 1985     Date of Admission:  2/5/2018  Date of Discharge:  2/14/2018  Admitting Physician:  Robe Mixon MD  Discharge Physician:  Robe Mixon MD         Event Leading to Hospitalization:     Presentation: This patient is a 32 year old female with history of treatment-refractory schizoaffective disorder, bipolar type, and unspecified neurocognitive disorder (IQ 62) who was voluntarily admitted directly from clinic during an ECT consultation visit for suicidal ideations.    Diagnostic Impression: This patient is a 32 year old female with history of treatment-refractory schizoaffective disorder, bipolar type, and unspecified neurocognitive disorder (IQ 62) who was voluntarily admitted directly from clinic during an ECT consultation visit for suicidal ideations. Patient is currently under commitment, Torres, and Peña-Wilkins which has been renewed until 11/2/2018. Hospitalization is in the context of unclear stressors, but notably patient has had difficulties with another resident at her group home. She minimizes any distress about her brother's suicide last year. Substance use does not appear to be playing an active role. Patient has likely sleep-apnea, with desaturations to the mid 80s noted on overnight pulse oximetry, which is likely contributing to her sedation and depressive symptoms. On exam, she has blunted affect and notably slowed speech. Presentation is consistent with historic diagnosis of schizoaffective disorder, with an active depressive episode. Concurrent intellectual delay likely contributing with limited coping skills.        See Admission note by Robe Mixon MD on 2/5/2018 for additional details.           Diagnoses:     # Schizoaffective disorder, bipolar type, current depressive episode  # Unspecified neurocognitive disorder (IQ 62)         Labs:     BMP with Glucose 114 and Calcium 8.1, otherwise wnl  CBC wnl  Salivary cortisol: 0.236 (H), contaminated with blood  24 hr urine free cortisol 11, cortisone 73  Overnight pulse oximetry -- frequent desaturations to 84-88%         Consults:     Endocrine  Internal Medicine         Hospital Course:     Psychiatric Course: Karolyn Banerjee was admitted to station 20 as a voluntary patient (under commitment until 11/2/2018 with Brian/Casey Wilkins and on a provisional discharge) and placed on Status 15 minute checks, suicide and self-Injury precautions.  PTA medications were continued on admission.  On admission, patient had markedly downcast affect, depressed mood, with psychomotor retardation and speech delay. She also endorsed continued SI. She reported that ECT had been discontinued upon her own request, and it was decided to not restart ECT during this hospitalization. Wellbutrin was started to target low mood, and it was titrated to a dose of 450 mg daily. After the addition of Wellbutrin and with time in the hospital, the patient demonstrated improvement in her depressive symptoms. She engaged in more spontaneous conversation, she was smiling, and she denied any suicidal ideation. She was able to articulate a plan if suicide thoughts return, including utilizing prn medications, talking with staff, and spending time in her room.     Karolyn Banerjee was discharged to her group home. At the time of discharge Karolyn Banerjee was determined to not be a danger to herself or others.      Medical course:   - Evaluated by Endocrinology for Cushing's disease. On exam, no high discrimination signs for cushing's syndrome (facial plethora, easy bruising, purplish striae). A 24 hour urine cortisol was attempted. However, patient was not willing to complete the second round of 24  "hour urine testing. Late-night salivary cortisol was positive, but false-positives can be seen with depression. Dexamethasone suppression test was not obtained, as it would be low yield in the context of current depressive episode. Recommended to follow up in Endocrine clinic as an outpatient. Referral was placed.   - Performed overnight pulse oximetry to evaluate for JOSE. Patient had frequent desats to the mid 80s, indicating she likely has obstructive sleep apnea. Referral placed for a sleep medicine evaluation as an outpatient.       Risk Assessment:  Karolyn Banerjee has notable risk factors for self-harm, including psychosis, unspecified neurocognitive disorder, and previous suicide attempts. However, risk is mitigated by commitment to family, sobriety and ability to volunteer a safety plan.Additional steps taken to minimize risk include: close follow up with outpatient providers. Therefore, based on all available evidence including the factors cited above, Karolyn Banerjee does not appear to be at imminent risk for self-harm, and is appropriate for outpatient level of care.     This document serves as a transfer of care to Karolyn Banerjee's outpatient providers.         Discharge Medications:     BOLDED Medications are NEW or CHANGED     Psychiatric medications:  Wellbutrin  mg daily  Hydroxyzine 25-50 mg q4 hours prn  Trazodone 50 mg HS   Cogentin 0.5 mg bid  Lurasidone 160 mg daily (with dinner)  Olanzapine 10 mg bid prn  Perphenazine 16 mg bid  Fish oil    Non-psychiatric medications:  Zyrtec 10 mg daily  Microgestin 1-20 mg-mcg         Psychiatric Examination:   Appearance: Awake and alert, dressed in street clothes, adequately groomed  Attitude:  Cooperative  Eye Contact:  Good  Mood:  \"Good\"\"Better\"  Affect:  Mood congruent, bright, smiling  Speech:  Clear coherent, more spontaneous speech than admission, less speech latency  Psychomotor Behavior:  No evidence of tardive dyskinesia, dystonia, or " tics  Thought Process:  Logical, linear, goal oriented  Associations:  No loose associations  Thought Content:  Denies SI  Insight:  Fair  Judgment:  Fair  Oriented to:  Time, person, and place  Attention Span and Concentration:  Intact  Recent and Remote Memory:  Fair  Language: Conversant in English  Fund of Knowledge: Delayed  Muscle Strength and Tone: Grossly normal  Gait and Station: Normal         Discharge Plan:   Appointments: Dr. Dana Sandoval - 2/15/18 at 9:25am  [facility] 2nd Floor [facility]  [address]  [place] [address]  [phone]     Appoitment: Dr Casimiro Coulter- Sleep Study Consult  2/21/18 at 12:30pm  [facility]  [address], [place] [address]  [phone]    Other Referrals: Endocrine clinic  --------------------------------------------------------------------------------------------------------------------------------  Pt seen and discussed with my attending, MD Tarik Conroy MD  Psychiatry PGY1      Attestation:  I, Robe Mixon, saw and evaluated the patient with the resident physician.  I agree with the findings and plan of care as documented in the resident note.  I have reviewed all labs and vital signs.  I, Robe Mixon, have reviewed this summary and agree with the findings and discharge plan as written.

## 2018-02-14 NOTE — PROGRESS NOTES
Pt discharged as per order.  Staff from pts group home came and picked pt up. Pt denies SI.  Reviewed discharge paperwork with pt.  Pt pleasant and cooperative, affect bright.  Pt discharged with all belongings, discharge paperwork.

## 2018-02-16 DIAGNOSIS — F25.1 SCHIZOAFFECTIVE DISORDER, DEPRESSIVE TYPE (H): ICD-10-CM

## 2018-02-16 RX ORDER — PERPHENAZINE 16 MG/1
16 TABLET ORAL 2 TIMES DAILY
Qty: 60 TABLET | Refills: 0 | Status: SHIPPED | OUTPATIENT
Start: 2018-02-16 | End: 2018-03-06

## 2018-02-16 NOTE — TELEPHONE ENCOUNTER
Medication requested: olanzapine  Last refilled: 1/15/18  Qty: 31      Last seen: 1/4/18  RTC: 4 weeks  Cancel: 2  No-show: 0  Next appt: 2/21/18    Refill decision: Refill pended and routed to the provider for review/determination due to cancelled appt, recent hospitalization

## 2018-02-21 ENCOUNTER — OFFICE VISIT (OUTPATIENT)
Dept: PSYCHIATRY | Facility: CLINIC | Age: 33
End: 2018-02-21
Attending: PSYCHIATRY & NEUROLOGY
Payer: MEDICAID

## 2018-02-21 ENCOUNTER — OFFICE VISIT (OUTPATIENT)
Dept: SLEEP MEDICINE | Facility: CLINIC | Age: 33
End: 2018-02-21
Attending: PSYCHIATRY & NEUROLOGY
Payer: MEDICAID

## 2018-02-21 VITALS
BODY MASS INDEX: 43.2 KG/M2 | SYSTOLIC BLOOD PRESSURE: 134 MMHG | WEIGHT: 259.6 LBS | DIASTOLIC BLOOD PRESSURE: 90 MMHG | HEART RATE: 85 BPM

## 2018-02-21 VITALS
SYSTOLIC BLOOD PRESSURE: 120 MMHG | RESPIRATION RATE: 16 BRPM | HEART RATE: 86 BPM | DIASTOLIC BLOOD PRESSURE: 83 MMHG | BODY MASS INDEX: 43.15 KG/M2 | WEIGHT: 259 LBS | HEIGHT: 65 IN | OXYGEN SATURATION: 97 %

## 2018-02-21 DIAGNOSIS — F25.1 SCHIZOAFFECTIVE DISORDER, DEPRESSIVE TYPE (H): ICD-10-CM

## 2018-02-21 DIAGNOSIS — F25.0 SCHIZOAFFECTIVE DISORDER, BIPOLAR TYPE (H): Primary | ICD-10-CM

## 2018-02-21 DIAGNOSIS — R29.818 SUSPECTED SLEEP APNEA: Primary | ICD-10-CM

## 2018-02-21 DIAGNOSIS — R06.83 SNORING: ICD-10-CM

## 2018-02-21 DIAGNOSIS — R40.0 SLEEPINESS: ICD-10-CM

## 2018-02-21 PROCEDURE — 99243 OFF/OP CNSLTJ NEW/EST LOW 30: CPT | Performed by: INTERNAL MEDICINE

## 2018-02-21 PROCEDURE — G0463 HOSPITAL OUTPT CLINIC VISIT: HCPCS | Mod: ZF

## 2018-02-21 RX ORDER — CALCIUM CARBONATE 500 MG/1
1 TABLET, CHEWABLE ORAL PRN
COMMUNITY
End: 2018-12-20

## 2018-02-21 RX ORDER — ALBUTEROL SULFATE 90 UG/1
2 AEROSOL, METERED RESPIRATORY (INHALATION) EVERY 6 HOURS PRN
COMMUNITY
End: 2018-12-20

## 2018-02-21 ASSESSMENT — PAIN SCALES - GENERAL: PAINLEVEL: NO PAIN (0)

## 2018-02-21 NOTE — PROGRESS NOTES
Visit Date:   02/21/2018      SLEEP EVALUATION      DATE OF STUDY:  02/21/2018       REQUESTING PHYSICIAN:  Robe Mixon MD      REASON FOR CONSULTATION:  Evaluation for sleep apnea.      CHIEF COMPLAINT:  Snoring.      HISTORY OF PRESENT ILLNESS:  Ms. Banerjee is a 32-year-old female with psychiatric history significant for schizoaffective disorder, bipolar type, who is currently on commitment for treatment.  A sleep evaluation has been requested with suspicion of sleep apnea.      The patient lives in a group home through [facility].  She sleeps with roommates.  She has been told she has loud snoring.  Snoring is present every day.  The patient denies experiencing snort arousals, gasping episodes or choking episodes in sleep.  There is no report of witnessed apneas.  She frequently experiences a morning dry mouth.  She frequently has a headache in the morning.  She usually sleeps on her back and her sides.  There is no difficulty breathing through her nose.      The patient goes to bed around 8-9 p.m.  She falls asleep within half an hour.  She wakes up 2 times during the night and returns to sleep within 10 minutes.  She wakes up to use the bathroom.  In the morning she wakes up by 7:00 a.m.  She feels tired on waking up.  She feels tired throughout the day.  Her [place] Sleepiness Scale score is 1/24.      She takes a nap every day for 1-2 hours.  She feels refreshed after naps.      The patient denies having restless legs symptoms.  There is no history of dream enactment behavior or other parasomnias.      She drinks 2-3 caffeinated sodas daily.      PAST MEDICAL HISTORY:   1.  Schizoaffective disorder.   2.  Asthma.   3.  Mild cognitive impairment.      FAMILY HISTORY:  No family history of sleep disorders.      SOCIAL HISTORY:  She lives in a group home.  There is no history of smoking.  She does not drink alcohol.      REVIEW OF SYSTEMS:  A complete review of systems was negative except for weight gain,  sore throat, headaches, nausea, constipation, painful urination.      PHYSICAL EXAMINATION:     CONSTITUTIONAL:  The patient appeared drowsy with poor eye contact and poorly engaged in the interview.  There was no distress.     EYES:   No icterus.  Normal conjunctivae.  PERRL.      ENT:  Oropharynx is Moore class IV with narrow diameters.   CARDIOVASCULAR:  Regular S1, S2.   PULMONARY:  Chest is symmetric.  Lungs clear bilaterally.   NEUROLOGIC:  Alert and oriented x 3.   PSYCHIATRIC:  Mood is depressed with depressed affect.  The patient had poor eye contact and was poorly engaged in the interview.      IMPRESSION:   1.  Suspected sleep apnea.   2.  Schizoaffective disorder.      The patient, a 32-year-old female with BMI of 43 and neck circumference of 46 cm, has been sent for an evaluation regarding sleep apnea.  There is history of loud snoring and nonrestorative sleep.  Oropharynx is crowded on examination.  Obstructive sleep apnea is possible and I recommend proceeding with a polysomnogram for further evaluation.      TREATMENT PLAN:   1.  Split-night polysomnogram for evaluation of sleep apnea.   2.  Follow up after sleep study for further recommendations.      I spent a total of 30 minutes with this patient, with more than 50% spent in counseling.         BENY DOBBINS MD             D: 2018   T: 2018   MT: CHANTE      Name:     SUSAN TROTTER   MRN:      [id number]        Account:      [id number]   :      1985           Visit Date:   2018      Document: [id number]       cc: Suzie Roy MD

## 2018-02-21 NOTE — MR AVS SNAPSHOT
After Visit Summary   2/21/2018    Karolyn Banerjee    MRN: [id number]           Patient Information     Date Of Birth          1985        Visit Information        Provider Department      2/21/2018 12:30 PM Yfn Kirkpatrick MD [facility]        Today's Diagnoses     Suspected sleep apnea    -  1    Schizoaffective disorder, depressive type (H)        Snoring        Sleepiness          Care Instructions      Your BMI is Body mass index is 43.1 kg/(m^2).  Weight management is a personal decision.  If you are interested in exploring weight loss strategies, the following discussion covers the approaches that may be successful. Body mass index (BMI) is one way to tell whether you are at a healthy weight, overweight, or obese. It measures your weight in relation to your height.  A BMI of 18.5 to 24.9 is in the healthy range. A person with a BMI of 25 to 29.9 is considered overweight, and someone with a BMI of 30 or greater is considered obese. More than two-thirds of American adults are considered overweight or obese.  Being overweight or obese increases the risk for further weight gain. Excess weight may lead to heart disease and diabetes.  Creating and following plans for healthy eating and physical activity may help you improve your health.  Weight control is part of healthy lifestyle and includes exercise, emotional health, and healthy eating habits. Careful eating habits lifelong are the mainstay of weight control. Though there are significant health benefits from weight loss, long-term weight loss with diet alone may be very difficult to achieve- studies show long-term success with dietary management in less than 10% of people. Attaining a healthy weight may be especially difficult to achieve in those with severe obesity. In some cases, medications, devices and surgical management might be considered.  What can you do?  If you are overweight or obese and are interested in methods  for weight loss, you should discuss this with your provider.     Consider reducing daily calorie intake by 500 calories.     Keep a food journal.     Avoiding skipping meals, consider cutting portions instead.    Diet combined with exercise helps maintain muscle while optimizing fat loss. Strength training is particularly important for building and maintaining muscle mass. Exercise helps reduce stress, increase energy, and improves fitness. Increasing exercise without diet control, however, may not burn enough calories to loose weight.       Start walking three days a week 10-20 minutes at a time    Work towards walking thirty minutes five days a week     Eventually, increase the speed of your walking for 1-2 minutes at time    In addition, we recommend that you review healthy lifestyles and methods for weight loss available through the National Institutes of Health patient information sites:  http://win.niddk.nih.gov/publications/index.htm    And look into health and wellness programs that may be available through your health insurance provider, employer, local community center, or manuel club.    Weight management plan: Patient was referred to their PCP to discuss a diet and exercise plan.            Follow-ups after your visit        Your next 10 appointments already scheduled     Mar 09, 2018 10:20 AM CST   (Arrive by 10:05 AM)   RETURN ENDOCRINE with Raegan Elkins MD   [facility] ([facility])    [address] [address] [phone]            Apr 04, 2018  8:30 PM CDT   PSG Split with BED 1 SH SLEEP   [facility] ([facility])    [address] [place] [address]   [phone]            Apr 05, 2018 10:25 AM CDT   Adult Med Follow UP with Dana Sandoval MD   [facility] ([facility])    [address] [facility]  "[place] [address]   [phone]            2018 11:00 AM CDT   Return Sleep Patient with Yfn Kirkpatrick MD   [facility] ([facility])    [address] [place] [address]   [phone]              Future tests that were ordered for you today     Open Future Orders        Priority Expected Expires Ordered    Comprehensive Sleep Study Routine  2018            Who to contact     If you have questions or need follow up information about today's clinic visit or your schedule please contact [facility] directly at [phone].  Normal or non-critical lab and imaging results will be communicated to you by MyChart, letter or phone within 4 business days after the clinic has received the results. If you do not hear from us within 7 days, please contact the clinic through [employer] or phone. If you have a critical or abnormal lab result, we will notify you by phone as soon as possible.  Submit refill requests through [employer] or call your pharmacy and they will forward the refill request to us. Please allow 3 business days for your refill to be completed.          Additional Information About Your Visit        MyChart Information     [employer] lets you send messages to your doctor, view your test results, renew your prescriptions, schedule appointments and more. To sign up, go to [email or web address] . Click on \"Log in\" on the left side of the screen, which will take you to the Welcome page. Then click on \"Sign up Now\" on the right side of the page.     You will be asked to enter the access code listed below, as well as some personal information. Please follow the directions to create your username and password.     Your access code is: [email or web address]  Expires: 4/15/2018  1:56 PM     Your access code will  in 90 days. If you need help or a new code, please call your [place] clinic or [phone].        Care EveryWhere ID     " "This is your Care EveryWhere ID. This could be used by other organizations to access your [place] medical records  [id number]        Your Vitals Were     Pulse Respirations Height Pulse Oximetry BMI (Body Mass Index)       86 16 1.651 m (5' 5\") 97% 43.1 kg/m2        Blood Pressure from Last 3 Encounters:   02/21/18 120/83   02/10/18 (!) 148/95   12/11/17 (!) 148/102    Weight from Last 3 Encounters:   02/21/18 117.5 kg (259 lb)   02/13/18 120.7 kg (266 lb)   10/10/17 121.1 kg (267 lb)              We Performed the Following     SLEEP EVALUATION & MANAGEMENT REFERRAL - ADULT -[facility] [phone]  (Age 18 and up)        Primary Care Provider Office Phone # Fax #    Suzie Mariscal [phone]       [address] [address]        Equal Access to Services     KAYA STRICKLAND AH: Hadii reginald ku hadasho Soomaali, waaxda luqadaha, qaybta kaalmada adeegyada, waxay michelet pizarro . So [facility] [phone].    ATENCIÓN: Si habla español, tiene a stauffer disposición servicios gratuitos de asistencia lingüística. [facility] [phone].    We comply with applicable federal civil rights laws and [place] laws. We do not discriminate on the basis of race, color, national origin, age, disability, sex, sexual orientation, or gender identity.            Thank you!     Thank you for choosing [facility]  for your care. Our goal is always to provide you with excellent care. Hearing back from our patients is one way we can continue to improve our services. Please take a few minutes to complete the written survey that you may receive in the mail after your visit with us. Thank you!             Your Updated Medication List - Protect others around you: Learn how to safely use, store and throw away your medicines at www.disposemymeds.org.          This list is accurate as of 2/21/18  1:06 PM.  Always use your most recent med " list.                   Brand Name Dispense Instructions for use Diagnosis    ACETAMINOPHEN PO      Take 500 mg by mouth as needed for pain        albuterol 108 (90 BASE) MCG/ACT Inhaler    PROAIR HFA/PROVENTIL HFA/VENTOLIN HFA     Inhale 2 puffs into the lungs every 6 hours as needed for shortness of breath / dyspnea or wheezing        benztropine 0.5 MG tablet    COGENTIN    60 tablet    Take 1 tablet (0.5 mg) by mouth 2 times daily    Schizoaffective disorder, bipolar type (H), Aggression       BuPROPion HCl ER (XL) 450 MG Tb24     30 tablet    Take 450 mg by mouth daily    Schizoaffective disorder, depressive type (H)       calcium carbonate 500 MG chewable tablet    TUMS     Take 1 chew tab by mouth as needed for heartburn        cetirizine 10 MG tablet    zyrTEC     Take 10 mg by mouth daily        diphenhydrAMINE-acetaminophen  MG tablet    TYLENOL PM     Take 1 tablet by mouth nightly as needed for sleep        FISH OIL PO      Take 1,000 mg by mouth daily        hydrOXYzine 25 MG tablet    ATARAX    60 tablet    Take 1-2 tablets (25-50 mg) by mouth every 4 hours as needed for anxiety    Schizoaffective disorder, bipolar type (H), Aggression       lurasidone 80 MG Tabs tablet    LATUDA    60 tablet    Take 2 tablets (160 mg) by mouth daily (with dinner)    Schizoaffective disorder, bipolar type (H), Aggression       norethindrone-ethinyl estradiol 1-20 MG-MCG per tablet    MICROGESTIN 1/20     Take 1 tablet by mouth daily        OLANZapine 10 MG tablet    zyPREXA    30 tablet    Take 1 tablet (10 mg) by mouth 2 times daily as needed    Schizoaffective disorder, bipolar type (H), Aggression       perphenazine 16 MG tablet     60 tablet    Take 1 tablet (16 mg) by mouth 2 times daily    Schizoaffective disorder, depressive type (H)       traZODone 50 MG tablet    DESYREL    30 tablet    Take 1 tablet (50 mg) by mouth At Bedtime    Schizoaffective disorder, bipolar type (H), Aggression

## 2018-02-21 NOTE — PATIENT INSTRUCTIONS

## 2018-02-21 NOTE — NURSING NOTE
"Chief Complaint   Patient presents with     Sleep Problem       Initial /83  Pulse 86  Resp 16  Ht 1.651 m (5' 5\")  Wt 117.5 kg (259 lb)  SpO2 97%  BMI 43.1 kg/m2 Estimated body mass index is 43.1 kg/(m^2) as calculated from the following:    Height as of this encounter: 1.651 m (5' 5\").    Weight as of this encounter: 117.5 kg (259 lb).  Medication Reconciliation: complete     ESS 1  Neck 46cm  Anat Kumar    "

## 2018-02-21 NOTE — PROGRESS NOTES
"PSYCHIATRY CLINIC PROGRESS NOTE   The initial diagnostic evaluation was on prior to 2008.  Date of the most recent transfer of care jakal is 8/7/17.    Guardian: Mary \"Swetha\" Chriss (mother)- [phone] (okay to leave message) or [phone]    Pertinent Background:  This patient first experienced mental health issues as a child and has received treatment for Schizoaffective disorder bipolar type, borderline personality disorder and unspecified neurocognitive disorder.  See transfer evaluation for detailed history.  Notably, she has multiple medication trials and hospitalizations treating aggression and perceptual disturbances (including clozapine with neutropenia x2).  ECT combined with multiple medications has been most helpful in maintaining stability.  She is under commitment and Torres--renewed until Nov 2, 2018. Peña-Wilkins order also in place  authorizes treatment up to three times per week for 4 weeks and then up to 1x per week for maintenance.  10/28/2013: full scale IQ test of 62.  After neutropenia with clozapine x's 2, has required multiple neuroleptics as well as ECT to maintain stability.       Psych critical item history includes suicide attempt [multiple], suicidal ideation, SIB [indicated per chart review, pt denies], psychosis [sxs include AH, delusions relating to pregnancy], mutiple psychotropic trials, severe med reaction, psych hosp (>5), commitment and ECT.    INTERIM HISTORY                                                 Karolyn Banerjee is a 32 year old female who was last seen for medication management on 1/4/18 at which time no changes were made, however was hospitalized from clinic following ECT consult/appt with Dr. Amos on 2/5/18. The patient reports good treatment adherence.  History was provided by the patient and  staff who was a fair historians.  Since the last visit: Patient was hospitalized as summarized below in TCM note; also arrived 25 minutes late for her 30 " "minute appt however writer agreed to see pt given recent hosptialization  -has been doing okay since discharging from the hospital, reviewed the hospitalization with her as summarized below in the TCM note; she is tolerating the addition of bupropion that was made in the hospital   -she states that she does not currently have any suicidal thoughts and is feeling safe, yesterday she did become upset about an interaction she had with staff and another group home member and this led her to run out of the house to attempt to run into traffic  -she denies that she was having significant depression and suicidal thoughts prior to this but was reacting to her anger she was having at the Group Home  -she continues to have CAH that are at her baseline, no changes since discharging from the hospital or since last office visit; has been having thoughts that she was pregnant since discharging from the hospital, would not elaborate as to why she thinks this, denies thinking this during her hospital stay  -discussed possibility of her building coping skills to help her deal with her anger and frustration and pursuing Day Tx of PHP to target this; she declined any interest and did not feel that this would be helpful and that she would \"get bored\" and did not want to go   -writer shared that she will contact Karolyn's mother/guardian and discuss different options for her including Day Tx and also possibility of Karolyn changing her housing as it appears that a lot of the events that have led to Karolyn running out of the house into traffic or towards the pond in the back of the home are interactions that she has had with one particular resident 'Meme' and she may benefit from being housed in a different location  -writer contacted pts guardian after the visit and left message requesting call back     RECENT SYMPTOMS:   DEPRESSION:  reports-depressed mood and low energy;  DENIES- suicidal ideation  UJDE/HYPOMANIA:  reports-none;  " DENIES- increased energy, decreased sleep need, increased activity and grandiosity  PSYCHOSIS:  reports-delusions- thoughts of being pregnant [details in Interim History] and auditory hallucinations with commands [details in Interim History];  DENIES- visual hallucinations  DYSREGULATION:  reports-mood dysregulation and impulsive;  DENIES- suicidal ideation, violent ideation and SIB  PANIC ATTACK:  none   ANXIETY:  none  TRAUMA RELATED:  none  COMPULSIVE:  none  SLEEP:  no complaints- is following up with sleep medicine    EATING DISORDER: none    RECENT SUBSTANCE USE:     ALCOHOL- none          TOBACCO- none               CAFFEINE- 2 sodas/day  OPIOIDS- none       NARCAN KIT- N/A       CANNABIS- none          OTHER ILLICIT DRUGS- none     CURRENT SOCIAL HISTORY:  FINANCIAL SUPPORT- social security disability       CHILDREN- none       LIVING SITUATION- lives in group home ([facility]) in [place]      SOCIAL/ SPIRITUAL SUPPORT- her boyfriend JAC Alberto worker, CM, GH staff    FEELS SAFE AT HOME- Yes     MEDICAL ROS:  Reports sedation      Denies GI sxs [N/V/D], weight gain, sexual dysfunction [none] and akathisia, muscle problems [stiffness], unusual movements, polydipsia, polyphagia and Parkinsonian-type symptoms [shuffling gait, masked facies, stooped posture, speech change, writing change]  PSYCH and CD Critical Summary Points since July 2017           Pt hospitalized for suicidal ideation/attempt September 19-25 following death of brother  Perphenazine increased to 16mg BID during hospitalization  Inpatient psychiatric treatment 10/4/17 to 10/11/17 due to active suicidal ideation with plans and intent - no medication change, received ECT x 2  Commitment, Brian and Casey Wilkins renewed until 11/2/2018  Has not had ECT treatment since October 11th with continued stability and suicidal ideation/behavior that appears to be at baseline  PAST PSYCH MED TRIALS   see EMR Problem List: Hx of psychiatric care    MEDICAL  / SURGICAL HISTORY                                   CARE TEAM:   PCP- Dr. Isamar Spear          Therapist- none    [facility]: Joan through [facility] 1x/week   through [facility] recently changed  Pregnant or breastfeeding:  No      Contraception- on OCP    Neurologic Hx [head injury etc]:  Denies seizure hx or head injury with LOC  Patient Active Problem List   Diagnosis     Mild cognitive impairment     Borderline personality disorder     Asthma     Nonorganic enuresis     Schizoaffective disorder, depressive type (H)     Fx humerus shaft-closed     Port catheter in place     MENTAL HEALTH     Suicidal ideation     Hx of psychiatric care     DVT (deep vein thrombosis) in pregnancy (H)     Suicide attempt     Suicidal ideations       ALLERGY                                Clozapine; Risperdal; and Tape [adhesive tape]  MEDICATIONS                               Current Outpatient Prescriptions   Medication Sig Dispense Refill     perphenazine 16 MG tablet Take 1 tablet (16 mg) by mouth 2 times daily 60 tablet 0     buPROPion 450 MG TB24 Take 450 mg by mouth daily 30 tablet 0     norethindrone-ethinyl estradiol (MICROGESTIN 1/20) 1-20 MG-MCG per tablet Take 1 tablet by mouth daily       traZODone (DESYREL) 50 MG tablet Take 1 tablet (50 mg) by mouth At Bedtime 30 tablet 1     benztropine (COGENTIN) 0.5 MG tablet Take 1 tablet (0.5 mg) by mouth 2 times daily 60 tablet 1     lurasidone (LATUDA) 80 MG TABS tablet Take 2 tablets (160 mg) by mouth daily (with dinner) 60 tablet 1     OLANZapine (ZYPREXA) 10 MG tablet Take 1 tablet (10 mg) by mouth 2 times daily as needed 30 tablet 0     Omega-3 Fatty Acids (FISH OIL PO) Take 1,000 mg by mouth daily        hydrOXYzine (ATARAX) 25 MG tablet Take 1-2 tablets (25-50 mg) by mouth every 4 hours as needed for anxiety 60 tablet 2     cetirizine (ZYRTEC) 10 MG tablet Take 10 mg by mouth daily       albuterol (PROAIR HFA/PROVENTIL HFA/VENTOLIN HFA) 108 (90 BASE) MCG/ACT  Inhaler Inhale 2 puffs into the lungs every 6 hours as needed for shortness of breath / dyspnea or wheezing       diphenhydrAMINE-acetaminophen (TYLENOL PM)  MG tablet Take 1 tablet by mouth nightly as needed for sleep       calcium carbonate (TUMS) 500 MG chewable tablet Take 1 chew tab by mouth as needed for heartburn       ACETAMINOPHEN PO Take 500 mg by mouth as needed for pain       VITALS   /90  Pulse 85  Wt 117.8 kg (259 lb 9.6 oz)  BMI 43.2 kg/m2   MENTAL STATUS EXAM                                                           Alertness: alert , oriented and slow to respond  Appearance: adequately groomed  Behavior/Demeanor: cooperative with some increased irritability when discussing recent events with staff, with fair  eye contact   Speech: increased latency of response  Language: no problems  Psychomotor: slowed somewhat  Mood: depressed and having some irritability about events at her GH   Affect: flat; was congruent to mood; was congruent to content  Thought Process/Associations: unremarkable  Thought Content: Reports: delusion regarding being pregnant Denies suicidal ideation currently [details re: recent suicidal ideation in Interim History]; violent ideation  Perception:  Reports auditory hallucinations with commands [details in Interim History];  Denies visual hallucinations  Insight: limited  Judgment: limited  Cognition: does  appear grossly intact however below average; formal cognitive testing was not done    LABS and DATA   RATING SCALES:  AIMS: done 8/7/17 with total score of 0    EKG:  April 2017 which showed QTc of 424ms; Feb 2018 with QTc of 433ms    PHQ9 TODAY = pt did not complete today   PHQ-9 SCORE 11/6/2017 11/29/2017 1/4/2018   Total Score - - -   Total Score 4 7 6     ANTIPSYCHOTIC LABS   [glu, A1C, lipids (focus LDL), liver enzymes, WBC, ANEU, Hgb, plts]    q12 mo  Recent Labs   Lab Test  02/06/18   1655  10/05/17   1108   04/12/17   1024   GLC  114*  81   < >  83   A1C    --    --    --   5.0    < > = values in this interval not displayed.     Recent Labs   Lab Test  09/21/17   0750  04/12/17   1024   CHOL  181  207*   TRIG  144  143   LDL  108*  135*   HDL  44*  43     Recent Labs   Lab Test  10/05/17   1108  09/21/17   0750   AST  18  18   ALT  22  21   ALKPHOS  64  76     Recent Labs   Lab Test  02/06/18   1655  10/05/17   1108   WBC  6.0  7.0   ANEU  3.5  4.7   HGB  13.8  15.5   PLT  Platelets clumped, platelet count unavailable  154     DIAGNOSIS     Schizoaffective disorder, bipolar type, depressed episode, mild to moderate, with psychosis symptoms at baseline  Unspecified neurocognitive disorder     ASSESSMENT                                     TODAY Karolyn reports no safety concerns today and toleration of medication adjustment made during her recent inpatient stay. She continues to have some chronic suicidal thoughts/behavior in the context of poor distress tolerance and negative interactions with staff and other residents in her group home. Will attempt to contact pts guardian to discuss increasing support for Karolyn to help build coping skills such as through a day tx or PHP, however Karolyn is not interested at this time. Also, given the amount of conflict she has had with another resident in her home that tend to trigger her suicidal behavior will also discuss with guardian possibility of changing living situation for Karolyn and how writer can be supportive of this. Also, Karolyn is reporting having thoughts that she is pregnant, which was a delusion that was present in previous periods of increased symptoms, will continue to monitor and consider medication changes and resuming ECT should she show other symptoms of decompensation. In light of recent hospitalization (as summarized below) no medication changes will be made today and she will be closely monitored for decompensation. She will return to clinic in 3 weeks or sooner if needed.              SUICIDE RISK  ASSESSMENT [details described above]:  Today Karolyn Banerjee reports no suicidal ideation, no plan or intent and no urges for self harm or thoughts to harm others.  In addition, she has notable risk factors for self-harm including recent loss, relationship conflict, previous suicide attempt and recent inpt stay.  However, risk is mitigated by no plan or intent, future oriented, none to minimal alcohol use  and stable housing.  Based on all available evidence she does not appear to be at imminent risk for self-harm therefore does not meet criteria for a 72-hr hold/involuntary hospitalization.  However, based on degree of symptoms close psych FU and referral to day treatment of PHP  was recommended which the pt agreed to close follow up but declined referral for day treatment/PHP, will discuss with guardian.     [place] PRESCRIPTION MONITORING PROGRAM [] was not checked today:  not using controlled substances.    PSYCHOTROPIC DRUG INTERACTIONS:   Hydroxyzine and olanzapine or perphenazine or trazodone may result in increased risk of QT interval prolongation.    Perphenazine and benztropine may result in decreased phenothiazine serum concentrations, decreased phenothiazine effectiveness, enhanced anticholinergic effects (ileus, hyperpyrexia, sedation, dry mouth).   Trazodone and perphenazine may result in hypotension.  Bupropion + Hydroxyzine: Concurrent use of bupropion and hydroxyzine may result in lower seizure threshold.   MANAGEMENT:  Monitoring for adverse effects, routine vitals, periodic EKGs and patient is aware of risks     PLAN                                                                                                       1) PSYCHOTROPIC MEDICATIONS: no changes today, due to pt insurance medications must be ordered by attending      - lurasidone 160 mg with dinner      - olanzapine 10 mg BID prn for psychosis      - perphenazine 16 mg BID      - benztropine 0.5 mg BID      - trazodone 50mg at bedtime       - hydroxyzine 25-50 mg q4 hrs prn for anxiety       - bupropion XL 450mg daily     Coordinated with Dr. Amos regarding resuming ECT maintenance, will not resume at this time and continue to monitor for worsening of symptoms    2) THERAPY:    None currently    3) NEXT DUE:    Labs-neuroleptic labs due September 2018  EKG- May 2018 or PRN with medication changes  Rating Scales- AIMS next due at next visit given running behind today     4) REFERRALS:    Will consider referral to day tx, PHP, will plan to discuss with guardian    5) RTC: 3 weeks    6) CRISIS NUMBERS:   Provided routinely in AVS.  Especially emphasized:  [facility] [phone] (clinic)    [phone] (after hours)    TREATMENT RISK STATEMENT:  The risks, benefits, alternatives and potential adverse effects have been discussed and are understood by the pt/pt's guardian. The pt understands the risks of using street drugs or alcohol. There are no medical contraindications, the pt agrees to treatment with the ability to do so. The pt knows to call the clinic for any problems or to access emergency care if needed.  Medical and substance use concerns are documented above.  Psychotropic drug interaction check was done, including changes made today.    PSYCHIATRY CLINIC [facility] HOSPITAL FOLLOW-UP  Hospital:  [facility]   Date of Admission: 2/5/18  Date of Discharge: 2/14/18  TCM visit < 8d post hospital FU, up to high complexity/ risk  [CPT 70652]     Reason(s) for Admission: Per H&P by Dr. Mixon 2/5/18:   This patient is a 32 year old female with history of treatment-refractory schizoaffective disorder, bipolar type, borderline personality disorder, and unspecified neurocognitive disorder (IQ 62) who was voluntarily admitted directly from clinic during an ECT consultation visit for suicidal ideations. Patient is currently under commitment and Torres, which has been renewed until 11/2/2018. Mother is guardian.      Per clinic  report:  Clinical is obtained from Dr. Amos, who patient was seeing for ECT consultation. Patient is normally followed by Dr. Sandoval in clinic. She has a history of refractory schizoaffective disorder, for which she received maintenance ECT up until October 2017. Due to a variety of reasons (Port-a Cath not working, no perceived acute need for treatment), she has not received ECT since then. In the past week, patient's group home has been calling frequently with concerns of escalating behaviors. Today, patient had a pre-ECT physical done at an outside clinic. From her consultation visit with Dr. Amos today, it was not clear if patient really needed ECT. She apparently had been stressed due to interactions with some other residents at the group home. Dr. Amos recommended to defer ECT and offered to explore respite options, but patient became very upset stating that she needed to be admitted. She stated that she had been very depressed/suicidal and had minimized symptoms during an earlier office visit with Dr. Sandoval in early January. She stated that she was suicidal and had plans to run into traffic. At one point, she attempted to leave the clinic and needed to be stopped. She was subsequently admitted directly to station 20 for safety.        Problems taking medications regularly:  no  Medication changes since discharge: addition of Wellbutrin XL 450mg  Problems adhering to non-medication therapy:  no  Medications reviewed by: Dr. Sandoval and Dr. Durant    I have reviewed the RN telephone encounter dated: Not Applicable    Summary of hospitalization:  Per Discharge Summary by Dr. Mixon 2/14/18:     Psychiatric Course: Karolyn Banerjee was admitted to station 20 as a voluntary patient (under commitment until 11/2/2018 with Brian/Casey Wilkins and on a provisional discharge) and placed on Status 15 minute checks, suicide and self-Injury precautions.  PTA medications were continued on admission.  On admission,  patient had markedly downcast affect, depressed mood, with psychomotor retardation and speech delay. She also endorsed continued SI. She reported that ECT had been discontinued upon her own request, and it was decided to not restart ECT during this hospitalization. Wellbutrin was started to target low mood, and it was titrated to a dose of 450 mg daily. After the addition of Wellbutrin and with time in the hospital, the patient demonstrated improvement in her depressive symptoms. She engaged in more spontaneous conversation, she was smiling, and she denied any suicidal ideation. She was able to articulate a plan if suicide thoughts return, including utilizing prn medications, talking with staff, and spending time in her room.      Karolyn Banerjee was discharged to her group home. At the time of discharge Karolyn Banerjee was determined to not be a danger to herself or others.       Medical course:   - Evaluated by Endocrinology for Cushing's disease. On exam, no high discrimination signs for cushing's syndrome (facial plethora, easy bruising, purplish striae). A 24 hour urine cortisol was attempted. However, patient was not willing to complete the second round of 24 hour urine testing. Late-night salivary cortisol was positive, but false-positives can be seen with depression. Dexamethasone suppression test was not obtained, as it would be low yield in the context of current depressive episode. Recommended to follow up in Endocrine clinic as an outpatient. Referral was placed.   - Performed overnight pulse oximetry to evaluate for JOSE. Patient had frequent desats to the mid 80s, indicating she likely has obstructive sleep apnea. Referral placed for a sleep medicine evaluation as an outpatient.     Diagnostic Tests/Treatments reviewed.  Follow up needed: YES- with endocrinology and sleep medicine   Other Healthcare Providers Involved in Patient s Care: Endocrinology and Sleep medicine   Update since discharge: Had one  episode of suicidal behavior after she had a negative interaction with staff and other residents at her group home (see the above progress note). No safety concerns today. Is tolerating medication changes made inpatient, is reporting some thoughts of being pregnant since being discharged. No other concerns, plans to follow up with sleep medicine and endocrinology.     Post Discharge Medication Reconciliation: Discharge medications reconciled and changed, per note/orders (see AVS).    Plan of care communicated with patient and family:  YES- attempted to connect with pts guardian/mother after appointment, left message requesting call back.  This writer will contact home health nurse to further coordinate care: Not Applicable    RESIDENT:   Dana Sandoval, DO    Patient staffed in clinic with Dr. Durant who will sign the note.  Supervisor is Dr. Toussaint.  I saw the patient with the resident, and participated in key portions of the service, including the mental status examination and developing the plan of care. I reviewed key portions of the history with the resident. I agree with the findings and plan as documented in this note.    Tika Durant

## 2018-02-21 NOTE — NURSING NOTE
Chief Complaint   Patient presents with     Recheck Medication     Schizoaffective disorder, bipolar type      Reviewed allergies, medications, pharmacy and smoking status.    Obtained weight, pain level, blood pressure and heart rate

## 2018-02-21 NOTE — MR AVS SNAPSHOT
After Visit Summary   2/21/2018    Karolyn Banerjee    MRN: [id number]           Patient Information     Date Of Birth          1985        Visit Information        Provider Department      2/21/2018 9:55 AM Dana Sandoval MD [facility]        Today's Diagnoses     Schizoaffective disorder, bipolar type (H)    -  1      Care Instructions    -continue current medications, no changes  -return to clinic in 3 weeks or sooner if needed          Follow-ups after your visit        Follow-up notes from your care team     Return in about 3 weeks (around 3/14/2018) for 30 MFU.      Your next 10 appointments already scheduled     Mar 09, 2018 10:20 AM CST   (Arrive by 10:05 AM)   RETURN ENDOCRINE with Raegan Elkins MD   [facility] ([facility])    [address] [place] [address] [phone]            Apr 04, 2018  8:30 PM CDT   PSG Split with BED 1 SH SLEEP   [facility] ([facility])    [address] [address] [phone]            Apr 05, 2018 10:25 AM CDT   Adult Med Follow UP with Dana Sandoval MD   Psychiatry Clinic ([facility])    [place] [id number] [place] [address] [phone]            Apr 18, 2018 11:00 AM CDT   Return Sleep Patient with Yfn Kirkpatrick MD   [facility] ([facility])    [address] [id number] [phone]              Who to contact     Please call your clinic at [phone] to:    Ask questions about your health    Make or cancel appointments    Discuss your medicines    Learn about your test results    Speak to your doctor            Additional Information About Your Visit        MyChart Information     [employer] is an electronic gateway that provides easy, online access to your medical records.  With [employer], you can request a clinic appointment, read your test results, renew a prescription or communicate with your care team.     To sign up for [employer] visit the website at [email or web address]   You will be asked to enter the access code listed below, as well as some personal information. Please follow the directions to create your username and password.     Your access code is: [email or web address]  Expires: 4/15/2018  1:56 PM     Your access code will  in 90 days. If you need help or a new code, please contact your [facility] or call [phone] for assistance.        Care EveryWhere ID     This is your Care EveryWhere ID. This could be used by other organizations to access your [place] medical records  [id number]        Your Vitals Were     Pulse BMI (Body Mass Index)                85 43.2 kg/m2           Blood Pressure from Last 3 Encounters:   18 120/83   18 134/90   02/10/18 (!) 148/95    Weight from Last 3 Encounters:   18 117.5 kg (259 lb)   18 117.8 kg (259 lb 9.6 oz)   18 120.7 kg (266 lb)              Today, you had the following     No orders found for display       Primary Care Provider Office Phone # Fax #    Suzie Mariscal [phone]       [place] [address]        Equal Access to Services     KAYA STRICKLAND AH: Hadii reginald ku hadasho Soomaali, waaxda luqadaha, qaybta kaalmada adeegyada, anndo pizarro . So [facility] [phone].    ATENCIÓN: Si habla español, tiene a stauffer disposición servicios gratuitos de asistencia lingüística. Kiera al [phone].    We comply with applicable federal civil rights laws and [place] laws. We do not discriminate on the basis of race, color, national origin, age, disability, sex, sexual orientation, or gender identity.            Thank you!     Thank you for choosing [facility]  for your care.  Our goal is always to provide you with excellent care. Hearing back from our patients is one way we can continue to improve our services. Please take a few minutes to complete the written survey that you may receive in the mail after your visit with us. Thank you!             Your Updated Medication List - Protect others around you: Learn how to safely use, store and throw away your medicines at www.disposemymeds.org.          This list is accurate as of 2/21/18 11:59 PM.  Always use your most recent med list.                   Brand Name Dispense Instructions for use Diagnosis    ACETAMINOPHEN PO      Take 500 mg by mouth as needed for pain        albuterol 108 (90 BASE) MCG/ACT Inhaler    PROAIR HFA/PROVENTIL HFA/VENTOLIN HFA     Inhale 2 puffs into the lungs every 6 hours as needed for shortness of breath / dyspnea or wheezing        benztropine 0.5 MG tablet    COGENTIN    60 tablet    Take 1 tablet (0.5 mg) by mouth 2 times daily    Schizoaffective disorder, bipolar type (H), Aggression       BuPROPion HCl ER (XL) 450 MG Tb24     30 tablet    Take 450 mg by mouth daily    Schizoaffective disorder, depressive type (H)       calcium carbonate 500 MG chewable tablet    TUMS     Take 1 chew tab by mouth as needed for heartburn        cetirizine 10 MG tablet    zyrTEC     Take 10 mg by mouth daily        diphenhydrAMINE-acetaminophen  MG tablet    TYLENOL PM     Take 1 tablet by mouth nightly as needed for sleep        FISH OIL PO      Take 1,000 mg by mouth daily        hydrOXYzine 25 MG tablet    ATARAX    60 tablet    Take 1-2 tablets (25-50 mg) by mouth every 4 hours as needed for anxiety    Schizoaffective disorder, bipolar type (H), Aggression       lurasidone 80 MG Tabs tablet    LATUDA    60 tablet    Take 2 tablets (160 mg) by mouth daily (with dinner)    Schizoaffective disorder, bipolar type (H), Aggression       norethindrone-ethinyl estradiol 1-20 MG-MCG per tablet    MICROGESTIN 1/20     Take 1  tablet by mouth daily        OLANZapine 10 MG tablet    zyPREXA    30 tablet    Take 1 tablet (10 mg) by mouth 2 times daily as needed    Schizoaffective disorder, bipolar type (H), Aggression       perphenazine 16 MG tablet     60 tablet    Take 1 tablet (16 mg) by mouth 2 times daily    Schizoaffective disorder, depressive type (H)       traZODone 50 MG tablet    DESYREL    30 tablet    Take 1 tablet (50 mg) by mouth At Bedtime    Schizoaffective disorder, bipolar type (H), Aggression

## 2018-02-23 ENCOUNTER — TELEPHONE (OUTPATIENT)
Dept: PSYCHIATRY | Facility: CLINIC | Age: 33
End: 2018-02-23

## 2018-02-23 NOTE — TELEPHONE ENCOUNTER
----- Message from Amber Ulloa sent at 2/23/2018 12:15 PM CST -----  Regarding: returning provider call  Contact: [phone]  Hi Karolyn Wolfe's mother/guardian, Swetha, is returning a call from Dr. Sandoval.  She can be reached on her cell phone today: [phone].  She does not have a voicemail set up at that number.  Alternately, a message can be left with her  at the home number.    Thanks,  Amber Castillor was out of clinic 2/23/18 and attempted to reach pts mother 2/26 and left message at home with information regarding recommendation to look into increasing supports for Karolyn through Day Treatment as well as any way writer can be supportive of Karolyn possibly changing living environment given the number of conflicts she has with other residents at the group home. Requested call back with feedback/thoughts regarding this.       Dana Sandoval, DO  PGY-3 Psychiatry Resident

## 2018-03-06 DIAGNOSIS — F25.1 SCHIZOAFFECTIVE DISORDER, DEPRESSIVE TYPE (H): ICD-10-CM

## 2018-03-06 RX ORDER — PERPHENAZINE 16 MG/1
16 TABLET ORAL 2 TIMES DAILY
Qty: 60 TABLET | Refills: 0 | Status: SHIPPED | OUTPATIENT
Start: 2018-03-06 | End: 2018-04-09

## 2018-03-06 NOTE — TELEPHONE ENCOUNTER
"Medication requested: perphenazine  Last refilled: 2/16/18  Qty: 62    Last seen: 2/21/18  RTC: 3 weeks  Cancel: 0  No-show: 0  Next appt: 4/5/18  Refill pended and routed to the provider for review/determination due to note in chart: \" is reporting some thoughts of being pregnant since being discharged\" and med warning/MD override needed              "

## 2018-03-14 ENCOUNTER — TELEPHONE (OUTPATIENT)
Dept: PSYCHIATRY | Facility: CLINIC | Age: 33
End: 2018-03-14

## 2018-03-14 NOTE — TELEPHONE ENCOUNTER
Attempted to return mothers call at 12:06pm on 3/14/18. No answer and voicemail box has not been set up yet. Will try again in the future. Of note, did give order on patients clinic form from group home for patient to be able to take the vitamins as her mother is requesting at last appointment.       Dana Sandoval, DO  PGY-3 Psychiatry Resident

## 2018-03-14 NOTE — TELEPHONE ENCOUNTER
----- Message from Shona Arreaga RN sent at 3/13/2018  8:32 AM CDT -----  Contact: [phone]      ----- Message -----     From: Ines Palacios     Sent: 3/12/2018   4:57 PM       To: DHRUV Carbone/Yaneth Posada, pt's guardian, is caller. She does not think that the day program is a good idea. Also does not think that moving her to a different group home is a good idea.     She thinks that the best idea would be for the dr to write a letter to the group home so that they can give her the Daily Essential Nutrients vitamins     Says that she works 8-4:30 mon - fri, so would prefer a call from 12-1 when her lunch is.

## 2018-03-22 ENCOUNTER — TELEPHONE (OUTPATIENT)
Dept: PSYCHIATRY | Facility: CLINIC | Age: 33
End: 2018-03-22

## 2018-03-22 DIAGNOSIS — F25.1 SCHIZOAFFECTIVE DISORDER, DEPRESSIVE TYPE (H): ICD-10-CM

## 2018-03-22 RX ORDER — BUPROPION HYDROCHLORIDE 450 MG/1
450 TABLET, FILM COATED, EXTENDED RELEASE ORAL DAILY
Qty: 30 TABLET | Refills: 0 | Status: SHIPPED | OUTPATIENT
Start: 2018-03-22 | End: 2018-04-09

## 2018-03-22 NOTE — TELEPHONE ENCOUNTER
Received RF request from [facility] for:    Bupropion  mg daily    Last RF:  2/14/18    Due for RF:  yes    Appointment History:  Last appt: 2/21/18  RTC: 3 weeks  Cancel: none  No-show: none   Next appt: 4/05/18    Refilled 30 d/s per protocol.      Will route to Dr. Sandoval for FYI.

## 2018-04-04 ENCOUNTER — THERAPY VISIT (OUTPATIENT)
Dept: SLEEP MEDICINE | Facility: CLINIC | Age: 33
End: 2018-04-04
Payer: MEDICAID

## 2018-04-04 DIAGNOSIS — R29.818 SUSPECTED SLEEP APNEA: ICD-10-CM

## 2018-04-04 DIAGNOSIS — F25.1 SCHIZOAFFECTIVE DISORDER, DEPRESSIVE TYPE (H): ICD-10-CM

## 2018-04-04 DIAGNOSIS — R40.0 SLEEPINESS: ICD-10-CM

## 2018-04-04 DIAGNOSIS — R06.83 SNORING: ICD-10-CM

## 2018-04-04 PROCEDURE — 95810 POLYSOM 6/> YRS 4/> PARAM: CPT | Performed by: INTERNAL MEDICINE

## 2018-04-04 NOTE — MR AVS SNAPSHOT
"              After Visit Summary   4/4/2018    Karolyn Banerjee    MRN: [id number]           Patient Information     Date Of Birth          1985        Visit Information        Provider Department      4/4/2018 8:30 PM BED 1 SH SLEEP [facility]        Today's Diagnoses     Schizoaffective disorder, depressive type (H)        Sleepiness        Snoring        Suspected sleep apnea           Follow-ups after your visit        Your next 10 appointments already scheduled     Apr 05, 2018 10:25 AM CDT   Adult Med Follow UP with Dana Sandoval MD   Psychiatry Clinic ([facility])    [address]  [address] [address]   [phone]            Apr 18, 2018 11:00 AM CDT   Return Sleep Patient with Yfn Kirkpatrick MD   [facility] ([facility])    [address] [address] [phone]              Who to contact     If you have questions or need follow up information about today's clinic visit or your schedule please contact [facility] directly at [phone].  Normal or non-critical lab and imaging results will be communicated to you by MyChart, letter or phone within 4 business days after the clinic has received the results. If you do not hear from us within 7 days, please contact the clinic through [employer] or phone. If you have a critical or abnormal lab result, we will notify you by phone as soon as possible.  Submit refill requests through [employer] or call your pharmacy and they will forward the refill request to us. Please allow 3 business days for your refill to be completed.          Additional Information About Your Visit        MyChart Information     [employer] lets you send messages to your doctor, view your test results, renew your prescriptions, schedule appointments and more. To sign up, go to [email or web address] . Click on \"Log in\" on the " "left side of the screen, which will take you to the Welcome page. Then click on \"Sign up Now\" on the right side of the page.     You will be asked to enter the access code listed below, as well as some personal information. Please follow the directions to create your username and password.     Your access code is: [email or web address]  Expires: 4/15/2018  2:56 PM     Your access code will  in 90 days. If you need help or a new code, please call your [place] clinic or [phone].        Care EveryWhere ID     This is your Care EveryWhere ID. This could be used by other organizations to access your [place] medical records  [id number]         Blood Pressure from Last 3 Encounters:   18 120/83   02/10/18 (!) 148/95   17 (!) 148/102    Weight from Last 3 Encounters:   18 117.5 kg (259 lb)   18 120.7 kg (266 lb)   10/10/17 121.1 kg (267 lb)              We Performed the Following     Comprehensive Sleep Study        Primary Care Provider Office Phone # Fax #    Suzie Mariscal [phone]       [place] [id number]        Equal Access to Services     KAYA STRICKLAND : Hadii reginald ku meghano Sotaeali, waaxda luqadaha, qaybta kaalmada adeegyada, waxay michelet haychristinen janay pizarro . So [facility] [phone].    ATENCIÓN: Si habla español, tiene a stauffer disposición servicios gratuitos de asistencia lingüística. Llame al [phone].    We comply with applicable federal civil rights laws and [place] laws. We do not discriminate on the basis of race, color, national origin, age, disability, sex, sexual orientation, or gender identity.            Thank you!     Thank you for choosing [facility]  for your care. Our goal is always to provide you with excellent care. Hearing back from our patients is one way we can continue to improve our services. Please take a few minutes to complete the written survey that you may receive " in the mail after your visit with us. Thank you!             Your Updated Medication List - Protect others around you: Learn how to safely use, store and throw away your medicines at www.disposemymeds.org.          This list is accurate as of 4/4/18 11:59 PM.  Always use your most recent med list.                   Brand Name Dispense Instructions for use Diagnosis    ACETAMINOPHEN PO      Take 500 mg by mouth as needed for pain        albuterol 108 (90 BASE) MCG/ACT Inhaler    PROAIR HFA/PROVENTIL HFA/VENTOLIN HFA     Inhale 2 puffs into the lungs every 6 hours as needed for shortness of breath / dyspnea or wheezing        benztropine 0.5 MG tablet    COGENTIN    60 tablet    Take 1 tablet (0.5 mg) by mouth 2 times daily    Schizoaffective disorder, bipolar type (H), Aggression       BuPROPion HCl ER (XL) 450 MG Tb24     30 tablet    Take 450 mg by mouth daily    Schizoaffective disorder, depressive type (H)       calcium carbonate 500 MG chewable tablet    TUMS     Take 1 chew tab by mouth as needed for heartburn        cetirizine 10 MG tablet    zyrTEC     Take 10 mg by mouth daily        diphenhydrAMINE-acetaminophen  MG tablet    TYLENOL PM     Take 1 tablet by mouth nightly as needed for sleep        FISH OIL PO      Take 1,000 mg by mouth daily        hydrOXYzine 25 MG tablet    ATARAX    60 tablet    Take 1-2 tablets (25-50 mg) by mouth every 4 hours as needed for anxiety    Schizoaffective disorder, bipolar type (H), Aggression       lurasidone 80 MG Tabs tablet    LATUDA    60 tablet    Take 2 tablets (160 mg) by mouth daily (with dinner)    Schizoaffective disorder, bipolar type (H), Aggression       norethindrone-ethinyl estradiol 1-20 MG-MCG per tablet    MICROGESTIN 1/20     Take 1 tablet by mouth daily        OLANZapine 10 MG tablet    zyPREXA    30 tablet    Take 1 tablet (10 mg) by mouth 2 times daily as needed    Schizoaffective disorder, bipolar type (H), Aggression       perphenazine 16 MG  tablet     60 tablet    Take 1 tablet (16 mg) by mouth 2 times daily    Schizoaffective disorder, depressive type (H)       traZODone 50 MG tablet    DESYREL    30 tablet    Take 1 tablet (50 mg) by mouth At Bedtime    Schizoaffective disorder, bipolar type (H), Aggression

## 2018-04-08 PROBLEM — G47.33 OSA (OBSTRUCTIVE SLEEP APNEA): Status: ACTIVE | Noted: 2018-04-08

## 2018-04-09 ENCOUNTER — OFFICE VISIT (OUTPATIENT)
Dept: PSYCHIATRY | Facility: CLINIC | Age: 33
End: 2018-04-09
Attending: PSYCHIATRY & NEUROLOGY
Payer: MEDICAID

## 2018-04-09 VITALS
BODY MASS INDEX: 42.53 KG/M2 | WEIGHT: 255.6 LBS | HEART RATE: 94 BPM | DIASTOLIC BLOOD PRESSURE: 78 MMHG | SYSTOLIC BLOOD PRESSURE: 119 MMHG

## 2018-04-09 DIAGNOSIS — F25.1 SCHIZOAFFECTIVE DISORDER, DEPRESSIVE TYPE (H): ICD-10-CM

## 2018-04-09 DIAGNOSIS — F25.0 SCHIZOAFFECTIVE DISORDER, BIPOLAR TYPE (H): Primary | ICD-10-CM

## 2018-04-09 DIAGNOSIS — R46.89 AGGRESSION: ICD-10-CM

## 2018-04-09 PROCEDURE — G0463 HOSPITAL OUTPT CLINIC VISIT: HCPCS | Mod: ZF

## 2018-04-09 RX ORDER — PERPHENAZINE 16 MG/1
16 TABLET ORAL 2 TIMES DAILY
Qty: 60 TABLET | Refills: 1 | Status: SHIPPED | OUTPATIENT
Start: 2018-04-09 | End: 2018-07-20

## 2018-04-09 RX ORDER — BENZTROPINE MESYLATE 0.5 MG/1
0.5 TABLET ORAL 2 TIMES DAILY
Qty: 60 TABLET | Refills: 1 | Status: SHIPPED | OUTPATIENT
Start: 2018-04-09 | End: 2018-07-20

## 2018-04-09 RX ORDER — TRAZODONE HYDROCHLORIDE 50 MG/1
50 TABLET, FILM COATED ORAL AT BEDTIME
Qty: 30 TABLET | Refills: 1 | Status: SHIPPED | OUTPATIENT
Start: 2018-04-09 | End: 2018-07-20

## 2018-04-09 RX ORDER — OLANZAPINE 10 MG/1
10 TABLET ORAL 2 TIMES DAILY PRN
Qty: 30 TABLET | Refills: 0 | Status: SHIPPED | OUTPATIENT
Start: 2018-04-09 | End: 2018-07-20

## 2018-04-09 RX ORDER — LURASIDONE HYDROCHLORIDE 80 MG/1
160 TABLET, FILM COATED ORAL
Qty: 60 TABLET | Refills: 1 | Status: SHIPPED | OUTPATIENT
Start: 2018-04-09 | End: 2018-07-20

## 2018-04-09 RX ORDER — BUPROPION HYDROCHLORIDE 450 MG/1
450 TABLET, FILM COATED, EXTENDED RELEASE ORAL DAILY
Qty: 30 TABLET | Refills: 1 | Status: SHIPPED | OUTPATIENT
Start: 2018-04-09 | End: 2018-07-20

## 2018-04-09 ASSESSMENT — ABNORMAL INVOLUNTARY MOVEMENT SCALE (AIMS)
CURRENT_PROBLEMS_TEETH_DENTURES: YES
AIMS_DISAPPEAR_SLEEPING: YES
EDENTIA: NO
AIMS_PATIENT_AWARENESS: NO AWARENESS
TONGUE: 0
PATIENT_WEARS_DENTURES: NO

## 2018-04-09 ASSESSMENT — PAIN SCALES - GENERAL: PAINLEVEL: MILD PAIN (2)

## 2018-04-09 NOTE — PROCEDURES
"SLEEP STUDY INTERPRETATION  DIAGNOSTIC POLYSOMNOGRAPHY REPORT      Patient: SUSAN TROTTER  YOB: 1985  Study Date: 4/4/2018  MRN: [id number]  Referring Provider: Robe Mixon MD  Ordering Provider: Yfn Kirkpatrick MD    Indications for Polysomnography: The patient is a 32 y old Female who is 5' 5\" and weighs 259.0 lbs. Her BMI is 43.2, [place] sleepiness scale 1.0 and neck circumference is 46.0 cm. Relevant medical history includes schizoaffective disorder. A diagnostic polysomnogram was performed to evaluate for sleep apnea.    Polysomnogram Data: A full night polysomnogram recorded the standard physiologic parameters including EEG, EOG, EMG, ECG, nasal and oral airflow. Respiratory parameters of chest and abdominal movements were recorded with respiratory inductance plethysmography. Oxygen saturation was recorded by pulse oximetry. Hypopnea scoring rule used: 1B 4%.    Sleep Architecture: Long sleep latency and reduced sleep efficiency. Sleep was fragmented.   The total recording time of the polysomnogram was 522.4 minutes. The total sleep time was 324.0 minutes. Sleep latency was increased at 103.6 minutes without the use of a sleep aid. REM latency was 121.5 minutes. Arousal index was increased at 24.1 arousals per hour. Sleep efficiency was decreased at 62.0%. Wake after sleep onset was 89.5 minutes. The patient spent 9.1% of total sleep time in Stage N1, 51.1% in Stage N2, 26.2% in Stage N3, and 13.6% in REM. Time in REM supine was 11.0 minutes.    Respiration: Severe obstructive sleep apnea and associated hypoxemia.     Events ? The polysomnogram revealed a presence of 66 obstructive, 5 central, and 1 mixed apneas resulting in an apnea index of 13.3 events per hour. There were 115 obstructive hypopneas and - central hypopneas resulting in an obstructive hypopnea index of 21.3 and central hypopnea index of - events per hour. The combined apnea/hypopnea index was 34.6 events per hour (central " apnea/hypopnea index was 0.9 events per hour). The REM AHI was 103.6 events per hour. The supine AHI was 116.7 events per hour. The RERA index was 7.8 events per hour.  The RDI was 42.4 events per hour.    Snoring - was reported as mild to moderate.    Respiratory rate and pattern - was notable for normal respiratory rate and pattern.    Sustained Sleep Associated Hypoventilation - Transcutaneous carbon dioxide monitoring was not used; however significant hypoventilation was not suggested by oximetry.    Sleep Associated Hypoxemia - (Greater than 5 minutes O2 sat at or below 88%) was present. Baseline oxygen saturation was 94.0%. Lowest oxygen saturation was 66.4%. Time spent less than or equal to 88% was 10.2 minutes. Time spent less than or equal to 89% was 13.1 minutes.    Movement Activity: There was no significant movement abnormality.     Periodic Limb Activity - There were 5 PLMs during the entire study. The PLM index was 0.9 movements per hour. The PLM Arousal Index was - per hour.    REM EMG Activity - Excessive transient/sustained muscle activity was not present.    Nocturnal Behavior - Abnormal sleep related behaviors were not noted during/arising out of NREM / REM sleep.     Bruxism - None apparent.    Cardiac Summary: Normal sinus rhythm.   The average pulse rate was 81.1 bpm. The minimum pulse rate was 47.7 bpm while the maximum pulse rate was 106.1 bpm.  Arrhythmias were not noted.      Assessment:     This sleep study shows severe obstructive sleep apnea associated with sleep fragmentation and hypoxemia.     Recommendations:    CPAP therapy is the treatment of choice for severe obstructive sleep apnea. Treatment could be empirically initiated with Auto?titrating PAP therapy with a range of 5 to 15 cmH2O. Recommend clinical follow up with sleep management team.    If devices are not acceptable or effective, patient may benefit from evaluation of possible surgical options. If she is interested, would  recommend referral to specialized ENT-Sleep provider.    Weight management.     Diagnostic Codes:   Obstructive Sleep Apnea G47.33  Sleep Hypoxemia G47.36     4/4/2018 [facility] Sleep Study (259.0 lbs) - AHI 34.6, RDI 42.4, Supine .7, REM .6, Low O2 66.4%, Time Spent ?88% 10.2 minutes / Time Spent ?89% 13.1 minutes.    _____________________________________   Electronically Signed By: Yfn Kirkpatrick MD 04/08/2018
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
## 2018-04-09 NOTE — NURSING NOTE
Chief Complaint   Patient presents with     Recheck Medication     Patient unsure of pharmacy and medication list - Provider will need to review during appointment

## 2018-04-09 NOTE — MR AVS SNAPSHOT
After Visit Summary   4/9/2018    Karolyn Banerjee    MRN: [id number]           Patient Information     Date Of Birth          1985        Visit Information        Provider Department      4/9/2018 9:25 AM Dana Sandoval MD Psychiatry Clinic        Today's Diagnoses     Schizoaffective disorder, bipolar type (H)    -  1    Schizoaffective disorder, depressive type (H)        Aggression          Care Instructions    -continue current medications, no changes  -follow up with sleep medicine as scheduled and make appointment for Endocrinology  -return to clinic in 6 weeks or sooner if needed          Follow-ups after your visit        Follow-up notes from your care team     Return in about 6 weeks (around 5/21/2018) for 30 MFU.      Your next 10 appointments already scheduled     Apr 18, 2018 11:00 AM CDT   Return Sleep Patient with Yfn Kirkpatrick MD   [facility] ([facility])    [address] [place] [id number]   [phone]            Apr 25, 2018 11:00 AM CDT   (Arrive by 10:45 AM)   RETURN ENDOCRINE with Raegan Elkins MD   [facility] ([facility])    [address] [id number] [phone]            May 30, 2018  2:15 PM CDT   Adult Med Follow UP with Dana Sandoval MD   Psychiatry Clinic ([facility])    [address]  [address] [id number] [phone]              Who to contact     Please call your clinic at [phone] to:    Ask questions about your health    Make or cancel appointments    Discuss your medicines    Learn about your test results    Speak to your doctor            Additional Information About Your Visit        LetMe[employer]earYa Information     [employer]Ya is an electronic gateway that provides easy, online access to your medical records. With [employer]Ya, you can request a clinic appointment,  read your test results, renew a prescription or communicate with your care team.     To sign up for [employer] visit the website at [email or web address]   You will be asked to enter the access code listed below, as well as some personal information. Please follow the directions to create your username and password.     Your access code is: [email or web address]  Expires: 7/15/2018 10:05 PM     Your access code will  in 90 days. If you need help or a new code, please contact your [facility] or call [phone] for assistance.        Care EveryWhere ID     This is your Care EveryWhere ID. This could be used by other organizations to access your [place] medical records  [id number]        Your Vitals Were     Pulse BMI (Body Mass Index)                94 42.53 kg/m2           Blood Pressure from Last 3 Encounters:   18 119/78   18 120/83   18 134/90    Weight from Last 3 Encounters:   18 115.9 kg (255 lb 9.6 oz)   18 117.5 kg (259 lb)   18 117.8 kg (259 lb 9.6 oz)              Today, you had the following     No orders found for display         Where to get your medicines      These medications were sent to [facility] - [place], [place] - [address]  [address] [facility] [address]     Phone:  [phone]     benztropine 0.5 MG tablet    BuPROPion HCl ER (XL) 450 MG Tb24    lurasidone 80 MG Tabs tablet    OLANZapine 10 MG tablet    perphenazine 16 MG tablet    traZODone 50 MG tablet          Primary Care Provider Office Phone # Fax #    Suzie Roy Loida [phone]       [address] [address]        Equal Access to Services     KAYA STRICKLAND : Isaac Roman, wabrianneda luqadaha, qaybta kaalmada ranjit, nando mcguire. [facility] [phone].    ATENCIÓN: Si habla ginette, tiene a stauffer disposición servicios gratuitos  de asistencia lingüística. Kiera biswas [phone].    We comply with applicable federal civil rights laws and [place] laws. We do not discriminate on the basis of race, color, national origin, age, disability, sex, sexual orientation, or gender identity.            Thank you!     Thank you for choosing PSYCHIATRY CLINIC  for your care. Our goal is always to provide you with excellent care. Hearing back from our patients is one way we can continue to improve our services. Please take a few minutes to complete the written survey that you may receive in the mail after your visit with us. Thank you!             Your Updated Medication List - Protect others around you: Learn how to safely use, store and throw away your medicines at www.disposemymeds.org.          This list is accurate as of 4/9/18 11:59 PM.  Always use your most recent med list.                   Brand Name Dispense Instructions for use Diagnosis    ACETAMINOPHEN PO      Take 500 mg by mouth as needed for pain        albuterol 108 (90 Base) MCG/ACT Inhaler    PROAIR HFA/PROVENTIL HFA/VENTOLIN HFA     Inhale 2 puffs into the lungs every 6 hours as needed for shortness of breath / dyspnea or wheezing        benztropine 0.5 MG tablet    COGENTIN    60 tablet    Take 1 tablet (0.5 mg) by mouth 2 times daily    Schizoaffective disorder, bipolar type (H), Aggression       BuPROPion HCl ER (XL) 450 MG Tb24     30 tablet    Take 450 mg by mouth daily    Schizoaffective disorder, depressive type (H)       calcium carbonate 500 MG chewable tablet    TUMS     Take 1 chew tab by mouth as needed for heartburn        cetirizine 10 MG tablet    zyrTEC     Take 10 mg by mouth daily        diphenhydrAMINE-acetaminophen  MG tablet    TYLENOL PM     Take 1 tablet by mouth nightly as needed for sleep        FISH OIL PO      Take 1,000 mg by mouth daily        hydrOXYzine 25 MG tablet    ATARAX    60 tablet    Take 1-2 tablets (25-50 mg) by mouth every 4 hours as  needed for anxiety    Schizoaffective disorder, bipolar type (H), Aggression       lurasidone 80 MG Tabs tablet    LATUDA    60 tablet    Take 2 tablets (160 mg) by mouth daily (with dinner)    Schizoaffective disorder, bipolar type (H), Aggression       norethindrone-ethinyl estradiol 1-20 MG-MCG per tablet    MICROGESTIN 1/20     Take 1 tablet by mouth daily        OLANZapine 10 MG tablet    zyPREXA    30 tablet    Take 1 tablet (10 mg) by mouth 2 times daily as needed    Schizoaffective disorder, bipolar type (H), Aggression       perphenazine 16 MG tablet     60 tablet    Take 1 tablet (16 mg) by mouth 2 times daily    Schizoaffective disorder, depressive type (H)       traZODone 50 MG tablet    DESYREL    30 tablet    Take 1 tablet (50 mg) by mouth At Bedtime    Schizoaffective disorder, bipolar type (H), Aggression

## 2018-04-09 NOTE — PATIENT INSTRUCTIONS
-continue current medications, no changes  -follow up with sleep medicine as scheduled and make appointment for Endocrinology  -return to clinic in 6 weeks or sooner if needed

## 2018-04-10 LAB — SLPCOMP: NORMAL

## 2018-04-10 ASSESSMENT — PATIENT HEALTH QUESTIONNAIRE - PHQ9: SUM OF ALL RESPONSES TO PHQ QUESTIONS 1-9: 6

## 2018-04-18 ENCOUNTER — OFFICE VISIT (OUTPATIENT)
Dept: SLEEP MEDICINE | Facility: CLINIC | Age: 33
End: 2018-04-18
Payer: MEDICAID

## 2018-04-18 ENCOUNTER — DOCUMENTATION ONLY (OUTPATIENT)
Dept: SLEEP MEDICINE | Facility: CLINIC | Age: 33
End: 2018-04-18

## 2018-04-18 VITALS
WEIGHT: 259 LBS | DIASTOLIC BLOOD PRESSURE: 81 MMHG | RESPIRATION RATE: 18 BRPM | HEIGHT: 65 IN | SYSTOLIC BLOOD PRESSURE: 115 MMHG | OXYGEN SATURATION: 95 % | BODY MASS INDEX: 43.15 KG/M2 | HEART RATE: 101 BPM

## 2018-04-18 DIAGNOSIS — G47.33 OSA (OBSTRUCTIVE SLEEP APNEA): ICD-10-CM

## 2018-04-18 DIAGNOSIS — G47.33 OSA (OBSTRUCTIVE SLEEP APNEA): Primary | ICD-10-CM

## 2018-04-18 PROCEDURE — 99213 OFFICE O/P EST LOW 20 MIN: CPT | Performed by: INTERNAL MEDICINE

## 2018-04-18 NOTE — MR AVS SNAPSHOT
After Visit Summary   4/18/2018    Karolyn Banerjee    MRN: [id number]           Patient Information     Date Of Birth          1985        Visit Information        Provider Department      4/18/2018 11:00 AM Yfn Kirkpatrick MD [facility]        Today's Diagnoses     JOSE (obstructive sleep apnea)    -  1      Care Instructions      Your BMI is Body mass index is 43.1 kg/(m^2).  Weight management is a personal decision.  If you are interested in exploring weight loss strategies, the following discussion covers the approaches that may be successful. Body mass index (BMI) is one way to tell whether you are at a healthy weight, overweight, or obese. It measures your weight in relation to your height.  A BMI of 18.5 to 24.9 is in the healthy range. A person with a BMI of 25 to 29.9 is considered overweight, and someone with a BMI of 30 or greater is considered obese. More than two-thirds of American adults are considered overweight or obese.  Being overweight or obese increases the risk for further weight gain. Excess weight may lead to heart disease and diabetes.  Creating and following plans for healthy eating and physical activity may help you improve your health.  Weight control is part of healthy lifestyle and includes exercise, emotional health, and healthy eating habits. Careful eating habits lifelong are the mainstay of weight control. Though there are significant health benefits from weight loss, long-term weight loss with diet alone may be very difficult to achieve- studies show long-term success with dietary management in less than 10% of people. Attaining a healthy weight may be especially difficult to achieve in those with severe obesity. In some cases, medications, devices and surgical management might be considered.  What can you do?  If you are overweight or obese and are interested in methods for weight loss, you should discuss this with your provider.     Consider  reducing daily calorie intake by 500 calories.     Keep a food journal.     Avoiding skipping meals, consider cutting portions instead.    Diet combined with exercise helps maintain muscle while optimizing fat loss. Strength training is particularly important for building and maintaining muscle mass. Exercise helps reduce stress, increase energy, and improves fitness. Increasing exercise without diet control, however, may not burn enough calories to loose weight.       Start walking three days a week 10-20 minutes at a time    Work towards walking thirty minutes five days a week     Eventually, increase the speed of your walking for 1-2 minutes at time    In addition, we recommend that you review healthy lifestyles and methods for weight loss available through the National Institutes of Health patient information sites:  http://win.niddk.nih.gov/publications/index.htm    And look into health and wellness programs that may be available through your health insurance provider, employer, local community center, or manuel club.    Weight management plan: Patient was referred to their PCP to discuss a diet and exercise plan.              Follow-ups after your visit        Your next 10 appointments already scheduled     Apr 25, 2018 11:00 AM CDT   (Arrive by 10:45 AM)   RETURN ENDOCRINE with Raegan Elkins MD   [facility] ([facility])    [address] [id number] [phone]            May 30, 2018  2:15 PM CDT   Adult Med Follow UP with Dana Sandoval MD   Psychiatry Clinic ([facility])    [address] [id number] [phone]              Who to contact     If you have questions or need follow up information about today's clinic visit or your schedule please contact [facility] directly at [phone].  Normal or non-critical lab and imaging results  "will be communicated to you by MyChart, letter or phone within 4 business days after the clinic has received the results. If you do not hear from us within 7 days, please contact the clinic through App[employer] or phone. If you have a critical or abnormal lab result, we will notify you by phone as soon as possible.  Submit refill requests through App[employer] or call your pharmacy and they will forward the refill request to us. Please allow 3 business days for your refill to be completed.          Additional Information About Your Visit        Appcore Information     Appcore lets you send messages to your doctor, view your test results, renew your prescriptions, schedule appointments and more. To sign up, go to [email or web address] . Click on \"Log in\" on the left side of the screen, which will take you to the Welcome page. Then click on \"Sign up Now\" on the right side of the page.     You will be asked to enter the access code listed below, as well as some personal information. Please follow the directions to create your username and password.     Your access code is: [email or web address]  Expires: 7/15/2018 10:05 PM     Your access code will  in 90 days. If you need help or a new code, please call your [facility] clinic or [phone].        Care EveryWhere ID     This is your Care EveryWhere ID. This could be used by other organizations to access your [facility] medical records  [id number]        Your Vitals Were     Pulse Respirations Height Pulse Oximetry BMI (Body Mass Index)       101 18 1.651 m (5' 5\") 95% 43.1 kg/m2        Blood Pressure from Last 3 Encounters:   18 115/81   18 120/83   02/10/18 (!) 148/95    Weight from Last 3 Encounters:   18 117.5 kg (259 lb)   18 117.5 kg (259 lb)   18 120.7 kg (266 lb)              We Performed the Following     Comprehensive DME        Primary Care Provider Office Phone # Fax #    Suzie Godinezrishi Mariscal [phone]       [place] " [facility] [address] [facility] [address]        Equal Access to Services     KAYA STRICLKAND : Hadii aad ku hadalvarosymone Natalyatawanda, wabrianneda rosy, qalaneta demondbrendanjanice church, nando nolandpalomatoña mcguire. So [facility] [phone].    ATENCIÓN: Si habla español, tiene a stauffer disposición servicios gratuitos de asistencia lingüística. [facility] [phone].    We comply with applicable federal civil rights laws and [place] laws. We do not discriminate on the basis of race, color, national origin, age, disability, sex, sexual orientation, or gender identity.            Thank you!     Thank you for choosing [facility]  for your care. Our goal is always to provide you with excellent care. Hearing back from our patients is one way we can continue to improve our services. Please take a few minutes to complete the written survey that you may receive in the mail after your visit with us. Thank you!             Your Updated Medication List - Protect others around you: Learn how to safely use, store and throw away your medicines at www.disposemymeds.org.          This list is accurate as of 4/18/18 11:25 AM.  Always use your most recent med list.                   Brand Name Dispense Instructions for use Diagnosis    ACETAMINOPHEN PO      Take 500 mg by mouth as needed for pain        albuterol 108 (90 Base) MCG/ACT Inhaler    PROAIR HFA/PROVENTIL HFA/VENTOLIN HFA     Inhale 2 puffs into the lungs every 6 hours as needed for shortness of breath / dyspnea or wheezing        benztropine 0.5 MG tablet    COGENTIN    60 tablet    Take 1 tablet (0.5 mg) by mouth 2 times daily    Schizoaffective disorder, bipolar type (H), Aggression       BuPROPion HCl ER (XL) 450 MG Tb24     30 tablet    Take 450 mg by mouth daily    Schizoaffective disorder, depressive type (H)       calcium carbonate 500 MG chewable tablet    TUMS     Take 1 chew tab by mouth as needed for heartburn        cetirizine 10 MG  tablet    zyrTEC     Take 10 mg by mouth daily        diphenhydrAMINE-acetaminophen  MG tablet    TYLENOL PM     Take 1 tablet by mouth nightly as needed for sleep        FISH OIL PO      Take 1,000 mg by mouth daily        hydrOXYzine 25 MG tablet    ATARAX    60 tablet    Take 1-2 tablets (25-50 mg) by mouth every 4 hours as needed for anxiety    Schizoaffective disorder, bipolar type (H), Aggression       lurasidone 80 MG Tabs tablet    LATUDA    60 tablet    Take 2 tablets (160 mg) by mouth daily (with dinner)    Schizoaffective disorder, bipolar type (H), Aggression       norethindrone-ethinyl estradiol 1-20 MG-MCG per tablet    MICROGESTIN 1/20     Take 1 tablet by mouth daily        OLANZapine 10 MG tablet    zyPREXA    30 tablet    Take 1 tablet (10 mg) by mouth 2 times daily as needed    Schizoaffective disorder, bipolar type (H), Aggression       perphenazine 16 MG tablet     60 tablet    Take 1 tablet (16 mg) by mouth 2 times daily    Schizoaffective disorder, depressive type (H)       traZODone 50 MG tablet    DESYREL    30 tablet    Take 1 tablet (50 mg) by mouth At Bedtime    Schizoaffective disorder, bipolar type (H), Aggression

## 2018-04-18 NOTE — PROGRESS NOTES
Sleep Study Follow-Up Visit:    Date on this visit: 4/18/2018    Karolyn Banerjee comes in today for follow-up of her sleep study done on 4/4/2018 at the [facility] for possible sleep apnea.    Sleep latency 103 minutes without Ambien.  REM achieved.   REM latency 121.5 minutes.  Sleep efficiency 62%. Total sleep time 324 minutes.    Sleep architecture:  Stage 1, 9% (5%), stage 2, 51% (45-55%), stage 3, 26.2% (15-20%), stage REM, 13.6% (20-25%).  AHI was 34.6, with desaturations. RDI 42.4.  REM .6, consistent with severe REM JOSE.  Supine .7, consistent with severe SUPINE JOSE.  Periodic Limb Movement Index 0.9/hour.       These findings were reviewed with patient.     Past medical/surgical history, family history, social history, medications and allergies were reviewed.      Problem List:  Patient Active Problem List    Diagnosis Date Noted     JOSE (obstructive sleep apnea) 04/08/2018     Priority: Medium     Suicidal ideations 02/05/2018     Priority: Medium     Suicide attempt 09/20/2017     Priority: Medium     DVT (deep vein thrombosis) in pregnancy (H) 11/20/2016     Priority: Medium     Hx of psychiatric care 07/06/2016     Priority: Medium       PAST PSYCH MED TRIALS    clozapine: had 2 trials that resulted in agranulocytosis; no longer eligible for retrial   ziprasidone     olanzapine     quetiapine   divalproex   aripiprazole (ineffective)   risperidone (dystonia)   haloperidol (developed parkinsonism)   sertraline   venlafaxine       Suicidal ideation 12/03/2014     Priority: Medium     MENTAL HEALTH 06/13/2014     Priority: Medium     Port catheter in place 05/30/2014     Priority: Medium     Fx humerus shaft-closed 01/13/2014     Priority: Medium     Schizoaffective disorder, depressive type (H) 07/19/2013     Priority: Medium     Imo Update utility       Asthma 10/16/2012     Priority: Medium     Nonorganic enuresis 10/16/2012     Priority: Medium     Mild cognitive  impairment 04/30/2012     Priority: Medium     Problem list name updated by automated process. Provider to review       Borderline personality disorder 04/30/2012     Priority: Medium        Impression/Plan:    1. Severe Obstructive sleep apnea     - Patient was counseled regarding severe sleep apnea, consequences of untreated disease and treatment options. CPAP is the treatmnet of choice for severe sleep apnea. Patient was willing tos tart CPAP.     Plan:     1. Start auto PAP therapy 5-15 cm H2O     She will follow up with me in about 7 week(s).     Fifteen minutes spent with patient, all of which were spent face-to-face counseling, consulting, coordinating plan of care.      Yfn Kirkpatrick     CC: Suzie Mariscal

## 2018-04-18 NOTE — PROGRESS NOTES
Called PT to schedule new CPAP setup.  Left message to call [facility] [phone] or [facility] [place] [phone] to schedule when ready.

## 2018-04-18 NOTE — NURSING NOTE
"Chief Complaint   Patient presents with     Study Results     Follow up psg results       Initial /81  Pulse 101  Resp 18  Ht 1.651 m (5' 5\")  Wt 117.5 kg (259 lb)  SpO2 95%  BMI 43.1 kg/m2 Estimated body mass index is 43.1 kg/(m^2) as calculated from the following:    Height as of this encounter: 1.651 m (5' 5\").    Weight as of this encounter: 117.5 kg (259 lb).  Medication Reconciliation: complete     ESS 3  Lety Barker MA      "

## 2018-04-18 NOTE — PATIENT INSTRUCTIONS

## 2018-04-23 ENCOUNTER — TELEPHONE (OUTPATIENT)
Dept: SLEEP MEDICINE | Facility: CLINIC | Age: 33
End: 2018-04-23

## 2018-04-25 ENCOUNTER — OFFICE VISIT (OUTPATIENT)
Dept: ENDOCRINOLOGY | Facility: CLINIC | Age: 33
End: 2018-04-25
Payer: MEDICAID

## 2018-04-25 VITALS
WEIGHT: 256.7 LBS | HEART RATE: 101 BPM | HEIGHT: 65 IN | BODY MASS INDEX: 42.77 KG/M2 | SYSTOLIC BLOOD PRESSURE: 125 MMHG | DIASTOLIC BLOOD PRESSURE: 84 MMHG

## 2018-04-25 DIAGNOSIS — R79.89 ELEVATED CORTISOL LEVEL: Primary | ICD-10-CM

## 2018-04-25 NOTE — NURSING NOTE
"Chief Complaint   Patient presents with     RECHECK     CUSHING'S SYNDROME F/U        Initial /84 (BP Location: Left arm)  Pulse 101  Ht 1.651 m (5' 5\")  Wt 116.4 kg (256 lb 11.2 oz)  BMI 42.72 kg/m2 Estimated body mass index is 42.72 kg/(m^2) as calculated from the following:    Height as of this encounter: 1.651 m (5' 5\").    Weight as of this encounter: 116.4 kg (256 lb 11.2 oz).  Medication Reconciliation: complete    "

## 2018-04-25 NOTE — LETTER
"4/25/2018       RE: Karolyn Banerjee  [address]  [place] [id number]     Dear Colleague,    Thank you for referring your patient, Karolyn Banerjee, to the [facility] ENDOCRINOLOGY at [facility]. Please see a copy of my visit note below.    Endocrinology Clinic Visit 4/25/2018    NAME:  Karolyn Banerjee  PCP:  Suzie Mariscal  MRN:  [id number]  Reason for Consult:  Workup for Cushing's  Requesting Provider:  Referred Self    Chief Complaint     Chief Complaint   Patient presents with     RECHECK     CUSHING'S SYNDROME F/U        History of Present Illness     Karolyn Banerjee is a 32 year old female who is seen in clinic for hospital follow up for workup of Cushing's disease.   The patient has a complex history of refractory schizoaffective disorder, bipolar disorder, and unspecified neurocognitive disorder who resides in a group home, and was admitted to inpatient psychiatry on 2/5/2018 for suicidal ideation.    During the hospital stay, Endocrinology was consulted to evaluate for Cushing's disease (due to depression, obesity, round face). A 24 hour urine cortisol was attempted. However, patient was not willing to complete the second round of 24 hour urine testing. Late-night salivary cortisol was positive, but false-positives can be seen with depression. Dexamethasone suppression test was not obtained, as it would be low yield in the context of current depressive episode. Recommended to follow up in Endocrine clinic as an outpatient.    Endocrine consultation in hospital: \"Patient reports no known history of Cushing syndrome.  No family history of Cushing syndrome.  She is not on any over-the-counter steroids.  She lives in a group home and takes only prescription medication.  Patient has no problems climbing the stairs.  No significant changes in her weight or hair.  No easily bruising.  No headache.\" she is on OCPs.     Workup in hospital: [place] saliva cortisol: " 0.236 mcg/dL (should be <0.181 in age 30-50). There was blood contamination.     Interval History:   Today the patient was mostly flat, answering questions very briefly. She was initially not sure where she was and why she was here. After orienting her to the clinic and why she is being seen, proceeded with asking her questions about Cushing's disease. She continues to deny any recent changes in weight. No easy bruising, no purple striae. No lower extremity edema, no high blood pressure.    Problem List     Patient Active Problem List   Diagnosis     Mild cognitive impairment     Borderline personality disorder     Asthma     Nonorganic enuresis     Schizoaffective disorder, depressive type (H)     Fx humerus shaft-closed     Port catheter in place     MENTAL HEALTH     Suicidal ideation     Hx of psychiatric care     DVT (deep vein thrombosis) in pregnancy (H)     Suicide attempt     Suicidal ideations     JOSE (obstructive sleep apnea)        Medications     Current Outpatient Prescriptions   Medication     ACETAMINOPHEN PO     albuterol (PROAIR HFA/PROVENTIL HFA/VENTOLIN HFA) 108 (90 BASE) MCG/ACT Inhaler     benztropine (COGENTIN) 0.5 MG tablet     BuPROPion HCl ER, XL, 450 MG TB24     calcium carbonate (TUMS) 500 MG chewable tablet     cetirizine (ZYRTEC) 10 MG tablet     diphenhydrAMINE-acetaminophen (TYLENOL PM)  MG tablet     hydrOXYzine (ATARAX) 25 MG tablet     lurasidone (LATUDA) 80 MG TABS tablet     norethindrone-ethinyl estradiol (MICROGESTIN 1/20) 1-20 MG-MCG per tablet     OLANZapine (ZYPREXA) 10 MG tablet     Omega-3 Fatty Acids (FISH OIL PO)     perphenazine 16 MG tablet     traZODone (DESYREL) 50 MG tablet     No current facility-administered medications for this visit.         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 Gardner     Mild developmental delay      Myopia      Neuroleptic-induced tardive dyskinesia      Nonorganic enuresis      Refractive amblyopia      Schizoaffective disorder, bipolar type (H)     Follows with Dr. Cooley at her group home.      Sleep disorder      Past Surgical History:   Procedure Laterality Date     HRW PORT A CATH Right      NO HISTORY OF SURGERY         Social History     Social History     Social History     Marital status: Single     Spouse name: N/A     Number of children: N/A     Years of education: N/A     Occupational History     Not on file.     Social History Main Topics     Smoking status: Never Smoker     Smokeless tobacco: Never Used     Alcohol use No     Drug use: No     Sexual activity: No     Other Topics Concern     Not on file     Social History Narrative    Lives in group home([facility] for ~6 years), mother is guardian.         Had deficits in high school, but graduated with A's and B's with special programming.         Enjoys going to [place], and shopping. She also enjoys walks.         Verbalizes that she is single but recent chart review indicates being with Remy, boyfriend, for two years. They last saw each other 1 month ago.         Denies any tobacco, alcohol, or recreational drug use.                    Family History     Family History   Problem Relation Age of Onset     MENTAL ILLNESS Father      mild developmental delay     Schizophrenia Other      uncle       ROS     Constitutional: no fevers, chills, night sweats. No weight loss/gain. Good appetite  Eyes: no vision changes, no eye redness, no diplopia  Ears, Nose, mouth, throat: no hearing changes,  "no tinnitus, no rhinorrhea  Cardiovascular: no chest pain, no orthopnea or PND, no edema, no palpitations  Respiratory: no dyspnea, no cough, no sputum, no wheezing  Gastrointestinal: no nausea, no vomiting, no abdominal pain, no diarrhea, no constipation  Genitourinary: no dysuria, no frequency, no urgency, no nocturia  Musculoskeletal: no joint pains, no back pain, no cramps, no fractures  Skin: no rash, no itching, no dryness, no ulcers, no hair loss, no nail changes  Neurological:no headaches, no weakness, no numbness, no tingling, no tremors  Psychiatric: no anxiety, no sadness  Hematologic/lymphatic: no easy bruising, no bleeding, no palor    Physical Exam   /84 (BP Location: Left arm)  Pulse 101  Ht 1.651 m (5' 5\")  Wt 116.4 kg (256 lb 11.2 oz)  BMI 42.72 kg/m2     General: Comfortable, no obvious distress, normal body habitus  Eyes: Sclera anicteric, moist conjunctiva  HENT: Atraumatic, oropharynx clear, moist mucous membranes with no mucosal ulcerations  Neck: Trachea midline, supple. Thyroid: Thyroid is normal in size and texture  CV: Regular rhythm, normal rate. No murmurs auscultated  Resp: Clear to auscultation bilaterally, good effort  Abdomen:  Soft, non tender, non distended. Bowel sounds heard. No organomegaly. No striae  Skin: No rashes, lesions, or subcutaneous nodules.   Psych: Alert and oriented x 3. Appropriate affect, good insight  Extremities: No peripheral edema  Musculoskeletal: Appropriate muscle bulk and strength  Lymphatic: No cervical lymphadenopathy  Neuro: Moves all four extremities. No focal deficits on limited exam. Gait normal.     Labs/Imaging     Pertinent Labs were reviewed and updated in EPIC.    Summary of recent findings:   Lab Results   Component Value Date    A1C 5.0 04/12/2017       TSH   Date Value Ref Range Status   09/21/2017 2.55 0.40 - 4.00 mU/L Final   10/20/2015 1.73 0.40 - 4.00 mU/L Final   06/14/2014 2.02 0.4 - 5.0 mU/L Final   11/12/2013 2.13 0.4 - 5.0 " mU/L Final   06/02/2013 2.38 0.4 - 5.0 mU/L Final     T4 Free   Date Value Ref Range Status   07/26/2008 1.06 0.70 - 1.85 ng/dL Final   01/28/2006 1.07 0.70 - 1.85 ng/dL Final       Creatinine   Date Value Ref Range Status   02/06/2018 0.67 0.52 - 1.04 mg/dL Final       Recent Labs   Lab Test  09/21/17   0750  04/12/17   1024  10/20/15   0853   CHOL  181  207*  185   HDL  44*  43  43*   LDL  108*  135*  115   TRIG  144  143  134   CHOLHDLRATIO   --   4.81*  4.3     Impression / Plan     1. Concern for Cushing's Disease:     Initial suspicion due to obesity, round face, depression.   Patient lacks other clinical stigmata such as plethora, striae, HTN, DM, edema, supraclavicular fat pad.     Depression can complicate the wokup for Cushing's.   Also her being on OCP results in some false elevation in serum cortisol levels, so that urine or saliva xcortisol may be more reliable.   Midnight saliva cortisol can be falsely elevated in depression.   Also, ortisol may not be suppressed by overnight dexamethasone suppression test in obesity and depression..  Of note, depression also can increase urine free cortisol up to 60 mcg per day.      Given all of the above, it is reasonable to proceed with 24 hr urine free cortisol.   If it is normal, it would exclude Cushing's disease.   If abnormal, other tests can b done to check for concordance.     Most of the visit today was spent explaining to the patient why she was here today, what specialty she was seeing and for what reason. The rest of the visit was spent explaining the diagnosis of Cushing's disease and the right options for testing. I explained the rationale for recommending the 24-hour urine collection. Patient was a resistant to doing that mostly because it was inconvenient for her. The group home staff assisted me in trying to convince the patient that despite it being not very convenient, it is going to be important to get that test done in order to rule out  Cushing's disease. The staff reassured the patient that she will have access to a bathroom at all times, which was a concern of the patient.     So the plan is:  - 24-hour urine collection for free cortisol  - Drop off the sample in any [place] lab  - We will contact the patient with results and if they are normal would exclude Cushing's  - If abnormal we would proceed with more testing.    Test and/or medications prescribed today:  Orders Placed This Encounter   Procedures     Cortisol free urine       Follow up: TBD as per above    TIME: I spent a total of 25 minutes face-to-face with Karolyn Banerjee during today's office visit.  Over 50% of this time was spent counseling the patient and/or coordinating care regarding need for testing, why she was here, what is required, etc....  See note for details.      Raegan Elkins MD  Endocrinology, Diabetes and Metabolism  [facility]

## 2018-04-25 NOTE — PROGRESS NOTES
"Endocrinology Clinic Visit 4/25/2018    NAME:  Karolyn Banerjee  PCP:  Suzie Mariscal  MRN:  [id number]  Reason for Consult:  Workup for Cushing's  Requesting Provider:  Referred Self    Chief Complaint     Chief Complaint   Patient presents with     RECHECK     CUSHING'S SYNDROME F/U        History of Present Illness     Karolyn Banerjee is a 32 year old female who is seen in clinic for hospital follow up for workup of Cushing's disease.   The patient has a complex history of refractory schizoaffective disorder, bipolar disorder, and unspecified neurocognitive disorder who resides in a group home, and was admitted to inpatient psychiatry on 2/5/2018 for suicidal ideation.    During the hospital stay, Endocrinology was consulted to evaluate for Cushing's disease (due to depression, obesity, round face). A 24 hour urine cortisol was attempted. However, patient was not willing to complete the second round of 24 hour urine testing. Late-night salivary cortisol was positive, but false-positives can be seen with depression. Dexamethasone suppression test was not obtained, as it would be low yield in the context of current depressive episode. Recommended to follow up in Endocrine clinic as an outpatient.    Endocrine consultation in hospital: \"Patient reports no known history of Cushing syndrome.  No family history of Cushing syndrome.  She is not on any over-the-counter steroids.  She lives in a group home and takes only prescription medication.  Patient has no problems climbing the stairs.  No significant changes in her weight or hair.  No easily bruising.  No headache.\" she is on OCPs.     Workup in hospital: [place] saliva cortisol: 0.236 mcg/dL (should be <0.181 in age 30-50). There was blood contamination.     Interval History:   Today the patient was mostly flat, answering questions very briefly. She was initially not sure where she was and why she was here. After orienting her to the clinic and why she is being " seen, proceeded with asking her questions about Cushing's disease. She continues to deny any recent changes in weight. No easy bruising, no purple striae. No lower extremity edema, no high blood pressure.    Problem List     Patient Active Problem List   Diagnosis     Mild cognitive impairment     Borderline personality disorder     Asthma     Nonorganic enuresis     Schizoaffective disorder, depressive type (H)     Fx humerus shaft-closed     Port catheter in place     MENTAL HEALTH     Suicidal ideation     Hx of psychiatric care     DVT (deep vein thrombosis) in pregnancy (H)     Suicide attempt     Suicidal ideations     JOSE (obstructive sleep apnea)        Medications     Current Outpatient Prescriptions   Medication     ACETAMINOPHEN PO     albuterol (PROAIR HFA/PROVENTIL HFA/VENTOLIN HFA) 108 (90 BASE) MCG/ACT Inhaler     benztropine (COGENTIN) 0.5 MG tablet     BuPROPion HCl ER, XL, 450 MG TB24     calcium carbonate (TUMS) 500 MG chewable tablet     cetirizine (ZYRTEC) 10 MG tablet     diphenhydrAMINE-acetaminophen (TYLENOL PM)  MG tablet     hydrOXYzine (ATARAX) 25 MG tablet     lurasidone (LATUDA) 80 MG TABS tablet     norethindrone-ethinyl estradiol (MICROGESTIN 1/20) 1-20 MG-MCG per tablet     OLANZapine (ZYPREXA) 10 MG tablet     Omega-3 Fatty Acids (FISH OIL PO)     perphenazine 16 MG tablet     traZODone (DESYREL) 50 MG tablet     No current facility-administered medications for this visit.         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 Gardner     Mild developmental delay      Myopia      Neuroleptic-induced tardive dyskinesia      Nonorganic enuresis      Refractive amblyopia      Schizoaffective disorder, bipolar type (H)     Follows with Dr. Cooley at her group home.      Sleep disorder      Past Surgical History:   Procedure Laterality Date     HRW PORT A CATH Right      NO HISTORY OF SURGERY         Social History     Social History     Social History     Marital status: Single     Spouse name: N/A     Number of children: N/A     Years of education: N/A     Occupational History     Not on file.     Social History Main Topics     Smoking status: Never Smoker     Smokeless tobacco: Never Used     Alcohol use No     Drug use: No     Sexual activity: No     Other Topics Concern     Not on file     Social History Narrative    Lives in group home([facility]), mother is guardian.         Had deficits in high school, but graduated with A's and B's with special programming.         Enjoys going to [employer], and shopping. She also enjoys walks.         Verbalizes that she is single but recent chart review indicates being with Remy, boyfriend, for two years. They last saw each other 1 month ago.         Denies any tobacco, alcohol, or recreational drug use.                    Family History     Family History   Problem Relation Age of Onset     MENTAL ILLNESS Father      mild developmental delay     Schizophrenia Other      uncle       ROS     Constitutional: no fevers, chills, night sweats. No weight loss/gain. Good appetite  Eyes: no vision changes, no eye redness, no diplopia  Ears, Nose, mouth, throat: no hearing changes, no tinnitus, no rhinorrhea  Cardiovascular: no chest pain, no orthopnea or PND, no edema, no palpitations  Respiratory: no dyspnea, no cough, no sputum, no wheezing  Gastrointestinal: no nausea, no vomiting, no abdominal pain, no diarrhea, no constipation  Genitourinary: no dysuria,  "no frequency, no urgency, no nocturia  Musculoskeletal: no joint pains, no back pain, no cramps, no fractures  Skin: no rash, no itching, no dryness, no ulcers, no hair loss, no nail changes  Neurological:no headaches, no weakness, no numbness, no tingling, no tremors  Psychiatric: no anxiety, no sadness  Hematologic/lymphatic: no easy bruising, no bleeding, no palor    Physical Exam   /84 (BP Location: Left arm)  Pulse 101  Ht 1.651 m (5' 5\")  Wt 116.4 kg (256 lb 11.2 oz)  BMI 42.72 kg/m2     General: Comfortable, no obvious distress, normal body habitus  Eyes: Sclera anicteric, moist conjunctiva  HENT: Atraumatic, oropharynx clear, moist mucous membranes with no mucosal ulcerations  Neck: Trachea midline, supple. Thyroid: Thyroid is normal in size and texture  CV: Regular rhythm, normal rate. No murmurs auscultated  Resp: Clear to auscultation bilaterally, good effort  Abdomen:  Soft, non tender, non distended. Bowel sounds heard. No organomegaly. No striae  Skin: No rashes, lesions, or subcutaneous nodules.   Psych: Alert and oriented x 3. Appropriate affect, good insight  Extremities: No peripheral edema  Musculoskeletal: Appropriate muscle bulk and strength  Lymphatic: No cervical lymphadenopathy  Neuro: Moves all four extremities. No focal deficits on limited exam. Gait normal.     Labs/Imaging     Pertinent Labs were reviewed and updated in EPIC.    Summary of recent findings:   Lab Results   Component Value Date    A1C 5.0 04/12/2017       TSH   Date Value Ref Range Status   09/21/2017 2.55 0.40 - 4.00 mU/L Final   10/20/2015 1.73 0.40 - 4.00 mU/L Final   06/14/2014 2.02 0.4 - 5.0 mU/L Final   11/12/2013 2.13 0.4 - 5.0 mU/L Final   06/02/2013 2.38 0.4 - 5.0 mU/L Final     T4 Free   Date Value Ref Range Status   07/26/2008 1.06 0.70 - 1.85 ng/dL Final   01/28/2006 1.07 0.70 - 1.85 ng/dL Final       Creatinine   Date Value Ref Range Status   02/06/2018 0.67 0.52 - 1.04 mg/dL Final       Recent Labs "   Lab Test  09/21/17   0750  04/12/17   1024  10/20/15   0853   CHOL  181  207*  185   HDL  44*  43  43*   LDL  108*  135*  115   TRIG  144  143  134   CHOLHDLRATIO   --   4.81*  4.3     Impression / Plan     1. Concern for Cushing's Disease:     Initial suspicion due to obesity, round face, depression.   Patient lacks other clinical stigmata such as plethora, striae, HTN, DM, edema, supraclavicular fat pad.     Depression can complicate the wokup for Cushing's.   Also her being on OCP results in some false elevation in serum cortisol levels, so that urine or saliva xcortisol may be more reliable.   Midnight saliva cortisol can be falsely elevated in depression.   Also, ortisol may not be suppressed by overnight dexamethasone suppression test in obesity and depression..  Of note, depression also can increase urine free cortisol up to 60 mcg per day.      Given all of the above, it is reasonable to proceed with 24 hr urine free cortisol.   If it is normal, it would exclude Cushing's disease.   If abnormal, other tests can b done to check for concordance.     Most of the visit today was spent explaining to the patient why she was here today, what specialty she was seeing and for what reason. The rest of the visit was spent explaining the diagnosis of Cushing's disease and the right options for testing. I explained the rationale for recommending the 24-hour urine collection. Patient was a resistant to doing that mostly because it was inconvenient for her. The group home staff assisted me in trying to convince the patient that despite it being not very convenient, it is going to be important to get that test done in order to rule out Cushing's disease. The staff reassured the patient that she will have access to a bathroom at all times, which was a concern of the patient.     So the plan is:  - 24-hour urine collection for free cortisol  - Drop off the sample in any [place] lab  - We will contact the patient with  results and if they are normal would exclude Cushing's  - If abnormal we would proceed with more testing.    Test and/or medications prescribed today:  Orders Placed This Encounter   Procedures     Cortisol free urine       Follow up: TBD as per above    TIME: I spent a total of 25 minutes face-to-face with Karolyn Banerjee during today's office visit.  Over 50% of this time was spent counseling the patient and/or coordinating care regarding need for testing, why she was here, what is required, etc....  See note for details.      Raegan Elkins MD  Endocrinology, Diabetes and Metabolism  [facility]

## 2018-04-25 NOTE — MR AVS SNAPSHOT
After Visit Summary   4/25/2018    Karolyn Banerjee    MRN: [id number]           Patient Information     Date Of Birth          1985        Visit Information        Provider Department      4/25/2018 11:00 AM Raegan Elkins MD [facility] Endocrinology        Today's Diagnoses     Elevated cortisol level (H)    -  1      Care Instructions    Instructions for 24 hour urine collection:   1- First urine sample of the morning: discard (do not include)  2- Collect every sample the rest of the day and night  3- Collect the sample from the second morning. That will be the last sample    Bring it back to any [place] or [facility] lab.     Patient and staff would like to know where to find a close [place] lab to the group home which is in [place] (to drop off the urine collection).     Raegan Elkins MD  Endocrinology, Diabetes and Metabolism  [facility]            Follow-ups after your visit        Your next 10 appointments already scheduled     May 30, 2018  2:15 PM CDT   Adult Med Follow UP with Dana Sandoval MD   Psychiatry Clinic ([facility])    [place] [id number]  [address] [place] [id number] [phone]              Future tests that were ordered for you today     Open Future Orders        Priority Expected Expires Ordered    Cortisol free urine Routine  4/26/2019 4/25/2018            Who to contact     Please call your clinic at [phone] to:    Ask questions about your health    Make or cancel appointments    Discuss your medicines    Learn about your test results    Speak to your doctor            Additional Information About Your Visit        MyChart Information     [employer] is an electronic gateway that provides easy, online access to your medical records. With [employer], you can request a clinic appointment, read your test results, renew a prescription or communicate with your care team.     To sign up for [employer]  "visit the website at [email or web address]   You will be asked to enter the access code listed below, as well as some personal information. Please follow the directions to create your username and password.     Your access code is: [email or web address]  Expires: 7/15/2018 10:05 PM     Your access code will  in 90 days. If you need help or a new code, please contact your [facility] or call [phone] for assistance.        Care EveryWhere ID     This is your Care EveryWhere ID. This could be used by other organizations to access your [facility] medical records  [id number]        Your Vitals Were     Pulse Height BMI (Body Mass Index)             101 1.651 m (5' 5\") 42.72 kg/m2          Blood Pressure from Last 3 Encounters:   18 125/84   18 115/81   18 120/83    Weight from Last 3 Encounters:   18 116.4 kg (256 lb 11.2 oz)   18 117.5 kg (259 lb)   18 117.5 kg (259 lb)               Primary Care Provider Office Phone # Fax #    Suzie Mariscal [phone]       [address] [address]        Equal Access to Services     KAYA STRICKLAND AH: Hadii reginald ku hadasho Soomaali, waaxda luqadaha, qaybta kaalmada adeegyada, nando mcguire. So [facility] [phone].    ATENCIÓN: Si habla español, tiene a stauffer disposición servicios gratuitos de asistencia lingüística. Llame al [phone].    We comply with applicable federal civil rights laws and [place] laws. We do not discriminate on the basis of race, color, national origin, age, disability, sex, sexual orientation, or gender identity.            Thank you!     Thank you for choosing [facility]  for your care. Our goal is always to provide you with excellent care. Hearing back from our patients is one way we can continue to improve our services. Please take a few minutes to complete the written survey that you " may receive in the mail after your visit with us. Thank you!             Your Updated Medication List - Protect others around you: Learn how to safely use, store and throw away your medicines at www.disposemymeds.org.          This list is accurate as of 4/25/18 11:44 AM.  Always use your most recent med list.                   Brand Name Dispense Instructions for use Diagnosis    ACETAMINOPHEN PO      Take 500 mg by mouth as needed for pain        albuterol 108 (90 Base) MCG/ACT Inhaler    PROAIR HFA/PROVENTIL HFA/VENTOLIN HFA     Inhale 2 puffs into the lungs every 6 hours as needed for shortness of breath / dyspnea or wheezing        benztropine 0.5 MG tablet    COGENTIN    60 tablet    Take 1 tablet (0.5 mg) by mouth 2 times daily    Schizoaffective disorder, bipolar type (H), Aggression       BuPROPion HCl ER (XL) 450 MG Tb24     30 tablet    Take 450 mg by mouth daily    Schizoaffective disorder, depressive type (H)       calcium carbonate 500 MG chewable tablet    TUMS     Take 1 chew tab by mouth as needed for heartburn        cetirizine 10 MG tablet    zyrTEC     Take 10 mg by mouth daily        diphenhydrAMINE-acetaminophen  MG tablet    TYLENOL PM     Take 1 tablet by mouth nightly as needed for sleep        FISH OIL PO      Take 1,000 mg by mouth daily        hydrOXYzine 25 MG tablet    ATARAX    60 tablet    Take 1-2 tablets (25-50 mg) by mouth every 4 hours as needed for anxiety    Schizoaffective disorder, bipolar type (H), Aggression       lurasidone 80 MG Tabs tablet    LATUDA    60 tablet    Take 2 tablets (160 mg) by mouth daily (with dinner)    Schizoaffective disorder, bipolar type (H), Aggression       norethindrone-ethinyl estradiol 1-20 MG-MCG per tablet    MICROGESTIN 1/20     Take 1 tablet by mouth daily        OLANZapine 10 MG tablet    zyPREXA    30 tablet    Take 1 tablet (10 mg) by mouth 2 times daily as needed    Schizoaffective disorder, bipolar type (H), Aggression        perphenazine 16 MG tablet     60 tablet    Take 1 tablet (16 mg) by mouth 2 times daily    Schizoaffective disorder, depressive type (H)       traZODone 50 MG tablet    DESYREL    30 tablet    Take 1 tablet (50 mg) by mouth At Bedtime    Schizoaffective disorder, bipolar type (H), Aggression

## 2018-04-25 NOTE — PATIENT INSTRUCTIONS
Instructions for 24 hour urine collection:   1- First urine sample of the morning: discard (do not include)  2- Collect every sample the rest of the day and night  3- Collect the sample from the second morning. That will be the last sample    Bring it back to any [place] or [facility] lab.     Patient and staff would like to know where to find a close [place] lab to the group home which is in [place] (to drop off the urine collection).     Raegan Elkins MD  Endocrinology, Diabetes and Metabolism  [facility]

## 2018-05-02 DIAGNOSIS — R79.89 ELEVATED CORTISOL LEVEL: ICD-10-CM

## 2018-05-06 LAB
COLLECT DURATION TIME SPEC: 24 H
CORTIS F 24H UR HPLC-MCNC: 9.43 UG/L
CORTIS F 24H UR-MRATE: 10.4 UG/D
CORTIS F/CREAT 24H UR: 7.99 UG/G CRT
CREAT 24H UR-MCNC: 118 MG/DL
CREAT 24H UR-MRATE: 1298 MG/D (ref 700–1600)
IMP & REVIEW OF LAB RESULTS: NORMAL
SPECIMEN VOL ?TM UR: 1100 ML

## 2018-05-21 ENCOUNTER — MEDICAL CORRESPONDENCE (OUTPATIENT)
Dept: HEALTH INFORMATION MANAGEMENT | Facility: CLINIC | Age: 33
End: 2018-05-21

## 2018-05-22 ENCOUNTER — TELEPHONE (OUTPATIENT)
Dept: SLEEP MEDICINE | Facility: CLINIC | Age: 33
End: 2018-05-22

## 2018-05-22 DIAGNOSIS — G47.33 OSA (OBSTRUCTIVE SLEEP APNEA): ICD-10-CM

## 2018-05-22 NOTE — TELEPHONE ENCOUNTER
Called patient to see if she would like to schedule Urgent CPAP setup appointment with [facility]. Left voicemail to call us back at [facility] Customer Service at [phone] or [facility] Showroom at [phone].

## 2018-06-04 ENCOUNTER — TELEPHONE (OUTPATIENT)
Dept: PSYCHIATRY | Facility: CLINIC | Age: 33
End: 2018-06-04

## 2018-06-04 NOTE — TELEPHONE ENCOUNTER
On 5/21/2018 Physician's Order was completed by Dr. Soren Sandoval and  faxed to [facility], fax #[phone]. .I sent the document to scanning on t and held a copy in Psychiatry until scanning complete/confirmed. Lily Sandhu LPN

## 2018-07-20 ENCOUNTER — OFFICE VISIT (OUTPATIENT)
Dept: PSYCHIATRY | Facility: CLINIC | Age: 33
End: 2018-07-20
Attending: PSYCHIATRY & NEUROLOGY
Payer: MEDICAID

## 2018-07-20 VITALS
DIASTOLIC BLOOD PRESSURE: 88 MMHG | SYSTOLIC BLOOD PRESSURE: 126 MMHG | HEART RATE: 101 BPM | BODY MASS INDEX: 43.27 KG/M2 | WEIGHT: 260 LBS

## 2018-07-20 DIAGNOSIS — F25.0 SCHIZOAFFECTIVE DISORDER, BIPOLAR TYPE (H): ICD-10-CM

## 2018-07-20 DIAGNOSIS — F25.1 SCHIZOAFFECTIVE DISORDER, DEPRESSIVE TYPE (H): ICD-10-CM

## 2018-07-20 DIAGNOSIS — Z79.899 ENCOUNTER FOR LONG-TERM (CURRENT) USE OF MEDICATIONS: Primary | ICD-10-CM

## 2018-07-20 DIAGNOSIS — R46.89 AGGRESSION: ICD-10-CM

## 2018-07-20 PROCEDURE — G0463 HOSPITAL OUTPT CLINIC VISIT: HCPCS | Mod: ZF

## 2018-07-20 RX ORDER — BUPROPION HYDROCHLORIDE 450 MG/1
450 TABLET, FILM COATED, EXTENDED RELEASE ORAL DAILY
Qty: 30 TABLET | Refills: 1 | Status: SHIPPED | OUTPATIENT
Start: 2018-07-20 | End: 2018-08-31

## 2018-07-20 RX ORDER — TRAZODONE HYDROCHLORIDE 50 MG/1
50 TABLET, FILM COATED ORAL AT BEDTIME
Qty: 30 TABLET | Refills: 1 | Status: SHIPPED | OUTPATIENT
Start: 2018-07-20 | End: 2018-08-31

## 2018-07-20 RX ORDER — BENZTROPINE MESYLATE 0.5 MG/1
0.5 TABLET ORAL 2 TIMES DAILY
Qty: 60 TABLET | Refills: 1 | Status: SHIPPED | OUTPATIENT
Start: 2018-07-20 | End: 2018-08-31

## 2018-07-20 RX ORDER — BENZTROPINE MESYLATE 0.5 MG/1
0.5 TABLET ORAL DAILY PRN
Qty: 30 TABLET | Refills: 1 | Status: SHIPPED | OUTPATIENT
Start: 2018-07-20 | End: 2018-08-31

## 2018-07-20 RX ORDER — OLANZAPINE 10 MG/1
10 TABLET ORAL 2 TIMES DAILY PRN
Qty: 30 TABLET | Refills: 1 | Status: SHIPPED | OUTPATIENT
Start: 2018-07-20 | End: 2019-03-07

## 2018-07-20 RX ORDER — OLANZAPINE 10 MG/1
10 TABLET ORAL 2 TIMES DAILY PRN
Qty: 30 TABLET | Refills: 0 | Status: SHIPPED | OUTPATIENT
Start: 2018-07-20 | End: 2018-07-20

## 2018-07-20 RX ORDER — PERPHENAZINE 16 MG/1
16 TABLET ORAL 2 TIMES DAILY
Qty: 60 TABLET | Refills: 1 | Status: SHIPPED | OUTPATIENT
Start: 2018-07-20 | End: 2018-08-31

## 2018-07-20 RX ORDER — LURASIDONE HYDROCHLORIDE 80 MG/1
160 TABLET, FILM COATED ORAL
Qty: 60 TABLET | Refills: 1 | Status: SHIPPED | OUTPATIENT
Start: 2018-07-20 | End: 2018-08-31

## 2018-07-20 ASSESSMENT — PAIN SCALES - GENERAL: PAINLEVEL: NO PAIN (0)

## 2018-07-20 NOTE — PROGRESS NOTES
"[facility] PSYCHIATRY CLINIC TRANSFER DIAGNOSTIC ASSESSMENT       CARE TEAM:  PCP- Suzie Mariscal    Specialty Providers- none    Therapist- none     Community  Team-   [facility] worker, BERHANE, group home staff    Guardian: Mary \"Swetha\" Chriss (mother)- [phone] (okay to leave message) or [phone]    Karolyn Banerjee is a 32 year old female who prefers the name Karolyn & pronouns she, her, hers.    Date of initial diagnostic assessment was prior to 2008.  Date of most recent transfer of care assessment is 7/20/18.    Pertinent Background:  This patient first experienced mental health issues as a child and has received treatment for schizoaffective disorder, bipolar type, borderline personality disorder, and unspecified neurocognitive disorder.  See last diagnostic/transfer of care assessment for detailed history.  Notably, the patient's symptoms have historically included psychosis (auditory hallucinations, delusions related to pregnancy, agitation) and suicide attempts/threats, often in reaction to perceived slights or difficult interactions with group home staff/residents. She has been tried on many different medication trials in the past, including two trials of clozapine that caused neutropenia. ECT along with medication has been the most helpful treatment for maintaining stability. Despite this, patient has not received ECT since fall 2017 and has remained relatively stable (last hospitalization February 2018). She is currently under commitment w/ Brian and Casey-Wilkins in place. This expires November 2, 2018.      Psych critical item history includes suicide attempt [multiple], suicidal ideation, SIB [per chart], psychosis [sxs include auditory hallucinations, delusions], mutiple psychotropic trials, severe med reaction, violent behavior, psych hosp (>5), commitment and ECT.    INTERIM HISTORY                                                                                              4, 4   The " "patient reports good treatment adherence.  History was provided by the patient and and  who was a limited historian.  The last visit ended with no change to the med regimen.   Since the last visit:     Patient reports she is doing \"OK\" since last visit. Her biggest complaint when prompted was \"boredom\" and \"feeling tired during the day.\" She reports difficulty falling asleep - when she wakes up in the morning she does not feel refreshed. She has activities she does during the week, including a dedicated shopping morning and multiple days where she goes and plays games with her boyfriend. She feels bored during the other days of the week - she often times ends up napping or reading.  reports that mother/group home staff are in process of attending to get her set up with more day treatment to fill time during these other days. She reports getting along well with group home staff members and residents.    Both patient and  deny any instances of suicidal threats or running away from group home since last visit. Patient denies having any thoughts about this over past few months. Delusions about pregnancy have also lessened in frequency - patient reports that these thoughts recur because she hasn't had her period in 3-4 years, which makes her think she is pregnant. Auditory hallucinations continue to occur intermittently - they vary in frequency and intensity depending upon week or day. She will use the olanzapine PRN about once a week when hallucinations become intense. She did report that her eyes will sometimes get \"stuck above my glasses\" when voices occur. This will also happen sometimes when she isn't experiencing voices. She feels olanzapine does help with the voices and eye problem, but only after about a half hour.    Patient has not yet followed up with the sleep medicine doctors for evaluation of sleep difficulties and possible JOSE. She is open to having this " "evaluated if it will make difference with sleep.  indicated follow-up appointment could be made if felt to be needed.    RECENT SYMPTOMS:   DEPRESSION:  reports-depressed mood, anhedonia, low energy and insomnia;  DENIES- suicidal ideation and self-destructive thoughts  JUDE/HYPOMANIA:  reports-none;  DENIES- increased energy, decreased sleep need, increased activity and grandiosity  PSYCHOSIS:  reports-auditory hallucinations;  DENIES- delusions and visual hallucinations  DYSREGULATION:  reports-none;  DENIES- suicidal ideation, violent ideation and SIB  PANIC ATTACK:  none   ANXIETY:  excessive worry  TRAUMA RELATED:  none  COMPULSIVE:  none  SLEEP:  Poor sleep    EATING DISORDER: none     RECENT SUBSTANCE USE:     ALCOHOL- none      TOBACCO- none     CAFFEINE- soda [couple per day]  OPIOIDS- none         NARCAN KIT- N/A        CANNABIS- none            OTHER ILLICIT DRUGS- none      CURRENT SOCIAL HISTORY:  FINANCIAL SUPPORT- social security disability       CHILDREN- none      LIVING SITUATION- Lives at group home - [facility] - in [place].      SOCIAL/ SPIRITUAL SUPPORT- boyfriend Remy, family members, mental health support staff   FEELS SAFE AT HOME- yes     MEDICAL ROS (2,10):  Reports headaches, insomnia, muscle problems [eyes sometimes get stuck \"up\"]      Denies short term memory and/or word finding difficulty, wt gain, akathisia, spasms, bruxism, easy bruising , polydipsia, polyphagia and Parkinsonian-type symptoms [shuffling gait, masked facies, stooped posture, speech change, writing change]    SUBSTANCE USE HISTORY                                                                             Past Use- none reported  Treatment- #- none   Most Recent- N/A  Medical Consequences- none  HIV/Hepatitis- none  Legal Consequences- none    PSYCHIATRIC HISTORY     SIB- patient denies, yes per chart review  Suicidal Ideation Hx- Hx of both active and passive SI, often in response to perceived " slights or minor stressors. Plans in the past have included drowning herself in pool by group home or running into traffic.  Suicide Attempt- #- Several, walking toward pond   Most Recent- July 2017    Violence/Aggression Hx- Aggression during hospitalizations.  Psychosis Hx- Command auditory hallucinations, delusions related to pregnancy  Psych Hosp- #- Several, most recently Sep 2017, Oct 2017, Feb 2018   Most Recent- Feb 2018   ECT- #- yes   Most Recent- fall 2017    Eating Disorder: none    Outpatient Programs [ DBT, Day Treatment, Eating Disorder Tx etc] : day treatment     SOCIAL and FAMILY HISTORY                           patient reported                          1ea, 1ea   Financial Support- documented above  Living Situation/Family/Relationships- documented above;  Children- documented above                                 Trauma History (self-report)- none  Legal- none  Cultural/ Social/ Spiritual Support- boyfriend, group home staff, family members  Early History/Education-  Born in [place], moved to [place] when she was a baby due to her asthma, lived in [place] until starting living in group homes, completed HS in [place]; has 8 brothers and 2 sisters--she is second oldest. One of her brothers killed himself in September 2017.    FAMILY MENTAL HEALTH HX- Per chart review pts father with mild developmental disorder, she has an uncle with schizophrenia. One of her brothers committed suicide in September 2017.    PAST PSYCH MED TRIALS   see EMR Problem List: Hx of psychiatric care    MEDICAL / SURGICAL HISTORY                                   Pregnant or breastfeeding- none      Contraception- OCP    Neurologic Hx- not asked   Patient Active Problem List   Diagnosis     Mild cognitive impairment     Borderline personality disorder     Asthma     Nonorganic enuresis     Schizoaffective disorder, depressive type (H)     Fx humerus shaft-closed     Port catheter in place     MENTAL HEALTH     Suicidal ideation      Hx of psychiatric care     DVT (deep vein thrombosis) in pregnancy (H)     Suicide attempt     Suicidal ideations     JOSE (obstructive sleep apnea)       Past Surgical History:   Procedure Laterality Date     HRW PORT A CATH Right      NO HISTORY OF SURGERY          ALLERGY                                Clozapine; Risperdal; and Tape [adhesive tape]  MEDICATIONS                               Current Outpatient Prescriptions   Medication Sig Dispense Refill     ACETAMINOPHEN PO Take 500 mg by mouth as needed for pain       albuterol (PROAIR HFA/PROVENTIL HFA/VENTOLIN HFA) 108 (90 BASE) MCG/ACT Inhaler Inhale 2 puffs into the lungs every 6 hours as needed for shortness of breath / dyspnea or wheezing       benztropine (COGENTIN) 0.5 MG tablet Take 1 tablet (0.5 mg) by mouth 2 times daily 60 tablet 1     BuPROPion HCl ER, XL, 450 MG TB24 Take 450 mg by mouth daily 30 tablet 1     calcium carbonate (TUMS) 500 MG chewable tablet Take 1 chew tab by mouth as needed for heartburn       cetirizine (ZYRTEC) 10 MG tablet Take 10 mg by mouth daily       diphenhydrAMINE-acetaminophen (TYLENOL PM)  MG tablet Take 1 tablet by mouth nightly as needed for sleep       hydrOXYzine (ATARAX) 25 MG tablet Take 1-2 tablets (25-50 mg) by mouth every 4 hours as needed for anxiety 60 tablet 2     lurasidone (LATUDA) 80 MG TABS tablet Take 2 tablets (160 mg) by mouth daily (with dinner) 60 tablet 1     norethindrone-ethinyl estradiol (MICROGESTIN 1/20) 1-20 MG-MCG per tablet Take 1 tablet by mouth daily       OLANZapine (ZYPREXA) 10 MG tablet Take 1 tablet (10 mg) by mouth 2 times daily as needed 30 tablet 0     Omega-3 Fatty Acids (FISH OIL PO) Take 1,000 mg by mouth daily        perphenazine 16 MG tablet Take 1 tablet (16 mg) by mouth 2 times daily 60 tablet 1     traZODone (DESYREL) 50 MG tablet Take 1 tablet (50 mg) by mouth At Bedtime 30 tablet 1     VITALS                                                                                                                                   3, 3   There were no vitals taken for this visit.   MENTAL STATUS EXAM                                                                                      9, 14 cog gs     Alertness: alert , oriented and groggy  Appearance: casually groomed, wet hair  Behavior/Demeanor: cooperative and passive, with fair  eye contact   Speech: increased latency of response and slowed  Language: intact  Psychomotor: fidgety  Mood: description consistent with euthymia  Affect: blunted, ocasionally smiling; was congruent to mood; was congruent to content  Thought Process/Associations: response delay  Thought Content:  Reports delusions, preoccupations as described in HPI;  Denies suicidal ideation and violent ideation  Perception:  Reports auditory hallucinations (details in HPI);  Denies visual hallucinations  Insight: limited  Judgment: limited  Cognition: (6) does  appear grossly intact; formal cognitive testing was not done  Gait and Station: unremarkable    LABS and DATA     AIMS:  AIMS: done 4/9/18 with total score of 0; no signficant abnormal movements, a fine tremor in hands bilaterally, unchanged from last exam - due next 4/2019    EKG:  April 2017 which showed QTc of 424ms; Feb 2018 with QTc of 433ms    PHQ9 TODAY = 6  PHQ-9 SCORE 11/29/2017 1/4/2018 4/9/2018   Total Score - - -   Total Score 7 6 6       ANTIPSYCHOTIC LABS  [glu, A1C, lipids (sven LDL), liver enzymes, WBC, ANEU, Hgb, plts]  q12 mo  Recent Labs   Lab Test  02/06/18   1655  10/05/17   1108   04/12/17   1024   GLC  114*  81   < >  83   A1C   --    --    --   5.0    < > = values in this interval not displayed.     Recent Labs   Lab Test  09/21/17   0750  04/12/17   1024   CHOL  181  207*   TRIG  144  143   LDL  108*  135*   HDL  44*  43     Recent Labs   Lab Test  10/05/17   1108  09/21/17   0750   AST  18  18   ALT  22  21   ALKPHOS  64  76     Recent Labs   Lab Test  02/06/18   1655  10/05/17   1108    WBC  6.0  7.0   ANEU  3.5  4.7   HGB  13.8  15.5   PLT  Platelets clumped, platelet count unavailable  154       DIAGNOSIS     Schizoaffective Disorder, bipolar type, depressed episode, moderate  Unspecified Neurocognitive Disorder    ASSESSMENT                                                                                                          m2, h3     The patient is a 31 y/o female with past history of schizoaffective disorder, bipolar type, unspecified neurocognitive disorder, and historical diagnosis of borderline personality disorder transferring care from Dr. Sandoval. As described above, the patient has history primarily of two different symptom clusters that have contributed to decrease in social functioning; symptoms of psychosis (auditory hallucinations, often command in nature, and delusions of pregnancy) and depression/behavioral traits (frequent, chronic suicidal ideation w/ attempts to drown self in pond or run out into traffic). These ideas and attempts about harming herself have frequently been in response to minor stressors and/or small, perceived slights from other people in her life, which is part of the reason why borderline diagnosis has been given in the past. The patient had a difficult past year, which included three hospitalizations, the first of which in Sep 2017 followed her brother's death by suicide. Despite Peña-Wilkins, she has not received ECT since October 2017. Last hospitalization was in February 2018.    Today, both patient and her  report relative stability in symptoms. There have been no instances of patient attempting to elope from group home or walk to pond/into traffic since last clinic visit. Thoughts about being pregnant have been less intense. She endorses continued fatigue - she has not yet followed up on sleep referral, which I encouraged patient and group home staff member to do. Mother is also attempting to get patient into more day treatment  "services, which I think is a good idea. Patient continues to experience auditory hallucinations, and interestingly described her eyes \"going above my glasses\" both when she experiences hallucinations and when she doesn't. She does report that its uncomfortable. It is difficult to know for sure, but I suspect that patient may be experiencing oculogyric EPS related to antipsychotic burden. I will make extra benztropine dose available PRN to use and assess response. This was explained to both patient and group home staff member. I will keep remaining med doses the same. Patient will return to clinic in 4 weeks for reevaluation.    SUICIDE RISK ASSESSMENT:    Karolyn Banerjee reports no SI, SIB, or HI.  In addition, she has notable risk factors for self-harm including previous suicide attempt, hallucinations, recent loss, relationship conflict and recent inpt stay.  However, risk is mitigated by no plan or intent, symptom improvement, future oriented, none to minimal alcohol use , commitment to family, good social support   and stable housing.  Based on all available evidence she does not appear to be at imminent risk for self-harm therefore does not meet criteria for a 72-hr hold/  involuntary hospitalization.  However, based on degree of symptoms close psych FU was recommended which the pt did agree to. Group home/mother working on getting patient to more day treatment. Additional steps to minimize risk include: frequent clinic visits and med changes.      [place] PRESCRIPTION MONITORING PROGRAM [] was not checked today:  not using controlled substances.    PSYCHOTROPIC DRUG INTERACTIONS:   Hydroxyzine and olanzapine or perphenazine or trazodone may result in increased risk of QT interval prolongation.    Perphenazine and benztropine may result in decreased phenothiazine serum concentrations, decreased phenothiazine effectiveness, enhanced anticholinergic effects (ileus, hyperpyrexia, sedation, dry mouth).   Trazodone " and perphenazine may result in hypotension.  Bupropion + Hydroxyzine: Concurrent use of bupropion and hydroxyzine may result in lower seizure threshold.   MANAGEMENT:  Monitoring for adverse effects, routine vitals, periodic EKGs and patient is aware of risks     PLAN                                                                                                                       m2, h3     1) PSYCHOTROPIC MEDICATIONS:  - Continue lurasidone 160 mg daily w/ dinner  - Continue perphenazine 16 mg BID  - Continue olanzapine 10 mg BID PRN for psychosis  - Continue benztropine 0.5 mg BID - add 0.5 mg daily PRN dose for possible oculogyric movement disturbances  - Continue bupropion  mg daily  - Continue trazodone 50 mg at bedtime  - Continue hydroxyzine 25-50 mg q4hrs PRN for anxiety    - Continuing to coordinate w/ Dr. Amos regarding need for restarting ECT    2) THERAPY:    none    3) NEXT DUE:    Labs- AP labs due September 2018  EKG- Last EKG Feb 2018, as needed  Rating Scales- AIMS due April 2019    4) REFERRALS:    No Referrals needed     5) RTC: 4 weeks    6) CRISIS NUMBERS:   Provided routinely in [facility] [phone] (clinic)    [phone] (after hours)  none    TREATMENT RISK STATEMENT:  The risks, benefits, alternatives and potential adverse effects have been discussed and are understood by the pt. The pt understands the risks of using street drugs or alcohol. There are no medical contraindications, the pt agrees to treatment with the ability to do so. The pt knows to call the clinic for any problems or to access emergency care if needed.  Medical and substance use concerns are documented above.  Psychotropic drug interaction check was done, including changes made today.    PROVIDER: Harvey Joyce MD    Patient staffed in clinic with Dr. Kerr who will sign the note.  Supervisor is Dr. Kerr.    Supervisor Attestation:  I saw Karolyn Banerjee with the resident, and participated  in key portions of the service, including the mental status examination and developing the plan of care. I reviewed key portions of the history with the resident. I agree with the findings and plan as documented in this note.  Freddy Kerr MD

## 2018-07-20 NOTE — NURSING NOTE
Chief Complaint   Patient presents with     Recheck Medication     Schizoaffective disorder, bipolar type

## 2018-07-21 ASSESSMENT — PATIENT HEALTH QUESTIONNAIRE - PHQ9: SUM OF ALL RESPONSES TO PHQ QUESTIONS 1-9: 6

## 2018-07-31 ENCOUNTER — TELEPHONE (OUTPATIENT)
Dept: SLEEP MEDICINE | Facility: CLINIC | Age: 33
End: 2018-07-31

## 2018-07-31 NOTE — TELEPHONE ENCOUNTER
Left a message for Arturo informing her that there were two messages left by [facility] to schedule Karolyn for her CPAP set-up appointment. Gave her the numbers to the [facility] show room and the general customer service number for [facility]. em

## 2018-07-31 NOTE — TELEPHONE ENCOUNTER
Reason for Call:  Other call back    Detailed comments: Arturo from [facility] is calling regarding patient and she would like to know what are the next steps after patient sleep study. Does patient get a machine.    Phone Number Patient can be reached at: Other phone number:  [phone]    Best Time: anytime     Can we leave a detailed message on this number? YES    Call taken on 7/31/2018 at 10:52 AM by Suzanne Borges

## 2018-08-01 ENCOUNTER — CARE COORDINATION (OUTPATIENT)
Dept: PSYCHIATRY | Facility: CLINIC | Age: 33
End: 2018-08-01

## 2018-08-01 NOTE — PROGRESS NOTES
Rec'd call from Bre pt's CM with [facility].  Shares that pt's commitment will be expiring in October.  Seeking Dr. Joyce recommendations for allowing commitment to  vs pursing re-commitment.  She is aware that provider has only met with pt once.  Will notify Dr. Joyce of call.  Bre can be reached at [phone].

## 2018-08-02 NOTE — PROGRESS NOTES
- Received a call back from the  Sole at [facility].   - Relayed Dr. Joyce message   - Per Sole, If provider is pursing a recommitment for patient would need to submit examiner statement which will be faxed over once decision is made.   - Will route to provider as an FYI

## 2018-08-02 NOTE — PROGRESS NOTES
- Returned a call back to BERHANE Díaz at [facility] regarding patients commitment to relay Dr. Joyce message.   - M with call back number

## 2018-08-02 NOTE — PROGRESS NOTES
Clinic Care Coordination - Follow-up (call from )            Harvey Joyce MD   You 2 hours ago (12:07 PM)                 Sounds good, thanks for letting me know.     Fidel (Routing comment)

## 2018-08-02 NOTE — PROGRESS NOTES
Clinic Care Coordination - Follow-up (call from )            Harvey Joyce MD Blake, Katherine J, RN 1 hour ago (8:01 AM)                 Garrick Franco,   I would likely want to meet with Karolyn a few more times before forming an opinion on this. I would also want to touch base with Dr. Sandoval and Dr. Amos.     Fidel (Routing comment)

## 2018-08-13 ENCOUNTER — TELEPHONE (OUTPATIENT)
Dept: SLEEP MEDICINE | Facility: CLINIC | Age: 33
End: 2018-08-13

## 2018-08-13 DIAGNOSIS — G47.33 OSA (OBSTRUCTIVE SLEEP APNEA): ICD-10-CM

## 2018-08-13 NOTE — TELEPHONE ENCOUNTER
Called and spoke to Arturo (Patient's Care Coordinator) and rescheduled new PAP setup for Friday 8/17/18 at 1:30pm in [facility].

## 2018-08-22 ENCOUNTER — DOCUMENTATION ONLY (OUTPATIENT)
Dept: SLEEP MEDICINE | Facility: CLINIC | Age: 33
End: 2018-08-22

## 2018-08-22 DIAGNOSIS — G47.33 OSA (OBSTRUCTIVE SLEEP APNEA): ICD-10-CM

## 2018-08-22 NOTE — PROGRESS NOTES
PT AND  NO SHOWED FOR 8/22/18 APT IN [facility], SENT MESSAGE TO DR. SWAN VIA [email or web address].

## 2018-08-31 ENCOUNTER — OFFICE VISIT (OUTPATIENT)
Dept: PSYCHIATRY | Facility: CLINIC | Age: 33
End: 2018-08-31
Attending: PSYCHIATRY & NEUROLOGY
Payer: MEDICAID

## 2018-08-31 VITALS
WEIGHT: 259.6 LBS | SYSTOLIC BLOOD PRESSURE: 131 MMHG | BODY MASS INDEX: 43.2 KG/M2 | DIASTOLIC BLOOD PRESSURE: 86 MMHG | HEART RATE: 94 BPM

## 2018-08-31 DIAGNOSIS — F25.1 SCHIZOAFFECTIVE DISORDER, DEPRESSIVE TYPE (H): ICD-10-CM

## 2018-08-31 DIAGNOSIS — R46.89 AGGRESSION: ICD-10-CM

## 2018-08-31 DIAGNOSIS — F25.0 SCHIZOAFFECTIVE DISORDER, BIPOLAR TYPE (H): ICD-10-CM

## 2018-08-31 PROCEDURE — G0463 HOSPITAL OUTPT CLINIC VISIT: HCPCS | Mod: ZF

## 2018-08-31 RX ORDER — PERPHENAZINE 16 MG/1
16 TABLET ORAL 2 TIMES DAILY
Qty: 60 TABLET | Refills: 1 | Status: SHIPPED | OUTPATIENT
Start: 2018-09-17 | End: 2018-11-02

## 2018-08-31 RX ORDER — BUPROPION HYDROCHLORIDE 450 MG/1
450 TABLET, FILM COATED, EXTENDED RELEASE ORAL DAILY
Qty: 30 TABLET | Refills: 1 | Status: SHIPPED | OUTPATIENT
Start: 2018-09-17 | End: 2018-11-02

## 2018-08-31 RX ORDER — BENZTROPINE MESYLATE 0.5 MG/1
TABLET ORAL
Qty: 90 TABLET | Refills: 1 | Status: SHIPPED | OUTPATIENT
Start: 2018-08-31 | End: 2018-11-02

## 2018-08-31 RX ORDER — TRAZODONE HYDROCHLORIDE 50 MG/1
50 TABLET, FILM COATED ORAL AT BEDTIME
Qty: 30 TABLET | Refills: 1 | Status: SHIPPED | OUTPATIENT
Start: 2018-09-17 | End: 2018-11-02

## 2018-08-31 RX ORDER — LURASIDONE HYDROCHLORIDE 80 MG/1
160 TABLET, FILM COATED ORAL
Qty: 60 TABLET | Refills: 1 | Status: SHIPPED | OUTPATIENT
Start: 2018-09-17 | End: 2018-11-02

## 2018-08-31 ASSESSMENT — PAIN SCALES - GENERAL: PAINLEVEL: NO PAIN (0)

## 2018-08-31 NOTE — PROGRESS NOTES
"[facility] PSYCHIATRY CLINIC PROGRESS NOTE     CARE TEAM:  PCP- Suzie Mariscal    Specialty Providers- none      Therapist- none   Community  Team- [facility] worker, [facility], group home staff    Guardian: Mary \"Swetha\" Chriss (mother)- [phone] (okay to leave message) or [phone]    Karolyn Banerjee is a 33 year old female who prefers the name Karolyn & pronouns she, her, hers.  Date of initial diagnostic assessment was prior to 2008.  Date of most recent transfer of care assessment is 7/20/18.    Pertinent Background:  This patient first experienced mental health issues as a child and has received treatment for schizoaffective disorder, bipolar type, borderline personality disorder, and unspecified neurocognitive disorder.  See last diagnostic/transfer of care assessment for detailed history.  Notably, the patient's symptoms have historically included psychosis (auditory hallucinations, delusions related to pregnancy, agitation) and suicide attempts/threats, often in reaction to perceived slights or difficult interactions with group home staff/residents. She has been tried on many different medication trials in the past, including two trials of clozapine that caused neutropenia. ECT along with medication has been the most helpful treatment for maintaining stability. Despite this, patient has not received ECT since fall 2017 and has remained relatively stable (last hospitalization February 2018). She is currently under commitment w/ Brian and Casey-Wilkins in place. This expires November 2, 2018.      Psych critical item history includes suicide attempt [multiple], suicidal ideation, SIB [per chart], psychosis [sxs include auditory hallucinations, delusions], mutiple psychotropic trials, severe med reaction, violent behavior, psych hosp (>5), commitment and ECT.    INTERIM HISTORY                                                                                              4, 4   The patient reports good " "treatment adherence.  History was provided by the patient and and  who was a limited historian.  The last visit ended with no change to the med regimen.     Since the last visit:   - Continuing to experience oculogyric symptoms, which she describes as \"eyes becoming stuck\"  - She feels like it happens every day  - She has been using an extra benztropine dose 2-3 times per week; she does feel like symptoms resolve more quickly when she uses it  - She does have some days where she doesn't ask for extra dose  - Using Zyprexa PRN on average about once per week    - Mood symptoms remain unchanged; she does endorse thoughts about walking out into traffic[place] but has not acted upon them  - Infrequently having thoughts about being pregnant  - Sleep continues to be difficult; fatigued frequently throughout the day  - Concerned about weight gain; felt that Wellbutrin may have contributed to weight gain     - Did have follow-up scheduled after sleep study, but did not attend appointment because she was on trip with mother to visit sick grandmother  -  not planning on renewing commitment at this time    RECENT SYMPTOMS:   DEPRESSION:  reports-anhedonia, low energy, insomnia and poor concentration /memory;  DENIES- suicidal ideation and self-destructive thoughts  JUDE/HYPOMANIA:  reports-none;  DENIES- increased energy, decreased sleep need, increased activity and grandiosity  PSYCHOSIS:  reports-auditory hallucinations;  DENIES- delusions and visual hallucinations  DYSREGULATION:  reports-none;  DENIES- suicidal ideation, violent ideation and SIB  PANIC ATTACK:  none   ANXIETY:  excessive worry  TRAUMA RELATED:  none  COMPULSIVE:  none  SLEEP:  Poor sleep    EATING DISORDER: none     RECENT SUBSTANCE USE:     ALCOHOL- none      TOBACCO- none     CAFFEINE- soda [couple per day]  OPIOIDS- none         NARCAN KIT- N/A        CANNABIS- none            OTHER ILLICIT DRUGS- none    " "  CURRENT SOCIAL HISTORY:  FINANCIAL SUPPORT- social security disability       CHILDREN- none      LIVING SITUATION- Lives at group home - [facility] - in [place].      SOCIAL/ SPIRITUAL SUPPORT- boyfriend Remy, family members, mental health support staff   FEELS SAFE AT HOME- yes     MEDICAL ROS (2,10):  Reports insomnia, muscle problems [eyes sometimes get stuck \"up\"]   Denies short term memory and/or word finding difficulty, wt gain, akathisia, spasms, bruxism, easy bruising , polydipsia, polyphagia and Parkinsonian-type symptoms [shuffling gait, masked facies, stooped posture, speech change, writing change]    PSYCH and CD Critical Summary Points since July 2018 July 2018 - Added additional benztropine PRN dose for potential oculogyric symptoms  August 2018 - Increased benztropine dose to address oculogyric symptoms    PAST PSYCH MED TRIALS   see EMR Problem List: Hx of psychiatric care    MEDICAL / SURGICAL HISTORY                                   Pregnant or breastfeeding- none      Contraception- OCP    Neurologic Hx- not asked   Patient Active Problem List   Diagnosis     Mild cognitive impairment     Borderline personality disorder     Asthma     Nonorganic enuresis     Schizoaffective disorder, depressive type (H)     Fx humerus shaft-closed     Port catheter in place     MENTAL HEALTH     Suicidal ideation     Hx of psychiatric care     DVT (deep vein thrombosis) in pregnancy (H)     Suicide attempt     Suicidal ideations     JOSE (obstructive sleep apnea)       ALLERGY                                Clozapine; Risperdal; and Tape [adhesive tape]  MEDICATIONS                               Current Outpatient Prescriptions   Medication Sig Dispense Refill     ACETAMINOPHEN PO Take 500 mg by mouth as needed for pain       albuterol (PROAIR HFA/PROVENTIL HFA/VENTOLIN HFA) 108 (90 BASE) MCG/ACT Inhaler Inhale 2 puffs into the lungs every 6 hours as needed for shortness of breath / dyspnea or " wheezing       benztropine (COGENTIN) 0.5 MG tablet Take 1 tablet (0.5 mg) by mouth daily as needed (as needed for when patient's eyes are stuck in upward position) 30 tablet 1     benztropine (COGENTIN) 0.5 MG tablet Take 1 tablet (0.5 mg) by mouth 2 times daily 60 tablet 1     BuPROPion HCl ER, XL, 450 MG TB24 Take 450 mg by mouth daily 30 tablet 1     calcium carbonate (TUMS) 500 MG chewable tablet Take 1 chew tab by mouth as needed for heartburn       cetirizine (ZYRTEC) 10 MG tablet Take 10 mg by mouth daily       diphenhydrAMINE-acetaminophen (TYLENOL PM)  MG tablet Take 1 tablet by mouth nightly as needed for sleep       hydrOXYzine (ATARAX) 25 MG tablet Take 1-2 tablets (25-50 mg) by mouth every 4 hours as needed for anxiety 60 tablet 2     lurasidone (LATUDA) 80 MG TABS tablet Take 2 tablets (160 mg) by mouth daily (with dinner) 60 tablet 1     norethindrone-ethinyl estradiol (MICROGESTIN 1/20) 1-20 MG-MCG per tablet Take 1 tablet by mouth daily       OLANZapine (ZYPREXA) 10 MG tablet Take 1 tablet (10 mg) by mouth 2 times daily as needed 30 tablet 1     Omega-3 Fatty Acids (FISH OIL PO) Take 1,000 mg by mouth daily        perphenazine 16 MG tablet Take 1 tablet (16 mg) by mouth 2 times daily 60 tablet 1     traZODone (DESYREL) 50 MG tablet Take 1 tablet (50 mg) by mouth At Bedtime 30 tablet 1     VITALS                                                                                                            3, 3   /86  Pulse 94  Wt 117.8 kg (259 lb 9.6 oz)  BMI 43.2 kg/m2   MENTAL STATUS EXAM                                                          9, 14 cog gs     Alertness: alert , oriented and groggy  Appearance: casually groomed, long red hair  Behavior/Demeanor: cooperative and passive, with fair  eye contact   Speech: increased latency of response and slowed  Language: intact  Psychomotor: fidgety  Mood: depressed  Affect: blunted, ocasionally smiling; was congruent to mood; was  congruent to content  Thought Process/Associations: response delay  Thought Content:  Reports delusions, preoccupations as described in HPI;  Denies suicidal ideation and violent ideation  Perception:  Reports auditory hallucinations (details in HPI);  Denies visual hallucinations  Insight: limited  Judgment: limited  Cognition: (6) does  appear grossly intact; formal cognitive testing was not done  Gait and Station: unremarkable    LABS and DATA     AIMS:  AIMS: done 4/9/18 with total score of 0; no signficant abnormal movements, a fine tremor in hands bilaterally, unchanged from last exam - due next 4/2019    EKG:  April 2017 which showed QTc of 424ms; Feb 2018 with QTc of 433ms    PHQ9 TODAY = 6  PHQ-9 SCORE 1/4/2018 4/9/2018 7/20/2018   Total Score - - -   Total Score 6 6 6       ANTIPSYCHOTIC LABS  [glu, A1C, lipids (sven LDL), liver enzymes, WBC, ANEU, Hgb, plts]  q12 mo  Recent Labs   Lab Test  02/06/18   1655  10/05/17   1108   04/12/17   1024   GLC  114*  81   < >  83   A1C   --    --    --   5.0    < > = values in this interval not displayed.     Recent Labs   Lab Test  09/21/17   0750  04/12/17   1024   CHOL  181  207*   TRIG  144  143   LDL  108*  135*   HDL  44*  43     Recent Labs   Lab Test  10/05/17   1108  09/21/17   0750   AST  18  18   ALT  22  21   ALKPHOS  64  76     Recent Labs   Lab Test  02/06/18   1655  10/05/17   1108   WBC  6.0  7.0   ANEU  3.5  4.7   HGB  13.8  15.5   PLT  Platelets clumped, platelet count unavailable  154       DIAGNOSIS     Schizoaffective Disorder, bipolar type, depressed episode, moderate  Unspecified Neurocognitive Disorder    ASSESSMENT                                                                                   m2, h3     Today, Karolyn is reporting continued difficulty with oculogyric symptoms that she described at last visit. She has been using additional PRN sparingly; when she does use it she finds it moderately helpful. She does endorse thoughts about  running out into street and/or lake, which has been a problem in the past, but denies taking any action. She feels mood symptoms are about the same compared to last visit - no suicide attempts or gestures (see risk statement below). She endorses concern about weight gain since she was hospitalized this past February and attributes weight gain to Wellbutrin (only new med started during hospitalization).    I will increase her nighttime dose of benztropine to target oculogyric symptoms. Her entire med regimen likely needs review in attempt to determine necessity of multiple antipsychotics and possibility that polypharmacy is contributing to side effect profile. She did endorse concern about weigh gain; I don't think Wellbutrin has played a significant role because has stayed about the same since February. She is morbidly obese, which is more likely a combination of other medications (olanzapine, lurasidone) and poor health habits (diet, exercise). She did express interest in meeting w/ weight management and I provided contact info for their clinic. She will return to clinic in 6 weeks or sooner if needed for reevaluation.    SUICIDE RISK ASSESSMENT:    Karolyn Banerjee reports no SI, SIB, or HI.  In addition, she has notable risk factors for self-harm including previous suicide attempt, hallucinations, recent loss, relationship conflict and recent inpt stay.  However, risk is mitigated by no plan or intent, symptom improvement, future oriented, none to minimal alcohol use , commitment to family, good social support   and stable housing.  Based on all available evidence she does not appear to be at imminent risk for self-harm therefore does not meet criteria for a 72-hr hold/  involuntary hospitalization.  However, based on degree of symptoms close psych FU was recommended which the pt did agree to. Group home/mother working on getting patient to more day treatment. Additional steps to minimize risk include: frequent  clinic visits and med changes.      [place] PRESCRIPTION MONITORING PROGRAM [] was not checked today:  not using controlled substances.    PSYCHOTROPIC DRUG INTERACTIONS:   Hydroxyzine and olanzapine or perphenazine or trazodone may result in increased risk of QT interval prolongation.    Perphenazine and benztropine may result in decreased phenothiazine serum concentrations, decreased phenothiazine effectiveness, enhanced anticholinergic effects (ileus, hyperpyrexia, sedation, dry mouth).   Trazodone and perphenazine may result in hypotension.  Bupropion + Hydroxyzine: Concurrent use of bupropion and hydroxyzine may result in lower seizure threshold.   MANAGEMENT:  Monitoring for adverse effects, routine vitals, periodic EKGs and patient is aware of risks     PLAN                                                                                                   m2, h3     1) PSYCHOTROPIC MEDICATIONS:  - Continue lurasidone 160 mg daily w/ dinner  - Continue perphenazine 16 mg BID  - Continue olanzapine 10 mg BID PRN for psychosis  - Increase benztropine to 0.5 mg qAM and 1 mg qHS  - Continue bupropion  mg daily  - Continue trazodone 50 mg at bedtime  - Continue hydroxyzine 25-50 mg q4hrs PRN for anxiety    - Continuing to coordinate w/ Dr. Amos regarding need for restarting ECT    2) THERAPY:    none    3) NEXT DUE:    Labs- AP labs due next visit  EKG- Last EKG Feb 2018, as needed  Rating Scales- AIMS due April 2019    4) REFERRALS:    No Referrals needed     5) RTC: 4 weeks    6) CRISIS NUMBERS:   Provided routinely in [facility].   [facility] [phone] (clinic)    [phone] (after hours)  none    TREATMENT RISK STATEMENT:  The risks, benefits, alternatives and potential adverse effects have been discussed and are understood by the pt. The pt understands the risks of using street drugs or alcohol. There are no medical contraindications, the pt agrees to treatment with the ability to do so. The pt knows  to call the clinic for any problems or to access emergency care if needed.  Medical and substance use concerns are documented above.  Psychotropic drug interaction check was done, including changes made today.    PSYCHIATRY CLINIC INDIVIDUAL PSYCHOTHERAPY NOTE                                                  [16]   Start time - 1325           End time - 1348  Date last reviewed - 08/31/18       Date next due - 11/29/18 (or 12 months if Medicare)    Subjective: This supportive psychotherapy session addressed issues related to orientation to therapy  and goals of therapy .  Patient's reaction: Action and Maintenance in the context of mental status appropriate for ambulatory setting.  Progress: good  Plan: RTC 6 weeks  Psychotherapy services during this visit included  myself and the patient.   TREATMENT  PLAN          SYMPTOMS; PROBLEMS   MEASURABLE GOALS;    FUNCTIONAL IMPROVEMENT INTERVENTIONS;   GAINS MADE DISCHARGE CRITERIA   Depression: low energy and insomnia   reduce depressive symptoms and reduce depressive episodes meds/therapy marked symptom improvement   Depression: increase activities   improve frequency of activities meds/therapy marked symptom improvement       PROVIDER: Harvey Joyce MD    Patient staffed in clinic with Dr. Durant who will sign the note.  Supervisor is Dr. Kerr.  I saw the patient with the resident, and participated in key portions of the service, including the mental status examination and developing the plan of care. I reviewed key portions of the history with the resident. I agree with the findings and plan as documented in this note.  As I review this med list, again, it is notable for a lot of anticholinergic/ antihistaminergic components-not only in the multiple antipsychotics but also the other meds hydroxyzine, cogentin, Tylenol PM and trazodone. The cogentin is needed for dystonia but the others should be cut down. I would be hesitant to remove Tylenol PM at this point,  until the dystonia is under control, but then would not use it with cogentin. However, she should not be taking both trazodone and hydroxyzine. Will move towards getting rid of one of those- I recommend keeping hydroxyzine and removing trazodone as hydroxyzine has weak anticholinergic action-trazodone does not. I would discuss in supervision and plan to decrease the antipsychotic load. Perhaps a MTM could help assess which antipsychotics to keep and which should be decreased.    Tika Durant

## 2018-08-31 NOTE — MR AVS SNAPSHOT
After Visit Summary   8/31/2018    Karolyn Banerjee    MRN: [id number]           Patient Information     Date Of Birth          1985        Visit Information        Provider Department      8/31/2018 1:10 PM Harvey Joyce MD [facility]        Today's Diagnoses     Schizoaffective disorder, bipolar type (H)        Aggression        Schizoaffective disorder, depressive type (H)          Care Instructions    1. Increase evening dose of Cogentin to 1 mg at bedtime.  2. Keep remaining doses of the medications the same.  3. I think evaluation at the sleep clinic would be helpful to address fatigue problems.  4. Return to clinic in 6 weeks.      Weight management clinic:    Counts include 234 beds at the [facility]  Weight Loss Management and Surgery   Cl[facility] Center ([facility]  Floor 4  [address] [place] [address]   Appts: [phone]  Provider Referrals: [phone]           Follow-ups after your visit        Follow-up notes from your care team     Return in about 6 weeks (around 10/12/2018).      Your next 10 appointments already scheduled     Oct 22, 2018  9:40 AM CDT   Adult Med Follow UP with Harvey Joyce MD   Psychiatry Clinic ([facility])    [place] [id number]  [address] [place] [address] [phone]            Oct 24, 2018 11:30 AM CDT   Return Sleep Patient with Yfn Kirkpatrick MD   [facility] ([facility])    [address] [id number] [phone]              Who to contact     Please call your clinic at [phone] to:    Ask questions about your health    Make or cancel appointments    Discuss your medicines    Learn about your test results    Speak to your doctor            Additional Information About Your Visit        MyChart Information     [employer] is an electronic gateway that provides easy, online access to your medical records. With  [employer], you can request a clinic appointment, read your test results, renew a prescription or communicate with your care team.     To sign up for [employer] visit the website at [email or web address]   You will be asked to enter the access code listed below, as well as some personal information. Please follow the directions to create your username and password.     Your access code is: [email or web address]  Expires: 10/25/2018  8:37 PM     Your access code will  in 90 days. If you need help or a new code, please contact your [facility] or call [phone] for assistance.        Care EveryWhere ID     This is your Care EveryWhere ID. This could be used by other organizations to access your [facility] medical records  [id number]        Your Vitals Were     Pulse BMI (Body Mass Index)                94 43.2 kg/m2           Blood Pressure from Last 3 Encounters:   18 131/86   18 126/88   18 125/84    Weight from Last 3 Encounters:   18 117.8 kg (259 lb 9.6 oz)   18 117.9 kg (260 lb)   18 116.4 kg (256 lb 11.2 oz)              Today, you had the following     No orders found for display         Today's Medication Changes          These changes are accurate as of 18 11:59 PM.  If you have any questions, ask your nurse or doctor.               These medicines have changed or have updated prescriptions.        Dose/Directions    benztropine 0.5 MG tablet   Commonly known as:  COGENTIN   This may have changed:    - how much to take  - how to take this  - when to take this  - additional instructions  - Another medication with the same name was removed. Continue taking this medication, and follow the directions you see here.   Used for:  Schizoaffective disorder, bipolar type (H), Aggression   Changed by:  Harvey Joyce MD        Take one tab (0.5 mg) in the morning and two tabs (1 mg) at bedtime.   Quantity:  90 tablet   Refills:  1        BuPROPion HCl ER (XL) 450 MG Tb24   This may have changed:  These instructions start on 9/17/2018. If you are unsure what to do until then, ask your doctor or other care provider.   Used for:  Schizoaffective disorder, depressive type (H)   Changed by:  Harvey Joyce MD        Dose:  450 mg   Start taking on:  9/17/2018   Take 450 mg by mouth daily   Quantity:  30 tablet   Refills:  1       lurasidone 80 MG Tabs tablet   Commonly known as:  LATUDA   This may have changed:  These instructions start on 9/17/2018. If you are unsure what to do until then, ask your doctor or other care provider.   Used for:  Schizoaffective disorder, bipolar type (H), Aggression   Changed by:  Harvey Joyce MD        Dose:  160 mg   Start taking on:  9/17/2018   Take 2 tablets (160 mg) by mouth daily (with dinner)   Quantity:  60 tablet   Refills:  1       perphenazine 16 MG tablet   This may have changed:  These instructions start on 9/17/2018. If you are unsure what to do until then, ask your doctor or other care provider.   Used for:  Schizoaffective disorder, depressive type (H)   Changed by:  Harvey Joyce MD        Dose:  16 mg   Start taking on:  9/17/2018   Take 1 tablet (16 mg) by mouth 2 times daily   Quantity:  60 tablet   Refills:  1       traZODone 50 MG tablet   Commonly known as:  DESYREL   This may have changed:  These instructions start on 9/17/2018. If you are unsure what to do until then, ask your doctor or other care provider.   Used for:  Schizoaffective disorder, bipolar type (H), Aggression   Changed by:  Harvey Joyce MD        Dose:  50 mg   Start taking on:  9/17/2018   Take 1 tablet (50 mg) by mouth At Bedtime   Quantity:  30 tablet   Refills:  1            Where to get your medicines      These medications were sent to [facility] - [place], [place] - [address]  [address] [place] [address]     Phone:  [phone]     benztropine 0.5 MG tablet    BuPROPion HCl ER  (XL) 450 MG Tb24    lurasidone 80 MG Tabs tablet    perphenazine 16 MG tablet    traZODone 50 MG tablet                Primary Care Provider Office Phone # Fax #    Suzie Mariscal [phone]       [facility] [address] [address]        Equal Access to Services     KAYA STRICKLAND : Hadii aad ku hadasho Soomaali, waaxda luqadaha, qaybta kaalmada adeegyada, waxay idiin hayaan adeeg khpalomash lajeanmarien . So [facility] [phone].    ATENCIÓN: Si habla español, tiene a stauffer disposición servicios gratuitos de asistencia lingüística. [facility] [phone].    We comply with applicable federal civil rights laws and [place] laws. We do not discriminate on the basis of race, color, national origin, age, disability, sex, sexual orientation, or gender identity.            Thank you!     Thank you for choosing [facility]  for your care. Our goal is always to provide you with excellent care. Hearing back from our patients is one way we can continue to improve our services. Please take a few minutes to complete the written survey that you may receive in the mail after your visit with us. Thank you!             Your Updated Medication List - Protect others around you: Learn how to safely use, store and throw away your medicines at www.disposemymeds.org.          This list is accurate as of 8/31/18 11:59 PM.  Always use your most recent med list.                   Brand Name Dispense Instructions for use Diagnosis    ACETAMINOPHEN PO      Take 500 mg by mouth as needed for pain        albuterol 108 (90 Base) MCG/ACT inhaler    PROAIR HFA/PROVENTIL HFA/VENTOLIN HFA     Inhale 2 puffs into the lungs every 6 hours as needed for shortness of breath / dyspnea or wheezing        benztropine 0.5 MG tablet    COGENTIN    90 tablet    Take one tab (0.5 mg) in the morning and two tabs (1 mg) at bedtime.    Schizoaffective disorder, bipolar type (H), Aggression       BuPROPion HCl ER (XL)  450 MG Tb24   Start taking on:  9/17/2018     30 tablet    Take 450 mg by mouth daily    Schizoaffective disorder, depressive type (H)       calcium carbonate 500 MG chewable tablet    TUMS     Take 1 chew tab by mouth as needed for heartburn        cetirizine 10 MG tablet    zyrTEC     Take 10 mg by mouth daily        diphenhydrAMINE-acetaminophen  MG tablet    TYLENOL PM     Take 1 tablet by mouth nightly as needed for sleep        FISH OIL PO      Take 1,000 mg by mouth daily        hydrOXYzine 25 MG tablet    ATARAX    60 tablet    Take 1-2 tablets (25-50 mg) by mouth every 4 hours as needed for anxiety    Schizoaffective disorder, bipolar type (H), Aggression       lurasidone 80 MG Tabs tablet   Start taking on:  9/17/2018    LATUDA    60 tablet    Take 2 tablets (160 mg) by mouth daily (with dinner)    Schizoaffective disorder, bipolar type (H), Aggression       norethindrone-ethinyl estradiol 1-20 MG-MCG per tablet    MICROGESTIN 1/20     Take 1 tablet by mouth daily        OLANZapine 10 MG tablet    zyPREXA    30 tablet    Take 1 tablet (10 mg) by mouth 2 times daily as needed    Schizoaffective disorder, bipolar type (H), Aggression       perphenazine 16 MG tablet   Start taking on:  9/17/2018     60 tablet    Take 1 tablet (16 mg) by mouth 2 times daily    Schizoaffective disorder, depressive type (H)       traZODone 50 MG tablet   Start taking on:  9/17/2018    DESYREL    30 tablet    Take 1 tablet (50 mg) by mouth At Bedtime    Schizoaffective disorder, bipolar type (H), Aggression

## 2018-08-31 NOTE — NURSING NOTE
Chief Complaint   Patient presents with     RECHECK     Encounter for long-term (current) use of medications

## 2018-08-31 NOTE — PATIENT INSTRUCTIONS
1. Increase evening dose of Cogentin to 1 mg at bedtime.  2. Keep remaining doses of the medications the same.  3. I think evaluation at the sleep clinic would be helpful to address fatigue problems.  4. Return to clinic in 6 weeks.      Weight management clinic:    [facility]  Weight Loss Management and Surgery   Clinics and Surgery Center ([facility])  Floor 4  [address] [place] [address]   Appts: [phone]  Provider Referrals: [phone]

## 2018-09-01 ASSESSMENT — PATIENT HEALTH QUESTIONNAIRE - PHQ9: SUM OF ALL RESPONSES TO PHQ QUESTIONS 1-9: 5

## 2018-09-19 ENCOUNTER — TELEPHONE (OUTPATIENT)
Dept: SLEEP MEDICINE | Facility: CLINIC | Age: 33
End: 2018-09-19

## 2018-09-19 DIAGNOSIS — G47.33 OSA (OBSTRUCTIVE SLEEP APNEA): ICD-10-CM

## 2018-09-19 NOTE — TELEPHONE ENCOUNTER
Received voicemail into [facility] at 2:37 pm from patient's caretaker, Constantine, wanting to schedule PAP setup for patient. Scheduled for tomorrow at 1 pm at [facility].

## 2018-09-20 ENCOUNTER — DOCUMENTATION ONLY (OUTPATIENT)
Dept: SLEEP MEDICINE | Facility: CLINIC | Age: 33
End: 2018-09-20
Payer: MEDICAID

## 2018-09-20 DIAGNOSIS — G47.33 OSA (OBSTRUCTIVE SLEEP APNEA): ICD-10-CM

## 2018-09-20 NOTE — PROGRESS NOTES
Patient was offered choice of vendor and chose [facility].  Patient Karolyn Taylorpi was set up at [facility] on September 20, 2018. Patient received a Resmed AirSense 10 Auto. Pressures were set at 5-15 cm H2O.   Patient s ramp is 5 cm H2O for Auto and FLEX/EPR is 2.  Patient received a [employer] Mask name: Dreamwear  Full Face mask Size Medium, heated tubing and heated humidifier.  Patient is enrolled in the STM Program and does need to meet compliance. Patient will schedule follow up with Dr Yfn Kirkpatrick.    Zhane Monaco

## 2018-09-24 ENCOUNTER — DOCUMENTATION ONLY (OUTPATIENT)
Dept: SLEEP MEDICINE | Facility: CLINIC | Age: 33
End: 2018-09-24

## 2018-09-24 DIAGNOSIS — G47.33 OSA (OBSTRUCTIVE SLEEP APNEA): ICD-10-CM

## 2018-09-24 NOTE — Clinical Note
Hi just wanted to give you a heads up on this patient.  She just received CPAP a few days ago.  She states she can't leave on for longer than two minutes without ripping mask off.  I offered to make some changes for comfort and then asked if she would try again after changes she said no and does not want to try any more.  Staff is trying to work with her to get her to use it but I am not sure they will be successful.  Her follow up visit with you is not until 10/24/2018.  I spoke with Lety and she put her on a wait list for you to try and get her in sooner.  I will leave the next [facility] visit in place and see if there have been any changes in her usage.  Margaux

## 2018-09-24 NOTE — PROGRESS NOTES
3 DAY STM VISIT    Diagnostic AHI: 34.6   PSG    Patient contacted for 3 day STM visit  Subjective measures:   Spoke with patient.  She states that she is unwilling to use the CPAP.  Her main compliant seemed to be pressure related.  She states she is unable to use the mask for more than two minutes without pulling it off.  A pressure adjustment and comfort adjustments were offered however patient states that event with the change she will not be using the device.  We discussed moving up her follow up appt with Dr. Kirkpatrick to discuss alternative options and she is interested in this however she does not do her own scheduling and we will need to reach out to staff per pt request.    Called staff back again to see about scheduling a follow up visit sooner with her provider.  Staff states that have been talking with her about trying to use the device.  Discussed with staff the changes we can make on our end for comfort and staff was requested to reach out to us if they have any questions or feel we can help in any way.   Device type: Auto-CPAP  PAP settings from order::  CPAP min 5 cm  H20       CPAP max 15 cm  H20         Mask type:    Full Face Mask     Device settings from machine      Min CPAP 5.0            Max CPAP 15.0             Assessment: one night of usage reporting under 4 hours.   Action plan: Pt to have f/u 14 day [facility] visit.  Patient has a follow up visit scheduled:   yes within 31-90 days of set up.

## 2018-10-05 ENCOUNTER — DOCUMENTATION ONLY (OUTPATIENT)
Dept: SLEEP MEDICINE | Facility: CLINIC | Age: 33
End: 2018-10-05

## 2018-10-05 DIAGNOSIS — G47.33 OSA (OBSTRUCTIVE SLEEP APNEA): ICD-10-CM

## 2018-10-05 NOTE — PROGRESS NOTES
14 DAY STM VISIT    Diagnostic AHI: 34.6   PSG    Data only recheck-continued no usage by patient. She still has a follow up with Dr. Kirkpatrick on 10/24/2018.  Provider has been notified about non usage.      Assessment: Pt not meeting objective benchmarks for compliance     Action plan: pt to have 30 day STM visit data only, if patient is using call to be made.     Device type: Auto-CPAP  PAP settings: CPAP min 5.0 cm  H20     CPAP max 15.0 cm  H20

## 2018-10-23 ENCOUNTER — DOCUMENTATION ONLY (OUTPATIENT)
Dept: SLEEP MEDICINE | Facility: CLINIC | Age: 33
End: 2018-10-23

## 2018-10-23 DIAGNOSIS — G47.33 OSA (OBSTRUCTIVE SLEEP APNEA): ICD-10-CM

## 2018-10-23 NOTE — PROGRESS NOTES
30 DAY STM VISIT    Diagnostic AHI: 34.6   PSG    Data only recheck     Assessment: Pt not meeting objective benchmarks for compliance.  Per previous STM note pt is unwilling to use device.      Action plan:   Patient has scheduled a follow up visit with Dr. Kirkpatrick on 10/24/2018.   Device type: Auto-CPAP  PAP settings: CPAP min 5.0 cm  H20     CPAP max 15.0 cm  H20       Mask type:  Full Face Mask  Objective measures: 14 day rolling measures      Compliance 0.0 %         Objective measure goal  Compliance   Goal >70%  Leak   Goal < 24 lpm  AHI  Goal < 5  Usage  Goal >240

## 2018-10-24 ENCOUNTER — HOSPITAL ENCOUNTER (EMERGENCY)
Facility: CLINIC | Age: 33
Discharge: HOME | End: 2018-10-24
Attending: EMERGENCY MEDICINE | Admitting: EMERGENCY MEDICINE
Payer: MEDICAID

## 2018-10-24 ENCOUNTER — TELEPHONE (OUTPATIENT)
Dept: PSYCHIATRY | Facility: CLINIC | Age: 33
End: 2018-10-24

## 2018-10-24 VITALS
SYSTOLIC BLOOD PRESSURE: 114 MMHG | TEMPERATURE: 98 F | HEART RATE: 80 BPM | OXYGEN SATURATION: 97 % | DIASTOLIC BLOOD PRESSURE: 73 MMHG | RESPIRATION RATE: 16 BRPM

## 2018-10-24 DIAGNOSIS — F60.3 BORDERLINE PERSONALITY DISORDER (H): ICD-10-CM

## 2018-10-24 DIAGNOSIS — F25.1 SCHIZOAFFECTIVE DISORDER, DEPRESSIVE TYPE (H): ICD-10-CM

## 2018-10-24 PROCEDURE — 80320 DRUG SCREEN QUANTALCOHOLS: CPT | Performed by: EMERGENCY MEDICINE

## 2018-10-24 PROCEDURE — 99283 EMERGENCY DEPT VISIT LOW MDM: CPT | Mod: Z6 | Performed by: EMERGENCY MEDICINE

## 2018-10-24 PROCEDURE — 90791 PSYCH DIAGNOSTIC EVALUATION: CPT

## 2018-10-24 PROCEDURE — 81025 URINE PREGNANCY TEST: CPT | Performed by: EMERGENCY MEDICINE

## 2018-10-24 PROCEDURE — 80307 DRUG TEST PRSMV CHEM ANLYZR: CPT | Performed by: EMERGENCY MEDICINE

## 2018-10-24 PROCEDURE — 99285 EMERGENCY DEPT VISIT HI MDM: CPT | Mod: 25 | Performed by: EMERGENCY MEDICINE

## 2018-10-24 ASSESSMENT — ENCOUNTER SYMPTOMS
DYSPHORIC MOOD: 1
NERVOUS/ANXIOUS: 0
HALLUCINATIONS: 0

## 2018-10-24 NOTE — ED AVS SNAPSHOT
[facility], Emergency Department    [address]    [place] [address]    Phone:  [phone]    Fax:  [phone]                                       Karolyn Banerjee   MRN: [id number]    Department:  [facility], Emergency Department   Date of Visit:  10/24/2018           Patient Information     Date Of Birth          1985        Your diagnoses for this visit were:     Schizoaffective disorder, depressive type (H)     Borderline personality disorder (H)        You were seen by Nancy De Luna MD.        Discharge Instructions       Return to your group home and continue with current outpatient services.     24 Hour Appointment Hotline       To make an appointment at any [facility] clinic, call [phone] ([phone]). If you don't have a family doctor or clinic, we will help you find one. [facility] clinics are conveniently located to serve the needs of you and your family.             Review of your medicines      Our records show that you are taking the medicines listed below. If these are incorrect, please call your family doctor or clinic.        Dose / Directions Last dose taken    ACETAMINOPHEN PO   Dose:  500 mg        Take 500 mg by mouth as needed for pain   Refills:  0        albuterol 108 (90 Base) MCG/ACT inhaler   Commonly known as:  PROAIR HFA/PROVENTIL HFA/VENTOLIN HFA   Dose:  2 puff        Inhale 2 puffs into the lungs every 6 hours as needed for shortness of breath / dyspnea or wheezing   Refills:  0        benztropine 0.5 MG tablet   Commonly known as:  COGENTIN   Quantity:  90 tablet        Take one tab (0.5 mg) in the morning and two tabs (1 mg) at bedtime.   Refills:  1        BuPROPion HCl ER (XL) 450 MG Tb24   Dose:  450 mg   Quantity:  30 tablet        Take 450 mg by mouth daily   Refills:  1        calcium carbonate 500 MG chewable tablet   Commonly known as:  TUMS   Dose:  1 chew tab        Take 1 chew tab by mouth as needed for heartburn   Refills:  0         cetirizine 10 MG tablet   Commonly known as:  zyrTEC   Dose:  10 mg        Take 10 mg by mouth daily   Refills:  0        diphenhydrAMINE-acetaminophen  MG tablet   Commonly known as:  TYLENOL PM   Dose:  1 tablet        Take 1 tablet by mouth nightly as needed for sleep   Refills:  0        FISH OIL PO   Dose:  1000 mg        Take 1,000 mg by mouth daily   Refills:  0        hydrOXYzine 25 MG tablet   Commonly known as:  ATARAX   Dose:  25-50 mg   Quantity:  60 tablet        Take 1-2 tablets (25-50 mg) by mouth every 4 hours as needed for anxiety   Refills:  2        lurasidone 80 MG Tabs tablet   Commonly known as:  LATUDA   Dose:  160 mg   Quantity:  60 tablet        Take 2 tablets (160 mg) by mouth daily (with dinner)   Refills:  1        norethindrone-ethinyl estradiol 1-20 MG-MCG per tablet   Commonly known as:  MICROGESTIN 1/20   Dose:  1 tablet        Take 1 tablet by mouth daily   Refills:  0        OLANZapine 10 MG tablet   Commonly known as:  zyPREXA   Dose:  10 mg   Quantity:  30 tablet        Take 1 tablet (10 mg) by mouth 2 times daily as needed   Refills:  1        perphenazine 16 MG tablet   Dose:  16 mg   Quantity:  60 tablet        Take 1 tablet (16 mg) by mouth 2 times daily   Refills:  1        traZODone 50 MG tablet   Commonly known as:  DESYREL   Dose:  50 mg   Quantity:  30 tablet        Take 1 tablet (50 mg) by mouth At Bedtime   Refills:  1                Procedures and tests performed during your visit     Drug abuse screen 6 urine (tox)    HCG qualitative urine      Orders Needing Specimen Collection     None      Pending Results     No orders found from 10/22/2018 to 10/25/2018.            Pending Culture Results     No orders found from 10/22/2018 to 10/25/2018.            Pending Results Instructions     If you had any lab results that were not finalized at the time of your Discharge, you can call the ED Lab Result RN at [phone]. You will be contacted by this team for any  "positive Lab results or changes in treatment. The nurses are available 7 days a week from 10A to 6:30P.  You can leave a message 24 hours per day and they will return your call.        Thank you for choosing [facility]       Thank you for choosing [facility] for your care. Our goal is always to provide you with excellent care. Hearing back from our patients is one way we can continue to improve our services. Please take a few minutes to complete the written survey that you may receive in the mail after you visit with us. Thank you!        [employer] Information     [employer] lets you send messages to your doctor, view your test results, renew your prescriptions, schedule appointments and more. To sign up, go to [email or web address] . Click on \"Log in\" on the left side of the screen, which will take you to the Welcome page. Then click on \"Sign up Now\" on the right side of the page.     You will be asked to enter the access code listed below, as well as some personal information. Please follow the directions to create your username and password.     Your access code is: [email or web address]  Expires: 10/25/2018  8:37 PM     Your access code will  in 90 days. If you need help or a new code, please call your [facility] clinic or [phone].        Care EveryWhere ID     This is your Care EveryWhere ID. This could be used by other organizations to access your [facility] medical records  [id number]        Equal Access to Services     KAYA STRICKLAND : Hadii reginald Roman, waaxda lutigistadaha, qaybta kaalmada ranjit, nando pizarro . So [facility] [phone].    ATENCIÓN: Si habla español, tiene a stauffer disposición servicios gratuitos de asistencia lingüística. Kiera biswas [phone].    We comply with applicable federal civil rights laws and [place] laws. We do not discriminate on the basis of race, color, national origin, age, disability, sex, sexual orientation, or gender identity.            After " Visit Summary       This is your record. Keep this with you and show to your community pharmacist(s) and doctor(s) at your next visit.

## 2018-10-24 NOTE — ED NOTES
Bed: HW01  Expected date: 10/24/18  Expected time: 11:15 AM  Means of arrival:   Comments:  32 yo female. Suicidal. By amb

## 2018-10-24 NOTE — ED NOTES
Group home staff called wondering disposition of patient.  Group home updated on patient status.  [facility] asks to call them back for updates.    1.  [facility]: [phone]  2.  [facility] manager, Anat: [phone]

## 2018-10-24 NOTE — ED NOTES
Pt cooperative with search and change into behavioral scrub top.  Pt given food/fluids per RN.  Pt attempted to provide urine sample but unable to at this time.

## 2018-10-24 NOTE — ED AVS SNAPSHOT
[facility], Emergency Department    [address]    [facility] [address]    Phone:  [phone]    Fax:  [phone]                                       Karolyn Banerjee   MRN: [id number]    Department:  [facility], Emergency Department   Date of Visit:  10/24/2018           After Visit Summary Signature Page     I have received my discharge instructions, and my questions have been answered. I have discussed any challenges I see with this plan with the nurse or doctor.    ..........................................................................................................................................  Patient/Patient Representative Signature      ..........................................................................................................................................  Patient Representative Print Name and Relationship to Patient    ..................................................               ................................................  Date                                   Time    ..........................................................................................................................................  Reviewed by Signature/Title    ...................................................              ..............................................  Date                                               Time          22EPIC Rev 08/18

## 2018-10-24 NOTE — ED PROVIDER NOTES
History     Chief Complaint   Patient presents with     Suicidal     resides at group home and had conflict with staff, left group home and walking into traffic, PD found and brought back to group home before coming to ED by ambulance     HPI  Karolyn Banerjee is a 33 year old female with schizoaffective disorder, depressive disorder, BPD and borderline IQ who presents from her group home for safety evaluation.  Apparently she was talking back to staff yesterday. Today at the group home, the staff told her they would not be taking her on any special outings due to her talking back.  The patient then went outside.  She was gone 15 minutes and suddenly police were at the door with her.  They said she had been standing in the street trying to get cars to hit her.  Here, she says she is feeling better and can contract for safety.  She is med compliant, had a  and ILS skills worker.  Mom is her legal guardian.  She has not been hospitalized since February 2018.     I have reviewed the Medications, Allergies, Past Medical and Surgical History, and Social History in the Epic system.    Review of Systems   Psychiatric/Behavioral: Positive for behavioral problems and dysphoric mood. Negative for hallucinations and suicidal ideas. The patient is not nervous/anxious.    All other systems reviewed and are negative.      Physical Exam   BP: 136/90  Pulse: 78  Temp: 98  F (36.7  C)  Resp: 16  SpO2: 98 %      Physical Exam   Constitutional: She is oriented to person, place, and time. She appears well-developed and well-nourished. No distress.   HENT:   Head: Normocephalic and atraumatic.   Right Ear: External ear normal.   Left Ear: External ear normal.   Nose: Nose normal.   Eyes: EOM are normal.   Neck: Normal range of motion. Neck supple.   Cardiovascular: Normal rate, regular rhythm and normal heart sounds.    Pulmonary/Chest: Effort normal and breath sounds normal.   Musculoskeletal: Normal range of motion.    Neurological: She is alert and oriented to person, place, and time.   Skin: Skin is warm and dry. She is not diaphoretic.   Psychiatric: Her speech is normal. Thought content normal. Her affect is blunt. She is slowed and withdrawn. Cognition and memory are normal. She expresses impulsivity.   Nursing note and vitals reviewed.      ED Course     ED Course     Procedures           Labs Ordered and Resulted from Time of ED Arrival Up to the Time of Departure from the ED   DRUG ABUSE SCREEN 6 CHEM DEP URINE ([facility])   HCG QUALITATIVE URINE            Assessments & Plan (with Medical Decision Making)   The patient has schizoaffective disorder, depressive type, BPD, borderline IQ who presents from her group home due to standing in the street trying to get traffic to hit her after she learned that she wouldn't be taken on special outings at her group home due to talking back to staff.  Here she is calm and at her baseline.  She can contract for safety.  She was seen by myself and the DEC  and we do not feel that she would benefit from inpatient admission.  She will be discharged back to her group home and should continue with her current outpatient services.     I have reviewed the nursing notes.    I have reviewed the findings, diagnosis, plan and need for follow up with the patient.    New Prescriptions    No medications on file       Final diagnoses:   Schizoaffective disorder, depressive type (H)   Borderline personality disorder (H)       10/24/2018   [facility], [place], EMERGENCY DEPARTMENT     Nancy De Luna MD  10/24/18 [id number]

## 2018-10-24 NOTE — TELEPHONE ENCOUNTER
- Writer received incoming fax from [facility] [employer].   - Forms placed in providers folder to be signed   - Will route to provider

## 2018-10-31 NOTE — TELEPHONE ENCOUNTER
- Writer received signed forms from provider   - Forms faxed to [phone]  - Original placed in scanning and copy on file until scanning completed   - No further action needed by the writer

## 2018-11-02 ENCOUNTER — OFFICE VISIT (OUTPATIENT)
Dept: PSYCHIATRY | Facility: CLINIC | Age: 33
End: 2018-11-02
Attending: PSYCHIATRY & NEUROLOGY
Payer: MEDICAID

## 2018-11-02 VITALS
SYSTOLIC BLOOD PRESSURE: 127 MMHG | DIASTOLIC BLOOD PRESSURE: 85 MMHG | WEIGHT: 266.8 LBS | BODY MASS INDEX: 44.4 KG/M2 | HEART RATE: 105 BPM

## 2018-11-02 DIAGNOSIS — R46.89 AGGRESSION: ICD-10-CM

## 2018-11-02 DIAGNOSIS — F25.0 SCHIZOAFFECTIVE DISORDER, BIPOLAR TYPE (H): ICD-10-CM

## 2018-11-02 DIAGNOSIS — F25.1 SCHIZOAFFECTIVE DISORDER, DEPRESSIVE TYPE (H): ICD-10-CM

## 2018-11-02 PROCEDURE — G0463 HOSPITAL OUTPT CLINIC VISIT: HCPCS | Mod: ZF

## 2018-11-02 RX ORDER — PERPHENAZINE 16 MG/1
16 TABLET ORAL 2 TIMES DAILY
Qty: 60 TABLET | Refills: 1 | Status: SHIPPED | OUTPATIENT
Start: 2018-11-12 | End: 2019-01-24

## 2018-11-02 RX ORDER — LURASIDONE HYDROCHLORIDE 80 MG/1
160 TABLET, FILM COATED ORAL
Qty: 60 TABLET | Refills: 1 | Status: SHIPPED | OUTPATIENT
Start: 2018-11-12 | End: 2019-01-24

## 2018-11-02 RX ORDER — BUPROPION HYDROCHLORIDE 450 MG/1
450 TABLET, FILM COATED, EXTENDED RELEASE ORAL DAILY
Qty: 30 TABLET | Refills: 1 | Status: SHIPPED | OUTPATIENT
Start: 2018-11-12 | End: 2019-01-24

## 2018-11-02 RX ORDER — BENZTROPINE MESYLATE 0.5 MG/1
TABLET ORAL
Qty: 90 TABLET | Refills: 1 | Status: SHIPPED | OUTPATIENT
Start: 2018-11-02 | End: 2019-01-03

## 2018-11-02 ASSESSMENT — PAIN SCALES - GENERAL: PAINLEVEL: NO PAIN (0)

## 2018-11-02 NOTE — NURSING NOTE
Chief Complaint   Patient presents with     Recheck Medication     Schizoaffective disorder, bipolar type      Please check with staff of patient during clinic visit for pharmacy name and medication list

## 2018-11-02 NOTE — MR AVS SNAPSHOT
After Visit Summary   11/2/2018    Karolyn Banerjee    MRN: [id number]           Patient Information     Date Of Birth          1985        Visit Information        Provider Department      11/2/2018 3:10 PM Harvey Joyce MD [facility]iatry [facility]        Today's Diagnoses     Schizoaffective disorder, bipolar type (H)        Aggression        Schizoaffective disorder, depressive type (H)          Care Instructions    Karolyn -     Here are the changes we made today:    1. Discontinue the trazodone at bedtime.  2. Move morning dose of Cogentin to 5 PM for total dose of 1.5 mg of Cogentin at 5 PM.      Please let us know if you have any questions or concerns before your next visit.    Dr. Joyce    Thank you for coming to the PSYCHIATRY CLINIC.    Lab Testing:  If you had lab testing today and your results are reassuring or normal they will be mailed to you or sent through [employer] within 7 days.   If the lab tests need quick action we will call you with the results.  The phone number we will call with results is # [phone] (home) . If this is not the best number please call our clinic and change the number.    Medication Refills:  If you need any refills please call your pharmacy and they will contact us. Our fax number for refills is [phone]. Please allow three business for refill processing.   If you need to  your refill at a new pharmacy, please contact the new pharmacy directly. The new pharmacy will help you get your medications transferred.     Scheduling:  If you have any concerns about today's visit or wish to schedule another appointment please call our office during normal business hours [phone] (8-5:00 M-F)    Contact Us:  Please call [phone] during business hours (8-5:00 M-F).  If after clinic hours, or on the weekend, please call  [phone].    Financial Assistance [phone]  [facility]illing [phone]  [place] [facility] [phone]  Medical Records  [phone]      MENTAL HEALTH CRISIS NUMBERS:  [facility]:   [facility] - [phone]   Crisis Residence [facility] - [phone]   Walk-In Counseling Center [facility] - [phone]   COPE 24/7 [place] Mobile Team for Adults - [[phone]]; Child - [[phone]]     Crisis Connection - [phone]     [facility] - [phone]   Walk-in counseling [facility] - [phone]   Walk-in counseling [facility] - [phone]   Crisis Residence [facility] - [phone]   Urgent Care Adult Mental Health:   --Drop-in, 24/7 crisis line, and [place] Mobile Team [[phone]]    CRISIS TEXT LINE: Text 792-041 from anywhere, anytime, any crisis 24/7;    OR SEE www.crisistextline.org     Poison Control Center - [phone]    CHILD: [facility] needs assessment team - [phone]     [facility] Lifeline - [phone]; or [employer] Lifeline - 1-108.192.6207    If you have a medical emergency please call 911or go to the nearest ER.                    _____________________________________________    Again thank you for choosing PSYCHIATRY CLINIC and please let us know how we can best partner with you to improve you and your family's health.  You may be receiving a survey in the mail regarding this appointment. We would love to have your feedback, both positive and negative, so please fill out the survey and return it using the provided envelope. The survey is done by an external company, so your answers are anonymous.             Follow-ups after your visit        Follow-up notes from your care team     Return in about 4 weeks (around 11/30/2018).      Your next 10 appointments already scheduled     Dec 10, 2018  9:40 AM CST   Adult Med Follow UP with Harvey Joyce MD   Psychiatry Clinic ([facility])    [address]  [place] [address]   [phone]              Who to contact     Please call your clinic at [phone] to:    Ask questions about your health    Make or cancel appointments    Discuss your medicines    Learn about your test results    Speak to your doctor            Additional Information About Your Visit        MyChart Information     [employer] is an electronic gateway that provides easy, online access to your medical records. With [employer], you can request a clinic appointment, read your test results, renew a prescription or communicate with your care team.     To sign up for [employer] visit the website at [email or web address]   You will be asked to enter the access code listed below, as well as some personal information. Please follow the directions to create your username and password.     Your access code is: [email or web address]  Expires: 2019  4:06 PM     Your access code will  in 90 days. If you need help or a new code, please contact your [facility] or call [phone] for assistance.        Care EveryWhere ID     This is your Care EveryWhere ID. This could be used by other organizations to access your [place] medical records  [id number]        Your Vitals Were     Pulse BMI (Body Mass Index)                105 44.4 kg/m2           Blood Pressure from Last 3 Encounters:   18 127/85   10/24/18 114/73   18 131/86    Weight from Last 3 Encounters:   18 121 kg (266 lb 12.8 oz)   18 117.8 kg (259 lb 9.6 oz)   18 117.9 kg (260 lb)              Today, you had the following     No orders found for display         Today's Medication Changes          These changes are accurate as of 18  4:06 PM.  If you have any questions, ask your nurse or doctor.               These medicines have changed or have updated prescriptions.        Dose/Directions    benztropine 0.5 MG tablet   Commonly known as:  COGENTIN   This may have changed:  additional  instructions   Used for:  Schizoaffective disorder, bipolar type (H), Aggression   Changed by:  Harvey Joyce MD        Take three tabs (1.5 mg) at 5 PM.   Quantity:  90 tablet   Refills:  1       BuPROPion HCl ER (XL) 450 MG Tb24   This may have changed:  These instructions start on 11/12/2018. If you are unsure what to do until then, ask your doctor or other care provider.   Used for:  Schizoaffective disorder, depressive type (H)   Changed by:  Harvey Joyce MD        Dose:  450 mg   Start taking on:  11/12/2018   Take 450 mg by mouth daily   Quantity:  30 tablet   Refills:  1       lurasidone 80 MG Tabs tablet   Commonly known as:  LATUDA   This may have changed:  These instructions start on 11/12/2018. If you are unsure what to do until then, ask your doctor or other care provider.   Used for:  Schizoaffective disorder, bipolar type (H), Aggression   Changed by:  Harvey Joyce MD        Dose:  160 mg   Start taking on:  11/12/2018   Take 2 tablets (160 mg) by mouth daily (with dinner)   Quantity:  60 tablet   Refills:  1       perphenazine 16 MG tablet   This may have changed:  These instructions start on 11/12/2018. If you are unsure what to do until then, ask your doctor or other care provider.   Used for:  Schizoaffective disorder, depressive type (H)   Changed by:  Harvey Joyce MD        Dose:  16 mg   Start taking on:  11/12/2018   Take 1 tablet (16 mg) by mouth 2 times daily   Quantity:  60 tablet   Refills:  1         Stop taking these medicines if you haven't already. Please contact your care team if you have questions.     traZODone 50 MG tablet   Commonly known as:  DESYREL   Stopped by:  Harvey Joyce MD                Where to get your medicines      These medications were sent to [facility] - [place], [place] - [address]  [address] [facility] [address]     Phone:  [phone]     benztropine 0.5 MG tablet    BuPROPion HCl ER  (XL) 450 MG Tb24    lurasidone 80 MG Tabs tablet    perphenazine 16 MG tablet                Primary Care Provider Office Phone # Fax #    Suzie Mariscal [phone]       [place] [id number] [facility] [address]        Equal Access to Services     KAYA STRICKLAND : Hadii aad ku hadasho Soomaali, waaxda luqadaha, qaybta kaalmada adeegyada, waxay shinein haychristinen adesonya marquistoña mcguire. So [facility] [phone].    ATENCIÓN: Si habla español, tiene a stauffer disposición servicios gratuitos de asistencia lingüística. [facility] [phone].    We comply with applicable federal civil rights laws and [place] laws. We do not discriminate on the basis of race, color, national origin, age, disability, sex, sexual orientation, or gender identity.            Thank you!     Thank you for choosing PSY[facility]IATRY CLINIC  for your care. Our goal is always to provide you with excellent care. Hearing back from our patients is one way we can continue to improve our services. Please take a few minutes to complete the written survey that you may receive in the mail after your visit with us. Thank you!             Your Updated Medication List - Protect others around you: Learn how to safely use, store and throw away your medicines at www.disposemymeds.org.          This list is accurate as of 11/2/18  4:06 PM.  Always use your most recent med list.                   Brand Name Dispense Instructions for use Diagnosis    ACETAMINOPHEN PO      Take 500 mg by mouth as needed for pain        albuterol 108 (90 Base) MCG/ACT inhaler    PROAIR HFA/PROVENTIL HFA/VENTOLIN HFA     Inhale 2 puffs into the lungs every 6 hours as needed for shortness of breath / dyspnea or wheezing        benztropine 0.5 MG tablet    COGENTIN    90 tablet    Take three tabs (1.5 mg) at 5 PM.    Schizoaffective disorder, bipolar type (H), Aggression       BuPROPion HCl ER (XL) 450 MG Tb24   Start taking on:  11/12/2018     30 tablet     Take 450 mg by mouth daily    Schizoaffective disorder, depressive type (H)       calcium carbonate 500 MG chewable tablet    TUMS     Take 1 chew tab by mouth as needed for heartburn        cetirizine 10 MG tablet    zyrTEC     Take 10 mg by mouth daily        diphenhydrAMINE-acetaminophen  MG tablet    TYLENOL PM     Take 1 tablet by mouth nightly as needed for sleep        FISH OIL PO      Take 1,000 mg by mouth daily        hydrOXYzine 25 MG tablet    ATARAX    60 tablet    Take 1-2 tablets (25-50 mg) by mouth every 4 hours as needed for anxiety    Schizoaffective disorder, bipolar type (H), Aggression       lurasidone 80 MG Tabs tablet   Start taking on:  11/12/2018    LATUDA    60 tablet    Take 2 tablets (160 mg) by mouth daily (with dinner)    Schizoaffective disorder, bipolar type (H), Aggression       norethindrone-ethinyl estradiol 1-20 MG-MCG per tablet    MICROGESTIN 1/20     Take 1 tablet by mouth daily        OLANZapine 10 MG tablet    zyPREXA    30 tablet    Take 1 tablet (10 mg) by mouth 2 times daily as needed    Schizoaffective disorder, bipolar type (H), Aggression       perphenazine 16 MG tablet   Start taking on:  11/12/2018     60 tablet    Take 1 tablet (16 mg) by mouth 2 times daily    Schizoaffective disorder, depressive type (H)

## 2018-11-02 NOTE — PATIENT INSTRUCTIONS
Karolyn -     Here are the changes we made today:    1. Discontinue the trazodone at bedtime.  2. Move morning dose of Cogentin to 5 PM for total dose of 1.5 mg of Cogentin at 5 PM.      Please let us know if you have any questions or concerns before your next visit.    Dr. Joyce    Thank you for coming to the PSYCHIATRY CLINIC.    Lab Testing:  If you had lab testing today and your results are reassuring or normal they will be mailed to you or sent through [employer] within 7 days.   If the lab tests need quick action we will call you with the results.  The phone number we will call with results is # [phone] (home) . If this is not the best number please call our clinic and change the number.    Medication Refills:  If you need any refills please call your pharmacy and they will contact us. Our fax number for refills is [phone]. Please allow three business for refill processing.   If you need to  your refill at a new pharmacy, please contact the new pharmacy directly. The new pharmacy will help you get your medications transferred.     Scheduling:  If you have any concerns about today's visit or wish to schedule another appointment please call our office during normal business hours [phone] (8-5:00 M-F)    Contact Us:  Please call [phone] during business hours (8-5:00 M-F).  If after clinic hours, or on the weekend, please call  [phone].    Financial Assistance [phone]  [facility] Billing [phone]  [place] Billing [phone]  Medical Records [phone]      MENTAL HEALTH CRISIS NUMBERS:  [facility]:   [facility] - [phone]   Crisis Residence hospitals - [facility] - [phone]   Walk-In Counseling [facility] - [phone]   COPE 24/7 [place] Mobile Team for Adults - [[phone]]; Child - [[phone]]     Crisis Connection - [phone]     [facility]:   [facility] - [phone]   Walk-in counseling [facility]  [facility] - [phone]   Walk-in counseling [facility] - [phone]   Crisis Residence [facility] - [phone]   Urgent Care Adult Mental Health:   --Drop-in, 24/7 crisis line, and [place] Mobile Team [[phone]]    CRISIS TEXT LINE: Text 212-017 from anywhere, anytime, any crisis 24/7;    OR SEE www.crisistextline.org     Poison Control Center - [phone]    CHILD: [facility] needs assessment team - [phone]     [facility] Lifeline - 1-380.665.1537; or [employer] Lifeline - 1-105.551.4814    If you have a medical emergency please call 911or go to the nearest ER.                    _____________________________________________    Again thank you for choosing PSYCHIATRY CLINIC and please let us know how we can best partner with you to improve you and your family's health.  You may be receiving a survey in the mail regarding this appointment. We would love to have your feedback, both positive and negative, so please fill out the survey and return it using the provided envelope. The survey is done by an external company, so your answers are anonymous.
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
## 2018-11-05 ASSESSMENT — PATIENT HEALTH QUESTIONNAIRE - PHQ9: SUM OF ALL RESPONSES TO PHQ QUESTIONS 1-9: 6

## 2018-12-20 ENCOUNTER — APPOINTMENT (OUTPATIENT)
Dept: GENERAL RADIOLOGY | Facility: CLINIC | Age: 33
End: 2018-12-20
Attending: EMERGENCY MEDICINE
Payer: MEDICAID

## 2018-12-20 ENCOUNTER — HOSPITAL ENCOUNTER (OUTPATIENT)
Facility: CLINIC | Age: 33
Setting detail: OBSERVATION
Discharge: NURSING, REHAB OR LONG-TERM CARE | End: 2018-12-23
Attending: EMERGENCY MEDICINE | Admitting: INTERNAL MEDICINE
Payer: MEDICAID

## 2018-12-20 ENCOUNTER — APPOINTMENT (OUTPATIENT)
Dept: MRI IMAGING | Facility: CLINIC | Age: 33
End: 2018-12-20
Attending: EMERGENCY MEDICINE
Payer: MEDICAID

## 2018-12-20 DIAGNOSIS — S82.831A CLOSED FRACTURE OF PROXIMAL END OF RIGHT FIBULA, UNSPECIFIED FRACTURE MORPHOLOGY, INITIAL ENCOUNTER: ICD-10-CM

## 2018-12-20 DIAGNOSIS — S82.141A CLOSED FRACTURE OF RIGHT TIBIAL PLATEAU, INITIAL ENCOUNTER: Primary | ICD-10-CM

## 2018-12-20 PROBLEM — S82.409A CLOSED FIBULAR FRACTURE: Status: ACTIVE | Noted: 2018-12-20

## 2018-12-20 LAB
ABO + RH BLD: NORMAL
ABO + RH BLD: NORMAL
ANION GAP SERPL CALCULATED.3IONS-SCNC: 8 MMOL/L (ref 3–14)
BASOPHILS # BLD AUTO: 0 10E9/L (ref 0–0.2)
BASOPHILS NFR BLD AUTO: 0.1 %
BLD GP AB SCN SERPL QL: NORMAL
BLOOD BANK CMNT PATIENT-IMP: NORMAL
BUN SERPL-MCNC: 5 MG/DL (ref 7–30)
CALCIUM SERPL-MCNC: 8.7 MG/DL (ref 8.5–10.1)
CHLORIDE SERPL-SCNC: 107 MMOL/L (ref 94–109)
CK SERPL-CCNC: 456 U/L (ref 30–225)
CO2 SERPL-SCNC: 25 MMOL/L (ref 20–32)
CREAT SERPL-MCNC: 0.83 MG/DL (ref 0.52–1.04)
DIFFERENTIAL METHOD BLD: ABNORMAL
EOSINOPHIL # BLD AUTO: 0 10E9/L (ref 0–0.7)
EOSINOPHIL NFR BLD AUTO: 0.1 %
ERYTHROCYTE [DISTWIDTH] IN BLOOD BY AUTOMATED COUNT: 13 % (ref 10–15)
GFR SERPL CREATININE-BSD FRML MDRD: >90 ML/MIN/{1.73_M2}
GLUCOSE SERPL-MCNC: 108 MG/DL (ref 70–99)
HCG SERPL QL: NEGATIVE
HCT VFR BLD AUTO: 40.7 % (ref 35–47)
HGB BLD-MCNC: 14.1 G/DL (ref 11.7–15.7)
IMM GRANULOCYTES # BLD: 0 10E9/L (ref 0–0.4)
IMM GRANULOCYTES NFR BLD: 0.3 %
INR PPP: 1.02 (ref 0.86–1.14)
LYMPHOCYTES # BLD AUTO: 0.9 10E9/L (ref 0.8–5.3)
LYMPHOCYTES NFR BLD AUTO: 7.9 %
MCH RBC QN AUTO: 30.7 PG (ref 26.5–33)
MCHC RBC AUTO-ENTMCNC: 34.6 G/DL (ref 31.5–36.5)
MCV RBC AUTO: 89 FL (ref 78–100)
MONOCYTES # BLD AUTO: 0.6 10E9/L (ref 0–1.3)
MONOCYTES NFR BLD AUTO: 5.1 %
NEUTROPHILS # BLD AUTO: 9.7 10E9/L (ref 1.6–8.3)
NEUTROPHILS NFR BLD AUTO: 86.5 %
NRBC # BLD AUTO: 0 10*3/UL
NRBC BLD AUTO-RTO: 0 /100
PLATELET # BLD AUTO: 170 10E9/L (ref 150–450)
POTASSIUM SERPL-SCNC: 3.9 MMOL/L (ref 3.4–5.3)
RBC # BLD AUTO: 4.59 10E12/L (ref 3.8–5.2)
SODIUM SERPL-SCNC: 140 MMOL/L (ref 133–144)
SPECIMEN EXP DATE BLD: NORMAL
WBC # BLD AUTO: 11.2 10E9/L (ref 4–11)

## 2018-12-20 PROCEDURE — 80048 BASIC METABOLIC PNL TOTAL CA: CPT | Performed by: EMERGENCY MEDICINE

## 2018-12-20 PROCEDURE — 29505 APPLICATION LONG LEG SPLINT: CPT | Mod: RT

## 2018-12-20 PROCEDURE — 73590 X-RAY EXAM OF LOWER LEG: CPT | Mod: RT

## 2018-12-20 PROCEDURE — 85610 PROTHROMBIN TIME: CPT | Performed by: EMERGENCY MEDICINE

## 2018-12-20 PROCEDURE — 86901 BLOOD TYPING SEROLOGIC RH(D): CPT | Performed by: EMERGENCY MEDICINE

## 2018-12-20 PROCEDURE — 25000132 ZZH RX MED GY IP 250 OP 250 PS 637: Performed by: EMERGENCY MEDICINE

## 2018-12-20 PROCEDURE — G0378 HOSPITAL OBSERVATION PER HR: HCPCS

## 2018-12-20 PROCEDURE — 73610 X-RAY EXAM OF ANKLE: CPT | Mod: RT

## 2018-12-20 PROCEDURE — 85025 COMPLETE CBC W/AUTO DIFF WBC: CPT | Performed by: EMERGENCY MEDICINE

## 2018-12-20 PROCEDURE — 25000131 ZZH RX MED GY IP 250 OP 636 PS 637: Performed by: EMERGENCY MEDICINE

## 2018-12-20 PROCEDURE — 25000132 ZZH RX MED GY IP 250 OP 250 PS 637: Performed by: INTERNAL MEDICINE

## 2018-12-20 PROCEDURE — 84703 CHORIONIC GONADOTROPIN ASSAY: CPT | Performed by: EMERGENCY MEDICINE

## 2018-12-20 PROCEDURE — 99220 ZZC INITIAL OBSERVATION CARE,LEVL III: CPT | Performed by: INTERNAL MEDICINE

## 2018-12-20 PROCEDURE — 99285 EMERGENCY DEPT VISIT HI MDM: CPT | Mod: 25

## 2018-12-20 PROCEDURE — 86850 RBC ANTIBODY SCREEN: CPT | Performed by: EMERGENCY MEDICINE

## 2018-12-20 PROCEDURE — 86900 BLOOD TYPING SEROLOGIC ABO: CPT | Performed by: EMERGENCY MEDICINE

## 2018-12-20 PROCEDURE — 82550 ASSAY OF CK (CPK): CPT | Performed by: EMERGENCY MEDICINE

## 2018-12-20 PROCEDURE — 73721 MRI JNT OF LWR EXTRE W/O DYE: CPT | Mod: RT

## 2018-12-20 PROCEDURE — 73560 X-RAY EXAM OF KNEE 1 OR 2: CPT | Mod: RT

## 2018-12-20 RX ORDER — ACETAMINOPHEN 325 MG/1
650 TABLET ORAL EVERY 4 HOURS PRN
Status: DISCONTINUED | OUTPATIENT
Start: 2018-12-20 | End: 2018-12-22 | Stop reason: ALTCHOICE

## 2018-12-20 RX ORDER — ACETAMINOPHEN 325 MG/1
650 TABLET ORAL EVERY 4 HOURS PRN
Status: DISCONTINUED | OUTPATIENT
Start: 2018-12-20 | End: 2018-12-20

## 2018-12-20 RX ORDER — ONDANSETRON 4 MG/1
4 TABLET, ORALLY DISINTEGRATING ORAL ONCE
Status: COMPLETED | OUTPATIENT
Start: 2018-12-20 | End: 2018-12-20

## 2018-12-20 RX ORDER — ACETAMINOPHEN 650 MG/1
650 SUPPOSITORY RECTAL EVERY 4 HOURS PRN
Status: DISCONTINUED | OUTPATIENT
Start: 2018-12-20 | End: 2018-12-20

## 2018-12-20 RX ORDER — POLYETHYLENE GLYCOL 3350 17 G/17G
17 POWDER, FOR SOLUTION ORAL DAILY PRN
Status: DISCONTINUED | OUTPATIENT
Start: 2018-12-20 | End: 2018-12-23 | Stop reason: HOSPADM

## 2018-12-20 RX ORDER — LIDOCAINE 40 MG/G
CREAM TOPICAL
Status: DISCONTINUED | OUTPATIENT
Start: 2018-12-20 | End: 2018-12-23 | Stop reason: HOSPADM

## 2018-12-20 RX ORDER — NALOXONE HYDROCHLORIDE 0.4 MG/ML
.1-.4 INJECTION, SOLUTION INTRAMUSCULAR; INTRAVENOUS; SUBCUTANEOUS
Status: DISCONTINUED | OUTPATIENT
Start: 2018-12-20 | End: 2018-12-22

## 2018-12-20 RX ORDER — MORPHINE SULFATE 2 MG/ML
1 INJECTION, SOLUTION INTRAMUSCULAR; INTRAVENOUS
Status: DISCONTINUED | OUTPATIENT
Start: 2018-12-20 | End: 2018-12-22 | Stop reason: ALTCHOICE

## 2018-12-20 RX ORDER — ONDANSETRON 2 MG/ML
4 INJECTION INTRAMUSCULAR; INTRAVENOUS EVERY 6 HOURS PRN
Status: DISCONTINUED | OUTPATIENT
Start: 2018-12-20 | End: 2018-12-22

## 2018-12-20 RX ORDER — ONDANSETRON 4 MG/1
4 TABLET, ORALLY DISINTEGRATING ORAL EVERY 6 HOURS PRN
Status: DISCONTINUED | OUTPATIENT
Start: 2018-12-20 | End: 2018-12-22

## 2018-12-20 RX ORDER — TRAZODONE HYDROCHLORIDE 50 MG/1
50 TABLET, FILM COATED ORAL AT BEDTIME
Status: ON HOLD | COMMUNITY
End: 2019-01-14

## 2018-12-20 RX ORDER — ONDANSETRON 2 MG/ML
4 INJECTION INTRAMUSCULAR; INTRAVENOUS EVERY 6 HOURS PRN
Status: DISCONTINUED | OUTPATIENT
Start: 2018-12-20 | End: 2018-12-20

## 2018-12-20 RX ORDER — HYDROCODONE BITARTRATE AND ACETAMINOPHEN 5; 325 MG/1; MG/1
1 TABLET ORAL ONCE
Status: COMPLETED | OUTPATIENT
Start: 2018-12-20 | End: 2018-12-20

## 2018-12-20 RX ORDER — ONDANSETRON 4 MG/1
4 TABLET, ORALLY DISINTEGRATING ORAL EVERY 6 HOURS PRN
Status: DISCONTINUED | OUTPATIENT
Start: 2018-12-20 | End: 2018-12-20

## 2018-12-20 RX ORDER — BENZTROPINE MESYLATE 0.5 MG/1
0.5 TABLET ORAL DAILY PRN
COMMUNITY
End: 2019-03-07

## 2018-12-20 RX ORDER — NALOXONE HYDROCHLORIDE 0.4 MG/ML
.1-.4 INJECTION, SOLUTION INTRAMUSCULAR; INTRAVENOUS; SUBCUTANEOUS
Status: DISCONTINUED | OUTPATIENT
Start: 2018-12-20 | End: 2018-12-20

## 2018-12-20 RX ORDER — HYDROCODONE BITARTRATE AND ACETAMINOPHEN 5; 325 MG/1; MG/1
1-2 TABLET ORAL EVERY 4 HOURS PRN
Status: DISCONTINUED | OUTPATIENT
Start: 2018-12-20 | End: 2018-12-22 | Stop reason: ALTCHOICE

## 2018-12-20 RX ORDER — ACETAMINOPHEN 650 MG/1
650 SUPPOSITORY RECTAL EVERY 4 HOURS PRN
Status: DISCONTINUED | OUTPATIENT
Start: 2018-12-20 | End: 2018-12-23 | Stop reason: HOSPADM

## 2018-12-20 RX ORDER — DOCUSATE SODIUM 100 MG/1
100 CAPSULE, LIQUID FILLED ORAL 2 TIMES DAILY
Status: DISCONTINUED | OUTPATIENT
Start: 2018-12-20 | End: 2018-12-23 | Stop reason: HOSPADM

## 2018-12-20 RX ADMIN — DOCUSATE SODIUM 100 MG: 100 CAPSULE, LIQUID FILLED ORAL at 21:50

## 2018-12-20 RX ADMIN — HYDROCODONE BITARTRATE AND ACETAMINOPHEN 1 TABLET: 5; 325 TABLET ORAL at 21:50

## 2018-12-20 RX ADMIN — ONDANSETRON 4 MG: 4 TABLET, ORALLY DISINTEGRATING ORAL at 15:29

## 2018-12-20 RX ADMIN — HYDROCODONE BITARTRATE AND ACETAMINOPHEN 1 TABLET: 5; 325 TABLET ORAL at 15:29

## 2018-12-20 ASSESSMENT — MIFFLIN-ST. JEOR: SCORE: 1885.23

## 2018-12-20 ASSESSMENT — ENCOUNTER SYMPTOMS
ARTHRALGIAS: 1
NECK PAIN: 0
MYALGIAS: 1
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
## 2018-12-20 NOTE — ED NOTES
Spoke with group home member, patient's group home is wheelchair accessible and will be able to take patient back.

## 2018-12-20 NOTE — PROGRESS NOTES
I was asked to see if this patient can return to the [facility]  with a w/c.  I spoke with [facility] [phone]   who said this patient can return with a w/c but they don't have one for her so she will need to come home with one.  [facility].  I contacted [facility] and they helped me fit this patient with a 20 inch w/c with elevated leg rests.  I faxed the order, face to face and the face sheet to [facility] intake at [phone].  I asked that they deliver the w/c to the  tomorrow.  It isn't clear if this patient needs to be admitted yet but she is refusing to get of the ER gurney d/u pain.  She will need a wheelchair due to her knee and ankle pain.  I will follow up with [facility] in the am to make sure they w/c is delivered.  [facility] stated that they could pick this patient up from the hospital and fold the w/c in the vehicle if needed. CTS team will need to follow this patient for further needs as they arise.  The ER provider has been updated.

## 2018-12-21 ENCOUNTER — ANESTHESIA EVENT (OUTPATIENT)
Dept: SURGERY | Facility: CLINIC | Age: 33
End: 2018-12-21
Payer: MEDICAID

## 2018-12-21 ENCOUNTER — ANESTHESIA (OUTPATIENT)
Dept: SURGERY | Facility: CLINIC | Age: 33
End: 2018-12-21
Payer: MEDICAID

## 2018-12-21 ENCOUNTER — APPOINTMENT (OUTPATIENT)
Dept: GENERAL RADIOLOGY | Facility: CLINIC | Age: 33
End: 2018-12-21
Attending: ORTHOPAEDIC SURGERY
Payer: MEDICAID

## 2018-12-21 LAB
ALBUMIN SERPL-MCNC: 3.2 G/DL (ref 3.4–5)
ALP SERPL-CCNC: 74 U/L (ref 40–150)
ALT SERPL W P-5'-P-CCNC: 23 U/L (ref 0–50)
ANION GAP SERPL CALCULATED.3IONS-SCNC: 8 MMOL/L (ref 3–14)
AST SERPL W P-5'-P-CCNC: 21 U/L (ref 0–45)
BILIRUB SERPL-MCNC: 0.8 MG/DL (ref 0.2–1.3)
BUN SERPL-MCNC: 6 MG/DL (ref 7–30)
CALCIUM SERPL-MCNC: 8.7 MG/DL (ref 8.5–10.1)
CHLORIDE SERPL-SCNC: 107 MMOL/L (ref 94–109)
CO2 SERPL-SCNC: 25 MMOL/L (ref 20–32)
CREAT SERPL-MCNC: 0.85 MG/DL (ref 0.52–1.04)
ERYTHROCYTE [DISTWIDTH] IN BLOOD BY AUTOMATED COUNT: 13.2 % (ref 10–15)
GFR SERPL CREATININE-BSD FRML MDRD: >90 ML/MIN/{1.73_M2}
GLUCOSE SERPL-MCNC: 102 MG/DL (ref 70–99)
HCT VFR BLD AUTO: 39.8 % (ref 35–47)
HGB BLD-MCNC: 13.6 G/DL (ref 11.7–15.7)
MCH RBC QN AUTO: 30.6 PG (ref 26.5–33)
MCHC RBC AUTO-ENTMCNC: 34.2 G/DL (ref 31.5–36.5)
MCV RBC AUTO: 89 FL (ref 78–100)
PLATELET # BLD AUTO: 152 10E9/L (ref 150–450)
POTASSIUM SERPL-SCNC: 4.2 MMOL/L (ref 3.4–5.3)
PROT SERPL-MCNC: 7 G/DL (ref 6.8–8.8)
RBC # BLD AUTO: 4.45 10E12/L (ref 3.8–5.2)
SODIUM SERPL-SCNC: 140 MMOL/L (ref 133–144)
WBC # BLD AUTO: 7.5 10E9/L (ref 4–11)

## 2018-12-21 PROCEDURE — 25000128 H RX IP 250 OP 636: Performed by: ORTHOPAEDIC SURGERY

## 2018-12-21 PROCEDURE — 25000128 H RX IP 250 OP 636: Performed by: ANESTHESIOLOGY

## 2018-12-21 PROCEDURE — 37000009 ZZH ANESTHESIA TECHNICAL FEE, EACH ADDTL 15 MIN: Performed by: ORTHOPAEDIC SURGERY

## 2018-12-21 PROCEDURE — 37000008 ZZH ANESTHESIA TECHNICAL FEE, 1ST 30 MIN: Performed by: ORTHOPAEDIC SURGERY

## 2018-12-21 PROCEDURE — 25000125 ZZHC RX 250: Performed by: NURSE ANESTHETIST, CERTIFIED REGISTERED

## 2018-12-21 PROCEDURE — 36000063 ZZH SURGERY LEVEL 4 EA 15 ADDTL MIN: Performed by: ORTHOPAEDIC SURGERY

## 2018-12-21 PROCEDURE — 36415 COLL VENOUS BLD VENIPUNCTURE: CPT | Performed by: INTERNAL MEDICINE

## 2018-12-21 PROCEDURE — G0378 HOSPITAL OBSERVATION PER HR: HCPCS

## 2018-12-21 PROCEDURE — 80053 COMPREHEN METABOLIC PANEL: CPT | Performed by: INTERNAL MEDICINE

## 2018-12-21 PROCEDURE — C1769 GUIDE WIRE: HCPCS | Performed by: ORTHOPAEDIC SURGERY

## 2018-12-21 PROCEDURE — 25000125 ZZHC RX 250: Performed by: ORTHOPAEDIC SURGERY

## 2018-12-21 PROCEDURE — 36000065 ZZH SURGERY LEVEL 4 W FLUORO 1ST 30 MIN: Performed by: ORTHOPAEDIC SURGERY

## 2018-12-21 PROCEDURE — C1713 ANCHOR/SCREW BN/BN,TIS/BN: HCPCS | Performed by: ORTHOPAEDIC SURGERY

## 2018-12-21 PROCEDURE — 27210794 ZZH OR GENERAL SUPPLY STERILE: Performed by: ORTHOPAEDIC SURGERY

## 2018-12-21 PROCEDURE — 25000132 ZZH RX MED GY IP 250 OP 250 PS 637: Performed by: INTERNAL MEDICINE

## 2018-12-21 PROCEDURE — 71000014 ZZH RECOVERY PHASE 1 LEVEL 2 FIRST HR: Performed by: ORTHOPAEDIC SURGERY

## 2018-12-21 PROCEDURE — 85027 COMPLETE CBC AUTOMATED: CPT | Performed by: INTERNAL MEDICINE

## 2018-12-21 PROCEDURE — C1762 CONN TISS, HUMAN(INC FASCIA): HCPCS | Performed by: ORTHOPAEDIC SURGERY

## 2018-12-21 PROCEDURE — 25000128 H RX IP 250 OP 636: Performed by: NURSE ANESTHETIST, CERTIFIED REGISTERED

## 2018-12-21 PROCEDURE — 99225 ZZC SUBSEQUENT OBSERVATION CARE,LEVEL II: CPT | Performed by: HOSPITALIST

## 2018-12-21 PROCEDURE — 25000128 H RX IP 250 OP 636: Performed by: INTERNAL MEDICINE

## 2018-12-21 PROCEDURE — 40000277 XR SURGERY CARM FLUORO LESS THAN 5 MIN W STILLS

## 2018-12-21 PROCEDURE — 99207 ZZC CDG-CODE CATEGORY CHANGED: CPT | Performed by: HOSPITALIST

## 2018-12-21 PROCEDURE — 25000566 ZZH SEVOFLURANE, EA 15 MIN: Performed by: ORTHOPAEDIC SURGERY

## 2018-12-21 PROCEDURE — 25000128 H RX IP 250 OP 636: Performed by: PHYSICIAN ASSISTANT

## 2018-12-21 PROCEDURE — 40000170 ZZH STATISTIC PRE-PROCEDURE ASSESSMENT II: Performed by: ORTHOPAEDIC SURGERY

## 2018-12-21 DEVICE — IMPLANTABLE DEVICE: Type: IMPLANTABLE DEVICE | Site: KNEE | Status: FUNCTIONAL

## 2018-12-21 DEVICE — GRAFT BONE CHIPS CANC 15ML 400145: Type: IMPLANTABLE DEVICE | Site: KNEE | Status: FUNCTIONAL

## 2018-12-21 DEVICE — IMP SCR SYN CORTEX 3.5X50MM SELF TAP SS 204.850: Type: IMPLANTABLE DEVICE | Site: KNEE | Status: FUNCTIONAL

## 2018-12-21 DEVICE — IMP SCR SYN CORTEX 3.5X38MM SELF TAP SS 204.838: Type: IMPLANTABLE DEVICE | Site: KNEE | Status: FUNCTIONAL

## 2018-12-21 DEVICE — IMP SCR SYN CORTEX 3.5X32MM SELF TAP SS 204.832: Type: IMPLANTABLE DEVICE | Site: KNEE | Status: FUNCTIONAL

## 2018-12-21 RX ORDER — TRAZODONE HYDROCHLORIDE 50 MG/1
50 TABLET, FILM COATED ORAL AT BEDTIME
Status: DISCONTINUED | OUTPATIENT
Start: 2018-12-21 | End: 2018-12-23 | Stop reason: HOSPADM

## 2018-12-21 RX ORDER — HYDROMORPHONE HYDROCHLORIDE 1 MG/ML
.3-.5 INJECTION, SOLUTION INTRAMUSCULAR; INTRAVENOUS; SUBCUTANEOUS EVERY 5 MIN PRN
Status: DISCONTINUED | OUTPATIENT
Start: 2018-12-21 | End: 2018-12-22 | Stop reason: HOSPADM

## 2018-12-21 RX ORDER — CEFAZOLIN SODIUM 2 G/100ML
2 INJECTION, SOLUTION INTRAVENOUS
Status: COMPLETED | OUTPATIENT
Start: 2018-12-21 | End: 2018-12-21

## 2018-12-21 RX ORDER — FENTANYL CITRATE 50 UG/ML
50-100 INJECTION, SOLUTION INTRAMUSCULAR; INTRAVENOUS
Status: DISCONTINUED | OUTPATIENT
Start: 2018-12-21 | End: 2018-12-21 | Stop reason: HOSPADM

## 2018-12-21 RX ORDER — PROPOFOL 10 MG/ML
INJECTION, EMULSION INTRAVENOUS PRN
Status: DISCONTINUED | OUTPATIENT
Start: 2018-12-21 | End: 2018-12-21

## 2018-12-21 RX ORDER — SODIUM CHLORIDE, SODIUM LACTATE, POTASSIUM CHLORIDE, CALCIUM CHLORIDE 600; 310; 30; 20 MG/100ML; MG/100ML; MG/100ML; MG/100ML
INJECTION, SOLUTION INTRAVENOUS CONTINUOUS
Status: DISCONTINUED | OUTPATIENT
Start: 2018-12-21 | End: 2018-12-22 | Stop reason: HOSPADM

## 2018-12-21 RX ORDER — FENTANYL CITRATE 50 UG/ML
25-50 INJECTION, SOLUTION INTRAMUSCULAR; INTRAVENOUS
Status: DISCONTINUED | OUTPATIENT
Start: 2018-12-21 | End: 2018-12-22 | Stop reason: HOSPADM

## 2018-12-21 RX ORDER — CETIRIZINE HYDROCHLORIDE 10 MG/1
10 TABLET ORAL DAILY
Status: DISCONTINUED | OUTPATIENT
Start: 2018-12-21 | End: 2018-12-23 | Stop reason: HOSPADM

## 2018-12-21 RX ORDER — OLANZAPINE 10 MG/1
10 TABLET ORAL 2 TIMES DAILY PRN
Status: DISCONTINUED | OUTPATIENT
Start: 2018-12-21 | End: 2018-12-23 | Stop reason: HOSPADM

## 2018-12-21 RX ORDER — BENZTROPINE MESYLATE 0.5 MG/1
0.5 TABLET ORAL DAILY PRN
Status: DISCONTINUED | OUTPATIENT
Start: 2018-12-21 | End: 2018-12-23 | Stop reason: HOSPADM

## 2018-12-21 RX ORDER — DEXAMETHASONE SODIUM PHOSPHATE 4 MG/ML
INJECTION, SOLUTION INTRA-ARTICULAR; INTRALESIONAL; INTRAMUSCULAR; INTRAVENOUS; SOFT TISSUE PRN
Status: DISCONTINUED | OUTPATIENT
Start: 2018-12-21 | End: 2018-12-21

## 2018-12-21 RX ORDER — MAGNESIUM HYDROXIDE 1200 MG/15ML
LIQUID ORAL PRN
Status: DISCONTINUED | OUTPATIENT
Start: 2018-12-21 | End: 2018-12-22 | Stop reason: HOSPADM

## 2018-12-21 RX ORDER — LIDOCAINE HYDROCHLORIDE 20 MG/ML
INJECTION, SOLUTION INFILTRATION; PERINEURAL PRN
Status: DISCONTINUED | OUTPATIENT
Start: 2018-12-21 | End: 2018-12-21

## 2018-12-21 RX ORDER — PERPHENAZINE 16 MG/1
16 TABLET ORAL 2 TIMES DAILY
Status: DISCONTINUED | OUTPATIENT
Start: 2018-12-21 | End: 2018-12-23 | Stop reason: HOSPADM

## 2018-12-21 RX ORDER — GLYCOPYRROLATE 0.2 MG/ML
INJECTION, SOLUTION INTRAMUSCULAR; INTRAVENOUS PRN
Status: DISCONTINUED | OUTPATIENT
Start: 2018-12-21 | End: 2018-12-21

## 2018-12-21 RX ORDER — ONDANSETRON 2 MG/ML
4 INJECTION INTRAMUSCULAR; INTRAVENOUS EVERY 30 MIN PRN
Status: DISCONTINUED | OUTPATIENT
Start: 2018-12-21 | End: 2018-12-22 | Stop reason: HOSPADM

## 2018-12-21 RX ORDER — SODIUM CHLORIDE, SODIUM LACTATE, POTASSIUM CHLORIDE, CALCIUM CHLORIDE 600; 310; 30; 20 MG/100ML; MG/100ML; MG/100ML; MG/100ML
INJECTION, SOLUTION INTRAVENOUS CONTINUOUS
Status: DISCONTINUED | OUTPATIENT
Start: 2018-12-21 | End: 2018-12-21 | Stop reason: HOSPADM

## 2018-12-21 RX ORDER — LURASIDONE HYDROCHLORIDE 80 MG/1
160 TABLET, FILM COATED ORAL
Status: DISCONTINUED | OUTPATIENT
Start: 2018-12-21 | End: 2018-12-23 | Stop reason: HOSPADM

## 2018-12-21 RX ORDER — NEOSTIGMINE METHYLSULFATE 1 MG/ML
VIAL (ML) INJECTION PRN
Status: DISCONTINUED | OUTPATIENT
Start: 2018-12-21 | End: 2018-12-21

## 2018-12-21 RX ORDER — ONDANSETRON 2 MG/ML
INJECTION INTRAMUSCULAR; INTRAVENOUS PRN
Status: DISCONTINUED | OUTPATIENT
Start: 2018-12-21 | End: 2018-12-21

## 2018-12-21 RX ORDER — FENTANYL CITRATE 50 UG/ML
INJECTION, SOLUTION INTRAMUSCULAR; INTRAVENOUS PRN
Status: DISCONTINUED | OUTPATIENT
Start: 2018-12-21 | End: 2018-12-21

## 2018-12-21 RX ORDER — CEFAZOLIN SODIUM 1 G/3ML
1 INJECTION, POWDER, FOR SOLUTION INTRAMUSCULAR; INTRAVENOUS SEE ADMIN INSTRUCTIONS
Status: DISCONTINUED | OUTPATIENT
Start: 2018-12-21 | End: 2018-12-21 | Stop reason: HOSPADM

## 2018-12-21 RX ORDER — ONDANSETRON 4 MG/1
4 TABLET, ORALLY DISINTEGRATING ORAL EVERY 30 MIN PRN
Status: DISCONTINUED | OUTPATIENT
Start: 2018-12-21 | End: 2018-12-22 | Stop reason: HOSPADM

## 2018-12-21 RX ADMIN — MIDAZOLAM 2 MG: 1 INJECTION INTRAMUSCULAR; INTRAVENOUS at 19:07

## 2018-12-21 RX ADMIN — CEFAZOLIN SODIUM 2 G: 2 INJECTION, SOLUTION INTRAVENOUS at 19:30

## 2018-12-21 RX ADMIN — DEXAMETHASONE SODIUM PHOSPHATE 4 MG: 4 INJECTION, SOLUTION INTRA-ARTICULAR; INTRALESIONAL; INTRAMUSCULAR; INTRAVENOUS; SOFT TISSUE at 19:13

## 2018-12-21 RX ADMIN — NEOSTIGMINE METHYLSULFATE 4 MG: 1 INJECTION, SOLUTION INTRAVENOUS at 22:08

## 2018-12-21 RX ADMIN — HYDROMORPHONE HYDROCHLORIDE 0.5 MG: 1 INJECTION, SOLUTION INTRAMUSCULAR; INTRAVENOUS; SUBCUTANEOUS at 22:55

## 2018-12-21 RX ADMIN — FENTANYL CITRATE 100 MCG: 50 INJECTION, SOLUTION INTRAMUSCULAR; INTRAVENOUS at 19:13

## 2018-12-21 RX ADMIN — MORPHINE SULFATE 1 MG: 2 INJECTION, SOLUTION INTRAMUSCULAR; INTRAVENOUS at 13:10

## 2018-12-21 RX ADMIN — HYDROMORPHONE HYDROCHLORIDE 0.5 MG: 1 INJECTION, SOLUTION INTRAMUSCULAR; INTRAVENOUS; SUBCUTANEOUS at 20:45

## 2018-12-21 RX ADMIN — ROCURONIUM BROMIDE 20 MG: 10 INJECTION INTRAVENOUS at 20:09

## 2018-12-21 RX ADMIN — HYDROCODONE BITARTRATE AND ACETAMINOPHEN 1 TABLET: 5; 325 TABLET ORAL at 04:30

## 2018-12-21 RX ADMIN — CEFAZOLIN 1 G: 1 INJECTION, POWDER, FOR SOLUTION INTRAMUSCULAR; INTRAVENOUS at 21:30

## 2018-12-21 RX ADMIN — SODIUM CHLORIDE, POTASSIUM CHLORIDE, SODIUM LACTATE AND CALCIUM CHLORIDE: 600; 310; 30; 20 INJECTION, SOLUTION INTRAVENOUS at 20:45

## 2018-12-21 RX ADMIN — SODIUM CHLORIDE, POTASSIUM CHLORIDE, SODIUM LACTATE AND CALCIUM CHLORIDE: 600; 310; 30; 20 INJECTION, SOLUTION INTRAVENOUS at 15:53

## 2018-12-21 RX ADMIN — GLYCOPYRROLATE 0.6 MG: 0.2 INJECTION, SOLUTION INTRAMUSCULAR; INTRAVENOUS at 22:08

## 2018-12-21 RX ADMIN — LIDOCAINE HYDROCHLORIDE 100 MG: 20 INJECTION, SOLUTION INFILTRATION; PERINEURAL at 19:13

## 2018-12-21 RX ADMIN — HYDROMORPHONE HYDROCHLORIDE 0.5 MG: 1 INJECTION, SOLUTION INTRAMUSCULAR; INTRAVENOUS; SUBCUTANEOUS at 19:48

## 2018-12-21 RX ADMIN — HYDROMORPHONE HYDROCHLORIDE 0.5 MG: 1 INJECTION, SOLUTION INTRAMUSCULAR; INTRAVENOUS; SUBCUTANEOUS at 22:45

## 2018-12-21 RX ADMIN — ONDANSETRON 4 MG: 2 INJECTION INTRAMUSCULAR; INTRAVENOUS at 21:29

## 2018-12-21 RX ADMIN — ROCURONIUM BROMIDE 50 MG: 10 INJECTION INTRAVENOUS at 19:13

## 2018-12-21 RX ADMIN — PROPOFOL 180 MG: 10 INJECTION, EMULSION INTRAVENOUS at 19:13

## 2018-12-21 RX ADMIN — MORPHINE SULFATE 1 MG: 2 INJECTION, SOLUTION INTRAMUSCULAR; INTRAVENOUS at 09:33

## 2018-12-21 ASSESSMENT — LIFESTYLE VARIABLES: TOBACCO_USE: 0

## 2018-12-21 ASSESSMENT — COPD QUESTIONNAIRES: COPD: 0

## 2018-12-21 NOTE — PLAN OF CARE
PT and OT: Received eval orders.  Noted pt with R fibular head and medial tibial plateau fx.  Plan for OR today.  Holding evals and completing orders.  Please update post op orders as appropriate.

## 2018-12-21 NOTE — PLAN OF CARE
Oriented. Pt comes in from group home after fall with closed fracture of the right tibial plateau and fibular head-- in brace. Knee is just a tad pink and warm in comparison with left side. Pt has good pedal and post tib pulses on both sides. Gave Norco x1 to control pain. Has been on bedrest. Ortho consult in morning. To be NPO at midnight. Will continue to monitor.

## 2018-12-21 NOTE — PHARMACY-ADMISSION MEDICATION HISTORY
Admission medication history interview status for the 12/20/2018  admission is complete.     List per [facility]. Several attempts made to speak with on call staff at [facility].  [phone]  Time of last dose is unknown.     Time spent in this activity: 60 min    Prior to Admission medications    Medication Sig Last Dose Taking? Auth Provider   benztropine (COGENTIN) 0.5 MG tablet Take 0.5 mg by mouth daily as needed for other (Eyes stuck in upward position)  Yes Unknown, Entered By History   benztropine (COGENTIN) 0.5 MG tablet Take three tabs (1.5 mg) at 5 PM.  Yes Tika Durant MD   BuPROPion HCl ER, XL, 450 MG TB24 Take 450 mg by mouth daily  Yes Tika Durant MD   cetirizine (ZYRTEC) 10 MG tablet Take 10 mg by mouth daily  Yes Reported, Patient   lurasidone (LATUDA) 80 MG TABS tablet Take 2 tablets (160 mg) by mouth daily (with dinner)  Yes Tika Durant MD   norethindrone-ethinyl estradiol (MICROGESTIN 1/20) 1-20 MG-MCG per tablet Take 1 tablet by mouth daily  Yes Unknown, Entered By History   OLANZapine (ZYPREXA) 10 MG tablet Take 1 tablet (10 mg) by mouth 2 times daily as needed  Yes Freddy Kerr MD   Omega-3 Fatty Acids (FISH OIL PO) Take 1,000 mg by mouth daily   Yes Reported, Patient   perphenazine 16 MG tablet Take 1 tablet (16 mg) by mouth 2 times daily  Yes Tika Durant MD   traZODone (DESYREL) 50 MG tablet Take 50 mg by mouth At Bedtime  Yes Unknown, Entered By History

## 2018-12-21 NOTE — ED NOTES
"[facility]  ED Nurse Handoff Report    ED Chief complaint: Fall (comes from group home, slipped on snow, landed on R side. C/o R knee pain.  Given 50 fentanyl nasally by medics)      ED Diagnosis:   Final diagnoses:   Closed fracture of right tibial plateau, initial encounter       Code Status: Full Code    Allergies:   Allergies   Allergen Reactions     Clozapine      Agranulocytosis x 2, cannot be re trialed      Risperdal Other (See Comments)     dystonia     Tape [Adhesive Tape] Rash     Plastic tape       Activity level - Baseline/Home:  Stand with Assist of 2    Activity Level - Current:   Stand with Assist of 2     Needed?: No    Isolation: No  Infection: Not Applicable  Bariatric?: No    Vital Signs:   Vitals:    12/20/18 1321 12/20/18 1325 12/20/18 1400   BP:  (!) 156/96    Resp: 18     Temp: 97.6  F (36.4  C)     TempSrc: Oral     SpO2: 97%  97%   Weight: 117.9 kg (260 lb)     Height: 1.651 m (5' 5\")         Cardiac Rhythm: ,        Pain level: 0-10 Pain Scale: 8    Is this patient confused?: No   Does this patient have a guardian?  Yes         If yes, is there guardianship documents in the Epic \"Code/ACP\" activity?  Yes         Guardian Notified?  Yes  [place] - Suicide Severity Rating Scale Completed?  Yes  If yes, what color did the patient score?  White    Patient Report: Initial Complaint: fall, leg pain   Focused Assessment: pain and swelling to R leg   Tests Performed: MRI, xrays, labs   Abnormal Results: fractures   Treatments provided: pain meds, knee immobilizer     Family Comments: Mom notified by phone, lives in group home, they are aware as well.    OBS brochure/video discussed/provided to patient/family: Yes              Name of person given brochure if not patient: Group home staff              Relationship to patient: group home staff     ED Medications:   Medications   HYDROcodone-acetaminophen (NORCO) 5-325 MG per tablet 1 tablet (1 tablet Oral Given 12/20/18 " 1529)   ondansetron (ZOFRAN-ODT) ODT tab 4 mg (4 mg Oral Given 12/20/18 1521)       Drips infusing?:  No    For the majority of the shift this patient was Yellow.   Interventions performed were immobilizer .    Severe Sepsis OR Septic Shock Diagnosis Present: No    To be done/followed up on inpatient unit:  none     ED NURSE PHONE NUMBER: *[phone]

## 2018-12-21 NOTE — PROGRESS NOTES
[facility]    Medicine Progress Note - Hospitalist Service       Date of Admission:  12/20/2018  Assessment & Plan      Right fibular head and medial Tibial plateau fractures  Talar dome impaction fracture   Medial meniscus tear  - OR today per orthopedics  - revised cardiac index score of 0, indicating a 0.6% risk of perioperative cardiac events  - pain control  - DVT ppx when ok from orthostandpoint  - would contact mother as part of decision making process/consent for surgery. She seems impaired from a mental health standpoint    History of seizoaffective disorder, bipolar type  -Resumed her home medications     Leukocytosis  -Mild likely from acute distress, no evidence of obvious infection.  Monitor     History of sleep apnea intolerant to CPAP              Diet: NPO for Medical/Clinical Reasons Except for: Ice Chips    DVT Prophylaxis: Pneumatic Compression Devices  Saenz Catheter: not present  Code Status: Full Code      Disposition Plan   Expected discharge: 2 - 3 days, recommended to prior living arrangement once adequate pain management/ tolerating PO medications and safe disposition plan/ TCU bed available.  Entered: Brendon Weir DO 12/21/2018, 9:40 AM       The patient's care was discussed with the Bedside Nurse and Patient.    Brenodn Weir DO  Hospitalist Service  [facility]    ______________________________________________________________________    Interval History   Pain is well managed. She states her mother    Data reviewed today: I reviewed all medications, new labs and imaging results over the last 24 hours. I personally reviewed no images or EKG's today.    Physical Exam   Vital Signs: Temp: 99.7  F (37.6  C) Temp src: Oral BP: (!) 142/93 Pulse: 98 Heart Rate: 106 Resp: 18 SpO2: 92 % O2 Device: None (Room air)    Weight: 260 lbs 0 oz  General Appearance: NAD, pleasant, slow speech  Respiratory: CTAB, WOB WNL  Cardiovascular: RRR, no murmurs  GI:  soft, nontender and nondistended  Skin: visualized sections are WNL  Other: Right knee in brace. No swelling, neurovascularly intact     Data   Recent Labs   Lab 12/21/18  0720 12/20/18  1749   WBC 7.5 11.2*   HGB 13.6 14.1   MCV 89 89    170   INR  --  1.02    140   POTASSIUM 4.2 3.9   CHLORIDE 107 107   CO2 25 25   BUN 6* 5*   CR 0.85 0.83   ANIONGAP 8 8   GIOVANY 8.7 8.7   * 108*   ALBUMIN 3.2*  --    PROTTOTAL 7.0  --    BILITOTAL 0.8  --    ALKPHOS 74  --    ALT 23  --    AST 21  --      Recent Results (from the past 24 hour(s))   XR Knee Right 1/2 Views    Narrative    RIGHT KNEE ONE TO TWO VIEWS   12/20/2018 2:10 PM     HISTORY:  Pain on/slightly below knee, fell on ice.      Impression    IMPRESSION:   1. Distracted fracture of the fibular head.  2. Fracture of the medial tibial plateau which extends to the  metadiaphyseal region. There is comminution. There is at least 5 mm of  depression at the medial tibial plateau.    DOUGLAS PAIGE MD   MR Knee Right w/o Contrast    Narrative    MR KNEE RIGHT WITHOUT CONTRAST   12/20/2018 4:37 PM    HISTORY:  Preoperative evaluation for knee fracture.    COMPARISON: Radiographs earlier today.    TECHNIQUE: Multiplanar MR imaging was performed without contrast.    FINDINGS:     Medial Meniscus: There is a moderate-sized horizontal tear of the  posterior horn contacting the inferior articular surface. No displaced  meniscal fragment is seen.    Lateral Meniscus: No tear, displaced fragment, or extrusion.    Anterior Cruciate Ligament: Unremarkable.    Posterior Cruciate Ligament: Unremarkable.    Medial Collateral Ligament: Unremarkable.    Lateral Collateral Ligament Complex, Popliteus Tendon: The iliotibial  band, fibular collateral ligament, biceps femoris tendon, and  popliteus tendon are unremarkable.    Osseous and Cartilaginous Structures: There is a mildly comminuted  fracture of the tibial plateau. The main fracture line is sagittal  involving the  lateral aspect of the medial tibial plateau articular  surface. There is approximately 0.5 cm of articular surface depression  along the fracture line. There is an additional more horizontal  fracture line extending into the anterior aspect of the lateral tibial  plateau with minimal depression of this fragment. There is moderately  associated marrow edema. There is also a transverse fracture through  the fibular head with minimal distraction. This also demonstrates  marrow edema. No other fracture or osseous lesion is seen and no other  abnormal marrow signal intensity is identified. The cartilage surfaces  are well preserved.    Extensor Mechanism: The quadriceps and patellar tendons are  unremarkable. The medial and lateral patellar retinacula appear  unremarkable.    Joint Space: There is a moderate-sized layering joint effusion. This  has appearance of a lipohemarthrosis. No intra-articular loose body is  seen.    Additional Findings: No Baker's cyst. No semimembranosus-tibial  collateral ligament or pes anserine bursitis. There is prominent edema  in the soft tissues surrounding the fracture.      Impression    IMPRESSION:   1. Mildly comminuted and depressed fracture of the tibial plateau as  described above. There is also a mildly distracted fracture of the  fibular head.  2. Moderate-sized tear of the posterior horn of the medial meniscus.    TANI TELLEZ MD   XR Tibia & Fibula Right 2 Views    Narrative    TIBIA AND FIBULA RIGHT TWO VIEWS   12/20/2018 5:23 PM     HISTORY: Fall, has knee fracture.    COMPARISON: None.      Impression    IMPRESSION: Fibular head and medial tibial plateau fractures are  present. There is flattening of the talar dome consistent with  fracture. Refer to ankle report for additional details.    SUNSHINE QUINONES MD   Ankle XR, G/E 3 views, right    Narrative    RIGHT ANKLE THREE OR MORE VIEWS  12/20/2018 5:24 PM     HISTORY: Has knee pain, fracture.    COMPARISON: None.       Impression    IMPRESSION: There is flattening of the talar dome with associated  sclerosis, consistent with talar dome impaction fracture. There is  slight irregularity of the medial tibial metaphysis, lateral  metaphysis, posterior malleolus, and anterior tibial plafond which  could represent additional fractures. There is slight asymmetric  widening of the lateral mortise with slight medial tilt of the talus.  There is slight irregularity of the distal fibula; however, no  displaced acute distal fibula fracture is identified. No evidence of  dislocation. A 3 mm corticated ossification along the tip of the  lateral malleolus likely represents sequelae of old injury.    SUNSHINE QUINONES MD

## 2018-12-21 NOTE — PROGRESS NOTES
I rec'd a call from [facility] re: this patients wheelchair.  Because this patient is having surgery they will need the information restarted 2 days before discharge.  I spoke with Isadora at [facility] and this is what this patient will need when she is discharged and if she needs a wheelchair.    Isadora works on Saturday 12/22 (hrs ??) and on Tuesday 12/25 (until 12 noon).  Her direct number is [phone].  She can assist you with getting the w/c.    If this patient discharges on Sunday 12/23, Monday 12/24 please call [phone] ([facility] main intake for DME's).    The fax for Joanne GUTHRIE is [phone].    CTS  will need to fax a face sheet, a current face to face and the SCRIPT for a DME w/c--20 inches with elevated leg raisers.  You can start the process 2 days prior to discharge but no longer than that.  You can deliver the w/c to the  and inform them it's coming.  The  can  the patient (please confirm with the ) but they can't transport this patient in the w/c.  I have informed the RN CC's on the floor this patient is on.

## 2018-12-21 NOTE — PLAN OF CARE
Pt is A/O x4. Flat affect noted R/T mental health Dx. Bedrest due to femur and tibia fx. Uses bedpan. VSS. IV SL. Pulses palpable. C/O pain at and below R knee. PRN IV morphine given. Pt has been NPO since midnight ex ice chips per orders. Mother is legal guardian and paper work has been done. Plan for surgery around 1530, and mother to sign consent in pre-op. Anticipate possible room on [address] after surgery.

## 2018-12-21 NOTE — PLAN OF CARE
RECEIVING UNIT ED HANDOFF REVIEW    ED Nurse Handoff Report was reviewed by: Isabel Haider on December 20, 2018 at 8:20 PM     NOTE: ROOM NOT READY YET-- WILL CALL

## 2018-12-21 NOTE — CONSULTS
[facility] Orthopedic Consultation    Darrell Banerjee MRN# [id number]   Age: 33 year old YOB: 1985     Date of Admission:  12/20/2018    Reason for consult:  Right tibial plateau fracture       Requesting physician: Dr Gaye Iverson       Level of consult: Consult, follow and place orders           Assessment and Plan:   Assessment:   Right tibial plateau fracture, depressed, mildly comminuted      Plan:   Plan for operative fixation this afternoon or time permits  N.p.o.  Pain medication as needed  Continue knee immobilizer  Nonweightbearing right lower extremity  Discussed directly with mother who is present in room and will be present for sent for surgery           Chief Complaint:   Right tibial plateau fracture         History of Present Illness:   This patient is a 33 year old female who presents with the following condition requiring a hospital admission:    HPI obtained from discussion with mother, chart review, also some discussion with darrell of the current cognitive state/special needs is noted.  Patient lives at group home.  Yesterday he was walking outside of the home when she slipped on ice landing on her right side and knee.  She immediately noted right knee pain and was brought to [facility] emergency department by EMS for further evaluation and treatment planning.  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 Gardner     Mild developmental delay      Myopia      Neuroleptic-induced tardive dyskinesia      Nonorganic enuresis      Refractive amblyopia      Schizoaffective disorder, bipolar type (H)     Follows with Dr. Cooley at her group home.       Sleep disorder              Past Surgical History:     Past Surgical History:   Procedure Laterality Date     HRW PORT A CATH Right      NO HISTORY OF SURGERY               Social History:     Social History     Tobacco Use     Smoking status: Never Smoker     Smokeless tobacco: Never Used   Substance Use Topics     Alcohol use: No             Family History:     Family History   Problem Relation Age of Onset     Mental Illness Father         mild developmental delay     Schizophrenia Other         uncle             Immunizations:     VACCINE/DOSE   Diptheria   DPT   DTAP   HBIG   Hepatitis A   Hepatitis B   HIB   Influenza   Measles   Meningococcal   MMR   Mumps   Pneumococcal   Polio   Rubella   Small Pox   TDAP   Varicella   Zoster             Allergies:     Allergies   Allergen Reactions     Clozapine      Agranulocytosis x 2, cannot be re trialed      Risperdal Other (See Comments)     dystonia     Tape [Adhesive Tape] Rash     Plastic tape             Medications:     Current Facility-Administered Medications   Medication     acetaminophen (TYLENOL) Suppository 650 mg     acetaminophen (TYLENOL) tablet 650 mg     docusate sodium (COLACE) capsule 100 mg     HYDROcodone-acetaminophen (NORCO) 5-325 MG per tablet 1-2 tablet     lidocaine (LMX4) cream     lidocaine 1 % 1 mL     melatonin tablet 1 mg     morphine (PF) injection 1 mg     naloxone (NARCAN) injection 0.1-0.4 mg     ondansetron (ZOFRAN-ODT) ODT tab 4 mg    Or     ondansetron (ZOFRAN) injection 4 mg     polyethylene glycol (MIRALAX/GLYCOLAX) Packet 17 g     sodium chloride (PF) 0.9% PF flush 3 mL     sodium chloride (PF) 0.9% PF flush 3 mL             Review of Systems:   CV: NEGATIVE for chest pain, palpitations or peripheral edema  C: NEGATIVE for fever, chills, change in weight  E/M: NEGATIVE for ear, mouth and throat problems  R: NEGATIVE for significant cough or SOB          Physical Exam:   All vitals have been reviewed  Patient Vitals for the  "past 24 hrs:   BP Temp Temp src Pulse Heart Rate Resp SpO2 Height Weight   12/21/18 0731 (!) 142/93 99.7  F (37.6  C) Oral 98 -- 18 92 % -- --   12/20/18 2345 153/82 98.3  F (36.8  C) Oral -- 106 17 95 % -- --   12/20/18 2235 -- -- -- -- 92 18 -- -- --   12/20/18 2126 127/86 99.8  F (37.7  C) Oral -- 100 16 94 % -- --   12/20/18 1400 -- -- -- -- -- -- 97 % -- --   12/20/18 1325 (!) 156/96 -- -- -- -- -- -- -- --   12/20/18 1321 -- 97.6  F (36.4  C) Oral -- 96 18 97 % 1.651 m (5' 5\") 117.9 kg (260 lb)       Intake/Output Summary (Last 24 hours) at 12/21/2018 0800  Last data filed at 12/20/2018 2340  Gross per 24 hour   Intake 240 ml   Output --   Net 240 ml     Patient resting comfortably in bed  Knee immobilizer in place  Right knee moderately swollen  Minimal erythema of the surrounding skin.   Bilateral calves are soft, non-tender.  Bilateral lower extremity is NVI.  Sensation intact bilateral lower extremities  Active dorsi and plantar flexion bilaterally  No pain in her right ankle with ROM  No tenderness to palpation of medial and lateral ankle  No pain with palpation to foot  Actively moves all toes without issue  +Dp pulse            Data:   All laboratory data reviewed  Results for orders placed or performed during the hospital encounter of 12/20/18   XR Knee Right 1/2 Views    Narrative    RIGHT KNEE ONE TO TWO VIEWS   12/20/2018 2:10 PM     HISTORY:  Pain on/slightly below knee, fell on ice.      Impression    IMPRESSION:   1. Distracted fracture of the fibular head.  2. Fracture of the medial tibial plateau which extends to the  metadiaphyseal region. There is comminution. There is at least 5 mm of  depression at the medial tibial plateau.    DOUGLAS PAIGE MD   XR Tibia & Fibula Right 2 Views    Narrative    TIBIA AND FIBULA RIGHT TWO VIEWS   12/20/2018 5:23 PM     HISTORY: Fall, has knee fracture.    COMPARISON: None.      Impression    IMPRESSION: Fibular head and medial tibial plateau fractures " are  present. There is flattening of the talar dome consistent with  fracture. Refer to ankle report for additional details.    SUNSHINE QUINONES MD   Ankle XR, G/E 3 views, right    Narrative    RIGHT ANKLE THREE OR MORE VIEWS  12/20/2018 5:24 PM     HISTORY: Has knee pain, fracture.    COMPARISON: None.      Impression    IMPRESSION: There is flattening of the talar dome with associated  sclerosis, consistent with talar dome impaction fracture. There is  slight irregularity of the medial tibial metaphysis, lateral  metaphysis, posterior malleolus, and anterior tibial plafond which  could represent additional fractures. There is slight asymmetric  widening of the lateral mortise with slight medial tilt of the talus.  There is slight irregularity of the distal fibula; however, no  displaced acute distal fibula fracture is identified. No evidence of  dislocation. A 3 mm corticated ossification along the tip of the  lateral malleolus likely represents sequelae of old injury.    SUNSHINE QUINONES MD   MR Knee Right w/o Contrast    Narrative    MR KNEE RIGHT WITHOUT CONTRAST   12/20/2018 4:37 PM    HISTORY:  Preoperative evaluation for knee fracture.    COMPARISON: Radiographs earlier today.    TECHNIQUE: Multiplanar MR imaging was performed without contrast.    FINDINGS:     Medial Meniscus: There is a moderate-sized horizontal tear of the  posterior horn contacting the inferior articular surface. No displaced  meniscal fragment is seen.    Lateral Meniscus: No tear, displaced fragment, or extrusion.    Anterior Cruciate Ligament: Unremarkable.    Posterior Cruciate Ligament: Unremarkable.    Medial Collateral Ligament: Unremarkable.    Lateral Collateral Ligament Complex, Popliteus Tendon: The iliotibial  band, fibular collateral ligament, biceps femoris tendon, and  popliteus tendon are unremarkable.    Osseous and Cartilaginous Structures: There is a mildly comminuted  fracture of the tibial plateau. The main fracture  line is sagittal  involving the lateral aspect of the medial tibial plateau articular  surface. There is approximately 0.5 cm of articular surface depression  along the fracture line. There is an additional more horizontal  fracture line extending into the anterior aspect of the lateral tibial  plateau with minimal depression of this fragment. There is moderately  associated marrow edema. There is also a transverse fracture through  the fibular head with minimal distraction. This also demonstrates  marrow edema. No other fracture or osseous lesion is seen and no other  abnormal marrow signal intensity is identified. The cartilage surfaces  are well preserved.    Extensor Mechanism: The quadriceps and patellar tendons are  unremarkable. The medial and lateral patellar retinacula appear  unremarkable.    Joint Space: There is a moderate-sized layering joint effusion. This  has appearance of a lipohemarthrosis. No intra-articular loose body is  seen.    Additional Findings: No Baker's cyst. No semimembranosus-tibial  collateral ligament or pes anserine bursitis. There is prominent edema  in the soft tissues surrounding the fracture.      Impression    IMPRESSION:   1. Mildly comminuted and depressed fracture of the tibial plateau as  described above. There is also a mildly distracted fracture of the  fibular head.  2. Moderate-sized tear of the posterior horn of the medial meniscus.   CBC with platelets differential   Result Value Ref Range    WBC 11.2 (H) 4.0 - 11.0 10e9/L    RBC Count 4.59 3.8 - 5.2 10e12/L    Hemoglobin 14.1 11.7 - 15.7 g/dL    Hematocrit 40.7 35.0 - 47.0 %    MCV 89 78 - 100 fl    MCH 30.7 26.5 - 33.0 pg    MCHC 34.6 31.5 - 36.5 g/dL    RDW 13.0 10.0 - 15.0 %    Platelet Count 170 150 - 450 10e9/L    Diff Method Automated Method     % Neutrophils 86.5 %    % Lymphocytes 7.9 %    % Monocytes 5.1 %    % Eosinophils 0.1 %    % Basophils 0.1 %    % Immature Granulocytes 0.3 %    Nucleated RBCs 0 0 /100     Absolute Neutrophil 9.7 (H) 1.6 - 8.3 10e9/L    Absolute Lymphocytes 0.9 0.8 - 5.3 10e9/L    Absolute Monocytes 0.6 0.0 - 1.3 10e9/L    Absolute Eosinophils 0.0 0.0 - 0.7 10e9/L    Absolute Basophils 0.0 0.0 - 0.2 10e9/L    Abs Immature Granulocytes 0.0 0 - 0.4 10e9/L    Absolute Nucleated RBC 0.0    Basic metabolic panel   Result Value Ref Range    Sodium 140 133 - 144 mmol/L    Potassium 3.9 3.4 - 5.3 mmol/L    Chloride 107 94 - 109 mmol/L    Carbon Dioxide 25 20 - 32 mmol/L    Anion Gap 8 3 - 14 mmol/L    Glucose 108 (H) 70 - 99 mg/dL    Urea Nitrogen 5 (L) 7 - 30 mg/dL    Creatinine 0.83 0.52 - 1.04 mg/dL    GFR Estimate >90 >60 mL/min/[1.73_m2]    GFR Estimate If Black >90 >60 mL/min/[1.73_m2]    Calcium 8.7 8.5 - 10.1 mg/dL   INR   Result Value Ref Range    INR 1.02 0.86 - 1.14   HCG QUALitative pregnancy (blood)   Result Value Ref Range    HCG Qualitative Serum Negative NEG^Negative   CK total   Result Value Ref Range    CK Total 456 (H) 30 - 225 U/L   Comprehensive metabolic panel   Result Value Ref Range    Sodium 140 133 - 144 mmol/L    Potassium 4.2 3.4 - 5.3 mmol/L    Chloride 107 94 - 109 mmol/L    Carbon Dioxide 25 20 - 32 mmol/L    Anion Gap 8 3 - 14 mmol/L    Glucose 102 (H) 70 - 99 mg/dL    Urea Nitrogen 6 (L) 7 - 30 mg/dL    Creatinine 0.85 0.52 - 1.04 mg/dL    GFR Estimate >90 >60 mL/min/[1.73_m2]    GFR Estimate If Black >90 >60 mL/min/[1.73_m2]    Calcium 8.7 8.5 - 10.1 mg/dL    Bilirubin Total 0.8 0.2 - 1.3 mg/dL    Albumin 3.2 (L) 3.4 - 5.0 g/dL    Protein Total 7.0 6.8 - 8.8 g/dL    Alkaline Phosphatase 74 40 - 150 U/L    ALT 23 0 - 50 U/L    AST 21 0 - 45 U/L   CBC with platelets   Result Value Ref Range    WBC 7.5 4.0 - 11.0 10e9/L    RBC Count 4.45 3.8 - 5.2 10e12/L    Hemoglobin 13.6 11.7 - 15.7 g/dL    Hematocrit 39.8 35.0 - 47.0 %    MCV 89 78 - 100 fl    MCH 30.6 26.5 - 33.0 pg    MCHC 34.2 31.5 - 36.5 g/dL    RDW 13.2 10.0 - 15.0 %    Platelet Count 152 150 - 450 10e9/L   ABO/Rh  type and screen   Result Value Ref Range    ABO A     RH(D) Pos     Antibody Screen Neg     Test Valid Only At [facility]        Specimen Expires 12/23/2018           Attestation:  I have reviewed today's vital signs, notes, medications, labs and imaging with Dr. Meadows.  Amount of time performed on this consult: 20 minutes.    Anat Cuello PA-C

## 2018-12-21 NOTE — PROGRESS NOTES
Planning for surgery today with Dr. Meadows  Please keep NPO  Will see patient when time allows, likely between 11-12:00    Anat Cuello PAC  [phone]

## 2018-12-21 NOTE — PLAN OF CARE
A/Ox4, forgetful, flat affect/withdrawn. VSS on RA (cannot take blood pressures on right arm-has right arm port). Gave prn norco x1 overnight-effective. Ls dim. BS active, +flatus. Voiding via bedpan. NPO- orthopedic consult. IV-SL. Assist of 2 with lift. Unable to ambulate due to pain. CMS intact. Check pulses q2hrs. Continue to monitor.

## 2018-12-21 NOTE — PROGRESS NOTES
Was informed by bedside RN that pt has Legal Guardian that is her mother.  Call placed to pt's mother Mary and message left to have her call CTS-RN.  Received call back from Mary who confirmed that she is the legal guardian.  Asked pt's mother Mary about Legal Guardian paperwork and per Mary she would call Group Home and have them fax paperwork for Legal Guardian to [facility], fax number given.  Pt's mother Mary was informed that pt has been admitted under Observation Status and that a Observation brochure would be left at pt's bedside. Mary stated understanding of this.   Per Mary she will try to come in to see pt around the time that PA-C from [facility] will attempt to see pt.   Await Legal Guardian paperwork.     Called pt's mother Mary to ask about Group Home name and number.  Per pt's mom, pt resides at [facility] [phone].  Sandra is Group Home contact.   Anat is the Supervisor for this Group Home and she can be reached at [phone].    Received Legal Guardian paperwork and sent it to [employer] per policy.

## 2018-12-21 NOTE — ANESTHESIA PREPROCEDURE EVALUATION
Anesthesia Pre-Procedure Evaluation    Patient: Karolyn Banerjee   MRN: [id number] : 1985          Preoperative Diagnosis: TIBIAL FRACTURE.    Procedure(s):  OPEN REDUCTION INTERNAL FIXATION RIGHT TIBIAL PLATEAU FRACTURE.(C-ARM, SYNTHES)    Past Medical History:   Diagnosis Date     Agranulocytosis (H) ,     clozapine induced, cannot be retrialed      Asthma, mild intermittent      Astigmatism      Cognitive disorder     possibly due to ECT     Depressive disorder      Humeral shaft fracture 2014    Right, following Dr. Gardner     Mild developmental delay      Myopia      Neuroleptic-induced tardive dyskinesia      Nonorganic enuresis      Refractive amblyopia      Schizoaffective disorder, bipolar type (H)     Follows with Dr. Cooley at her group home.      Sleep disorder      Past Surgical History:   Procedure Laterality Date     HRW PORT A CATH Right      NO HISTORY OF SURGERY         Anesthesia Evaluation     . Pt has not had prior anesthetic            ROS/MED HX    ENT/Pulmonary:     (+)sleep apnea, Mild Persistent asthma , . .   (-) tobacco use and COPD   Neurologic:       Cardiovascular:        (-) hypertension and CAD   METS/Exercise Tolerance:     Hematologic:         Musculoskeletal:         GI/Hepatic:        (-) GERD and liver disease   Renal/Genitourinary:      (-) renal disease   Endo:     (+) Obesity, .   (-) Type I DM and Type II DM   Psychiatric:     (+) psychiatric history other (comment) (schizoaffective disorder, borderline personality disorder)      Infectious Disease:         Malignancy:         Other:                          Physical Exam      Airway   Mallampati: III  TM distance: >3 FB  Neck ROM: full    Dental   (+) missing  Comment: Missing several teeth, denies loose or chipped teeth    Cardiovascular   Rhythm and rate: regular and normal  (-) no murmur    Pulmonary    breath sounds clear to auscultation            Lab Results   Component Value Date    WBC 7.5 2018  "   HGB 13.6 12/21/2018    HCT 39.8 12/21/2018     12/21/2018     12/21/2018    POTASSIUM 4.2 12/21/2018    CHLORIDE 107 12/21/2018    CO2 25 12/21/2018    BUN 6 (L) 12/21/2018    CR 0.85 12/21/2018     (H) 12/21/2018    GIOVANY 8.7 12/21/2018    PHOS 3.3 01/14/2014    MAG 2.0 01/14/2014    ALBUMIN 3.2 (L) 12/21/2018    PROTTOTAL 7.0 12/21/2018    ALT 23 12/21/2018    AST 21 12/21/2018    GGT 22 06/02/2013    ALKPHOS 74 12/21/2018    BILITOTAL 0.8 12/21/2018    LIPASE 39 10/15/2013    PTT 26 08/03/2016    INR 1.02 12/20/2018    FIBR 279 10/11/2013    TSH 2.55 09/21/2017    T4 1.06 07/26/2008    T3 119 07/26/2008    HCG Negative 10/24/2018    HCGS Negative 12/20/2018       Preop Vitals  BP Readings from Last 3 Encounters:   12/21/18 147/90   10/24/18 114/73   04/25/18 125/84    Pulse Readings from Last 3 Encounters:   12/21/18 98   10/24/18 80   04/25/18 101      Resp Readings from Last 3 Encounters:   12/21/18 18   10/24/18 16   04/18/18 18    SpO2 Readings from Last 3 Encounters:   12/21/18 97%   10/24/18 97%   04/18/18 95%      Temp Readings from Last 1 Encounters:   12/21/18 37.4  C (99.4  F) (Oral)    Ht Readings from Last 1 Encounters:   12/20/18 1.651 m (5' 5\")      Wt Readings from Last 1 Encounters:   12/20/18 117.9 kg (260 lb)    Estimated body mass index is 43.27 kg/m  as calculated from the following:    Height as of this encounter: 1.651 m (5' 5\").    Weight as of this encounter: 117.9 kg (260 lb).       Anesthesia Plan      History & Physical Review  History and physical reviewed and following examination; no interval change.    ASA Status:  2 .    NPO Status:  > 8 hours    Plan for General and ETT with Intravenous induction. Maintenance will be Inhalation.    PONV prophylaxis:  Ondansetron (or other 5HT-3) and Dexamethasone or Solumedrol       Postoperative Care  Postoperative pain management:  IV analgesics.      Consents  Anesthetic plan, risks, benefits and alternatives discussed " with:  Patient and legal guardian..                 Shane Abraham MD

## 2018-12-22 ENCOUNTER — APPOINTMENT (OUTPATIENT)
Dept: PHYSICAL THERAPY | Facility: CLINIC | Age: 33
End: 2018-12-22
Attending: PHYSICIAN ASSISTANT
Payer: MEDICAID

## 2018-12-22 ENCOUNTER — APPOINTMENT (OUTPATIENT)
Dept: OCCUPATIONAL THERAPY | Facility: CLINIC | Age: 33
End: 2018-12-22
Attending: PHYSICIAN ASSISTANT
Payer: MEDICAID

## 2018-12-22 LAB
ANION GAP SERPL CALCULATED.3IONS-SCNC: 8 MMOL/L (ref 3–14)
BUN SERPL-MCNC: 8 MG/DL (ref 7–30)
CALCIUM SERPL-MCNC: 8.5 MG/DL (ref 8.5–10.1)
CHLORIDE SERPL-SCNC: 104 MMOL/L (ref 94–109)
CO2 SERPL-SCNC: 25 MMOL/L (ref 20–32)
CREAT SERPL-MCNC: 0.77 MG/DL (ref 0.52–1.04)
ERYTHROCYTE [DISTWIDTH] IN BLOOD BY AUTOMATED COUNT: 13.2 % (ref 10–15)
GFR SERPL CREATININE-BSD FRML MDRD: >90 ML/MIN/{1.73_M2}
GLUCOSE SERPL-MCNC: 150 MG/DL (ref 70–99)
HCT VFR BLD AUTO: 38.2 % (ref 35–47)
HGB BLD-MCNC: 12.8 G/DL (ref 11.7–15.7)
MCH RBC QN AUTO: 30.5 PG (ref 26.5–33)
MCHC RBC AUTO-ENTMCNC: 33.5 G/DL (ref 31.5–36.5)
MCV RBC AUTO: 91 FL (ref 78–100)
PLATELET # BLD AUTO: 152 10E9/L (ref 150–450)
POTASSIUM SERPL-SCNC: 4.2 MMOL/L (ref 3.4–5.3)
RBC # BLD AUTO: 4.2 10E12/L (ref 3.8–5.2)
SODIUM SERPL-SCNC: 137 MMOL/L (ref 133–144)
WBC # BLD AUTO: 9.3 10E9/L (ref 4–11)

## 2018-12-22 PROCEDURE — 80048 BASIC METABOLIC PNL TOTAL CA: CPT | Performed by: HOSPITALIST

## 2018-12-22 PROCEDURE — 97530 THERAPEUTIC ACTIVITIES: CPT | Mod: GP | Performed by: PHYSICAL THERAPIST

## 2018-12-22 PROCEDURE — 25000128 H RX IP 250 OP 636: Performed by: PHYSICIAN ASSISTANT

## 2018-12-22 PROCEDURE — 25000132 ZZH RX MED GY IP 250 OP 250 PS 637: Performed by: INTERNAL MEDICINE

## 2018-12-22 PROCEDURE — 99207 ZZC CDG-CODE CATEGORY CHANGED: CPT | Performed by: INTERNAL MEDICINE

## 2018-12-22 PROCEDURE — 25000132 ZZH RX MED GY IP 250 OP 250 PS 637: Performed by: PHYSICIAN ASSISTANT

## 2018-12-22 PROCEDURE — 40000193 ZZH STATISTIC PT WARD VISIT: Performed by: PHYSICAL THERAPIST

## 2018-12-22 PROCEDURE — 97166 OT EVAL MOD COMPLEX 45 MIN: CPT | Mod: GO | Performed by: OCCUPATIONAL THERAPIST

## 2018-12-22 PROCEDURE — 99225 ZZC SUBSEQUENT OBSERVATION CARE,LEVEL II: CPT | Performed by: INTERNAL MEDICINE

## 2018-12-22 PROCEDURE — G0378 HOSPITAL OBSERVATION PER HR: HCPCS

## 2018-12-22 PROCEDURE — 25000132 ZZH RX MED GY IP 250 OP 250 PS 637: Performed by: HOSPITALIST

## 2018-12-22 PROCEDURE — 85027 COMPLETE CBC AUTOMATED: CPT | Performed by: HOSPITALIST

## 2018-12-22 PROCEDURE — 97161 PT EVAL LOW COMPLEX 20 MIN: CPT | Mod: GP | Performed by: PHYSICAL THERAPIST

## 2018-12-22 PROCEDURE — 40000133 ZZH STATISTIC OT WARD VISIT: Performed by: OCCUPATIONAL THERAPIST

## 2018-12-22 PROCEDURE — 36415 COLL VENOUS BLD VENIPUNCTURE: CPT | Performed by: HOSPITALIST

## 2018-12-22 RX ORDER — NALOXONE HYDROCHLORIDE 0.4 MG/ML
.1-.4 INJECTION, SOLUTION INTRAMUSCULAR; INTRAVENOUS; SUBCUTANEOUS
Status: ACTIVE | OUTPATIENT
Start: 2018-12-22 | End: 2018-12-23

## 2018-12-22 RX ORDER — ONDANSETRON 4 MG/1
4 TABLET, ORALLY DISINTEGRATING ORAL EVERY 6 HOURS PRN
Status: DISCONTINUED | OUTPATIENT
Start: 2018-12-22 | End: 2018-12-23 | Stop reason: HOSPADM

## 2018-12-22 RX ORDER — ONDANSETRON 2 MG/ML
4 INJECTION INTRAMUSCULAR; INTRAVENOUS EVERY 6 HOURS PRN
Status: DISCONTINUED | OUTPATIENT
Start: 2018-12-22 | End: 2018-12-23 | Stop reason: HOSPADM

## 2018-12-22 RX ORDER — CEFAZOLIN SODIUM 2 G/100ML
2 INJECTION, SOLUTION INTRAVENOUS EVERY 8 HOURS
Status: COMPLETED | OUTPATIENT
Start: 2018-12-22 | End: 2018-12-22

## 2018-12-22 RX ORDER — SODIUM CHLORIDE, SODIUM LACTATE, POTASSIUM CHLORIDE, CALCIUM CHLORIDE 600; 310; 30; 20 MG/100ML; MG/100ML; MG/100ML; MG/100ML
INJECTION, SOLUTION INTRAVENOUS CONTINUOUS
Status: DISCONTINUED | OUTPATIENT
Start: 2018-12-22 | End: 2018-12-23 | Stop reason: HOSPADM

## 2018-12-22 RX ORDER — NALOXONE HYDROCHLORIDE 0.4 MG/ML
.1-.4 INJECTION, SOLUTION INTRAMUSCULAR; INTRAVENOUS; SUBCUTANEOUS
Status: DISCONTINUED | OUTPATIENT
Start: 2018-12-22 | End: 2018-12-23 | Stop reason: HOSPADM

## 2018-12-22 RX ORDER — AMOXICILLIN 250 MG
2 CAPSULE ORAL 2 TIMES DAILY
Status: DISCONTINUED | OUTPATIENT
Start: 2018-12-22 | End: 2018-12-23 | Stop reason: HOSPADM

## 2018-12-22 RX ORDER — ACETAMINOPHEN 325 MG/1
975 TABLET ORAL EVERY 8 HOURS
Status: DISCONTINUED | OUTPATIENT
Start: 2018-12-22 | End: 2018-12-23 | Stop reason: HOSPADM

## 2018-12-22 RX ORDER — ACETAMINOPHEN 325 MG/1
650 TABLET ORAL EVERY 4 HOURS PRN
Status: DISCONTINUED | OUTPATIENT
Start: 2018-12-25 | End: 2018-12-23 | Stop reason: HOSPADM

## 2018-12-22 RX ORDER — OXYCODONE HYDROCHLORIDE 5 MG/1
5-10 TABLET ORAL
Status: DISCONTINUED | OUTPATIENT
Start: 2018-12-22 | End: 2018-12-23 | Stop reason: HOSPADM

## 2018-12-22 RX ORDER — LIDOCAINE 40 MG/G
CREAM TOPICAL
Status: DISCONTINUED | OUTPATIENT
Start: 2018-12-22 | End: 2018-12-23 | Stop reason: HOSPADM

## 2018-12-22 RX ORDER — HYDROMORPHONE HYDROCHLORIDE 1 MG/ML
.3-.5 INJECTION, SOLUTION INTRAMUSCULAR; INTRAVENOUS; SUBCUTANEOUS
Status: DISCONTINUED | OUTPATIENT
Start: 2018-12-22 | End: 2018-12-23 | Stop reason: HOSPADM

## 2018-12-22 RX ORDER — AMOXICILLIN 250 MG
1 CAPSULE ORAL 2 TIMES DAILY
Status: DISCONTINUED | OUTPATIENT
Start: 2018-12-22 | End: 2018-12-23 | Stop reason: HOSPADM

## 2018-12-22 RX ADMIN — PERPHENAZINE 16 MG: 16 TABLET, FILM COATED ORAL at 10:01

## 2018-12-22 RX ADMIN — TRAZODONE HYDROCHLORIDE 50 MG: 50 TABLET ORAL at 21:30

## 2018-12-22 RX ADMIN — SENNOSIDES AND DOCUSATE SODIUM 2 TABLET: 8.6; 5 TABLET ORAL at 09:27

## 2018-12-22 RX ADMIN — DOCUSATE SODIUM 100 MG: 100 CAPSULE, LIQUID FILLED ORAL at 09:24

## 2018-12-22 RX ADMIN — SODIUM CHLORIDE, POTASSIUM CHLORIDE, SODIUM LACTATE AND CALCIUM CHLORIDE: 600; 310; 30; 20 INJECTION, SOLUTION INTRAVENOUS at 00:59

## 2018-12-22 RX ADMIN — LURASIDONE HYDROCHLORIDE 160 MG: 80 TABLET, FILM COATED ORAL at 17:11

## 2018-12-22 RX ADMIN — OXYCODONE HYDROCHLORIDE 5 MG: 5 TABLET ORAL at 11:24

## 2018-12-22 RX ADMIN — BUPROPION HYDROCHLORIDE 450 MG: 300 TABLET, FILM COATED, EXTENDED RELEASE ORAL at 10:00

## 2018-12-22 RX ADMIN — DOCUSATE SODIUM 100 MG: 100 CAPSULE, LIQUID FILLED ORAL at 21:30

## 2018-12-22 RX ADMIN — CEFAZOLIN SODIUM 2 G: 2 INJECTION, SOLUTION INTRAVENOUS at 11:24

## 2018-12-22 RX ADMIN — PERPHENAZINE 16 MG: 16 TABLET, FILM COATED ORAL at 21:30

## 2018-12-22 RX ADMIN — ACETAMINOPHEN 975 MG: 325 TABLET, FILM COATED ORAL at 09:28

## 2018-12-22 RX ADMIN — SODIUM CHLORIDE, POTASSIUM CHLORIDE, SODIUM LACTATE AND CALCIUM CHLORIDE: 600; 310; 30; 20 INJECTION, SOLUTION INTRAVENOUS at 03:59

## 2018-12-22 RX ADMIN — CETIRIZINE HYDROCHLORIDE 10 MG: 10 TABLET, FILM COATED ORAL at 09:27

## 2018-12-22 RX ADMIN — BENZTROPINE MESYLATE 1.5 MG: 1 TABLET ORAL at 09:26

## 2018-12-22 RX ADMIN — ACETAMINOPHEN 975 MG: 325 TABLET, FILM COATED ORAL at 17:10

## 2018-12-22 RX ADMIN — CEFAZOLIN SODIUM 2 G: 2 INJECTION, SOLUTION INTRAVENOUS at 03:59

## 2018-12-22 RX ADMIN — SENNOSIDES AND DOCUSATE SODIUM 2 TABLET: 8.6; 5 TABLET ORAL at 21:30

## 2018-12-22 RX ADMIN — ACETAMINOPHEN 975 MG: 325 TABLET, FILM COATED ORAL at 01:03

## 2018-12-22 NOTE — ANESTHESIA CARE TRANSFER NOTE
Patient: Karolyn Banerjee    Procedure(s):  OPEN REDUCTION INTERNAL FIXATION RIGHT TIBIAL PLATEAU FRACTURE    Diagnosis: TIBIAL FRACTURE.  Diagnosis Additional Information: No value filed.    Anesthesia Type:   General, ETT     Note:  Airway :Face Mask  Patient transferred to:PACU  Comments:   TOF 4/4 with sustained tetany > 5secs. Spontaneous resp with tidal volume >400ml.. Followed commands et purposeful. Strong tongue thrust. Extubated with cuff down. Pt maintains resp. O2 sat > 97%. Simple face mask on with 10l O2.To PACU: VSS, placed on monitors with alarms on. Report given to RN.; Handoff Report: Identifed the Patient, Identified the Reponsible Provider, Reviewed the pertinent medical history, Discussed the surgical course, Reviewed Intra-OP anesthesia mangement and issues during anesthesia, Set expectations for post-procedure period and Allowed opportunity for questions and acknowledgement of understanding      Vitals: (Last set prior to Anesthesia Care Transfer)    CRNA VITALS  12/21/2018 2216 - 12/21/2018 2252      12/21/2018             Pulse:  113    SpO2:  97 %                Electronically Signed By: HELADIO Otto CRNA  December 21, 2018  10:52 PM

## 2018-12-22 NOTE — ANESTHESIA POSTPROCEDURE EVALUATION
Patient: Karolyn Banerjee    Procedure(s):  OPEN REDUCTION INTERNAL FIXATION RIGHT TIBIAL PLATEAU FRACTURE    Diagnosis:TIBIAL FRACTURE.  Diagnosis Additional Information: No value filed.    Anesthesia Type:  General, ETT    Note:  Anesthesia Post Evaluation    Patient location during evaluation: Bedside  Patient participation: Able to fully participate in evaluation  Level of consciousness: awake and alert  Pain management: adequate  Airway patency: patent  Cardiovascular status: acceptable  Respiratory status: acceptable  Hydration status: acceptable  PONV: none             Last vitals:  Vitals:    12/21/18 2255 12/21/18 2300 12/21/18 2310   BP:  (!) 151/100 (!) 159/106   Pulse:  98 98   Resp: 12 20 24   Temp:      SpO2:  100% 96%         Electronically Signed By: Jose Back MD  December 21, 2018  11:58 PM

## 2018-12-22 NOTE — PLAN OF CARE
PT: Orders received, chart reviewed, Eval completed and treatment started, s/p ORIF of R Tibial plateau and fibular head fractures on 12/21/18.    Discharge Planner PT   Patient plan for discharge: Not stated.  Current status: Reports 5/10 R knee pain.  Needs modA of 2 for bed mobility, transfers, and to take 2 steps forward and back, NWB R.  Barriers to return to prior living situation: Postop pain, edema, and level of assist needed to be safe.  Recommendations for discharge: Disch to group home with FWW and w/c, provided staff is able to provide adequate assist for her to be NWB R x 12 weeks.  Otherwise, TCU.  Rationale for recommendations: Will continue to need 24/7 supervision and assistance with all cares and activity, as well as skilled PT for recovery of greater functional independence, mobility, and safety for negotiation of chosen residence.       Entered by: Stan Boogie 12/22/2018 12:46 PM

## 2018-12-22 NOTE — PROGRESS NOTES
[facility]    Hospitalist Progress Note      Assessment & Plan   Karolyn Banerjee is a 33 year old female with a past medical history of schizoaffective disorder, bipolar, mild intermittent asthma, JOSE intolerant of CPAP who was admitted on 12/20/2018 with a tibial plateau fracture and fibular head fracture following a mechanical fall.    Right fibular head and Right medial Tibial plateau fractures  Talar dome impaction fracture   Medial meniscus tear  All due to a mechanical fall.  Underwent ORIF on 12/21/18.    -NWB right leg x 12 weeks  -continue therapy - will need 24/7 assistance and assist for ambulation with walker - ? Group home vs TCU  -pain control  -follow up with ortho in 2 weeks     History of seizoaffective disorder, bipolar type  -continue PTA cogentin, bupropion, latuda, trazodone     Leukocytosis  -Mild likely from acute distress, no evidence of obvious infection.  resolved     History of sleep apnea intolerant to CPAP          DVT Prophylaxis: lovenox while in hospital and then ASA 325mg x6 weeks  Code Status: Full Code    Disposition: Expected discharge in 1-2 days once confirmed capabilities of group home vs need for TCU.    Cortez Nelson    Interval History   Pt feels ok today, does have some pain at her knee with moving around in bed.  No chest pain, shortness of breath.      -Data reviewed today: I reviewed all new labs and imaging results over the last 24 hours. I personally reviewed no images or EKG's today.    Physical Exam   Temp: 99  F (37.2  C) Temp src: Oral BP: 140/63 Pulse: 96 Heart Rate: 104 Resp: 20 SpO2: 96 % O2 Device: Nasal cannula Oxygen Delivery: 3 LPM  Vitals:    12/20/18 1321   Weight: 117.9 kg (260 lb)     Vital Signs with Ranges  Temp:  [97.9  F (36.6  C)-99.1  F (37.3  C)] 99  F (37.2  C)  Pulse:  [] 96  Heart Rate:  [] 104  Resp:  [9-54] 20  BP: (135-178)/() 140/63  SpO2:  [95 %-100 %] 96 %  I/O last 3 completed shifts:  In: 1860  [P.O.:260; I.V.:1600]  Out: 1100 [Urine:1000; Blood:100]    Constitutional: awake, alert, cooperative    Respiratory: Clear to auscultation bilaterally, no crackles or wheezing noted.  Good air exchange.     Cardiovascular: Regular rate and rhythm.  No murmur, rub or gallop noted.     GI: Positive bowel sounds, soft, non-distended, non-tender.  No masses or organomegaly noted.     Musculoskeletal: right leg wrapped, NWB    Neurologic: Awake, alert and oriented to name, place and time.  Cranial nerves II-XII are grossly intact.      Lymphatic: No edema noted    Skin: no rash    Medications     lactated ringers 75 mL/hr at 12/22/18 0359       acetaminophen  975 mg Oral Q8H     benztropine  1.5 mg Oral Daily     BuPROPion HCl ER (XL)  450 mg Oral Daily     cetirizine  10 mg Oral Daily     docusate sodium  100 mg Oral BID     [START ON 12/23/2018] enoxaparin  40 mg Subcutaneous Q24H     lurasidone  160 mg Oral Daily with supper     perphenazine  16 mg Oral BID     senna-docusate  1 tablet Oral BID    Or     senna-docusate  2 tablet Oral BID     sodium chloride (PF)  3 mL Intracatheter Q8H     sodium chloride (PF)  3 mL Intracatheter Q8H     traZODone  50 mg Oral At Bedtime       Data   Recent Labs   Lab 12/22/18  0642 12/21/18  0720 12/20/18  1749   WBC 9.3 7.5 11.2*   HGB 12.8 13.6 14.1   MCV 91 89 89    152 170   INR  --   --  1.02    140 140   POTASSIUM 4.2 4.2 3.9   CHLORIDE 104 107 107   CO2 25 25 25   BUN 8 6* 5*   CR 0.77 0.85 0.83   ANIONGAP 8 8 8   GIOVANY 8.5 8.7 8.7   * 102* 108*   ALBUMIN  --  3.2*  --    PROTTOTAL  --  7.0  --    BILITOTAL  --  0.8  --    ALKPHOS  --  74  --    ALT  --  23  --    AST  --  21  --        Recent Results (from the past 24 hour(s))   XR Surgery GULSHAN L/T 5 Min Fluoro w Stills    Narrative    XR SURGERY GULSHAN FLUORO LESS THAN 5 MIN W STILLS 12/21/2018 10:10 PM     COMPARISON: 12/20/2018    HISTORY: Right Tibial Plateau Fracture ORIF    NUMBER OF IMAGES ACQUIRED:  2    VIEWS: 2    FLUOROSCOPY TIME: 1.1      Impression    IMPRESSION: Intraoperative images during plate and screw fixation of  RIGHT tibial plateau fracture and screw fixation of proximal fibular  fracture.    NADEGE RAMIREZ MD

## 2018-12-22 NOTE — PLAN OF CARE
Pt came from PACU at 0015. A&Ox4. Flat effect. VSS on 3 liters, except hypertensive at times. On capnography WNL. Dressing CDI. Knee immobilizer in place. CMS intact except mild tingling to RLE. Pain managed with scheduled Tylenol. No N&V. Tolerating regular diet. Had snacks overnight. +BS. No flatus yet. Saenz with adequate UOP, removed this AM around 0600. Pt due to void. NWB to RLE. PT eval today. Nursing continue to monitor.

## 2018-12-22 NOTE — PLAN OF CARE
Care Plan Summary Note: Pt A&Ox4 , VSS, flat affect, slow speech. Voiding. CMS intact ex tingling RLE. PCDs on, using IS with encouragement. PERRLA intact. C/o  RLE pain managed by cold pack and oxycodone. Maciej regular diet, IVF infusing. A1, walker. NWB x12 weeks. discharge TCU or group home. Continue to monitor.

## 2018-12-22 NOTE — PLAN OF CARE
Discharge Planner OT   Patient plan for discharge: Pt would like to return to group home  Current status: Orders received, eval only completed, pt is on OBS. Pt requires multiple cues at times, appears somewhat dazed during evaluation, pt had delayed responses to questions. Pt requires MOD A for bed mobility, A of 2 for supine to sit with VC for hand placement. Pt was MOD-MAX A for LE dressing, pt holds her breath during activities and appears SOB with effort. Discussed group home setting with pt and SW for discharge planning.   Barriers to return to prior living situation: NWB status, equipment/level of assist  Recommendations for discharge: Return to group home with Home OT   Rationale for recommendations: Pt may be able to return to group home setting if facility is able to provide assist for all mobility, ADLs, and IADLs. Pt will require a WC, access to a shower/tub transfer bench as shower has step in, address her bed as pt reports it is on wheels. If facility is not able to meet level of assist/equipment pt would benefit from TCU stay to maximize IND and safety in ADLs and functional mobility. Pt would benefit from home OT services for equipment recommendations and training for pt in home environment. Orders completed.        Entered by: Anat Noonan 12/22/2018 4:36 PM

## 2018-12-22 NOTE — CONSULTS
Care Transition Initial Assessment - RN        Met with: Patient and Caregiver.  DATA   Active Problems:    Closed fibular fracture       Cognitive Status: alert and oriented.        Contact information and PCP information verified: Yes  Lives With: facility resident(3 other girls.)   Living Arrangements: group home(1 story with LL.)                 Insurance concerns: No Insurance issues identified  ASSESSMENT  Patient currently receives the following services:  Lives in a group home        Identified issues/concerns regarding health management: Patient had a fall resulting in a ORIF right knee. Patient is NWB on right leg, will need further assistance from Group Home. Placed call to group to inquire if patient can return with current status. Spoke with  staff, they will contact director of the group home to see if they are able to help with patient. Left message for them to call writer back.  Patient needs wheel chair prior to discharge.    PLAN  Financial costs for the patient include unsure .  Patient given options and choices for discharge yes .  Patient/family is agreeable to the plan?  Yes:   Patient anticipates discharging to pending Group home assistance. .        Patient anticipates needs for home equipment: Yes  Plan/Disposition: Home   Appointments:       Care  (CTS) will continue to follow as needed.

## 2018-12-22 NOTE — PROGRESS NOTES
"S:  No acute events overnight. Pain well controlled.     O:  BP (!) 146/99 (BP Location: Left arm)   Pulse 96   Temp 99.1  F (37.3  C) (Oral)   Resp 28   Ht 1.651 m (5' 5\")   Wt 117.9 kg (260 lb)   SpO2 96%   BMI 43.27 kg/m        RLE:  Knee immobilizer in place  +DF/PF distally  SILT    A/P:  33 year old female s/p ORIF R tibial plateau fx and fibular head avulsion    - doing well  - nwb rle x 12 weeks  - pt/ot  - pain control  - dvt prophylaxis withy lovenox while in hospital, transition to aspirin 325 upon discharge for 6 weeks  - change dressing POD#2  - ok to discharge once safe on crutches/walker/wheelchair from ortho perspective  - f/u with me at [facility] in 2 weeks. Can call my care coordinator Desiree at [phone]                  "

## 2018-12-22 NOTE — PROGRESS NOTES
12/22/18 1600   Quick Adds   Type of Visit Initial Occupational Therapy Evaluation   Living Environment   Lives With facility resident   Living Arrangements group home  (1 story with LL.)   Home Accessibility wheelchair accessible   Living Environment Comment Pt reports she has a bed on wheels, and the shower has a small step to get in.    Self-Care   Current Activity Tolerance poor   Equipment Currently Used at Home none   Functional Level   Ambulation 0-->independent   Transferring 0-->independent   Toileting 0-->independent   Bathing 0-->independent   Communication 0-->understands/communicates without difficulty   Swallowing 2-->difficulty swallowing liquids/foods   Fall history within last six months yes   Number of times patient has fallen within last six months 2   Prior Functional Level Comment Per pt she was completing ADLs with distant supervision for showers. Has assist for IADLs. History is somewhat limited at time of eval   General Information   Onset of Illness/Injury or Date of Surgery - Date 12/20/18   Referring Physician Gaye Iverson MD   Patient/Family Goals Statement None specifically stated   Additional Occupational Profile Info/Pertinent History of Current Problem 33 year old female with a past medical history of schizoaffective disorder, bipolar, mild intermittent asthma, JOSE intolerant of CPAP who was admitted on 12/20/2018 with a tibial plateau fracture and fibular head fracture following a mechanical fall, underwent ORIF 12/21/18   Precautions/Limitations fall precautions   Weight-Bearing Status - RLE nonweight-bearing   General Observations KI - continuous   Cognitive Status Examination   Orientation orientation to person, place and time   Level of Consciousness lethargic/somnolent   Follows Commands (Cognition) delayed response/completion   Cognitive Comment Pt has delayed response and increase processing time needed during eval   Visual Perception   Visual Perception Wears glasses    Pain Assessment   Patient Currently in Pain Yes, see Vital Sign flowsheet   Range of Motion (ROM)   ROM Comment BUE WFL for ADLs   Strength   Strength Comments BUE WFL for ADLs   Hand Strength   Hand Strength Comments BUE WFL for ADLs   Coordination   Upper Extremity Coordination No deficits were identified   Mobility   Bed Mobility Comments A of 2   Transfer Skills   Transfer Comments A of 2   Transfer Skill: Sit to Stand   Level of [place]: Sit/Stand moderate assist (50% patients effort)   Physical Assist/Nonphysical Assist: Sit/Stand 2 persons   Transfer Skill: Sit to Stand nonweight-bearing   Assistive Device for Transfer: Sit/Stand rolling walker   Upper Body Dressing   Level of [place]: Dress Upper Body stand-by assist   Lower Body Dressing   Level of [place]: Dress Lower Body moderate assist (50% patients effort)   Toileting   Level of [place]: Toilet moderate assist (50% patients effort)   Physical Assist/Nonphysical Assist: Toilet 2 person assist   Grooming   Level of [place]: Grooming stand-by assist   Eating/Self Feeding   Level of [place]: Eating stand-by assist   Assistive Device (pt asked for containers to be opened, when asked she states )   Activities of Daily Living Analysis   Impairments Contributing to Impaired Activities of Daily Living balance impaired;cognition impaired;fear and anxiety;pain;post surgical precautions   General Therapy Interventions   Intervention Comments eval only   Clinical Impression   Criteria for Skilled Therapeutic Interventions Met evaluation only   OT Diagnosis reduced IND in ADLs and functional mobility   Influenced by the following impairments balance impaired;cognition impaired;fear and anxiety;pain;post surgical precautions   Assessment of Occupational Performance 3-5 Performance Deficits   Identified Performance Deficits toileting, dressing, bed mobility, bathing, G/H   Clinical Decision Making (Complexity) Moderate complexity  "  Therapy Frequency (eval only)   Predicted Duration of Therapy Intervention (days/wks) 1 day   Anticipated Equipment Needs at Discharge (see care plan note)   Anticipated Discharge Disposition Other (see comments);Transitional Care Facility  (group home)   Risks and Benefits of Treatment have been explained. Yes   Patient, Family & other staff in agreement with plan of care Yes   [facility] TM \"6 Clicks\"   2016, Trustees of [facility], under license to [employer].  All rights reserved.   6 Clicks Short Forms Daily Activity Inpatient Short Form   [facility]  \"6 Clicks\" Daily Activity Inpatient Short Form   1. Putting on and taking off regular lower body clothing? 2 - A Lot   2. Bathing (including washing, rinsing, drying)? 2 - A Lot   3. Toileting, which includes using toilet, bedpan or urinal? 2 - A Lot   4. Putting on and taking off regular upper body clothing? 3 - A Little   5. Taking care of personal grooming such as brushing teeth? 3 - A Little   6. Eating meals? 3 - A Little   Daily Activity Raw Score (Score out of 24.Lower scores equate to lower levels of function) 15   Total Evaluation Time   Total Evaluation Time (Minutes) 20     "

## 2018-12-23 ENCOUNTER — APPOINTMENT (OUTPATIENT)
Dept: PHYSICAL THERAPY | Facility: CLINIC | Age: 33
End: 2018-12-23
Payer: MEDICAID

## 2018-12-23 VITALS
HEART RATE: 95 BPM | OXYGEN SATURATION: 94 % | DIASTOLIC BLOOD PRESSURE: 97 MMHG | SYSTOLIC BLOOD PRESSURE: 144 MMHG | WEIGHT: 260 LBS | TEMPERATURE: 98.6 F | HEIGHT: 65 IN | RESPIRATION RATE: 16 BRPM | BODY MASS INDEX: 43.32 KG/M2

## 2018-12-23 LAB
ANION GAP SERPL CALCULATED.3IONS-SCNC: 7 MMOL/L (ref 3–14)
BUN SERPL-MCNC: 10 MG/DL (ref 7–30)
CALCIUM SERPL-MCNC: 8.2 MG/DL (ref 8.5–10.1)
CHLORIDE SERPL-SCNC: 105 MMOL/L (ref 94–109)
CO2 SERPL-SCNC: 27 MMOL/L (ref 20–32)
CREAT SERPL-MCNC: 0.74 MG/DL (ref 0.52–1.04)
ERYTHROCYTE [DISTWIDTH] IN BLOOD BY AUTOMATED COUNT: 13.5 % (ref 10–15)
GFR SERPL CREATININE-BSD FRML MDRD: >90 ML/MIN/{1.73_M2}
GLUCOSE SERPL-MCNC: 98 MG/DL (ref 70–99)
HCT VFR BLD AUTO: 34.6 % (ref 35–47)
HGB BLD-MCNC: 11.4 G/DL (ref 11.7–15.7)
MCH RBC QN AUTO: 30 PG (ref 26.5–33)
MCHC RBC AUTO-ENTMCNC: 32.9 G/DL (ref 31.5–36.5)
MCV RBC AUTO: 91 FL (ref 78–100)
PLATELET # BLD AUTO: 126 10E9/L (ref 150–450)
POTASSIUM SERPL-SCNC: 3.9 MMOL/L (ref 3.4–5.3)
RBC # BLD AUTO: 3.8 10E12/L (ref 3.8–5.2)
SODIUM SERPL-SCNC: 139 MMOL/L (ref 133–144)
WBC # BLD AUTO: 7.6 10E9/L (ref 4–11)

## 2018-12-23 PROCEDURE — 40000193 ZZH STATISTIC PT WARD VISIT: Performed by: PHYSICAL THERAPIST

## 2018-12-23 PROCEDURE — 85027 COMPLETE CBC AUTOMATED: CPT | Performed by: HOSPITALIST

## 2018-12-23 PROCEDURE — 25000128 H RX IP 250 OP 636: Performed by: PHYSICIAN ASSISTANT

## 2018-12-23 PROCEDURE — G0378 HOSPITAL OBSERVATION PER HR: HCPCS

## 2018-12-23 PROCEDURE — 25000132 ZZH RX MED GY IP 250 OP 250 PS 637: Performed by: PHYSICIAN ASSISTANT

## 2018-12-23 PROCEDURE — 97530 THERAPEUTIC ACTIVITIES: CPT | Mod: GP | Performed by: PHYSICAL THERAPIST

## 2018-12-23 PROCEDURE — 99217 ZZC OBSERVATION CARE DISCHARGE: CPT | Performed by: INTERNAL MEDICINE

## 2018-12-23 PROCEDURE — 25000132 ZZH RX MED GY IP 250 OP 250 PS 637: Performed by: HOSPITALIST

## 2018-12-23 PROCEDURE — 25000132 ZZH RX MED GY IP 250 OP 250 PS 637: Performed by: INTERNAL MEDICINE

## 2018-12-23 PROCEDURE — 36415 COLL VENOUS BLD VENIPUNCTURE: CPT | Performed by: HOSPITALIST

## 2018-12-23 PROCEDURE — 80048 BASIC METABOLIC PNL TOTAL CA: CPT | Performed by: HOSPITALIST

## 2018-12-23 PROCEDURE — 97116 GAIT TRAINING THERAPY: CPT | Mod: GP | Performed by: PHYSICAL THERAPIST

## 2018-12-23 RX ORDER — ASPIRIN 325 MG
325 TABLET ORAL
Qty: 60 TABLET | Refills: 0 | Status: ON HOLD | OUTPATIENT
Start: 2018-12-23 | End: 2019-01-15

## 2018-12-23 RX ORDER — OXYCODONE HYDROCHLORIDE 5 MG/1
5 TABLET ORAL
Qty: 30 TABLET | Refills: 0 | Status: SHIPPED | OUTPATIENT
Start: 2018-12-23 | End: 2019-02-05

## 2018-12-23 RX ORDER — AMOXICILLIN 250 MG
2 CAPSULE ORAL 2 TIMES DAILY
Qty: 120 TABLET | Refills: 0 | Status: ON HOLD | OUTPATIENT
Start: 2018-12-23 | End: 2019-01-15

## 2018-12-23 RX ORDER — ACETAMINOPHEN 325 MG/1
650 TABLET ORAL EVERY 4 HOURS PRN
Qty: 1 BOTTLE | Refills: 0 | Status: ON HOLD | OUTPATIENT
Start: 2018-12-25 | End: 2019-01-15

## 2018-12-23 RX ADMIN — ENOXAPARIN SODIUM 40 MG: 40 INJECTION SUBCUTANEOUS at 00:20

## 2018-12-23 RX ADMIN — PERPHENAZINE 16 MG: 16 TABLET, FILM COATED ORAL at 08:10

## 2018-12-23 RX ADMIN — OXYCODONE HYDROCHLORIDE 5 MG: 5 TABLET ORAL at 13:56

## 2018-12-23 RX ADMIN — CETIRIZINE HYDROCHLORIDE 10 MG: 10 TABLET, FILM COATED ORAL at 08:10

## 2018-12-23 RX ADMIN — DOCUSATE SODIUM 100 MG: 100 CAPSULE, LIQUID FILLED ORAL at 08:10

## 2018-12-23 RX ADMIN — BENZTROPINE MESYLATE 1.5 MG: 1 TABLET ORAL at 08:09

## 2018-12-23 RX ADMIN — ACETAMINOPHEN 975 MG: 325 TABLET, FILM COATED ORAL at 16:18

## 2018-12-23 RX ADMIN — SENNOSIDES AND DOCUSATE SODIUM 2 TABLET: 8.6; 5 TABLET ORAL at 08:10

## 2018-12-23 RX ADMIN — ACETAMINOPHEN 975 MG: 325 TABLET, FILM COATED ORAL at 08:09

## 2018-12-23 RX ADMIN — BUPROPION HYDROCHLORIDE 450 MG: 300 TABLET, FILM COATED, EXTENDED RELEASE ORAL at 08:09

## 2018-12-23 RX ADMIN — ACETAMINOPHEN 975 MG: 325 TABLET, FILM COATED ORAL at 00:20

## 2018-12-23 NOTE — PROGRESS NOTES
Orthopedic Surgery  Karolyn Banerjee  2018  Admit Date:  2018  POD # 2  S/P right tibial plateau and right fibular head fracture    Patient resting comfortably in bed.    Pain controlled.  Tolerating oral intake.    Denies nausea or vomiting.  Denies chest pain or shortness of breath.  No events overnight.       Alert and orient to person, place, and time.  Vital Sign Ranges  Temperature Temp  Av.6  F (37  C)  Min: 98  F (36.7  C)  Max: 99.7  F (37.6  C)   Blood pressure Systolic (24hrs), Av , Min:126 , Max:140        Diastolic (24hrs), Av, Min:63, Max:97      Pulse Pulse  Av  Min: 95  Max: 95   Respirations Resp  Av.7  Min: 18  Max: 22   Pulse oximetry SpO2  Av.5 %  Min: 93 %  Max: 99 %       Knee immobilizer in place   Minimal erythema of the surrounding skin.   Bilateral calves are soft, non-tender.   Bilateral lower extremity is NVI.  Sensation intact bilateral lower extremities.  5/5 motor with resisted dorsi and plantar flexion bilaterally.  +DP pulse.     Labs:  Recent Labs   Lab Test 18  0628 18  0642 18  0720   POTASSIUM 3.9 4.2 4.2     Recent Labs   Lab Test 18  0628 18  0642 18  0720   HGB 11.4* 12.8 13.6     Recent Labs   Lab Test 18  1749 16  1716 16  0639   INR 1.02 2.97* 1.86*     Recent Labs   Lab Test 18  0628 18  0642 18  0720   * 152 152       A/P  1. Plan    Continue SCDs & Aspirin for DVT prophylaxis.     Mobilize with PT/OT    NWB to RLE x 12 weeks   Continue current pain regiment.   Follow up with Dr. Meadows in clinic in 2 weeks    2. Disposition   Anticipate d/c to home pending medical clearance - okay to discharge from an orthopedic standpoint.     Marisela Mendez PA-C

## 2018-12-23 NOTE — PROGRESS NOTES
Care Coordination:    Placed call to Mother/Guardian Swetha Banerjee regarding patient being discharged. She aksed if Group Home was giving her a ride, which writer had called prior to her and spoke with Vaishnavi, they will  once patient is ready to discharge.    Kimber Madison RN BSN  [facility] Care Coordinator  Phone [phone]      Addendum 1530: Orders are in for Home PT/OT. Discussed with group Home- they have no preference on services. Referral sent to [facility] contact # [place] [phone] for scheduling purposes  Malgorzata Morales RN

## 2018-12-23 NOTE — PLAN OF CARE
A&O x4, VSS on RA, CMS intact, Drng changed-CDI, Activity w/1- NWB on RLE, Voiding adequately in BSC, Pain controlled w/ oxycodone, Diet reg, Talked to pt's guardian Mary Banerjee regarding pt's discharge instruction and all questions answered. Continue to monitor.

## 2018-12-23 NOTE — PROGRESS NOTES
12/22/18 1119   Quick Adds   Type of Visit Initial PT Evaluation   Living Environment   Lives With facility resident  (3 other girls.)   Living Arrangements group home  (1 story with LL.)   Home Accessibility wheelchair accessible   Self-Care   Usual Activity Tolerance fair   Current Activity Tolerance poor   Equipment Currently Used at Home none   Functional Level Prior   Ambulation 0-->independent   Transferring 0-->independent   Toileting 0-->independent   Bathing 0-->independent   Communication 0-->understands/communicates without difficulty   Swallowing 2-->difficulty swallowing liquids/foods   Cognition 0 - no cognition issues reported   Fall history within last six months yes   Number of times patient has fallen within last six months 2   General Information   Onset of Illness/Injury or Date of Surgery - Date 12/21/18   Referring Physician Marc Meadows   Patient/Family Goals Statement No plans as yet   Pertinent History of Current Problem (include personal factors and/or comorbidities that impact the POC) Patient slipped on ice, suffering fractures of her R Tibial plateau and fibulare head, for which she underwent ORIF on 12/21/18.   Precautions/Limitations fall precautions   Weight-Bearing Status - RLE nonweight-bearing   Cognitive Status Examination   Orientation orientation to person, place and time   Level of Consciousness alert   Follows Commands and Answers Questions 100% of the time   Personal Safety and Judgment intact   Memory impaired   Pain Assessment   Patient Currently in Pain Yes, see Vital Sign flowsheet  (5/10 R knee pain.)   Integumentary/Edema   Integumentary/Edema Comments Surgical site covered by postop dressing.   Posture    Posture Forward head position   Posture Comments Tends to slouch with sitting and standing.   Range of Motion (ROM)   ROM Comment R knee in Knee immobilizer, continuously   Strength   Strength Comments R LE strength inhibited by postop pain and edema.   Bed Mobility    Bed Mobility Bed mobility analysis   Bed Mobility Comments Needs modA of 2 and vc for bed mobility, supine to sit and back to supine.   Bed Mobility Analysis   Bed Mobility Limitations decreased ability to use legs for bridging/pushing   Transfer Skills   Transfer Comments Needs modA and vc for transfers, sit to faby and back to sit, NWB R.   Gait   Gait Gait Analysis   Gait Comments Needs modA of 2 and vc for gait training, with FWW and knee immobilizer, 2 steps forward and back to EOB, NWB R.   Gait Analysis   Gait Pattern Used 3-point gait   Gait Deviations Noted decreased hank;decreased velocity of limb motion;decreased step length   Impairments Contributing to Gait Deviations decreased flexibility;pain;decreased ROM;decreased strength   Balance   Balance no deficits were identified   Sensory Examination   Sensory Perception no deficits were identified   Coordination   Coordination no deficits were identified   Muscle Tone   Muscle Tone no deficits were identified   Modality Interventions   Planned Modality Interventions Cryotherapy   General Therapy Interventions   Planned Therapy Interventions bed mobility training;gait training;strengthening;stretching;transfer training;risk factor education;home program guidelines;progressive activity/exercise   Clinical Impression   Criteria for Skilled Therapeutic Intervention yes, treatment indicated   PT Diagnosis Impaired gait and mobility.   Influenced by the following impairments Pain, edema, weakness.   Functional limitations due to impairments Limited mobility and gait.   Clinical Presentation Stable/Uncomplicated   Clinical Presentation Rationale Functional assessment and clinical judgement    Clinical Decision Making (Complexity) Low complexity   Therapy Frequency` daily   Predicted Duration of Therapy Intervention (days/wks) 2   Anticipated Equipment Needs at Discharge front wheeled walker;wheelchair   Anticipated Discharge Disposition Transitional Care  "Facility;Other (see comments);Home with Assist  (To group home if staff able to manage her.  otherwise, TCU.)   Risk & Benefits of therapy have been explained Yes   Patient, Family & other staff in agreement with plan of care Yes   [facility] TM \"6 Clicks\"   2016, Trustees of [facility], under license to [employer].  All rights reserved.   6 Clicks Short Forms Basic Mobility Inpatient Short Form   [facility]  \"6 Clicks\" V.2 Basic Mobility Inpatient Short Form   1. Turning from your back to your side while in a flat bed without using bedrails? 2 - A Lot   2. Moving from lying on your back to sitting on the side of a flat bed without using bedrails? 2 - A Lot   3. Moving to and from a bed to a chair (including a wheelchair)? 2 - A Lot   4. Standing up from a chair using your arms (e.g., wheelchair, or bedside chair)? 2 - A Lot   5. To walk in hospital room? 2 - A Lot   6. Climbing 3-5 steps with a railing? 1 - Total   Basic Mobility Raw Score (Score out of 24.Lower scores equate to lower levels of function) 11   Total Evaluation Time   Total Evaluation Time (Minutes) 15     "

## 2018-12-23 NOTE — DISCHARGE SUMMARY
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     Patient reports that she had a fall earlier today when she slipped in ice and fell outside her group home.  There is no history of head trauma.  She fell on her right knee and started noticing pain immediately.  There is no history of loss of consciousness, chest pain, palpitation, dizziness, lightheadedness.     When she presented to the ED, temperature was 98.6, pulse rate 96, blood pressure 156/96, respiratory rate 18, O2 sat 97% on room air.  Labs showed unremarkable BMP, serum hCG negative, WBC count of 11.2, hemoglobin 14.1, platelet 170, INR 1.02.  X-ray ankle of the right side showed flattening of talar dome with associated sclerosis, consistent with talar dome impaction fracture.  There is slightly irregularity of the medial tibial metaphysis, lateral meta physis, posterior malleolus and anterior tibial pad plafond which could represent additional fracture.  Slight asymmetric widening of lateral mortise with slight medial tilt of the talus.  No displaced acute distal fibula fracture is identified.  No evidence of dislocation.  X-ray of tibia and fibula shows fibular head and medial tibial plateau fracture with flattening of talar dome consistent with fracture.  Orthopedics was contacted by ED and recommended MRI of the knee which has been done, results pending at this time.  Per ED physician her right knee is more swollen than at arrival.  Patient was given 50 mcg of  fentanyl nasally and received a tablet of Norco.  Patient reports her pain is currently under control.          Hospital Course   Karolyn Banerjee was admitted on 12/20/2018.  The following problems were addressed during her hospitalization:    Right fibular head and Right medial Tibial plateau fractures  Talar dome impaction fracture   Medial meniscus tear  All due to a mechanical fall.  Underwent ORIF on 12/21/18 and did well post op with therapies and pain control.  Group home able to take her back at this point with a wheelchair given the limitations she has with mobility.    -NWB right leg x 12 weeks  -home therapy ordered - will need 24/7 assistance  -follow up with ortho in 2 weeks  -DVT prophylaxis with ASA on discharge x 4 weeks per ortho  -wheelchair ordered for discharge     History of seizoaffective disorder, bipolar type  -continue PTA cogentin, bupropion, latuda, trazodone     Leukocytosis  -Mild likely from acute distress, no evidence of obvious infection.  resolved     History of sleep apnea intolerant to CPAP          Cortez Nelson      Pending Results   Unresulted Labs Ordered in the Past 30 Days of this Admission     No orders found from 10/21/2018 to 12/21/2018.          Code Status   Full Code       Primary Care Physician   Suzie Mariscal    General: lying in bed, appears comfortable  Cardiovascular: RRR, no m/r/g  Pulmonary: CTAB  GI: +BS, soft, NT/ND  Lymphatics: no edema  Skin: no rash    Discharge Disposition   Discharged to group home    Condition at discharge: Stable    Consultations This Hospital Stay   CARE COORDINATOR IP CONSULT  SOCIAL WORK IP CONSULT  PHYSICAL THERAPY ADULT IP CONSULT  OCCUPATIONAL THERAPY ADULT IP CONSULT  ORTHOPEDICS IP CONSULT  CARE COORDINATOR IP CONSULT  CARE TRANSITION RN/SW IP CONSULT  OCCUPATIONAL THERAPY ADULT IP CONSULT  PHYSICAL THERAPY ADULT IP CONSULT    Time Spent on this Encounter   I, Cortez Nelson, personally saw the patient today  and spent less than or equal to 30 minutes discharging this patient.    Discharge Orders      DME REFERRAL      Home Care OT Referral for Hospital Discharge      Home Care PT Referral for Hospital Discharge      Reason for your hospital stay    S/p right tibial plateau fracture and right fibular head fracture open reduction internal fixation     Follow-up and recommended labs and tests     Please follow up with Dr. Meadows with [facility] in 2 weeks. Call his care coordinator Desiree at [phone] to make an appointment.   No follow up labs or test are needed.     Activity    Your activity upon discharge: NWB to RLE x 12 weeks with knee immobilizer in place     Wound care and dressings    Instructions to care for your wound at home: Keep dressing intact. Change dressing if there is drainage     Discharge Instructions    NWB to RLE x 12 weeks.   Keep knee immobilizer in place.   Take Aspirin 325 mg 2x daily for 4 weeks for blood clot prevention.   Take Tylenol and Oxycodone for pain.   Take Senna for constipation.     Please follow up with Dr. Meadows with [facility] in 2. Call his care coordinator Desiree at [phone] to make an appointment.     MD face to face encounter    Documentation of Face to Face and Certification for Home Health Services    I certify that patient: Karolyn Banerjee is under my care and that I, or a nurse practitioner or physician's assistant working with me, had a face-to-face encounter that meets the physician face-to-face encounter requirements with this patient on: 12/23/2018.    This encounter with the patient was in whole, or in part, for the following medical condition, which is the primary reason for home health care: right tibial plateua fx and fibular head fx.    I certify that, based on my findings, the following services are medically necessary home health services: Occupational Therapy and Physical Therapy.    My clinical findings support the need for the  above services because: Occupational Therapy Services are needed to assess and treat cognitive ability and address ADL safety due to impairment in cognitive ability in the group home ([facility]) and assess needs in home enviornment . and Physical Therapy Services are needed to assess and treat the following functional impairments: NWB for right tibial plateua fx and fibular head fx.    Further, I certify that my clinical findings support that this patient is homebound (i.e. absences from home require considerable and taxing effort and are for medical reasons or [facility] services or infrequently or of short duration when for other reasons) because: Mental health symptoms including: pt is in group home, under guardianship and requires 24/7 assistance..    Based on the above findings. I certify that this patient is confined to the home and needs intermittent skilled nursing care, physical therapy and/or speech therapy.  The patient is under my care, and I have initiated the establishment of the plan of care.  This patient will be followed by a physician who will periodically review the plan of care.  Physician/Provider to provide follow up care: Suzie Mariscal    Attending hospital physician (the Medicare certified [place] provider): Cortez Nelson  Physician Signature: See electronic signature associated with these discharge orders.  Date: 12/23/2018     Diet    Follow this diet upon discharge: regular     Discharge Medications   Current Discharge Medication List      START taking these medications    Details   acetaminophen (TYLENOL) 325 MG tablet Take 2 tablets (650 mg) by mouth every 4 hours as needed for pain  Qty: 1 Bottle, Refills: 0    Associated Diagnoses: Closed fracture of right tibial plateau, initial encounter      aspirin (ASA) 325 MG tablet Take 1 tablet (325 mg) by mouth 2 times daily  Qty: 60 tablet, Refills: 0    Associated Diagnoses: Closed fracture of right tibial plateau, initial  encounter      !! order for DME Equipment being ordered: Wheelchair  Qty: 1 Units, Refills: 0    Associated Diagnoses: Closed fracture of proximal end of right fibula, unspecified fracture morphology, initial encounter      !! order for DME Equipment being ordered: Walker Wheels () and Walker ()  Treatment Diagnosis: Impaired gait.  Qty: 1 each, Refills: 0    Associated Diagnoses: Closed fracture of right tibial plateau, initial encounter      oxyCODONE (ROXICODONE) 5 MG tablet Take 1 tablet (5 mg) by mouth every 3 hours as needed for severe pain  Qty: 30 tablet, Refills: 0    Associated Diagnoses: Closed fracture of right tibial plateau, initial encounter      senna-docusate (SENOKOT-S/PERICOLACE) 8.6-50 MG tablet Take 2 tablets by mouth 2 times daily  Qty: 120 tablet, Refills: 0    Associated Diagnoses: Closed fracture of right tibial plateau, initial encounter       !! - Potential duplicate medications found. Please discuss with provider.      CONTINUE these medications which have NOT CHANGED    Details   !! benztropine (COGENTIN) 0.5 MG tablet Take 0.5 mg by mouth daily as needed for other (Eyes stuck in upward position)      !! benztropine (COGENTIN) 0.5 MG tablet Take three tabs (1.5 mg) at 5 PM.  Qty: 90 tablet, Refills: 1    Associated Diagnoses: Schizoaffective disorder, bipolar type (H); Aggression      BuPROPion HCl ER, XL, 450 MG TB24 Take 450 mg by mouth daily  Qty: 30 tablet, Refills: 1    Associated Diagnoses: Schizoaffective disorder, depressive type (H)      cetirizine (ZYRTEC) 10 MG tablet Take 10 mg by mouth daily      lurasidone (LATUDA) 80 MG TABS tablet Take 2 tablets (160 mg) by mouth daily (with dinner)  Qty: 60 tablet, Refills: 1    Associated Diagnoses: Schizoaffective disorder, bipolar type (H); Aggression      norethindrone-ethinyl estradiol (MICROGESTIN 1/20) 1-20 MG-MCG per tablet Take 1 tablet by mouth daily      OLANZapine (ZYPREXA) 10 MG tablet Take 1 tablet (10 mg) by  mouth 2 times daily as needed  Qty: 30 tablet, Refills: 1    Associated Diagnoses: Schizoaffective disorder, bipolar type (H); Aggression      Omega-3 Fatty Acids (FISH OIL PO) Take 1,000 mg by mouth daily       perphenazine 16 MG tablet Take 1 tablet (16 mg) by mouth 2 times daily  Qty: 60 tablet, Refills: 1    Associated Diagnoses: Schizoaffective disorder, depressive type (H)      traZODone (DESYREL) 50 MG tablet Take 50 mg by mouth At Bedtime       !! - Potential duplicate medications found. Please discuss with provider.        Allergies   Allergies   Allergen Reactions     Clozapine      Agranulocytosis x 2, cannot be re trialed      Risperdal Other (See Comments)     dystonia     Tape [Adhesive Tape] Rash     Plastic tape     Data   Most Recent 3 CBC's:  Recent Labs   Lab Test 12/23/18  0628 12/22/18  0642 12/21/18  0720   WBC 7.6 9.3 7.5   HGB 11.4* 12.8 13.6   MCV 91 91 89   * 152 152      Most Recent 3 BMP's:  Recent Labs   Lab Test 12/23/18 0628 12/22/18  0642 12/21/18  0720    137 140   POTASSIUM 3.9 4.2 4.2   CHLORIDE 105 104 107   CO2 27 25 25   BUN 10 8 6*   CR 0.74 0.77 0.85   ANIONGAP 7 8 8   GIOVANY 8.2* 8.5 8.7   GLC 98 150* 102*     Most Recent 2 LFT's:  Recent Labs   Lab Test 12/21/18  0720 10/05/17  1108   AST 21 18   ALT 23 22   ALKPHOS 74 64   BILITOTAL 0.8 0.4     Most Recent INR's and Anticoagulation Dosing History:  Anticoagulation Dose History     Recent Dosing and Labs Latest Ref Rng & Units 11/21/2016 11/22/2016 11/23/2016 11/24/2016 11/25/2016 11/28/2016 12/20/2018    ZZ IMS TEMPLATE - - - - - 12.5 mg - -    Warfarin 5 mg - - 10 mg 10 mg 10 mg - - -    Warfarin 7.5 mg - 7.5 mg - - - - - -    INR 0.86 - 1.14 0.98 1.03 1.26(H) 1.50(H) 1.86(H) 2.97(H) 1.02        Most Recent 3 Troponin's:No lab results found.  Most Recent Cholesterol Panel:  Recent Labs   Lab Test 09/21/17  0750   CHOL 181   *   HDL 44*   TRIG 144     Most Recent 6 Bacteria Isolates From Any Culture (See  EPIC Reports for Culture Details):  Recent Labs   Lab Test 11/19/16  2131 02/03/14  0830 10/15/13  1848   CULT No growth 10,000 to 50,000 colonies/mL Mixed gram positive alexi Multiple species present, probable perineal contamination. Susceptibility testing not routinely done No growth  10,000 to 50,000 colonies/mL Mixed gram positive alexi Multiple species present,  probable perineal contamination. Susceptibility testing not routinely done     Most Recent TSH, T4 and A1c Labs:  Recent Labs   Lab Test 09/21/17  0750 04/12/17  1024   TSH 2.55  --    A1C  --  5.0     Results for orders placed or performed during the hospital encounter of 12/20/18   XR Knee Right 1/2 Views    Narrative    RIGHT KNEE ONE TO TWO VIEWS   12/20/2018 2:10 PM     HISTORY:  Pain on/slightly below knee, fell on ice.      Impression    IMPRESSION:   1. Distracted fracture of the fibular head.  2. Fracture of the medial tibial plateau which extends to the  metadiaphyseal region. There is comminution. There is at least 5 mm of  depression at the medial tibial plateau.    DOUGLAS PAIGE MD   XR Tibia & Fibula Right 2 Views    Narrative    TIBIA AND FIBULA RIGHT TWO VIEWS   12/20/2018 5:23 PM     HISTORY: Fall, has knee fracture.    COMPARISON: None.      Impression    IMPRESSION: Fibular head and medial tibial plateau fractures are  present. There is flattening of the talar dome consistent with  fracture. Refer to ankle report for additional details.    SUNSHINE QUINONES MD   Ankle XR, G/E 3 views, right    Narrative    RIGHT ANKLE THREE OR MORE VIEWS  12/20/2018 5:24 PM     HISTORY: Has knee pain, fracture.    COMPARISON: None.      Impression    IMPRESSION: There is flattening of the talar dome with associated  sclerosis, consistent with talar dome impaction fracture. There is  slight irregularity of the medial tibial metaphysis, lateral  metaphysis, posterior malleolus, and anterior tibial plafond which  could represent additional fractures. There  is slight asymmetric  widening of the lateral mortise with slight medial tilt of the talus.  There is slight irregularity of the distal fibula; however, no  displaced acute distal fibula fracture is identified. No evidence of  dislocation. A 3 mm corticated ossification along the tip of the  lateral malleolus likely represents sequelae of old injury.    SUNSHINE QUINONES MD   MR Knee Right w/o Contrast    Narrative    MR KNEE RIGHT WITHOUT CONTRAST   12/20/2018 4:37 PM    HISTORY:  Preoperative evaluation for knee fracture.    COMPARISON: Radiographs earlier today.    TECHNIQUE: Multiplanar MR imaging was performed without contrast.    FINDINGS:     Medial Meniscus: There is a moderate-sized horizontal tear of the  posterior horn contacting the inferior articular surface. No displaced  meniscal fragment is seen.    Lateral Meniscus: No tear, displaced fragment, or extrusion.    Anterior Cruciate Ligament: Unremarkable.    Posterior Cruciate Ligament: Unremarkable.    Medial Collateral Ligament: Unremarkable.    Lateral Collateral Ligament Complex, Popliteus Tendon: The iliotibial  band, fibular collateral ligament, biceps femoris tendon, and  popliteus tendon are unremarkable.    Osseous and Cartilaginous Structures: There is a mildly comminuted  fracture of the tibial plateau. The main fracture line is sagittal  involving the lateral aspect of the medial tibial plateau articular  surface. There is approximately 0.5 cm of articular surface depression  along the fracture line. There is an additional more horizontal  fracture line extending into the anterior aspect of the lateral tibial  plateau with minimal depression of this fragment. There is moderately  associated marrow edema. There is also a transverse fracture through  the fibular head with minimal distraction. This also demonstrates  marrow edema. No other fracture or osseous lesion is seen and no other  abnormal marrow signal intensity is identified. The  cartilage surfaces  are well preserved.    Extensor Mechanism: The quadriceps and patellar tendons are  unremarkable. The medial and lateral patellar retinacula appear  unremarkable.    Joint Space: There is a moderate-sized layering joint effusion. This  has appearance of a lipohemarthrosis. No intra-articular loose body is  seen.    Additional Findings: No Baker's cyst. No semimembranosus-tibial  collateral ligament or pes anserine bursitis. There is prominent edema  in the soft tissues surrounding the fracture.      Impression    IMPRESSION:   1. Mildly comminuted and depressed fracture of the tibial plateau as  described above. There is also a mildly distracted fracture of the  fibular head.  2. Moderate-sized tear of the posterior horn of the medial meniscus.    TANI TELLEZ MD   XR Surgery GULSHAN L/T 5 Min Fluoro w Stills    Narrative    XR SURGERY GULSHAN FLUORO LESS THAN 5 MIN W STILLS 12/21/2018 10:10 PM     COMPARISON: 12/20/2018    HISTORY: Right Tibial Plateau Fracture ORIF    NUMBER OF IMAGES ACQUIRED: 2    VIEWS: 2    FLUOROSCOPY TIME: 1.1      Impression    IMPRESSION: Intraoperative images during plate and screw fixation of  RIGHT tibial plateau fracture and screw fixation of proximal fibular  fracture.    NADEGE RAMIREZ MD     *Note: Due to a large number of results and/or encounters for the requested time period, some results have not been displayed. A complete set of results can be found in Results Review.

## 2018-12-23 NOTE — PROGRESS NOTES
Care Coordination:    Faxed over new DME order for W/C to [facility] at [phone], also called [facility]. They will deliver this afternoon to Group Home.    Kimber Madison RN BSN  [facility] Care Coordinator  Phone [phone]

## 2018-12-23 NOTE — PLAN OF CARE
Pt A&Ox4. Flat affect, slow speech/responses. C/o 2/10 pain in knee; received sched tylenol. Denies nausea, sob. Voiding adequately; incontinent at times; used bedpan. PIV SL. Tolerating reg diet; good appetite. CMS intact, with exception of numbess in R foot. NWB x 12 weeks. Plans to discharge with w/c either to TCU or group home. Nursing will continue to monitor.

## 2018-12-23 NOTE — PLAN OF CARE
Pt discharging back to group home. Discharge instructions reviewed w/ guardian by DHRUV Hernandez.

## 2018-12-23 NOTE — PLAN OF CARE
Pt A/O x4. Flat affect and slow to respond. Some reported slight numbness in RLE, but otherwise CMS intact. VSS on RA. NWB on RLE for 12 weeks. Repositioned per pt comfort. Taking scheduled Tylenol for pain. Voiding adequately via bedpan. No occurrence of incontinence overnight. No IV access. Continue to monitor.

## 2018-12-24 NOTE — PLAN OF CARE
Discharge Planner PT   Patient plan for discharge: Disch back to group home where she lives   Current status: Needs CGA and vc for bed mobility, transfers, and gait with FWW, OZIEL R, 12'.  Patient fitted and issued FWW at disch to home.  Barriers to return to prior living situation: None with assurances of help of group home staff.  Recommendations for discharge: Group home with FWW and w/c rental.  Rationale for recommendations: Anticipate patient will be safe for disch to group home with help of group home staff, OZIEL R x 12 weeks.       Entered by: Stan Boogie 12/23/2018 8:59 PM      Physical Therapy Discharge Summary    Reason for therapy discharge:    Discharged to home.    Progress towards therapy goal(s). See goals on Care Plan in [facility] electronic health record for goal details.  Goals met    Therapy recommendation(s):    No further therapy is recommended.

## 2018-12-27 NOTE — OP NOTE
Procedure Date: 12/21/2018      PREOPERATIVE DIAGNOSIS:     1.  Right medial tibial plateau fracture that has a split depression.   2.  Avulsion of the fibular head.      POSTOPERATIVE DIAGNOSIS:     1.  Right medial tibial plateau fracture that has a split depression.   2.  Avulsion of the fibular head.      PROCEDURE:     1.  Open reduction internal fixation of right tibial plateau fracture.   2.  Open reduction internal fixation of avulsion of the fibular head.   3.  Medical meniscus repair.      SURGEON:  Marc Meadows MD      FIRST ASSISTANT:  JAQUI Marquez      A skilled first assistant was necessary for patient positioning, prepping and draping, help with retraction, help with positioning, helping with holding the leg and maneuvering, as well as help with reduction maneuvers and closing and patient transfer.      ANESTHESIA:  General.      ESTIMATED BLOOD LOSS:  100 mL.      TOURNIQUET TIME:  120 minutes.      COMPLICATIONS:  None apparent.      INDICATIONS:  The patient is a 33-year-old female who is a special-needs patient, who slipped on ice outside her group home yesterday when her knee buckled on her.  She had immediate pain and inability to bear weight.  She was brought to the Emergency Department where x-rays and MRI were performed that demonstrated a split depression medial tibial plateau fracture, as well as an avulsion fracture of the fibular head.  The MRI also demonstrated a medial meniscus tear.  After discussion of the treatment options with the patient as well as the family, given the displacement, we did feel that the best way to move forward with this fracture type would be open reduction internal fixation.  They understood and agreed to proceed.      DESCRIPTION OF PROCEDURE:  On the date of the procedure, the patient was brought into the preoperative area by the surgery and anesthesia teams.  Her right knee was marked by the operative surgeon.  Informed consent was obtained.  Risks  and benefits of the surgery were discussed with the patient as well as the family including risk of bleeding, infection, damage to neurovascular structures, risk of stiffness, risk of instability, risk of fracture, risk of blood clot, as well as a risk of continued pain and risk of future arthritis.  They understood and agreed to proceed.  We also discussed that she may need a future revision surgery at some point.      The patient was then brought to the operating room and general anesthesia was administered.  A Saenz catheter was placed.  She was then placed on a Tk table in supine position.  A thigh tourniquet was placed on the right lower extremity and the right lower extremity was prepped and draped in the usual sterile fashion.  Preoperative antibiotics were given.  A preoperative timeout was performed.      We began the procedure by making a standard incision over the medial aspect of the knee for a medial approach.  Sharp dissection was carried down through the skin as well as the subcutaneous tissues.  Hemostasis was obtained throughout.  We then visualized the sartorius fascia and this was incised.  We also visualized the gastroc and the pes.  We developed the intervals above the sartorius fascia, as well as behind the gastroc.  The tibial bone was well visualized and we also visualized the MCL.  The anterior portion of the MCL was protected throughout the case.  However, we had to incise the posterior portion of the MCL in order to gain access to the joint.  After dissection was complete, we performed a submeniscal arthrotomy on the medial joint line and evacuated the hematoma.  The meniscal arthrotomy was carried out until we had a good visualization of the split depression fracture.  We booked open the fracture site and then elevated up the depressed fragments of the medial tibial plateau.  We then backfilled the area with cancellous bone chips and held all this in place with K-wires.  We then  closed the book and held that together with a pointed reduction clamp.  We then placed a mattress suture through the capsule as well as the meniscus in order to repair the vertical meniscal tear and save this for later repair.  Next, we chose the appropriate sized plates and we chose a Synthes medial tibial plateau plate that was precontoured.  We placed it in the appropriate position and double checked under AP and lateral views.  When we were happy with the position, it was placed deep to the pes and on top of the MCL.  K-wires were placed through the proximal holes and we then placed a distal cortical screw through the shaft further distally.  Prior to that, we did use a large pointed reduction clamp to hold the plate reduced to the bone.  The cortical screw was then placed distally and proximally, we placed two K-wires followed by the appropriately-sized locking screws.  We used a total of 3 locking screws proximally as rafting screws in order to hold the fracture reduced.  Next, we placed two kickstand screws inferiorly and 1 more distal cortical screw in order to hold the plate down to the bone.  The wound was copiously irrigated.  Final x-rays were taken for this portion of the case and we turned our attention to the repair.  The meniscus and the arthrotomy were repaired to the plate using 0 PDS and then we used a combination of FiberWire and Ethibond, as well as Vicryl, in order to perform an MCL repair and to repair the sartorius fascia back to its original place.  We then packed the wound and turned our attention laterally.  We made a lateral incision centered over the fibular head.  Sharp dissection was carried down through the skin as well as the subcutaneous tissue.  We then visualized the fascia of the IT band as well as the peroneals.  We used scissor dissection in order to develop our posterior interval between the IT band and the biceps.  We visualized the biceps and we went posteriorly and  dissected out the common peroneal nerve, which was in continuity and was not affected.  We then protected the nerve throughout the rest of the case.  We visualized the fibular head and did note that there was an avulsion fracture off the biceps of the fibular head.  This was cleaned out using a combination of curet as well as suction and it was reduced using a pointed reduction clamp.  We then used a 3.5 cortical screw in order to reduce the proximal fibular fragment back to the shaft and did this under fluoroscopic visualization bicortically.  The clamp was then reduced.  Final x-rays were taken, both in the AP and lateral views and the wound was copiously irrigated.  We then turned our attention to closure.  Laterally, we closed in anatomic layers followed by staples for the skin and medially, we also closed in anatomic layers followed by staples for the skin.  Sterile dressing was applied.  Cast padding was placed followed by an Ace wrap.  The patient was then placed into a knee immobilizer and awakened from anesthesia and brought to the PACU for recovery.         SHAHEED VERMA MD             D: 2018   T: 2018   MT: DENILSON      Name:     SUSAN TROTTER   MRN:      [id number]        Account:        [id number]   :      1985           Procedure Date: 2018      Document: [id number]

## 2019-01-02 ENCOUNTER — TELEPHONE (OUTPATIENT)
Dept: PSYCHIATRY | Facility: CLINIC | Age: 34
End: 2019-01-02

## 2019-01-02 NOTE — TELEPHONE ENCOUNTER
Jonathanr received Prescriber Orders from [facility] [employer] Writer reviewed the orders and is awaiting a signature from Dr. Durant per patients insurance.

## 2019-01-03 DIAGNOSIS — F25.0 SCHIZOAFFECTIVE DISORDER, BIPOLAR TYPE (H): ICD-10-CM

## 2019-01-03 DIAGNOSIS — R46.89 AGGRESSION: ICD-10-CM

## 2019-01-03 RX ORDER — BENZTROPINE MESYLATE 0.5 MG/1
TABLET ORAL
Qty: 90 TABLET | Refills: 0 | Status: SHIPPED | OUTPATIENT
Start: 2019-01-03 | End: 2019-03-07

## 2019-01-03 NOTE — TELEPHONE ENCOUNTER
Medication requested: benztropine (COGENTIN) 0.5 MG     Last refilled:UNK  Qty: 90      Last seen: 11/2/18  RTC: 4 WEEKS   Cancel: 0  No-show: 0  Next appt: 2/8/19  Refill decision:  Refilled for 30 days per protocol  Will send FYI to provider as is outside RTC timeframe

## 2019-01-03 NOTE — TELEPHONE ENCOUNTER
Jonathanr received call from pharmacy stating that cogentin had been refilled under Dr. Joyce name, Dr. Joyce not covered under Medicare. Jonathanr provided updated provider, Dr. Kerr.

## 2019-01-04 ENCOUNTER — TELEPHONE (OUTPATIENT)
Dept: PSYCHIATRY | Facility: CLINIC | Age: 34
End: 2019-01-04

## 2019-01-04 NOTE — TELEPHONE ENCOUNTER
Writer received call from nurse Sandra at pt's living facility. Sandra asked to clarify the pt's recent medication changes at the last visit. Writer confirmed that pt last was seen on 11/2/18 by Dr. Sumner's and that per encounter note:    1) PSYCHOTROPIC MEDICATIONS:  - Continue lurasidone 160 mg daily w/ dinner  - Continue perphenazine 16 mg BID  - Continue olanzapine 10 mg BID PRN for psychosis  - Change benztropine to 1.5 mg at 5 PM  - Continue bupropion  mg daily  - Discontinue trazodone 50 mg at bedtime  - Continue hydroxyzine 25-50 mg q4hrs PRN for anxiety

## 2019-01-12 RX ORDER — CEFAZOLIN SODIUM 1 G/3ML
1 INJECTION, POWDER, FOR SOLUTION INTRAMUSCULAR; INTRAVENOUS SEE ADMIN INSTRUCTIONS
Status: CANCELLED | OUTPATIENT
Start: 2019-01-12

## 2019-01-12 RX ORDER — CEFAZOLIN SODIUM 2 G/100ML
2 INJECTION, SOLUTION INTRAVENOUS
Status: CANCELLED | OUTPATIENT
Start: 2019-01-12

## 2019-01-14 ENCOUNTER — HOSPITAL ENCOUNTER (INPATIENT)
Facility: CLINIC | Age: 34
LOS: 9 days | Discharge: NURSING, REHAB OR LONG-TERM CARE | End: 2019-01-23
Attending: ORTHOPAEDIC SURGERY | Admitting: ORTHOPAEDIC SURGERY
Payer: MEDICAID

## 2019-01-14 ENCOUNTER — APPOINTMENT (OUTPATIENT)
Dept: GENERAL RADIOLOGY | Facility: CLINIC | Age: 34
End: 2019-01-14
Attending: ORTHOPAEDIC SURGERY
Payer: MEDICAID

## 2019-01-14 ENCOUNTER — ANESTHESIA EVENT (OUTPATIENT)
Dept: SURGERY | Facility: CLINIC | Age: 34
End: 2019-01-14
Payer: MEDICAID

## 2019-01-14 ENCOUNTER — ANESTHESIA (OUTPATIENT)
Dept: SURGERY | Facility: CLINIC | Age: 34
End: 2019-01-14
Payer: MEDICAID

## 2019-01-14 DIAGNOSIS — S82.141D CLOSED FRACTURE OF RIGHT TIBIAL PLATEAU WITH ROUTINE HEALING, SUBSEQUENT ENCOUNTER: Primary | ICD-10-CM

## 2019-01-14 LAB
CREAT SERPL-MCNC: 0.76 MG/DL (ref 0.52–1.04)
GFR SERPL CREATININE-BSD FRML MDRD: >90 ML/MIN/{1.73_M2}
HCG UR QL: NEGATIVE
PLATELET # BLD AUTO: 221 10E9/L (ref 150–450)

## 2019-01-14 PROCEDURE — 36415 COLL VENOUS BLD VENIPUNCTURE: CPT | Performed by: PHYSICIAN ASSISTANT

## 2019-01-14 PROCEDURE — 25000125 ZZHC RX 250: Performed by: ORTHOPAEDIC SURGERY

## 2019-01-14 PROCEDURE — 37000008 ZZH ANESTHESIA TECHNICAL FEE, 1ST 30 MIN: Performed by: ORTHOPAEDIC SURGERY

## 2019-01-14 PROCEDURE — C1713 ANCHOR/SCREW BN/BN,TIS/BN: HCPCS | Performed by: ORTHOPAEDIC SURGERY

## 2019-01-14 PROCEDURE — 0QPG04Z REMOVAL OF INTERNAL FIXATION DEVICE FROM RIGHT TIBIA, OPEN APPROACH: ICD-10-PCS | Performed by: ORTHOPAEDIC SURGERY

## 2019-01-14 PROCEDURE — 71000013 ZZH RECOVERY PHASE 1 LEVEL 1 EA ADDTL HR: Performed by: ORTHOPAEDIC SURGERY

## 2019-01-14 PROCEDURE — L1832 KO ADJ JNT POS R SUP PRE CST: HCPCS

## 2019-01-14 PROCEDURE — 25000128 H RX IP 250 OP 636: Performed by: PHYSICIAN ASSISTANT

## 2019-01-14 PROCEDURE — 25000128 H RX IP 250 OP 636: Performed by: NURSE ANESTHETIST, CERTIFIED REGISTERED

## 2019-01-14 PROCEDURE — C1763 CONN TISS, NON-HUMAN: HCPCS | Performed by: ORTHOPAEDIC SURGERY

## 2019-01-14 PROCEDURE — 40000277 XR SURGERY CARM FLUORO LESS THAN 5 MIN W STILLS: Mod: TC

## 2019-01-14 PROCEDURE — 0QSG04Z REPOSITION RIGHT TIBIA WITH INTERNAL FIXATION DEVICE, OPEN APPROACH: ICD-10-PCS | Performed by: ORTHOPAEDIC SURGERY

## 2019-01-14 PROCEDURE — 71000012 ZZH RECOVERY PHASE 1 LEVEL 1 FIRST HR: Performed by: ORTHOPAEDIC SURGERY

## 2019-01-14 PROCEDURE — 93010 ELECTROCARDIOGRAM REPORT: CPT | Performed by: INTERNAL MEDICINE

## 2019-01-14 PROCEDURE — 25000132 ZZH RX MED GY IP 250 OP 250 PS 637: Performed by: PHYSICIAN ASSISTANT

## 2019-01-14 PROCEDURE — 25000128 H RX IP 250 OP 636: Performed by: ORTHOPAEDIC SURGERY

## 2019-01-14 PROCEDURE — 0SQC0ZZ REPAIR RIGHT KNEE JOINT, OPEN APPROACH: ICD-10-PCS | Performed by: ORTHOPAEDIC SURGERY

## 2019-01-14 PROCEDURE — 81025 URINE PREGNANCY TEST: CPT | Performed by: ANESTHESIOLOGY

## 2019-01-14 PROCEDURE — 37000009 ZZH ANESTHESIA TECHNICAL FEE, EACH ADDTL 15 MIN: Performed by: ORTHOPAEDIC SURGERY

## 2019-01-14 PROCEDURE — 25000125 ZZHC RX 250: Performed by: NURSE ANESTHETIST, CERTIFIED REGISTERED

## 2019-01-14 PROCEDURE — 27210794 ZZH OR GENERAL SUPPLY STERILE: Performed by: ORTHOPAEDIC SURGERY

## 2019-01-14 PROCEDURE — 25000128 H RX IP 250 OP 636: Performed by: ANESTHESIOLOGY

## 2019-01-14 PROCEDURE — 12000000 ZZH R&B MED SURG/OB

## 2019-01-14 PROCEDURE — 36000065 ZZH SURGERY LEVEL 4 W FLUORO 1ST 30 MIN: Performed by: ORTHOPAEDIC SURGERY

## 2019-01-14 PROCEDURE — 36000063 ZZH SURGERY LEVEL 4 EA 15 ADDTL MIN: Performed by: ORTHOPAEDIC SURGERY

## 2019-01-14 PROCEDURE — 82565 ASSAY OF CREATININE: CPT | Performed by: PHYSICIAN ASSISTANT

## 2019-01-14 PROCEDURE — 40000306 ZZH STATISTIC PRE PROC ASSESS II: Performed by: ORTHOPAEDIC SURGERY

## 2019-01-14 PROCEDURE — 85049 AUTOMATED PLATELET COUNT: CPT | Performed by: PHYSICIAN ASSISTANT

## 2019-01-14 DEVICE — IMP SCR SYN CORTEX 3.5X30MM SELF TAP SS 204.830: Type: IMPLANTABLE DEVICE | Site: TIBIA | Status: FUNCTIONAL

## 2019-01-14 DEVICE — IMPLANTABLE DEVICE: Type: IMPLANTABLE DEVICE | Site: TIBIA | Status: FUNCTIONAL

## 2019-01-14 DEVICE — IMP SCR SYN CORTEX 3.5X40MM SELF TAP SS 204.840: Type: IMPLANTABLE DEVICE | Site: TIBIA | Status: FUNCTIONAL

## 2019-01-14 DEVICE — IMP PLATE SYN LCP PROX HUM 3.5MM 03H SS 241.901: Type: IMPLANTABLE DEVICE | Site: TIBIA | Status: FUNCTIONAL

## 2019-01-14 DEVICE — IMP SCR SYN CORTEX 3.5X36MM SELF TAP SS 204.836: Type: IMPLANTABLE DEVICE | Site: TIBIA | Status: FUNCTIONAL

## 2019-01-14 DEVICE — IMP SCR SYN CORTEX 3.5X34MM SELF TAP SS 204.834: Type: IMPLANTABLE DEVICE | Site: TIBIA | Status: FUNCTIONAL

## 2019-01-14 RX ORDER — FENTANYL CITRATE 50 UG/ML
INJECTION, SOLUTION INTRAMUSCULAR; INTRAVENOUS PRN
Status: DISCONTINUED | OUTPATIENT
Start: 2019-01-14 | End: 2019-01-14

## 2019-01-14 RX ORDER — LIDOCAINE 40 MG/G
CREAM TOPICAL
Status: DISCONTINUED | OUTPATIENT
Start: 2019-01-14 | End: 2019-01-14 | Stop reason: HOSPADM

## 2019-01-14 RX ORDER — AMOXICILLIN 250 MG
1 CAPSULE ORAL 2 TIMES DAILY
Status: DISCONTINUED | OUTPATIENT
Start: 2019-01-14 | End: 2019-01-23 | Stop reason: HOSPADM

## 2019-01-14 RX ORDER — DEXAMETHASONE SODIUM PHOSPHATE 4 MG/ML
INJECTION, SOLUTION INTRA-ARTICULAR; INTRALESIONAL; INTRAMUSCULAR; INTRAVENOUS; SOFT TISSUE PRN
Status: DISCONTINUED | OUTPATIENT
Start: 2019-01-14 | End: 2019-01-14

## 2019-01-14 RX ORDER — ACETAMINOPHEN 325 MG/1
975 TABLET ORAL EVERY 8 HOURS
Status: COMPLETED | OUTPATIENT
Start: 2019-01-14 | End: 2019-01-17

## 2019-01-14 RX ORDER — LIDOCAINE 40 MG/G
CREAM TOPICAL
Status: DISCONTINUED | OUTPATIENT
Start: 2019-01-14 | End: 2019-01-23 | Stop reason: HOSPADM

## 2019-01-14 RX ORDER — LABETALOL HYDROCHLORIDE 5 MG/ML
INJECTION, SOLUTION INTRAVENOUS PRN
Status: DISCONTINUED | OUTPATIENT
Start: 2019-01-14 | End: 2019-01-14

## 2019-01-14 RX ORDER — HYDROMORPHONE HYDROCHLORIDE 1 MG/ML
.3-.5 INJECTION, SOLUTION INTRAMUSCULAR; INTRAVENOUS; SUBCUTANEOUS EVERY 5 MIN PRN
Status: DISCONTINUED | OUTPATIENT
Start: 2019-01-14 | End: 2019-01-14 | Stop reason: HOSPADM

## 2019-01-14 RX ORDER — OXYCODONE HYDROCHLORIDE 5 MG/1
5 TABLET ORAL EVERY 6 HOURS PRN
Status: ON HOLD | COMMUNITY
End: 2019-01-15

## 2019-01-14 RX ORDER — OXYCODONE HYDROCHLORIDE 5 MG/1
5-10 TABLET ORAL
Status: DISCONTINUED | OUTPATIENT
Start: 2019-01-14 | End: 2019-01-23 | Stop reason: HOSPADM

## 2019-01-14 RX ORDER — NALOXONE HYDROCHLORIDE 0.4 MG/ML
.1-.4 INJECTION, SOLUTION INTRAMUSCULAR; INTRAVENOUS; SUBCUTANEOUS
Status: DISCONTINUED | OUTPATIENT
Start: 2019-01-14 | End: 2019-01-14

## 2019-01-14 RX ORDER — GLYCOPYRROLATE 0.2 MG/ML
INJECTION, SOLUTION INTRAMUSCULAR; INTRAVENOUS PRN
Status: DISCONTINUED | OUTPATIENT
Start: 2019-01-14 | End: 2019-01-14

## 2019-01-14 RX ORDER — FENTANYL CITRATE 50 UG/ML
25-50 INJECTION, SOLUTION INTRAMUSCULAR; INTRAVENOUS
Status: DISCONTINUED | OUTPATIENT
Start: 2019-01-14 | End: 2019-01-14 | Stop reason: HOSPADM

## 2019-01-14 RX ORDER — DIPHENHYDRAMINE HYDROCHLORIDE 50 MG/ML
25 INJECTION INTRAMUSCULAR; INTRAVENOUS EVERY 6 HOURS PRN
Status: DISCONTINUED | OUTPATIENT
Start: 2019-01-14 | End: 2019-01-23 | Stop reason: HOSPADM

## 2019-01-14 RX ORDER — ONDANSETRON 4 MG/1
4 TABLET, ORALLY DISINTEGRATING ORAL EVERY 30 MIN PRN
Status: DISCONTINUED | OUTPATIENT
Start: 2019-01-14 | End: 2019-01-14 | Stop reason: HOSPADM

## 2019-01-14 RX ORDER — CEFAZOLIN SODIUM 1 G/3ML
1 INJECTION, POWDER, FOR SOLUTION INTRAMUSCULAR; INTRAVENOUS SEE ADMIN INSTRUCTIONS
Status: DISCONTINUED | OUTPATIENT
Start: 2019-01-14 | End: 2019-01-14 | Stop reason: HOSPADM

## 2019-01-14 RX ORDER — LIDOCAINE HYDROCHLORIDE 10 MG/ML
INJECTION, SOLUTION INFILTRATION; PERINEURAL PRN
Status: DISCONTINUED | OUTPATIENT
Start: 2019-01-14 | End: 2019-01-14

## 2019-01-14 RX ORDER — PROPOFOL 10 MG/ML
INJECTION, EMULSION INTRAVENOUS PRN
Status: DISCONTINUED | OUTPATIENT
Start: 2019-01-14 | End: 2019-01-14

## 2019-01-14 RX ORDER — HYDRALAZINE HYDROCHLORIDE 20 MG/ML
2.5-5 INJECTION INTRAMUSCULAR; INTRAVENOUS EVERY 10 MIN PRN
Status: DISCONTINUED | OUTPATIENT
Start: 2019-01-14 | End: 2019-01-14 | Stop reason: HOSPADM

## 2019-01-14 RX ORDER — DIPHENHYDRAMINE HCL 25 MG
25 CAPSULE ORAL EVERY 6 HOURS PRN
Status: DISCONTINUED | OUTPATIENT
Start: 2019-01-14 | End: 2019-01-23 | Stop reason: HOSPADM

## 2019-01-14 RX ORDER — ONDANSETRON 2 MG/ML
INJECTION INTRAMUSCULAR; INTRAVENOUS PRN
Status: DISCONTINUED | OUTPATIENT
Start: 2019-01-14 | End: 2019-01-14

## 2019-01-14 RX ORDER — SODIUM CHLORIDE, SODIUM LACTATE, POTASSIUM CHLORIDE, CALCIUM CHLORIDE 600; 310; 30; 20 MG/100ML; MG/100ML; MG/100ML; MG/100ML
INJECTION, SOLUTION INTRAVENOUS CONTINUOUS
Status: DISCONTINUED | OUTPATIENT
Start: 2019-01-14 | End: 2019-01-14 | Stop reason: HOSPADM

## 2019-01-14 RX ORDER — ONDANSETRON 4 MG/1
4 TABLET, ORALLY DISINTEGRATING ORAL EVERY 6 HOURS PRN
Status: DISCONTINUED | OUTPATIENT
Start: 2019-01-14 | End: 2019-01-23 | Stop reason: HOSPADM

## 2019-01-14 RX ORDER — HYDROMORPHONE HYDROCHLORIDE 1 MG/ML
.3-.5 INJECTION, SOLUTION INTRAMUSCULAR; INTRAVENOUS; SUBCUTANEOUS
Status: DISCONTINUED | OUTPATIENT
Start: 2019-01-14 | End: 2019-01-23 | Stop reason: HOSPADM

## 2019-01-14 RX ORDER — NEOSTIGMINE METHYLSULFATE 1 MG/ML
VIAL (ML) INJECTION PRN
Status: DISCONTINUED | OUTPATIENT
Start: 2019-01-14 | End: 2019-01-14

## 2019-01-14 RX ORDER — CEFAZOLIN SODIUM 2 G/100ML
2 INJECTION, SOLUTION INTRAVENOUS
Status: COMPLETED | OUTPATIENT
Start: 2019-01-14 | End: 2019-01-14

## 2019-01-14 RX ORDER — CEFAZOLIN SODIUM 2 G/100ML
2 INJECTION, SOLUTION INTRAVENOUS EVERY 8 HOURS
Status: COMPLETED | OUTPATIENT
Start: 2019-01-14 | End: 2019-01-15

## 2019-01-14 RX ORDER — ACETAMINOPHEN 325 MG/1
650 TABLET ORAL EVERY 4 HOURS PRN
Status: DISCONTINUED | OUTPATIENT
Start: 2019-01-17 | End: 2019-01-23 | Stop reason: HOSPADM

## 2019-01-14 RX ORDER — METHOCARBAMOL 750 MG/1
750 TABLET, FILM COATED ORAL 4 TIMES DAILY PRN
Status: DISCONTINUED | OUTPATIENT
Start: 2019-01-14 | End: 2019-01-23 | Stop reason: HOSPADM

## 2019-01-14 RX ORDER — SODIUM CHLORIDE 9 MG/ML
INJECTION, SOLUTION INTRAVENOUS CONTINUOUS
Status: DISCONTINUED | OUTPATIENT
Start: 2019-01-14 | End: 2019-01-23 | Stop reason: HOSPADM

## 2019-01-14 RX ORDER — AMOXICILLIN 250 MG
2 CAPSULE ORAL 2 TIMES DAILY
Status: DISCONTINUED | OUTPATIENT
Start: 2019-01-14 | End: 2019-01-23 | Stop reason: HOSPADM

## 2019-01-14 RX ORDER — ONDANSETRON 2 MG/ML
4 INJECTION INTRAMUSCULAR; INTRAVENOUS EVERY 30 MIN PRN
Status: DISCONTINUED | OUTPATIENT
Start: 2019-01-14 | End: 2019-01-14 | Stop reason: HOSPADM

## 2019-01-14 RX ORDER — MAGNESIUM HYDROXIDE 1200 MG/15ML
LIQUID ORAL PRN
Status: DISCONTINUED | OUTPATIENT
Start: 2019-01-14 | End: 2019-01-14 | Stop reason: HOSPADM

## 2019-01-14 RX ORDER — LABETALOL HYDROCHLORIDE 5 MG/ML
10 INJECTION, SOLUTION INTRAVENOUS
Status: DISCONTINUED | OUTPATIENT
Start: 2019-01-14 | End: 2019-01-14 | Stop reason: HOSPADM

## 2019-01-14 RX ORDER — NALOXONE HYDROCHLORIDE 0.4 MG/ML
.1-.4 INJECTION, SOLUTION INTRAMUSCULAR; INTRAVENOUS; SUBCUTANEOUS
Status: DISCONTINUED | OUTPATIENT
Start: 2019-01-14 | End: 2019-01-23 | Stop reason: HOSPADM

## 2019-01-14 RX ORDER — ONDANSETRON 2 MG/ML
4 INJECTION INTRAMUSCULAR; INTRAVENOUS EVERY 6 HOURS PRN
Status: DISCONTINUED | OUTPATIENT
Start: 2019-01-14 | End: 2019-01-23 | Stop reason: HOSPADM

## 2019-01-14 RX ADMIN — ONDANSETRON 4 MG: 2 INJECTION INTRAMUSCULAR; INTRAVENOUS at 12:16

## 2019-01-14 RX ADMIN — FENTANYL CITRATE 50 MCG: 50 INJECTION, SOLUTION INTRAMUSCULAR; INTRAVENOUS at 14:12

## 2019-01-14 RX ADMIN — HYDRALAZINE HYDROCHLORIDE 5 MG: 20 INJECTION INTRAMUSCULAR; INTRAVENOUS at 15:37

## 2019-01-14 RX ADMIN — ACETAMINOPHEN 975 MG: 325 TABLET, FILM COATED ORAL at 23:54

## 2019-01-14 RX ADMIN — GLYCOPYRROLATE 0.2 MG: 0.2 INJECTION, SOLUTION INTRAMUSCULAR; INTRAVENOUS at 11:57

## 2019-01-14 RX ADMIN — FENTANYL CITRATE 50 MCG: 50 INJECTION, SOLUTION INTRAMUSCULAR; INTRAVENOUS at 15:33

## 2019-01-14 RX ADMIN — HYDROMORPHONE HYDROCHLORIDE 1 MG: 1 INJECTION, SOLUTION INTRAMUSCULAR; INTRAVENOUS; SUBCUTANEOUS at 13:10

## 2019-01-14 RX ADMIN — SODIUM CHLORIDE, POTASSIUM CHLORIDE, SODIUM LACTATE AND CALCIUM CHLORIDE: 600; 310; 30; 20 INJECTION, SOLUTION INTRAVENOUS at 11:44

## 2019-01-14 RX ADMIN — LIDOCAINE HYDROCHLORIDE 50 MG: 10 INJECTION, SOLUTION INFILTRATION; PERINEURAL at 11:57

## 2019-01-14 RX ADMIN — HYDROMORPHONE HYDROCHLORIDE 1 MG: 1 INJECTION, SOLUTION INTRAMUSCULAR; INTRAVENOUS; SUBCUTANEOUS at 12:16

## 2019-01-14 RX ADMIN — ROCURONIUM BROMIDE 40 MG: 10 INJECTION INTRAVENOUS at 11:57

## 2019-01-14 RX ADMIN — SODIUM CHLORIDE, POTASSIUM CHLORIDE, SODIUM LACTATE AND CALCIUM CHLORIDE: 600; 310; 30; 20 INJECTION, SOLUTION INTRAVENOUS at 12:38

## 2019-01-14 RX ADMIN — FENTANYL CITRATE 100 MCG: 50 INJECTION, SOLUTION INTRAMUSCULAR; INTRAVENOUS at 11:57

## 2019-01-14 RX ADMIN — ACETAMINOPHEN 975 MG: 325 TABLET, FILM COATED ORAL at 17:44

## 2019-01-14 RX ADMIN — STANDARDIZED SENNA CONCENTRATE AND DOCUSATE SODIUM 1 TABLET: 8.6; 5 TABLET, FILM COATED ORAL at 17:44

## 2019-01-14 RX ADMIN — CEFAZOLIN SODIUM 2 G: 2 INJECTION, SOLUTION INTRAVENOUS at 19:55

## 2019-01-14 RX ADMIN — GLYCOPYRROLATE 0.8 MG: 0.2 INJECTION, SOLUTION INTRAMUSCULAR; INTRAVENOUS at 14:06

## 2019-01-14 RX ADMIN — ROCURONIUM BROMIDE 10 MG: 10 INJECTION INTRAVENOUS at 13:10

## 2019-01-14 RX ADMIN — CEFAZOLIN SODIUM 2 G: 2 INJECTION, SOLUTION INTRAVENOUS at 11:44

## 2019-01-14 RX ADMIN — Medication 5 MG: at 14:06

## 2019-01-14 RX ADMIN — SODIUM CHLORIDE: 9 INJECTION, SOLUTION INTRAVENOUS at 16:35

## 2019-01-14 RX ADMIN — PROPOFOL 200 MG: 10 INJECTION, EMULSION INTRAVENOUS at 11:57

## 2019-01-14 RX ADMIN — DEXAMETHASONE SODIUM PHOSPHATE 4 MG: 4 INJECTION, SOLUTION INTRA-ARTICULAR; INTRALESIONAL; INTRAMUSCULAR; INTRAVENOUS; SOFT TISSUE at 11:57

## 2019-01-14 RX ADMIN — MIDAZOLAM 2 MG: 1 INJECTION INTRAMUSCULAR; INTRAVENOUS at 11:44

## 2019-01-14 RX ADMIN — LABETALOL HYDROCHLORIDE 10 MG: 5 INJECTION INTRAVENOUS at 13:37

## 2019-01-14 ASSESSMENT — ACTIVITIES OF DAILY LIVING (ADL)
DRESS: 0-->INDEPENDENT
COGNITION: 2 - DIFFICULTY WITH ORGANIZING THOUGHTS
SWALLOWING: 0-->SWALLOWS FOODS/LIQUIDS WITHOUT DIFFICULTY
RETIRED_COMMUNICATION: 0-->UNDERSTANDS/COMMUNICATES WITHOUT DIFFICULTY
ADLS_ACUITY_SCORE: 17
FALL_HISTORY_WITHIN_LAST_SIX_MONTHS: YES
WHICH_OF_THE_ABOVE_FUNCTIONAL_RISKS_HAD_A_RECENT_ONSET_OR_CHANGE?: AMBULATION;TRANSFERRING
TRANSFERRING: 0-->INDEPENDENT
RETIRED_EATING: 0-->INDEPENDENT
AMBULATION: 0-->INDEPENDENT
BATHING: 0-->INDEPENDENT
TOILETING: 0-->INDEPENDENT

## 2019-01-14 ASSESSMENT — COLUMBIA-SUICIDE SEVERITY RATING SCALE - C-SSRS
1. IN THE PAST MONTH, HAVE YOU WISHED YOU WERE DEAD OR WISHED YOU COULD GO TO SLEEP AND NOT WAKE UP?: NO
2. HAVE YOU ACTUALLY HAD ANY THOUGHTS OF KILLING YOURSELF IN THE PAST MONTH?: NO

## 2019-01-14 ASSESSMENT — MIFFLIN-ST. JEOR
SCORE: 1918.34
SCORE: 1885.23

## 2019-01-14 NOTE — H&P
[facility]    Orthopedics Consultation H&P    Date of Admission:  1/14/2019    Assessment & Plan   Darrell Banerjee is a 33 year old female who was admitted on 1/14/2019 for revision ORIF of medial tibial plateau fracture right with peroneal nerve palsy.    Discussed both operative and nonoperative management.  Risks of surgery discussed included but not limited to bleeding, infection, damage to surrounding neurovascular structures, stiffness, malunion, delayed union, nonunion, post traumatic arthritis, need for revision surgery, blood clots, pulmonary embolus, stroke, anesthetic complications and even death.  No guarantees given or implied. Patient and mother thoughtfully acknowledges risks and wish to proceed.      Tarik Panda MD    Code Status    Prior    Reason for Consult     Primary Care Physician   Suzie Mariscal    History of Present Illness   Darrell Banerjee is a 33 year old female who presents with failed fixation of right medial tibial plateau fracture after fall.  DOS was 12/21/18.  HPI obtained from discussion with mother, chart review, also some discussion with darrell of the current cognitive state/special needs is noted.  Patient lives at group home.  ON 12/20/18 was walking outside of the home when she slipped on ice landing on her right side and knee and was subsequently taken for ORIF medial tibial plateau, fibular head, and meniscus.    MEDS:   No current outpatient medications on file.       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 Gardner     Mild developmental delay      Myopia      Neuroleptic-induced tardive dyskinesia      Nonorganic enuresis      Refractive amblyopia      Schizoaffective  disorder, bipolar type (H)     Follows with Dr. Cooley at her group home.      Sleep disorder        PAST SURGICAL HISTORY:   Past Surgical History:   Procedure Laterality Date     HRW PORT A CATH Right      NO HISTORY OF SURGERY       OPEN REDUCTION INTERNAL FIXATION TIBIAL PLATEAU Right 12/21/2018    Procedure: OPEN REDUCTION INTERNAL FIXATION RIGHT TIBIAL PLATEAU FRACTURE;  Surgeon: Marc Meadows MD;  Location:  OR       FAMILY HISTORY:   Family History   Problem Relation Age of Onset     Mental Illness Father         mild developmental delay     Schizophrenia Other         uncle       SOCIAL HISTORY:   Social History     Tobacco Use     Smoking status: Never Smoker     Smokeless tobacco: Never Used   Substance Use Topics     Alcohol use: No       ALLERGIES:    Allergies   Allergen Reactions     Clozapine      Agranulocytosis x 2, cannot be re trialed      Risperdal Other (See Comments)     dystonia     Tape [Adhesive Tape] Rash     Plastic tape       ROS:  10 point ROS neg other than the symptoms noted above in the HPI.      Physical Exam   Temp: 97.5  F (36.4  C) Temp src: Temporal BP: (!) 140/97     Resp: 16        Vital Signs with Ranges  Temp:  [97.5  F (36.4  C)] 97.5  F (36.4  C)  Resp:  [16] 16  BP: (140)/(97) 140/97  260 lbs 0 oz    Constitutional: Pleasant, alert, appropriate, following commands.  HEENT: Head atraumatic normocephalic. Pupils equal round and reactive to light.  Respiratory: Unlabored breathing no audible wheeze  Cardiovascular: Regular rate and rhythm  GI: Abdomen soft nontender nondistended.  Lymph/Hematologic: No lymphadenopathy in areas examined  Genitourinary: No ramey  Skin: No rashes, no cyanosis, no edema.  Musculoskeletal: Right leg healing incision w/o erythema, drainage, or signs of infection.  Right foot drop with dysthesias dorsum of foot, palpable dp pulse.  Neurologic: normal without focal findings, mental status with some developmental delay, speech normal, alert and  oriented x iii, KATHLEEN    X-rays from [facility] show diplacement of medial tibial plateau fragment and fibular head avulsion.    Data   Results for orders placed or performed during the hospital encounter of 01/14/19 (from the past 24 hour(s))   HCG qualitative urine   Result Value Ref Range    HCG Qual Urine Negative NEG^Negative   EKG 12-lead, tracing only   Result Value Ref Range    Interpretation ECG Click View Image link to view waveform and result    XR Surgery GULSHAN L/T 5 Min Fluoro w Stills    Narrative    This exam was marked as non-reportable because it will not be read by a   radiologist or a [facility] non-radiologist provider.

## 2019-01-14 NOTE — ANESTHESIA POSTPROCEDURE EVALUATION
Patient: Karolyn Banerjee    Procedure(s):  OPEN REDUCTION INTERNAL FIXATION TIBIAL PLATEAU    Diagnosis:right tibial plateau fracture  Diagnosis Additional Information: Procedure(s):  OPEN REDUCTION INTERNAL FIXATION TIBIAL PLATEAU     Diagnosis: right tibial plateau fracture    Anesthesia Type:  General, ETT    Note:  Anesthesia Post Evaluation    Patient location during evaluation: PACU  Patient participation: Able to fully participate in evaluation  Level of consciousness: awake and alert  Pain management: adequate  Airway patency: patent  Cardiovascular status: acceptable  Respiratory status: acceptable  Hydration status: acceptable  PONV: none     Anesthetic complications: None          Last vitals:  Vitals:    01/14/19 1510 01/14/19 1520 01/14/19 1530   BP: (!) 160/91 (!) 161/96 (!) 168/103   Pulse: 74 72 76   Resp: 12 16 11   Temp:      SpO2: 100% 97% 99%         Electronically Signed By: Harvey Fisher MD  January 14, 2019  3:55 PM

## 2019-01-14 NOTE — ANESTHESIA PREPROCEDURE EVALUATION
Anesthesia Pre-Procedure Evaluation    Patient: Karolyn Banerjee   MRN: [id number] : 1985          Preoperative Diagnosis: right tibial plateau fracture    Procedure(s):  OPEN REDUCTION INTERNAL FIXATION TIBIAL PLATEAU    Past Medical History:   Diagnosis Date     Agranulocytosis (H) ,     clozapine induced, cannot be retrialed      Asthma, mild intermittent      Astigmatism      Cognitive disorder     possibly due to ECT     Depressive disorder      Humeral shaft fracture 2014    Right, following Dr. Gardner     Mild developmental delay      Myopia      Neuroleptic-induced tardive dyskinesia      Nonorganic enuresis      Refractive amblyopia      Schizoaffective disorder, bipolar type (H)     Follows with Dr. Cooley at her group home.      Sleep disorder      Past Surgical History:   Procedure Laterality Date     HRW PORT A CATH Right      NO HISTORY OF SURGERY       OPEN REDUCTION INTERNAL FIXATION TIBIAL PLATEAU Right 2018    Procedure: OPEN REDUCTION INTERNAL FIXATION RIGHT TIBIAL PLATEAU FRACTURE;  Surgeon: Marc Meadows MD;  Location:  OR     Anesthesia Evaluation     . Pt has had prior anesthetic. Type: General           ROS/MED HX    ENT/Pulmonary:     (+)sleep apnea, Intermittent asthma Treatment: Inhaler prn,  , . .    Neurologic:     (+)other neuro Mild developmental delay    Cardiovascular:  - neg cardiovascular ROS       METS/Exercise Tolerance:     Hematologic:     (+) Other Hematologic Disorder-Agranulocytosis      Musculoskeletal:   (+) , fracture lower extremity: Ankle, -       GI/Hepatic:  - neg GI/hepatic ROS       Renal/Genitourinary:  - ROS Renal section negative       Endo:     (+) Obesity, .      Psychiatric:     (+) psychiatric history depression (Schizoaffective disorder, bipolar type)      Infectious Disease:  - neg infectious disease ROS       Malignancy:      - no malignancy   Other:    (+) No chance of pregnancy C-spine cleared: N/A, no H/O Chronic Pain,no other  "significant disability   - neg other ROS                      Physical Exam  Normal systems: cardiovascular, pulmonary and dental    Airway   Mallampati: II  TM distance: >3 FB  Neck ROM: full    Dental     Cardiovascular       Pulmonary             Lab Results   Component Value Date    WBC 7.6 12/23/2018    HGB 11.4 (L) 12/23/2018    HCT 34.6 (L) 12/23/2018     (L) 12/23/2018     12/23/2018    POTASSIUM 3.9 12/23/2018    CHLORIDE 105 12/23/2018    CO2 27 12/23/2018    BUN 10 12/23/2018    CR 0.74 12/23/2018    GLC 98 12/23/2018    GIOVANY 8.2 (L) 12/23/2018    PHOS 3.3 01/14/2014    MAG 2.0 01/14/2014    ALBUMIN 3.2 (L) 12/21/2018    PROTTOTAL 7.0 12/21/2018    ALT 23 12/21/2018    AST 21 12/21/2018    GGT 22 06/02/2013    ALKPHOS 74 12/21/2018    BILITOTAL 0.8 12/21/2018    LIPASE 39 10/15/2013    PTT 26 08/03/2016    INR 1.02 12/20/2018    FIBR 279 10/11/2013    TSH 2.55 09/21/2017    T4 1.06 07/26/2008    T3 119 07/26/2008    HCG Negative 10/24/2018    HCGS Negative 12/20/2018       Preop Vitals  BP Readings from Last 3 Encounters:   01/14/19 (!) 140/97   12/23/18 (!) 144/97   10/24/18 114/73    Pulse Readings from Last 3 Encounters:   12/23/18 95   10/24/18 80   04/25/18 101      Resp Readings from Last 3 Encounters:   01/14/19 16   12/23/18 16   10/24/18 16    SpO2 Readings from Last 3 Encounters:   01/14/19 (!) 3%   12/23/18 94%   10/24/18 97%      Temp Readings from Last 1 Encounters:   01/14/19 97.5  F (36.4  C) (Temporal)    Ht Readings from Last 1 Encounters:   01/14/19 1.651 m (5' 5\")      Wt Readings from Last 1 Encounters:   01/14/19 117.9 kg (260 lb)    Estimated body mass index is 43.27 kg/m  as calculated from the following:    Height as of this encounter: 1.651 m (5' 5\").    Weight as of this encounter: 117.9 kg (260 lb).       Anesthesia Plan      History & Physical Review  History and physical reviewed and following examination; no interval change.    ASA Status:  3 .    NPO Status:  > 8 " hours    Plan for General and ETT with Intravenous induction. Maintenance will be Balanced.    PONV prophylaxis:  Dexamethasone or Solumedrol and Ondansetron (or other 5HT-3)       Postoperative Care  Postoperative pain management:  IV analgesics.      Consents  Anesthetic plan, risks, benefits and alternatives discussed with:  Patient.  Use of blood products discussed: Yes.   Use of blood products discussed with Patient.  Consented to blood products.  .                 Dillan Andujar MD                    .

## 2019-01-14 NOTE — ANESTHESIA CARE TRANSFER NOTE
Patient: Karolyn Banerjee    Procedure(s):  OPEN REDUCTION INTERNAL FIXATION TIBIAL PLATEAU    Diagnosis: right tibial plateau fracture  Diagnosis Additional Information: No value filed.    Anesthesia Type:   General, ETT     Note:  Airway :Face Mask  Patient transferred to:PACU  Handoff Report: Identifed the Patient, Identified the Reponsible Provider, Reviewed the pertinent medical history, Discussed the surgical course, Reviewed Intra-OP anesthesia mangement and issues during anesthesia, Set expectations for post-procedure period and Allowed opportunity for questions and acknowledgement of understanding      Vitals: (Last set prior to Anesthesia Care Transfer)    CRNA VITALS  1/14/2019 1413 - 1/14/2019 1447      1/14/2019             Pulse:  84    SpO2:  99 %    Resp Rate (observed):  14                Electronically Signed By: HELADIO Doe CRNA  January 14, 2019  2:47 PM

## 2019-01-14 NOTE — OR NURSING
Spoke with group home.  Surgery rescheduled to 9:50 tomorrow morning.  Arrival moved to 8:20 am.  Message also left with mother.

## 2019-01-15 ENCOUNTER — APPOINTMENT (OUTPATIENT)
Dept: PHYSICAL THERAPY | Facility: CLINIC | Age: 34
End: 2019-01-15
Attending: ORTHOPAEDIC SURGERY
Payer: MEDICAID

## 2019-01-15 PROBLEM — S82.143A TIBIAL PLATEAU FRACTURE: Status: ACTIVE | Noted: 2019-01-15

## 2019-01-15 LAB
GLUCOSE BLDC GLUCOMTR-MCNC: 96 MG/DL (ref 70–99)
HGB BLD-MCNC: 12.7 G/DL (ref 11.7–15.7)
INTERPRETATION ECG - MUSE: NORMAL

## 2019-01-15 PROCEDURE — 97161 PT EVAL LOW COMPLEX 20 MIN: CPT | Mod: GP | Performed by: PHYSICAL THERAPIST

## 2019-01-15 PROCEDURE — 25000128 H RX IP 250 OP 636: Performed by: PHYSICIAN ASSISTANT

## 2019-01-15 PROCEDURE — 82306 VITAMIN D 25 HYDROXY: CPT | Performed by: PHYSICIAN ASSISTANT

## 2019-01-15 PROCEDURE — 40000193 ZZH STATISTIC PT WARD VISIT: Performed by: PHYSICAL THERAPIST

## 2019-01-15 PROCEDURE — 36415 COLL VENOUS BLD VENIPUNCTURE: CPT | Performed by: PHYSICIAN ASSISTANT

## 2019-01-15 PROCEDURE — 12000000 ZZH R&B MED SURG/OB

## 2019-01-15 PROCEDURE — 85018 HEMOGLOBIN: CPT | Performed by: PHYSICIAN ASSISTANT

## 2019-01-15 PROCEDURE — 25000132 ZZH RX MED GY IP 250 OP 250 PS 637: Performed by: PHYSICIAN ASSISTANT

## 2019-01-15 PROCEDURE — 97530 THERAPEUTIC ACTIVITIES: CPT | Mod: GP | Performed by: PHYSICAL THERAPIST

## 2019-01-15 PROCEDURE — 00000146 ZZHCL STATISTIC GLUCOSE BY METER IP

## 2019-01-15 RX ORDER — PERPHENAZINE 4 MG/1
16 TABLET ORAL 2 TIMES DAILY
Status: DISCONTINUED | OUTPATIENT
Start: 2019-01-15 | End: 2019-01-23 | Stop reason: HOSPADM

## 2019-01-15 RX ORDER — OLANZAPINE 5 MG/1
10 TABLET ORAL 2 TIMES DAILY PRN
Status: DISCONTINUED | OUTPATIENT
Start: 2019-01-15 | End: 2019-01-23 | Stop reason: HOSPADM

## 2019-01-15 RX ORDER — METHOCARBAMOL 750 MG/1
750 TABLET, FILM COATED ORAL 4 TIMES DAILY PRN
Qty: 30 TABLET | Refills: 0 | Status: SHIPPED | OUTPATIENT
Start: 2019-01-15 | End: 2019-02-05

## 2019-01-15 RX ORDER — OXYCODONE HYDROCHLORIDE 5 MG/1
5-10 TABLET ORAL EVERY 4 HOURS PRN
Qty: 20 TABLET | Refills: 0 | Status: SHIPPED | OUTPATIENT
Start: 2019-01-15 | End: 2019-02-05

## 2019-01-15 RX ORDER — LURASIDONE HYDROCHLORIDE 80 MG/1
160 TABLET, FILM COATED ORAL
Status: DISCONTINUED | OUTPATIENT
Start: 2019-01-15 | End: 2019-01-23 | Stop reason: HOSPADM

## 2019-01-15 RX ORDER — BENZTROPINE MESYLATE 0.5 MG/1
0.5 TABLET ORAL DAILY PRN
Status: DISCONTINUED | OUTPATIENT
Start: 2019-01-15 | End: 2019-01-23 | Stop reason: HOSPADM

## 2019-01-15 RX ORDER — BENZTROPINE MESYLATE 0.5 MG/1
1.5 TABLET ORAL DAILY
Status: DISCONTINUED | OUTPATIENT
Start: 2019-01-15 | End: 2019-01-23 | Stop reason: HOSPADM

## 2019-01-15 RX ORDER — BUPROPION HYDROCHLORIDE 150 MG/1
450 TABLET ORAL DAILY
Status: DISCONTINUED | OUTPATIENT
Start: 2019-01-15 | End: 2019-01-23 | Stop reason: HOSPADM

## 2019-01-15 RX ORDER — CETIRIZINE HYDROCHLORIDE 10 MG/1
10 TABLET ORAL DAILY
Status: DISCONTINUED | OUTPATIENT
Start: 2019-01-15 | End: 2019-01-23 | Stop reason: HOSPADM

## 2019-01-15 RX ORDER — AMOXICILLIN 250 MG
2 CAPSULE ORAL 2 TIMES DAILY
Qty: 40 TABLET | Refills: 0 | Status: SHIPPED | OUTPATIENT
Start: 2019-01-15 | End: 2019-02-05

## 2019-01-15 RX ORDER — ACETAMINOPHEN 325 MG/1
650 TABLET ORAL EVERY 6 HOURS PRN
Qty: 40 TABLET | Refills: 0 | Status: SHIPPED | OUTPATIENT
Start: 2019-01-15 | End: 2019-02-05 | Stop reason: DRUGHIGH

## 2019-01-15 RX ORDER — NORETHINDRONE ACETATE AND ETHINYL ESTRADIOL .02; 1 MG/1; MG/1
1 TABLET ORAL DAILY
Status: DISCONTINUED | OUTPATIENT
Start: 2019-01-15 | End: 2019-01-23 | Stop reason: HOSPADM

## 2019-01-15 RX ADMIN — BUPROPION HYDROCHLORIDE 450 MG: 150 TABLET, FILM COATED, EXTENDED RELEASE ORAL at 17:22

## 2019-01-15 RX ADMIN — ENOXAPARIN SODIUM 40 MG: 40 INJECTION SUBCUTANEOUS at 08:33

## 2019-01-15 RX ADMIN — ACETAMINOPHEN 975 MG: 325 TABLET, FILM COATED ORAL at 08:32

## 2019-01-15 RX ADMIN — STANDARDIZED SENNA CONCENTRATE AND DOCUSATE SODIUM 1 TABLET: 8.6; 5 TABLET, FILM COATED ORAL at 08:32

## 2019-01-15 RX ADMIN — OXYCODONE HYDROCHLORIDE 5 MG: 5 TABLET ORAL at 03:58

## 2019-01-15 RX ADMIN — CEFAZOLIN SODIUM 2 G: 2 INJECTION, SOLUTION INTRAVENOUS at 03:41

## 2019-01-15 RX ADMIN — STANDARDIZED SENNA CONCENTRATE AND DOCUSATE SODIUM 1 TABLET: 8.6; 5 TABLET, FILM COATED ORAL at 19:32

## 2019-01-15 RX ADMIN — OXYCODONE HYDROCHLORIDE 5 MG: 5 TABLET ORAL at 08:32

## 2019-01-15 RX ADMIN — PERPHENAZINE 16 MG: 4 TABLET, FILM COATED ORAL at 19:33

## 2019-01-15 RX ADMIN — ACETAMINOPHEN 975 MG: 325 TABLET, FILM COATED ORAL at 17:22

## 2019-01-15 RX ADMIN — CETIRIZINE HYDROCHLORIDE 10 MG: 10 TABLET, FILM COATED ORAL at 17:23

## 2019-01-15 RX ADMIN — SODIUM CHLORIDE: 9 INJECTION, SOLUTION INTRAVENOUS at 06:41

## 2019-01-15 RX ADMIN — LURASIDONE HYDROCHLORIDE 160 MG: 80 TABLET, FILM COATED ORAL at 19:45

## 2019-01-15 RX ADMIN — OXYCODONE HYDROCHLORIDE 5 MG: 5 TABLET ORAL at 17:23

## 2019-01-15 RX ADMIN — BENZTROPINE MESYLATE 1.5 MG: 0.5 TABLET ORAL at 17:23

## 2019-01-15 ASSESSMENT — ACTIVITIES OF DAILY LIVING (ADL)
ADLS_ACUITY_SCORE: 25
ADLS_ACUITY_SCORE: 16
ADLS_ACUITY_SCORE: 25
ADLS_ACUITY_SCORE: 17
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
## 2019-01-15 NOTE — PLAN OF CARE
Orientation: Alert and oriented x 2-3. Very flat affect.  VSS. 98% on 1 L [place]. IPI 10, EtCO2 38-40.  HR 87.   LS: clear  GI:  Passing gas. No BM. Denies N/V.   : Adequate urine output. Uses bed pan  Skin: clean and dry. Has ace wrap around R leg w/ hinged immobilizer. Elevated with pillows.  Activity: Pt is weight bearing as tolerated, but has not been up yet. Is expected to work with PT/OT today. Pt slept comfortably throughout shift.   Pain: 5/10. Pt was given scheduled tylenol and one dose of Oxy.  Plan: Continue with current cares. Pt is post op day 1. Hx of schizophrenia, Bipolar, HTN. Is from a group home and has a developmental delay. Independent baseline. When communicating use simple questions and wait for response. HAs a orthosis consult and is being followed by PT, OT ,and SW.  No discharge plan yet.

## 2019-01-15 NOTE — PLAN OF CARE
PRIMARY DIAGNOSIS: Displacement of (R) Medial Tibial Plateau fragment & Fibular Head avulsion; POD# 0, s/p Open Reduction Internal fixation (ORIF) of (R) Medial Tibial Plateau  GOALS TO BE MET BEFORE DISCHARGE:  ADLs back to baseline: No. POD # 0, (RLE) Right lower extremity ORIF    Activity and level of assistance: Up with x1-2 assistance using walker & gait belt. Non-weight bearing to RLE tonight.    Pain status: Improved-controlled with oral pain medications per patient comfort level.    Return to near baseline physical activity: No. POD #0, s/p ORIF RLE     Discharge Planner Nurse   Safe discharge environment identified: No. POD #0, s/p RLE ORIF  Barriers to discharge: Yes. POD #0, s/p ORIF RLE

## 2019-01-15 NOTE — PROGRESS NOTES
Ambulates Ax1 w/ WW and GB. VSS. +BS/gas, tolerating diet. Voiding in adequate amounts. CMS intact. Dressing CDI. Taking scheduled tylenol and PRN oxycodone for pain control. Will continue to monitor.

## 2019-01-15 NOTE — PLAN OF CARE
Discharge Planner PT   Patient plan for discharge: TCU per chart/Ortho  Current status: PT: order received; Initial evaluation completed and treatment initiated POD #1 s/p  Right tibial plateau fracture ORIF revision. Original fracture and surgery in December; Per chart patient has been NWB with a walker since then. Lives in a Group home at baseline. On eval patient in bed; limited response initially to this therapist but eventually agreeable to try to get up to bed and if able, to wheelchair; min/mod A x 2 for supine to sit bed mobility; assisted R LE on pillows to EOB for comfort; no dizziness in sitting at EOB; patient slow to respond at times to cues but able to follow  percent; instructed in NWB on R LE; min/mod A x 2 to come to standing and to transfer to bedside wheeelchair taking a few hop type steps; min A x 2 to safely sit in wheelchair; alarm placed for patient safety; Nurse made aware of level of assist and that patient was up in chair at end of session    Barriers to return to prior living situation: Current level of assist  Recommendations for discharge: TCU  Rationale for recommendations: Would benefit from ongoing PT to Maximize safety and independence with bed mobility and transfers while NWB on R LE to allow for eventual return to PLOF.       Entered by: Kita Boogie 01/15/2019 3:10 PM

## 2019-01-15 NOTE — PROGRESS NOTES
SWS     D: Discharge planning continuing, anticipate pt's transfer to rehab facility, awaiting PT assessment and documentation for facilities to complete assessment.       I: Spoke this morning to pt's mother, Mary who has asked that planning continue for pt's transfer to rehab facility in the [facility] and [facility]. She has identified [facility] as one for consideration, it was agreed that SW would send referrals to other facilities in those areas for assessments determination of bed availability for mother then to decide on facility per availability. Referrals made to [facility], [facility] , [facility], and [facility] in [place].      A/P: Will await assessments, continue planning per MD and facility availability.

## 2019-01-15 NOTE — PHARMACY-ADMISSION MEDICATION HISTORY
Admission medication history interview status for this patient is complete. See [facility] admission navigator for allergy information, prior to admission medications and immunization status.     Medication history interview source(s): patient's mother (Swetha) and group home staff   Medication history resources (including written lists, pill bottles, clinic record): discharge summary from 12//20/2018    Changes made to PTA medication list:  Added: none  Deleted: none  Changed: none     Actions taken by pharmacist (provider contacted, etc):   - called [facility]:  Clarified trazodone - med was discontinued; warfarin: patient does not take.  - sticky note to MD to review home meds    Additional medication history information:None    Medication reconciliation/reorder completed by provider prior to medication history? No    Prior to Admission medications    Medication Sig Last Dose Taking? Auth Provider   acetaminophen (TYLENOL) 325 MG tablet Take 2 tablets (650 mg) by mouth every 6 hours as needed for mild pain  Yes Anat Cuello PA-C   benztropine (COGENTIN) 0.5 MG tablet Take three tabs (1.5 mg) at 5 PM. 1/13/2019 Yes Harvey Joyce MD   benztropine (COGENTIN) 0.5 MG tablet Take 0.5 mg by mouth daily as needed for other (Eyes stuck in upward position) 1/14/2019 at 0700 Yes Unknown, Entered By History   BuPROPion HCl ER, XL, 450 MG TB24 Take 450 mg by mouth daily 1/14/2019 at 0700 Yes Tika Durant MD   cetirizine (ZYRTEC) 10 MG tablet Take 10 mg by mouth daily 1/14/2019 at 0700 Yes Reported, Patient   enoxaparin (LOVENOX) 40 MG/0.4ML syringe Inject 0.4 mLs (40 mg) Subcutaneous every 24 hours for 5 days  Yes Anat Cuello PA-C   lurasidone (LATUDA) 80 MG TABS tablet Take 2 tablets (160 mg) by mouth daily (with dinner) 1/13/2019 at 1700 Yes Tika Durant MD   methocarbamol (ROBAXIN) 750 MG tablet Take 1 tablet (750 mg) by mouth 4 times daily as needed for muscle spasms  Yes Anat Cuello PA-C    norethindrone-ethinyl estradiol (MICROGESTIN 1/20) 1-20 MG-MCG per tablet Take 1 tablet by mouth daily 1/14/2019 at 0700 Yes Unknown, Entered By History   Omega-3 Fatty Acids (FISH OIL PO) Take 1,000 mg by mouth daily  1/14/2019 at 0700 Yes Reported, Patient   oxyCODONE (ROXICODONE) 5 MG tablet Take 1-2 tablets (5-10 mg) by mouth every 4 hours as needed  Yes Anat Cuello PA-C   perphenazine 16 MG tablet Take 1 tablet (16 mg) by mouth 2 times daily 1/14/2019 at 0700 Yes iTka Durant MD   senna-docusate (SENOKOT-S/PERICOLACE) 8.6-50 MG tablet Take 2 tablets by mouth 2 times daily for 10 days  Yes Anat Cuello PA-C   OLANZapine (ZYPREXA) 10 MG tablet Take 1 tablet (10 mg) by mouth 2 times daily as needed More than a month at Unknown time  Freddy Kerr MD   order for DME Equipment being ordered: Wheelchair Unknown at Unknown time  Cortez Nelson DO   order for DME Equipment being ordered: Walker Wheels () and Walker ()  Treatment Diagnosis: Impaired gait. Unknown at Unknown time  Marc Meadows MD

## 2019-01-15 NOTE — PROGRESS NOTES
I checked fit of orthosis. Orthosis fit is good.  Patient reports feels ok and has no questions.  Patient responds minimally.  Please call orthotics if questions.  Zaheer LAWRENCE.

## 2019-01-15 NOTE — OP NOTE
Procedure Date: 01/14/2019      PREOPERATIVE DIAGNOSES:   1.  Failure of fixation, right medial tibial plateau fracture, after a fall.   2.  Failure of right fibular head fixation after a fall.   3.  Right peroneal nerve palsy.   4.  Developmental delay.      POSTOPERATIVE DIAGNOSES:   1.  Failure of fixation, right medial tibial plateau fracture, after a fall.   2.  Failure of right fibular head fixation after a fall.   3.  Right peroneal nerve palsy.   4.  Developmental delay.      PROCEDURE PERFORMED:     1.  Revision fixation, right displaced medial tibial plateau fracture, intraarticular with comminution.   2.  Right medial meniscus repair.      SURGEON:  Tarik Panda MD      ASSISTANT:  Desiree Pena PA-C, whose assistance was critical for positioning, exposure during fracture reduction, placement of implants, well as closure in this large patient.      ANESTHESIA:  General.      ESTIMATED BLOOD LOSS:  100 mL      FINDINGS:  Completely displaced anterior medial fragment that was displaced.  Intra-articular comminution.  Medial meniscus tear.      INDICATION:  This is a 33-year-old female with developmental delay who on 12/20/2018 fell on the ice, having the above-named injuries.  She was taken by my partner for open reduction and internal fixation of fibula head, medial tibial plateau, and meniscus repair.  Unfortunately, when she was transferring she fell, resulting in failure of fixation of both the fibular head and medial plateau.  Also, on my exam she had peroneal nerve palsy with dysesthesias at the dorsum of foot and inability to dorsiflex the foot.      Discussed with patient and her mother treatment options, including nonoperative versus revision surgery.  Risks of her injury an surgery discussed included but not limited to bleeding, infection, damage to surrounding neurovascular structures and tendons, repeat failure of fixation, malunion, delayed union, nonunion, stiffness, post-traumatic  arthritis, blood clots going to the heart, lung, or brain, anesthetic complications, or even death.  They understand and wish to proceed.  Consent signed.      DESCRIPTION OF PROCEDURE:  The patient was identified in the preop holding area per hospital policy, correct operative site marked, onto the OR table.  Ancef given.  General anesthesia induced.  Chlorhexidine prescrub, ChloraPrep, draped.  A timeout was performed.  All in the room agreed.  Using the previous medial incision and extending slightly distal, dissected through the skin and subcutaneous tissue and fat.  There was a seroma between the fat and fascial layer, which was evacuated.  No signs of infection.  Carried the construct down.  It was necessary to partially release the hamstring origin in order to address displaced fragment and revision fixation.  The prior hardware was removed without difficulty.  Opened up the knee with valgus stress to expose the joint.  The fracture fragments were cleaned, reduced, and provisionally pinned.  The vertical capsular tear was addressed first with #1 Prolene horizontal mattress stitches x 3 through meniscus, which were later passed through the plate for repair.  Under fluoroscopy and direct visualization, the medial joint line was smooth.  We used a medial precontoured tibial plate and placed it anterior and medially, provisionally pinning, confirming position.  Placed cannulated locking screws proximally with cortical shaft screws.  This nicely secured the previously displaced anterior fragment; however, the posterior medial portion of the tibial plateau needed further support.  We found that a precountoured proximal humerus plate from locking small frag set which that fit nicely.  This was pinned into place and cortical screws were used to pin the plate to the bone and buttress the medial plateau followed by locking screws to support the cartilage surface medially.  Directly inspected anterior medial joint  surface.  I also had to use 1 K-wire as a raft pin for some of the large intraarticular piece of cartilage with subchondral bone attached.  The joint surfaces were restored nicely.  Further supported with Norion calcium phosphate cement underneath.   Direct visualization showed no matter within the joints, as well as x-ray AP and lateral showing hardware was safe and in appropriate reduction of the joint.  The tourniquet was up for approximately 100 minutes.  Copiously irrigated the wound after hemostasis obtained.  Closed in a layered fashion after tying down where we repaired the vertical meniscal tear, capsular junction.   Closed the fatty layer with 0 Vicryl, 2-0 Vicryl subcutaneous.  Staples for the skin.  Sterile dressing applied with the hinged knee brace set at 50 degrees of flexion to prevent weightbearing.  The patient was awakened and transferred stable to the PACU.      POSTOPERATIVE PLAN:     1.  Nonweightbearing, right lower extremity, times 10 to 12-week.     2.  Hinged knee brace locked in flexion for first 2-4 weeks to prevent weightbearing.   3.  Test vitamin D and supplement if low.   4.  Ancef x 24 hours.   5.  DVT prophylaxis:  Sequential compression devices with Lovenox 40 mg subcutaneous daily x 2 weeks, followed by aspirin enteric-coated 325 mg daily x 6 weeks.   6.  PT - Bed to chair transfers only.  Wheel chair.  Cannot protect her weightbearing.  6.  Two-week wound check with staple removal. 6 week wound check  7.  Twelve-week followup could either be with Dr. Meadows or myself.          MARKUS DAVILA MD             D: 2019   T: 01/15/2019   MT: EDILBERTO      Name:     SUSAN TROTTER   MRN:      [id number]        Account:        [id number]   :      1985           Procedure Date: 2019      Document: [id number]

## 2019-01-15 NOTE — PROGRESS NOTES
01/15/19 1116   Quick Adds   Type of Visit Initial PT Evaluation   Living Environment   Lives With facility resident   Living Arrangements group home   Home Accessibility wheelchair accessible   Living Environment Comment patient having difficulty giving information about group home   Self-Care   Usual Activity Tolerance good   Current Activity Tolerance moderate   Equipment Currently Used at Home (unknown; walker; has been NWB since 12/22)   Activity/Exercise/Self-Care Comment patient has been NWB since prior surgery 12/21   Functional Level Prior   Ambulation 3-->assistive equipment and person  (since fracture in Dec)   Transferring 3-->assistive equipment and person  (since fracture in Dec)   Toileting 3-->assistive equipment and person   Bathing 3-->assistive equipment and person   Cognition 2 - difficulty with organizing thoughts   Fall history within last six months yes   Number of times patient has fallen within last six months 2  (per chart)   Which of the above functional risks had a recent onset or change? ambulation;transferring   Prior Functional Level Comment Patient not verbalizing PLOF at time of eval; limited verbalization   General Information   Onset of Illness/Injury or Date of Surgery - Date 01/14/19   Referring Physician Anat Cuello PA-C   Patient/Family Goals Statement not stated   Pertinent History of Current Problem (include personal factors and/or comorbidities that impact the POC) per chart: patient seen POD #1 s/p Right tibial plateau fracture ORIF revision   Precautions/Limitations fall precautions   Weight-Bearing Status - RLE nonweight-bearing   General Observations Patient with limited verbalizing during session   General Info Comments Activity: up in chair   Cognitive Status Examination   Orientation person  (not answering questions)   Level of Consciousness alert;lethargic/somnolent   Follows Commands and Answers Questions 75% of the time;100% of the time   Personal Safety  and Judgment impaired;at risk behaviors demonstrated   Cognitive Comment baseline cognitive issues per chart   Pain Assessment   Patient Currently in Pain No  (no complaint)   Integumentary/Edema   Integumentary/Edema Comments surgical incision on R LE; covered   Posture    Posture Comments forward flexed   Range of Motion (ROM)   ROM Comment R leg limited by knee brace/recent surgery; patient not actively moving others to command but functional for transfers   Strength   Strength Comments functional strength to transfer from bed to wheelchair with assist of 2   Bed Mobility   Bed Mobility Comments min A of 1-2 for supine to sit; use of rail; HOB elevated   Transfer Skills   Transfer Comments sit>stand with min/mod A of 1-2 and use of walker; able to maintain NWB on R with brace   Gait   Gait Comments able to take hop steps to wheelchair with min/mod A x 2 and walker   Balance   Balance Comments impaired; NWB on R; need for assist of 1-2 for safety   Modality Interventions   Planned Modality Interventions Comments ice to R LE   General Therapy Interventions   Planned Therapy Interventions bed mobility training;transfer training   Clinical Impression   Criteria for Skilled Therapeutic Intervention yes, treatment indicated   PT Diagnosis impaired transfers   Influenced by the following impairments NWB on R LE; impaired cognition; R LE weakness; ; impaired balance   Functional limitations due to impairments impaired independence with functional mobility   Clinical Presentation Stable/Uncomplicated   Clinical Presentation Rationale min/mod A x 2 for activity; medically stable   Clinical Decision Making (Complexity) Low complexity   Therapy Frequency` daily   Predicted Duration of Therapy Intervention (days/wks) 2-3 days   Anticipated Equipment Needs at Discharge front wheeled walker;wheelchair   Anticipated Discharge Disposition Transitional Care Facility   Risk & Benefits of therapy have been explained Yes   Patient,  "Family & other staff in agreement with plan of care Yes   [facility] AM-PAC  \"6 Clicks\" V.2 Basic Mobility Inpatient Short Form   1. Turning from your back to your side while in a flat bed without using bedrails? 2 - A Lot   2. Moving from lying on your back to sitting on the side of a flat bed without using bedrails? 2 - A Lot   3. Moving to and from a bed to a chair (including a wheelchair)? 2 - A Lot   4. Standing up from a chair using your arms (e.g., wheelchair, or bedside chair)? 2 - A Lot   5. To walk in hospital room? 2 - A Lot   6. Climbing 3-5 steps with a railing? 1 - Total   Basic Mobility Raw Score (Score out of 24.Lower scores equate to lower levels of function) 11   Total Evaluation Time   Total Evaluation Time (Minutes) 10     "

## 2019-01-16 ENCOUNTER — APPOINTMENT (OUTPATIENT)
Dept: PHYSICAL THERAPY | Facility: CLINIC | Age: 34
End: 2019-01-16
Attending: ORTHOPAEDIC SURGERY
Payer: MEDICAID

## 2019-01-16 LAB
DEPRECATED CALCIDIOL+CALCIFEROL SERPL-MC: 15 UG/L (ref 20–75)
HGB BLD-MCNC: 11.8 G/DL (ref 11.7–15.7)

## 2019-01-16 PROCEDURE — 40000193 ZZH STATISTIC PT WARD VISIT: Performed by: PHYSICAL THERAPY ASSISTANT

## 2019-01-16 PROCEDURE — 97116 GAIT TRAINING THERAPY: CPT | Mod: GP | Performed by: PHYSICAL THERAPY ASSISTANT

## 2019-01-16 PROCEDURE — 25000132 ZZH RX MED GY IP 250 OP 250 PS 637: Performed by: PHYSICIAN ASSISTANT

## 2019-01-16 PROCEDURE — 99207 ZZC CONSULT E&M CHANGED TO INITIAL LEVEL: CPT | Performed by: PHYSICIAN ASSISTANT

## 2019-01-16 PROCEDURE — 85018 HEMOGLOBIN: CPT | Performed by: PHYSICIAN ASSISTANT

## 2019-01-16 PROCEDURE — 36415 COLL VENOUS BLD VENIPUNCTURE: CPT | Performed by: PHYSICIAN ASSISTANT

## 2019-01-16 PROCEDURE — 99222 1ST HOSP IP/OBS MODERATE 55: CPT | Performed by: PHYSICIAN ASSISTANT

## 2019-01-16 PROCEDURE — 12000000 ZZH R&B MED SURG/OB

## 2019-01-16 PROCEDURE — 25000128 H RX IP 250 OP 636: Performed by: PHYSICIAN ASSISTANT

## 2019-01-16 PROCEDURE — 97530 THERAPEUTIC ACTIVITIES: CPT | Mod: GP | Performed by: PHYSICAL THERAPY ASSISTANT

## 2019-01-16 RX ADMIN — OXYCODONE HYDROCHLORIDE 5 MG: 5 TABLET ORAL at 09:40

## 2019-01-16 RX ADMIN — SENNOSIDES AND DOCUSATE SODIUM 2 TABLET: 8.6; 5 TABLET ORAL at 20:42

## 2019-01-16 RX ADMIN — ENOXAPARIN SODIUM 40 MG: 40 INJECTION SUBCUTANEOUS at 08:41

## 2019-01-16 RX ADMIN — OXYCODONE HYDROCHLORIDE 5 MG: 5 TABLET ORAL at 06:23

## 2019-01-16 RX ADMIN — ACETAMINOPHEN 975 MG: 325 TABLET, FILM COATED ORAL at 16:51

## 2019-01-16 RX ADMIN — PERPHENAZINE 16 MG: 4 TABLET, FILM COATED ORAL at 08:41

## 2019-01-16 RX ADMIN — STANDARDIZED SENNA CONCENTRATE AND DOCUSATE SODIUM 1 TABLET: 8.6; 5 TABLET, FILM COATED ORAL at 08:42

## 2019-01-16 RX ADMIN — OXYCODONE HYDROCHLORIDE 5 MG: 5 TABLET ORAL at 16:51

## 2019-01-16 RX ADMIN — CETIRIZINE HYDROCHLORIDE 10 MG: 10 TABLET, FILM COATED ORAL at 08:42

## 2019-01-16 RX ADMIN — PERPHENAZINE 16 MG: 4 TABLET, FILM COATED ORAL at 20:42

## 2019-01-16 RX ADMIN — BENZTROPINE MESYLATE 1.5 MG: 0.5 TABLET ORAL at 16:51

## 2019-01-16 RX ADMIN — ACETAMINOPHEN 975 MG: 325 TABLET, FILM COATED ORAL at 01:11

## 2019-01-16 RX ADMIN — OXYCODONE HYDROCHLORIDE 5 MG: 5 TABLET ORAL at 01:11

## 2019-01-16 RX ADMIN — LURASIDONE HYDROCHLORIDE 160 MG: 80 TABLET, FILM COATED ORAL at 16:51

## 2019-01-16 RX ADMIN — ACETAMINOPHEN 975 MG: 325 TABLET, FILM COATED ORAL at 08:42

## 2019-01-16 RX ADMIN — OXYCODONE HYDROCHLORIDE 5 MG: 5 TABLET ORAL at 20:42

## 2019-01-16 RX ADMIN — BUPROPION HYDROCHLORIDE 450 MG: 150 TABLET, FILM COATED, EXTENDED RELEASE ORAL at 08:42

## 2019-01-16 ASSESSMENT — ACTIVITIES OF DAILY LIVING (ADL)
ADLS_ACUITY_SCORE: 24
ADLS_ACUITY_SCORE: 25
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
## 2019-01-16 NOTE — PLAN OF CARE
Pt up A1 with walker. Hinged brace on at 45 degree bend. NWB RLE. Voiding. Passing flatus. Reg diet. Baseline numbness to BLE. Poor dorsi/plantar. Flat affect. Very slow to respond. Use short sentences with small words. Pain managed with oxy prn. VSS. Plan to discharge to TCU when placement is found. Will continue to monitor.

## 2019-01-16 NOTE — PLAN OF CARE
Discharge Planner PT   Patient plan for discharge: TCU per chart/Ortho  Current status: PT - Pt transfers supine to sit with pt assist R LE to EOB and requiring SBA.  Pt transfers sit to/from stand with CGA with vcs for hand placement for safety.  Pt transfers bed to bathroom to chair with ww with min assist while pt maintained NWB on the R LE.  Pt indep with hygiene following toileting. PT - Pt amb 15' x 2 with ww with min assist, while maintaining NWB on the R LE.   Barriers to return to prior living situation: Current level of assist  Recommendations for discharge: TCU per plan established by the PT.    Rationale for recommendations: Would benefit from ongoing PT to Maximize safety and independence with bed mobility and transfers while NWB on R LE to allow for eventual return to PLOF.       Entered by: Angie Lawson 01/16/2019 9:26 AM

## 2019-01-16 NOTE — PLAN OF CARE
Discharge Planner OT   Patient plan for discharge: defer to PT  Current status: OT orders received, per chart patient POD #2 S/P Right tibial plateau fracture ORIF revision. Per PT note, patient amb 15' x 2 with ww with min assist, while maintaining NWB on the R LE. At this time, TCU is recommended and patient is not appropriate for IP OT services, will defer to next level of care and discharge recommendations to PT.   Barriers to return to prior living situation: defer to PT  Recommendations for discharge: defer to PT  Rationale for recommendations: Per PT, patient will need TCU at discharge, therefore will defer OT to next level of care, at this time, will discontinue OT orders and defer further recommendations to PT.       Entered by: Monica Bah 01/16/2019 2:51 PM

## 2019-01-16 NOTE — PROGRESS NOTES
"SWS     D: Discharge planning continuing... Follow-up today with rehab referrals yesterday, no openings at [facility], [facility], [facility], or [facility], awaiting call back from [facility] and [facility].      I: Spoke today by phone with pt's mother and updated her with above information. Based on conversation additional referrals have been made to [facility], [facility] and [facility].      A/P: Awaiting assessments and determination of bed availability, MD determination of discharge date, continue planning accordingly.     Addendum:     D: Noted plan for pt's discharge when bed available. [facility] has informed that they have residents who have \"GI symptoms\", mother does not wish to further this facility due to this. [facility] is assessing however next anticipated availability is on Friday. [facility] has no openings and no openings at [facility]. Additional conversation with mother, referrals also made to [facility], [facility], [facility], and [facility].     Addendum:     D: No openings at [facility], or [facility], [facility] does not have MA contract. Additional referral to [place].   A   "

## 2019-01-16 NOTE — PLAN OF CARE
Pt up with 1 assist, walker, and gait belt to and from the bathroom. NWB on RLE. LS clear - encouraged CADB exercises and IS use. BS present, passing flatus, LBM 1/14/19. CMS - monitoring R foot d/p flexion as pt was not motivated to participate in performing d/p flexion in bilateral lower extremities. Will reassess and notify AM nurse of findings. R leg drsg CDI with hinge brace intact. Temp 99.7 - recheck 97.3. Pain partially controlled with oxycodone.

## 2019-01-16 NOTE — PROGRESS NOTES
Orthopedic Surgery  Karolyn Banerjee  2019  Admit Date:  2019  POD # 2  S/P Right tibial plateau fracture ORIF revision    Patient resting comfortably in bed.  Sleeping on arrival but easily awakes  Pain controlled.  Tolerating oral intake.    Denies nausea or vomiting  Denies chest pain or shortness of breath  No events overnight.     Alert and orient to person, place, and time.  Vital Sign Ranges  Temperature Temp  Av.5  F (36.9  C)  Min: 97.3  F (36.3  C)  Max: 99.7  F (37.6  C)   Blood pressure Systolic (24hrs), Av , Min:152 , Max:154        Diastolic (24hrs), Av, Min:86, Max:88      Pulse No Data Recorded   Respirations Resp  Av.3  Min: 16  Max: 18   Pulse oximetry SpO2  Av %  Min: 97 %  Max: 99 %       Dressing/ACE is clean, dry, and intact. TROM brace locked at 45 degrees  Minimal erythema of the surrounding skin.   Bilateral calves are soft, non-tender.  Bilateral lower extremity is NVI.  Sensation intact bilateral lower extremities  Unable to dorsi flex right ankle.  Able to plantar flex and flex/extend toes.  +Dp pulse    Labs:  Recent Labs   Lab Test 18  0628 18  0642 18  0720   POTASSIUM 3.9 4.2 4.2     Recent Labs   Lab Test 19  0635 01/15/19  1350 18  0628   HGB 11.8 12.7 11.4*     Recent Labs   Lab Test 18  1749 16  1716 16  0639   INR 1.02 2.97* 1.86*     Recent Labs   Lab Test 19  1654 18  0628 18  0642    126* 152       A/P  1. Plan              Continue Lovenox for DVT prophylaxis.                Mobilize with PT/OT, May transfer bed to chair, otherwise wheelchair/Bed              Non-WB right LE.              Keep TROM bace locked at 45 degrees                Continue current pain regiment.              Leave dressing intact     2. Disposition              Anticipate d/c to TCU based on bed placement - ok to discharge at anytime per ortho    Anat Cuello PA-C

## 2019-01-16 NOTE — PLAN OF CARE
Pt A&O but forgetful to day and which hospital. Flat affect. VSS afebrile. Saline locked. Tolerated regular diet. +BS/Gas. Up with assist 1 gait belt and walker/hinged brace on.NWB. PT consult. Compression wrap c/d/I. Pain managed with scheduled Tylenol and prn Oxycodone. Will continue to monitor.

## 2019-01-17 ENCOUNTER — APPOINTMENT (OUTPATIENT)
Dept: PHYSICAL THERAPY | Facility: CLINIC | Age: 34
End: 2019-01-17
Attending: ORTHOPAEDIC SURGERY
Payer: MEDICAID

## 2019-01-17 LAB — PLATELET # BLD AUTO: 169 10E9/L (ref 150–450)

## 2019-01-17 PROCEDURE — 25000128 H RX IP 250 OP 636: Performed by: PHYSICIAN ASSISTANT

## 2019-01-17 PROCEDURE — 25000132 ZZH RX MED GY IP 250 OP 250 PS 637: Performed by: PHYSICIAN ASSISTANT

## 2019-01-17 PROCEDURE — 36416 COLLJ CAPILLARY BLOOD SPEC: CPT | Performed by: PHYSICIAN ASSISTANT

## 2019-01-17 PROCEDURE — 99232 SBSQ HOSP IP/OBS MODERATE 35: CPT | Performed by: INTERNAL MEDICINE

## 2019-01-17 PROCEDURE — 85049 AUTOMATED PLATELET COUNT: CPT | Performed by: PHYSICIAN ASSISTANT

## 2019-01-17 PROCEDURE — 12000000 ZZH R&B MED SURG/OB

## 2019-01-17 PROCEDURE — 40000193 ZZH STATISTIC PT WARD VISIT: Performed by: PHYSICAL THERAPY ASSISTANT

## 2019-01-17 PROCEDURE — 97530 THERAPEUTIC ACTIVITIES: CPT | Mod: GP | Performed by: PHYSICAL THERAPY ASSISTANT

## 2019-01-17 RX ADMIN — ENOXAPARIN SODIUM 40 MG: 40 INJECTION SUBCUTANEOUS at 08:33

## 2019-01-17 RX ADMIN — BUPROPION HYDROCHLORIDE 450 MG: 150 TABLET, FILM COATED, EXTENDED RELEASE ORAL at 08:34

## 2019-01-17 RX ADMIN — BENZTROPINE MESYLATE 1.5 MG: 0.5 TABLET ORAL at 17:31

## 2019-01-17 RX ADMIN — PERPHENAZINE 16 MG: 4 TABLET, FILM COATED ORAL at 08:34

## 2019-01-17 RX ADMIN — OXYCODONE HYDROCHLORIDE 5 MG: 5 TABLET ORAL at 05:57

## 2019-01-17 RX ADMIN — CETIRIZINE HYDROCHLORIDE 10 MG: 10 TABLET, FILM COATED ORAL at 08:34

## 2019-01-17 RX ADMIN — OXYCODONE HYDROCHLORIDE 5 MG: 5 TABLET ORAL at 00:41

## 2019-01-17 RX ADMIN — LURASIDONE HYDROCHLORIDE 160 MG: 80 TABLET, FILM COATED ORAL at 17:31

## 2019-01-17 RX ADMIN — ACETAMINOPHEN 975 MG: 325 TABLET, FILM COATED ORAL at 00:39

## 2019-01-17 RX ADMIN — ACETAMINOPHEN 975 MG: 325 TABLET, FILM COATED ORAL at 08:34

## 2019-01-17 RX ADMIN — SENNOSIDES AND DOCUSATE SODIUM 2 TABLET: 8.6; 5 TABLET ORAL at 19:40

## 2019-01-17 RX ADMIN — PERPHENAZINE 16 MG: 4 TABLET, FILM COATED ORAL at 19:40

## 2019-01-17 RX ADMIN — SENNOSIDES AND DOCUSATE SODIUM 2 TABLET: 8.6; 5 TABLET ORAL at 08:34

## 2019-01-17 ASSESSMENT — ACTIVITIES OF DAILY LIVING (ADL)
ADLS_ACUITY_SCORE: 24

## 2019-01-17 NOTE — PROGRESS NOTES
Staff spoke with Anat Buchanan, #[phone], who works at patients [facility] [employer] [employer]. Please call and update her when pt is placed with a facility, letting her know where the patient is going and when the pt is discharging. It was also mentioned to her that the patient has not been on her birthcontrol because we do not supply that type, she said she would get in touch with the patients mom about when to restart it.   Cassandra Vidales RN  1/17/19   0578

## 2019-01-17 NOTE — PROGRESS NOTES
"               [facility]  Hospitalist Progress Note  Name: Karolyn Banerjee    MRN: [id number]  YOB: 1985    Age: 33 year old  Date of admission: 1/14/2019  Primary care provider: Suzie Marisacl      Reason for Stay (Diagnosis): Revision of right knee ORIF         Assessment and Plan:      Summary of Stay:  This patient is a 33 year old female with a PMH significant for schizoaffective disorder bipolar type, cognitive disorder possibly due to ECT, mild intermittent asthma, JOSE intolerant of CPAP who is POD 3 s/p revision of right ORIF for tibial plateau fracture with peroneal nerve palsy following a fall.    Problem List/Plan:  1. Recurrent right tibial plateau fracture with revision of ORIF: Postop day #3 and pain appears to be controlled.  Defer pain control and DVT prophylaxis to orthopedics.  2. Schizoaffective disorder, bipolar type: Resides at a group home at baseline.  Resume chronic psychiatric medications.  3. History of JOSE: Intolerant of CPAP.  4. Mildly elevated blood pressure: Suspect secondary to some discomfort and some anxiety.  Not chronically on any antihypertensives at the next level of care but may consider starting if blood pressure does not improve after pain is better controlled.  5. Mild intermittent asthma: No evidence of exacerbation      DVT Prophylaxis: Defer to primary service  Code Status: Full Code  Discharge Dispo: TCU per orthopedics  Estimated Disch Date / # of Days until Disch: Okay to discharge from medicine perspective        Interval History (Subjective):      No specific complaints.  Does report that pain is \"okay\"         Physical Exam:      Vital signs:  Temp: 99  F (37.2  C)(pt just ate something hot) Temp src: Oral BP: 151/85 Pulse: 90 Heart Rate: 93 Resp: 16 SpO2: 98 % O2 Device: None (Room air) Oxygen Delivery: 1 LPM Height: 162.6 cm (5' 4\") Weight: 122.8 kg (270 lb 12.8 oz)  Estimated body mass index is 46.48 kg/m  as calculated " "from the following:    Height as of this encounter: 1.626 m (5' 4\").    Weight as of this encounter: 122.8 kg (270 lb 12.8 oz).    I/O last 3 completed shifts:  In: 250 [P.O.:250]  Out: -   Vitals:    01/14/19 0851 01/14/19 1610   Weight: 117.9 kg (260 lb) 122.8 kg (270 lb 12.8 oz)       Constitutional: Awake, alert, cooperative, no apparent distress   Respiratory: Nl work of breathing. Clear to auscultation bilaterally, no crackles or wheezing   Cardiovascular: Regular rate and rhythm, normal S1 and S2, and no murmur noted       Skin: No rashes, no cyanosis, dry to touch   Neuro: CN 2-12 intact, no localizing weakness   Extremities: No edema   HEENT Normocephalic, atraumatic, normal nasal turbinates; oropharynx clear   Neck Supple; nl inspection; trachea midline; no thryomegaly   Psychiatric: A+O x3. Normal affect          Medications:      All current medications were reviewed with changes reflected in problem list.         Data:      All new lab and imaging data was reviewed.   Labs:  Recent Labs   Lab 01/17/19  0619 01/16/19  0635 01/15/19  1350 01/14/19  1654   HGB  --  11.8 12.7  --      --   --  221     Recent Labs   Lab 01/14/19  1654   CR 0.76   GFRESTIMATED >90   GFRESTBLACK >90      Imaging:   No results found for this or any previous visit (from the past 24 hour(s)).    Richie Sanchez [phone]      "

## 2019-01-17 NOTE — PLAN OF CARE
Pt up A1 with walker. NWB RLE. Ace wrap CDI. Hinged brace in place at 50 degrees. Reg diet. No N/V. Numbness and tingling baseline BLE. Unable to do dorsi flexion on RLE. Waiting for patient to be placed in a rehab facility. Pain managed with oxy- none given this shift. Will continue to monitor.

## 2019-01-17 NOTE — PLAN OF CARE
Discharge Planner PT   Patient plan for discharge: TCU per chart/Ortho  Current status: PT - Pt c/o pain when calf palpated.  Note foot drop on the R and note pt unable to dorsiflex.  Note pt to move toes on R foot. Pt transfers supine to sit with SBA  and sit to supine with min assist with R LE. Pt transfers bed to / from bathroom with ww while maintaining TTWB on the R LE with hinge brace.  Pt performed hygiene following toileting.  Pt amb 15' x 2 with ww with min assist.   Note improvement with transfers and amb with ww. Pt to continue to ambulate with nursing & will decrease PT from BID to daily @ this time.   Barriers to return to prior living situation: Current level of assist  Recommendations for discharge: TCU per plan established by the PT.    Rationale for recommendations: Continue to note improvement with transfers and ambulation with use of ww, while maintaining NWB on R LE.   Would benefit from ongoing PT to Maximize safety and independence with bed mobility and transfers while NWB on R LE to allow for eventual return to PLOF.       Entered by: Angie Lawson 01/17/2019 9:06 AM

## 2019-01-17 NOTE — PLAN OF CARE
"Pt. up with assist of one using gait belt and walker. Hinged brace on. Using commode to void, adequate amounts. Pain \"moderate\" and managed with PRN oxy 5 and ice to knee. Ace wrap is clean, dry, and intact. Plans to discharge to TCU.   "

## 2019-01-17 NOTE — PROGRESS NOTES
STELLA     D: Discharge planning continuing...Awaitin calls from [facility], [facility], [facility] and [facility] to determine if they could accept pt. [facility] does not accept MA for TCU payment, [facility] has no opening.          Also, PAS screening has been initiated ( # [id number]), FELIX was informed by Allison from [facility] that  would need to contact [facility] to determine if pt will need Level 2 screening. FELIX has this morning left message for pt's [facility] , Brian Blount ([phone]) asking for assistance with this determination.      A/P: Planning continuing...     Addendum:      D: [facility] unable to accept pt, no openings.           FELIX has spoken to [facility]""s [facility]  Brian regarding the question of need for Level 2 screening for pt's admission to the rehab facility. He has indicated that he does not do the Obra Level 2 screening and informed the  would need to contact [facility], also noting that pt had a screening in the Fall of last year. FELIX has left message for Blanca ([phone]) at [facility] ( this was directed by Allison from [facility]) requesting her assistance in moving forward with whether pt would need new screening, or if screening from last Fall would be adequate. The relevance of this is that rehab facilities would not accept pt until this issue gets resolved as without the appropriate screening their payment from [place] could be denied.     Addendum:      D: Able to speak this afternoon with Blanca Ramirez ( [phone]) [facility] who informs that she has not yet received the referral from [facility] Line/PAS for assessment of need for Obra Level 2 screening need. She informs that when she does receive the referral, she will review and forward on to the appropriate department at the [facility] if the Obra screening is needed at which time. The timing of this will be based  on the need for screening.. possible that this would not occur tomorrow and then FELIX would be unable to move forward with plan until after the weekend.           Meanwhile, [facility] has completed clinical assessment and determined that they would be able to accept pt pending the determination made from the county, and bed availability when completed. FELIX has updated pt's mother with information noted above.

## 2019-01-17 NOTE — PROGRESS NOTES
[facility]      OUTPATIENT PHYSICAL THERAPY EVALUATION  PLAN OF TREATMENT FOR OUTPATIENT REHABILITATION  (COMPLETE FOR INITIAL CLAIMS ONLY)  Patient's Last Name, First Name, M.I.  YOB: 1985  Karolyn Banerjee                        Provider's Name  [facility] Medical Record No.  [id number]                               Onset Date:  12/21/18   Start of Care Date:  12/22/18      Type:     _X_PT   ___OT   ___SLP Medical Diagnosis:  R knee ORIF                        PT Diagnosis:  Impaired gait and mobility.   Visits from SOC:  1   _________________________________________________________________________________  Plan of Treatment/Functional Goals    Planned Interventions: Cryotherapy,    bed mobility training, gait training, strengthening, stretching, transfer training, risk factor education, home program guidelines, progressive activity/exercise,       Goals: See Physical Therapy Goals on Care Plan in [facility] electronic health record.    Therapy Frequency: daily  Predicted Duration of Therapy Intervention: 2  _________________________________________________________________________________    I CERTIFY THE NEED FOR THESE SERVICES FURNISHED UNDER        THIS PLAN OF TREATMENT AND WHILE UNDER MY CARE     (Physician co-signature of this document indicates review and certification of the therapy plan).                Certification date from: 12/22/18, Certification date to: 12/23/18    Referring Physician: Marc Meadows            Initial Assessment        See Physical Therapy evaluation dated 12/22/18 in Epic electronic health record.

## 2019-01-17 NOTE — PROGRESS NOTES
Orthopedic Surgery  Karolyn Banerjee  2019  Admit Date:  2019  POD # 3  S/P Right tibial plateau fracture ORIF revision    Patient resting comfortably in bed.    Pain controlled.  Tolerating oral intake.    Denies nausea or vomiting  Denies chest pain or shortness of breath  No events overnight.       Alert and orient to person, place, and time.  Vital Sign Ranges  Temperature Temp  Av.8  F (37.1  C)  Min: 98.7  F (37.1  C)  Max: 99  F (37.2  C)   Blood pressure Systolic (24hrs), Av , Min:128 , Max:151        Diastolic (24hrs), Av, Min:69, Max:85      Pulse Pulse  Av.5  Min: 90  Max: 97   Respirations Resp  Avg: 15.6  Min: 14  Max: 16   Pulse oximetry SpO2  Av %  Min: 96 %  Max: 98 %       Dressing/ACE is clean, dry, and intact. T-scope brace locked at 50 degrees  Minimal erythema of the surrounding skin.   Bilateral calves are soft, non-tender.  Bilateral lower extremity is NVI.  Sensation intact bilateral lower extremities  Unable to dorsi flex right ankle.  Able to plantar flex and flex/extend toes.  +Dp pulse        Labs:  Recent Labs   Lab Test 18  0628 18  0642 18  0720   POTASSIUM 3.9 4.2 4.2     Recent Labs   Lab Test 19  0635 01/15/19  1350 18  0628   HGB 11.8 12.7 11.4*     Recent Labs   Lab Test 18  1749 16  1716 16  0639   INR 1.02 2.97* 1.86*     Recent Labs   Lab Test 19  0619 19  1654 18  0628    221 126*       A/P  1.  Plan              Continue Lovenox for DVT prophylaxis x 2 weeks then ASA daily              Mobilize with PT/OT, May transfer bed to chair, otherwise wheelchair/Bed              Non-WB right LE.              Keep TROM bace locked at 50 degrees                Continue current pain regiment.              Leave dressing intact      2. Disposition   Anticipate d/c to TCU on today, 2019.    Anat Cuello PA-C

## 2019-01-17 NOTE — PLAN OF CARE
Pt up with 1 assist, walker, and gait belt to and from the bathroom. NWB on RLE. LS clear - encouraged CADB exercises and IS use. BS present, passing flatus, LBM 1/14/19. CMS - R foot unable to dorsi flex - pt said that she has been unable to since the fall. L d/p flexion has moderate strength. R leg drsg CDI with hinge brace intact. Pain partially controlled with oxycodone. Plan is to discharge to a TCU.

## 2019-01-18 ENCOUNTER — APPOINTMENT (OUTPATIENT)
Dept: PHYSICAL THERAPY | Facility: CLINIC | Age: 34
End: 2019-01-18
Attending: ORTHOPAEDIC SURGERY
Payer: MEDICAID

## 2019-01-18 PROCEDURE — 25000132 ZZH RX MED GY IP 250 OP 250 PS 637: Performed by: PHYSICIAN ASSISTANT

## 2019-01-18 PROCEDURE — 12000000 ZZH R&B MED SURG/OB

## 2019-01-18 PROCEDURE — 99207 ZZC MOONLIGHTING INDICATOR: CPT | Performed by: INTERNAL MEDICINE

## 2019-01-18 PROCEDURE — 97530 THERAPEUTIC ACTIVITIES: CPT | Mod: GP | Performed by: PHYSICAL THERAPY ASSISTANT

## 2019-01-18 PROCEDURE — 25000131 ZZH RX MED GY IP 250 OP 636 PS 637: Performed by: PHYSICIAN ASSISTANT

## 2019-01-18 PROCEDURE — 25000128 H RX IP 250 OP 636: Performed by: PHYSICIAN ASSISTANT

## 2019-01-18 PROCEDURE — 40000193 ZZH STATISTIC PT WARD VISIT: Performed by: PHYSICAL THERAPY ASSISTANT

## 2019-01-18 PROCEDURE — 99233 SBSQ HOSP IP/OBS HIGH 50: CPT | Performed by: INTERNAL MEDICINE

## 2019-01-18 RX ADMIN — STANDARDIZED SENNA CONCENTRATE AND DOCUSATE SODIUM 1 TABLET: 8.6; 5 TABLET, FILM COATED ORAL at 08:45

## 2019-01-18 RX ADMIN — BENZTROPINE MESYLATE 1.5 MG: 0.5 TABLET ORAL at 16:35

## 2019-01-18 RX ADMIN — SENNOSIDES AND DOCUSATE SODIUM 2 TABLET: 8.6; 5 TABLET ORAL at 20:20

## 2019-01-18 RX ADMIN — PERPHENAZINE 16 MG: 4 TABLET, FILM COATED ORAL at 08:44

## 2019-01-18 RX ADMIN — CETIRIZINE HYDROCHLORIDE 10 MG: 10 TABLET, FILM COATED ORAL at 08:45

## 2019-01-18 RX ADMIN — PERPHENAZINE 16 MG: 4 TABLET, FILM COATED ORAL at 20:20

## 2019-01-18 RX ADMIN — ENOXAPARIN SODIUM 40 MG: 40 INJECTION SUBCUTANEOUS at 08:44

## 2019-01-18 RX ADMIN — ACETAMINOPHEN 650 MG: 325 TABLET, FILM COATED ORAL at 04:42

## 2019-01-18 RX ADMIN — BUPROPION HYDROCHLORIDE 450 MG: 150 TABLET, FILM COATED, EXTENDED RELEASE ORAL at 08:44

## 2019-01-18 RX ADMIN — LURASIDONE HYDROCHLORIDE 160 MG: 80 TABLET, FILM COATED ORAL at 16:35

## 2019-01-18 RX ADMIN — ONDANSETRON 4 MG: 4 TABLET, ORALLY DISINTEGRATING ORAL at 23:34

## 2019-01-18 RX ADMIN — ACETAMINOPHEN 650 MG: 325 TABLET, FILM COATED ORAL at 22:57

## 2019-01-18 RX ADMIN — OXYCODONE HYDROCHLORIDE 5 MG: 5 TABLET ORAL at 00:36

## 2019-01-18 ASSESSMENT — ACTIVITIES OF DAILY LIVING (ADL)
ADLS_ACUITY_SCORE: 25
ADLS_ACUITY_SCORE: 25
ADLS_ACUITY_SCORE: 24
ADLS_ACUITY_SCORE: 25
ADLS_ACUITY_SCORE: 24
ADLS_ACUITY_SCORE: 24

## 2019-01-18 NOTE — PLAN OF CARE
A&O x4, slow to respond. Up w/Ax1 w/walker, NWB RLE, right knee in hinged brace locked at 50 degrees. BS/flatus+. Tolerating regular diet well. Voiding well. CMS intact ex: right foot weak dorsi flexion. Ace wrap C/D/I. Pain managed w/PRN oxycodone given x1 and Tylenol. Slept well overnight. Possible discharge today to TCU pending placement.

## 2019-01-18 NOTE — PROGRESS NOTES
[facility]    Hospitalist Progress Note    Assessment & Plan   This patient is a 33 year old female with a PMH significant for schizoaffective disorder bipolar type, cognitive disorder possibly due to ECT, mild intermittent asthma, JOSE intolerant of CPAP who is POD 3 s/p revision of right ORIF for tibial plateau fracture with peroneal nerve palsy following a fall.     Problem List/Plan:  1. Recurrent right tibial plateau fracture with revision of ORIF: Postop day #3 and pain appears to be controlled.  Defer pain control and DVT prophylaxis to orthopedics.  2. Schizoaffective disorder, bipolar type: Resides at a group home at baseline.  Resume chronic psychiatric medications.  3. History of JOSE: Intolerant of CPAP.  4. Mildly elevated blood pressure: Suspect secondary to some discomfort and some anxiety.  Not chronically on any antihypertensives at the next level of care but may consider starting if blood pressure does not improve after pain is better controlled.  5. Mild intermittent asthma: No evidence of exacerbation        DVT Prophylaxis: Defer to primary service  Code Status: Full Code  Discharge Dispo: TCU per orthopedics  Estimated Disch Date / # of Days until Disch: Okay to discharge from medicine perspective      Viktoria Luque MD   Text Page (7am to 6pm)    Interval History   Minimal pain    -Data reviewed today: I reviewed all new labs and imaging results over the last 24 hours. I personally reviewed no images or EKG's today.    Physical Exam   Temp: 97.3  F (36.3  C) Temp src: Oral BP: (!) 156/99 Pulse: 98 Heart Rate: 87 Resp: 18 SpO2: 93 % O2 Device: None (Room air)    Vitals:    01/14/19 0851 01/14/19 1610   Weight: 117.9 kg (260 lb) 122.8 kg (270 lb 12.8 oz)     Vital Signs with Ranges  Temp:  [97.3  F (36.3  C)-99.1  F (37.3  C)] 97.3  F (36.3  C)  Pulse:  [98] 98  Heart Rate:  [] 87  Resp:  [18-20] 18  BP: (119-158)/(61-99) 156/99  SpO2:  [93 %-96 %] 93 %  I/O last 3 completed  shifts:  In: 490 [P.O.:490]  Out: -     Respiratory: Nl work of breathing. Clear to auscultation bilaterally, no crackles or wheezing   Cardiovascular: Regular rate and rhythm, normal S1 and S2, and no murmur noted         Skin: No rashes, no cyanosis, dry to touch   Neuro: CN 2-12 intact, no localizing weakness   Extremities: No edema   HEENT Normocephalic, atraumatic, normal nasal turbinates; oropharynx clear   Neck Supple; nl inspection; trachea midline; no thryomegaly   Psychiatric: A+O x3. Normal affect           Medications     sodium chloride 75 mL/hr at 01/15/19 0641       benztropine  1.5 mg Oral Daily     BuPROPion HCl ER (XL)  450 mg Oral Daily     cetirizine  10 mg Oral Daily     enoxaparin  40 mg Subcutaneous Q24H     lurasidone  160 mg Oral Daily with supper     norethindrone-ethinyl estradiol  1 tablet Oral Daily     perphenazine  16 mg Oral BID     senna-docusate  1 tablet Oral BID    Or     senna-docusate  2 tablet Oral BID       Data   Recent Labs   Lab 01/17/19  0619 01/16/19  0635 01/15/19  1350 01/14/19  1654   HGB  --  11.8 12.7  --      --   --  221   CR  --   --   --  0.76       Imaging:   No results found for this or any previous visit (from the past 24 hour(s)).

## 2019-01-18 NOTE — PROGRESS NOTES
Orthopedic Surgery  Karolyn Banerjee  2019  Admit Date:  2019  POD # 4  S/P Right tibial plateau fracture ORIF revision    Patient resting comfortably in chair.    Pain controlled.  Tolerating oral intake.    Denies nausea or vomiting  Denies chest pain or shortness of breath  No events overnight.     Alert and orient to person, place, and time. Flat affect but responsive to questions and makes needs known  Vital Sign Ranges  Temperature Temp  Av.5  F (36.9  C)  Min: 97.3  F (36.3  C)  Max: 99.1  F (37.3  C)   Blood pressure Systolic (24hrs), Av , Min:119 , Max:158        Diastolic (24hrs), Av, Min:61, Max:99      Pulse Pulse  Av  Min: 90  Max: 98   Respirations Resp  Av  Min: 16  Max: 20   Pulse oximetry SpO2  Av.4 %  Min: 93 %  Max: 98 %       Dressing/ACE is clean, dry, and intact. T-scope brace locked at 30 degrees  Minimal erythema of the surrounding skin.   Bilateral calves are soft, non-tender.  Bilateral lower extremity is NVI.  Sensation intact bilateral lower extremities  Unable to dorsi flex right ankle.  Able to plantar flex and flex/extend toes.  +Dp pulse    Labs:  Recent Labs   Lab Test 18  0628 18  0642 18  0720   POTASSIUM 3.9 4.2 4.2     Recent Labs   Lab Test 19  0635 01/15/19  1350 18  0628   HGB 11.8 12.7 11.4*     Recent Labs   Lab Test 18  1749 16  1716 16  0639   INR 1.02 2.97* 1.86*     Recent Labs   Lab Test 19  0619 19  1654 18  0628    221 126*       A/P  1.  Plan              Continue Lovenox for DVT prophylaxis x 2 weeks then ASA daily              Mobilize with PT/OT, May transfer bed to chair, otherwise wheelchair/Bed              Non-WB right LE.              Keep TROM bace locked at 50 degrees when up out of chair              Continue current pain regiment.              Leave dressing intact        2. Disposition              Anticipate d/c to TCU based on bed  availability      Anat Cuello PA-C

## 2019-01-18 NOTE — PLAN OF CARE
Pt has denied pain.  Pt is voiding and tolerating diet. Pt is up with standby assist and walker.  Brace is on at all time.

## 2019-01-18 NOTE — PROGRESS NOTES
"SWS     D: Discharge planning continuing. Multiple contacts this morning regarding efforts to resolve the question of Level 2 Obra screen need. Spoke this morning to Blanca Ramirez from [facility] who informs that she has obtained the referral from the [facility], she will review and hand off to Carissa Luna with [facility]. It was suggested that SW follow-up with call to Carissa, call placed and message left. Meanwhile, Blanca also suggested that SW contact [facility] ( county of current residence) to determine if there had been a Level 2 Obra screen done during pt's residence in [facility]. FELIX has spoken to Vivian ([phone]) from [facility] who informs that there is not a Level 2 Obra screen on record with [facility], and no open case at this time which would indicate need for screening.            FELIX has today also reached out again to pt's  Brian Jose Alejandro ( [phone]) to enlist his assistance in determination of screening as noted, message left.            [facility] have completed clinical assessment, would be able to accept pt clinically and currently have opening however will need the Level 2 issue resolved before acceptance.     Addendum:       D: FELIX has learned that Brian Blount is not in the office today, call placed and message left for his supervisor, Allison Li ([phone]) asking that she return call for assistance.     Addendum:      D: Additional conversations this afternoon with Blanca Geiger, able to also speak with Carissa Luna, and  also follow-up with Allison from the [facility]. Per determination through these conversations and direction from Allison from the [facility], pt's  will need to completed a \"3426\" form and based on the answers to the questions this will determine if Level 2 Obra screening will need to be done. If needed, there uncertainty of whether " the screening will need to be done by Brian Blount ( ) through [place] (county of pt's current location), or [facility] ( [facility] for pt's CADI services).           FELIX has sent Brian an email outlining specifics of the information noted by Allison, it was asked that he follow-up on this however this will not be until Tuesday given the weekend, and the [facility] observed holiday on Mnday. Felix has updated pt's mother.  She has at this time chosen [facility] of preference.

## 2019-01-18 NOTE — PLAN OF CARE
Discharge Planner PT   Patient plan for discharge: not stated  Current status: bed to chair only with NWB and brace locked at 50, attempted to change to 30 per PA notes for today but unable to adjust locking, did adjust to align knee joint with bracing  Barriers to return to prior living situation: mobility needs level of assist need unable to be given at current living situation  Recommendations for discharge: TCU per plan established by the PT.    Rationale for recommendations: will benefit from continued skilled to PT maximize Ind in transfers        Entered by: Monica Haskins 01/18/2019 10:16 AM

## 2019-01-18 NOTE — PLAN OF CARE
Pt disorientated to time. VSS. Afebrile. RA. No saline lock in place. LS clear. Encouraged IS use. Up with assist of 1 gait belt and walker. Hinged brace on and locked at 50 degrees. NWB to RLE. Tolerated regular diet. Pt denied pain and declined prn Oxycodone. Ace wrap c/d/I. Unable to dorsi/plantar flex. Possible discharge tomorrow if placement can be found for TCU. Will continue to monitor.

## 2019-01-19 ENCOUNTER — APPOINTMENT (OUTPATIENT)
Dept: PHYSICAL THERAPY | Facility: CLINIC | Age: 34
End: 2019-01-19
Attending: ORTHOPAEDIC SURGERY
Payer: MEDICAID

## 2019-01-19 PROCEDURE — 99232 SBSQ HOSP IP/OBS MODERATE 35: CPT | Performed by: INTERNAL MEDICINE

## 2019-01-19 PROCEDURE — 12000000 ZZH R&B MED SURG/OB

## 2019-01-19 PROCEDURE — 97116 GAIT TRAINING THERAPY: CPT | Mod: GP

## 2019-01-19 PROCEDURE — L2755 CARBON GRAPHITE LAMINATION: HCPCS

## 2019-01-19 PROCEDURE — 40000193 ZZH STATISTIC PT WARD VISIT

## 2019-01-19 PROCEDURE — 25000128 H RX IP 250 OP 636: Performed by: PHYSICIAN ASSISTANT

## 2019-01-19 PROCEDURE — 25000132 ZZH RX MED GY IP 250 OP 250 PS 637: Performed by: PHYSICIAN ASSISTANT

## 2019-01-19 PROCEDURE — L1951 AFO SPIRAL PREFABRICATED: HCPCS

## 2019-01-19 RX ADMIN — OXYCODONE HYDROCHLORIDE 5 MG: 5 TABLET ORAL at 20:36

## 2019-01-19 RX ADMIN — LURASIDONE HYDROCHLORIDE 160 MG: 80 TABLET, FILM COATED ORAL at 16:52

## 2019-01-19 RX ADMIN — BENZTROPINE MESYLATE 1.5 MG: 0.5 TABLET ORAL at 16:52

## 2019-01-19 RX ADMIN — OXYCODONE HYDROCHLORIDE 5 MG: 5 TABLET ORAL at 03:23

## 2019-01-19 RX ADMIN — BUPROPION HYDROCHLORIDE 450 MG: 150 TABLET, FILM COATED, EXTENDED RELEASE ORAL at 09:17

## 2019-01-19 RX ADMIN — SENNOSIDES AND DOCUSATE SODIUM 2 TABLET: 8.6; 5 TABLET ORAL at 09:18

## 2019-01-19 RX ADMIN — STANDARDIZED SENNA CONCENTRATE AND DOCUSATE SODIUM 1 TABLET: 8.6; 5 TABLET, FILM COATED ORAL at 20:36

## 2019-01-19 RX ADMIN — PERPHENAZINE 16 MG: 4 TABLET, FILM COATED ORAL at 20:36

## 2019-01-19 RX ADMIN — OXYCODONE HYDROCHLORIDE 5 MG: 5 TABLET ORAL at 12:11

## 2019-01-19 RX ADMIN — PERPHENAZINE 16 MG: 4 TABLET, FILM COATED ORAL at 09:17

## 2019-01-19 RX ADMIN — CETIRIZINE HYDROCHLORIDE 10 MG: 10 TABLET, FILM COATED ORAL at 09:18

## 2019-01-19 RX ADMIN — ENOXAPARIN SODIUM 40 MG: 40 INJECTION SUBCUTANEOUS at 09:18

## 2019-01-19 RX ADMIN — ACETAMINOPHEN 650 MG: 325 TABLET, FILM COATED ORAL at 16:52

## 2019-01-19 ASSESSMENT — ACTIVITIES OF DAILY LIVING (ADL)
ADLS_ACUITY_SCORE: 25

## 2019-01-19 NOTE — PROGRESS NOTES
[facility]    Medicine Progress Note - Hospitalist Service       Date of Admission:  1/14/2019  Assessment & Plan      Karolyn Banerjee is a 33 year old female with history of schizoaffective bipolar disorder, cognitive impairment, obesity, asthma, obstructive sleep apnea intolerant with CPAP therapy who who was admitted at [facility] on 1/14/19 due to recurrent right tibial plateau fracture.  She underwent right TPA open reduction internal fixation on 1/14/19.  Her prior surgery with open reduction internal fixation right tibial plateau fracture was on 12/21/2018.    1. Recurrent right tibial plateau fracture with revision of ORIF: Postop day #5 and pain appears to be controlled. Lovenox for DVT prophylaxis as per orthopedics. Restart ambulation with PT/OT and ambulate NWB RLE, maintain Hinged Knee Brace Locked until Follow-Up and AFO for foot drop.  2. Schizoaffective disorder and bipolar type: Resides at a group home at baseline.  Resumed chronic psychiatric medications.  3. History of JOSE: Intolerant of CPAP.  4. Mildly elevated blood pressure: Suspect secondary to some discomfort and some anxiety.  Not chronically on any antihypertensives at the next level of care but may consider starting if blood pressure does not improve after pain is better controlled.  5. Mild intermittent asthma: No evidence of exacerbation      Diet: Advance Diet as Tolerated: Regular Diet Adult  Advance Diet as Tolerated    DVT Prophylaxis: Enoxaparin (Lovenox) SQ  Saenz Catheter: not present  Code Status: full code    Disposition Plan   Expected discharge: 2 - 3 days, recommended to transitional care unit once safe disposition plan/ TCU bed available. As [per  note, Tuesday at [facility] TCU.  Entered: Hans Gutiérrez MD 01/19/2019, 1:45 PM       The patient's care was discussed with the Bedside Nurse.    Hans Gutiérrez MD  Hospitalist Service  [facility]    ______________________________________________________________________    Interval History   She is poor historian.  She has a pain in her right leg surgical site.  She denies chest pain, shortness of breath and fevers.    Data reviewed today: I reviewed all medications, new labs and imaging results over the last 24 hours. I personally reviewed no images or EKG's today.    Physical Exam   Vital Signs: Temp: 98  F (36.7  C) Temp src: Oral BP: (!) 149/92 Pulse: 90 Heart Rate: 91 Resp: 18 SpO2: 91 % O2 Device: None (Room air)    Weight: 270 lbs 12.8 oz  GENERAL:  No acute distress.  SKIN:  Dry and warm.  There is no rash, lesions, ulcers or juandice at area of skin examined.  HEENT:  Head without trauma.  Pupils round, reactive. Exam of conjunctiva and lids are normal. Sclera without icterus. There is no oral thrush.  NECK:  Supple.  There is no cervical adenopathy, no thyromegaly. No jugular venous distension.  CHEST: No reproducible chest tenderness.   LUNGS:  Normal respiratory effort. Lungs reveal decreased breath sounds at bases. No rales or wheezes.  HEART:  Regular rate and rhythm.  No murmur, gallops or rubs auscultated.  ABDOMEN:  Soft, bowel sounds positive.  There is no tenderness or guarding.   EXTREMITIES: +1 edema. Right leg with knee hinges immobilizer and wound with dressing and AFO at right foot.  NEUROLOGIC:  Alert and oriented x3.  Moving all ext. Gait not tested.        Data   Recent Labs   Lab 01/17/19  0619 01/16/19  0635 01/15/19  1350 01/14/19  1654   HGB  --  11.8 12.7  --      --   --  221   CR  --   --   --  0.76     No results found for this or any previous visit (from the past 24 hour(s)).  Medications     sodium chloride 75 mL/hr at 01/15/19 0641       benztropine  1.5 mg Oral Daily     BuPROPion HCl ER (XL)  450 mg Oral Daily     cetirizine  10 mg Oral Daily     enoxaparin  40 mg Subcutaneous Q24H     lurasidone  160 mg Oral Daily with supper     norethindrone-ethinyl  estradiol  1 tablet Oral Daily     perphenazine  16 mg Oral BID     senna-docusate  1 tablet Oral BID    Or     senna-docusate  2 tablet Oral BID

## 2019-01-19 NOTE — PLAN OF CARE
Pts leg in brace. Pt is to get a AFO brace for her foot drop.  Pt is up with stand by assist. 5mg oxy for pain.

## 2019-01-19 NOTE — PLAN OF CARE
Discharge Planner PT   Patient plan for discharge: TCU  Current status: pt required encouragement in order to participate in PT. Pt was SBA for transfers and pt was able to ambulate 15 feet with a FWW and her brace (CGA). Pt was able to ambulate without violating her precautions and pt did not need cuing.  Barriers to return to prior living situation: none for TCU  Recommendations for discharge: TCU  Rationale for recommendations: pt is unable to care for her needs and pt would benefit from continued skilled IP PT        Entered by: Brendan Oliveros 01/19/2019 4:07 PM

## 2019-01-19 NOTE — PLAN OF CARE
A&O x4, slow to respond. Up w/Ax1 w/walker, NWB RLE, right knee in hinged brace locked at 50 degrees. BS/flatus+. Tolerating regular diet well. Had 1 episode of nausea at beginning of shift, vomited 30ml, given zofran and resolved. Voiding well. CMS intact ex: right foot weak dorsi flexion. Ace wrap C/D/I. Pain managed w/PRN oxycodone given x1 and Tylenol. Slept well overnight. Possible discharge today to TCU pending placement.

## 2019-01-19 NOTE — PROGRESS NOTES
S:  Brittnee was fit with a [employer] Walk-on AFO for the right drop foot as requested through the on-call practitioner system today.  She was seen her hospital room with her parents.  O:  Brittnee is currently wear a post-op articulating knee brace after her surgery and was sitting in a wheel chair.  She explains that she is non-weight bearing at this time.    A:  The AFO was trimmed for her size 9 shoe an then placed in the shoe after the insole was re moved.  The proper don and doffing of the brace and care was explained to her and her parents    P:  Wear time and duration will be determined from her physicians.   Any issues with the brace should be referred to the [facility] department.      Matthias RODRIGUEZ, SHAWN, CPed, ATC

## 2019-01-19 NOTE — PROGRESS NOTES
[facility]    Orthopedics  Daily Post-Op Note    Assessment & Plan   Procedure(s):  Open reduction internal fixation right tibial plateau fracture   -5 Days Post-Op  Doing well.  No immediate surgical complications identified.  No excessive bleeding  Pain well-controlled.  Tolerating physical therapy and rehabilitation well.    Plan:  -Ambulate NWB RLE  -Maintain Hinged Knee Brace Locked until Follow-Up  -AFO for Foot Drop  -Advance activity as tolerated otherwise  -Start or continue physical therapy  -Pain control measures    Active Problems:    Tibial plateau fracture      Dillan Allison MD  Orthopedic Trauma and Arthroplasty  [facility]    Interval History   Doing well.  Continues to improve.  Pain is well-controlled.  No fevers.      Physical Exam   Temp: 98  F (36.7  C) Temp src: Oral BP: (!) 149/92 Pulse: 90 Heart Rate: 91 Resp: 18 SpO2: 91 % O2 Device: None (Room air)    Vitals:    01/14/19 0851 01/14/19 1610   Weight: 117.9 kg (260 lb) 122.8 kg (270 lb 12.8 oz)     Vital Signs with Ranges  Temp:  [98  F (36.7  C)-99.4  F (37.4  C)] 98  F (36.7  C)  Pulse:  [90] 90  Heart Rate:  [] 91  Resp:  [16-18] 18  BP: (145-149)/(77-92) 149/92  SpO2:  [91 %-95 %] 91 %  I/O last 3 completed shifts:  In: 100 [P.O.:100]  Out: 30 [Emesis/NG output:30]  Patient Active Problem List   Diagnosis     Mild cognitive impairment     Borderline personality disorder (H)     Asthma     Nonorganic enuresis     Schizoaffective disorder, depressive type (H)     Fx humerus shaft-closed     Port catheter in place     MENTAL HEALTH     Suicidal ideation     Hx of psychiatric care     DVT (deep vein thrombosis) in pregnancy (H)     Suicide attempt (H)     Suicidal ideations     JOSE (obstructive sleep apnea)     Closed fibular fracture     Tibial plateau fracture     HKB and Dressing clean, dry and intact.  CMS intact distally.  Intact sensation in lateral femoral cutaneous, saphenous, sural, superficial  peroneal, deep peroneal and tibial distributions.    Motors absent tibialis anterior, extensor hallucis longus  Motor intact in quadriceps, hamstrings, abductors, , and gastrocsoleus.   Pedal pulses intact and capillary refill brisk.    Medications     sodium chloride 75 mL/hr at 01/15/19 0641        benztropine  1.5 mg Oral Daily     BuPROPion HCl ER (XL)  450 mg Oral Daily     cetirizine  10 mg Oral Daily     enoxaparin  40 mg Subcutaneous Q24H     lurasidone  160 mg Oral Daily with supper     norethindrone-ethinyl estradiol  1 tablet Oral Daily     perphenazine  16 mg Oral BID     senna-docusate  1 tablet Oral BID    Or     senna-docusate  2 tablet Oral BID       Data   Recent Labs   Lab 01/16/19  0635 01/15/19  1350   HGB 11.8 12.7     No results found for this or any previous visit (from the past 24 hour(s)).

## 2019-01-19 NOTE — PLAN OF CARE
Pt A & O but forgetful about time and hospital name. VSS. Afebrile. RA BM this shift. Tolerated regular diet. No n/v. 2300 pt c/o of wanting brace off her knee  and was not comfortable. Pt did not want anything but prn Tylenol for the discomfort. Ice was applied. UP with assist of 1 gait belt and walker. RLE baseline no dorsi/planter flexion. Social Work still working on TCU placement for discharge. Will continue to monitor.

## 2019-01-20 ENCOUNTER — APPOINTMENT (OUTPATIENT)
Dept: PHYSICAL THERAPY | Facility: CLINIC | Age: 34
End: 2019-01-20
Attending: ORTHOPAEDIC SURGERY
Payer: MEDICAID

## 2019-01-20 LAB — PLATELET # BLD AUTO: 173 10E9/L (ref 150–450)

## 2019-01-20 PROCEDURE — 25000128 H RX IP 250 OP 636: Performed by: PHYSICIAN ASSISTANT

## 2019-01-20 PROCEDURE — 12000000 ZZH R&B MED SURG/OB

## 2019-01-20 PROCEDURE — 36415 COLL VENOUS BLD VENIPUNCTURE: CPT | Performed by: PHYSICIAN ASSISTANT

## 2019-01-20 PROCEDURE — 85049 AUTOMATED PLATELET COUNT: CPT | Performed by: PHYSICIAN ASSISTANT

## 2019-01-20 PROCEDURE — 25000132 ZZH RX MED GY IP 250 OP 250 PS 637: Performed by: PHYSICIAN ASSISTANT

## 2019-01-20 PROCEDURE — 97530 THERAPEUTIC ACTIVITIES: CPT | Mod: GP | Performed by: PHYSICAL THERAPIST

## 2019-01-20 PROCEDURE — 99232 SBSQ HOSP IP/OBS MODERATE 35: CPT | Performed by: INTERNAL MEDICINE

## 2019-01-20 PROCEDURE — 40000193 ZZH STATISTIC PT WARD VISIT: Performed by: PHYSICAL THERAPIST

## 2019-01-20 RX ADMIN — ENOXAPARIN SODIUM 40 MG: 40 INJECTION SUBCUTANEOUS at 10:14

## 2019-01-20 RX ADMIN — OXYCODONE HYDROCHLORIDE 5 MG: 5 TABLET ORAL at 00:28

## 2019-01-20 RX ADMIN — PERPHENAZINE 16 MG: 4 TABLET, FILM COATED ORAL at 10:14

## 2019-01-20 RX ADMIN — ACETAMINOPHEN 650 MG: 325 TABLET, FILM COATED ORAL at 16:10

## 2019-01-20 RX ADMIN — STANDARDIZED SENNA CONCENTRATE AND DOCUSATE SODIUM 1 TABLET: 8.6; 5 TABLET, FILM COATED ORAL at 19:38

## 2019-01-20 RX ADMIN — PERPHENAZINE 16 MG: 4 TABLET, FILM COATED ORAL at 19:38

## 2019-01-20 RX ADMIN — BUPROPION HYDROCHLORIDE 450 MG: 150 TABLET, FILM COATED, EXTENDED RELEASE ORAL at 10:14

## 2019-01-20 RX ADMIN — BENZTROPINE MESYLATE 1.5 MG: 0.5 TABLET ORAL at 16:47

## 2019-01-20 RX ADMIN — SENNOSIDES AND DOCUSATE SODIUM 2 TABLET: 8.6; 5 TABLET ORAL at 10:14

## 2019-01-20 RX ADMIN — LURASIDONE HYDROCHLORIDE 160 MG: 80 TABLET, FILM COATED ORAL at 16:47

## 2019-01-20 RX ADMIN — CETIRIZINE HYDROCHLORIDE 10 MG: 10 TABLET, FILM COATED ORAL at 10:15

## 2019-01-20 RX ADMIN — ACETAMINOPHEN 650 MG: 325 TABLET, FILM COATED ORAL at 05:44

## 2019-01-20 ASSESSMENT — ACTIVITIES OF DAILY LIVING (ADL)
ADLS_ACUITY_SCORE: 25

## 2019-01-20 NOTE — PLAN OF CARE
A&O x4, flat affect, slow to respond. Up w/Ax1 w/walker, NWB RLE, right knee in hinged brace locked at 50 degrees, also to use AFO brace when walking (for right foot drop). BS/flatus+. Tolerating regular diet well. Voiding well. CMS intact ex: right foot weak dorsi flexion. Ace wrap C/D/I. Pain managed w/PRN oxycodone given and Tylenol. Slept well overnight. Plans to discharge to TCU on Tuesday.

## 2019-01-20 NOTE — PROGRESS NOTES
[facility]    Orthopedics  Daily Post-Op Note    Assessment & Plan   Procedure(s):  Open reduction internal fixation right tibial plateau fracture   -6 Days Post-Op  Doing well.  No immediate surgical complications identified.  No excessive bleeding  Pain well-controlled.  Tolerating physical therapy and rehabilitation well.  Plan:  -Ambulate NWB RLE  -Maintain HKB locked.  -AFO when ambulating.  -Advance activity as tolerated  -Continue supportive and symptomatic treatment  -Start or continue physical therapy  -Pain control measures  -Advance diet as tolerated  -Follow-up 2 weeks postop with Dr. Panda.     Active Problems:    Tibial plateau fracture    Dillan Allison MD  Orthopedic Trauma and Arthroplasty  [facility]    Interval History   Doing well.  Continues to improve.  Pain is well-controlled.  No fevers.      Physical Exam   Temp: 98.3  F (36.8  C) Temp src: Oral BP: 154/88 Pulse: 92 Heart Rate: 94 Resp: 16 SpO2: 94 % O2 Device: None (Room air)    Vitals:    01/14/19 0851 01/14/19 1610   Weight: 117.9 kg (260 lb) 122.8 kg (270 lb 12.8 oz)     Vital Signs with Ranges  Temp:  [97.3  F (36.3  C)-98.3  F (36.8  C)] 98.3  F (36.8  C)  Pulse:  [92] 92  Heart Rate:  [] 94  Resp:  [16-18] 16  BP: (139-154)/(82-92) 154/88  SpO2:  [91 %-97 %] 94 %  I/O last 3 completed shifts:  In: 800 [P.O.:800]  Out: -   Patient Active Problem List   Diagnosis     Mild cognitive impairment     Borderline personality disorder (H)     Asthma     Nonorganic enuresis     Schizoaffective disorder, depressive type (H)     Fx humerus shaft-closed     Port catheter in place     MENTAL HEALTH     Suicidal ideation     Hx of psychiatric care     DVT (deep vein thrombosis) in pregnancy (H)     Suicide attempt (H)     Suicidal ideations     JOSE (obstructive sleep apnea)     Closed fibular fracture     Tibial plateau fracture       Dressing dry and intact.    Medications     sodium chloride 75 mL/hr at  01/15/19 0641        benztropine  1.5 mg Oral Daily     BuPROPion HCl ER (XL)  450 mg Oral Daily     cetirizine  10 mg Oral Daily     enoxaparin  40 mg Subcutaneous Q24H     lurasidone  160 mg Oral Daily with supper     norethindrone-ethinyl estradiol  1 tablet Oral Daily     perphenazine  16 mg Oral BID     senna-docusate  1 tablet Oral BID    Or     senna-docusate  2 tablet Oral BID       Data   Recent Labs   Lab 01/16/19  0635 01/15/19  1350   HGB 11.8 12.7     No results found for this or any previous visit (from the past 24 hour(s)).

## 2019-01-20 NOTE — PROGRESS NOTES
"/65 (BP Location: Left arm)   Pulse 92   Temp 97.7  F (36.5  C) (Oral)   Resp 16   Ht 1.626 m (5' 4\")   Wt 122.8 kg (270 lb 12.8 oz)   SpO2 91%   BMI 46.48 kg/m    Lungs: clear, encouraged CDB and IS  BS: positive, passing flatus, tolerating regular diet  Urine: voids adequate amount of urine  CMS: intact  Dressing: dressing CDI  Pain: denied pain during shift refused PRN pain medications only ice to operative exrtremity  Activity: up with Ax1 and walker, tolerates sitting up in chair for meals, ambulated to the bathroom multiple times. NWB or right leg, knee brace in place, unable to apply AFO as shoes is too small pt encouraged to talk to mom about getting bigger and wider shoes.   Plan: Will continue to monitor.   "

## 2019-01-20 NOTE — PLAN OF CARE
Pt A&O, flat affect. VSS Afebrile. Up with assist of 1 gait belt and walker. Hinged brace on at 50 degrees. NWB RLE. New AFO brace dropped off by orthotics. Will follow up with them again tomorrow because no instructions were given to RN. Pt was told it goes under the hinged brace and only on when walking.  Ace wrap c/d/I.  Foot drop baseline to Right foot. Tolerated regular diet. Pain managed with Oxycodone and Tylenol. Plan to TCU on Tuesday. Will continue to [facility].

## 2019-01-20 NOTE — PLAN OF CARE
Discharge Planner PT   Patient plan for discharge: TCU  Current status: Pt required encouragement to participate with PT, but does relatively well with NWB RLE, brace donned and locked.  CGA with transfers and gait x 15ft in room with FWW.  Barriers to return to prior living situation: mobility needs level of assist need unable to be given at current living situation  Recommendations for discharge: TCU  Rationale for recommendations: Pt will benefit from ongoing PT intervention to improve independence with transfers and ambulation/mobility skills.  Currently recommend TCU, pending ability of  staff to provide assistance upon return home, could consider return to  if able to accommodate Ax1 for safety and likely w/c, FWW.       Entered by: Pedrito Myers 01/20/2019 1:31 PM

## 2019-01-20 NOTE — PROGRESS NOTES
[facility]    Medicine Progress Note - Hospitalist Service       Date of Admission:  1/14/2019  Assessment & Plan      Karolyn Banerjee is a 33 year old female with history of schizoaffective bipolar disorder, cognitive impairment, obesity, asthma, obstructive sleep apnea intolerant with CPAP therapy who who was admitted at [facility] on 1/14/19 due to recurrent right tibial plateau fracture.  She underwent right TPA open reduction internal fixation on 1/14/19.  Her prior surgery with open reduction internal fixation right tibial plateau fracture was on 12/21/2018.    1. Recurrent right tibial plateau fracture with revision of ORIF: Postop day #6 and pain appears to be controlled. Lovenox for DVT prophylaxis as per orthopedics. Restart ambulation with PT/OT and ambulate NWB RLE, maintain Hinged Knee Brace Locked until Follow-Up and AFO for foot drop.  2. Schizoaffective disorder and bipolar type: Resides at a group home at baseline.  Resumed chronic psychiatric medications.  3. History of JOSE: Intolerant of CPAP.  4. Mildly elevated blood pressure: Suspect secondary to some discomfort and some anxiety.  Not chronically on any antihypertensives at the next level of care but may consider starting if blood pressure does not improve after pain is better controlled.  5. Mild intermittent asthma: No evidence of exacerbation      Diet: Advance Diet as Tolerated: Regular Diet Adult  Advance Diet as Tolerated    DVT Prophylaxis: Enoxaparin (Lovenox) SQ  Saenz Catheter: not present  Code Status: full code    Disposition Plan   Expected discharge: 2 - 3 days, recommended to transitional care unit once safe disposition plan/ TCU bed available. As [per  note, Tuesday at [facility] TCU.  Entered: Hans Gutiérrez MD 01/20/2019, 12:28 PM       The patient's care was discussed with the Bedside Nurse.    Hans Gutiérrez MD  Hospitalist Service  [facility]  Hospital    ______________________________________________________________________    Interval History   She is poor historian.  She has a pain in her right leg surgical site.  She denies chest pain, shortness of breath and fevers. No new changes.    Data reviewed today: I reviewed all medications, new labs and imaging results over the last 24 hours. I personally reviewed no images or EKG's today.    Physical Exam   Vital Signs: Temp: 97.7  F (36.5  C) Temp src: Oral BP: 117/65 Pulse: 92 Heart Rate: 91 Resp: 16 SpO2: 91 % O2 Device: None (Room air)    Weight: 270 lbs 12.8 oz  GENERAL:  No acute distress.  SKIN:  Dry and warm.  There is no rash, lesions, ulcers or juandice at area of skin examined.  HEENT:  Head without trauma.  Pupils round, reactive. Exam of conjunctiva and lids are normal. Sclera without icterus. There is no oral thrush.  NECK:  Supple.  There is no cervical adenopathy, no thyromegaly. No jugular venous distension.  CHEST: No reproducible chest tenderness.   LUNGS:  Normal respiratory effort. Lungs reveal decreased breath sounds at bases. No rales or wheezes.  HEART:  Regular rate and rhythm.  No murmur, gallops or rubs auscultated.  ABDOMEN:  Soft, bowel sounds positive.  There is no tenderness or guarding.   EXTREMITIES: +1 edema. Right leg with knee hinges immobilizer and wound with dressing and AFO at right foot.  NEUROLOGIC:  Alert and oriented x3.  Moving all ext. Gait not tested.        Data   Recent Labs   Lab 01/20/19  0650 01/17/19  0619 01/16/19  0635 01/15/19  1350 01/14/19  1654   HGB  --   --  11.8 12.7  --     169  --   --  221   CR  --   --   --   --  0.76     No results found for this or any previous visit (from the past 24 hour(s)).  Medications     sodium chloride 75 mL/hr at 01/15/19 0641       benztropine  1.5 mg Oral Daily     BuPROPion HCl ER (XL)  450 mg Oral Daily     cetirizine  10 mg Oral Daily     enoxaparin  40 mg Subcutaneous Q24H     lurasidone  160 mg Oral  Daily with supper     norethindrone-ethinyl estradiol  1 tablet Oral Daily     perphenazine  16 mg Oral BID     senna-docusate  1 tablet Oral BID    Or     senna-docusate  2 tablet Oral BID

## 2019-01-21 ENCOUNTER — APPOINTMENT (OUTPATIENT)
Dept: PHYSICAL THERAPY | Facility: CLINIC | Age: 34
End: 2019-01-21
Attending: ORTHOPAEDIC SURGERY
Payer: MEDICAID

## 2019-01-21 LAB
ANION GAP SERPL CALCULATED.3IONS-SCNC: 8 MMOL/L (ref 3–14)
BUN SERPL-MCNC: 14 MG/DL (ref 7–30)
CALCIUM SERPL-MCNC: 8.6 MG/DL (ref 8.5–10.1)
CHLORIDE SERPL-SCNC: 107 MMOL/L (ref 94–109)
CO2 SERPL-SCNC: 25 MMOL/L (ref 20–32)
CREAT SERPL-MCNC: 0.68 MG/DL (ref 0.52–1.04)
ERYTHROCYTE [DISTWIDTH] IN BLOOD BY AUTOMATED COUNT: 13.1 % (ref 10–15)
GFR SERPL CREATININE-BSD FRML MDRD: >90 ML/MIN/{1.73_M2}
GLUCOSE SERPL-MCNC: 82 MG/DL (ref 70–99)
HCT VFR BLD AUTO: 40.1 % (ref 35–47)
HGB BLD-MCNC: 13.1 G/DL (ref 11.7–15.7)
MCH RBC QN AUTO: 29.6 PG (ref 26.5–33)
MCHC RBC AUTO-ENTMCNC: 32.7 G/DL (ref 31.5–36.5)
MCV RBC AUTO: 91 FL (ref 78–100)
PLATELET # BLD AUTO: 195 10E9/L (ref 150–450)
POTASSIUM SERPL-SCNC: 4.7 MMOL/L (ref 3.4–5.3)
RBC # BLD AUTO: 4.43 10E12/L (ref 3.8–5.2)
SODIUM SERPL-SCNC: 140 MMOL/L (ref 133–144)
WBC # BLD AUTO: 4.2 10E9/L (ref 4–11)

## 2019-01-21 PROCEDURE — 25000128 H RX IP 250 OP 636: Performed by: PHYSICIAN ASSISTANT

## 2019-01-21 PROCEDURE — 25000131 ZZH RX MED GY IP 250 OP 636 PS 637: Performed by: PHYSICIAN ASSISTANT

## 2019-01-21 PROCEDURE — 12000000 ZZH R&B MED SURG/OB

## 2019-01-21 PROCEDURE — 40000193 ZZH STATISTIC PT WARD VISIT: Performed by: PHYSICAL THERAPY ASSISTANT

## 2019-01-21 PROCEDURE — 36416 COLLJ CAPILLARY BLOOD SPEC: CPT | Performed by: INTERNAL MEDICINE

## 2019-01-21 PROCEDURE — 25000132 ZZH RX MED GY IP 250 OP 250 PS 637: Performed by: PHYSICIAN ASSISTANT

## 2019-01-21 PROCEDURE — 85027 COMPLETE CBC AUTOMATED: CPT | Performed by: INTERNAL MEDICINE

## 2019-01-21 PROCEDURE — 99232 SBSQ HOSP IP/OBS MODERATE 35: CPT | Performed by: STUDENT IN AN ORGANIZED HEALTH CARE EDUCATION/TRAINING PROGRAM

## 2019-01-21 PROCEDURE — 80048 BASIC METABOLIC PNL TOTAL CA: CPT | Performed by: INTERNAL MEDICINE

## 2019-01-21 PROCEDURE — 97530 THERAPEUTIC ACTIVITIES: CPT | Mod: GP | Performed by: PHYSICAL THERAPY ASSISTANT

## 2019-01-21 RX ADMIN — OXYCODONE HYDROCHLORIDE 5 MG: 5 TABLET ORAL at 09:08

## 2019-01-21 RX ADMIN — LURASIDONE HYDROCHLORIDE 160 MG: 80 TABLET, FILM COATED ORAL at 17:56

## 2019-01-21 RX ADMIN — OXYCODONE HYDROCHLORIDE 5 MG: 5 TABLET ORAL at 00:05

## 2019-01-21 RX ADMIN — CETIRIZINE HYDROCHLORIDE 10 MG: 10 TABLET, FILM COATED ORAL at 09:08

## 2019-01-21 RX ADMIN — STANDARDIZED SENNA CONCENTRATE AND DOCUSATE SODIUM 1 TABLET: 8.6; 5 TABLET, FILM COATED ORAL at 09:08

## 2019-01-21 RX ADMIN — ONDANSETRON 4 MG: 4 TABLET, ORALLY DISINTEGRATING ORAL at 22:55

## 2019-01-21 RX ADMIN — ENOXAPARIN SODIUM 40 MG: 40 INJECTION SUBCUTANEOUS at 09:08

## 2019-01-21 RX ADMIN — OXYCODONE HYDROCHLORIDE 5 MG: 5 TABLET ORAL at 22:08

## 2019-01-21 RX ADMIN — PERPHENAZINE 16 MG: 4 TABLET, FILM COATED ORAL at 09:08

## 2019-01-21 RX ADMIN — STANDARDIZED SENNA CONCENTRATE AND DOCUSATE SODIUM 1 TABLET: 8.6; 5 TABLET, FILM COATED ORAL at 22:08

## 2019-01-21 RX ADMIN — PERPHENAZINE 16 MG: 4 TABLET, FILM COATED ORAL at 22:05

## 2019-01-21 RX ADMIN — BENZTROPINE MESYLATE 1.5 MG: 0.5 TABLET ORAL at 17:57

## 2019-01-21 RX ADMIN — BUPROPION HYDROCHLORIDE 450 MG: 150 TABLET, FILM COATED, EXTENDED RELEASE ORAL at 09:08

## 2019-01-21 ASSESSMENT — ACTIVITIES OF DAILY LIVING (ADL)
ADLS_ACUITY_SCORE: 25

## 2019-01-21 NOTE — PLAN OF CARE
A/O.Flat effect. VSS: stable Up with Ax1, gbelt and walker. NWB RLE, Hinged brace on. CMS: R.foot drop. AFO too small. Ace wrap drsg: CDI. Slept well over night.Pain managed with PRN  Oxycodone. Voiding adequately, passing flatus. Plan is TCU upon discharge.

## 2019-01-21 NOTE — PROGRESS NOTES
[facility]    Medicine Progress Note - Hospitalist Service       Date of Admission:  1/14/2019  Date of Service: 01/21/2019    Assessment & Plan      Karolyn Banerjee is a 33 year old female with history of schizoaffective bipolar disorder, cognitive impairment, obesity, asthma, obstructive sleep apnea intolerant with CPAP therapy who who was admitted at [facility] on 1/14/19 due to recurrent right tibial plateau fracture.  She underwent right TPA open reduction internal fixation on 1/14/19.  Her prior surgery with open reduction internal fixation right tibial plateau fracture was on 12/21/2018.    1. Recurrent right tibial plateau fracture with revision of ORIF: Postop day #6 and pain appears to be controlled. Lovenox for DVT prophylaxis as per orthopedics. Restart ambulation with PT/OT and ambulate NWB RLE, maintain Hinged Knee Brace Locked until Follow-Up and AFO for foot drop. Lovenox for DVT prophylaxis x 2 weeks then ASA daily    2. Schizoaffective disorder and bipolar type: Resides at a group home at baseline.  Resumed chronic psychiatric medications.    3. History of JOSE: Intolerant of CPAP.    4. Mildly elevated blood pressure: Suspect secondary to some discomfort and some anxiety.  Not chronically on any antihypertensives at the next level of care but may consider starting if blood pressure does not improve after pain is better controlled.    5. Mild intermittent asthma: No evidence of exacerbation      Diet: Advance Diet as Tolerated: Regular Diet Adult  Advance Diet as Tolerated    DVT Prophylaxis: Enoxaparin (Lovenox) SQ  Saenz Catheter: not present  Code Status: full code    Disposition Plan   Expected discharge: Tomorrow, recommended to transitional care unit once safe disposition plan/ TCU bed available. As [per  note, Tuesday at [facility].  Entered: Jamie Paige MD 01/21/2019, 11:00 AM       The patient's care was discussed with the Bedside  Nurse.    Jamie Paige MD  Hospitalist Service  [facility]    ______________________________________________________________________    Interval History     She is poor historian.  She denies chest pain, shortness of breath and fevers. No new changes.  Ambulating with walker, no new complaints today    Data reviewed today: I reviewed all medications, new labs and imaging results over the last 24 hours. I personally reviewed no images or EKG's today.    Physical Exam   Vital Signs: Temp: 96.2  F (35.7  C) Temp src: Oral BP: 149/84   Heart Rate: 84 Resp: 18 SpO2: 92 % O2 Device: None (Room air)    Weight: 270 lbs 12.8 oz  GENERAL:  No acute distress.  SKIN:  Dry and warm.  There is no rash, lesions, ulcers or juandice at area of skin examined.  HEENT:  Head without trauma.  Pupils round, reactive. Exam of conjunctiva and lids are normal. Sclera without icterus. There is no oral thrush.  CHEST: RRR with no m/r/g  LUNGS:  Normal respiratory effort. Lungs reveal decreased breath sounds at bases. No rales or wheezes.  HEART:  Regular rate and rhythm.  No murmur, gallops or rubs auscultated.  ABDOMEN:  Soft, bowel sounds positive.  There is no tenderness or guarding.   EXTREMITIES: +1 edema. Right leg with knee hinges immobilizer and wound with dressing and AFO at right foot.  NEUROLOGIC:  Alert and oriented x3.  Moving all ext. Gait not tested.        Data   Recent Labs   Lab 01/21/19  0719 01/20/19  0650 01/17/19  0619 01/16/19  0635 01/15/19  1350 01/14/19  1654   WBC 4.2  --   --   --   --   --    HGB 13.1  --   --  11.8 12.7  --    MCV 91  --   --   --   --   --     173 169  --   --  221     --   --   --   --   --    POTASSIUM 4.7  --   --   --   --   --    CHLORIDE 107  --   --   --   --   --    CO2 25  --   --   --   --   --    BUN 14  --   --   --   --   --    CR 0.68  --   --   --   --  0.76   ANIONGAP 8  --   --   --   --   --    GIOVANY 8.6  --   --   --   --   --    GLC 82  --   --   --    --   --      No results found for this or any previous visit (from the past 24 hour(s)).  Medications     sodium chloride 75 mL/hr at 01/15/19 0641       benztropine  1.5 mg Oral Daily     BuPROPion HCl ER (XL)  450 mg Oral Daily     cetirizine  10 mg Oral Daily     enoxaparin  40 mg Subcutaneous Q24H     lurasidone  160 mg Oral Daily with supper     norethindrone-ethinyl estradiol  1 tablet Oral Daily     perphenazine  16 mg Oral BID     senna-docusate  1 tablet Oral BID    Or     senna-docusate  2 tablet Oral BID

## 2019-01-21 NOTE — PROGRESS NOTES
Orthopedic Surgery  Karolyn Banerjee  2019  Admit Date:  2019  POD # 7  S/P Right tibial plateau fracture ORIF revision    Patient resting comfortably in bed.    Flat affect, minimal communication  Pain controlled.  Tolerating oral intake.    Denies nausea or vomiting  Denies chest pain or shortness of breath  No events overnight.     Alert and orient to person, place, and time.  Vital Sign Ranges  Temperature Temp  Av.4  F (36.9  C)  Min: 96.2  F (35.7  C)  Max: 99.3  F (37.4  C)   Blood pressure Systolic (24hrs), Av , Min:117 , Max:149        Diastolic (24hrs), Av, Min:65, Max:84      Pulse No Data Recorded   Respirations Resp  Av  Min: 16  Max: 20   Pulse oximetry SpO2  Av.3 %  Min: 91 %  Max: 95 %       Dressing is clean, dry, and intact.   Minimal erythema of the surrounding skin.   Bilateral calves are soft.  Mild tenderness with compression right calf.  Bilateral lower extremity is NVI.    0/5 motor with df, and no sensation dorsum of foot.  Known peroneal nerve palsy. +Dp pulse    Labs:  Recent Labs   Lab Test 19  0719 18  0628 18  0642   POTASSIUM 4.7 3.9 4.2     Recent Labs   Lab Test 19  0719 19  0635 01/15/19  1350   HGB 13.1 11.8 12.7     Recent Labs   Lab Test 18  1749 16  1716 16  0639   INR 1.02 2.97* 1.86*     Recent Labs   Lab Test 19  0719 19  0650 19  0619    173 169       A/P - S/p revision right medial plateau fracture 19 with known peroneal nerve palsey  1. Plan               Continue Lovenox for DVT prophylaxis x 2 weeks then ASA daily              Mobilize with PT/OT, May transfer bed to chair, otherwise wheelchair/Bed              Non-WB right LE.              Keep TROM bace locked at 50 degrees         AFO on right when transferring              Continue current pain regiment.              Leave dressing intact     2. Disposition   Anticipate d/c to TCU on when bed placement  established.     Anat Cuello PA-C

## 2019-01-21 NOTE — PLAN OF CARE
Pt A&O. VSS temp as high as 99.3 encouraged CDB and IS use. RA.  LS clear. No saline lock. Tolerated regular diet. 1 BM this evening. Tylenol given for Temp. Pt declined Oxycodone for pain. Ice applied to Right Knee. Hinged brace on and locked at 50 degrees. Up with assist of 1 gait belt and walker. AFO does not seem to fit. Orthotics will be back consulted on it again tomorrow. Social work planning discharge to TCU possibly Tuesday. Will continue to monitor.

## 2019-01-21 NOTE — PROGRESS NOTES
SWS     D: Discharge planning continuing.. Per discussion today with admissions coordinator at [facility] she has today received the 3426 form from pt's  Brian Blount, and based on the completion of the form, pt will need Level @ Obra screen. SW has received follow-up emails from Brian in this regard, he notes that the screening needs to be done through [facility] where the referral was made last week through the [facility]. FELIX has attempted today to speak to Allison at the [facility] ( [phone]) however the office is closed today due to the holiday .            FELIX has updated pt's mother with above.    A/P: FELIX will follow-up as noted tomorrow with Allison, continue planning per Level 2 Obra screen.

## 2019-01-21 NOTE — PROGRESS NOTES
"BRIEF NUTRITION ASSESSMENT      REASON FOR ASSESSMENT:  LOS    NUTRITION HISTORY:  Information from patient and chart:  - Regular diet and good appetite at baseline  - No known food allergies   - Lives in group home   - H/o cognitive disorder, developmental delay,schizoaffective disorder, bipolar type --> admitted after a fall (slipping on ice)    CURRENT DIET AND INTAKE:  Diet: Regular     Patient reports appetite is \"pretty good\" and orders meal TID. She denies changes in appetite, intakes, weight PTA.  Flow sheets indicate % meal consumption    Last BM x 2 yesterday     ANTHROPOMETRICS:  Height: 5' 4\"  Weight: 122.8 kg  BMI: 46.48 kg/m2  IBW:  54.5 kg  Weight Status: Obesity Grade III BMI >40  %IBW: 225%  Weight History: Stable   Wt Readings from Last 10 Encounters:   01/14/19 122.8 kg (270 lb 12.8 oz)   12/20/18 117.9 kg (260 lb)   04/25/18 116.4 kg (256 lb 11.2 oz)   04/18/18 117.5 kg (259 lb)   02/21/18 117.5 kg (259 lb)   02/13/18 120.7 kg (266 lb)   10/10/17 121.1 kg (267 lb)   09/23/17 120.3 kg (265 lb 4.8 oz)   07/11/17 120.7 kg (266 lb 1.5 oz)   11/22/16 126.9 kg (279 lb 12.8 oz)       LABS:  Labs noted    MALNUTRITION:  Patient does not meet two of the following criteria necessary for diagnosing malnutrition: significant weight loss, reduced intake, subcutaneous fat loss, muscle loss or fluid retention    NUTRITION INTERVENTION:  Nutrition Diagnosis:  No nutrition diagnosis at this time.    Implementation:  Nutrition Education: Per Provider order if indicated    FOLLOW UP/MONITORING:   Will re-evaluate in 7 - 10 days, or sooner, if re-consulted.        Alejandra Nickerson RD, LD  Clinical Dietitian     "

## 2019-01-21 NOTE — PROGRESS NOTES
VSS. Ambulates Ax1 W/ WW, GB, brace. +BS/gas, tolerating diet. Voiding in adequate amounts. CMS intact. Dressing CDI. Plan is TCU, awaiting placement. Will continue to monitor.

## 2019-01-21 NOTE — PLAN OF CARE
Discharge Planner PT   Patient plan for discharge: TCU  Current status: PT - Pt needed encouragement to participate with PT. Pt transfers supine to sit with supervision to indep with assisting R LE to EOB with lifting with brace.  Orthotics not yet arrived.  Pt transfers sit to / from stand with CGA with vcs for hand placement.  Pt transfers bed to chair with ww while maintaining NWB on the R LE with min. Pt amb 15'  with ww with min assist.   Barriers to return to prior living situation: mobility needs level of assist need unable to be given at current living situation  Recommendations for discharge: TCU per plan established by the PT.    Rationale for recommendations: Pt will benefit from ongoing PT intervention to improve independence with transfers and ambulation/mobility skills.  Currently recommend TCU, pending ability of  staff to provide assistance upon return home, could consider return to  if able to accommodate Ax1 for safety and likely w/c, FWW.       Entered by: Angie Lawson 01/21/2019 11:15 AM

## 2019-01-22 ENCOUNTER — APPOINTMENT (OUTPATIENT)
Dept: ULTRASOUND IMAGING | Facility: CLINIC | Age: 34
End: 2019-01-22
Attending: ORTHOPAEDIC SURGERY
Payer: MEDICAID

## 2019-01-22 PROCEDURE — 25000128 H RX IP 250 OP 636: Performed by: PHYSICIAN ASSISTANT

## 2019-01-22 PROCEDURE — 25000132 ZZH RX MED GY IP 250 OP 250 PS 637: Performed by: PHYSICIAN ASSISTANT

## 2019-01-22 PROCEDURE — 93971 EXTREMITY STUDY: CPT | Mod: RT

## 2019-01-22 PROCEDURE — 25000131 ZZH RX MED GY IP 250 OP 636 PS 637: Performed by: PHYSICIAN ASSISTANT

## 2019-01-22 PROCEDURE — 99232 SBSQ HOSP IP/OBS MODERATE 35: CPT | Performed by: INTERNAL MEDICINE

## 2019-01-22 PROCEDURE — 12000000 ZZH R&B MED SURG/OB

## 2019-01-22 RX ADMIN — ONDANSETRON 4 MG: 4 TABLET, ORALLY DISINTEGRATING ORAL at 22:33

## 2019-01-22 RX ADMIN — SENNOSIDES AND DOCUSATE SODIUM 2 TABLET: 8.6; 5 TABLET ORAL at 08:36

## 2019-01-22 RX ADMIN — OXYCODONE HYDROCHLORIDE 5 MG: 5 TABLET ORAL at 18:08

## 2019-01-22 RX ADMIN — BUPROPION HYDROCHLORIDE 450 MG: 150 TABLET, FILM COATED, EXTENDED RELEASE ORAL at 08:35

## 2019-01-22 RX ADMIN — CETIRIZINE HYDROCHLORIDE 10 MG: 10 TABLET, FILM COATED ORAL at 08:36

## 2019-01-22 RX ADMIN — ACETAMINOPHEN 650 MG: 325 TABLET, FILM COATED ORAL at 18:07

## 2019-01-22 RX ADMIN — LURASIDONE HYDROCHLORIDE 160 MG: 80 TABLET, FILM COATED ORAL at 18:04

## 2019-01-22 RX ADMIN — OXYCODONE HYDROCHLORIDE 5 MG: 5 TABLET ORAL at 05:44

## 2019-01-22 RX ADMIN — PERPHENAZINE 16 MG: 4 TABLET, FILM COATED ORAL at 20:49

## 2019-01-22 RX ADMIN — ENOXAPARIN SODIUM 40 MG: 40 INJECTION SUBCUTANEOUS at 08:35

## 2019-01-22 RX ADMIN — PERPHENAZINE 16 MG: 4 TABLET, FILM COATED ORAL at 08:35

## 2019-01-22 RX ADMIN — SENNOSIDES AND DOCUSATE SODIUM 2 TABLET: 8.6; 5 TABLET ORAL at 20:49

## 2019-01-22 RX ADMIN — BENZTROPINE MESYLATE 1.5 MG: 0.5 TABLET ORAL at 18:04

## 2019-01-22 ASSESSMENT — ACTIVITIES OF DAILY LIVING (ADL)
ADLS_ACUITY_SCORE: 25

## 2019-01-22 NOTE — PLAN OF CARE
Pt-attempted to see, she declined. Per conversation she was unable to get a hold of her mom  today for her to bring  a better shoe to fit the AFO.

## 2019-01-22 NOTE — PLAN OF CARE
Pts dressing was changed today.  Pt had a ultra sound of her right leg to R/O DVT.   Pt is waiting for her mom to bring her a different shoe for AFO brace.  When the shoe arrives orthotics will come and fit brace and shoe.

## 2019-01-22 NOTE — PROGRESS NOTES
"[facility]  Hospitalist Progress Note  Patient Name: Karolyn Banerjee    MRN: [id number]  Provider: Lonny Broderick MD  01/22/19    Initial presenting complaint/issue to hospital (Diagnosis): ORIF revision         Assessment and Plan:      Karolyn Banerjee is a 33 year old female (group home resident) with history of schizoaffective bipolar disorder, cognitive impairment, obesity, asthma, and obstructive sleep apnea (intolerant of CPAP).  She presented to the ED on 1/14/19 for revision of ORIF of medial tibial plateau fracture with nerve palsy.  She had a fall on the ice on 12/20/18.  She had ORIF on 12/21/18. This needed revision (due to failure of fixation) which was done on 1/14/19.  Surgery was uncomplicated.      1. Post op after revision of ORIF of  right tibial plateau fracture (original ORIF was on 12/21/18 with revision on 1/14/19).  Stable after surgery.  Awaiting TCU placement.      2. Schizoaffective disorder and bipolar type: Resides at a group home at baseline. Continue prior to admisison psychiatric medications.     3. History of JOSE: Intolerant of CPAP.     4. Mildly elevated blood pressure: Suspect secondary to post op discomfort and some anxiety.  Stable/improving.      5. Mild intermittent asthma: No evidence of exacerbation     DVT Prophylaxis:  -  Lovenox  Code Status: Full Code  Discharge Dispo: TCU  Estimated Disch Date / # of Days until Discharge: once bed available- SW following and meeting county/state requirements for placement of group home patient.         Interval History:      No new problems.  Doing well.                   Physical Exam:      Last Vital Signs:  /88 (BP Location: Left arm)   Pulse 83   Temp 98.2  F (36.8  C) (Oral)   Resp 20   Ht 1.626 m (5' 4\")   Wt 122.8 kg (270 lb 12.8 oz)   SpO2 91%   BMI 46.48 kg/m      Intake/Output Summary (Last 24 hours) at 1/22/2019 1239  Last data filed at 1/21/2019 2200  Gross per 24 hour   Intake 656 ml   Output -- "   Net 656 ml       GENERAL:  Comfortable. Cooperative.  PSYCH: No acute distress.  EYES: PERRLA, Normal conjunctiva.  HEART:  Regular rate and rhythm. No JVD. Pulses normal. No edema.  LUNGS:  Clear to auscultation, normal Respiratory effort.  ABDOMEN:  Soft, no hepatosplenomegaly, normal bowel sounds.  EXTREMETIES: No clubbing, cyanosis or ischemia  SKIN:  Dry to touch, No rash.           Medications:      All current medications were reviewed.         Data:      All new lab and imaging data was reviewed.   Labs:       Lab Results   Component Value Date     01/21/2019     12/23/2018     12/22/2018    Lab Results   Component Value Date    CHLORIDE 107 01/21/2019    CHLORIDE 105 12/23/2018    CHLORIDE 104 12/22/2018    Lab Results   Component Value Date    BUN 14 01/21/2019    BUN 10 12/23/2018    BUN 8 12/22/2018      Lab Results   Component Value Date    POTASSIUM 4.7 01/21/2019    POTASSIUM 3.9 12/23/2018    POTASSIUM 4.2 12/22/2018    Lab Results   Component Value Date    CO2 25 01/21/2019    CO2 27 12/23/2018    CO2 25 12/22/2018    Lab Results   Component Value Date    CR 0.68 01/21/2019    CR 0.76 01/14/2019    CR 0.74 12/23/2018        Recent Labs   Lab 01/21/19  0719 01/20/19  0650 01/17/19  0619 01/16/19  0635 01/15/19  1350   WBC 4.2  --   --   --   --    HGB 13.1  --   --  11.8 12.7   HCT 40.1  --   --   --   --    MCV 91  --   --   --   --     173 169  --   --

## 2019-01-22 NOTE — PROGRESS NOTES
SWS     D: Discharge planning continuing... Spoke this morning to Allison at [facility] who informs that she has left a message this morning with Brian Blount ( pt's ) inquiring whether he made the referral for [facility] on pt's behalf as the next step would be the Level 2 screening through [facility]. FELIX has emailed Brian also inquiring if that referral was made, and if not he could submit this request to Hien Swenson ( [phone]) for the Level 2 screening. Allison has also left a message with Hien with question of whether a referral was made to her/[facility] for the screening.     Addendum:      D: Spoke with Hien Swenson, [facility], and discussed pt's hospital admission due to tibia fx, discussed reasoning for TCU placement at this time. Per her request, the PAS screening that SW completed on 1/16 was faxed for her review, additionally she will be reaching out to pt's MH  and [facility]  to discuss and determine next steps in process for Level 2 [place] screening. FELIX has updated pt's mother of contact today with John

## 2019-01-22 NOTE — PLAN OF CARE
A&O x4, flat affect, slow to respond. Up w/Ax1 w/walker, NWB RLE, right knee in hinged brace locked at 50 degrees, also to use AFO brace when walking (for right foot drop). BS/flatus+. Tolerating regular diet well. Voiding well. CMS intact. Ace wrap C/D/I. Pain managed w/PRN oxycodone given x1. Slept well overnight. Plans to discharge to TCU possibly today.

## 2019-01-22 NOTE — PROGRESS NOTES
Orthopedic Surgery  Karolyn Banerjee  2019  Admit Date:  2019  POD # 8  S/P #8    Patient resting comfortably in bed.    Flat affect, minimal communication  Pain controlled.  Tolerating oral intake.    Denies nausea or vomiting  Denies chest pain or shortness of breath  No events overnight.    States AFO not fitting well.    Vital Sign Ranges  Temperature Temp  Av.2  F (36.8  C)  Min: 97.8  F (36.6  C)  Max: 98.7  F (37.1  C)   Blood pressure Systolic (24hrs), Av , Min:128 , Max:156        Diastolic (24hrs), Av, Min:84, Max:88      Pulse Pulse  Av  Min: 83  Max: 83   Respirations Resp  Av.6  Min: 18  Max: 20   Pulse oximetry SpO2  Av.3 %  Min: 91 %  Max: 96 %       Dressing is clean, dry, and intact. No drainage on ace.   Minimal erythema of the surrounding skin.   Bilateral calves are soft.  Continued tenderness with compression right calf and winces with passive right ankle dorsiflexion.     0/5 motor with df, and no sensation dorsum of foot.  Known peroneal nerve palsy. +Dp pulse        Labs:  Recent Labs   Lab Test 19  0719 18  0628 18  0642   POTASSIUM 4.7 3.9 4.2     Recent Labs   Lab Test 19  0719 19  0635 01/15/19  1350   HGB 13.1 11.8 12.7     Recent Labs   Lab Test 18  1749 16  1716 16  0639   INR 1.02 2.97* 1.86*     Recent Labs   Lab Test 19  0719 19  0650 19  0619    173 169        A/P - S/p revision right medial plateau fracture 19 with known peroneal nerve palsey          1. Plan              Continue Lovenox for DVT prophylaxis x 2 weeks then ASA daily              Mobilize with PT/OT, May transfer bed to chair, otherwise wheelchair/Bed              Non-WB right LE.              Keep TROM bace locked at 50 degrees                AFO on right when transferring - Orthotist called to re-fit the AFO              Continue current pain regiment.              Leave dressing intact      Doppler  U/S ordered due to continued tenderness with calf pain     2. Disposition              Anticipate d/c to TCU today pending U/S report and medical clearance.       Anat Cuello PA-C

## 2019-01-22 NOTE — PLAN OF CARE
Pt alert, disoriented to time.  Able to make needs known via call light. VSS on RA.  Up with Ax1, walker to bathroom.  Non WB on R.  Voiding adequately.  Pain controlled with 5mg oxycodone.  Dressing CDI.  CMS intact.  Pt had emesis/nausea toward end of roberto carlos shift.  States it feels like something she ate for dinner. Zofran administered.  Pt will discharge to TCU when placement found. Will continue to monitor.

## 2019-01-23 ENCOUNTER — APPOINTMENT (OUTPATIENT)
Dept: PHYSICAL THERAPY | Facility: CLINIC | Age: 34
End: 2019-01-23
Attending: ORTHOPAEDIC SURGERY
Payer: MEDICAID

## 2019-01-23 VITALS
TEMPERATURE: 98.6 F | HEART RATE: 83 BPM | SYSTOLIC BLOOD PRESSURE: 135 MMHG | DIASTOLIC BLOOD PRESSURE: 61 MMHG | BODY MASS INDEX: 46.23 KG/M2 | WEIGHT: 270.8 LBS | OXYGEN SATURATION: 96 % | HEIGHT: 64 IN | RESPIRATION RATE: 16 BRPM

## 2019-01-23 LAB — PLATELET # BLD AUTO: 198 10E9/L (ref 150–450)

## 2019-01-23 PROCEDURE — 97530 THERAPEUTIC ACTIVITIES: CPT | Mod: GP | Performed by: PHYSICAL THERAPY ASSISTANT

## 2019-01-23 PROCEDURE — 25000128 H RX IP 250 OP 636: Performed by: PHYSICIAN ASSISTANT

## 2019-01-23 PROCEDURE — 40000193 ZZH STATISTIC PT WARD VISIT: Performed by: PHYSICAL THERAPY ASSISTANT

## 2019-01-23 PROCEDURE — 85049 AUTOMATED PLATELET COUNT: CPT | Performed by: PHYSICIAN ASSISTANT

## 2019-01-23 PROCEDURE — 99239 HOSP IP/OBS DSCHRG MGMT >30: CPT | Performed by: INTERNAL MEDICINE

## 2019-01-23 PROCEDURE — 25000132 ZZH RX MED GY IP 250 OP 250 PS 637: Performed by: PHYSICIAN ASSISTANT

## 2019-01-23 PROCEDURE — 36415 COLL VENOUS BLD VENIPUNCTURE: CPT | Performed by: PHYSICIAN ASSISTANT

## 2019-01-23 RX ADMIN — OXYCODONE HYDROCHLORIDE 5 MG: 5 TABLET ORAL at 00:18

## 2019-01-23 RX ADMIN — OXYCODONE HYDROCHLORIDE 5 MG: 5 TABLET ORAL at 05:43

## 2019-01-23 RX ADMIN — SENNOSIDES AND DOCUSATE SODIUM 2 TABLET: 8.6; 5 TABLET ORAL at 09:06

## 2019-01-23 RX ADMIN — BENZTROPINE MESYLATE 1.5 MG: 0.5 TABLET ORAL at 16:14

## 2019-01-23 RX ADMIN — CETIRIZINE HYDROCHLORIDE 10 MG: 10 TABLET, FILM COATED ORAL at 09:06

## 2019-01-23 RX ADMIN — BUPROPION HYDROCHLORIDE 450 MG: 150 TABLET, FILM COATED, EXTENDED RELEASE ORAL at 09:06

## 2019-01-23 RX ADMIN — LURASIDONE HYDROCHLORIDE 160 MG: 80 TABLET, FILM COATED ORAL at 16:13

## 2019-01-23 RX ADMIN — ACETAMINOPHEN 650 MG: 325 TABLET, FILM COATED ORAL at 05:43

## 2019-01-23 RX ADMIN — ENOXAPARIN SODIUM 40 MG: 40 INJECTION SUBCUTANEOUS at 09:07

## 2019-01-23 RX ADMIN — OXYCODONE HYDROCHLORIDE 5 MG: 5 TABLET ORAL at 16:13

## 2019-01-23 RX ADMIN — ACETAMINOPHEN 650 MG: 325 TABLET, FILM COATED ORAL at 16:13

## 2019-01-23 RX ADMIN — PERPHENAZINE 16 MG: 4 TABLET, FILM COATED ORAL at 09:06

## 2019-01-23 ASSESSMENT — ACTIVITIES OF DAILY LIVING (ADL)
ADLS_ACUITY_SCORE: 22
ADLS_ACUITY_SCORE: 21
ADLS_ACUITY_SCORE: 21
ADLS_ACUITY_SCORE: 22
ADLS_ACUITY_SCORE: 22

## 2019-01-23 NOTE — DISCHARGE SUMMARY
"Discharge Summary    Karolyn Banerjee MRN# [id number]   YOB: 1985 Age: 33 year old     Date of Admission:  1/14/2019  Date of Discharge:  1/23/2019  Admitting Physician:  Tarik Panda MD  Discharge Physician:  Lonny Broderick MD  Discharging Service:  Hospitalist       Primary Provider: Suzie Mariscal          Discharge Diagnosis:   1. Post op after revision of ORIF of  right tibial plateau fracture (original ORIF was on 12/21/18 with revision on 1/14/19).        2. Schizoaffective disorder and bipolar.     3. History of JOSE: Intolerant of CPAP.     4. Mildly elevated blood pressure, improving;     5. Mild intermittent asthma: No evidence of exacerbation                 Discharge Disposition:   Discharged to nursing home           Allergies:   Allergies   Allergen Reactions     Clozapine      Agranulocytosis x 2, cannot be re trialed      Risperdal Other (See Comments)     dystonia     Tape [Adhesive Tape] Rash     Plastic tape                Condition on Discharge:   Discharge condition: Stable   Discharge vitals: Blood pressure 141/77, pulse 83, temperature 98.5  F (36.9  C), resp. rate 18, height 1.626 m (5' 4\"), weight 122.8 kg (270 lb 12.8 oz), SpO2 95 %, not currently breastfeeding.   Code status on discharge: Full Code   Physical exam on day of discharge:   GENERAL:  Comfortable. Cooperative.  PSYCH: , No acute distress.  EYES: PERRLA, Normal conjunctiva.  HEART:  Regular rate and rhythm. No JVD. Pulses normal. No edema.  LUNGS:  Clear to auscultation, normal Respiratory effort.  ABDOMEN:  Soft, no hepatosplenomegaly, normal bowel sounds.  EXTREMETIES: No clubbing, cyanosis or ischemia  SKIN:  Dry to touch, No rash.         History of Present Illness and Hospital Course:     See detailed admission note for full details.  Karolyn Banerjee is a 33 year old female (group home resident) with history of schizoaffective bipolar disorder, cognitive impairment, obesity, asthma, and " obstructive sleep apnea (intolerant of CPAP).  She presented to the ED on 1/14/19 for revision of ORIF of medial tibial plateau fracture with nerve palsy.  She had a fall on the ice on 12/20/18.  She had ORIF on 12/21/18. This needed revision (due to failure of fixation) which was done on 1/14/19.  Surgery was uncomplicated.      1. Post op after revision of ORIF of  right tibial plateau fracture (original ORIF was on 12/21/18 with revision on 1/14/19).  Stable after surgery.  Non weight bearing right lower extremity.      2. Schizoaffective disorder and bipolar type: Resides at a group home at baseline.      3. History of JOSE: Intolerant of CPAP.     4. Mildly elevated blood pressure: Suspect secondary to post op discomfort and some anxiety.  Stable/improving.      5. Mild intermittent asthma: No evidence of exacerbation               Procedures / Imaging:   Post op after revision of ORIF of  right tibial plateau fracture (original ORIF was on 12/21/18 with revision on 1/14/19).           Consultations:   Consultation during this admission received from orthopedics             Pending Results:   None           Discharge Instructions and Follow-Up:   Discharge diet: Regular   Discharge activity: Activity as tolerated with non-weight bearing RLE   Discharge follow-up: Follow up with nursing home physician and with Ortho as recommended.   Outpatient therapy: Per Ortho   Other instructions: None             Discharge Medications:   Current Discharge Medication List      START taking these medications    Details   enoxaparin (LOVENOX) 40 MG/0.4ML syringe Inject 0.4 mLs (40 mg) Subcutaneous every 24 hours for 5 days  Qty: 14 Syringe, Refills: 0    Associated Diagnoses: Closed fracture of right tibial plateau with routine healing, subsequent encounter      methocarbamol (ROBAXIN) 750 MG tablet Take 1 tablet (750 mg) by mouth 4 times daily as needed for muscle spasms  Qty: 30 tablet, Refills: 0    Associated Diagnoses:  Closed fracture of right tibial plateau with routine healing, subsequent encounter         CONTINUE these medications which have CHANGED    Details   acetaminophen (TYLENOL) 325 MG tablet Take 2 tablets (650 mg) by mouth every 6 hours as needed for mild pain  Qty: 40 tablet, Refills: 0    Associated Diagnoses: Closed fracture of right tibial plateau with routine healing, subsequent encounter      oxyCODONE (ROXICODONE) 5 MG tablet Take 1-2 tablets (5-10 mg) by mouth every 4 hours as needed  Qty: 20 tablet, Refills: 0    Associated Diagnoses: Closed fracture of right tibial plateau with routine healing, subsequent encounter      senna-docusate (SENOKOT-S/PERICOLACE) 8.6-50 MG tablet Take 2 tablets by mouth 2 times daily for 10 days  Qty: 40 tablet, Refills: 0    Associated Diagnoses: Closed fracture of right tibial plateau with routine healing, subsequent encounter         CONTINUE these medications which have NOT CHANGED    Details   !! benztropine (COGENTIN) 0.5 MG tablet Take three tabs (1.5 mg) at 5 PM.  Qty: 90 tablet, Refills: 0    Associated Diagnoses: Schizoaffective disorder, bipolar type (H); Aggression      !! benztropine (COGENTIN) 0.5 MG tablet Take 0.5 mg by mouth daily as needed for other (Eyes stuck in upward position)      BuPROPion HCl ER, XL, 450 MG TB24 Take 450 mg by mouth daily  Qty: 30 tablet, Refills: 1    Associated Diagnoses: Schizoaffective disorder, depressive type (H)      cetirizine (ZYRTEC) 10 MG tablet Take 10 mg by mouth daily      lurasidone (LATUDA) 80 MG TABS tablet Take 2 tablets (160 mg) by mouth daily (with dinner)  Qty: 60 tablet, Refills: 1    Associated Diagnoses: Schizoaffective disorder, bipolar type (H); Aggression      norethindrone-ethinyl estradiol (MICROGESTIN 1/20) 1-20 MG-MCG per tablet Take 1 tablet by mouth daily      Omega-3 Fatty Acids (FISH OIL PO) Take 1,000 mg by mouth daily       perphenazine 16 MG tablet Take 1 tablet (16 mg) by mouth 2 times daily  Qty: 60  tablet, Refills: 1    Associated Diagnoses: Schizoaffective disorder, depressive type (H)      OLANZapine (ZYPREXA) 10 MG tablet Take 1 tablet (10 mg) by mouth 2 times daily as needed  Qty: 30 tablet, Refills: 1    Associated Diagnoses: Schizoaffective disorder, bipolar type (H); Aggression      !! order for DME Equipment being ordered: Wheelchair  Qty: 1 Units, Refills: 0    Associated Diagnoses: Closed fracture of proximal end of right fibula, unspecified fracture morphology, initial encounter      !! order for DME Equipment being ordered: Walker Wheels () and Walker ()  Treatment Diagnosis: Impaired gait.  Qty: 1 each, Refills: 0    Associated Diagnoses: Closed fracture of right tibial plateau, initial encounter       !! - Potential duplicate medications found. Please discuss with provider.      STOP taking these medications       aspirin (ASA) 325 MG tablet Comments:   Reason for Stopping:                  Total time spent in face to face contact with the patient and coordinating discharge was:  35 Minutes

## 2019-01-23 NOTE — PROGRESS NOTES
Orthopedic Surgery  Karolyn Banerjee  2019  Admit Date:  2019  POD # 9  S/P Right tibial plateau ORIF with revision, right foot drop (prior to second surgery)    Patient resting comfortably in bed.    Flat affect, minimal communitcation  Pain controlled.  Tolerating oral intake.    Denies nausea or vomiting  Denies chest pain or shortness of breath  No events overnight.     Alert and orient to person  Vital Sign Ranges  Temperature Temp  Av.9  F (37.2  C)  Min: 98.5  F (36.9  C)  Max: 99.6  F (37.6  C)   Blood pressure Systolic (24hrs), Av , Min:119 , Max:141        Diastolic (24hrs), Av, Min:68, Max:87      Pulse No Data Recorded   Respirations Resp  Av.8  Min: 16  Max: 18   Pulse oximetry SpO2  Av %  Min: 94 %  Max: 96 %       Dressing is clean, dry, and intact. No drainage on ace.   Minimal erythema of the surrounding skin.   Bilateral calves are soft.  Continued tenderness with compression right calf and winces with passive right ankle dorsiflexion.     0/5 motor with df, and no sensation dorsum of foot.  Known peroneal nerve palsy. +Dp pulse    Labs:  Recent Labs   Lab Test 19  0719 18  0628 18  0642   POTASSIUM 4.7 3.9 4.2     Recent Labs   Lab Test 19  0719 19  0635 01/15/19  1350   HGB 13.1 11.8 12.7     Recent Labs   Lab Test 18  1749 16  1716 16  0639   INR 1.02 2.97* 1.86*     Recent Labs   Lab Test 19  0611 19  0719 19  0650    195 173       A/P - S/p revision right medial plateau fracture 19 with known peroneal nerve palsey          1. Plan              Continue Lovenox for DVT prophylaxis x 2 weeks then ASA daily              Mobilize with PT/OT, May transfer bed to chair, otherwise wheelchair/Bed              Non-WB right LE.              Keep TROM bace locked at 50 degrees                AFO on right when transferring - Orthotist called to re-fit the AFO              Continue current pain  regiment.              Leave dressing intact                     2. Disposition              Anticipate d/c to TCU today pending bed placement      Anat Cuello PA-C

## 2019-01-23 NOTE — PLAN OF CARE
Discharge Planner PT   Patient plan for discharge: TCU  Current status: bed mobility and basic tranfers with S gait with NWB to chair and RW CGA able to propel wc for needs to 50 feet out of room and to lounge via wc RN, charge and NA aware  Barriers to return to prior living situation: mobility needs level of assist need unable to be given at current living situation  Recommendations for discharge: TCU per plan established by the PT.  Rationale for recommendations: awaiting clearance for TCU       Entered by: Moniac Haskins 01/23/2019 11:55 AM

## 2019-01-23 NOTE — PROGRESS NOTES
SWS     D: Discharge planning continuing.. Per follow-up and discussion with Hien Litfatimah [facility] pre-admission screening she has obtained the needed information from pt's MH and [facility] case mangers and indicates that there would be a 30 day approval for pt, so SW is able to move forward with the planning for pt's transfer today to rehab facility. Discussed with admissions coordinator from [facility] who has spoken to Hine also and obtained the needed information from her for pt to admit to facility today.      I: Spoke by phone to pt's mother this afternoon noting of above, she has asked that SW arrange medical transport, [facility], w/c arranged for 1730 with [facility] billing for the transport. FELIX has met with pt and also discussed plan, assuring her that FELIX has also spoken today to her mother about plan.      A/P: Anticipate no problem with arrangements for pt's transfer today, with discharge no further SWS.

## 2019-01-23 NOTE — PROGRESS NOTES
VSS. Belongings with patient in room. Discharge instructions and medications reviewed with patient. HE transport at 1730.

## 2019-01-23 NOTE — PLAN OF CARE
Assumed care from 1900 to 0730  A&Ox4, lethargic, slept most of shift   up SBA w/GB & walker, bed alarm for safety, able to make needs known  LDA: No PIV access  Vitals: stable, RA  Pain: Controlled, oxy x1  Emesis x2, PRN zofran w/relief  Skin: ace wrap CDI, NWB RLE, TROM brace in place, CMS intact, slight foot drop R ft  Mom to bring in shoes to get AFO fitted w/TROM brace  GI/: Voiding, +gas  Followed by Ortho, PT, OT  Plan: Awaiting SW & Level 2 screening, poss TCU  Will continue to monitor

## 2019-01-24 ENCOUNTER — NURSING HOME VISIT (OUTPATIENT)
Dept: GERIATRICS | Facility: CLINIC | Age: 34
End: 2019-01-24
Payer: MEDICAID

## 2019-01-24 VITALS
HEART RATE: 94 BPM | OXYGEN SATURATION: 97 % | RESPIRATION RATE: 18 BRPM | DIASTOLIC BLOOD PRESSURE: 77 MMHG | HEIGHT: 64 IN | BODY MASS INDEX: 46.1 KG/M2 | WEIGHT: 270 LBS | TEMPERATURE: 97.9 F | SYSTOLIC BLOOD PRESSURE: 125 MMHG

## 2019-01-24 DIAGNOSIS — E66.01 MORBID OBESITY (H): ICD-10-CM

## 2019-01-24 DIAGNOSIS — F25.1 SCHIZOAFFECTIVE DISORDER, DEPRESSIVE TYPE (H): ICD-10-CM

## 2019-01-24 DIAGNOSIS — S82.141D CLOSED FRACTURE OF RIGHT TIBIAL PLATEAU WITH ROUTINE HEALING, SUBSEQUENT ENCOUNTER: Primary | ICD-10-CM

## 2019-01-24 DIAGNOSIS — R46.89 AGGRESSION: ICD-10-CM

## 2019-01-24 DIAGNOSIS — G31.84 MILD COGNITIVE IMPAIRMENT: ICD-10-CM

## 2019-01-24 DIAGNOSIS — Z86.718 HISTORY OF DEEP VENOUS THROMBOSIS: ICD-10-CM

## 2019-01-24 DIAGNOSIS — F25.0 SCHIZOAFFECTIVE DISORDER, BIPOLAR TYPE (H): ICD-10-CM

## 2019-01-24 DIAGNOSIS — F60.3 BORDERLINE PERSONALITY DISORDER (H): ICD-10-CM

## 2019-01-24 DIAGNOSIS — G47.30 SLEEP APNEA, UNSPECIFIED TYPE: ICD-10-CM

## 2019-01-24 DIAGNOSIS — R53.81 PHYSICAL DECONDITIONING: ICD-10-CM

## 2019-01-24 PROCEDURE — 99310 SBSQ NF CARE HIGH MDM 45: CPT | Performed by: NURSE PRACTITIONER

## 2019-01-24 RX ORDER — LURASIDONE HYDROCHLORIDE 80 MG/1
160 TABLET, FILM COATED ORAL
Qty: 60 TABLET | Refills: 0 | Status: SHIPPED | OUTPATIENT
Start: 2019-01-24 | End: 2019-02-20

## 2019-01-24 RX ORDER — PERPHENAZINE 16 MG/1
16 TABLET ORAL 2 TIMES DAILY
Qty: 60 TABLET | Refills: 0 | Status: SHIPPED | OUTPATIENT
Start: 2019-01-24 | End: 2019-02-20

## 2019-01-24 RX ORDER — BUPROPION HYDROCHLORIDE 450 MG/1
450 TABLET, FILM COATED, EXTENDED RELEASE ORAL DAILY
Qty: 30 TABLET | Refills: 0 | Status: SHIPPED | OUTPATIENT
Start: 2019-01-24 | End: 2019-02-20

## 2019-01-24 ASSESSMENT — MIFFLIN-ST. JEOR: SCORE: 1914.71

## 2019-01-24 NOTE — TELEPHONE ENCOUNTER
Medication requested: perphenazine 16 MG   Last refilled:  UNK  Qty:60  Medication requested: lurasidone (LATUDA) 80 MG   Last refilled: UNK  Qty: 60  Medication requested:  BuPROPion HCl ER, XL, 450 MG   Last refilled: UNK  Qty: 30      Last seen:  11/2/18  RTC: 4 WEEKS  Cancel: 0  No-show: 0  Next appt: 2/8/19   Refill decision:  Refilled for 30 days per protocol.  * Will send FYI to provider as is outside RTC timeframe.

## 2019-01-24 NOTE — PROGRESS NOTES
[facility]  PRIMARY CARE PROVIDER AND CLINIC:  Suzie Mariscal [facility] [address] [facility]*  No chief complaint on file.    [facility] Medical Record Number:  [id number]  Place of Service where encounter took place:  No question data found.    HPI:    Karolyn Banerjee is a 33 year old  (1985),admitted to the above facility from  {HOSPITAL AND EMERGENCY ROOM SITES FOR NURSING HOME ADMISSIONS:778505}.  Hospital stay *** through ***.  Admitted to this facility for  {FGS ADMISSION REASONS:145894}.  HPI information obtained from: {FGS HPI:608522}.      Current issues are:        {FGS DX:861843}        CODE STATUS/ADVANCE DIRECTIVES DISCUSSION:   {CODE STATUS:402393}  Patient's living condition: {LIVES WITH (NURSING HOME):480514}    ALLERGIES:Clozapine; Risperdal; and Tape [adhesive tape]  PAST MEDICAL HISTORY:  has a past medical history of Agranulocytosis (H) (2007, 2013), Asthma, mild intermittent, Astigmatism, Cognitive disorder, Depressive disorder, Humeral shaft fracture (2014), Mild developmental delay, Myopia, Neuroleptic-induced tardive dyskinesia, Nonorganic enuresis, Refractive amblyopia, Schizoaffective disorder, bipolar type (H), and Sleep disorder.  PAST SURGICAL HISTORY:  has a past surgical history that includes no history of surgery; hrw port a cath (Right); Open reduction internal fixation tibial plateau (Right, 12/21/2018); and Open reduction internal fixation tibial plateau (Right, 1/14/2019).  FAMILY HISTORY: family history includes Mental Illness in her father; Schizophrenia in an other family member.  SOCIAL HISTORY:  reports that  has never smoked. she has never used smokeless tobacco. She reports that she does not drink alcohol or use drugs.    Post Discharge Medication Reconciliation Status: {ACO Med Rec (Provider):709754}.  Current Outpatient Medications   Medication Sig Dispense Refill     acetaminophen (TYLENOL) 325 MG tablet Take 2  tablets (650 mg) by mouth every 6 hours as needed for mild pain 40 tablet 0     benztropine (COGENTIN) 0.5 MG tablet Take three tabs (1.5 mg) at 5 PM. 90 tablet 0     benztropine (COGENTIN) 0.5 MG tablet Take 0.5 mg by mouth daily as needed for other (Eyes stuck in upward position)       BuPROPion HCl ER, XL, 450 MG TB24 Take 450 mg by mouth daily 30 tablet 1     cetirizine (ZYRTEC) 10 MG tablet Take 10 mg by mouth daily       lurasidone (LATUDA) 80 MG TABS tablet Take 2 tablets (160 mg) by mouth daily (with dinner) 60 tablet 0     methocarbamol (ROBAXIN) 750 MG tablet Take 1 tablet (750 mg) by mouth 4 times daily as needed for muscle spasms 30 tablet 0     norethindrone-ethinyl estradiol (MICROGESTIN 1/20) 1-20 MG-MCG per tablet Take 1 tablet by mouth daily       OLANZapine (ZYPREXA) 10 MG tablet Take 1 tablet (10 mg) by mouth 2 times daily as needed 30 tablet 1     Omega-3 Fatty Acids (FISH OIL PO) Take 1,000 mg by mouth daily        order for DME Equipment being ordered: Wheelchair 1 Units 0     order for DME Equipment being ordered: Walker Wheels () and Walker ()  Treatment Diagnosis: Impaired gait. 1 each 0     perphenazine 16 MG tablet Take 1 tablet (16 mg) by mouth 2 times daily 60 tablet 1     senna-docusate (SENOKOT-S/PERICOLACE) 8.6-50 MG tablet Take 2 tablets by mouth 2 times daily for 10 days 40 tablet 0       ROS:  {ROS FGS:738767}    Exam:  There were no vitals taken for this visit.  {custodial physical exam :009517}    Lab/Diagnostic data:   ***  CBC RESULTS:   Recent Labs   Lab Test 01/23/19  0611 01/21/19  0719  01/16/19  0635  12/23/18  0628   WBC  --  4.2  --   --   --  7.6   RBC  --  4.43  --   --   --  3.80   HGB  --  13.1  --  11.8   < > 11.4*   HCT  --  40.1  --   --   --  34.6*   MCV  --  91  --   --   --  91   MCH  --  29.6  --   --   --  30.0   MCHC  --  32.7  --   --   --  32.9   RDW  --  13.1  --   --   --  13.5    195   < >  --    < > 126*    < > = values in this  interval not displayed.       Last Basic Metabolic Panel:  Recent Labs   Lab Test 01/21/19  0719 01/14/19  1654 12/23/18  0628     --  139   POTASSIUM 4.7  --  3.9   CHLORIDE 107  --  105   GIOVANY 8.6  --  8.2*   CO2 25  --  27   BUN 14  --  10   CR 0.68 0.76 0.74   GLC 82  --  98       Liver Function Studies -   Recent Labs   Lab Test 12/21/18  0720 10/05/17  1108   PROTTOTAL 7.0 7.7   ALBUMIN 3.2* 3.6   BILITOTAL 0.8 0.4   ALKPHOS 74 64   AST 21 18   ALT 23 22       TSH   Date Value Ref Range Status   09/21/2017 2.55 0.40 - 4.00 mU/L Final   10/20/2015 1.73 0.40 - 4.00 mU/L Final   ]    Lab Results   Component Value Date    A1C 5.0 04/12/2017           ASSESSMENT/PLAN:    {FGS DX INITIAL:508330}            {FGS TIME SPENT:236138}    Electronically signed by:  HELADIO Mcleod CNP

## 2019-01-24 NOTE — PROGRESS NOTES
[place] [facility]    PRIMARY CARE PROVIDER AND CLINIC:  Suzie Mariscal [facility] [address] [facility]*  Chief Complaint   Patient presents with     Hospital F/U     [place] Medical Record Number:  [id number]  Place of Service where encounter took place:  [facility] [[id number]]    HPI:    Kaorlyn Banerjee is a 33 year old  (1985),admitted to the above facility from  [facility].  Hospital stay 1/14/19 through 1/23/19.  Admitted to this facility for  rehab, medical management and nursing care.  HPI information obtained from: facility chart records, facility staff, patient report and [facility] chart review.      Current issues are:        Closed fracture of right tibial plateau with routine healing, subsequent encounter  Morbid obesity (H)  History of deep venous thrombosis  Patient admitted to [facility] 1/14-1/23 due to failed fixation of right tibial hardware. Initially fell on ice on 12/20, s/p ORIF was on 12/21. S/p revision of ORIF of right tibial plateau fracture on 1/14 with Dr. Panda. Hx deep vein thrombosis (DVT) of brachial vein of right upper extremity 12/9/16. Currently taking lovenox every day x 2 weeks then recommended to take ASA every day DVT prophylaxis. Currently NWB to RLE. During exam, admits to mild pain to RLE. Has been taking robaxin prn, tylenol prn, oxycodone prn. Denies constipation, has senna-s scheduled BID x 10 days. Patient to follow-up with Ortho as directed.     Body mass index is 46.35 kg/m .      Schizoaffective disorder, depressive type (H)  Borderline personality disorder (H)  Currently taking cogentin, buproprion, latuda, perphenazine, zyprexa.     Sleep apnea, unspecified type  Noted to have obstructive sleep apnea, has not been compliant with using CPAP.     Mild cognitive impairment  Physical deconditioning  PTA lives at group home, patient's mom is guardian. Patient is  actively participating in therapy sessions. Cognitive testing to be done in therapy. SW following for discharge planning.           CODE STATUS/ADVANCE DIRECTIVES DISCUSSION:   CPR/Full code   Patient's living condition: lives in a group home    ALLERGIES:Clozapine; Risperdal; and Tape [adhesive tape]  PAST MEDICAL HISTORY:  has a past medical history of Agranulocytosis (H) (2007, 2013), Asthma, mild intermittent, Astigmatism, Cognitive disorder, Depressive disorder, Humeral shaft fracture (2014), Mild developmental delay, Myopia, Neuroleptic-induced tardive dyskinesia, Nonorganic enuresis, Refractive amblyopia, Schizoaffective disorder, bipolar type (H), and Sleep disorder.  PAST SURGICAL HISTORY:  has a past surgical history that includes no history of surgery; hrw port a cath (Right); Open reduction internal fixation tibial plateau (Right, 12/21/2018); and Open reduction internal fixation tibial plateau (Right, 1/14/2019).  FAMILY HISTORY: family history includes Mental Illness in her father; Schizophrenia in an other family member.  SOCIAL HISTORY:  reports that  has never smoked. she has never used smokeless tobacco. She reports that she does not drink alcohol or use drugs.    Post Discharge Medication Reconciliation Status: discharge medications reconciled and changed, per note/orders (see AVS).  Current Outpatient Medications   Medication Sig Dispense Refill     acetaminophen (TYLENOL) 325 MG tablet Take 2 tablets (650 mg) by mouth every 6 hours as needed for mild pain 40 tablet 0     benztropine (COGENTIN) 0.5 MG tablet Take three tabs (1.5 mg) at 5 PM. 90 tablet 0     benztropine (COGENTIN) 0.5 MG tablet Take 0.5 mg by mouth daily as needed for other (Eyes stuck in upward position)       cetirizine (ZYRTEC) 10 MG tablet Take 10 mg by mouth daily       enoxaparin (LOVENOX) 40 MG/0.4ML syringe Inject 40 mg Subcutaneous daily       lurasidone (LATUDA) 80 MG TABS tablet Take 2 tablets (160 mg) by mouth daily  "(with dinner) 60 tablet 0     methocarbamol (ROBAXIN) 750 MG tablet Take 1 tablet (750 mg) by mouth 4 times daily as needed for muscle spasms 30 tablet 0     norethindrone-ethinyl estradiol (MICROGESTIN 1/20) 1-20 MG-MCG per tablet Take 1 tablet by mouth daily       OLANZapine (ZYPREXA) 10 MG tablet Take 1 tablet (10 mg) by mouth 2 times daily as needed 30 tablet 1     Omega-3 Fatty Acids (FISH OIL PO) Take 1,000 mg by mouth daily        OXYCODONE HCL PO Take 5-10 mg by mouth every 4 hours as needed       senna-docusate (SENOKOT-S/PERICOLACE) 8.6-50 MG tablet Take 2 tablets by mouth 2 times daily for 10 days 40 tablet 0     BuPROPion HCl ER, XL, 450 MG TB24 Take 450 mg by mouth daily * 1TAB 30 tablet 0     perphenazine 16 MG tablet Take 1 tablet (16 mg) by mouth 2 times daily 60 tablet 0       ROS:  10 point ROS of systems including Constitutional, Eyes, Respiratory, Cardiovascular, Gastroenterology, Genitourinary, Integumentary, Musculoskeletal, Psychiatric were all negative except for pertinent positives noted in my HPI.    Exam:  /77   Pulse 94   Temp 97.9  F (36.6  C)   Resp 18   Ht 1.626 m (5' 4\")   Wt 122.5 kg (270 lb)   SpO2 97%   BMI 46.35 kg/m    GENERAL APPEARANCE:  Alert, in no distress, oriented, cooperative  ENT:  Mouth and posterior oropharynx normal, moist mucous membranes, normal hearing acuity  EYES:  EOM, conjunctivae, lids, pupils and irises normal, PERRL  NECK:  No adenopathy,masses or thyromegaly  RESP:  respiratory effort and palpation of chest normal, lungs clear to auscultation , no respiratory distress  CV:  Palpation and auscultation of heart done , regular rate and rhythm, no murmur, rub, or gallop, no edema, +2 pedal pulses  ABDOMEN:  normal bowel sounds, soft, nontender, no hepatosplenomegaly or other masses, no guarding or rebound  M/S: NWB to RLE. Gait and station abnormal, requires assistance with activity and ADLS. Digits and nails normal  SKIN:  Inspection of skin and " subcutaneous tissue baseline, Palpation of skin and subcutaneous tissue baseline. RLE brace on.   NEURO:   Cranial nerves 2-12 are normal tested and grossly at patient's baseline, Examination of sensation by touch normal  PSYCH:  Oriented X 3, flat affect, cognitive testing to be done in therapy      Lab/Diagnostic data:  CBC RESULTS:   Recent Labs   Lab Test 01/23/19  0611 01/21/19  0719  01/16/19  0635  12/23/18  0628   WBC  --  4.2  --   --   --  7.6   RBC  --  4.43  --   --   --  3.80   HGB  --  13.1  --  11.8   < > 11.4*   HCT  --  40.1  --   --   --  34.6*   MCV  --  91  --   --   --  91   MCH  --  29.6  --   --   --  30.0   MCHC  --  32.7  --   --   --  32.9   RDW  --  13.1  --   --   --  13.5    195   < >  --    < > 126*    < > = values in this interval not displayed.       Last Basic Metabolic Panel:  Recent Labs   Lab Test 01/21/19  0719 01/14/19  1654 12/23/18  0628     --  139   POTASSIUM 4.7  --  3.9   CHLORIDE 107  --  105   GIOVANY 8.6  --  8.2*   CO2 25  --  27   BUN 14  --  10   CR 0.68 0.76 0.74   GLC 82  --  98       Liver Function Studies -   Recent Labs   Lab Test 12/21/18  0720 10/05/17  1108   PROTTOTAL 7.0 7.7   ALBUMIN 3.2* 3.6   BILITOTAL 0.8 0.4   ALKPHOS 74 64   AST 21 18   ALT 23 22             ASSESSMENT/PLAN:    (S82.141D) Closed fracture of right tibial plateau with routine healing, subsequent encounter  (primary encounter diagnosis)  (E66.01) Morbid obesity (H)  (Z86.718) History of deep venous thrombosis  Comment: S/p ORIF R tibia on 12/21. S/p revision of ORIF of right tibial plateau fracture on 1/14 with Dr. Panda. Hx DVT.   Plan: Check CBC & BMP 1/25. Continue NWB to RLE.   Per postop note, Ortho recommending to continue lovenox every day x 5 days (2 week course), then resume ASA 325mg every day x 6 weeks. Patient to follow-up with Ortho in 2 weeks.     (F25.1) Schizoaffective disorder, depressive type (H)  (F60.3) Borderline personality disorder (H)  Comment:  Chronic  Plan: Continue plan of care. Monitor changes in mood or behavior. Patient to follow-up with Psychiatry on 2/8.      (G47.30) Sleep apnea, unspecified type  Comment: Uncontrolled due to noncompliance with CPAP  Plan: Encourage use of CPAP. Monitor O2 sats and respiratory status.     (G31.84) Mild cognitive impairment  (R53.81) Physical deconditioning  Comment: Ongoing  Plan: Encourage active participation in therapy session to increase strength and promote independence in activities and ADLs. Cognitive testing to be done in therapy. SW following for discharge planning.                   Total time spent with patient visit at the skilled nursing facility was 45 min including patient visit and review of past records. Greater than 50% of total time spent with counseling and coordinating care due to hospital f/u    Electronically signed by:  HELADIO Mcleod CNP

## 2019-01-24 NOTE — PLAN OF CARE
Physical Therapy Discharge Summary    Reason for therapy discharge:    Discharged to transitional care facility.    Progress towards therapy goal(s). See goals on Care Plan in [facility] electronic health record for goal details.  Goals met    Therapy recommendation(s):    Continued therapy is recommended.  Rationale/Recommendations:  TCU was recommended.

## 2019-01-28 PROBLEM — E66.01 MORBID OBESITY (H): Status: ACTIVE | Noted: 2019-01-28

## 2019-01-29 ENCOUNTER — APPOINTMENT (OUTPATIENT)
Dept: GERIATRICS | Facility: CLINIC | Age: 34
End: 2019-01-29
Payer: MEDICAID

## 2019-01-29 ENCOUNTER — NURSING HOME VISIT (OUTPATIENT)
Dept: GERIATRICS | Facility: CLINIC | Age: 34
End: 2019-01-29
Payer: MEDICAID

## 2019-01-29 DIAGNOSIS — S82.141D CLOSED FRACTURE OF RIGHT TIBIAL PLATEAU WITH ROUTINE HEALING, SUBSEQUENT ENCOUNTER: Primary | ICD-10-CM

## 2019-01-29 DIAGNOSIS — E66.01 MORBID OBESITY (H): ICD-10-CM

## 2019-01-29 DIAGNOSIS — F25.1 SCHIZOAFFECTIVE DISORDER, DEPRESSIVE TYPE (H): ICD-10-CM

## 2019-01-29 PROCEDURE — 99305 1ST NF CARE MODERATE MDM 35: CPT | Performed by: INTERNAL MEDICINE

## 2019-01-31 NOTE — PROGRESS NOTES
[place] [facility]    PRIMARY CARE PROVIDER AND CLINIC:  Suzie Mariscal [facility] [address] [facility] / [place] [facility]*    Pt was seen for an initial TCU visit at [facility] on 1/29/19 by Dr Mitchell    HPI:    Karolyn Banerjee is a 33 year old  (1985), admitted from  [facility].  Hospital stay 1/14/19 through 1/23/19 for the management of a R tibial fracture      Admitted to this facility for  rehab, medical management and nursing care.         Patient was admitted to [facility] 1/14-1/23 due to failed fixation of right tibial hardware. Initially fell on ice on 12/20, s/p ORIF was on 12/21. S/p revision of ORIF of right tibial plateau fracture on 1/14 with Dr. Panda. Pt is NWB R LE    Ongoing medical concerns:    Schizoaffective disorder, depressive type (H)  Borderline personality disorder (H), on cogentin, buproprion, latuda, perphenazine, zyprexa.     Sleep apnea, unspecified type non compliant with using CPAP.     Mild cognitive impairment  Physical deconditioning  Pt lives at a group home    Pt states R LE pain is controlled  She denies chest pain, cough abd pain, nausea      CODE STATUS/ADVANCE DIRECTIVES DISCUSSION:   CPR/Full code   Patient's living condition: lives in a group home    ALLERGIES:Clozapine; Liquid adhesive; Risperdal; and Adhesive tape  PAST MEDICAL HISTORY:  has a past medical history of Agranulocytosis (H) (2007, 2013), Asthma, mild intermittent, Astigmatism, Cognitive disorder, Depressive disorder, Humeral shaft fracture (2014), Mild developmental delay, Myopia, Neuroleptic-induced tardive dyskinesia, Nonorganic enuresis, Refractive amblyopia, Schizoaffective disorder, bipolar type (H), and Sleep disorder.  PAST SURGICAL HISTORY:  has a past surgical history that includes no history of surgery; hrw port a cath (Right); Open reduction internal fixation tibial plateau (Right, 12/21/2018); and Open reduction internal fixation  tibial plateau (Right, 1/14/2019).  FAMILY HISTORY: family history includes Mental Illness in her father; Schizophrenia in an other family member.  SOCIAL HISTORY:  reports that  has never smoked. she has never used smokeless tobacco. She reports that she does not drink alcohol or use drugs.    Post Discharge Medication Reconciliation Status:   .  Current Outpatient Medications   Medication Sig Dispense Refill     acetaminophen (TYLENOL) 325 MG tablet Take 2 tablets (650 mg) by mouth every 6 hours as needed for mild pain 40 tablet 0     benztropine (COGENTIN) 0.5 MG tablet Take three tabs (1.5 mg) at 5 PM. 90 tablet 0     benztropine (COGENTIN) 0.5 MG tablet Take 0.5 mg by mouth daily as needed for other (Eyes stuck in upward position)       BuPROPion HCl ER, XL, 450 MG TB24 Take 450 mg by mouth daily * 1TAB 30 tablet 0     cetirizine (ZYRTEC) 10 MG tablet Take 10 mg by mouth daily       lurasidone (LATUDA) 80 MG TABS tablet Take 2 tablets (160 mg) by mouth daily (with dinner) 60 tablet 0     norethindrone-ethinyl estradiol (MICROGESTIN 1/20) 1-20 MG-MCG per tablet Take 1 tablet by mouth daily       OLANZapine (ZYPREXA) 10 MG tablet Take 1 tablet (10 mg) by mouth 2 times daily as needed 30 tablet 1     Omega-3 Fatty Acids (FISH OIL PO) Take 1,000 mg by mouth daily        OXYCODONE HCL PO Take 5-10 mg by mouth every 4 hours as needed       perphenazine 16 MG tablet Take 1 tablet (16 mg) by mouth 2 times daily 60 tablet 0       ROS:  10 point ROS neg except as noted above      Exam:    GENERAL APPEARANCE:  Alert, in no distress, lying in bed  ENT:   moist mucous membranes, normal hearing acuity  EYES:  EOM, conjunctivae, lids normal  NECK:  supple  RESP:  Lungs clear  CV:  RRR  ABDOMEN: soft, protuberant, non-tender  M/S: R LE in hinged knee brace  SKIN:  no rash  NEURO:   Cranial nerves 2-12, Pt is alert, fully oriented        Lab/Diagnostic data:  CBC RESULTS:   Recent Labs   Lab Test 01/23/19  0611 01/21/19  0719   01/16/19  0635  12/23/18  0628   WBC  --  4.2  --   --   --  7.6   RBC  --  4.43  --   --   --  3.80   HGB  --  13.1  --  11.8   < > 11.4*   HCT  --  40.1  --   --   --  34.6*   MCV  --  91  --   --   --  91   MCH  --  29.6  --   --   --  30.0   MCHC  --  32.7  --   --   --  32.9   RDW  --  13.1  --   --   --  13.5    195   < >  --    < > 126*    < > = values in this interval not displayed.       Last Basic Metabolic Panel:  Recent Labs   Lab Test 01/21/19  0719 01/14/19  1654 12/23/18  0628     --  139   POTASSIUM 4.7  --  3.9   CHLORIDE 107  --  105   GIOVANY 8.6  --  8.2*   CO2 25  --  27   BUN 14  --  10   CR 0.68 0.76 0.74   GLC 82  --  98       Liver Function Studies -   Recent Labs   Lab Test 12/21/18  0720 10/05/17  1108   PROTTOTAL 7.0 7.7   ALBUMIN 3.2* 3.6   BILITOTAL 0.8 0.4   ALKPHOS 74 64   AST 21 18   ALT 23 22             ASSESSMENT/PLAN:    (S82.141D) Closed fracture of right tibial plateau with routine healing, subsequent encounter  (primary encounter diagnosis)  (E66.01) Morbid obesity (H)  (Z86.718) History of deep venous thrombosis  Comment: S/p ORIF R tibia on 12/21. S/p revision of ORIF of right tibial plateau fracture on 1/14 with Dr. Panda.   Plan:  Continue NWB to RLE. ASA following course of Lovenox      (F25.1) Schizoaffective disorder, depressive type (H)  (F60.3) Borderline personality disorder (H)  Comment: Chronic, stable  Plan: Continue plan of care. Psychiatry f/u  s.     (G31.84) Mild cognitive impairment  (R53.81) Physical deconditioning  Plan monitor functional and mental status        Lonny Mitchell MD

## 2019-02-05 ENCOUNTER — NURSING HOME VISIT (OUTPATIENT)
Dept: GERIATRICS | Facility: CLINIC | Age: 34
End: 2019-02-05
Payer: MEDICAID

## 2019-02-05 VITALS
DIASTOLIC BLOOD PRESSURE: 80 MMHG | BODY MASS INDEX: 44.11 KG/M2 | WEIGHT: 257 LBS | TEMPERATURE: 98.1 F | OXYGEN SATURATION: 95 % | SYSTOLIC BLOOD PRESSURE: 117 MMHG | RESPIRATION RATE: 18 BRPM | HEART RATE: 95 BPM

## 2019-02-05 DIAGNOSIS — G31.84 MILD COGNITIVE IMPAIRMENT: ICD-10-CM

## 2019-02-05 DIAGNOSIS — F25.1 SCHIZOAFFECTIVE DISORDER, DEPRESSIVE TYPE (H): ICD-10-CM

## 2019-02-05 DIAGNOSIS — S82.141D CLOSED FRACTURE OF RIGHT TIBIAL PLATEAU WITH ROUTINE HEALING, SUBSEQUENT ENCOUNTER: Primary | ICD-10-CM

## 2019-02-05 DIAGNOSIS — E66.01 MORBID OBESITY (H): ICD-10-CM

## 2019-02-05 DIAGNOSIS — F60.3 BORDERLINE PERSONALITY DISORDER (H): ICD-10-CM

## 2019-02-05 DIAGNOSIS — R53.81 PHYSICAL DECONDITIONING: ICD-10-CM

## 2019-02-05 PROCEDURE — 99309 SBSQ NF CARE MODERATE MDM 30: CPT | Performed by: NURSE PRACTITIONER

## 2019-02-05 RX ORDER — AMOXICILLIN 250 MG
1 CAPSULE ORAL 2 TIMES DAILY PRN
COMMUNITY
End: 2019-05-22

## 2019-02-05 RX ORDER — METHOCARBAMOL 750 MG/1
750 TABLET, FILM COATED ORAL 4 TIMES DAILY PRN
COMMUNITY
End: 2019-02-22

## 2019-02-05 RX ORDER — OXYCODONE HYDROCHLORIDE 5 MG/1
5 TABLET ORAL EVERY 4 HOURS PRN
Qty: 20 TABLET | Refills: 0 | Status: SHIPPED | OUTPATIENT
Start: 2019-02-05 | End: 2019-02-22

## 2019-02-05 NOTE — PROGRESS NOTES
[place] [facility]    Chief Complaint   Patient presents with     FDC Acute       [place] Medical Record Number:  [id number]  Place of Service where encounter took place:  [facility] ([facility] [[id number]]    HPI:    Karolyn Banerjee is a 33 year old  (1985), who is being seen today for an episodic care visit.  HPI information obtained from: facility chart records, facility staff, patient report and [facility] chart review.    Today's concern is:    Closed fracture of right tibial plateau with routine healing, subsequent encounter  Morbid obesity (H)  History of deep venous thrombosis  Patient admitted to [facility] 1/14-1/23 due to failed fixation of right tibial hardware. Initially fell on ice on 12/20, s/p ORIF was on 12/21. S/p revision of ORIF of right tibial plateau fracture on 1/14 with Dr. Panda. Hx deep vein thrombosis (DVT) of brachial vein of right upper extremity 12/9/16. Completed lovenox every day x 2 weeks, now taking ASA 325mg every day DVT prophylaxis through 3/12/19. Currently NWB to RLE. During exam, admits to mild pain to RLE. Has been taking robaxin prn, tylenol prn, oxycodone prn. Denies constipation, has senna-s prn. Patient to follow-up with Ortho as directed.      Body mass index is 44.11 kg/m .       Schizoaffective disorder, depressive type (H)  Borderline personality disorder (H)  Currently taking cogentin, buproprion, latuda, perphenazine, zyprexa.      Mild cognitive impairment  Physical deconditioning  PTA lives at group home, patient's mom is guardian. Patient is actively participating in therapy sessions. Cognitive testing to be done in therapy. SW following for discharge planning.           ALLERGIES: Clozapine; Liquid adhesive; Risperdal; and Adhesive tape  Past Medical, Surgical, Family and Social History reviewed and updated in [facility].    Current Outpatient Medications   Medication Sig Dispense Refill     ACETAMINOPHEN PO Take 650 mg  by mouth 3 times daily       aspirin (ASA) 325 MG EC tablet Take 325 mg by mouth daily        benztropine (COGENTIN) 0.5 MG tablet Take three tabs (1.5 mg) at 5 PM. 90 tablet 0     benztropine (COGENTIN) 0.5 MG tablet Take 0.5 mg by mouth daily as needed for other (Eyes stuck in upward position)       BuPROPion HCl ER, XL, 450 MG TB24 Take 450 mg by mouth daily * 1TAB 30 tablet 0     cetirizine (ZYRTEC) 10 MG tablet Take 10 mg by mouth daily       lurasidone (LATUDA) 80 MG TABS tablet Take 2 tablets (160 mg) by mouth daily (with dinner) 60 tablet 0     methocarbamol (ROBAXIN) 750 MG tablet Take 750 mg by mouth 4 times daily as needed for muscle spasms       norethindrone-ethinyl estradiol (MICROGESTIN 1/20) 1-20 MG-MCG per tablet Take 1 tablet by mouth daily       OLANZapine (ZYPREXA) 10 MG tablet Take 1 tablet (10 mg) by mouth 2 times daily as needed 30 tablet 1     Omega-3 Fatty Acids (FISH OIL PO) Take 1,000 mg by mouth daily        oxyCODONE (ROXICODONE) 5 MG tablet Take 1 tablet (5 mg) by mouth every 4 hours as needed for pain or moderate to severe pain 20 tablet 0     perphenazine 16 MG tablet Take 1 tablet (16 mg) by mouth 2 times daily 60 tablet 0     senna-docusate (SENOKOT-S/PERICOLACE) 8.6-50 MG tablet Take 1 tablet by mouth 2 times daily as needed        Medications reviewed:  Medications reconciled to facility chart and changes were made to reflect current medications as identified as above med list. Below are the changes that were made:   Medications stopped since last EPIC medication reconciliation:   There are no discontinued medications.    Medications started since last [facility] medication reconciliation:  No orders of the defined types were placed in this encounter.        REVIEW OF SYSTEMS:  4 point ROS including Respiratory, CV, GI and , other than that noted in the HPI,  is negative      Physical Exam:  /80   Pulse 95   Temp 98.1  F (36.7  C)   Resp 18   Wt 116.6 kg (257 lb)   SpO2 95%    BMI 44.11 kg/m    GENERAL APPEARANCE:  Alert, in no distress, oriented, cooperative  RESP:  respiratory effort and palpation of chest normal, lungs clear to auscultation , no respiratory distress  CV:  Palpation and auscultation of heart done , regular rate and rhythm, no murmur, rub, or gallop, no edema, +2 pedal pulses  ABDOMEN:  normal bowel sounds, soft, nontender, no hepatosplenomegaly or other masses, no guarding or rebound  M/S: NWB to RLE. Gait and station abnormal, requires assistance with activity and ADLS. Digits and nails normal  SKIN:  Inspection of skin and subcutaneous tissue baseline, Palpation of skin and subcutaneous tissue baseline. RLE brace on.   NEURO:   Cranial nerves 2-12 are normal tested and grossly at patient's baseline, Examination of sensation by touch normal  PSYCH:  Oriented X 3, flat affect, cognitive testing to be done in therapy      BP Readings from Last 3 Encounters:   02/05/19 117/80   01/24/19 125/77   01/23/19 135/61     Pulse Readings from Last 4 Encounters:   02/05/19 95   01/24/19 94   01/22/19 83   12/23/18 95     Wt Readings from Last 5 Encounters:   02/05/19 116.6 kg (257 lb)   01/24/19 122.5 kg (270 lb)   01/14/19 122.8 kg (270 lb 12.8 oz)   12/20/18 117.9 kg (260 lb)   04/25/18 116.4 kg (256 lb 11.2 oz)       Recent Labs:   CBC RESULTS:   Recent Labs   Lab Test 01/23/19  0611 01/21/19  0719  01/16/19  0635  12/23/18  0628   WBC  --  4.2  --   --   --  7.6   RBC  --  4.43  --   --   --  3.80   HGB  --  13.1  --  11.8   < > 11.4*   HCT  --  40.1  --   --   --  34.6*   MCV  --  91  --   --   --  91   MCH  --  29.6  --   --   --  30.0   MCHC  --  32.7  --   --   --  32.9   RDW  --  13.1  --   --   --  13.5    195   < >  --    < > 126*    < > = values in this interval not displayed.       Last Basic Metabolic Panel:  Recent Labs   Lab Test 01/21/19  0719 01/14/19  1654 12/23/18  0628     --  139   POTASSIUM 4.7  --  3.9   CHLORIDE 107  --  105   GIOVANY 8.6  --   8.2*   CO2 25  --  27   BUN 14  --  10   CR 0.68 0.76 0.74   GLC 82  --  98       Liver Function Studies -   Recent Labs   Lab Test 12/21/18  0720 10/05/17  1108   PROTTOTAL 7.0 7.7   ALBUMIN 3.2* 3.6   BILITOTAL 0.8 0.4   ALKPHOS 74 64   AST 21 18   ALT 23 22       TSH   Date Value Ref Range Status   09/21/2017 2.55 0.40 - 4.00 mU/L Final   10/20/2015 1.73 0.40 - 4.00 mU/L Final   ]    Lab Results   Component Value Date    A1C 5.0 04/12/2017           ASSESSMENT/PLAN:     (S82.141D) Closed fracture of right tibial plateau with routine healing, subsequent encounter  (primary encounter diagnosis)  (E66.01) Morbid obesity (H)  (Z86.718) History of deep venous thrombosis  Comment: S/p ORIF R tibia on 12/21. S/p revision of ORIF of right tibial plateau fracture on 1/14 with Dr. Panda. Hx DVT  Plan: Decrease oxycodone to 5mg q4hrs prn. ASA to be completed 3/12 dx DVT prophylaxis. Continue NWB to RLE. Patient to follow-up with Ortho as directed.     (F25.1) Schizoaffective disorder, depressive type (H)  (F60.3) Borderline personality disorder (H)  Comment: Chronic  Plan: Continue plan of care. Monitor changes in mood or behavior. Patient to follow-up with Psychiatry on 2/8.    (G31.84) Mild cognitive impairment  (R53.81) Physical deconditioning  Comment: Ongoing  Plan: Encourage active participation in therapy session to increase strength and promote independence in activities and ADLs. Cognitive testing to be done in therapy. SW following for discharge planning.             Electronically signed by  HELADIO Mcleod CNP

## 2019-02-14 VITALS
RESPIRATION RATE: 18 BRPM | OXYGEN SATURATION: 98 % | HEIGHT: 65 IN | HEART RATE: 100 BPM | BODY MASS INDEX: 44.42 KG/M2 | WEIGHT: 266.6 LBS | TEMPERATURE: 97.8 F | DIASTOLIC BLOOD PRESSURE: 81 MMHG | SYSTOLIC BLOOD PRESSURE: 115 MMHG

## 2019-02-14 ASSESSMENT — MIFFLIN-ST. JEOR: SCORE: 1915.17

## 2019-02-14 NOTE — PROGRESS NOTES
[place] [facility]    Chief Complaint   Patient presents with     care home Acute       [place] Medical Record Number:  [id number]  Place of Service where encounter took place:  [facility] - [place] ([facility]) [[id number]]    HPI:    Karolyn Banerjee is a 33 year old  (1985), who is being seen today for an episodic care visit.  HPI information obtained from: facility chart records, facility staff, patient report and [facility] chart review.    Today's concern is:    Closed fracture of right tibial plateau with routine healing, subsequent encounter  Slow transit constipation  History of deep venous thrombosis  Patient admitted to [facility] 1/14-1/23 due to failed fixation of right tibial hardware. Initially fell on ice on 12/20, s/p ORIF was on 12/21. S/p revision of ORIF of right tibial plateau fracture on 1/14 with Dr. Panda. Hx deep vein thrombosis (DVT) of brachial vein of right upper extremity 12/9/16. Completed lovenox every day x 2 weeks, now taking ASA 325mg every day DVT prophylaxis through 3/12/19. Currently NWB to RLE. During exam, admits to mild pain to RLE. Has been taking tylenol prn, oxycodone prn. Admits to constipation, has senna-s prn. Patient to follow-up with Ortho as directed.      Mild cognitive impairment  Physical deconditioning  PTA lives at group home, patient's mom is guardian. Patient is actively participating in therapy sessions. Ambulates 40ft with walker. Requires min assist with toileting and dressing due to NWB status. Cognitive testing to be done in therapy (CPT 4.8). SW following for discharge planning.             ALLERGIES: Clozapine; Liquid adhesive; Risperdal; and Adhesive tape  Past Medical, Surgical, Family and Social History reviewed and updated in [facility].    Current Outpatient Medications   Medication Sig Dispense Refill     ACETAMINOPHEN PO Take 650 mg by mouth 3 times daily       aspirin (ASA) 325 MG EC tablet Take 325 mg by mouth daily     "    benztropine (COGENTIN) 0.5 MG tablet Take three tabs (1.5 mg) at 5 PM. 90 tablet 0     benztropine (COGENTIN) 0.5 MG tablet Take 0.5 mg by mouth daily as needed for other (Eyes stuck in upward position)       cetirizine (ZYRTEC) 10 MG tablet Take 10 mg by mouth daily       norethindrone-ethinyl estradiol (MICROGESTIN 1/20) 1-20 MG-MCG per tablet Take 1 tablet by mouth daily       OLANZapine (ZYPREXA) 10 MG tablet Take 1 tablet (10 mg) by mouth 2 times daily as needed 30 tablet 1     Omega-3 Fatty Acids (FISH OIL PO) Take 1,000 mg by mouth daily HOLD WHILE IN [facility]       senna-docusate (SENOKOT-S/PERICOLACE) 8.6-50 MG tablet Take 1 tablet by mouth 2 times daily as needed        buPROPion HCl ER, XL, 450 MG TB24 Take 450 mg by mouth daily * 1TAB 30 tablet 0     lurasidone (LATUDA) 80 MG TABS tablet Take 2 tablets (160 mg) by mouth daily (with dinner) 60 tablet 0     perphenazine 16 MG tablet Take 1 tablet (16 mg) by mouth 2 times daily 60 tablet 0     Medications reviewed:  Medications reconciled to facility chart and changes were made to reflect current medications as identified as above med list. Below are the changes that were made:   Medications stopped since last EPIC medication reconciliation:   There are no discontinued medications.    Medications started since last [facility] medication reconciliation:  No orders of the defined types were placed in this encounter.          REVIEW OF SYSTEMS:  4 point ROS including Respiratory, CV, GI and , other than that noted in the HPI,  is negative      Physical Exam:  /81   Pulse 100   Temp 97.8  F (36.6  C)   Resp 18   Ht 1.651 m (5' 5\")   Wt 120.9 kg (266 lb 9.6 oz)   SpO2 98%   BMI 44.36 kg/m    GENERAL APPEARANCE:  Alert, in no distress, oriented, cooperative  RESP:  respiratory effort and palpation of chest normal, lungs clear to auscultation , no respiratory distress  CV:  Palpation and auscultation of heart done , regular rate and rhythm, no murmur, rub, " or gallop, no edema, +2 pedal pulses  ABDOMEN:  normal bowel sounds, soft, nontender, no hepatosplenomegaly or other masses, no guarding or rebound  M/S: NWB to RLE. Gait and station abnormal, requires assistance with activity and ADLS. Digits and nails normal  SKIN:  Inspection of skin and subcutaneous tissue baseline, Palpation of skin and subcutaneous tissue baseline. RLE brace on.   NEURO:   Cranial nerves 2-12 are normal tested and grossly at patient's baseline, Examination of sensation by touch normal  PSYCH:  Oriented X 3, flat affect, cognitive testing to be done in therapy (CPT 4.8)      BP Readings from Last 3 Encounters:   02/22/19 134/88   02/14/19 115/81   02/05/19 117/80     Pulse Readings from Last 4 Encounters:   02/22/19 85   02/14/19 100   02/05/19 95   01/24/19 94     Wt Readings from Last 5 Encounters:   02/22/19 138.6 kg (305 lb 8 oz)   02/14/19 120.9 kg (266 lb 9.6 oz)   02/05/19 116.6 kg (257 lb)   01/24/19 122.5 kg (270 lb)   01/14/19 122.8 kg (270 lb 12.8 oz)         Recent Labs:   CBC RESULTS:   Recent Labs   Lab Test 01/23/19  0611 01/21/19  0719  01/16/19  0635  12/23/18  0628   WBC  --  4.2  --   --   --  7.6   RBC  --  4.43  --   --   --  3.80   HGB  --  13.1  --  11.8   < > 11.4*   HCT  --  40.1  --   --   --  34.6*   MCV  --  91  --   --   --  91   MCH  --  29.6  --   --   --  30.0   MCHC  --  32.7  --   --   --  32.9   RDW  --  13.1  --   --   --  13.5    195   < >  --    < > 126*    < > = values in this interval not displayed.       Last Basic Metabolic Panel:  Recent Labs   Lab Test 01/21/19  0719 01/14/19  1654 12/23/18  0628     --  139   POTASSIUM 4.7  --  3.9   CHLORIDE 107  --  105   GIOVANY 8.6  --  8.2*   CO2 25  --  27   BUN 14  --  10   CR 0.68 0.76 0.74   GLC 82  --  98       Liver Function Studies -   Recent Labs   Lab Test 12/21/18  0720 10/05/17  1108   PROTTOTAL 7.0 7.7   ALBUMIN 3.2* 3.6   BILITOTAL 0.8 0.4   ALKPHOS 74 64   AST 21 18   ALT 23 22              Assessment/Plan:    (S82.141D) Closed fracture of right tibial plateau with routine healing, subsequent encounter  (primary encounter diagnosis)  (K59.01) Slow transit constipation  (Z86.718) History of deep venous thrombosis  Comment: S/p ORIF R tibia on 12/21. S/p revision of ORIF of right tibial plateau fracture on 1/14 with Dr. Panda. Hx DVT to RUE 2016.   Plan: Add senna-s 2 tabs every day x 4 days. Give additional senna-s 2 tabs today. Monitor bowel movements. Continue ASA through 3/12. Patient to follow-up with Ortho as directed.     (G31.84) Mild cognitive impairment  (R53.81) Physical deconditioning  Comment: Ongoing  Plan: Progress limited due to NWB status. Encourage active participation in therapy session to increase strength and promote independence in activities and ADLs. SW following for discharge planning.           Electronically signed by  HELADIO Mcleod CNP

## 2019-02-15 ENCOUNTER — NURSING HOME VISIT (OUTPATIENT)
Dept: GERIATRICS | Facility: CLINIC | Age: 34
End: 2019-02-15
Payer: MEDICAID

## 2019-02-15 DIAGNOSIS — Z86.718 HISTORY OF DEEP VENOUS THROMBOSIS: ICD-10-CM

## 2019-02-15 DIAGNOSIS — R53.81 PHYSICAL DECONDITIONING: ICD-10-CM

## 2019-02-15 DIAGNOSIS — K59.01 SLOW TRANSIT CONSTIPATION: ICD-10-CM

## 2019-02-15 DIAGNOSIS — G31.84 MILD COGNITIVE IMPAIRMENT: ICD-10-CM

## 2019-02-15 DIAGNOSIS — S82.141D CLOSED FRACTURE OF RIGHT TIBIAL PLATEAU WITH ROUTINE HEALING, SUBSEQUENT ENCOUNTER: Primary | ICD-10-CM

## 2019-02-15 PROCEDURE — 99309 SBSQ NF CARE MODERATE MDM 30: CPT | Performed by: NURSE PRACTITIONER

## 2019-02-19 DIAGNOSIS — F25.1 SCHIZOAFFECTIVE DISORDER, DEPRESSIVE TYPE (H): ICD-10-CM

## 2019-02-19 DIAGNOSIS — R46.89 AGGRESSION: ICD-10-CM

## 2019-02-19 DIAGNOSIS — F25.0 SCHIZOAFFECTIVE DISORDER, BIPOLAR TYPE (H): ICD-10-CM

## 2019-02-20 RX ORDER — LURASIDONE HYDROCHLORIDE 80 MG/1
160 TABLET, FILM COATED ORAL
Qty: 60 TABLET | Refills: 0 | Status: SHIPPED | OUTPATIENT
Start: 2019-02-20 | End: 2019-10-02

## 2019-02-20 RX ORDER — PERPHENAZINE 16 MG/1
16 TABLET ORAL 2 TIMES DAILY
Qty: 60 TABLET | Refills: 0 | Status: SHIPPED | OUTPATIENT
Start: 2019-02-20 | End: 2019-10-02

## 2019-02-20 RX ORDER — BUPROPION HYDROCHLORIDE 450 MG/1
450 TABLET, FILM COATED, EXTENDED RELEASE ORAL DAILY
Qty: 30 TABLET | Refills: 0 | Status: SHIPPED | OUTPATIENT
Start: 2019-02-20 | End: 2019-10-02

## 2019-02-20 NOTE — TELEPHONE ENCOUNTER
Medication requested: perphenazine 16 MG   Last refilled:  UNK  Qty:60  Medication requested: lurasidone (LATUDA) 80 MG   Last refilled: UNK  Qty: 60  Medication requested:  BuPROPion HCl ER, XL, 450 MG   Last refilled: UNK  Qty: 30       Last seen:  11/2/18  RTC: 4 WEEKS  Cancel: 0  No-show:1  Next appt: not scheduled   Refill decision: Refill pended and routed to the provider for review/determination due to NO SHOW X 1  Pt outside of RTC timeframe.  Scheduling has been notified to contact the pt for appointment.

## 2019-02-22 ENCOUNTER — NURSING HOME VISIT (OUTPATIENT)
Dept: GERIATRICS | Facility: CLINIC | Age: 34
End: 2019-02-22
Payer: MEDICAID

## 2019-02-22 VITALS
OXYGEN SATURATION: 94 % | DIASTOLIC BLOOD PRESSURE: 88 MMHG | RESPIRATION RATE: 16 BRPM | HEART RATE: 85 BPM | TEMPERATURE: 97.2 F | WEIGHT: 293 LBS | BODY MASS INDEX: 48.82 KG/M2 | HEIGHT: 65 IN | SYSTOLIC BLOOD PRESSURE: 134 MMHG

## 2019-02-22 DIAGNOSIS — R53.81 PHYSICAL DECONDITIONING: ICD-10-CM

## 2019-02-22 DIAGNOSIS — Z86.718 HISTORY OF DEEP VENOUS THROMBOSIS: ICD-10-CM

## 2019-02-22 DIAGNOSIS — G31.84 MILD COGNITIVE IMPAIRMENT: ICD-10-CM

## 2019-02-22 DIAGNOSIS — S82.141D CLOSED FRACTURE OF RIGHT TIBIAL PLATEAU WITH ROUTINE HEALING, SUBSEQUENT ENCOUNTER: Primary | ICD-10-CM

## 2019-02-22 DIAGNOSIS — K59.01 SLOW TRANSIT CONSTIPATION: ICD-10-CM

## 2019-02-22 PROCEDURE — 99309 SBSQ NF CARE MODERATE MDM 30: CPT | Performed by: NURSE PRACTITIONER

## 2019-02-22 ASSESSMENT — MIFFLIN-ST. JEOR: SCORE: 2091.62

## 2019-02-22 NOTE — PROGRESS NOTES
[place] [facility]    Chief Complaint   Patient presents with     residential Acute       [place] Medical Record Number:  [id number]  Place of Service where encounter took place:  [facility] - [place] ([facility]) [[id number]]    HPI:    Karolyn Banerjee is a 33 year old  (1985), who is being seen today for an episodic care visit.  HPI information obtained from: facility chart records, facility staff, patient report and [facility] chart review.    Today's concern is:    Closed fracture of right tibial plateau with routine healing, subsequent encounter  Slow transit constipation  History of deep venous thrombosis  Patient admitted to [facility] 1/14-1/23 due to failed fixation of right tibial hardware. Initially fell on ice on 12/20, s/p ORIF was on 12/21. S/p revision of ORIF of right tibial plateau fracture on 1/14 with Dr. Panda. Hx deep vein thrombosis (DVT) of brachial vein of right upper extremity 12/9/16. Completed lovenox every day x 2 weeks, now taking ASA 325mg every day DVT prophylaxis through 3/12/19. Currently NWB to RLE. During exam, admits to mild pain to RLE. Has been taking tylenol prn, oxycodone prn. Denies constipation, has senna-s prn. Patient to follow-up with Ortho as directed.      Mild cognitive impairment  Physical deconditioning  PTA lives at group home, patient's mom is guardian. Therapy stopped 2/21 until weight bearing status increased. Ambulates 40-60ft with walker. Requires min assist with toileting and dressing due to NWB status. Cognitive testing to be done in therapy (CPT 4.8). SW following for discharge planning.        ALLERGIES: Clozapine; Liquid adhesive; Risperdal; and Adhesive tape  Past Medical, Surgical, Family and Social History reviewed and updated in [facility].    Current Outpatient Medications   Medication Sig Dispense Refill     ACETAMINOPHEN PO Take 650 mg by mouth 3 times daily       aspirin (ASA) 325 MG EC tablet Take 325 mg by mouth daily     "    benztropine (COGENTIN) 0.5 MG tablet Take three tabs (1.5 mg) at 5 PM. 90 tablet 0     benztropine (COGENTIN) 0.5 MG tablet Take 0.5 mg by mouth daily as needed for other (Eyes stuck in upward position)       buPROPion HCl ER, XL, 450 MG TB24 Take 450 mg by mouth daily * 1TAB 30 tablet 0     cetirizine (ZYRTEC) 10 MG tablet Take 10 mg by mouth daily       lurasidone (LATUDA) 80 MG TABS tablet Take 2 tablets (160 mg) by mouth daily (with dinner) 60 tablet 0     norethindrone-ethinyl estradiol (MICROGESTIN 1/20) 1-20 MG-MCG per tablet Take 1 tablet by mouth daily       OLANZapine (ZYPREXA) 10 MG tablet Take 1 tablet (10 mg) by mouth 2 times daily as needed 30 tablet 1     Omega-3 Fatty Acids (FISH OIL PO) Take 1,000 mg by mouth daily HOLD WHILE IN TCU       perphenazine 16 MG tablet Take 1 tablet (16 mg) by mouth 2 times daily 60 tablet 0     senna-docusate (SENOKOT-S/PERICOLACE) 8.6-50 MG tablet Take 1 tablet by mouth 2 times daily as needed        Medications reviewed:  Medications reconciled to facility chart and changes were made to reflect current medications as identified as above med list. Below are the changes that were made:   Medications stopped since last EPIC medication reconciliation:   There are no discontinued medications.    Medications started since last [facility] medication reconciliation:  No orders of the defined types were placed in this encounter.          REVIEW OF SYSTEMS:  4 point ROS including Respiratory, CV, GI and , other than that noted in the HPI,  is negative      Physical Exam:  /88   Pulse 85   Temp 97.2  F (36.2  C)   Resp 16   Ht 1.651 m (5' 5\")   Wt 138.6 kg (305 lb 8 oz)   SpO2 94%   BMI 50.84 kg/m    GENERAL APPEARANCE:  Alert, in no distress, oriented, cooperative  RESP:  respiratory effort and palpation of chest normal, lungs clear to auscultation , no respiratory distress  CV:  Palpation and auscultation of heart done , regular rate and rhythm, no murmur, rub, or " gallop, no edema, +2 pedal pulses  ABDOMEN:  normal bowel sounds, soft, nontender, no hepatosplenomegaly or other masses, no guarding or rebound  M/S: NWB to RLE. Gait and station abnormal, requires assistance with activity and ADLS. Digits and nails normal  SKIN:  Inspection of skin and subcutaneous tissue baseline, Palpation of skin and subcutaneous tissue baseline. RLE brace on.   NEURO:   Cranial nerves 2-12 are normal tested and grossly at patient's baseline, Examination of sensation by touch normal  PSYCH:  Oriented X 3, flat affect, cognitive testing to be done in therapy (CPT 4.8)      BP Readings from Last 3 Encounters:   02/22/19 134/88   02/14/19 115/81   02/05/19 117/80     Pulse Readings from Last 4 Encounters:   02/22/19 85   02/14/19 100   02/05/19 95   01/24/19 94     Wt Readings from Last 5 Encounters:   02/22/19 138.6 kg (305 lb 8 oz)   02/14/19 120.9 kg (266 lb 9.6 oz)   02/05/19 116.6 kg (257 lb)   01/24/19 122.5 kg (270 lb)   01/14/19 122.8 kg (270 lb 12.8 oz)         Recent Labs:   CBC RESULTS:   Recent Labs   Lab Test 01/23/19  0611 01/21/19  0719  01/16/19  0635  12/23/18  0628   WBC  --  4.2  --   --   --  7.6   RBC  --  4.43  --   --   --  3.80   HGB  --  13.1  --  11.8   < > 11.4*   HCT  --  40.1  --   --   --  34.6*   MCV  --  91  --   --   --  91   MCH  --  29.6  --   --   --  30.0   MCHC  --  32.7  --   --   --  32.9   RDW  --  13.1  --   --   --  13.5    195   < >  --    < > 126*    < > = values in this interval not displayed.       Last Basic Metabolic Panel:  Recent Labs   Lab Test 01/21/19  0719 01/14/19  1654 12/23/18  0628     --  139   POTASSIUM 4.7  --  3.9   CHLORIDE 107  --  105   GIOVANY 8.6  --  8.2*   CO2 25  --  27   BUN 14  --  10   CR 0.68 0.76 0.74   GLC 82  --  98       Liver Function Studies -   Recent Labs   Lab Test 12/21/18  0720 10/05/17  1108   PROTTOTAL 7.0 7.7   ALBUMIN 3.2* 3.6   BILITOTAL 0.8 0.4   ALKPHOS 74 64   AST 21 18   ALT 23 22          Assessment/Plan:     (S82.308D) Closed fracture of right tibial plateau with routine healing, subsequent encounter  (primary encounter diagnosis)  (K59.01) Slow transit constipation  (Z86.718) History of deep venous thrombosis  Comment: S/p ORIF R tibia on 12/21. S/p revision of ORIF of right tibial plateau fracture on 1/14 with Dr. Panda. Hx DVT to RUE 2016. Constipation controlled.  Plan: Continue plan of care. Monitor bowel movements. Discontinue oxycodone prn and robaxin prn. Continue ASA through 3/12. Patient to follow-up with Ortho as directed.      (G31.84) Mild cognitive impairment  (R53.81) Physical deconditioning  Comment: Ongoing  Plan: Progress limited due to NWB status; therapy stopped 2/21 until weight bearing status increased. SW following for discharge planning.             Electronically signed by  HELADIO Mcleod CNP

## 2019-03-07 ENCOUNTER — NURSING HOME VISIT (OUTPATIENT)
Dept: GERIATRICS | Facility: CLINIC | Age: 34
End: 2019-03-07
Payer: MEDICAID

## 2019-03-07 VITALS
BODY MASS INDEX: 45.67 KG/M2 | OXYGEN SATURATION: 96 % | RESPIRATION RATE: 18 BRPM | WEIGHT: 274.1 LBS | TEMPERATURE: 97.9 F | SYSTOLIC BLOOD PRESSURE: 121 MMHG | HEIGHT: 65 IN | DIASTOLIC BLOOD PRESSURE: 82 MMHG | HEART RATE: 85 BPM

## 2019-03-07 DIAGNOSIS — Z86.718 HISTORY OF DEEP VENOUS THROMBOSIS: ICD-10-CM

## 2019-03-07 DIAGNOSIS — S82.141D CLOSED FRACTURE OF RIGHT TIBIAL PLATEAU WITH ROUTINE HEALING, SUBSEQUENT ENCOUNTER: Primary | ICD-10-CM

## 2019-03-07 DIAGNOSIS — E66.01 MORBID OBESITY (H): ICD-10-CM

## 2019-03-07 DIAGNOSIS — G31.84 MILD COGNITIVE IMPAIRMENT: ICD-10-CM

## 2019-03-07 DIAGNOSIS — K59.01 SLOW TRANSIT CONSTIPATION: ICD-10-CM

## 2019-03-07 DIAGNOSIS — R53.81 PHYSICAL DECONDITIONING: ICD-10-CM

## 2019-03-07 PROCEDURE — 99309 SBSQ NF CARE MODERATE MDM 30: CPT | Performed by: NURSE PRACTITIONER

## 2019-03-07 ASSESSMENT — MIFFLIN-ST. JEOR: SCORE: 1949.19

## 2019-03-07 NOTE — PROGRESS NOTES
[place] [facility]  [place] Medical Record Number:  [id number]  Place of Service where encounter took place:  [facility] - [place] ([facility]) [[id number]]  Chief Complaint   Patient presents with     Nursing Home Acute       HPI:    Karolyn Banerjee  is a 33 year old (1985), who is being seen today for an episodic care visit.  HPI information obtained from: facility chart records, facility staff, patient report and [facility] chart review.     Today's concern is:    Closed fracture of right tibial plateau with routine healing, subsequent encounter  Morbid obesity (H)  Slow transit constipation  History of deep venous thrombosis  Patient admitted to [facility] 1/14-1/23 due to failed fixation of right tibial hardware. Initially fell on ice on 12/20, s/p ORIF was on 12/21. S/p revision of ORIF of right tibial plateau fracture on 1/14 with Dr. Panda. Hx deep vein thrombosis (DVT) of brachial vein of right upper extremity 12/9/16. Completed lovenox every day x 2 weeks, now taking ASA 325mg every day DVT prophylaxis through 3/12/19. Currently NWB to RLE. During exam, admits to mild pain to RLE. Has been taking tylenol prn; has oxycodone prn available but not regularly using. Denies constipation, has senna-s prn. Patient to follow-up with Ortho on 3/15.     Body mass index is 45.61 kg/m .       Mild cognitive impairment  Physical deconditioning  PTA lives at group home, patient's mom is guardian. Therapy stopped 2/21 until weight bearing status increased. Ambulates 40-60ft with walker. Requires min assist with toileting and dressing due to NWB status. Cognitive testing to be done in therapy (CPT 4.8). SW following for discharge planning.          Past Medical and Surgical History reviewed in [facility] today.        REVIEW OF SYSTEMS:  4 point ROS including Respiratory, CV, GI and , other than that noted in the HPI,  is negative      Objective:  /82   Pulse 85   Temp 97.9  F (36.6  C)    "Resp 18   Ht 1.651 m (5' 5\")   Wt 124.3 kg (274 lb 1.6 oz)   SpO2 96%   BMI 45.61 kg/m    Exam:  GENERAL APPEARANCE:  Alert, in no distress, oriented, cooperative  RESP:  respiratory effort and palpation of chest normal, lungs clear to auscultation , no respiratory distress  CV:  Palpation and auscultation of heart done , regular rate and rhythm, no murmur, rub, or gallop, no edema, +2 pedal pulses  ABDOMEN:  normal bowel sounds, soft, nontender, no hepatosplenomegaly or other masses, no guarding or rebound  M/S: NWB to RLE. Gait and station abnormal, requires assistance with activity and ADLS. Digits and nails normal  SKIN:  Inspection of skin and subcutaneous tissue baseline, Palpation of skin and subcutaneous tissue baseline. RLE brace on.   NEURO:   Cranial nerves 2-12 are normal tested and grossly at patient's baseline, Examination of sensation by touch normal  PSYCH:  Oriented X 3, flat affect, cognitive testing (CPT 4.8)      BP Readings from Last 3 Encounters:   03/07/19 121/82   02/22/19 134/88   02/14/19 115/81     Pulse Readings from Last 4 Encounters:   03/07/19 85   02/22/19 85   02/14/19 100   02/05/19 95     Wt Readings from Last 5 Encounters:   03/07/19 124.3 kg (274 lb 1.6 oz)   02/22/19 138.6 kg (305 lb 8 oz)   02/14/19 120.9 kg (266 lb 9.6 oz)   02/05/19 116.6 kg (257 lb)   01/24/19 122.5 kg (270 lb)       Labs:   Labs done in SNF are in [facility]. Please refer to them using EPIC/Care Everywhere., Recent labs in EPIC reviewed by me today.  and   Most Recent 3 CBC's:  Recent Labs   Lab Test 01/23/19  0611 01/21/19  0719 01/20/19  0650  01/16/19  0635 01/15/19  1350  12/23/18  0628 12/22/18  0642   WBC  --  4.2  --   --   --   --   --  7.6 9.3   HGB  --  13.1  --   --  11.8 12.7  --  11.4* 12.8   MCV  --  91  --   --   --   --   --  91 91    195 173   < >  --   --    < > 126* 152    < > = values in this interval not displayed.     Most Recent 3 BMP's:  Recent Labs   Lab Test " 01/21/19  0719 01/14/19  1654 12/23/18  0628 12/22/18  0642     --  139 137   POTASSIUM 4.7  --  3.9 4.2   CHLORIDE 107  --  105 104   CO2 25  --  27 25   BUN 14  --  10 8   CR 0.68 0.76 0.74 0.77   ANIONGAP 8  --  7 8   GIOVANY 8.6  --  8.2* 8.5   GLC 82  --  98 150*           ASSESSMENT/PLAN:    (S82.141D) Closed fracture of right tibial plateau with routine healing, subsequent encounter  (primary encounter diagnosis)  (E66.01) Morbid obesity (H)  (K59.01) Slow transit constipation  (Z86.718) History of deep venous thrombosis  Comment: S/p ORIF R tibia on 12/21. S/p revision of ORIF of right tibial plateau fracture on 1/14 with Dr. Panda. Hx DVT to RUE.  Plan: Continue NWB to RLE. Continue ASA 325mg every day until weight bearing status increases. Patient to follow-up with Ortho on 3/15.     (G31.84) Mild cognitive impairment  (R53.81) Physical deconditioning  Comment: Ongoing  Plan: Continue plan of care, restart therapy once weight bearing restriction is increased. SW following for discharge planning.             Electronically signed by:  HELADIO Mcleod CNP

## 2019-03-19 ENCOUNTER — TELEPHONE (OUTPATIENT)
Dept: GERIATRICS | Facility: CLINIC | Age: 34
End: 2019-03-19

## 2019-03-19 NOTE — TELEPHONE ENCOUNTER
[place] [facility] TELEPHONE ENCOUNTER    Muscles spasm, and eyes are rolled up    Karolyn Banerjee is a 33 year old  (1985),Nurse called today to reports benztropine was stopped on March 5th the scheduled and prn. Now pt has symptoms of rolling eye upward, and muscles spasm    ASSESSMENT/PLAN    After reviewed the record, the meds was stopped without GDR on March 5th, Pt was seen by NP on March 7th, nothing in the note regarding Benztropine discontinue.     Will resume the previous regimen, 1.5 mg daily and 0.5 mg once a day prn.     If GDR is warranted, consider slow GDR.     Jeffery Hernandez MD

## 2019-03-21 ENCOUNTER — NURSING HOME VISIT (OUTPATIENT)
Dept: GERIATRICS | Facility: CLINIC | Age: 34
End: 2019-03-21
Payer: MEDICAID

## 2019-03-21 VITALS
SYSTOLIC BLOOD PRESSURE: 141 MMHG | RESPIRATION RATE: 18 BRPM | HEART RATE: 101 BPM | OXYGEN SATURATION: 96 % | DIASTOLIC BLOOD PRESSURE: 91 MMHG | BODY MASS INDEX: 46.44 KG/M2 | WEIGHT: 272 LBS | HEIGHT: 64 IN | TEMPERATURE: 99.2 F

## 2019-03-21 DIAGNOSIS — F60.3 BORDERLINE PERSONALITY DISORDER (H): ICD-10-CM

## 2019-03-21 DIAGNOSIS — F25.1 SCHIZOAFFECTIVE DISORDER, DEPRESSIVE TYPE (H): ICD-10-CM

## 2019-03-21 DIAGNOSIS — G31.84 MILD COGNITIVE IMPAIRMENT: ICD-10-CM

## 2019-03-21 DIAGNOSIS — S82.141D CLOSED FRACTURE OF RIGHT TIBIAL PLATEAU WITH ROUTINE HEALING, SUBSEQUENT ENCOUNTER: Primary | ICD-10-CM

## 2019-03-21 DIAGNOSIS — R53.81 PHYSICAL DECONDITIONING: ICD-10-CM

## 2019-03-21 DIAGNOSIS — E66.01 MORBID OBESITY (H): ICD-10-CM

## 2019-03-21 PROCEDURE — 99309 SBSQ NF CARE MODERATE MDM 30: CPT | Performed by: NURSE PRACTITIONER

## 2019-03-21 RX ORDER — BENZTROPINE MESYLATE 0.5 MG/1
1.5 TABLET ORAL DAILY
COMMUNITY
End: 2019-10-02

## 2019-03-21 ASSESSMENT — MIFFLIN-ST. JEOR: SCORE: 1923.78

## 2019-03-21 NOTE — PROGRESS NOTES
[place] [facility]  [place] Medical Record Number:  [id number]  Place of Service where encounter took place:  [facility] ([facility]) [[id number]]  Chief Complaint   Patient presents with     Nursing Home Acute       HPI:    Karolyn Banerjee  is a 33 year old (1985), who is being seen today for an episodic care visit.  HPI information obtained from: facility chart records, facility staff, patient report and [facility] chart review.     Today's concern is:    Closed fracture of right tibial plateau with routine healing, subsequent encounter  Morbid obesity (H)  History of deep venous thrombosis  Patient admitted to [facility] 1/14-1/23 due to failed fixation of right tibial hardware. Initially fell on ice on 12/20, s/p ORIF was on 12/21. S/p revision of ORIF of right tibial plateau fracture on 1/14 with Dr. Panda. Hx deep vein thrombosis (DVT) of brachial vein of right upper extremity 12/9/16. Currently NWB to RLE. During exam, admits to mild pain to RLE. Has been taking tylenol prn; has oxycodone prn available but not regularly using. Denies constipation, has senna-s prn. Patient to follow-up with Ortho as directed.      Body mass index is 46.69 kg/m .     Schizoaffective disorder, depressive type (H)  Borderline personality disorder (H)  Currently taking cogentin, buproprion, latuda, perphenazine, zyprexa. Unfortunately, patient's cogentin was discontinued beginning of 03/2019. Per chart review, patient had episode of eye rolling a few pena ago and cogentin was restarted by on-call provider.   During exam, patient reports has had episodes of eye rolling in the past and has been controlled on cogentin. States since cogentin resumed, sx have resolved.   Patient to follow-up with Psychiatrist as directed.     Mild cognitive impairment  Physical deconditioning  PTA lives at group home, patient's mom is guardian. Therapy stopped 2/21 until weight bearing status increased. Ambulates  "40-60ft with walker. Requires min assist with toileting and dressing due to NWB status. Cognitive testing to be done in therapy (CPT 4.8). SW following for discharge planning.            Past Medical and Surgical History reviewed in Epic today.    MEDICATIONS:  Current Outpatient Medications   Medication Sig Dispense Refill     ACETAMINOPHEN PO Take 650 mg by mouth 2 times daily        benztropine (COGENTIN) 0.5 MG tablet Take 1.5 mg by mouth daily And 0.5mg daily as needed       buPROPion HCl ER, XL, 450 MG TB24 Take 450 mg by mouth daily * 1TAB 30 tablet 0     cetirizine (ZYRTEC) 10 MG tablet Take 10 mg by mouth daily       lurasidone (LATUDA) 80 MG TABS tablet Take 2 tablets (160 mg) by mouth daily (with dinner) 60 tablet 0     norethindrone-ethinyl estradiol (MICROGESTIN 1/20) 1-20 MG-MCG per tablet Take 1 tablet by mouth daily Hold until 3/25       Omega-3 Fatty Acids (FISH OIL PO) Take 1,000 mg by mouth daily HOLD WHILE IN TCU       perphenazine 16 MG tablet Take 1 tablet (16 mg) by mouth 2 times daily 60 tablet 0     senna-docusate (SENOKOT-S/PERICOLACE) 8.6-50 MG tablet Take 1 tablet by mouth 2 times daily as needed              REVIEW OF SYSTEMS:  4 point ROS including Respiratory, CV, GI and , other than that noted in the HPI,  is negative      Objective:  BP (!) 141/91   Pulse 101   Temp 99.2  F (37.3  C)   Resp 18   Ht 1.626 m (5' 4\")   Wt 123.4 kg (272 lb)   SpO2 96%   BMI 46.69 kg/m    Exam:  GENERAL APPEARANCE:  Alert, in no distress, oriented, cooperative  RESP:  respiratory effort and palpation of chest normal, lungs clear to auscultation , no respiratory distress  CV:  Palpation and auscultation of heart done , regular rate and rhythm, no murmur, rub, or gallop, no edema, +2 pedal pulses  ABDOMEN:  normal bowel sounds, soft, nontender, no hepatosplenomegaly or other masses, no guarding or rebound  M/S: NWB to RLE. Gait and station abnormal, requires assistance with activity and " ADLS. Digits and nails normal  SKIN:  Inspection of skin and subcutaneous tissue baseline, Palpation of skin and subcutaneous tissue baseline. RLE brace on.   NEURO:   Cranial nerves 2-12 are normal tested and grossly at patient's baseline, Examination of sensation by touch normal  PSYCH:  Oriented X 3, flat affect, cognitive testing (CPT 4.8)        Labs:   Labs done in SNF are in [facility]. Please refer to them using [facility] Everywhere., Recent labs in EPIC reviewed by me today.  and   Most Recent 3 CBC's:  Recent Labs   Lab Test 01/23/19  0611 01/21/19  0719 01/20/19  0650  01/16/19  0635 01/15/19  1350  12/23/18  0628 12/22/18  0642   WBC  --  4.2  --   --   --   --   --  7.6 9.3   HGB  --  13.1  --   --  11.8 12.7  --  11.4* 12.8   MCV  --  91  --   --   --   --   --  91 91    195 173   < >  --   --    < > 126* 152    < > = values in this interval not displayed.     Most Recent 3 BMP's:  Recent Labs   Lab Test 01/21/19  0719 01/14/19  1654 12/23/18  0628 12/22/18  0642     --  139 137   POTASSIUM 4.7  --  3.9 4.2   CHLORIDE 107  --  105 104   CO2 25  --  27 25   BUN 14  --  10 8   CR 0.68 0.76 0.74 0.77   ANIONGAP 8  --  7 8   GIOVANY 8.6  --  8.2* 8.5   GLC 82  --  98 150*          ASSESSMENT/PLAN:    (U62.141D) Closed fracture of right tibial plateau with routine healing, subsequent encounter  (primary encounter diagnosis)  (E66.01) Morbid obesity (H)  Comment: S/p ORIF R tibia on 12/21. S/p revision of ORIF of right tibial plateau fracture on 1/14 with Dr. Panda. Hx DVT.   Plan: Discontinue scheduled acetaminophen. Add acetaminophen 650mg q4hrs prn.   Patient to follow-up with Ortho as directed.     (F25.1) Schizoaffective disorder, depressive type (H)  (F60.3) Borderline personality disorder (H)  Comment: Episode of eye rolling a few days ago with abrupt discontinuation of cogentin.   Plan: Continue plan of care. Monitor sx withdrawal. Monitor changes in mood/behavior. Patient to  follow-up with Psychiatrist as directed.      (G31.84) Mild cognitive impairment  (R53.81) Physical deconditioning  Comment: Ongoing  Plan: Encourage active participation in therapy session to increase strength and promote independence in activities and ADLs. SW following for discharge planning.           Electronically signed by:  HELADIO Mcleod CNP

## 2019-04-18 ENCOUNTER — NURSING HOME VISIT (OUTPATIENT)
Dept: GERIATRICS | Facility: CLINIC | Age: 34
End: 2019-04-18
Payer: MEDICAID

## 2019-04-18 VITALS
HEART RATE: 93 BPM | SYSTOLIC BLOOD PRESSURE: 116 MMHG | BODY MASS INDEX: 48.9 KG/M2 | WEIGHT: 286.4 LBS | RESPIRATION RATE: 18 BRPM | DIASTOLIC BLOOD PRESSURE: 63 MMHG | TEMPERATURE: 97.2 F | HEIGHT: 64 IN | OXYGEN SATURATION: 93 %

## 2019-04-18 DIAGNOSIS — R53.81 PHYSICAL DECONDITIONING: ICD-10-CM

## 2019-04-18 DIAGNOSIS — S82.141D CLOSED FRACTURE OF RIGHT TIBIAL PLATEAU WITH ROUTINE HEALING, SUBSEQUENT ENCOUNTER: Primary | ICD-10-CM

## 2019-04-18 DIAGNOSIS — G31.84 MILD COGNITIVE IMPAIRMENT: ICD-10-CM

## 2019-04-18 PROCEDURE — 99309 SBSQ NF CARE MODERATE MDM 30: CPT | Performed by: NURSE PRACTITIONER

## 2019-04-18 ASSESSMENT — MIFFLIN-ST. JEOR: SCORE: 1989.1

## 2019-04-18 NOTE — PROGRESS NOTES
[place] [facility]  [place] Medical Record Number:  [id number]  Place of Service where encounter took place:  [facility] ([facility] [[id number]]  Chief Complaint   Patient presents with     RECHECK       HPI:    Karolyn Banerjee  is a 33 year old (1985), who is being seen today for an episodic care visit.  HPI information obtained from: facility chart records, facility staff, patient report and [facility] chart review.     Today's concern is:    Closed fracture of right tibial plateau with routine healing, subsequent encounter  Patient admitted to [facility] 1/14-1/23 due to failed fixation of right tibial hardware. Initially fell on ice on 12/20, s/p ORIF was on 12/21. S/p revision of ORIF of right tibial plateau fracture on 1/14 with Dr. Panda. Completed ASA 325mg BID x 6 weeks for DVT prophylaxis. Hx deep vein thrombosis (DVT) of brachial vein of right upper extremity 12/9/16. Continues to be NWB to RLE. During exam, denies pain to RLE. Has been taking tylenol prn. Denies constipation, has senna-s prn. Patient to follow-up with Ortho as directed.     Mild cognitive impairment  Physical deconditioning  PTA lives at group home, patient's mom is guardian. Therapy stopped 2/21 until weight bearing status increased. Ambulates 40-60ft with walker. Requires min assist with toileting and dressing due to NWB status. Cognitive testing done in therapy (CPT 4.8). SW following for discharge planning.           Past Medical and Surgical History reviewed in Epic today.    MEDICATIONS:  Current Outpatient Medications   Medication Sig Dispense Refill     ACETAMINOPHEN PO Take 650 mg by mouth every 4 hours as needed        benztropine (COGENTIN) 0.5 MG tablet Take 1.5 mg by mouth daily And 0.5mg daily as needed       buPROPion HCl ER, XL, 450 MG TB24 Take 450 mg by mouth daily * 1TAB 30 tablet 0     cetirizine (ZYRTEC) 10 MG tablet Take 10 mg by mouth daily       lurasidone (LATUDA) 80 MG  "TABS tablet Take 2 tablets (160 mg) by mouth daily (with dinner) 60 tablet 0     norethindrone-ethinyl estradiol (MICROGESTIN 1/20) 1-20 MG-MCG per tablet Take 1 tablet by mouth daily        perphenazine 16 MG tablet Take 1 tablet (16 mg) by mouth 2 times daily 60 tablet 0     senna-docusate (SENOKOT-S/PERICOLACE) 8.6-50 MG tablet Take 1 tablet by mouth 2 times daily as needed        Omega-3 Fatty Acids (FISH OIL PO) Take 1,000 mg by mouth daily HOLD WHILE IN TCU             REVIEW OF SYSTEMS:  4 point ROS including Respiratory, CV, GI and , other than that noted in the HPI,  is negative      Objective:  /63   Pulse 93   Temp 97.2  F (36.2  C)   Resp 18   Ht 1.626 m (5' 4\")   Wt 129.9 kg (286 lb 6.4 oz)   SpO2 93%   BMI 49.16 kg/m    Exam:  GENERAL APPEARANCE:  Alert, in no distress, oriented, cooperative  RESP:  respiratory effort and palpation of chest normal, lungs clear to auscultation , no respiratory distress  CV:  Palpation and auscultation of heart done , regular rate and rhythm, no murmur, rub, or gallop, no edema, +2 pedal pulses  ABDOMEN:  normal bowel sounds, soft, nontender, no guarding or rebound  M/S: NWB to RLE. Gait and station abnormal, requires assistance with activity and ADLS. Digits and nails normal  SKIN:  Inspection of skin and subcutaneous tissue baseline, Palpation of skin and subcutaneous tissue baseline. RLE brace on.   NEURO:   Cranial nerves 2-12 are normal tested and grossly at patient's baseline, Examination of sensation by touch normal  PSYCH:  Oriented X 3, flat affect, cognitive testing (CPT 4.8)        Labs:   Labs done in SNF are in [facility]. Please refer to them using [facility] Everywhere., Recent labs in EPIC reviewed by me today.  and   Most Recent 3 CBC's:  Recent Labs   Lab Test 01/23/19  0611 01/21/19  0719 01/20/19  0650  01/16/19  0635 01/15/19  1350  12/23/18  0628 12/22/18  0642   WBC  --  4.2  --   --   --   --   --  7.6 9.3   HGB  --  13.1  --   -- "  11.8 12.7  --  11.4* 12.8   MCV  --  91  --   --   --   --   --  91 91    195 173   < >  --   --    < > 126* 152    < > = values in this interval not displayed.     Most Recent 3 BMP's:  Recent Labs   Lab Test 01/21/19  0719 01/14/19  1654 12/23/18  0628 12/22/18  0642     --  139 137   POTASSIUM 4.7  --  3.9 4.2   CHLORIDE 107  --  105 104   CO2 25  --  27 25   BUN 14  --  10 8   CR 0.68 0.76 0.74 0.77   ANIONGAP 8  --  7 8   GIOVANY 8.6  --  8.2* 8.5   GLC 82  --  98 150*           ASSESSMENT/PLAN:    (S82.141D) Closed fracture of right tibial plateau with routine healing, subsequent encounter  (primary encounter diagnosis)  Comment: S/p ORIF R tibia on 12/21. S/p revision of ORIF of right tibial plateau fracture on 1/14 with Dr. Panda.  Plan: Continue plan of care. Discontinue senna-s prn. Continue NWB to RLE. Patient to follow-up with Ortho as directed.    (G31.84) Mild cognitive impairment  (R53.81) Physical deconditioning  Comment: Ongoing  Plan: Encourage active participation in therapy session to increase strength and promote independence in activities and ADLs. SW following for discharge planning.     Medications in PCC reviewed, Microgestin noted to be scheduled continuously. Has been held x 2 every 3 weeks via on-call providers.   Reviewed with patient and plan includes: Microgestin last held 3/17-3/24. Clarification to give Microgestin x 21 days, then hold x 7 days; then repeat cycle.           Electronically signed by:  HELADIO Mcleod CNP

## 2019-05-09 ENCOUNTER — TELEPHONE (OUTPATIENT)
Dept: PSYCHIATRY | Facility: CLINIC | Age: 34
End: 2019-05-09

## 2019-05-09 NOTE — TELEPHONE ENCOUNTER
received 3 Physician's Orders from [facility] [employer], [facility]. Jonathanr placed the originals in Dr. Joyce folder and a copy was held at writers desk until completed forms returned. A message was routed to Dr. Joyce.

## 2019-05-21 VITALS
OXYGEN SATURATION: 94 % | HEART RATE: 88 BPM | DIASTOLIC BLOOD PRESSURE: 99 MMHG | HEIGHT: 64 IN | TEMPERATURE: 98.8 F | SYSTOLIC BLOOD PRESSURE: 152 MMHG | BODY MASS INDEX: 49.9 KG/M2 | RESPIRATION RATE: 20 BRPM | WEIGHT: 292.3 LBS

## 2019-05-21 ASSESSMENT — MIFFLIN-ST. JEOR: SCORE: 2015.86

## 2019-05-21 NOTE — PROGRESS NOTES
[place] [facility]  [place] Medical Record Number:  [id number]  Place of Service where encounter took place:  [facility] ([facility] [[id number]]  Chief Complaint   Patient presents with     Nursing Home Acute       HPI:    Karolyn Banerjee  is a 33 year old (1985), who is being seen today for an episodic care visit.  HPI information obtained from: facility chart records, facility staff, patient report and [facility] chart review.     Per recent TCU NP note:  Patient admitted to [facility] 1/14-1/23 due to failed fixation of right tibial hardware. Initially fell on ice on 12/20, s/p ORIF was on 12/21. S/p revision of ORIF of right tibial plateau fracture on 1/14 with Dr. Panda. Hx deep vein thrombosis (DVT) of brachial vein of right upper extremity 12/9/16.On ASA every day DVT prophylaxis. PTA lives at group home, patient's mom is guardian. Now able to bear weight as tolerated in therapies and immobilizer for support.     Today's concern is:  Seen today for routine follow up. Denies pain. Working in therapies - able to walk 94 ft and 70 ft this morning with walker and WC follow. Transfers with min assist. No headaches, dizziness, chest pain or breathing problems. Feels she is constipated. No bladder complaints. She is up 13 lbs in the past 2 months (currently 252 and Body mass index is 50.17 kg/m .). She has SBP range  and sats are 98%.     Past Medical and Surgical History reviewed in Epic today.    MEDICATIONS:  Current Outpatient Medications   Medication Sig Dispense Refill     ACETAMINOPHEN PO Take 650 mg by mouth every 4 hours as needed        benztropine (COGENTIN) 0.5 MG tablet Take 1.5 mg by mouth daily And 0.5mg daily as needed       buPROPion HCl ER, XL, 450 MG TB24 Take 450 mg by mouth daily * 1TAB 30 tablet 0     cetirizine (ZYRTEC) 10 MG tablet Take 10 mg by mouth daily       lurasidone (LATUDA) 80 MG TABS tablet Take 2 tablets (160 mg) by mouth daily (with  "dinner) 60 tablet 0     norethindrone-ethinyl estradiol (MICROGESTIN 1/20) 1-20 MG-MCG per tablet Take 1 tablet by mouth daily        perphenazine 16 MG tablet Take 1 tablet (16 mg) by mouth 2 times daily 60 tablet 0     sennosides (SENOKOT) 8.6 MG tablet Take 1 tablet by mouth daily       Omega-3 Fatty Acids (FISH OIL PO) Take 1,000 mg by mouth daily HOLD WHILE IN TCU         REVIEW OF SYSTEMS:  10 point ROS of systems including Constitutional, Eyes, Respiratory, Cardiovascular, Gastroenterology, Genitourinary, Integumentary, Musculoskeletal, Psychiatric were all negative except for pertinent positives noted in my HPI.    Objective:  BP (!) 152/99   Pulse 88   Temp 98.8  F (37.1  C)   Resp 20   Ht 1.626 m (5' 4\")   Wt 132.6 kg (292 lb 4.8 oz)   SpO2 94%   BMI 50.17 kg/m    Exam:  GENERAL APPEARANCE:  Alert, in no distress, pleasant, cooperative, oriented x self, place, date  EYES:  EOM, lids, pupils and irises normal, sclera clear and conjunctiva normal, no discharge or mattering on lids or lashes noted  ENT:  Mouth normal, moist mucous membranes, nose normal without drainage or crusting, external ears without lesions, hearing acuity intact  RESP:  respiratory effort normal, no chest wall tenderness, no respiratory distress, Lung sounds clear, patient is on room air  CV:  Auscultation of heart done, rate and rhythm controlled and regular, no murmur, no rub or gallop. Edema none bilateral lower extremities.   ABDOMEN:  normal bowel sounds, soft, nontender, no palpable masses.  M/S:   Gait and station walks with assist , no tenderness or swelling of the joints; able to move all extremities, digits normal  NEURO: cranial nerves 2-12 grossly intact, no facial asymmetry, no speech deficits and able to follow directions, moves all extremities symmetrically  PSYCH:  insight and judgement and memory impaired, affect and mood flat    Labs:     Most Recent 3 CBC's:  Recent Labs   Lab Test 01/23/19  0611 01/21/19  0719 " 01/20/19  0650  01/16/19  0635 01/15/19  1350  12/23/18  0628 12/22/18  0642   WBC  --  4.2  --   --   --   --   --  7.6 9.3   HGB  --  13.1  --   --  11.8 12.7  --  11.4* 12.8   MCV  --  91  --   --   --   --   --  91 91    195 173   < >  --   --    < > 126* 152    < > = values in this interval not displayed.     Most Recent 3 BMP's:  Recent Labs   Lab Test 01/21/19  0719 01/14/19  1654 12/23/18  0628 12/22/18  0642     --  139 137   POTASSIUM 4.7  --  3.9 4.2   CHLORIDE 107  --  105 104   CO2 25  --  27 25   BUN 14  --  10 8   CR 0.68 0.76 0.74 0.77   ANIONGAP 8  --  7 8   GIOVANY 8.6  --  8.2* 8.5   GLC 82  --  98 150*       ASSESSMENT/PLAN:  Closed fracture of right tibial plateau with routine healing, subsequent encounter  Physical deconditioning  Morbid obesity (H)  Acute on chronic issues. Now up as tolerated, continue therapies and f/u with progress next week. Monitor weight - encourage healthy dietary choices, consider dietary consult if weight gain persists.     History of deep venous thrombosis  Now on asa. Monitor.     Schizoaffective disorder, depressive type (H)  Borderline personality disorder (H)  Mild cognitive impairment  Chronic, meds as PTA. Monitor.     Sleep apnea, unspecified type  Chronic, management as PTA.     Constipation, unspecified constipation type  New complaint. Add senna s 1 tab PO daily. Staff to update provider if not effective.     Orders written by provider at facility  1. Senna s 1 tab PO daily. Staff to update provider if not effective.     Total time spent with patient visit at the skilled nursing facility was 35 min including patient visit and review of past records. Greater than 50% of total time spent with counseling and coordinating care due to review of history, current status, concerns, POC to address issues as noted above    Electronically signed by:  HELADIO Glover CNP

## 2019-05-22 ENCOUNTER — NURSING HOME VISIT (OUTPATIENT)
Dept: GERIATRICS | Facility: CLINIC | Age: 34
End: 2019-05-22
Payer: MEDICAID

## 2019-05-22 DIAGNOSIS — F60.3 BORDERLINE PERSONALITY DISORDER (H): ICD-10-CM

## 2019-05-22 DIAGNOSIS — R53.81 PHYSICAL DECONDITIONING: ICD-10-CM

## 2019-05-22 DIAGNOSIS — E66.01 MORBID OBESITY (H): ICD-10-CM

## 2019-05-22 DIAGNOSIS — K59.00 CONSTIPATION, UNSPECIFIED CONSTIPATION TYPE: ICD-10-CM

## 2019-05-22 DIAGNOSIS — Z86.718 HISTORY OF DEEP VENOUS THROMBOSIS: ICD-10-CM

## 2019-05-22 DIAGNOSIS — G31.84 MILD COGNITIVE IMPAIRMENT: ICD-10-CM

## 2019-05-22 DIAGNOSIS — G47.30 SLEEP APNEA, UNSPECIFIED TYPE: ICD-10-CM

## 2019-05-22 DIAGNOSIS — S82.141D CLOSED FRACTURE OF RIGHT TIBIAL PLATEAU WITH ROUTINE HEALING, SUBSEQUENT ENCOUNTER: Primary | ICD-10-CM

## 2019-05-22 DIAGNOSIS — F25.1 SCHIZOAFFECTIVE DISORDER, DEPRESSIVE TYPE (H): ICD-10-CM

## 2019-05-22 PROCEDURE — 99310 SBSQ NF CARE HIGH MDM 45: CPT | Performed by: NURSE PRACTITIONER

## 2019-05-22 RX ORDER — SENNOSIDES 8.6 MG
1 TABLET ORAL 2 TIMES DAILY PRN
COMMUNITY

## 2019-05-22 NOTE — LETTER
5/22/2019        RE: Karolyn Banerjee  [address]  [place] [address]        [place] [facility]  [place] Medical Record Number:  [id number]  Place of Service where encounter took place:  [facility] ([facility]) [[id number]]  Chief Complaint   Patient presents with     Nursing Home Acute       HPI:    Karolyn Banerjee  is a 33 year old (1985), who is being seen today for an episodic care visit.  HPI information obtained from: facility chart records, facility staff, patient report and [facility] chart review.     Per recent TCU NP note:  Patient admitted to [facility] 1/14-1/23 due to failed fixation of right tibial hardware. Initially fell on ice on 12/20, s/p ORIF was on 12/21. S/p revision of ORIF of right tibial plateau fracture on 1/14 with Dr. Panda. Hx deep vein thrombosis (DVT) of brachial vein of right upper extremity 12/9/16.On ASA every day DVT prophylaxis. PTA lives at group home, patient's mom is guardian. Now able to bear weight as tolerated in therapies and immobilizer for support.     Today's concern is:  Seen today for routine follow up. Denies pain. Working in therapies - able to walk 94 ft and 70 ft this morning with walker and WC follow. Transfers with min assist. No headaches, dizziness, chest pain or breathing problems. Feels she is constipated. No bladder complaints. She is up 13 lbs in the past 2 months (currently 252 and Body mass index is 50.17 kg/m .). She has SBP range  and sats are 98%.     Past Medical and Surgical History reviewed in Epic today.    MEDICATIONS:  Current Outpatient Medications   Medication Sig Dispense Refill     ACETAMINOPHEN PO Take 650 mg by mouth every 4 hours as needed        benztropine (COGENTIN) 0.5 MG tablet Take 1.5 mg by mouth daily And 0.5mg daily as needed       buPROPion HCl ER, XL, 450 MG TB24 Take 450 mg by mouth daily * 1TAB 30 tablet 0     cetirizine (ZYRTEC) 10 MG tablet Take 10 mg by mouth  "daily       lurasidone (LATUDA) 80 MG TABS tablet Take 2 tablets (160 mg) by mouth daily (with dinner) 60 tablet 0     norethindrone-ethinyl estradiol (MICROGESTIN 1/20) 1-20 MG-MCG per tablet Take 1 tablet by mouth daily        perphenazine 16 MG tablet Take 1 tablet (16 mg) by mouth 2 times daily 60 tablet 0     sennosides (SENOKOT) 8.6 MG tablet Take 1 tablet by mouth daily       Omega-3 Fatty Acids (FISH OIL PO) Take 1,000 mg by mouth daily HOLD WHILE IN TCU         REVIEW OF SYSTEMS:  10 point ROS of systems including Constitutional, Eyes, Respiratory, Cardiovascular, Gastroenterology, Genitourinary, Integumentary, Musculoskeletal, Psychiatric were all negative except for pertinent positives noted in my HPI.    Objective:  BP (!) 152/99   Pulse 88   Temp 98.8  F (37.1  C)   Resp 20   Ht 1.626 m (5' 4\")   Wt 132.6 kg (292 lb 4.8 oz)   SpO2 94%   BMI 50.17 kg/m     Exam:  GENERAL APPEARANCE:  Alert, in no distress, pleasant, cooperative, oriented x self, place, date  EYES:  EOM, lids, pupils and irises normal, sclera clear and conjunctiva normal, no discharge or mattering on lids or lashes noted  ENT:  Mouth normal, moist mucous membranes, nose normal without drainage or crusting, external ears without lesions, hearing acuity intact  RESP:  respiratory effort normal, no chest wall tenderness, no respiratory distress, Lung sounds clear, patient is on room air  CV:  Auscultation of heart done, rate and rhythm controlled and regular, no murmur, no rub or gallop. Edema none bilateral lower extremities.   ABDOMEN:  normal bowel sounds, soft, nontender, no palpable masses.  M/S:   Gait and station walks with assist , no tenderness or swelling of the joints; able to move all extremities, digits normal  NEURO: cranial nerves 2-12 grossly intact, no facial asymmetry, no speech deficits and able to follow directions, moves all extremities symmetrically  PSYCH:  insight and judgement and memory impaired, affect and " mood flat    Labs:     Most Recent 3 CBC's:  Recent Labs   Lab Test 01/23/19  0611 01/21/19  0719 01/20/19  0650  01/16/19  0635 01/15/19  1350  12/23/18  0628 12/22/18  0642   WBC  --  4.2  --   --   --   --   --  7.6 9.3   HGB  --  13.1  --   --  11.8 12.7  --  11.4* 12.8   MCV  --  91  --   --   --   --   --  91 91    195 173   < >  --   --    < > 126* 152    < > = values in this interval not displayed.     Most Recent 3 BMP's:  Recent Labs   Lab Test 01/21/19  0719 01/14/19  1654 12/23/18  0628 12/22/18  0642     --  139 137   POTASSIUM 4.7  --  3.9 4.2   CHLORIDE 107  --  105 104   CO2 25  --  27 25   BUN 14  --  10 8   CR 0.68 0.76 0.74 0.77   ANIONGAP 8  --  7 8   GIOVANY 8.6  --  8.2* 8.5   GLC 82  --  98 150*       ASSESSMENT/PLAN:  Closed fracture of right tibial plateau with routine healing, subsequent encounter  Physical deconditioning  Morbid obesity (H)  Acute on chronic issues. Now up as tolerated, continue therapies and f/u with progress next week. Monitor weight - encourage healthy dietary choices, consider dietary consult if weight gain persists.     History of deep venous thrombosis  Now on asa. Monitor.     Schizoaffective disorder, depressive type (H)  Borderline personality disorder (H)  Mild cognitive impairment  Chronic, meds as PTA. Monitor.     Sleep apnea, unspecified type  Chronic, management as PTA.     Constipation, unspecified constipation type  New complaint. Add senna s 1 tab PO daily. Staff to update provider if not effective.     Orders written by provider at facility  1. Senna s 1 tab PO daily. Staff to update provider if not effective.     Total time spent with patient visit at the [facility] was 35 min including patient visit and review of past records. Greater than 50% of total time spent with counseling and coordinating care due to review of history, current status, concerns, POC to address issues as noted above    Electronically signed by:  Yaneth RICH  HELADIO Queen CNP               Sincerely,        HELADIO Glover CNP

## 2019-06-11 ENCOUNTER — NURSING HOME VISIT (OUTPATIENT)
Dept: GERIATRICS | Facility: CLINIC | Age: 34
End: 2019-06-11
Payer: MEDICAID

## 2019-06-11 VITALS
OXYGEN SATURATION: 96 % | TEMPERATURE: 97.6 F | HEIGHT: 64 IN | DIASTOLIC BLOOD PRESSURE: 81 MMHG | SYSTOLIC BLOOD PRESSURE: 136 MMHG | HEART RATE: 96 BPM | RESPIRATION RATE: 16 BRPM | WEIGHT: 293 LBS | BODY MASS INDEX: 50.02 KG/M2

## 2019-06-11 DIAGNOSIS — S82.141D CLOSED FRACTURE OF RIGHT TIBIAL PLATEAU WITH ROUTINE HEALING, SUBSEQUENT ENCOUNTER: Primary | ICD-10-CM

## 2019-06-11 DIAGNOSIS — R53.81 PHYSICAL DECONDITIONING: ICD-10-CM

## 2019-06-11 DIAGNOSIS — G31.84 MILD COGNITIVE IMPAIRMENT: ICD-10-CM

## 2019-06-11 DIAGNOSIS — R63.5 WEIGHT GAIN: ICD-10-CM

## 2019-06-11 PROCEDURE — 99309 SBSQ NF CARE MODERATE MDM 30: CPT | Performed by: NURSE PRACTITIONER

## 2019-06-11 ASSESSMENT — MIFFLIN-ST. JEOR: SCORE: 2021.31

## 2019-06-11 NOTE — PROGRESS NOTES
[place] [facility]ATRIC SERVICES    [place] Medical Record Number:  [id number]  Place of Service where encounter took place:  [facility] ([facility]) [[id number]]  Chief Complaint   Patient presents with     Nursing Home Acute       HPI:    Karolyn Banerjee  is a 33 year old (1985), who is being seen today for an episodic care visit.  HPI information obtained from: facility chart records, facility staff, patient report and [facility] chart review.     Today's concern is:    Closed fracture of right tibial plateau with routine healing, subsequent encounter  Patient admitted to [facility] 1/14-1/23 due to failed fixation of right tibial hardware. Initially fell on ice on 12/20, s/p ORIF was on 12/21. S/p revision of ORIF of right tibial plateau fracture on 1/14 with Dr. Panda. Completed ASA 325mg BID x 6 weeks for DVT prophylaxis. Hx deep vein thrombosis (DVT) of brachial vein of right upper extremity 12/9/16. Ortho increased weight bearing status to WBAT on 4/30. Wears AFO brace to R foot. During exam, denies pain to RLE. Has been taking tylenol prn. Denies constipation, has senna-s prn. Patient to follow-up with Ortho PRN.      Mild cognitive impairment  Physical deconditioning  PTA lives at group home, patient's mom is guardian. Patient is actively participating in therapy sessions. Ambulates 300ft with walker. Requires supervision w/ADLs. Cognitive testing done in therapy (CPT 4.8). SW following for discharge planning. Patient's mom looking into alternative group home options.     Weight gain  Noted to have slow weight gain since at [facility]. Admission weight was 257lbs on 1/25/19. Current weight has been 292lbs. Dietary following. Dietary and staff report patient is constantly eating snacks throughout the day.     Wt Readings from Last 4 Encounters:   06/11/19 133.1 kg (293 lb 8 oz)   05/21/19 132.6 kg (292 lb 4.8 oz)   04/18/19 129.9 kg (286 lb 6.4 oz)   03/21/19 123.4 kg (272 lb)         Past  "Medical and Surgical History reviewed in Epic today.    MEDICATIONS:  Current Outpatient Medications   Medication Sig Dispense Refill     ACETAMINOPHEN PO Take 650 mg by mouth every 4 hours as needed        benztropine (COGENTIN) 0.5 MG tablet Take 1.5 mg by mouth daily And 0.5mg daily as needed       buPROPion HCl ER, XL, 450 MG TB24 Take 450 mg by mouth daily * 1TAB 30 tablet 0     cetirizine (ZYRTEC) 10 MG tablet Take 10 mg by mouth daily       lurasidone (LATUDA) 80 MG TABS tablet Take 2 tablets (160 mg) by mouth daily (with dinner) 60 tablet 0     norethindrone-ethinyl estradiol (MICROGESTIN 1/20) 1-20 MG-MCG per tablet Take 1 tablet by mouth daily        Omega-3 Fatty Acids (FISH OIL PO) Take 1,000 mg by mouth daily HOLD WHILE IN TCU       perphenazine 16 MG tablet Take 1 tablet (16 mg) by mouth 2 times daily 60 tablet 0     sennosides (SENOKOT) 8.6 MG tablet Take 1 tablet by mouth daily             REVIEW OF SYSTEMS:  4 point ROS including Respiratory, CV, GI and , other than that noted in the HPI,  is negative      Objective:  /81   Pulse 96   Temp 97.6  F (36.4  C)   Resp 16   Ht 1.626 m (5' 4\")   Wt 133.1 kg (293 lb 8 oz)   SpO2 96%   BMI 50.38 kg/m    Exam:  GENERAL APPEARANCE:  Alert, in no distress, oriented, cooperative  RESP:  respiratory effort and palpation of chest normal, lungs clear to auscultation , no respiratory distress  CV:  Palpation and auscultation of heart done , regular rate and rhythm, no murmur, rub, or gallop, no edema, +2 pedal pulses  ABDOMEN:  normal bowel sounds, soft, nontender, no guarding or rebound  M/S: Gait and station abnormal, requires assistance with activity and ADLS. Digits and nails normal  SKIN:  Inspection of skin and subcutaneous tissue baseline, Palpation of skin and subcutaneous tissue baseline.   NEURO:   Cranial nerves 2-12 are normal tested and grossly at patient's baseline, Examination of sensation by touch normal  PSYCH:  Oriented X 3, flat " affect, cognitive testing (CPT 4.8)      Labs:   Labs done in SNF are in [place] EPIC. Please refer to them using EPIC/Care Everywhere., Recent labs in EPIC reviewed by me today.  and   Most Recent 3 CBC's:  Recent Labs   Lab Test 01/23/19  0611 01/21/19  0719 01/20/19  0650  01/16/19  0635 01/15/19  1350  12/23/18  0628 12/22/18  0642   WBC  --  4.2  --   --   --   --   --  7.6 9.3   HGB  --  13.1  --   --  11.8 12.7  --  11.4* 12.8   MCV  --  91  --   --   --   --   --  91 91    195 173   < >  --   --    < > 126* 152    < > = values in this interval not displayed.     Most Recent 3 BMP's:  Recent Labs   Lab Test 01/21/19  0719 01/14/19  1654 12/23/18  0628 12/22/18  0642     --  139 137   POTASSIUM 4.7  --  3.9 4.2   CHLORIDE 107  --  105 104   CO2 25  --  27 25   BUN 14  --  10 8   CR 0.68 0.76 0.74 0.77   ANIONGAP 8  --  7 8   GIOVANY 8.6  --  8.2* 8.5   GLC 82  --  98 150*           ASSESSMENT/PLAN:    (S82.141D) Closed fracture of right tibial plateau with routine healing, subsequent encounter  (primary encounter diagnosis)  Comment: S/p ORIF R tibia on 12/21. S/p revision of ORIF of right tibial plateau fracture on 1/14 with Dr. Panda. Patient tolerating WBAT to RLE. R tibial fracture appears to be healed.   Plan: Continue AFO to R foot w/ambulation. Continue PT/OT. Patient to follow-up with Ortho PRN.     (G31.84) Mild cognitive impairment  (R53.81) Physical deconditioning  Comment: Ongoing  Plan: Encourage active participation in therapy session to increase strength and promote independence in activities and ADLs. SW following for discharge planning. Patient's mom looking at alternative group home options.     (R63.5) Weight gain  Comment: Weight gain approx 30-40lbs since at [facility].   Plan: Dietary following.   Check CBC, BMP, lipid panel, A1C, TSH, free T3, free T4 on 6/12.           Electronically signed by:  HELADIO Mcleod CNP

## 2019-06-12 ENCOUNTER — TRANSFERRED RECORDS (OUTPATIENT)
Dept: HEALTH INFORMATION MANAGEMENT | Facility: CLINIC | Age: 34
End: 2019-06-12

## 2019-06-12 LAB
ALBUMIN SERPL-MCNC: 3.2 G/DL (ref 3.4–5)
ALP SERPL-CCNC: 67 U/L (ref 40–150)
ALT SERPL-CCNC: 20 U/L (ref 0–50)
ANION GAP SERPL CALCULATED.3IONS-SCNC: 8 MMOL/L (ref 3–14)
AST SERPL-CCNC: 12 U/L (ref 0–45)
BILIRUB SERPL-MCNC: 0.4 MG/DL (ref 0.2–1.3)
BILIRUBIN DIRECT: 0.1 MG/DL (ref 0–0.2)
BUN SERPL-MCNC: 14 MG/DL (ref 7–30)
CALCIUM SERPL-MCNC: 8.7 MG/DL (ref 8.5–10.1)
CHLORIDE SERPLBLD-SCNC: 105 MMOL/L (ref 94–109)
CHOLEST SERPL-MCNC: 168 MG/DL
CO2 SERPL-SCNC: 26 MMOL/L (ref 20–32)
CREAT SERPL-MCNC: 0.84 MG/DL (ref 0.52–1.04)
ERYTHROCYTE [DISTWIDTH] IN BLOOD BY AUTOMATED COUNT: 14.2 % (ref 10–15)
GFR SERPL CREATININE-BSD FRML MDRD: >90 ML/MIN/1.73_M2
GLUCOSE SERPL-MCNC: 52 MG/DL (ref 70–99)
HBA1C MFR BLD: 5 % (ref 0–5.6)
HCT VFR BLD AUTO: 41.7 % (ref 35–47)
HDLC SERPL-MCNC: 52 MG/DL
HEMOGLOBIN: 13.7 G/DL (ref 11.7–15.7)
LDLC SERPL CALC-MCNC: 97 MG/DL
MCH RBC QN AUTO: 29.7 PG (ref 26.5–33)
MCHC RBC AUTO-ENTMCNC: 32.9 G/DL (ref 31.5–36.5)
MCV RBC AUTO: 90 FL (ref 78–100)
NONHDLC SERPL-MCNC: 116 MG/DL
PLATELET # BLD AUTO: 175 10E9/L (ref 150–450)
POTASSIUM SERPL-SCNC: 3.7 MMOL/L (ref 3.4–5.3)
PROT SERPL-MCNC: 7 G/DL (ref 6.8–8.8)
RBC # BLD AUTO: 4.62 10E12/L (ref 3.8–5.2)
SODIUM SERPL-SCNC: 139 MMOL/L (ref 133–144)
TRIGL SERPL-MCNC: 94 MG/DL
WBC # BLD AUTO: 6.2 10E9/L (ref 4–11)

## 2019-06-13 ENCOUNTER — APPOINTMENT (OUTPATIENT)
Dept: GERIATRICS | Facility: CLINIC | Age: 34
End: 2019-06-13
Payer: MEDICAID

## 2019-06-13 ENCOUNTER — NURSING HOME VISIT (OUTPATIENT)
Dept: GERIATRICS | Facility: CLINIC | Age: 34
End: 2019-06-13

## 2019-06-13 DIAGNOSIS — F25.1 SCHIZOAFFECTIVE DISORDER, DEPRESSIVE TYPE (H): ICD-10-CM

## 2019-06-13 DIAGNOSIS — S82.141D CLOSED FRACTURE OF RIGHT TIBIAL PLATEAU WITH ROUTINE HEALING, SUBSEQUENT ENCOUNTER: Primary | ICD-10-CM

## 2019-06-13 DIAGNOSIS — G47.30 SLEEP APNEA, UNSPECIFIED TYPE: ICD-10-CM

## 2019-06-13 PROCEDURE — 99307 SBSQ NF CARE SF MDM 10: CPT | Performed by: INTERNAL MEDICINE

## 2019-06-13 NOTE — PROGRESS NOTES
Patient was seen for a regulatory TCU visit.    Course reviewed with nurse practitioner.    Patient continues to progress slowly in therapy.  She is now weightbearing as tolerated right lower extremity and is walking with a walker.    Patient denies chest pain, shortness breath, abdominal pain, nausea.    Exam  Alert, pleasant, obese female, seen walking with therapy with walker.  Lungs clear  CV RRR  Abdomen soft  No right lower extremity edema  Patient is fully oriented.  Affect blunted.    Assessment    Status post revision ORIF right tibial plateau fracture January 14, 2019, status post original surgery December 2018.  Patient is now weightbearing as tolerated    History of schizo affective disorder and mild cognitive impairment.  Patient is living in a group home prior to admission    Obstructive sleep apnea, historically noncompliant with CPAP    Plan  Continue therapies.  Anticipate discharge to a different group home in the near future.

## 2019-07-10 ENCOUNTER — DISCHARGE SUMMARY NURSING HOME (OUTPATIENT)
Dept: GERIATRICS | Facility: CLINIC | Age: 34
End: 2019-07-10
Payer: MEDICAID

## 2019-07-10 VITALS
HEIGHT: 64 IN | TEMPERATURE: 98.6 F | RESPIRATION RATE: 18 BRPM | DIASTOLIC BLOOD PRESSURE: 88 MMHG | WEIGHT: 293 LBS | SYSTOLIC BLOOD PRESSURE: 148 MMHG | OXYGEN SATURATION: 100 % | HEART RATE: 86 BPM | BODY MASS INDEX: 50.02 KG/M2

## 2019-07-10 DIAGNOSIS — G31.84 MILD COGNITIVE IMPAIRMENT: ICD-10-CM

## 2019-07-10 DIAGNOSIS — F25.1 SCHIZOAFFECTIVE DISORDER, DEPRESSIVE TYPE (H): ICD-10-CM

## 2019-07-10 DIAGNOSIS — G47.30 SLEEP APNEA, UNSPECIFIED TYPE: ICD-10-CM

## 2019-07-10 DIAGNOSIS — R63.5 WEIGHT GAIN: ICD-10-CM

## 2019-07-10 DIAGNOSIS — S82.141D CLOSED FRACTURE OF RIGHT TIBIAL PLATEAU WITH ROUTINE HEALING, SUBSEQUENT ENCOUNTER: Primary | ICD-10-CM

## 2019-07-10 DIAGNOSIS — F60.3 BORDERLINE PERSONALITY DISORDER (H): ICD-10-CM

## 2019-07-10 DIAGNOSIS — R53.81 PHYSICAL DECONDITIONING: ICD-10-CM

## 2019-07-10 DIAGNOSIS — E66.01 MORBID OBESITY (H): ICD-10-CM

## 2019-07-10 PROCEDURE — 99316 NF DSCHRG MGMT 30 MIN+: CPT | Performed by: NURSE PRACTITIONER

## 2019-07-10 ASSESSMENT — MIFFLIN-ST. JEOR: SCORE: 2028.11

## 2019-07-10 NOTE — LETTER
7/10/2019        RE: Karolyn Banerjee  [address]  [place] [address]        [place] [facility]IATRIC [facility] DISCHARGE SUMMARY    PATIENT'S NAME: Karolyn Banerjee  YOB: 1985  MEDICAL RECORD NUMBER:  [id number]  Place of Service where encounter took place:  [facility] [place] ([facility]) [[id number]]    PRIMARY CARE PROVIDER AND CLINIC RESPONSIBLE AFTER TRANSFER:   [facility] [address] [facility]   Non-G Provider     Transferring providers: HELADIO Mcleod CNP; Lonny Mitchell MD  Recent Hospitalization/ED:  [facility] Hospital stay 1/14/19 to 1/23/19.  Date of SNF Admission: January / 23 / 2019  Date of SNF (anticipated) Discharge: July / 11 / 2019  Discharged to: [facility] (Adult group home)  Cognitive Scores: BIMS: 14/15, CPT 4.8/5.6  Physical Function: Ambulating 350ft with walker  DME: N/A    CODE STATUS/ADVANCE DIRECTIVES DISCUSSION:  Full Code     ALLERGIES: Clozapine; Liquid adhesive; Risperdal; and Adhesive tape      DISCHARGE DIAGNOSIS/NURSING FACILITY COURSE:     Closed fracture of right tibial plateau with routine healing, subsequent encounter  Patient admitted to [facility] 1/14-1/23 due to failed fixation of right tibial hardware. Initially fell on ice on 12/20, s/p ORIF was on 12/21. S/p revision of ORIF of right tibial plateau fracture on 1/14 with Dr. Panda. Hx deep vein thrombosis (DVT) of brachial vein of right upper extremity 12/9/16. Ortho increased weight bearing status to WBAT on 4/30. Wears AFO brace to R foot for support. During exam, denies pain to RLE. Has been taking tylenol prn. Denies constipation, has senna-s prn. Patient to follow-up with Ortho PRN.      Mild cognitive impairment  Physical deconditioning  PTA lives at group home, patient's mom is guardian. Patient is actively participating in therapy sessions. Ambulates  350ft with walker. Requires supervision  w/ADLs. Cognitive testing done in therapy (CPT 4.8). SW following for discharge planning.  Patient discharging to new group home, as well as  home care services, including home PT, OT.      Morbidly obese (H)  Weight gain  Noted to have slow weight gain since at [facility]. Admission weight was 257lbs on 1/25/19. Current weight has been 293-295lbs. Dietary following. Dietary and staff report patient is constantly eating snacks throughout the day.     Wt Readings from Last 4 Encounters:   07/10/19 133.8 kg (295 lb)   06/11/19 133.1 kg (293 lb 8 oz)   05/21/19 132.6 kg (292 lb 4.8 oz)   04/18/19 129.9 kg (286 lb 6.4 oz)     Body mass index is 50.64 kg/m .       Schizoaffective disorder, depressive type (H)  Borderline personality disorder (H)  Currently taking cogentin, buproprion, latuda, perphenazine, zyprexa. Patient to follow-up with Psychiatrist as directed.     Sleep apnea, unspecified type  Noted to have obstructive sleep apnea, has not been compliant with using CPAP.              Past Medical History:  has a past medical history of Agranulocytosis (H) (2007, 2013), Asthma, mild intermittent, Astigmatism, Cognitive disorder, Depressive disorder, Humeral shaft fracture (2014), Mild developmental delay, Myopia, Neuroleptic-induced tardive dyskinesia, Nonorganic enuresis, Refractive amblyopia, Schizoaffective disorder, bipolar type (H), and Sleep disorder.    Discharge Medications:  Current Outpatient Medications   Medication Sig Dispense Refill     ACETAMINOPHEN PO Take 650 mg by mouth every 4 hours as needed        benztropine (COGENTIN) 0.5 MG tablet Take 1.5 mg by mouth daily And 0.5mg daily as needed       buPROPion HCl ER, XL, 450 MG TB24 Take 450 mg by mouth daily * 1TAB 30 tablet 0     cetirizine (ZYRTEC) 10 MG tablet Take 10 mg by mouth daily       lurasidone (LATUDA) 80 MG TABS tablet Take 2 tablets (160 mg) by mouth daily (with dinner) 60 tablet 0     norethindrone-ethinyl estradiol (MICROGESTIN 1/20) 1-20  "MG-MCG per tablet Take 1 tablet by mouth daily        Omega-3 Fatty Acids (FISH OIL PO) Take 1,000 mg by mouth daily HOLD WHILE IN TCU       perphenazine 16 MG tablet Take 1 tablet (16 mg) by mouth 2 times daily 60 tablet 0     sennosides (SENOKOT) 8.6 MG tablet Take 1 tablet by mouth 2 times daily as needed          Medication Changes/Rationale:   Changed senna to 1 tab BID PRN for constipation  Completed course of lovenox for DVT prophylaxis  Discontinued robaxin prn, not using  Discontinued zyprexa prn, not using  Discontinued oxycodone prn, pain controlled    Controlled medications sent with patient:   not applicable/none         ROS:   10 point ROS of systems including Constitutional, Eyes, Respiratory, Cardiovascular, Gastroenterology, Genitourinary, Integumentary, Musculoskeletal, Psychiatric were all negative except for pertinent positives noted in my HPI.      Physical Exam:   Vitals: /88   Pulse 86   Temp 98.6  F (37  C)   Resp 18   Ht 1.626 m (5' 4\")   Wt 133.8 kg (295 lb)   SpO2 100%   BMI 50.64 kg/m     BMI= Body mass index is 50.64 kg/m .  GENERAL APPEARANCE:  Alert, in no distress, oriented, cooperative  ENT:  Mouth and posterior oropharynx normal, moist mucous membranes, normal hearing acuity  EYES:  EOM, conjunctivae, lids, pupils and irises normal, PERRL  NECK:  No adenopathy,masses or thyromegaly  RESP:  respiratory effort and palpation of chest normal, lungs clear to auscultation , no respiratory distress  CV:  Palpation and auscultation of heart done , regular rate and rhythm, no murmur, rub, or gallop, no edema, +2 pedal pulses  ABDOMEN:  normal bowel sounds, soft, nontender, no guarding or rebound  M/S: Gait and station abnormal, ambulates with walker. Digits and nails normal  SKIN:   RLE brace intact. Inspection of skin and subcutaneous tissue baseline, Palpation of skin and subcutaneous tissue baseline.   NEURO:   Cranial nerves 2-12 are normal tested and grossly at patient's " baseline, Examination of sensation by touch normal  PSYCH:  Oriented X 3,  normal mood/affect        SNF labs: Labs done in SNF are in [place] EPIC. Please refer to them using [employer]/Care Everywhere., Recent labs in EPIC reviewed by me today.  and   Most Recent 3 CBC's:  Recent Labs   Lab Test 06/12/19 01/23/19  0611 01/21/19  0719  01/16/19  0635  12/23/18  0628   WBC 6.2  --  4.2  --   --   --  7.6   HGB 13.7  --  13.1  --  11.8   < > 11.4*   MCV 90  --  91  --   --   --  91    198 195   < >  --    < > 126*    < > = values in this interval not displayed.     Most Recent 3 BMP's:  Recent Labs   Lab Test 06/12/19 01/21/19  0719 01/14/19  1654 12/23/18  0628    140  --  139   POTASSIUM 3.7 4.7  --  3.9   CHLORIDE 105 107  --  105   CO2 26 25  --  27   BUN 14 14  --  10   CR 0.84 0.68 0.76 0.74   ANIONGAP 8 8  --  7   GIOVANY 8.7 8.6  --  8.2*   GLC 52* 82  --  98         DISCHARGE PLAN:    Follow up labs: No labs orders/due    Medical Follow Up:      Follow up with primary care provider in 1-2 weeks   Follow up with specialist: Ortho as needed, Psychiatrist     MTM referral needed and placed by this provider: No    Current [place] scheduled appointments:  1. Patient to follow-up with PCP within 7 days of discharge from TCU.  2. Patient to follow-up with Psychiatrist as directed      Discharge Services: Home Care:  Occupational Therapy and Physical Therapy      Discharge Instructions Verbalized to Patient at Discharge:     Weight bearing restrictions:  Weight bearing as tolerated.     Continue to follow your diet:  regular.     Notify PCP if you have a fever greater than 100.5 degrees.     24-hour supervision is recommended for safety.           TOTAL DISCHARGE TIME:   Greater than 30 minutes     Electronically signed by:  HELADIO Mcleod CNP                         Sincerely,        HELADIO Mcleod CNP

## 2019-07-10 NOTE — PROGRESS NOTES
[place] [facility]ERIATRIC [facility] DISCHARGE SUMMARY    PATIENT'S NAME: Karolyn Banerjee  YOB: 1985  MEDICAL RECORD NUMBER:  [id number]  Place of Service where encounter took place:  [facility] [place] ([facility]) [[id number]]    PRIMARY CARE PROVIDER AND CLINIC RESPONSIBLE AFTER TRANSFER:   Suzie Mariscal, [facility] [address] [facility]   Non-[facility] Provider     Transferring providers: HELADIO Mcleod CNP; Lonny Mitchell MD  Recent Hospitalization/ED:  [facility] Hospital stay 1/14/19 to 1/23/19.  Date of SNF Admission: January / 23 / 2019  Date of SNF (anticipated) Discharge: July / 11 / 2019  Discharged to: [facility] (Adult group home)  Cognitive Scores: BIMS: 14/15, CPT 4.8/5.6  Physical Function: Ambulating 350ft with walker  DME: N/A    CODE STATUS/ADVANCE DIRECTIVES DISCUSSION:  Full Code     ALLERGIES: Clozapine; Liquid adhesive; Risperdal; and Adhesive tape      DISCHARGE DIAGNOSIS/NURSING FACILITY COURSE:     Closed fracture of right tibial plateau with routine healing, subsequent encounter  Patient admitted to [facility] 1/14-1/23 due to failed fixation of right tibial hardware. Initially fell on ice on 12/20, s/p ORIF was on 12/21. S/p revision of ORIF of right tibial plateau fracture on 1/14 with Dr. Panda. Hx deep vein thrombosis (DVT) of brachial vein of right upper extremity 12/9/16. Ortho increased weight bearing status to WBAT on 4/30. Wears AFO brace to R foot for support. During exam, denies pain to RLE. Has been taking tylenol prn. Denies constipation, has senna-s prn. Patient to follow-up with Ortho PRN.      Mild cognitive impairment  Physical deconditioning  PTA lives at group home, patient's mom is guardian. Patient is actively participating in therapy sessions. Ambulates 350ft with walker. Requires supervision w/ADLs. Cognitive testing done in therapy (CPT 4.8). SW following for discharge planning. Patient  discharging to new group home, as well as  home care services, including home PT, OT.      Morbidly obese (H)  Weight gain  Noted to have slow weight gain since at [facility]. Admission weight was 257lbs on 1/25/19. Current weight has been 293-295lbs. Dietary following. Dietary and staff report patient is constantly eating snacks throughout the day.     Wt Readings from Last 4 Encounters:   07/10/19 133.8 kg (295 lb)   06/11/19 133.1 kg (293 lb 8 oz)   05/21/19 132.6 kg (292 lb 4.8 oz)   04/18/19 129.9 kg (286 lb 6.4 oz)     Body mass index is 50.64 kg/m .       Schizoaffective disorder, depressive type (H)  Borderline personality disorder (H)  Currently taking cogentin, buproprion, latuda, perphenazine, zyprexa. Patient to follow-up with Psychiatrist as directed.     Sleep apnea, unspecified type  Noted to have obstructive sleep apnea, has not been compliant with using CPAP.              Past Medical History:  has a past medical history of Agranulocytosis (H) (2007, 2013), Asthma, mild intermittent, Astigmatism, Cognitive disorder, Depressive disorder, Humeral shaft fracture (2014), Mild developmental delay, Myopia, Neuroleptic-induced tardive dyskinesia, Nonorganic enuresis, Refractive amblyopia, Schizoaffective disorder, bipolar type (H), and Sleep disorder.    Discharge Medications:  Current Outpatient Medications   Medication Sig Dispense Refill     ACETAMINOPHEN PO Take 650 mg by mouth every 4 hours as needed        benztropine (COGENTIN) 0.5 MG tablet Take 1.5 mg by mouth daily And 0.5mg daily as needed       buPROPion HCl ER, XL, 450 MG TB24 Take 450 mg by mouth daily * 1TAB 30 tablet 0     cetirizine (ZYRTEC) 10 MG tablet Take 10 mg by mouth daily       lurasidone (LATUDA) 80 MG TABS tablet Take 2 tablets (160 mg) by mouth daily (with dinner) 60 tablet 0     norethindrone-ethinyl estradiol (MICROGESTIN 1/20) 1-20 MG-MCG per tablet Take 1 tablet by mouth daily        Omega-3 Fatty Acids (FISH OIL PO) Take 1,000  "mg by mouth daily HOLD WHILE IN TCU       perphenazine 16 MG tablet Take 1 tablet (16 mg) by mouth 2 times daily 60 tablet 0     sennosides (SENOKOT) 8.6 MG tablet Take 1 tablet by mouth 2 times daily as needed          Medication Changes/Rationale:   Changed senna to 1 tab BID PRN for constipation  Completed course of lovenox for DVT prophylaxis  Discontinued robaxin prn, not using  Discontinued zyprexa prn, not using  Discontinued oxycodone prn, pain controlled    Controlled medications sent with patient:   not applicable/none         ROS:   10 point ROS of systems including Constitutional, Eyes, Respiratory, Cardiovascular, Gastroenterology, Genitourinary, Integumentary, Musculoskeletal, Psychiatric were all negative except for pertinent positives noted in my HPI.      Physical Exam:   Vitals: /88   Pulse 86   Temp 98.6  F (37  C)   Resp 18   Ht 1.626 m (5' 4\")   Wt 133.8 kg (295 lb)   SpO2 100%   BMI 50.64 kg/m    BMI= Body mass index is 50.64 kg/m .  GENERAL APPEARANCE:  Alert, in no distress, oriented, cooperative  ENT:  Mouth and posterior oropharynx normal, moist mucous membranes, normal hearing acuity  EYES:  EOM, conjunctivae, lids, pupils and irises normal, PERRL  NECK:  No adenopathy,masses or thyromegaly  RESP:  respiratory effort and palpation of chest normal, lungs clear to auscultation , no respiratory distress  CV:  Palpation and auscultation of heart done , regular rate and rhythm, no murmur, rub, or gallop, no edema, +2 pedal pulses  ABDOMEN:  normal bowel sounds, soft, nontender, no guarding or rebound  M/S: Gait and station abnormal, ambulates with walker. Digits and nails normal  SKIN:  RLE brace intact. Inspection of skin and subcutaneous tissue baseline, Palpation of skin and subcutaneous tissue baseline.   NEURO:   Cranial nerves 2-12 are normal tested and grossly at patient's baseline, Examination of sensation by touch normal  PSYCH:  Oriented X 3, normal mood/affect        SNF " labs: Labs done in SNF are in [place] EPIC. Please refer to them using EPIC/Care Everywhere., Recent labs in EPIC reviewed by me today.  and   Most Recent 3 CBC's:  Recent Labs   Lab Test 06/12/19 01/23/19  0611 01/21/19  0719  01/16/19  0635  12/23/18  0628   WBC 6.2  --  4.2  --   --   --  7.6   HGB 13.7  --  13.1  --  11.8   < > 11.4*   MCV 90  --  91  --   --   --  91    198 195   < >  --    < > 126*    < > = values in this interval not displayed.     Most Recent 3 BMP's:  Recent Labs   Lab Test 06/12/19 01/21/19  0719 01/14/19  1654 12/23/18  0628    140  --  139   POTASSIUM 3.7 4.7  --  3.9   CHLORIDE 105 107  --  105   CO2 26 25  --  27   BUN 14 14  --  10   CR 0.84 0.68 0.76 0.74   ANIONGAP 8 8  --  7   GIOVANY 8.7 8.6  --  8.2*   GLC 52* 82  --  98         DISCHARGE PLAN:    Follow up labs: No labs orders/due    Medical Follow Up:      Follow up with primary care provider in 1-2 weeks   Follow up with specialist: Ortho as needed, Psychiatrist     MTM referral needed and placed by this provider: No    Current [place] scheduled appointments:  1. Patient to follow-up with PCP within 7 days of discharge from TCU.  2. Patient to follow-up with Psychiatrist as directed      Discharge Services: Home Care:  Occupational Therapy and Physical Therapy      Discharge Instructions Verbalized to Patient at Discharge:     Weight bearing restrictions:  Weight bearing as tolerated.     Continue to follow your diet:  regular.     Notify PCP if you have a fever greater than 100.5 degrees.     24-hour supervision is recommended for safety.           TOTAL DISCHARGE TIME:   Greater than 30 minutes     Electronically signed by:  HELADIO Mcleod CNP

## 2019-07-11 ENCOUNTER — HOME CARE/HOSPICE - HEALTHEAST (OUTPATIENT)
Dept: HOME HEALTH SERVICES | Facility: HOME HEALTH | Age: 34
End: 2019-07-11

## 2019-07-11 NOTE — PATIENT INSTRUCTIONS
[facility] Discharge Orders    Name: Karolyn Banerjee  : 1985  Planned Discharge Date: 19  Discharged to: [facility]      MEDICAL FOLLOW UP  1. Patient to follow-up with PCP within 7 days of discharge from TCU.  2. Patient to follow-up with Psychiatrist as directed      FUTURE LABS: No labs orders/due      ORDER CHANGES:  Changed senna to 1 tab BID PRN for constipation  Completed course of lovenox for DVT prophylaxis  Discontinued robaxin prn, not using  Discontinued zyprexa prn, not using  Discontinued oxycodone prn, pain controlled    DISCHARGE MEDICATIONS:  The patient s pharmacy is authorized to dispense a 30-day supply of medications. Refill requests should be directed to the primary provider, Suzie Mariscal.   No narcotics are prescribed at time of discharge.     Current Outpatient Medications   Medication Sig Dispense Refill     ACETAMINOPHEN PO Take 650 mg by mouth every 4 hours as needed        benztropine (COGENTIN) 0.5 MG tablet Take 1.5 mg by mouth daily And 0.5mg daily as needed       buPROPion HCl ER, XL, 450 MG TB24 Take 450 mg by mouth daily * 1TAB 30 tablet 0     cetirizine (ZYRTEC) 10 MG tablet Take 10 mg by mouth daily       lurasidone (LATUDA) 80 MG TABS tablet Take 2 tablets (160 mg) by mouth daily (with dinner) 60 tablet 0     norethindrone-ethinyl estradiol (MICROGESTIN /20) 1-20 MG-MCG per tablet Take 1 tablet by mouth daily        Omega-3 Fatty Acids (FISH OIL PO) Take 1,000 mg by mouth daily HOLD WHILE IN U       perphenazine 16 MG tablet Take 1 tablet (16 mg) by mouth 2 times daily 60 tablet 0     sennosides (SENOKOT) 8.6 MG tablet Take 1 tablet by mouth 2 times daily as needed          SERVICES:  Home Care:  Occupational Therapy, Physical Therapy and From:  [facility]      ADDITIONAL INSTRUCTIONS:  ? Weight bearing restrictions:  Weight bearing as tolerated.   ? Continue to follow your diet:  regular.   ? Notify PCP if  you have a fever greater than 100.5 degrees.   ? 24-hour supervision is recommended for safety.       HELADIO Mcleod CNP  This document was electronically signed on July 10, 2019

## 2019-10-02 DIAGNOSIS — F25.0 SCHIZOAFFECTIVE DISORDER, BIPOLAR TYPE (H): ICD-10-CM

## 2019-10-02 DIAGNOSIS — R46.89 AGGRESSION: ICD-10-CM

## 2019-10-02 DIAGNOSIS — F25.1 SCHIZOAFFECTIVE DISORDER, DEPRESSIVE TYPE (H): ICD-10-CM

## 2019-10-02 RX ORDER — BUPROPION HYDROCHLORIDE 450 MG/1
450 TABLET, FILM COATED, EXTENDED RELEASE ORAL DAILY
Qty: 30 TABLET | Refills: 0 | Status: SHIPPED | OUTPATIENT
Start: 2019-10-02 | End: 2019-10-21

## 2019-10-02 RX ORDER — LURASIDONE HYDROCHLORIDE 80 MG/1
160 TABLET, FILM COATED ORAL
Qty: 60 TABLET | Refills: 0 | Status: SHIPPED | OUTPATIENT
Start: 2019-10-02 | End: 2019-10-21

## 2019-10-02 RX ORDER — PERPHENAZINE 16 MG/1
16 TABLET ORAL 2 TIMES DAILY
Qty: 60 TABLET | Refills: 0 | Status: SHIPPED | OUTPATIENT
Start: 2019-10-02 | End: 2019-10-21

## 2019-10-02 RX ORDER — BENZTROPINE MESYLATE 0.5 MG/1
1.5 TABLET ORAL DAILY
Qty: 120 TABLET | Refills: 0 | Status: SHIPPED | OUTPATIENT
Start: 2019-10-02 | End: 2019-10-21

## 2019-10-02 NOTE — TELEPHONE ENCOUNTER
Writer did called the GH again and was informed this was her previous GH and that pt has not been living there since August.    Called the pharmacy back. Mariluz, pharmacist who was working on this previously, also did not have any additional information.    Called pt's mother/legal guardian. She informed this writer that pt broke her leg last winter and her previous GH did not care for her. Unfortunately, this worsened her condition and pt had to be transferred to a TCU. She was there for approximately 6 months, but has since regained strength. Although, she still needs to use a walker to ambulate and suffers from foot drop. On 7/11, the pt moved to a new  in [place]. Mom provided the following contact number: [phone]. NK,  can be reached at [phone]. Mom concerned that things have been much better and more stable since the pt moved to this new .

## 2019-10-02 NOTE — TELEPHONE ENCOUNTER
Harvey Joyce MD Labossiere, Laura, RN   Cc: Paulina Leroy MD   Caller: Unspecified (Today,  8:49 AM)             I can sign them - it would probably make more sense since Paulina hasn't seen the patient. Thanks Meme for your work on this!          Called NK back and informed her of the refill. She was very appreciative.

## 2019-10-02 NOTE — TELEPHONE ENCOUNTER
Called NK at the number in the previous message. She confirmed that the pt has not missed any medication, as the provider with the TCU helped with the transition. No changes were made to pt's psych medications. She takes the following:    -Cogentin 1.5 mg daily + 0.5 mg PRN  -Wellbutrin 450 mg daily  -Latuda 160 mg daily  -perphenazine 16 mg BID    Agreed to discuss this with the providers and will work on refilling. Pt is completely out of all meds, except Latuda. Per NK, if this is sent before 3 pm today, the pharmacy can still deliver her medications to the home. Will call NK when the refills have been sent.

## 2019-10-02 NOTE — TELEPHONE ENCOUNTER
-Writer reviewed pt's chart. Appears she was last seen in November of 2018. Meds were last filled in February per the med tab.    -Called [facility] pharmacy and was informed that her meds were last filled in December of 2018 and January of 2019. The pharmacist did more investigating and found documentation stating the pt moved on 10/1/19. She moved to a new  called [facility] . The phone number for this GH is [phone]. It's uncertain whether she received her medications from February to present. Pharmacist will call pt's new GH. Writer agreed to do this as well.    -Called new GH at the number above. No answer. Left confidential VM requesting a c/b at writer's direct number. Message routed to providers in the meantime.

## 2019-10-21 DIAGNOSIS — F25.0 SCHIZOAFFECTIVE DISORDER, BIPOLAR TYPE (H): ICD-10-CM

## 2019-10-21 DIAGNOSIS — R46.89 AGGRESSION: ICD-10-CM

## 2019-10-21 DIAGNOSIS — F25.1 SCHIZOAFFECTIVE DISORDER, DEPRESSIVE TYPE (H): ICD-10-CM

## 2019-10-21 RX ORDER — BUPROPION HYDROCHLORIDE 450 MG/1
450 TABLET, FILM COATED, EXTENDED RELEASE ORAL DAILY
Qty: 15 TABLET | Refills: 0 | Status: SHIPPED | OUTPATIENT
Start: 2019-10-21 | End: 2019-11-06

## 2019-10-21 RX ORDER — LURASIDONE HYDROCHLORIDE 80 MG/1
160 TABLET, FILM COATED ORAL
Qty: 30 TABLET | Refills: 0 | Status: SHIPPED | OUTPATIENT
Start: 2019-10-21 | End: 2019-11-06

## 2019-10-21 RX ORDER — PERPHENAZINE 16 MG/1
16 TABLET ORAL 2 TIMES DAILY
Qty: 30 TABLET | Refills: 0 | Status: SHIPPED | OUTPATIENT
Start: 2019-10-21 | End: 2019-11-06

## 2019-10-21 RX ORDER — BENZTROPINE MESYLATE 0.5 MG/1
1.5 TABLET ORAL DAILY
Qty: 45 TABLET | Refills: 0 | Status: SHIPPED | OUTPATIENT
Start: 2019-10-21 | End: 2019-11-06

## 2019-10-21 NOTE — TELEPHONE ENCOUNTER
[facility] Call Center    Phone Message    May a detailed message be left on voicemail: yes    Reason for Call: Medication Refill Request    Has the patient contacted the pharmacy for the refill? Yes   Name of medication being requested: Benztropin, Bupropion, Latuda, Perphenazine   Provider who prescribed the medication: Dr. Kerr RUST  Pharmacy: [facility] [place] [id number] - [place], [place] - [address]  Date medication is needed: Patient is out currently          Action Taken: Other: Nurse Pool

## 2019-10-21 NOTE — TELEPHONE ENCOUNTER
Received RF request from pharmacy     Last seen: 11/2/2018  RTC: 4 weeks  Cancel: one - 10/15/2019 (group home unable to transport)  No-show: one - 2/8/2019  Next appt: 11/6/2019     Medication requested: benztropine (COGENTIN) 0.5 MG tablet  Directions: Take 3 tablets (1.5 mg) by mouth daily And 0.5mg daily as needed  Qty: 120  Last Rx written 10/2/2019 for 30 ds with 0 rf     Medication requested: buPROPion HCl ER, XL, 450 MG TB24  Directions: Take 450 mg by mouth daily * 1TAB - Oral  Qty: 30  Last Rx written 10/2/2019 for 30 ds with 0 rf    Medication requested: lurasidone (LATUDA) 80 MG TABS tablet  Directions: Take 2 tablets (160 mg) by mouth daily (with dinner)   Qty: 60  Last Rx written 10/2/2019 for 30 ds with 0 rf    Medication requested: perphenazine 16 MG tablet  Directions: Take 1 tablet (16 mg) by mouth 2 times daily   Qty: 60   Last Rx written 10/2/2019 for 30 ds with 0 rf      Plan per 11/2/2018  office visit:    - Continue lurasidone 160 mg daily w/ dinner  - Continue perphenazine 16 mg BID  - Continue olanzapine 10 mg BID PRN for psychosis  - Change benztropine to 1.5 mg at 5 PM  - Continue bupropion  mg daily  - Discontinue trazodone 50 mg at bedtime  - Continue hydroxyzine 25-50 mg q4hrs PRN for anxiety       Called the pharmacy to determine whether the Rx sent on 10/2/2019 did not go through.  They reported that those medications were refilled, but they need refills on file as patient will run out before her next appointment.  She will run out on 10/31/2019.      Pended 15 ds for all 4 medications and routed to  provider to sign off on due to being outside of RTC timeframe, one no show, and one cancellation.

## 2019-10-30 NOTE — IP AVS SNAPSHOT
MRN:[id number]                      After Visit Summary   3/10/2017    Karolyn Banerjee    MRN: [id number]           Visit Information        Provider Department      3/10/2017 10:15 AM Osiel Amos MD [facility]           Review of your medicines      CONTINUE these medicines which have NOT CHANGED        Dose / Directions    ACETAMINOPHEN PO        Dose:  1000 mg   Take 1,000 mg by mouth every 8 hours as needed for pain   Refills:  0       albuterol (5 MG/ML) 0.5% neb solution   Commonly known as:  PROVENTIL        Dose:  2.5 mg   Inhale 2.5 mg into the lungs every 4 hours as needed for wheezing or shortness of breath / dyspnea (2 puffs)   Refills:  0       benztropine 0.5 MG tablet   Commonly known as:  COGENTIN   Used for:  Schizoaffective disorder, bipolar type (H), Aggression        Dose:  0.5 mg   Take 1 tablet (0.5 mg) by mouth 2 times daily   Quantity:  60 tablet   Refills:  0       cetirizine 10 MG tablet   Commonly known as:  zyrTEC        Dose:  10 mg   Take 10 mg by mouth daily   Refills:  0       [employer] FISH OIL 1000 MG Caps   Used for:  Schizoaffective disorder, bipolar type (H)        Dose:  1000 mg   Take 1,000 mg by mouth daily   Quantity:  30 capsule   Refills:  5       diphenhydrAMINE 25 MG tablet   Commonly known as:  BENADRYL        Dose:  25 mg   Take 25 mg by mouth every 6 hours as needed for itching or allergies   Refills:  0       hydrOXYzine 25 MG tablet   Commonly known as:  ATARAX   Used for:  Schizoaffective disorder, bipolar type (H), Aggression        Dose:  25-50 mg   Take 1-2 tablets (25-50 mg) by mouth every 4 hours as needed for anxiety   Quantity:  60 tablet   Refills:  2       lurasidone 80 MG Tabs tablet   Commonly known as:  LATUDA   Used for:  Schizoaffective disorder, bipolar type (H), Aggression        Dose:  160 mg   Take 2 tablets (160 mg) by mouth daily (with dinner)   Quantity:  60 tablet   Refills:  1        norethindrone-ethinyl estradiol 1-20 MG-MCG per tablet   Commonly known as:  MICROGESTIN 1/20   Used for:  Unspecified contraceptive management        Dose:  1 tablet   Take 1 tablet by mouth daily   Quantity:  28 tablet   Refills:  0       OLANZapine 10 MG tablet   Commonly known as:  zyPREXA   Used for:  Schizoaffective disorder, bipolar type (H), Aggression        Dose:  10 mg   Take 1 tablet (10 mg) by mouth 2 times daily as needed   Quantity:  30 tablet   Refills:  0       perphenazine 8 MG tablet   Used for:  Schizoaffective disorder, bipolar type (H), Aggression        Dose:  8 mg   Take 1 tablet (8 mg) by mouth 3 times daily   Quantity:  93 tablet   Refills:  0       traZODone 50 MG tablet   Commonly known as:  DESYREL   Used for:  Schizoaffective disorder, bipolar type (H), Aggression        Dose:  50 mg   Take 1 tablet (50 mg) by mouth At Bedtime   Quantity:  30 tablet   Refills:  2                Protect others around you: Learn how to safely use, store and throw away your medicines at www.disposemymeds.org.         Follow-ups after your visit        Your next 10 appointments already scheduled     Apr 07, 2017  3:00 PM CDT   Schizophrenia Follow Up with Mariel Kiser MD   Psychiatry Clinic ([facility])    [address]  [address] [facility] [address]   [phone]            Apr 17, 2017  9:45 AM CDT   Electroconvulsive Therapy with Osiel Amos MD   [facility] ([facility])    [address] [facility] [address]   [phone]               Care Instructions        Further instructions from your care team       ECT Discharge Instructions      During your ECT series:      Do not drive or work heavy equipment until 7 days after your last treatment.    Do not drink alcohol or use street drugs (illicit drugs) while you are having treatments.    Do not make important decisions,  "including legal decisions.    After each treatment:      Get plenty of rest. A responsible adult must stay with your for at least 6 hours.    Avoid heavy or risky activities for 24 hours.    If you feel light-headed, sit for a few minutes before standing. Have someone help you get up.    If you have nausea (feel sick to your stomach): Drink only clear liquids such as apple juice, ginger ale, broth or 7UP, Be sure to drink plenty of liquids. Move to a normal diet as you feel able.     If you received Toradol, wait 6 hours before taking ibuprofen.    Call your doctor if:     You have a fever over 100F (37.7 C) (taken under the tongue), or a fever that last more than 24 hours.    Your IV site is red, swollen, very painful or is getting more tender.    You have nausea that gets very bad or does not improve.      If you have any symptoms after ECT, tell our staff before your next treatment.    The ECT Department can be reached at [phone].  The ECT Department is open ,  and  from 7:00 AM to 2:00 PM.    Your next ECT treatment will be: in 6 weeks on 17 at 9:45 AM    To speak to a doctor, call: Dr. Osiel Amos: Office # [phone] and fax # [phone]     Additional Information About Your Visit        [employer] Information     [employer] lets you send messages to your doctor, view your test results, renew your prescriptions, schedule appointments and more. To sign up, go to [email or web address] . Click on \"Log in\" on the left side of the screen, which will take you to the Welcome page. Then click on \"Sign up Now\" on the right side of the page.     You will be asked to enter the access code listed below, as well as some personal information. Please follow the directions to create your username and password.     Your access code is: [email or web address]  Expires: 2017 11:25 AM     Your access code will  in 90 days. If you need help or a new code, please call your [place] clinic " or [phone].        Care EveryWhere ID     This is your Care EveryWhere ID. This could be used by other organizations to access your [facility] medical records  [id number]        Your Vitals Were     Blood Pressure Pulse Temperature Respirations Pulse Oximetry       146/93 101 98.5  F (36.9  C) 16 97%        Primary Care Provider Office Phone # Fax #    Suzie Mariscal [phone]      Thank you!     Thank you for choosing [facility] for your care. Our goal is always to provide you with excellent care. Hearing back from our patients is one way we can continue to improve our services. Please take a few minutes to complete the written survey that you may receive in the mail after you visit with us. Thank you!             Medication List: This is a list of all your medications and when to take them. Check marks below indicate your daily home schedule. Keep this list as a reference.      Medications           Morning Afternoon Evening Bedtime As Needed    ACETAMINOPHEN PO   Take 1,000 mg by mouth every 8 hours as needed for pain                                albuterol (5 MG/ML) 0.5% neb solution   Commonly known as:  PROVENTIL   Inhale 2.5 mg into the lungs every 4 hours as needed for wheezing or shortness of breath / dyspnea (2 puffs)                                benztropine 0.5 MG tablet   Commonly known as:  COGENTIN   Take 1 tablet (0.5 mg) by mouth 2 times daily                                cetirizine 10 MG tablet   Commonly known as:  zyrTEC   Take 10 mg by mouth daily                                [employer] FISH OIL 1000 MG Caps   Take 1,000 mg by mouth daily                                diphenhydrAMINE 25 MG tablet   Commonly known as:  BENADRYL   Take 25 mg by mouth every 6 hours as needed for itching or allergies                                hydrOXYzine 25 MG tablet   Commonly known as:  ATARAX   Take 1-2 tablets (25-50 mg) by mouth every 4 hours as needed for anxiety                                 lurasidone 80 MG Tabs tablet   Commonly known as:  LATUDA   Take 2 tablets (160 mg) by mouth daily (with dinner)                                norethindrone-ethinyl estradiol 1-20 MG-MCG per tablet   Commonly known as:  MICROGESTIN 1/20   Take 1 tablet by mouth daily                                OLANZapine 10 MG tablet   Commonly known as:  zyPREXA   Take 1 tablet (10 mg) by mouth 2 times daily as needed                                perphenazine 8 MG tablet   Take 1 tablet (8 mg) by mouth 3 times daily                                traZODone 50 MG tablet   Commonly known as:  DESYREL   Take 1 tablet (50 mg) by mouth At Bedtime                                    used

## 2019-11-06 ENCOUNTER — OFFICE VISIT (OUTPATIENT)
Dept: PSYCHIATRY | Facility: CLINIC | Age: 34
End: 2019-11-06
Attending: PSYCHIATRY & NEUROLOGY
Payer: MEDICAID

## 2019-11-06 VITALS
BODY MASS INDEX: 50.81 KG/M2 | WEIGHT: 293 LBS | DIASTOLIC BLOOD PRESSURE: 97 MMHG | SYSTOLIC BLOOD PRESSURE: 145 MMHG | HEART RATE: 82 BPM

## 2019-11-06 DIAGNOSIS — F25.0 SCHIZOAFFECTIVE DISORDER, BIPOLAR TYPE (H): ICD-10-CM

## 2019-11-06 DIAGNOSIS — R46.89 AGGRESSION: ICD-10-CM

## 2019-11-06 DIAGNOSIS — F25.1 SCHIZOAFFECTIVE DISORDER, DEPRESSIVE TYPE (H): ICD-10-CM

## 2019-11-06 PROCEDURE — G0463 HOSPITAL OUTPT CLINIC VISIT: HCPCS | Mod: ZF

## 2019-11-06 RX ORDER — BENZTROPINE MESYLATE 0.5 MG/1
1.5 TABLET ORAL DAILY
Qty: 90 TABLET | Refills: 1 | Status: SHIPPED | OUTPATIENT
Start: 2019-11-06 | End: 2020-01-06

## 2019-11-06 RX ORDER — LURASIDONE HYDROCHLORIDE 80 MG/1
160 TABLET, FILM COATED ORAL
Qty: 60 TABLET | Refills: 1 | Status: SHIPPED | OUTPATIENT
Start: 2019-11-06 | End: 2020-01-06

## 2019-11-06 RX ORDER — PERPHENAZINE 16 MG/1
16 TABLET ORAL 2 TIMES DAILY
Qty: 60 TABLET | Refills: 1 | Status: SHIPPED | OUTPATIENT
Start: 2019-11-06 | End: 2020-01-06

## 2019-11-06 RX ORDER — BUPROPION HYDROCHLORIDE 450 MG/1
450 TABLET, FILM COATED, EXTENDED RELEASE ORAL DAILY
Qty: 30 TABLET | Refills: 1 | Status: SHIPPED | OUTPATIENT
Start: 2019-11-06 | End: 2020-01-06

## 2019-11-06 ASSESSMENT — PAIN SCALES - GENERAL: PAINLEVEL: NO PAIN (0)

## 2019-11-06 ASSESSMENT — PATIENT HEALTH QUESTIONNAIRE - PHQ9: SUM OF ALL RESPONSES TO PHQ QUESTIONS 1-9: 3

## 2019-11-06 NOTE — PROGRESS NOTES
"     [facility]  Psychiatry Clinic  TRANSFER of CARE DIAGNOSTIC ASSESSMENT     Date of initial Diagnostic Assessment (DA) is prior to 2008 and most recent Transfer of Care DA is 11/06/19.    CARE TEAM:  PCP- Suzie Mariscal  Specialty Providers- none      Therapist- none   Community  Team- [facility] worker, , group home staff            Guardian: Mary \"Swetha\" Chriss (mother)- [phone] (okay to leave message) or [phone]  [facility] 1: [phone], [phone]. , : [phone].    Karolyn Banerjee is a 34 year old female who prefers the name Karolyn & pronouns she, her, hers.      Pertinent Background:  This patient first experienced mental health issues as a child and has received treatment for schizoaffective disorder, bipolar type, borderline personality disorder, and unspecified neurocognitive disorder. Notably, the patient's symptoms have historically included psychosis (auditory hallucinations, delusions related to pregnancy, agitation) and suicide attempts/threats to run into traffic or drown herself in a pond, often in reaction to perceived slights or difficult interactions with group home staff/residents. She has been tried on many different medication trials in the past, including two trials of clozapine that caused neutropenia. ECT along with medication has been the most helpful treatment for maintaining stability. ECT with Dr. Amos was discontinued in fall 2017 after patient remained relatively stable without it for 3 months (last hospitalization February 2018). Her mother is her guardian. Karolyn fell and broke her leg in 12/2018 which resulted in multiple surgeries, a long transitional care stay, and ongoing use of a walker.     Psych critical item history includes suicide attempt [multiple], suicidal ideation, SIB [per chart], psychosis [sxs include auditory hallucinations, delusions], mutiple psychotropic trials, " "severe med reaction, violent behavior, psych hosp (>5), commitment and ECT.    INTERIM HISTORY      [4, 4]   The patient reports good treatment adherence.  History was provided by the patient and NK, group home owner, who were fair historians.    The last visit ended with the following med change: trazodone discontinued and consolidated benztropine .   Since the last visit:  - Records indicate that Karolyn fell on 12/20/18, fracturing her leg. She underwent an ORIF and then a revision of the ORIF of her right tibia. She developed a DVT following this and spent about 6 months in a TCU. Following this, her group home did not care for her well, so she changed group homes in 7/2019.   - NK reports that Karolyn had been doing well since her move in July and had not had any suicidal behavior or reports of suicidal ideation.  - Karolyn had two episodes of suicidal behavior in the past few weeks  - About 3 weeks ago, Karolyn became upset because she had not seen her boyfriend, Remy, in about a month. She sees him at her day treatment program but was unable to attend this day treatment for a month because her MA which paid for her transportation had lapsed. Karolyn reports that she began crying and very quickly developed suicidal ideation with a plan to walk into traffic. She started to walk towards a busy road near the group home but staff stopped her. She is glad today that staff stopped her.   - Two days ago, Karolyn became upset with a housemate. She again quickly developed suicidal ideation with a plan to walk into traffic. She began walking towards the busy road but staff intervened   - Karolyn does not want her mother to know about these incidences because she is worried her mother will no longer take her to Buddhism.  - [place] that PRN Olanzapine was helpful for these episodes of impulsive suicidality in the past. Does not think that ECT was helpful.     - Mood is \"ok\" with overall unchanged depressed mood since she broke her " leg. Notes that things have been somewhat better since moving into her current group home. Sleeps about 12 hours at night and is fatigued during the day. Has had low energy since breaking her leg.   - Enjoys playing cards and dice with group home staff, enjoys outings to [place] to walk, and generally gets along with housemates. Enjoying attending Hindu weekly with her parents.   - Rates SI intensity today as a 1-2 out of 10 and describes this as her daily baseline since her fall. She describes her SI as constant since breaking her leg.   - Denies medication side effects. Did not tolerate using CPAP. Does not wish to start therapy at this time.     RECENT PSYCH ROS:   Depression: REPORTS: suicidal ideation, depressed mood and low energy DENIES: appetite changes, insomnia, anhedonia, poor concentration  Elevated: DENIES: increased energy, decreased sleep need and increased activity  Psychosis: REPORTS: auditory hallucinations only when listening to music commanding her to lose weight DENIES: paranoia, delusions  Anxiety: REPORTS: excessive worry  Panic Attack:  none  Trauma Related:  none  Dysregulation: REPORTS:  suicidal ideation, mood dysregulation and impulsive  Eating Disorder: no     RECENT SUBSTANCE USE:     ALCOHOL- none      TOBACCO- none     CAFFEINE- soda [couple per day]  OPIOIDS- none         NARCAN KIT- N/A        CANNABIS- none            OTHER ILLICIT DRUGS- none    CURRENT SOCIAL HISTORY:  FINANCIAL SUPPORT- social security disability       CHILDREN- none      LIVING SITUATION- Lives at group home - [facility]     SOCIAL/ SPIRITUAL SUPPORT- boyfriend Remy, family members, mental health support staff. Attends Hindu weekly with parents. Attends [facility] day program in [place] twice a week.   FEELS SAFE AT HOME- yes     PSYCHIATRIC HISTORY                                                          SIB- patient denies, yes per chart review  Suicidal Ideation Hx- Hx of both  active and passive SI, often in response to perceived slights or minor stressors. Plans in the past have included drowning herself in pool by group home or running into traffic.  Suicide Attempt- #- Several, walking toward pond   Most Recent- 2019     Violence/Aggression Hx- Aggression during hospitalizations.  Psychosis Hx- Command auditory hallucinations, delusions related to pregnancy  Psych Hosp- #- Several, most recently Sep 2017, Oct 2017, 2018   Most Recent- 2018   ECT- #- yes   Most Recent- 2017     Eating Disorder: none  Outpatient Programs [ DBT, Day Treatment, Eating Disorder Tx etc] : day treatment at [facility] in [place]    Past Psychotropic Med Trials- see next section    PAST MED TRIALS                                                           clozapine: had 2 trials that resulted in agranulocytosis; no longer eligible for retrial   ziprasidone     olanzapine     quetiapine   divalproex   aripiprazole (ineffective)   risperidone (dystonia)   haloperidol (developed parkinsonism)   sertraline   venlafaxine    SUBSTANCE USE HISTORY                                               Past Use- none reported  Treatment- #- none   Most Recent- N/A  Medical Consequences- none  HIV/Hepatitis- none  Legal Consequences- none    SOCIAL and FAMILY HISTORY      [1ea, 1ea]       patient reported                  Financial Support- documented above  Living Situation/Family/Relationships- documented above;  Children- documented above                                 Trauma History (self-report)- none  Legal- none  Cultural/ Social/ Spiritual Support- boyfriend, Remy (since ), group home staff, family members  Early History/Education-  Born in [place], moved to [place] when she was a baby due to her asthma, lived in [place] until starting living in group homes, completed HS in [place]; has 8 brothers and 2 sisters--she is second oldest. One of her brothers  by suicide in 2017. Closest with  sister, Opal.      FAMILY MENTAL HEALTH HX- Per chart review pts father with mild developmental disorder, she has an uncle with schizophrenia. One of her brothers  by suicide in 2017.  MEDICAL / SURGICAL HISTORY          Pregnant or breastfeeding- no      Contraception- oral contraceptives     Patient Active Problem List   Diagnosis     Mild cognitive impairment     Borderline personality disorder (H)     Asthma     Nonorganic enuresis     Schizoaffective disorder, depressive type (H)     Fx humerus shaft-closed     Port catheter in place     MENTAL HEALTH     Suicidal ideation     Hx of psychiatric care     DVT (deep vein thrombosis) in pregnancy     Suicide attempt (H)     Suicidal ideations     JOSE (obstructive sleep apnea)     Closed fibular fracture     Tibial plateau fracture     Morbid obesity (H)       Past Surgical History:   Procedure Laterality Date     HRW PORT A CATH Right      NO HISTORY OF SURGERY       OPEN REDUCTION INTERNAL FIXATION TIBIAL PLATEAU Right 2018    Procedure: OPEN REDUCTION INTERNAL FIXATION RIGHT TIBIAL PLATEAU FRACTURE;  Surgeon: Marc Meadows MD;  Location:  OR     OPEN REDUCTION INTERNAL FIXATION TIBIAL PLATEAU Right 2019    Procedure: Open reduction internal fixation right tibial plateau fracture;  Surgeon: Tarik Panda MD;  Location:  OR     MEDICAL REVIEW OF SYSTEMS    [2, 10]     REPORTS: fatigue DENIES: oculogyric issues, weight gain, akathisia, easy bruising/bleeding, memory issues.     ALLERGY       Clozapine; Liquid adhesive; Risperdal; and Adhesive tape  MEDICATIONS        Current Outpatient Medications   Medication Sig Dispense Refill     ACETAMINOPHEN PO Take 650 mg by mouth every 4 hours as needed        benztropine (COGENTIN) 0.5 MG tablet Take 3 tablets (1.5 mg) by mouth daily And 0.5mg daily as needed 90 tablet 1     buPROPion HCl ER, XL, 450 MG TB24 Take 450 mg by mouth daily * 1TAB 30 tablet 1     cetirizine (ZYRTEC) 10 MG  "tablet Take 10 mg by mouth daily       lurasidone (LATUDA) 80 MG TABS tablet Take 2 tablets (160 mg) by mouth daily (with dinner) 60 tablet 1     norethindrone-ethinyl estradiol (MICROGESTIN 1/20) 1-20 MG-MCG per tablet Take 1 tablet by mouth daily        Omega-3 Fatty Acids (FISH OIL PO) Take 1,000 mg by mouth daily HOLD WHILE IN TCU       perphenazine 16 MG tablet Take 1 tablet (16 mg) by mouth 2 times daily 60 tablet 1     sennosides (SENOKOT) 8.6 MG tablet Take 1 tablet by mouth 2 times daily as needed        VITALS      [3, 3]   BP (!) 145/97   Pulse 82   Wt 134.3 kg (296 lb)   BMI 50.81 kg/m     MENTAL STATUS EXAM      [9, 14 cog gs]     Alertness: alert , oriented and slow to respond  Appearance: casually groomed, glasses, morbidly obese.   Behavior/Demeanor: cooperative and passive, with poor eye contact, looks down for most of interview  Speech: increased latency of response and slowed  Language: intact  Psychomotor: fidgety and slowed. Plays with part of her walker for most of interview  Mood: \"ok\"  Affect: blunted; was not congruent to mood; was congruent to content  Thought Process/Associations: response delay and concrete  Thought Content:  Reports suicidal ideation with plan; without intent [details in Interim History];  Denies violent ideation and delusions  Perception:  Reports auditory hallucinations;  Denies visual hallucinations  Insight: limited  Judgment: limited  Cognition: (6) does  appear grossly intact; formal cognitive testing was not done  Gait and Station: slow, with walker     LABS and DATA     AIMS:  last done 11/2019 with score(s): 2 (fasiculations of tongue, mild tremor of hands)     PHQ9 TODAY = 3  PHQ-9 SCORE 8/31/2018 11/2/2018 11/6/2019   PHQ-9 Total Score - - -   PHQ-9 Total Score 5 6 3     ANTIPSYCHOTIC LABS  [glu, A1C, lipids (sven LDL), liver enzymes, WBC, ANEU, Hgb, plts]  q12 mo  Recent Labs   Lab Test 06/12/19 01/21/19  0719 12/23/18  0628 12/22/18  0642  04/12/17  1024 "   GLC 52* 82 98 150*   < > 83   A1C 5.0  --   --   --   --  5.0    < > = values in this interval not displayed.     Recent Labs   Lab Test 06/12/19 09/21/17  0750 04/12/17  1024 10/20/15  0853   CHOL 168 181 207* 185   TRIG 94 144 143 134   LDL 97 108* 135* 115   HDL 52 44* 43 43*     Recent Labs   Lab Test 06/12/19 12/21/18  0720 10/05/17  1108 09/21/17  0750   AST 12 21 18 18   ALT 20 23 22 21   ALKPHOS 67 74 64 76     Recent Labs   Lab Test 06/12/19 01/23/19  0611 01/21/19  0719 01/20/19  0650  01/16/19  0635 01/15/19  1350  12/23/18  0628 12/22/18  0642  12/20/18  1749 02/06/18  1655 10/05/17  1108 09/21/17  0750   WBC 6.2  --  4.2  --   --   --   --   --  7.6 9.3   < > 11.2* 6.0 7.0 6.3   ANEU  --   --   --   --   --   --   --   --   --   --   --  9.7* 3.5 4.7 4.2   HGB 13.7  --  13.1  --   --  11.8 12.7  --  11.4* 12.8   < > 14.1 13.8 15.5 15.3    198 195 173   < >  --   --    < > 126* 152   < > 170 Platelets clumped, platelet count unavailable 154 118*    < > = values in this interval not displayed.       PSYCHOTROPIC DRUG INTERACTIONS     Hydroxyzine and olanzapine or perphenazine or trazodone may result in increased risk of QT interval prolongation.    Perphenazine and benztropine may result in decreased phenothiazine serum concentrations, decreased phenothiazine effectiveness, enhanced anticholinergic effects (ileus, hyperpyrexia, sedation, dry mouth).   Trazodone and perphenazine may result in hypotension.  Bupropion + Hydroxyzine: Concurrent use of bupropion and hydroxyzine may result in lower seizure threshold.     MANAGEMENT:  Monitoring for adverse effects, routine vitals, periodic EKGs and patient is aware of risks    RISK STATEMENT for SAFETY     Karolyn Banerjee reports chronic SI at her baseline.  There are notable risk factors for self-harm including suicide plan [to run into traffic], previous suicide attempt, hallucinations and new/ worsening medical issue.  However, risk is mitigated by  future oriented, feeling hopeful, none to minimal alcohol use , commitment to family, good social support  , Pentecostalism beliefs, stable housing and participation in DBT or day program.  Based on all available evidence she does not appear to be at imminent risk for self-harm therefore does not meet criteria for a 72-hr hold/  involuntary hospitalization.  However, based on degree of symptoms close psych FU was recommended which the pt did agree to.  Additional steps to minimize risk include: frequent clinic visits and med changes.      PSYCHIATRIC DIAGNOSIS     Schizoaffective Disorder, bipolar type, depressed episode, moderate  Unspecified Neurocognitive Disorder    ASSESSMENT      [m2, h3]     TODAY Karolyn presents for follow-up after she had not been seen in clinic since 11/2018. She has had a difficult year since then including breaking her leg from falling in 12/2018 that resulted in 2 surgeries and a prolonged TCU stay. She has also switched group homes. She has a long history of impulsive suicidal gestures such as walking into traffic or into a pond when she has a conflict with group home staff or is upset. Overall, she reports worsening depression over the past year related to her significant stressors. Her group home staff felt that she had been doing well for several months as she had not had any suicidal gestures for 3 months. She has then had two suicidal gestures in the last few weeks. She has had multiple medication trials and tried ECT to help with her mood with limited effect. An MTM consult may be helpful in assessing what options remain for Karolyn to target her depression. She reports benefit from olanzapine prn in the past when she becomes upset and impulsively has suicidal gestures. She was taking olanzapine 10mg prn in 11/2018 and it has since been discontinued for unclear reasons. Karolyn's mother consents to resuming this medication at a lower dose of 5mg. She also has a significant antipsychotic  load that could be contributing to grogginess. Future considerations could focus on reducing this overall load as tolerated. Coordination with Dr. Amos regarding resuming ECT may be discussed in the future with Karolny and her mother who is her guardian.      PLAN      [m2, h3]     1) PSYCHOTROPIC MEDICATIONS:  - Continue lurasidone 160 mg daily w/ dinner  - Continue perphenazine 16 mg BID  - Start olanzapine 5 mg BID PRN for psychosis, agitation  - Continue benztropine 1.5 mg at 5 PM  - Continue bupropion  mg daily    2) [place]  was not checked today:  not using controlled substances.    3) THERAPY:    none. Patient declined    4) NEXT DUE:    Labs- AP labs due 6/2020  EKG- last 1/2019 Qtc 428, recheck as needed  Rating Scales- AIMS due 11/2020    5) REFERRALS:    MTM- for consideration of options to target her depressive symptoms    6) RTC: 4 weeks    7) CRISIS NUMBERS:   Provided routinely in AVS   ONLY if a [facility] PT: [place] [phone] (clinic), [phone] (after hours)     TREATMENT RISK STATEMENT:  The risks, benefits, alternatives and potential adverse effects have been discussed and are understood by the pt. The pt understands the risks of using street drugs or alcohol. There are no medical contraindications, the pt agrees to treatment with the ability to do so. The pt knows to call the clinic for any problems or to access emergency care if needed.  Medical and substance use concerns are documented above.  Psychotropic drug interaction check was done, including changes made today.    PROVIDER: Meme Murcia MD    Patient staffed in clinic with Dr. Toussaint who will sign the note.  Supervisor is Dr. Toussaint.  I saw the patient with the resident, and participated in key portions of the service, including the mental status examination and developing the plan of care. I reviewed key portions of the history with the resident. I agree with the findings and plan as documented in this  note.    Ginna Toussaint MD

## 2019-11-06 NOTE — PATIENT INSTRUCTIONS
It was good to meet you Karolyn.    Continue your medications as prescribed.     I will call your mother about started Olanzapine PRN.    Please tell staff if your suicidal thoughts are more intense.     Follow-up with me in 1 month    Dr. Murcia

## 2019-11-07 DIAGNOSIS — F60.3 BORDERLINE PERSONALITY DISORDER (H): Primary | ICD-10-CM

## 2019-11-07 DIAGNOSIS — F25.1 SCHIZOAFFECTIVE DISORDER, DEPRESSIVE TYPE (H): ICD-10-CM

## 2019-11-07 DIAGNOSIS — R45.851 SUICIDAL IDEATION: ICD-10-CM

## 2019-11-07 RX ORDER — OLANZAPINE 5 MG/1
5 TABLET ORAL 2 TIMES DAILY PRN
Qty: 60 TABLET | Refills: 1 | Status: SHIPPED | OUTPATIENT
Start: 2019-11-07 | End: 2020-10-16

## 2019-11-07 RX ORDER — NORETHINDRONE ACETATE AND ETHINYL ESTRADIOL 1MG-20(21)
1 KIT ORAL
COMMUNITY
Start: 2019-10-02

## 2019-11-07 RX ORDER — AMOXICILLIN 250 MG
8.6 CAPSULE ORAL
COMMUNITY
Start: 2018-12-23

## 2019-11-07 NOTE — TELEPHONE ENCOUNTER
----- Message from Meme Murcia MD sent at 11/6/2019  2:33 PM CST -----  Regarding: medication consent from guardian  Garrick Wolfe,    I wanted to give you a heads-up that I left a message for Karolyn's mom who is her guardian and asked her to call back. I was hoping to start Olanzapine 5mg BID prn agitation/intense suicidality for Karolyn given her two recent episodes of suicidal ideation with a plan to walk into traffic. If her mother calls back, could you speak with her about it and let me know, so we can put the prescription through?    Thank you,  Meme

## 2019-11-07 NOTE — TELEPHONE ENCOUNTER
[facility]apo Godfrey [facility]   Phone Number: [phone]             Patients mother/guardian Mary stated she received a message asking if it is okay to begin a new medication. Mother stated that she is fine with the medication change, and if anything else is needed to call number listed.          Pended order for olanzapine 5 mg tab PO Q BID PRN for agitation and intense suicidality and routed to Dr. Murcia for review and signature.

## 2019-11-12 ENCOUNTER — TELEPHONE (OUTPATIENT)
Dept: PSYCHIATRY | Facility: CLINIC | Age: 34
End: 2019-11-12

## 2019-11-12 NOTE — TELEPHONE ENCOUNTER
MTM referral from: [facility] visit (referral by provider)    MTM referral outreach attempt #2 on November 12, 2019 at 10:54 AM      Outcome: Patient not reachable after several attempts, will route to MTM Pharmacist/Provider as an FYI. Thank you for the referral.    See [facility] Pharmacy Coordinator

## 2019-12-13 ENCOUNTER — TRANSFERRED RECORDS (OUTPATIENT)
Dept: HEALTH INFORMATION MANAGEMENT | Facility: CLINIC | Age: 34
End: 2019-12-13

## 2019-12-13 ENCOUNTER — TELEPHONE (OUTPATIENT)
Dept: PSYCHIATRY | Facility: CLINIC | Age: 34
End: 2019-12-13

## 2019-12-13 NOTE — TELEPHONE ENCOUNTER
Received a [facility] Diagnostic Assessment Form from [facility] via fax dated December 11, 2019.      It is accompanied by a signed authorization for use or disclosure of information between [facility] and [facility].  The following records can be released/exchanged:  - treatment plan   - chemical health history, assessment, and treatment  - medical history, assessment and treatment  - court and correctional records  - progress reports  - medication records  - mental health history, assessment and treatment    Completed form to be faxed back to   Thea Hall,   [facility]  P: [phone]  F: [phone]    Placed form in Dr. Murcia's folder for completion.

## 2019-12-17 ENCOUNTER — MEDICAL CORRESPONDENCE (OUTPATIENT)
Dept: HEALTH INFORMATION MANAGEMENT | Facility: CLINIC | Age: 34
End: 2019-12-17

## 2019-12-17 NOTE — TELEPHONE ENCOUNTER
Faxed the completed form back to [facility].  Sent the original to scanning and retained a copy in clinic.

## 2020-01-02 DIAGNOSIS — F25.0 SCHIZOAFFECTIVE DISORDER, BIPOLAR TYPE (H): ICD-10-CM

## 2020-01-02 DIAGNOSIS — R46.89 AGGRESSION: ICD-10-CM

## 2020-01-02 DIAGNOSIS — F25.1 SCHIZOAFFECTIVE DISORDER, DEPRESSIVE TYPE (H): ICD-10-CM

## 2020-01-06 RX ORDER — BUPROPION HYDROCHLORIDE 450 MG/1
450 TABLET, FILM COATED, EXTENDED RELEASE ORAL DAILY
Qty: 30 TABLET | Refills: 0 | Status: SHIPPED | OUTPATIENT
Start: 2020-01-06 | End: 2020-02-06

## 2020-01-06 RX ORDER — PERPHENAZINE 16 MG/1
16 TABLET ORAL 2 TIMES DAILY
Qty: 60 TABLET | Refills: 0 | Status: SHIPPED | OUTPATIENT
Start: 2020-01-06 | End: 2020-02-06

## 2020-01-06 RX ORDER — BENZTROPINE MESYLATE 0.5 MG/1
TABLET ORAL
Qty: 120 TABLET | Refills: 0 | Status: SHIPPED | OUTPATIENT
Start: 2020-01-06 | End: 2020-02-06

## 2020-01-06 RX ORDER — LURASIDONE HYDROCHLORIDE 80 MG/1
160 TABLET, FILM COATED ORAL
Qty: 60 TABLET | Refills: 0 | Status: SHIPPED | OUTPATIENT
Start: 2020-01-06 | End: 2020-02-06

## 2020-01-06 NOTE — TELEPHONE ENCOUNTER
Last seen: 11/6/2019  RTC: 4 weeks  Cancel: 0  No-show: 0  Next appt: None. Message sent to scheduling to contact patient for appointment.     Refill decision: Refilled x 30 days until patient can be seen per protocol.    Warnings Override History for buPROPion HCl ER, XL, 450 MG TB24 [[id number]]     Overridden by Meme Murcia MD on Nov 6, 2019 2:31 PM   Drug-Drug   1. SELECTIVE SEROTONIN REUPTAKE INHIBITORS AND BUPROPION / PHENOTHIAZINES [Level: Major]   Other Orders: perphenazine 16 MG tablet      2. SELECTIVE SEROTONIN REUPTAKE INHIBITORS AND BUPROPION / PHENOTHIAZINES [Level: Major]   Other Orders: perphenazine 16 MG tablet

## 2020-01-29 DIAGNOSIS — F25.1 SCHIZOAFFECTIVE DISORDER, DEPRESSIVE TYPE (H): ICD-10-CM

## 2020-01-29 DIAGNOSIS — F25.0 SCHIZOAFFECTIVE DISORDER, BIPOLAR TYPE (H): ICD-10-CM

## 2020-01-29 DIAGNOSIS — R46.89 AGGRESSION: ICD-10-CM

## 2020-01-30 RX ORDER — LURASIDONE HYDROCHLORIDE 80 MG/1
TABLET, FILM COATED ORAL
Qty: 60 TABLET | Refills: 0 | OUTPATIENT
Start: 2020-01-30

## 2020-01-30 RX ORDER — BENZTROPINE MESYLATE 0.5 MG/1
TABLET ORAL
Qty: 120 TABLET | Refills: 0 | OUTPATIENT
Start: 2020-01-30

## 2020-01-30 RX ORDER — PERPHENAZINE 16 MG/1
TABLET ORAL
Qty: 60 TABLET | Refills: 0 | OUTPATIENT
Start: 2020-01-30

## 2020-01-30 RX ORDER — BUPROPION HYDROCHLORIDE 150 MG/1
TABLET ORAL
Qty: 90 TABLET | OUTPATIENT
Start: 2020-01-30

## 2020-02-06 ENCOUNTER — OFFICE VISIT (OUTPATIENT)
Dept: PSYCHIATRY | Facility: CLINIC | Age: 35
End: 2020-02-06
Attending: PSYCHIATRY & NEUROLOGY
Payer: MEDICAID

## 2020-02-06 VITALS
BODY MASS INDEX: 50.91 KG/M2 | DIASTOLIC BLOOD PRESSURE: 102 MMHG | HEART RATE: 106 BPM | SYSTOLIC BLOOD PRESSURE: 142 MMHG | WEIGHT: 293 LBS

## 2020-02-06 DIAGNOSIS — F25.0 SCHIZOAFFECTIVE DISORDER, BIPOLAR TYPE (H): ICD-10-CM

## 2020-02-06 DIAGNOSIS — R46.89 AGGRESSION: ICD-10-CM

## 2020-02-06 DIAGNOSIS — F25.1 SCHIZOAFFECTIVE DISORDER, DEPRESSIVE TYPE (H): ICD-10-CM

## 2020-02-06 PROCEDURE — G0463 HOSPITAL OUTPT CLINIC VISIT: HCPCS | Mod: ZF

## 2020-02-06 RX ORDER — LURASIDONE HYDROCHLORIDE 80 MG/1
160 TABLET, FILM COATED ORAL
Qty: 60 TABLET | Refills: 1 | Status: SHIPPED | OUTPATIENT
Start: 2020-02-06 | End: 2020-03-25

## 2020-02-06 RX ORDER — BENZTROPINE MESYLATE 0.5 MG/1
TABLET ORAL
Qty: 120 TABLET | Refills: 1 | Status: SHIPPED | OUTPATIENT
Start: 2020-02-06 | End: 2020-03-25

## 2020-02-06 RX ORDER — PERPHENAZINE 16 MG/1
16 TABLET ORAL 2 TIMES DAILY
Qty: 60 TABLET | Refills: 1 | Status: SHIPPED | OUTPATIENT
Start: 2020-02-06 | End: 2020-03-25

## 2020-02-06 RX ORDER — BUPROPION HYDROCHLORIDE 450 MG/1
450 TABLET, FILM COATED, EXTENDED RELEASE ORAL DAILY
Qty: 30 TABLET | Refills: 1 | Status: SHIPPED | OUTPATIENT
Start: 2020-02-06 | End: 2020-03-25

## 2020-02-06 ASSESSMENT — PAIN SCALES - GENERAL: PAINLEVEL: NO PAIN (0)

## 2020-02-06 NOTE — PROGRESS NOTES
"     [facility]  Psychiatry Clinic  PSYCHIATRIC PROGRESS NOTE     Date of initial Diagnostic Assessment (DA) is prior to 2008 and most recent Transfer of Care DA is 11/06/19.    CARE TEAM:  PCP- Suzie Mariscal  Specialty Providers- none      Therapist- none   Community  Team- [facility] worker, CM, group home staff. CADI : Charmaine Redmand [phone]. Mental Health : Bre [phone]       Guardian: Mary \"Swetha\" Chriss (mother)- [phone] (okay to leave message) or [phone]  FC[facility], [facility] 1: [phone], [phone]. , : [phone].    Karolyn Banerjee is a 34 year old female who prefers the name Karolyn & pronouns she, her, hers.      Pertinent Background:  This patient first experienced mental health issues as a child and has received treatment for schizoaffective disorder, bipolar type, borderline personality disorder, and unspecified neurocognitive disorder. Notably, the patient's symptoms have historically included psychosis (auditory hallucinations, delusions related to pregnancy, agitation) and suicide attempts/threats to run into traffic or drown herself in a pond, often in reaction to perceived slights or difficult interactions with group home staff/residents. She has been tried on many different medication trials in the past, including two trials of clozapine that caused neutropenia. ECT along with medication has been the most helpful treatment for maintaining stability. ECT with Dr. Amos was discontinued in fall 2017 after patient remained relatively stable without it for 3 months (last hospitalization February 2018). Her mother is her guardian. Karolyn fell and broke her leg in 12/2018 which resulted in multiple surgeries, a long transitional care stay, and ongoing use of a walker.     Psych critical item history includes suicide attempt [multiple], suicidal ideation, SIB [per chart], psychosis " [sxs include auditory hallucinations, delusions], mutiple psychotropic trials, severe med reaction, violent behavior, psych hosp (>5), commitment and ECT.    INTERIM HISTORY      [4, 4]   The patient reports good treatment adherence.  History was provided by the patient and CALDERON, group home owner, who were fair historians.    The last visit ended with the following med change: Olanzapine as needed for psychosis.   Since the last visit:  -States that things have been good and her mood has been good.  -Had Sorin roberto carlos dinner at her mother's house with most of her brothers and sisters there.  Enjoyed seeing her family.  -No issues with her group home.  -Has not been seeing her boyfriend, Remy, at her day program as often because she does not have money on her [employer] card to pay for [employer] mobility.  Did see Remy at the day program 2 days ago because he paid for part of her ride. CALDERON has been working with Karolyn's [email or web address]  to get money on her metro card.  In the meantime, the group home can pay for Karolyn's transportation.  -Has been staying busy with watching TV and playing games such as [employer], Robertson in the corner, and fill or bust.  Also attends Protestant weekly with her mother and spends time with her afterwards.  -Sleeps about 13 hours a day from 6 PM until 7 AM.  Has some fatigue during the day.  -Denies auditory hallucinations, delusions, or medication side effects.  -Reports rare suicidal ideation since her last appointment with suicidal thoughts once every couple months.  Denies any suicidal behavior since her last appointment in November which she attributes to staying busy. CALDERON states Karolyn has not needed any Zyprexa as needed because she has not had any suicidal behavior. CALDERON notes that if Karolyn does not see Remy, she has worse suicidal ideation.    RECENT PSYCH ROS:   Depression: REPORTS: low energy and hypersomnia DENIES: depressed mood, appetite changes, insomnia, anhedonia, poor concentration,  suicidal ideation  Elevated: DENIES: increased energy, decreased sleep need and increased activity  Psychosis: DENIES: auditory hallucinations,paranoia, delusions  Anxiety: DENIES: excessive worry  Panic Attack:  none  Trauma Related:  none  Dysregulation: DENIES:  suicidal ideation, mood dysregulation and impulsive  Eating Disorder: no     RECENT SUBSTANCE USE:     ALCOHOL- none      TOBACCO- none     CAFFEINE- soda [couple per day]  OPIOIDS- none         NARCAN KIT- N/A        CANNABIS- none            OTHER ILLICIT DRUGS- none    CURRENT SOCIAL HISTORY:  FINANCIAL SUPPORT- social security disability       CHILDREN- none      LIVING SITUATION- Lives at group home - [facility]     SOCIAL/ SPIRITUAL SUPPORT- boyfriend Remy, family members, mental health support staff. Attends Anabaptism weekly with parents. Attends [facility] [place] program in [place] twice a week.   FEELS SAFE AT HOME- yes     PSYCH and CD Critical Summary Points since July 2019 11/6- resident transition, started olanzapine PRN  2/6/20-no changes    PAST MED TRIALS          See last Diagnostic Assessment  MEDICAL / SURGICAL HISTORY          Pregnant or breastfeeding- no      Contraception- oral contraceptives     Patient Active Problem List   Diagnosis     Mild cognitive impairment     Borderline personality disorder (H)     Asthma     Nonorganic enuresis     Schizoaffective disorder, depressive type (H)     Fx humerus shaft-closed     Port catheter in place     MENTAL HEALTH     Suicidal ideation     Hx of psychiatric care     DVT (deep vein thrombosis) in pregnancy     Suicide attempt (H)     Suicidal ideations     JOSE (obstructive sleep apnea)     Closed fibular fracture     Tibial plateau fracture     Morbid obesity (H)       Past Surgical History:   Procedure Laterality Date     HRW PORT A CATH Right      NO HISTORY OF SURGERY       OPEN REDUCTION INTERNAL FIXATION TIBIAL PLATEAU Right 12/21/2018    Procedure: OPEN  REDUCTION INTERNAL FIXATION RIGHT TIBIAL PLATEAU FRACTURE;  Surgeon: Marc Meadows MD;  Location:  OR     OPEN REDUCTION INTERNAL FIXATION TIBIAL PLATEAU Right 1/14/2019    Procedure: Open reduction internal fixation right tibial plateau fracture;  Surgeon: Tarik Panda MD;  Location:  OR     MEDICAL REVIEW OF SYSTEMS    [2, 10]     REPORTS: fatigue, mild shoulder pain DENIES: oculogyric issues, weight gain, akathisia, easy bruising/bleeding, memory issues, fever, confusion, muscle twitching    ALLERGY       Clozapine; Liquid adhesive; Risperdal; and Adhesive tape  MEDICATIONS        Current Outpatient Medications   Medication Sig Dispense Refill     ACETAMINOPHEN PO Take 650 mg by mouth every 4 hours as needed        benztropine (COGENTIN) 0.5 MG tablet Take 3 tablets by mouth daily and take 1 tablet by mouth daily as needed 120 tablet 1     buPROPion HCl ER, XL, 450 MG TB24 Take 450 mg by mouth daily DO NOT CRUSH. 30 tablet 1     cetirizine (ZYRTEC) 10 MG tablet Take 10 mg by mouth daily       lurasidone (LATUDA) 80 MG TABS tablet Take 2 tablets (160 mg) by mouth daily (with dinner) 60 tablet 1     norethindrone-ethinyl estradiol (JUNEL FE 1/20) 1-20 MG-MCG tablet Take 1 tablet by mouth       norethindrone-ethinyl estradiol (MICROGESTIN 1/20) 1-20 MG-MCG per tablet Take 1 tablet by mouth daily        OLANZapine (ZYPREXA) 5 MG tablet Take 1 tablet (5 mg) by mouth 2 times daily as needed (agitation, intense suicidality) 60 tablet 1     Omega-3 Fatty Acids (FISH OIL PO) Take 1,000 mg by mouth daily HOLD WHILE IN TCU       perphenazine 16 MG tablet Take 1 tablet (16 mg) by mouth 2 times daily 60 tablet 1     senna-docusate (STOOL SOFTENER/LAXATIVE) 8.6-50 MG tablet Take 8.6 mg by mouth       sennosides (SENOKOT) 8.6 MG tablet Take 1 tablet by mouth 2 times daily as needed        VITALS      [3, 3]   BP (!) 142/102   Pulse 106   Wt 134.5 kg (296 lb 9.6 oz)   BMI 50.91 kg/m     MENTAL STATUS EXAM      [9,  "14 cog gs]     Alertness: alert , oriented and slow to respond  Appearance: casually groomed, glasses, morbidly obese.  Hair appears unwashed.  Skin tag on upper lip  Behavior/Demeanor: cooperative, pleasant and calm, with fair  eye contact  Speech: normal and regular rate and rhythm  Language: intact  Psychomotor: normal or unremarkable  Mood: \"good\"  Affect: restricted; was not congruent to mood; was not congruent to content  Thought Process/Associations: concrete, otherwise unremarkable  Thought Content:  Reports none;  Denies suicidal and violent ideation and delusions  Perception:  Reports none;  Denies auditory hallucinations and visual hallucinations  Insight: limited  Judgment: limited  Cognition: (6) does  appear grossly intact; formal cognitive testing was not done  Gait and Station: unremarkable     LABS and DATA     AIMS:  last done 11/2019 with score(s): 2 (fasiculations of tongue, mild tremor of hands)     PHQ9 TODAY = 3  PHQ-9 SCORE 8/31/2018 11/2/2018 11/6/2019   PHQ-9 Total Score - - -   PHQ-9 Total Score 5 6 3     ANTIPSYCHOTIC LABS  [glu, A1C, lipids (sven LDL), liver enzymes, WBC, ANEU, Hgb, plts]  q12 mo  Recent Labs   Lab Test 06/12/19 01/21/19  0719 12/23/18  0628 12/22/18  0642  04/12/17  1024   GLC 52* 82 98 150*   < > 83   A1C 5.0  --   --   --   --  5.0    < > = values in this interval not displayed.     Recent Labs   Lab Test 06/12/19 09/21/17  0750 04/12/17  1024 10/20/15  0853   CHOL 168 181 207* 185   TRIG 94 144 143 134   LDL 97 108* 135* 115   HDL 52 44* 43 43*     Recent Labs   Lab Test 06/12/19 12/21/18  0720 10/05/17  1108 09/21/17  0750   AST 12 21 18 18   ALT 20 23 22 21   ALKPHOS 67 74 64 76     Recent Labs   Lab Test 06/12/19 01/23/19  0611 01/21/19  0719 01/20/19  0650  01/16/19  0635 01/15/19  1350  12/23/18  0628 12/22/18  0642  12/20/18  1749 02/06/18  1655 10/05/17  1108 09/21/17  0750   WBC 6.2  --  4.2  --   --   --   --   --  7.6 9.3   < > 11.2* 6.0 7.0 6.3   ANEU  --   " --   --   --   --   --   --   --   --   --   --  9.7* 3.5 4.7 4.2   HGB 13.7  --  13.1  --   --  11.8 12.7  --  11.4* 12.8   < > 14.1 13.8 15.5 15.3    198 195 173   < >  --   --    < > 126* 152   < > 170 Platelets clumped, platelet count unavailable 154 118*    < > = values in this interval not displayed.       PSYCHOTROPIC DRUG INTERACTIONS   CETIRIZINE and PERPHENAZINE AND OLANZAPINE may result in increased risk of CNS depression.   BUPROPION and AGENTS LOWERING SEIZURE THRESHOLD may result in lower seizure threshold.   PERPHENAZINE and BENZTROPINE may result in decreased phenothiazine serum concentrations, decreased phenothiazine effectiveness, enhanced anticholinergic effects (ileus, hyperpyrexia, sedation, dry mouth).     MANAGEMENT:  Monitoring for adverse effects, routine vitals and patient is aware of risks    RISK STATEMENT for SAFETY     Karolyn Banerjee denies current SI.  There are notable risk factors for self-harm including suicide plan [to run into traffic], previous suicide attempt, hallucinations and new/ worsening medical issue.  However, risk is mitigated by future oriented, feeling hopeful, none to minimal alcohol use , commitment to family, good social support  , Roman [employer] beliefs, stable housing and participation in DBT or day program.  Based on all available evidence she does not appear to be at imminent risk for self-harm therefore does not meet criteria for a 72-hr hold/  involuntary hospitalization.  However, based on degree of symptoms close psych FU was recommended which the pt did agree to.  Additional steps to minimize risk include: frequent clinic visits and med changes.      PSYCHIATRIC DIAGNOSIS     Schizoaffective Disorder, bipolar type, depressed episode, moderate  Unspecified Neurocognitive Disorder    ASSESSMENT      [m2, h3]     TODAY Karolyn presents for follow-up.  She has been doing very well since her last appointment in November with a good mood, rare suicidal  ideation, and no suicidal behavior.  Karolyn and her group home staff, NK, attribute this to Anat staying busy with activities.  She has not needed any Zyprexa as needed as a result.  She continues to have hypersomnia and mild fatigue which may be due to to her medication regimen.  Discussed decreasing her overall antipsychotic load to improve this which can be further explored at her next appointment.  A thorough review of Karolyn's past medication history will be helpful in determining which medication is more appropriate to taper.      PLAN      [m2, h3]     1) PSYCHOTROPIC MEDICATIONS:  - Continue lurasidone 160 mg daily w/ dinner  - Continue perphenazine 16 mg BID  - Continue olanzapine 5 mg BID PRN for psychosis, agitation  - Continue benztropine 1.5 mg at 5 PM  - Continue bupropion  mg daily    2) [place]  was not checked today:  not using controlled substances.    3) THERAPY:    none. Patient declined    4) NEXT DUE:    Labs- AP labs due 6/2020  EKG- last 1/2019 Qtc 428, recheck as needed  Rating Scales- AIMS due 11/2020    5) REFERRALS:    MTM- for consideration of options to target her depressive symptoms    6) RTC: 2 months    7) CRISIS NUMBERS:   Provided routinely in AVS   ONLY if a [facility]: [facility] [phone] (clinic), [phone] (after hours)     TREATMENT RISK STATEMENT:  The risks, benefits, alternatives and potential adverse effects have been discussed and are understood by the pt. The pt understands the risks of using street drugs or alcohol. There are no medical contraindications, the pt agrees to treatment with the ability to do so. The pt knows to call the clinic for any problems or to access emergency care if needed.  Medical and substance use concerns are documented above.  Psychotropic drug interaction check was done, including changes made today.    PROVIDER: Meme Murcia MD    Patient staffed in clinic with Dr. Kerr who will sign the note.  Supervisor is   Norbert.    Supervisor Attestation:  I met with Karolyn Banerjee along with the resident physician, Meme Murcia MD. I participated in key portions of the service, including the mental status examination and developing the plan of care. I reviewed key portions of the history with the resident. I agree with the findings and plan as documented in this note.  Freddy Kerr MD

## 2020-03-24 DIAGNOSIS — F25.0 SCHIZOAFFECTIVE DISORDER, BIPOLAR TYPE (H): ICD-10-CM

## 2020-03-24 DIAGNOSIS — F25.1 SCHIZOAFFECTIVE DISORDER, DEPRESSIVE TYPE (H): ICD-10-CM

## 2020-03-24 DIAGNOSIS — R46.89 AGGRESSION: ICD-10-CM

## 2020-03-25 RX ORDER — BUPROPION HYDROCHLORIDE 150 MG/1
TABLET ORAL
Qty: 90 TABLET | Refills: 1 | Status: SHIPPED | OUTPATIENT
Start: 2020-03-25 | End: 2020-05-17

## 2020-03-25 RX ORDER — BENZTROPINE MESYLATE 0.5 MG/1
TABLET ORAL
Qty: 120 TABLET | Refills: 1 | Status: SHIPPED | OUTPATIENT
Start: 2020-03-25 | End: 2020-05-17

## 2020-03-25 RX ORDER — LURASIDONE HYDROCHLORIDE 80 MG/1
160 TABLET, FILM COATED ORAL
Qty: 60 TABLET | Refills: 1 | Status: SHIPPED | OUTPATIENT
Start: 2020-03-25 | End: 2020-06-17

## 2020-03-25 RX ORDER — PERPHENAZINE 16 MG/1
TABLET ORAL
Qty: 60 TABLET | Refills: 1 | Status: SHIPPED | OUTPATIENT
Start: 2020-03-25 | End: 2020-05-17

## 2020-03-25 NOTE — TELEPHONE ENCOUNTER
Received a call from [facility] pharmacist.  She confirmed that patient still has enough medication but she would like to make sure that there are refills on file since they have to bubble-pack them individually and to allow time for mailing.  The group home does not get early refills.  She also confirmed that they are providing bupropion  mg tabs instead of 450 mg tabs.    Pended 30 ds with 1 rf of Cogentin, Wellbutrin XL, Trilafon, and Latuda and routed to Dr. Murcia for review and signature.

## 2020-03-25 NOTE — TELEPHONE ENCOUNTER
Received RF request via interface     Last seen: 2/6/2020  RTC: 2 months  Cancel: none  No-show: none  Next appt: none scheduled     Medication requested: benztropine (COGENTIN) 0.5 MG tablet   Directions: Take 3 tablets by mouth daily and take 1 tablet by mouth daily as needed   Qty: 120  Last Rx written 2/6/2020 for 30 ds with 1 rf     Medication requested: buPROPion HCl ER, XL, 450 MG TB24  Directions: Take 450 mg by mouth daily DO NOT CRUSH.   Qty: 30  Last Rx written 2/6/2020 for 30 ds with 1 rf    Medication requested: perphenazine 16 MG tablet   Directions: Take 1 tablet (16 mg) by mouth 2 times daily   Qty: 60  Last Rx written 2/6/2020 for 30 ds with 1 rf    Not requested but also on the same refill schedule:    lurasidone (LATUDA) 80 MG TABS tablet  60 tablet  1  2/6/2020   No    Sig - Route: Take 2 tablets (160 mg) by mouth daily (with dinner)          Plan per 2/6/20 office visit:    - Continue lurasidone 160 mg daily w/ dinner  - Continue perphenazine 16 mg BID  - Continue olanzapine 5 mg BID PRN for psychosis, agitation  - Continue benztropine 1.5 mg at 5 PM  - Continue bupropion  mg daily       Per outside med tab, 2/6/20 Rxs have not been filled.  Also, pended Rx is for bupropion  mg tabs and not 450 mg.  Called and left  at [facility] to get clarification on what looks like an early refill request.
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
## 2020-05-15 DIAGNOSIS — F25.0 SCHIZOAFFECTIVE DISORDER, BIPOLAR TYPE (H): ICD-10-CM

## 2020-05-15 DIAGNOSIS — F25.1 SCHIZOAFFECTIVE DISORDER, DEPRESSIVE TYPE (H): ICD-10-CM

## 2020-05-18 RX ORDER — BUPROPION HYDROCHLORIDE 150 MG/1
450 TABLET ORAL EVERY MORNING
Qty: 90 TABLET | Refills: 0 | Status: SHIPPED | OUTPATIENT
Start: 2020-05-18 | End: 2020-06-17

## 2020-05-18 RX ORDER — BENZTROPINE MESYLATE 0.5 MG/1
TABLET ORAL
Qty: 120 TABLET | Refills: 0 | Status: SHIPPED | OUTPATIENT
Start: 2020-05-18 | End: 2020-06-22

## 2020-05-18 RX ORDER — PERPHENAZINE 16 MG/1
16 TABLET ORAL 2 TIMES DAILY
Qty: 60 TABLET | Refills: 0 | Status: SHIPPED | OUTPATIENT
Start: 2020-05-18 | End: 2020-06-22

## 2020-05-18 NOTE — TELEPHONE ENCOUNTER
Medication requested:   benztropine (COGENTIN) 0.5 MG tablet   buPROPion (WELLBUTRIN XL) 150 MG 24 hr tablet   perphenazine 16 MG tablet   Last refilled: 4/20/20  Qty:   120  90  60      Last seen: 2/6/20  RTC: 2 months  Cancel: 0  No-show: 0  Next appt: 0    Refill decision:   Pt outside of RTC timeframe.  Scheduling has been notified to contact the pt for appointment.  Cogentin controlled substance

## 2020-06-15 DIAGNOSIS — F25.1 SCHIZOAFFECTIVE DISORDER, DEPRESSIVE TYPE (H): ICD-10-CM

## 2020-06-15 DIAGNOSIS — F25.0 SCHIZOAFFECTIVE DISORDER, BIPOLAR TYPE (H): ICD-10-CM

## 2020-06-15 DIAGNOSIS — R46.89 AGGRESSION: ICD-10-CM

## 2020-06-17 DIAGNOSIS — F25.0 SCHIZOAFFECTIVE DISORDER, BIPOLAR TYPE (H): ICD-10-CM

## 2020-06-17 DIAGNOSIS — F25.1 SCHIZOAFFECTIVE DISORDER, DEPRESSIVE TYPE (H): ICD-10-CM

## 2020-06-17 DIAGNOSIS — R46.89 AGGRESSION: ICD-10-CM

## 2020-06-17 RX ORDER — BUPROPION HYDROCHLORIDE 150 MG/1
450 TABLET ORAL EVERY MORNING
Qty: 45 TABLET | Refills: 0 | Status: SHIPPED | OUTPATIENT
Start: 2020-06-17 | End: 2020-06-29

## 2020-06-17 RX ORDER — LURASIDONE HYDROCHLORIDE 80 MG/1
160 TABLET, FILM COATED ORAL
Qty: 30 TABLET | Refills: 0 | Status: SHIPPED | OUTPATIENT
Start: 2020-06-17 | End: 2020-06-29

## 2020-06-17 NOTE — TELEPHONE ENCOUNTER
Last seen: 2/6  RTC: 2 months   Cancel: none   No-show: none   Next appt: none     Incoming refill from patient via patient     Medication requested: buPROPion (WELLBUTRIN XL) 150 MG 24 hr tablet   Directions: Take 3 tablets (450 mg) by mouth every morning For more refills,schedule an appointment at [phone] - Oral   Qty: 90  Last refilled: 5/18    Medication requested: lurasidone (LATUDA) 80 MG TABS tablet  Directions: Take 2 tablets (160 mg) by mouth daily (with dinner) - Oral  Qty: 60  Last refilled: 3/25    Writer placed a call to patient to help schedule an appt. Patient requested this writer call GH nurse and owner, NK to schedule an appt. Writer placed a call to NK at [phone] to help schedule an appt for the patient. They agreed to be seen for a video visit on 6/29 at 8:30 am. Scheduling notified.     Routed to provider for approval

## 2020-06-17 NOTE — TELEPHONE ENCOUNTER
- Meds refilled by provider   - Med tab changed to reflect this   -  staff notified     No further action needed by this writer

## 2020-06-17 NOTE — TELEPHONE ENCOUNTER
[facility] Call Center    Phone Message    May a detailed message be left on voicemail: yes     Reason for Call: Medication Refill Request    Has the patient contacted the pharmacy for the refill? Yes   Name of medication being requested: Wellbutrin  Provider who prescribed the medication: Dr. Murcia  Pharmacy: [facility]  Date medication is needed: 6/18/20 patient only has today and tomorrow's dose left.         Action Taken: Message routed to:  Other: [facility]    Travel Screening: Not Applicable

## 2020-06-18 RX ORDER — LURASIDONE HYDROCHLORIDE 80 MG/1
TABLET, FILM COATED ORAL
Qty: 60 TABLET | Refills: 1 | OUTPATIENT
Start: 2020-06-18

## 2020-06-18 RX ORDER — BUPROPION HYDROCHLORIDE 150 MG/1
TABLET ORAL
Qty: 90 TABLET | Refills: 0 | OUTPATIENT
Start: 2020-06-18

## 2020-06-22 RX ORDER — PERPHENAZINE 16 MG/1
16 TABLET ORAL 2 TIMES DAILY
Qty: 60 TABLET | Refills: 0 | Status: SHIPPED | OUTPATIENT
Start: 2020-06-22 | End: 2020-06-29

## 2020-06-22 RX ORDER — BENZTROPINE MESYLATE 0.5 MG/1
TABLET ORAL
Qty: 120 TABLET | Refills: 0 | Status: SHIPPED | OUTPATIENT
Start: 2020-06-22 | End: 2020-07-16

## 2020-06-22 NOTE — TELEPHONE ENCOUNTER
Medication requested:   benztropine (COGENTIN) 0.5 MG tablet   perphenazine 16 MG tablet   Last refilled: 5/18/20  Qty:   120  60       Last seen: 2/6/20  RTC: 2 months  Cancel: 0  No-show: 0  Next appt: 6/29/20     Refill decision:   Refilled for 30 days per protocol.      Warnings Override History for perphenazine 16 MG tablet [[id number]]     Overridden by Meme Murcia MD on Jun 17, 2020 11:20 AM    Drug-Drug    1. SELECTIVE SEROTONIN REUPTAKE INHIBITORS AND BUPROPION / PHENOTHIAZINES [Level: Major]    Other Orders:  buPROPion (WELLBUTRIN XL) 150 MG 24 hr tablet          Overridden by Meme Murcia MD on May 18, 2020 8:12 AM    Drug-Drug    1. SELECTIVE SEROTONIN REUPTAKE INHIBITORS AND BUPROPION / PHENOTHIAZINES [Level: Major]    Other Orders:  buPROPion (WELLBUTRIN XL) 150 MG 24 hr tablet       2. SELECTIVE SEROTONIN REUPTAKE INHIBITORS AND BUPROPION / PHENOTHIAZINES [Level: Major]    Other Orders:  buPROPion (WELLBUTRIN XL) 150 MG 24 hr tablet       Allergy/Contraindication    1. CLOZAPINE [Level: Drug Class M

## 2020-06-29 ENCOUNTER — VIRTUAL VISIT (OUTPATIENT)
Dept: PSYCHIATRY | Facility: CLINIC | Age: 35
End: 2020-06-29
Attending: PSYCHIATRY & NEUROLOGY
Payer: MEDICAID

## 2020-06-29 DIAGNOSIS — F25.1 SCHIZOAFFECTIVE DISORDER, DEPRESSIVE TYPE (H): ICD-10-CM

## 2020-06-29 DIAGNOSIS — R46.89 AGGRESSION: ICD-10-CM

## 2020-06-29 DIAGNOSIS — F25.0 SCHIZOAFFECTIVE DISORDER, BIPOLAR TYPE (H): ICD-10-CM

## 2020-06-29 RX ORDER — LURASIDONE HYDROCHLORIDE 80 MG/1
160 TABLET, FILM COATED ORAL
Qty: 60 TABLET | Refills: 2 | Status: SHIPPED | OUTPATIENT
Start: 2020-06-29 | End: 2020-09-16

## 2020-06-29 RX ORDER — PERPHENAZINE 16 MG/1
16 TABLET ORAL 2 TIMES DAILY
Qty: 60 TABLET | Refills: 2 | Status: SHIPPED | OUTPATIENT
Start: 2020-06-29 | End: 2020-09-16

## 2020-06-29 RX ORDER — BUPROPION HYDROCHLORIDE 150 MG/1
450 TABLET ORAL EVERY MORNING
Qty: 90 TABLET | Refills: 2 | Status: SHIPPED | OUTPATIENT
Start: 2020-06-29 | End: 2020-09-16

## 2020-06-29 NOTE — PROGRESS NOTES
"TELEPHONE VISIT  Karolyn Banerjee is a 34 year old pt. who is being evaluated via a billable telephone visit.      The patient has been notified of the following:    We have found that certain health care needs can be provided without the need for a physical exam. This service lets us provide the care you need with a short phone conversation. If a prescription is necessary we can send it directly to your pharmacy. If lab work is needed we can place an order for that and you can then stop by our lab to have the test done at a later time. Insurers are generally covering virtual visits as they would in-office visits so billing should not be different than normal.  If for some reason you do get billed incorrectly, you should contact the billing office to correct it and that number is in the AVS .    Patient has given verbal consent for a telephone visit?:  Yes   How would the pt like to obtain the AVS?:  Patient declined  AVS SmartPhrase [PsychAVS] has been placed in 'Patient Instructions':  N/A     Start Time:  8:30AM       End Time:  9:00AM       [facility]  Psychiatry Clinic  PSYCHIATRIC PROGRESS NOTE     Date of initial Diagnostic Assessment (DA) is prior to 2008 and most recent Transfer of Care DA is 11/06/19.    CARE TEAM:  PCP- Suzie Mariscal  Specialty Providers- none      Therapist- none   Community  Team- [facility] worker, CM, group home staff. CADI : Charmaine Isabel [phone]. Mental Health : Bre [phone]       Guardian: Mary \"Swetha\" Chriss (mother)- [phone] (okay to leave message) or [phone]  jail, [facility] 1: [phone], [phone]. , : [phone].    Karolyn Banerjee is a 34 year old female who prefers the name Karolyn & pronouns she, her, hers.      Pertinent Background:  This patient first experienced mental health issues as a child and has received treatment for schizoaffective disorder, " bipolar type, borderline personality disorder, and unspecified neurocognitive disorder. Notably, the patient's symptoms have historically included psychosis (auditory hallucinations, delusions related to pregnancy, agitation) and suicide attempts/threats to run into traffic or drown herself in a pond, often in reaction to perceived slights or difficult interactions with group [place] staff/residents. She has been tried on many different medication trials in the past, including two trials of clozapine that caused neutropenia. ECT along with medication has been the most helpful treatment for maintaining stability. ECT with Dr. Amos was discontinued in fall 2017 after patient remained relatively stable without it for 3 months (last hospitalization February 2018). Her mother is her guardian. Karolyn fell and broke her leg in 12/2018 which resulted in multiple surgeries, a long transitional care stay, and ongoing use of a walker.     Psych critical item history includes suicide attempt [multiple], suicidal ideation, SIB [per chart], psychosis [sxs include auditory hallucinations, delusions], mutiple psychotropic trials, severe med reaction, violent behavior, psych hosp (>5), commitment and ECT.    INTERIM HISTORY      [4, 4]   The patient reports good treatment adherence.  History was provided by the patient and NK, group home owner, who were fair historians.    The last visit ended with no change to the med regimen.   Since the last visit:  -States that she was just sleeping.  -Reports that she is good and her mood has been good.  -Has been doing ok without the day program. Spends her days playing games and watching TV.   -Misses her boyfriend, Remy, who she normally sees at the day program but talks to him on the phone.   -Sleeps about 12 hours a night. Her energy has been good.  -Only sees her mother when she drops off money for her but does talk to her family on the phone.   -Denies any suicidal ideation, auditory  hallucinations, paranoia, delusions, or medication side effects.    Karolyn's , CALDERON, states that Karolyn has been doing well. Karolyn has not had any suicidal behavior for months.     Left voicemail for Karolyn's mother informing her that I had an appointment with Karolyn today and that she seemed to be doing well. Encouraged her to call the clinic if she has any questions or concerns.    RECENT PSYCH ROS:   Depression: REPORTS: hypersomnia DENIES: low energy, depressed mood, appetite changes, insomnia, anhedonia, poor concentration, suicidal ideation  Elevated: DENIES: increased energy, decreased sleep need and increased activity  Psychosis: DENIES: auditory hallucinations,paranoia, delusions  Anxiety: DENIES: excessive worry  Panic Attack:  none  Trauma Related:  none  Dysregulation: DENIES:  suicidal ideation, mood dysregulation and impulsive  Eating Disorder: no     RECENT SUBSTANCE USE:     ALCOHOL- none      TOBACCO- none     CAFFEINE- soda [couple per day]  OPIOIDS- none         NARCAN KIT- N/A        CANNABIS- none            OTHER ILLICIT DRUGS- none    CURRENT SOCIAL HISTORY:  FINANCIAL SUPPORT- social security disability       CHILDREN- none      LIVING SITUATION- Lives at group home - [facility]     SOCIAL/ SPIRITUAL SUPPORT- boyfriend Remy, family members, mental health support staff. Before pandemic, attended Sikh with her parents and attended [facility] day program in [place] twice a week.   FEELS SAFE AT HOME- yes     PSYCH and CD Critical Summary Points since July 2019 11/6- resident transition, started olanzapine PRN  2/6/20-no changes  6/29-no changes    PAST MED TRIALS          See last Diagnostic Assessment  MEDICAL / SURGICAL HISTORY          Pregnant or breastfeeding- no      Contraception- oral contraceptives     Patient Active Problem List   Diagnosis     Mild cognitive impairment     Borderline personality disorder (H)     Asthma      Nonorganic enuresis     Schizoaffective disorder, depressive type (H)     Fx humerus shaft-closed     Port catheter in place     MENTAL HEALTH     Suicidal ideation     Hx of psychiatric care     DVT (deep vein thrombosis) in pregnancy     Suicide attempt (H)     Suicidal ideations     JOSE (obstructive sleep apnea)     Closed fibular fracture     Tibial plateau fracture     Morbid obesity (H)       Past Surgical History:   Procedure Laterality Date     HRW PORT A CATH Right      NO HISTORY OF SURGERY       OPEN REDUCTION INTERNAL FIXATION TIBIAL PLATEAU Right 12/21/2018    Procedure: OPEN REDUCTION INTERNAL FIXATION RIGHT TIBIAL PLATEAU FRACTURE;  Surgeon: Marc Meadows MD;  Location:  OR     OPEN REDUCTION INTERNAL FIXATION TIBIAL PLATEAU Right 1/14/2019    Procedure: Open reduction internal fixation right tibial plateau fracture;  Surgeon: Tarik Panda MD;  Location:  OR     MEDICAL REVIEW OF SYSTEMS    [2, 10]     REPORTS: none DENIES: oculogyric issues, weight gain, akathisia, easy bruising/bleeding, memory issues, fever, confusion, muscle twitching, nausea, abdominal pain, dyspnea    ALLERGY       Clozapine; Liquid adhesive; Risperdal; and Adhesive tape  MEDICATIONS        Current Outpatient Medications   Medication Sig Dispense Refill     ACETAMINOPHEN PO Take 650 mg by mouth every 4 hours as needed        benztropine (COGENTIN) 0.5 MG tablet Take 3 tablets (1.5 mg) by mouth daily. May also take 1 tablet (0.5 mg) daily as needed for agitation. 120 tablet 0     buPROPion (WELLBUTRIN XL) 150 MG 24 hr tablet Take 3 tablets (450 mg) by mouth every morning 45 tablet 0     cetirizine (ZYRTEC) 10 MG tablet Take 10 mg by mouth daily       lurasidone (LATUDA) 80 MG TABS tablet Take 2 tablets (160 mg) by mouth daily (with dinner) 30 tablet 0     norethindrone-ethinyl estradiol (JUNEL FE 1/20) 1-20 MG-MCG tablet Take 1 tablet by mouth       norethindrone-ethinyl estradiol (MICROGESTIN 1/20) 1-20 MG-MCG per  "tablet Take 1 tablet by mouth daily        OLANZapine (ZYPREXA) 5 MG tablet Take 1 tablet (5 mg) by mouth 2 times daily as needed (agitation, intense suicidality) 60 tablet 1     Omega-3 Fatty Acids (FISH OIL PO) Take 1,000 mg by mouth daily HOLD WHILE IN TCU       perphenazine 16 MG tablet Take 1 tablet (16 mg) by mouth 2 times daily 60 tablet 0     senna-docusate (STOOL SOFTENER/LAXATIVE) 8.6-50 MG tablet Take 8.6 mg by mouth       sennosides (SENOKOT) 8.6 MG tablet Take 1 tablet by mouth 2 times daily as needed        VITALS      [3, 3]   There were no vitals taken for this visit.   MENTAL STATUS EXAM      [9, 14 cog gs]     Alertness: alert , oriented and slow to respond  Appearance: could not assess  Behavior/Demeanor: cooperative, pleasant and calm, could not assess eye contact  Speech: increased latency of response and slowed, short responses, not conversational   Language: intact  Psychomotor: could not assess  Mood: \"good\"  Affect: sounded restricted; was not congruent to mood; was not congruent to content  Thought Process/Associations: concrete, otherwise unremarkable  Thought Content:  Reports none;  Denies suicidal and violent ideation and delusions  Perception:  Reports none;  Denies auditory hallucinations and visual hallucinations  Insight: limited  Judgment: limited  Cognition: (6) does  appear grossly intact; formal cognitive testing was not done  Gait and Station: unremarkable     LABS and DATA     AIMS:  last done 11/2019 with score(s): 2 (fasiculations of tongue, mild tremor of hands)     PHQ9 TODAY =not completed  PHQ-9 SCORE 8/31/2018 11/2/2018 11/6/2019   PHQ-9 Total Score - - -   PHQ-9 Total Score 5 6 3     ANTIPSYCHOTIC LABS  [glu, A1C, lipids (sven LDL), liver enzymes, WBC, ANEU, Hgb, plts]  q12 mo  Recent Labs   Lab Test 06/12/19 01/21/19  0719 12/23/18  0628 12/22/18  0642  04/12/17  1024   GLC 52* 82 98 150*   < > 83   A1C 5.0  --   --   --   --  5.0    < > = values in this interval not " displayed.     Recent Labs   Lab Test 06/12/19 09/21/17  0750 04/12/17  1024 10/20/15  0853   CHOL 168 181 207* 185   TRIG 94 144 143 134   LDL 97 108* 135* 115   HDL 52 44* 43 43*     Recent Labs   Lab Test 06/12/19 12/21/18  0720 10/05/17  1108 09/21/17  0750   AST 12 21 18 18   ALT 20 23 22 21   ALKPHOS 67 74 64 76     Recent Labs   Lab Test 06/12/19 01/23/19  0611 01/21/19  0719 01/20/19  0650  01/16/19  0635 01/15/19  1350  12/23/18  0628 12/22/18  0642  12/20/18  1749 02/06/18  1655 10/05/17  1108 09/21/17  0750   WBC 6.2  --  4.2  --   --   --   --   --  7.6 9.3   < > 11.2* 6.0 7.0 6.3   ANEU  --   --   --   --   --   --   --   --   --   --   --  9.7* 3.5 4.7 4.2   HGB 13.7  --  13.1  --   --  11.8 12.7  --  11.4* 12.8   < > 14.1 13.8 15.5 15.3    198 195 173   < >  --   --    < > 126* 152   < > 170 Platelets clumped, platelet count unavailable 154 118*    < > = values in this interval not displayed.       PSYCHOTROPIC DRUG INTERACTIONS   CETIRIZINE and PERPHENAZINE AND OLANZAPINE may result in increased risk of CNS depression.   BUPROPION and AGENTS LOWERING SEIZURE THRESHOLD may result in lower seizure threshold.   PERPHENAZINE and BENZTROPINE may result in decreased phenothiazine serum concentrations, decreased phenothiazine effectiveness, enhanced anticholinergic effects (ileus, hyperpyrexia, sedation, dry mouth).     MANAGEMENT:  Monitoring for adverse effects, routine vitals and patient is aware of risks    RISK STATEMENT for SAFETY     Karolyn Banerjee denies current SI.  There are notable risk factors for self-harm including suicide plan [to run into traffic], previous suicide attempt, hallucinations and new/ worsening medical issue.  However, risk is mitigated by future oriented, feeling hopeful, none to minimal alcohol use , commitment to family, good social support  , Christianity beliefs, stable housing and participation in DBT or day program.  Based on all available evidence she does not  appear to be at imminent risk for self-harm therefore does not meet criteria for a 72-hr hold/  involuntary hospitalization.  However, based on degree of symptoms close psych FU was recommended which the pt did agree to.  Additional steps to minimize risk include: frequent clinic visits and med changes.      PSYCHIATRIC DIAGNOSIS     Schizoaffective Disorder, bipolar type, depressed episode, moderate  Unspecified Neurocognitive Disorder    ASSESSMENT      [m2, h3]     TODAY Karolyn presents for follow-up via telephone. She has been doing well for the past several months despite the pandemic and not going to her day program. She has not had any psychosis, depression, or suicidal behavior. Given her stability, will continue her medications unchanged. Future considerations include decreasing her overall antipsychotic load to reduce her hypersomnia.  A thorough review of Karolyn's past medication history will be helpful in determining which medication is more appropriate to taper.     PLAN      [m2, h3]     1) PSYCHOTROPIC MEDICATIONS:  - Continue lurasidone 160 mg daily w/ dinner  - Continue perphenazine 16 mg BID  - Continue olanzapine 5 mg BID PRN for psychosis, agitation  - Continue benztropine 1.5 mg at 5 PM  - Continue bupropion  mg daily    2) [place]  was not checked today:  not using controlled substances.    3) THERAPY:    none. Patient declined    4) NEXT DUE:    Labs- AP labs due 6/2020  EKG- last 1/2019 Qtc 428, recheck as needed  Rating Scales- AIMS due 11/2020    5) REFERRALS:    MTM- for consideration of options to target her depressive symptoms    6) RTC: 3 months with Dr. Hoff    7) CRISIS NUMBERS:   Provided routinely in AVS   ONLY if a NICK PT: [facility] [phone] (clinic), [phone] (after hours)     TREATMENT RISK STATEMENT:  The risks, benefits, alternatives and potential adverse effects have been discussed and are understood by the pt. The pt understands the risks of using  street drugs or alcohol. There are no medical contraindications, the pt agrees to treatment with the ability to do so. The pt knows to call the clinic for any problems or to access emergency care if needed.  Medical and substance use concerns are documented above.  Psychotropic drug interaction check was done, including changes made today.    PROVIDER: Meme Murcia MD    Patient staffed with Dr. Ruano who will sign the note.  Supervisor is Dr. Toussaint.    TELEHEALTH ATTENDING ATTESTATION  Following the ACGME guidelines on telemedicine and direct supervision due to COVID-19, I was concurrently participating in and/or monitoring the patient care through appropriate telecommunication technology.  I discussed the key portions of the service with the resident, including the mental status examination and developing the plan of care. I reviewed key portions of the history with the resident. I agree with the findings and plan as documented in this note.   Vanessa Ruano MD

## 2020-06-29 NOTE — PROGRESS NOTES
"TELEPHONE VISIT  Karolyn Banerjee is a 34 year old pt. who is being evaluated via a billable telephone visit.      The patient has been notified of the following:    We have found that certain health care needs can be provided without the need for a physical exam. This service lets us provide the care you need with a short phone conversation. If a prescription is necessary we can send it directly to your pharmacy. If lab work is needed we can place an order for that and you can then stop by our lab to have the test done at a later time. Insurers are generally covering virtual visits as they would in-office visits so billing should not be different than normal.  If for some reason you do get billed incorrectly, you should contact the billing office to correct it and that number is in the AVS .    Patient has given verbal consent for a telephone visit?:  Yes   How would the pt like to obtain the AVS?:  Patient declined  AVS SmartPhrase [PsychAVS] has been placed in 'Patient Instructions':  N/A     Start Time:  8:30AM       End Time:  9:00AM       [facility]  Psychiatry Clinic  PSYCHIATRIC PROGRESS NOTE     Date of initial Diagnostic Assessment (DA) is prior to 2008 and most recent Transfer of Care DA is 11/06/19.    CARE TEAM:  PCP- Suzie Mariscal  Specialty Providers- none      Therapist- none   Community  Team- [facility] worker, CM, group home staff. CADI : Charmaine Isabel [phone]. Mental Health : Bre [phone]       Guardian: Mary \"Swetha\" Chriss (mother)- [phone] (okay to leave message) or [phone]  [facility], [facility]: [phone], [phone]. , : [phone].    Karolyn Banerjee is a 34 year old female who prefers the name Karolyn & pronouns she, her, hers.      Pertinent Background:  This patient first experienced mental health issues as a child and has received treatment for schizoaffective disorder, " bipolar type, borderline personality disorder, and unspecified neurocognitive disorder. Notably, the patient's symptoms have historically included psychosis (auditory hallucinations, delusions related to pregnancy, agitation) and suicide attempts/threats to run into traffic or drown herself in a pond, often in reaction to perceived slights or difficult interactions with group [employer] staff/residents. She has been tried on many different medication trials in the past, including two trials of clozapine that caused neutropenia. ECT along with medication has been the most helpful treatment for maintaining stability. ECT with Dr. Amos was discontinued in fall 2017 after patient remained relatively stable without it for 3 months (last hospitalization February 2018). Her mother is her guardian. Karolyn fell and broke her leg in 12/2018 which resulted in multiple surgeries, a long transitional care stay, and ongoing use of a walker.     Psych critical item history includes suicide attempt [multiple], suicidal ideation, SIB [per chart], psychosis [sxs include auditory hallucinations, delusions], mutiple psychotropic trials, severe med reaction, violent behavior, psych hosp (>5), commitment and ECT.    INTERIM HISTORY      [4, 4]   The patient reports good treatment adherence.  History was provided by the patient and NK, group home owner, who were fair historians.    The last visit ended with no change to the med regimen.   Since the last visit:  -States that she was just sleeping.  -Reports that she is good and her mood has been good.  -Has been doing ok without the day program. Spends her days playing games and watching TV.   -Misses her boyfriend, Remy, who she sees at the day program but talks to him on the phone.   -Sleeps about 12 hours a night. Her energy has been good.  -Only sees her mother when she drops off money for her but does talk to her family on the phone.   -Denies any suicidal ideation, auditory  hallucinations, paranoia, delusions, or medication side effects.    Karolyn's , CALDERON, states that Karolyn has been doing well. Karolyn has not had any suicidal behavior for months.     Left voicemail for Karolyn's mother informing her that I had an appointment with Karolyn today and that she seemed to be doing well. Encouraged her to call the clinic if she has any questions or concerns.    RECENT PSYCH ROS:   Depression: REPORTS: hypersomnia DENIES: low energy, depressed mood, appetite changes, insomnia, anhedonia, poor concentration, suicidal ideation  Elevated: DENIES: increased energy, decreased sleep need and increased activity  Psychosis: DENIES: auditory hallucinations,paranoia, delusions  Anxiety: DENIES: excessive worry  Panic Attack:  none  Trauma Related:  none  Dysregulation: DENIES:  suicidal ideation, mood dysregulation and impulsive  Eating Disorder: no     RECENT SUBSTANCE USE:     ALCOHOL- none      TOBACCO- none     CAFFEINE- soda [couple per day]  OPIOIDS- none         NARCAN KIT- N/A        CANNABIS- none            OTHER ILLICIT DRUGS- none    CURRENT SOCIAL HISTORY:  FINANCIAL SUPPORT- social security disability       CHILDREN- none      LIVING SITUATION- Lives at group home - [facility]     SOCIAL/ SPIRITUAL SUPPORT- boyfriend Remy, family members, mental health support staff. Before pandemic, attended [employer] with her parents and attended [facility] day program in [place] twice a week.   FEELS SAFE AT HOME- yes     PSYCH and CD Critical Summary Points since July 2019 11/6- resident transition, started olanzapine PRN  2/6/20-no changes  6/29-no changes    PAST MED TRIALS          See last Diagnostic Assessment  MEDICAL / SURGICAL HISTORY          Pregnant or breastfeeding- no      Contraception- oral contraceptives     Patient Active Problem List   Diagnosis     Mild cognitive impairment     Borderline personality disorder (H)     Asthma      Nonorganic enuresis     Schizoaffective disorder, depressive type (H)     Fx humerus shaft-closed     Port catheter in place     MENTAL HEALTH     Suicidal ideation     Hx of psychiatric care     DVT (deep vein thrombosis) in pregnancy     Suicide attempt (H)     Suicidal ideations     JOSE (obstructive sleep apnea)     Closed fibular fracture     Tibial plateau fracture     Morbid obesity (H)       Past Surgical History:   Procedure Laterality Date     HRW PORT A CATH Right      NO HISTORY OF SURGERY       OPEN REDUCTION INTERNAL FIXATION TIBIAL PLATEAU Right 12/21/2018    Procedure: OPEN REDUCTION INTERNAL FIXATION RIGHT TIBIAL PLATEAU FRACTURE;  Surgeon: Marc Meadows MD;  Location:  OR     OPEN REDUCTION INTERNAL FIXATION TIBIAL PLATEAU Right 1/14/2019    Procedure: Open reduction internal fixation right tibial plateau fracture;  Surgeon: Tarik Panda MD;  Location:  OR     MEDICAL REVIEW OF SYSTEMS    [2, 10]     REPORTS: none DENIES: oculogyric issues, weight gain, akathisia, easy bruising/bleeding, memory issues, fever, confusion, muscle twitching, nausea, abdominal pain, dyspnea    ALLERGY       Clozapine; Liquid adhesive; Risperdal; and Adhesive tape  MEDICATIONS        Current Outpatient Medications   Medication Sig Dispense Refill     ACETAMINOPHEN PO Take 650 mg by mouth every 4 hours as needed        benztropine (COGENTIN) 0.5 MG tablet Take 3 tablets (1.5 mg) by mouth daily. May also take 1 tablet (0.5 mg) daily as needed for agitation. 120 tablet 0     buPROPion (WELLBUTRIN XL) 150 MG 24 hr tablet Take 3 tablets (450 mg) by mouth every morning 45 tablet 0     cetirizine (ZYRTEC) 10 MG tablet Take 10 mg by mouth daily       lurasidone (LATUDA) 80 MG TABS tablet Take 2 tablets (160 mg) by mouth daily (with dinner) 30 tablet 0     norethindrone-ethinyl estradiol (JUNEL FE 1/20) 1-20 MG-MCG tablet Take 1 tablet by mouth       norethindrone-ethinyl estradiol (MICROGESTIN 1/20) 1-20 MG-MCG per  "tablet Take 1 tablet by mouth daily        OLANZapine (ZYPREXA) 5 MG tablet Take 1 tablet (5 mg) by mouth 2 times daily as needed (agitation, intense suicidality) 60 tablet 1     Omega-3 Fatty Acids (FISH OIL PO) Take 1,000 mg by mouth daily HOLD WHILE IN TCU       perphenazine 16 MG tablet Take 1 tablet (16 mg) by mouth 2 times daily 60 tablet 0     senna-docusate (STOOL SOFTENER/LAXATIVE) 8.6-50 MG tablet Take 8.6 mg by mouth       sennosides (SENOKOT) 8.6 MG tablet Take 1 tablet by mouth 2 times daily as needed        VITALS      [3, 3]   There were no vitals taken for this visit.   MENTAL STATUS EXAM      [9, 14 cog gs]     Alertness: alert , oriented and slow to respond  Appearance: could not assess  Behavior/Demeanor: cooperative, pleasant and calm, could not assess eye contact  Speech: increased latency of response and slowed, short responses, not conversational   Language: intact  Psychomotor: could not assess  Mood: \"good\"  Affect: sounded restricted; was not congruent to mood; was not congruent to content  Thought Process/Associations: concrete, otherwise unremarkable  Thought Content:  Reports none;  Denies suicidal and violent ideation and delusions  Perception:  Reports none;  Denies auditory hallucinations and visual hallucinations  Insight: limited  Judgment: limited  Cognition: (6) does  appear grossly intact; formal cognitive testing was not done  Gait and Station: unremarkable     LABS and DATA     AIMS:  last done 11/2019 with score(s): 2 (fasiculations of tongue, mild tremor of hands)     PHQ9 TODAY =not completed  PHQ-9 SCORE 8/31/2018 11/2/2018 11/6/2019   PHQ-9 Total Score - - -   PHQ-9 Total Score 5 6 3     ANTIPSYCHOTIC LABS  [glu, A1C, lipids (sven LDL), liver enzymes, WBC, ANEU, Hgb, plts]  q12 mo  Recent Labs   Lab Test 06/12/19 01/21/19  0719 12/23/18  0628 12/22/18  0642  04/12/17  1024   GLC 52* 82 98 150*   < > 83   A1C 5.0  --   --   --   --  5.0    < > = values in this interval not " displayed.     Recent Labs   Lab Test 06/12/19 09/21/17  0750 04/12/17  1024 10/20/15  0853   CHOL 168 181 207* 185   TRIG 94 144 143 134   LDL 97 108* 135* 115   HDL 52 44* 43 43*     Recent Labs   Lab Test 06/12/19 12/21/18  0720 10/05/17  1108 09/21/17  0750   AST 12 21 18 18   ALT 20 23 22 21   ALKPHOS 67 74 64 76     Recent Labs   Lab Test 06/12/19 01/23/19  0611 01/21/19  0719 01/20/19  0650  01/16/19  0635 01/15/19  1350  12/23/18  0628 12/22/18  0642  12/20/18  1749 02/06/18  1655 10/05/17  1108 09/21/17  0750   WBC 6.2  --  4.2  --   --   --   --   --  7.6 9.3   < > 11.2* 6.0 7.0 6.3   ANEU  --   --   --   --   --   --   --   --   --   --   --  9.7* 3.5 4.7 4.2   HGB 13.7  --  13.1  --   --  11.8 12.7  --  11.4* 12.8   < > 14.1 13.8 15.5 15.3    198 195 173   < >  --   --    < > 126* 152   < > 170 Platelets clumped, platelet count unavailable 154 118*    < > = values in this interval not displayed.       PSYCHOTROPIC DRUG INTERACTIONS   CETIRIZINE and PERPHENAZINE AND OLANZAPINE may result in increased risk of CNS depression.   BUPROPION and AGENTS LOWERING SEIZURE THRESHOLD may result in lower seizure threshold.   PERPHENAZINE and BENZTROPINE may result in decreased phenothiazine serum concentrations, decreased phenothiazine effectiveness, enhanced anticholinergic effects (ileus, hyperpyrexia, sedation, dry mouth).     MANAGEMENT:  Monitoring for adverse effects, routine vitals and patient is aware of risks    RISK STATEMENT for SAFETY     Karolyn Banerjee denies current SI.  There are notable risk factors for self-harm including suicide plan [to run into traffic], previous suicide attempt, hallucinations and new/ worsening medical issue.  However, risk is mitigated by future oriented, feeling hopeful, none to minimal alcohol use , commitment to family, good social support  , Restoration beliefs, stable housing and participation in DBT or day program.  Based on all available evidence she does not  appear to be at imminent risk for self-harm therefore does not meet criteria for a 72-hr hold/  involuntary hospitalization.  However, based on degree of symptoms close psych FU was recommended which the pt did agree to.  Additional steps to minimize risk include: frequent clinic visits and med changes.      PSYCHIATRIC DIAGNOSIS     Schizoaffective Disorder, bipolar type, depressed episode, moderate  Unspecified Neurocognitive Disorder    ASSESSMENT      [m2, h3]     TODAY Karolyn presents for follow-up.  She has been doing very well since her last appointment in November with a good mood, rare suicidal ideation, and no suicidal behavior.  Karolyn and her group home staff, NK, attribute this to Anat staying busy with activities.  She has not needed any Zyprexa as needed as a result.  She continues to have hypersomnia and mild fatigue which may be due to to her medication regimen.  Discussed decreasing her overall antipsychotic load to improve this which can be further explored at her next appointment.  A thorough review of Karolyn's past medication history will be helpful in determining which medication is more appropriate to taper. ***     PLAN      [m2, h3]     1) PSYCHOTROPIC MEDICATIONS:  - Continue lurasidone 160 mg daily w/ dinner  - Continue perphenazine 16 mg BID  - Continue olanzapine 5 mg BID PRN for psychosis, agitation  - Continue benztropine 1.5 mg at 5 PM  - Continue bupropion  mg daily    2) [place]  was not checked today:  not using controlled substances.    3) THERAPY:    none. Patient declined    4) NEXT DUE:    Labs- AP labs due 6/2020  EKG- last 1/2019 Qtc 428, recheck as needed  Rating Scales- AIMS due 11/2020    5) REFERRALS:    MTM- for consideration of options to target her depressive symptoms    6) RTC: 3 months with Dr. Hoff    7) CRISIS NUMBERS:   Provided routinely in AVS   ONLY if a [facility] PT: [place] [phone] (clinic), [phone] (after hours)     TREATMENT RISK  STATEMENT:  The risks, benefits, alternatives and potential adverse effects have been discussed and are understood by the pt. The pt understands the risks of using street drugs or alcohol. There are no medical contraindications, the pt agrees to treatment with the ability to do so. The pt knows to call the clinic for any problems or to access emergency care if needed.  Medical and substance use concerns are documented above.  Psychotropic drug interaction check was done, including changes made today.    PROVIDER: Meme Murcia MD    Patient staffed in clinic with Dr. Ruano who will sign the note.  Supervisor is Dr. Toussaint.

## 2020-07-13 DIAGNOSIS — F25.0 SCHIZOAFFECTIVE DISORDER, BIPOLAR TYPE (H): ICD-10-CM

## 2020-07-16 RX ORDER — BENZTROPINE MESYLATE 0.5 MG/1
TABLET ORAL
Qty: 120 TABLET | Refills: 0 | Status: SHIPPED | OUTPATIENT
Start: 2020-07-16 | End: 2020-08-17

## 2020-07-16 NOTE — TELEPHONE ENCOUNTER
Medication requested: BENZTROPINE 0.5MG TAB   Last refilled: 6/22/20  Qty: 120      Last seen: 6/29/20  RTC: 3 months  Cancel: 0  No-show: 0  Next appt: 0    Refill decision:   Refill pended and routed to the provider for review/determination due to   Last note from 6/29/20 is not signed  Scheduling has been notified to contact the pt for appointment.

## 2020-08-13 DIAGNOSIS — F25.0 SCHIZOAFFECTIVE DISORDER, BIPOLAR TYPE (H): ICD-10-CM

## 2020-08-17 RX ORDER — BENZTROPINE MESYLATE 0.5 MG/1
TABLET ORAL
Qty: 120 TABLET | Refills: 0 | Status: SHIPPED | OUTPATIENT
Start: 2020-08-17 | End: 2020-09-16

## 2020-08-18 NOTE — TELEPHONE ENCOUNTER
Medication requested: BENZTROPINE 0.5MG TAB   Last refilled: 7/16/20  Qty: 120      Last seen: 6/29/20  RTC: 3 months  Cancel: 0  No-show: 0  Next appt: 0    Refill decision:   30 day piter refill sent to the pharmacy - including instructions for patient to call the clinic and schedule an appointment.  Scheduling has been notified to contact the pt for appointment.

## 2020-09-15 DIAGNOSIS — F25.0 SCHIZOAFFECTIVE DISORDER, BIPOLAR TYPE (H): ICD-10-CM

## 2020-09-15 DIAGNOSIS — R46.89 AGGRESSION: ICD-10-CM

## 2020-09-15 DIAGNOSIS — F25.1 SCHIZOAFFECTIVE DISORDER, DEPRESSIVE TYPE (H): ICD-10-CM

## 2020-09-16 ENCOUNTER — TELEPHONE (OUTPATIENT)
Dept: PSYCHIATRY | Facility: CLINIC | Age: 35
End: 2020-09-16

## 2020-09-16 DIAGNOSIS — F25.0 SCHIZOAFFECTIVE DISORDER, BIPOLAR TYPE (H): ICD-10-CM

## 2020-09-16 RX ORDER — PERPHENAZINE 16 MG/1
16 TABLET ORAL 2 TIMES DAILY
Qty: 60 TABLET | Refills: 2 | Status: SHIPPED | OUTPATIENT
Start: 2020-09-16 | End: 2020-12-14

## 2020-09-16 RX ORDER — BUPROPION HYDROCHLORIDE 150 MG/1
TABLET ORAL
Qty: 90 TABLET | Refills: 2 | Status: SHIPPED | OUTPATIENT
Start: 2020-09-16 | End: 2020-12-14

## 2020-09-16 RX ORDER — LURASIDONE HYDROCHLORIDE 80 MG/1
TABLET, FILM COATED ORAL
Qty: 60 TABLET | Refills: 2 | Status: SHIPPED | OUTPATIENT
Start: 2020-09-16 | End: 2020-12-14

## 2020-09-16 RX ORDER — BENZTROPINE MESYLATE 0.5 MG/1
TABLET ORAL
Qty: 120 TABLET | Refills: 0 | Status: SHIPPED | OUTPATIENT
Start: 2020-09-16 | End: 2020-10-15

## 2020-09-16 NOTE — TELEPHONE ENCOUNTER
Brief Psychiatry Telephone Note    Phone call time: 3:15PM    S:  Called CALDERON, Karolyn's , for Karolyn's appointment. NK stated that Karolyn was on an outing with staff. NK noted that Karolyn has been doing very well. She has not had any suicidal behavior and has not required any PRNs. She did have some knee pain and was seen by a doctor for this. Karolyn is active with playing cards, taking walks, and doing errands with staff which she enjoys.     A/P: Karolyn Banerjee is a 34yo F with PMH of schizoaffective disorder bipolar type and unspecified neurocognitive disorder who missed her appointment today. Given her stability and the plan to have her transfer to Dr. Hoff, will reschedule her for a transfer of care visit with Dr. Hoff.    -Messaged clinic scheduling to schedule a transfer of care visit with Dr. Hoff 10/13 10AM. NK informed of this time.  -Refilled medications as NK indicated Karolyn will run out of medications before then. Multiple medication orders were pended for Karolyn, so these were completed.     Patient is not currently suicidal or homicidal. she is aware to call 911 or go to the nearest ER if safety concern or urgent symptoms arise.     Meme Murcia MD  Psychiatry Resident

## 2020-09-16 NOTE — TELEPHONE ENCOUNTER
On 09/16/2020, at 1434, writer called patient at [phone] to confirm Virtual Visit. Writer unable to make contact with patient. Writer left detailed voice message for call back. [phone] left as call back number. Marta Landa MA

## 2020-10-13 ENCOUNTER — VIRTUAL VISIT (OUTPATIENT)
Dept: PSYCHIATRY | Facility: CLINIC | Age: 35
End: 2020-10-13
Attending: PSYCHIATRY & NEUROLOGY
Payer: MEDICAID

## 2020-10-13 DIAGNOSIS — F25.0 SCHIZOAFFECTIVE DISORDER, BIPOLAR TYPE (H): ICD-10-CM

## 2020-10-13 DIAGNOSIS — Z79.899 ENCOUNTER FOR LONG-TERM (CURRENT) USE OF MEDICATIONS: Primary | ICD-10-CM

## 2020-10-13 PROCEDURE — 99214 OFFICE O/P EST MOD 30 MIN: CPT | Mod: GC | Performed by: STUDENT IN AN ORGANIZED HEALTH CARE EDUCATION/TRAINING PROGRAM

## 2020-10-13 NOTE — PROGRESS NOTES
"TELEPHONE VISIT  Karolyn Banerjee is a 35 year old pt. who is being evaluated via a billable telephone visit.      The patient has been notified of the following:    We have found that certain health care needs can be provided without the need for a physical exam. This service lets us provide the care you need with a short phone conversation. If a prescription is necessary we can send it directly to your pharmacy. If lab work is needed we can place an order for that and you can then stop by our lab to have the test done at a later time. Insurers are generally covering virtual visits as they would in-office visits so billing should not be different than normal.  If for some reason you do get billed incorrectly, you should contact the billing office to correct it and that number is in the AVS .    Patient has given verbal consent for a telephone visit?:  Yes   How would the pt like to obtain the AVS?:  Georama  AVS SmartPhrase [PsychAVS] has been placed in 'Patient Instructions':  Yes     Start Time:  10:09 AM          End Time:  11:00AM       [facility]  Psychiatry Clinic  TRANSFER of CARE DIAGNOSTIC ASSESSMENT     CARE TEAM:    PCP- Suzie Mariscal   Specialty Providers- none      Therapist- none     Community  Team- [facility] worker, , group home staff.   CADI : Charmaine Isabel [phone].   Mental Health : Bre [phone]        Guardian: Mary \"Swetha\" Chriss (mother)- [phone] (okay to leave message) or [phone]  penitentiary, [facility]: [phone], [phone]. , : [phone].    Karolyn Banerjee is a 35 year old patient who prefers the name Karolyn and uses pronouns she, her, hers.     PERTINENT BACKGROUND                           [most recent eval 10/12/20]     This patient first experienced mental health issues as a child and has received treatment for schizoaffective disorder, bipolar type, borderline " "personality disorder, and unspecified neurocognitive disorder. Notably, the patient's symptoms have historically included psychosis (auditory hallucinations, delusions related to pregnancy, agitation) and suicide attempts/threats to run into traffic or drown herself in a pond, often in reaction to perceived slights or difficult interactions with group home staff/residents. She has been tried on many different medication trials in the past, including two trials of clozapine that caused neutropenia. ECT along with medication has been the most helpful treatment for maintaining stability. ECT with Dr. Amos was discontinued in fall 2017 after patient remained relatively stable without it for 3 months (last hospitalization February 2018). Her mother is her guardian. Karolyn fell and broke her leg in 12/2018 which resulted in multiple surgeries, a long transitional care stay, and ongoing use of a walker.     Psych critical item history includes suicide attempt [multiple], suicidal ideation, SIB [per chart], psychosis [sxs include auditory hallucinations, delusions], mutiple psychotropic trials , severe med reaction, violent behavior, psych hosp (>5), commitment and ECT    INTERIM HISTORY                                                                                    [4, 4]   History was provided by the patient, NK (group home owner), and Mary (pt's mother and guardian) who were fair historians. Treatment adherence is good.  Since the last visit:   - Dharmesh reports she is doing \"okay\".  - Mood is \"okay\" with no feelings of sadness. Has been sleeping \"a lot\". Will have lots of naps in the day. Will have thoughts to hurt herself \"once in awhile\" but didn't know when was the last time she had those thoughts.  - When asked why she thinks things have gotten better, she said it was because two residents at the group home who \"complained a lot\" had left. In addition, staff at the group home have been coloring or playing " more board games with her. She also said she spends some of her day watching TV but could not remember what she has watched lately.  - Still only sees her mother when she drops off money for her but does talk to her family on the phone.   - Karolyn reported having no suicidal ideation, auditory hallucinations, paranoia, delusions, or side effects to medications.    - Regarding other physical symptoms, she reports her left leg is starting to hurt around the kneecap. Hurts to go down the stairs. Takes something for pain, but doesn't know what because someone else manages her medications.    - Spoke with Karolyn's , CALDERON. NK states that Karolyn has been doing well. Karolyn has not had any suicidal behavior since February (was previously threatening to walk out into the street) and has not been needing use of any PRN medications.   - They are providing some care for her L knee pain, having her ice it 3x a day, and she takes PRN ibuprofen and Tylenol.   - No behavioral concerns or suicidal thoughts.    - Also spoke with Karolyn's mother who is her guardian informing her that I had an appointment with Karolyn today and that she seemed to be doing well. Mary also agreed that she had heard and seen that Karolyn had been doing better over the past several months. She had no other concerns or questions today.     RECENT PSYCH ROS:   Depression:  none reported  Elevated:  none   Psychosis:  none  Anxiety:  none reported  Trauma Related:  none  Dysregulation:  none  Eating Disorder: no     Adverse Effects:  None reported  Pertinent Negative Symptoms: No self-injurious behavior/urges, suicidal and violent ideation, aggression, psychosis, hallucinations and delusions    RECENT SUBSTANCE USE:     Alcohol- none  Tobacco- none  Caffeine- couple sodas per day  Cannabis- none     Opioids- none           Narcan Kit- N/A   Other illicit drugs- none    FAMILY and SOCIAL HISTORY                                   [1ea,  1ea]       pt reported        Family Hx: (Per chart review)   Father: mild developmental disorder  Uncle: schizophrenia  One of her brothers completed suicide in 2017    Social Hx:  Financial/ Work- social security disability       Partner/ - none  Children- none      Living situation- Lives at group home - [facility]         Social/ Spiritual Support- boyfriend Remy, family members, mental health support staff. Before pandemic, attended [employer] with her parents and attended [facility] day program in [place] twice a week.     Legal- no  FEELS SAFE AT HOME- yes     Trauma History (self-report)- no  Early History/Education- Born in [place], moved to [place] when she was a baby due to her asthma, lived in [place] until starting living in group homes, completed HS in [place]; has 8 brothers and 2 sisters--she is second oldest. One of her brothers  by suicide in 2017. Closest with sister, Opla.      PSYCHIATRIC HISTORY                                                            SIB-patient denies, yes per chart review  Suicidal Ideation Hx- Hx of both active and passive SI, often in response to perceived slights or minor stressors. Plans in the past have included drowning herself in pool by group home or running into traffic.  Suicide Attempt- #- yes, several, walking toward pond or into traffic, most recent- 2019    Violence/Aggression Hx- yes, Aggression during hospitalizations.  Psychosis Hx- yes, Command auditory hallucinations, delusions related to pregnancy  Eating Disorder Hx- none    Psych Hosp- #- yes, several, most recently Sep 2017, Oct 2017, 2018, most recent- 2018   Commitment- no  ECT- yes  Outpatient Programs - day treatment at [facility] in [place]    PAST MED TRIALS                                                              Medication  Dose (mg) Effect  Dates of Use   lurasidone 160 mg helpful current   Bupropion  mg helpful  current   perphenazine 16 mg BID helpful Current   clozapine  2 trials that resulted with agranulocytosis (not eligible for retrial)    olanzapine      quetiapine      ziprasidone      risperidone  dystonia    aripiprazole  ineffective    haloperidol  Developed parkinsonism    divalproex      sertraline      venlafaxine        SUBSTANCE USE HISTORY     Past Use- none reported  Treatment- # none, most recent- N/A  Medical Consequences- none  HIV/Hepatitis- none  Legal Consequences- none    MEDICAL HISTORY and ALLERGY     ALLERGIES: Clozapine, Liquid adhesive, Risperdal, and Adhesive tape     Patient Active Problem List   Diagnosis     Mild cognitive impairment     Borderline personality disorder (H)     Asthma     Nonorganic enuresis     Schizoaffective disorder, depressive type (H)     Fx humerus shaft-closed     Port catheter in place     MENTAL HEALTH     Suicidal ideation     Hx of psychiatric care     DVT (deep vein thrombosis) in pregnancy     Suicide attempt (H)     Suicidal ideations     JOSE (obstructive sleep apnea)     Closed fibular fracture     Tibial plateau fracture     Morbid obesity (H)         MEDICAL REVIEW OF SYSTEMS                                                               [2, 10]   Pregnant or breastfeeding- no      Contraception- OCP (Microgestin 1/20)    A comprehensive review of systems was performed and is negative other than noted in the HPI.    MEDICATIONS        Current Outpatient Medications   Medication Sig Dispense Refill     ACETAMINOPHEN PO Take 650 mg by mouth every 4 hours as needed        benztropine (COGENTIN) 0.5 MG tablet TAKE 3 TABLETS BY MOUTH DAILY AND TAKE 1 TABLET BY MOUTH DAILY AS NEEDED FOR AGITATION 120 tablet 0     buPROPion (WELLBUTRIN XL) 150 MG 24 hr tablet TAKE 3 TABLETS (450MG) BY MOUTH DAILY IN THE MORNING 90 tablet 2     cetirizine (ZYRTEC) 10 MG tablet Take 10 mg by mouth daily       LATUDA 80 MG TABS tablet TAKE 2 TABLETS BY MOUTH DAILY WITH DINNER 60  tablet 2     norethindrone-ethinyl estradiol (JUNEL FE 1/20) 1-20 MG-MCG tablet Take 1 tablet by mouth       norethindrone-ethinyl estradiol (MICROGESTIN 1/20) 1-20 MG-MCG per tablet Take 1 tablet by mouth daily        OLANZapine (ZYPREXA) 5 MG tablet Take 1 tablet (5 mg) by mouth 2 times daily as needed (agitation, intense suicidality) 60 tablet 1     Omega-3 Fatty Acids (FISH OIL PO) Take 1,000 mg by mouth daily HOLD WHILE IN TCU       perphenazine 16 MG tablet Take 1 tablet (16 mg) by mouth 2 times daily 60 tablet 2     senna-docusate (STOOL SOFTENER/LAXATIVE) 8.6-50 MG tablet Take 8.6 mg by mouth       sennosides (SENOKOT) 8.6 MG tablet Take 1 tablet by mouth 2 times daily as needed        VITALS                                                                                                                              [3, 3]   There were no vitals taken for this visit.   MENTAL STATUS EXAM                                                             [9, 14 cog gs]     Alertness: alert  and oriented  Appearance: N/A (phone visit)  Behavior/Demeanor: cooperative, pleasant and calm, with N/A (phone visit) eye contact   Speech: increased latency of response and regular rate and rhythm  Language: intact and no obvious problem  Psychomotor: N/A (phone visit)  Mood: description consistent with euthymia  Affect: N/A (phone visit); congruent to: mood- yes, content- yes  Thought Process/Associations: unremarkable  Thought Content:  Reports none;  Denies suicidal & violent ideation and delusions  Perception:  Reports none;  Denies auditory hallucinations and visual hallucinations  Insight: fair  Judgment: adequate for safety  Cognition: (6) oriented: time, person, and place  attention span: fair  concentration: intact  recent memory: fair  remote memory: limited  fund of knowledge: delayed  Gait and Station: N/A (telehealth)    LABS and DATA     PHQ9 TODAY = not done today  PHQ 8/31/2018 11/2/2018 11/6/2019   PHQ-9 Total  Score 5 6 3   Q9: Thoughts of better off dead/self-harm past 2 weeks Several days Several days Several days     ANTIPSYCHOTIC LABS  [glu, A1C, lipids (sven LDL), liver enzymes, WBC, ANEU, Hgb, plts]  q12 mo  Recent Labs   Lab Test 06/12/19 01/21/19  0719 12/23/18  0628 12/22/18  0642 04/12/17  1024 04/12/17  1024   GLC 52* 82 98 150*   < > 83   A1C 5.0  --   --   --   --  5.0    < > = values in this interval not displayed.     Recent Labs   Lab Test 06/12/19 09/21/17  0750 04/12/17  1024 10/20/15  0853   CHOL 168 181 207* 185   TRIG 94 144 143 134   LDL 97 108* 135* 115   HDL 52 44* 43 43*     Recent Labs   Lab Test 06/12/19 12/21/18  0720 10/05/17  1108 09/21/17  0750   AST 12 21 18 18   ALT 20 23 22 21   ALKPHOS 67 74 64 76     Recent Labs   Lab Test 06/12/19 01/23/19  0611 01/21/19  0719 01/20/19  0650 01/16/19  0635 01/16/19  0635 01/15/19  1350 12/23/18  0628 12/23/18  0628 12/22/18  0642 12/20/18  1749 12/20/18  1749 02/06/18  1655 10/05/17  1108 09/21/17  0750   WBC 6.2  --  4.2  --   --   --   --   --  7.6 9.3   < > 11.2* 6.0 7.0 6.3   ANEU  --   --   --   --   --   --   --   --   --   --   --  9.7* 3.5 4.7 4.2   HGB 13.7  --  13.1  --   --  11.8 12.7  --  11.4* 12.8   < > 14.1 13.8 15.5 15.3    198 195 173   < >  --   --    < > 126* 152   < > 170 Platelets clumped, platelet count unavailable 154 118*    < > = values in this interval not displayed.         Recent Labs   Lab Test 06/12/19 01/21/19  0719 01/14/19  1654   CR 0.84 0.68 0.76   GFRESTIMATED >90 >90 >90     PSYCHOTROPIC DRUG INTERACTIONS     CETIRIZINE and PERPHENAZINE AND OLANZAPINE may result in increased risk of CNS depression.   BUPROPION and AGENTS LOWERING SEIZURE THRESHOLD may result in lower seizure threshold.   PERPHENAZINE and BENZTROPINE may result in decreased phenothiazine serum concentrations, decreased phenothiazine effectiveness, enhanced anticholinergic effects (ileus, hyperpyrexia, sedation, dry mouth).     MANAGEMENT:   Monitoring for adverse effects, routine vitals and patient is aware of risks    RISK STATEMENT for SAFETY     Karolyn Banerjee did not appear to be an imminent safety risk to self or others.    DIAGNOSES                                                                                         [m2, h3]      Schizoaffective Disorder, bipolar type, depressed episode, moderate  Unspecified Neurocognitive Disorder    ASSESSMENT                                                                                      [m2, h3]        Karolyn Banerjee presents to the [facility] for transfer of care and medication management of schizoaffective disorder, bipolar type.     TODAY   Karolyn presents for follow-up via telephone. She presents with continued mood stability since last visit and has been doing well for the past several months. She has not had any psychosis, depression, or suicidal behavior. She was limited in her ability to provide substantive information, but with collateral from her  and mother (guardian), she is reportedly doing well despite no significant changes in medication regimen. Previous difficulties appeared to stem from interpersonal conflict with two residents who have since left. In addition, a supportive environment from staff, who will play games and read with her throughout the day appear to have helped with any mood issues or suicidal behaviors. Given her stability, will continue her medications unchanged. Future considerations include decreasing her overall antipsychotic load to reduce her hypersomnia.    MNPMP review was not needed today.    PLAN                                                                                                               [m2, h3]        1) Meds  - Continue lurasidone 160 mg daily w/ dinner  - Continue perphenazine 16 mg BID  - Continue benztropine 1.5 mg at 5 PM  - Continue bupropion  mg daily    - Continue olanzapine 5 mg  BID PRN for psychosis, agitation    2) Therapy  - none. Pt. Not interested at this time    3) Next Due   Labs- AP labs due 6/2020, ordered [facility] labs today.  EKG- last 1/2019 Qtc 428, recheck as needed  Rating scales- AIMS: determine next due (11/2020)    4) Referrals  No Referrals needed     5) RTC: 3 months    6) Crisis Numbers:   Provided routinely in AVS     After hours:  [phone]      TREATMENT RISK STATEMENT:  The risks, benefits, alternatives and potential adverse effects have been discussed and are understood by the pt. The pt understands the risks of using street drugs or alcohol. There are no medical contraindications, the pt agrees to treatment with the ability to do so. The pt knows to call the clinic for any problems or to access emergency care if needed.  Medical and substance use concerns are documented above.  Psychotropic drug interaction check was done, including changes made today.    PROVIDER: Job Hoff MD     Patient staffed in clinic with Dr. Durant who will sign the note.  Supervisor is Dr. Kerr.    TELEHEALTH ATTENDING ATTESTATION  Following the ACGME guidelines on telemedicine and direct supervision due to COVID-19, I was concurrently participating in and/or monitoring the patient care through appropriate telecommunication technology.  I discussed the key portions of the service with the resident, including the mental status examination and developing the plan of care. I reviewed key portions of the history with the resident. I agree with the findings and plan as documented in this note.   Tika Durant MD

## 2020-10-15 DIAGNOSIS — F25.1 SCHIZOAFFECTIVE DISORDER, DEPRESSIVE TYPE (H): ICD-10-CM

## 2020-10-15 DIAGNOSIS — R45.851 SUICIDAL IDEATION: ICD-10-CM

## 2020-10-15 DIAGNOSIS — F60.3 BORDERLINE PERSONALITY DISORDER (H): ICD-10-CM

## 2020-10-15 RX ORDER — BENZTROPINE MESYLATE 0.5 MG/1
TABLET ORAL
Qty: 84 TABLET | Refills: 1 | Status: SHIPPED | OUTPATIENT
Start: 2020-10-15 | End: 2020-12-14

## 2020-10-15 NOTE — TELEPHONE ENCOUNTER
benztropine (COGENTIN) 0.5 MG  Last refilled: 9/16/20  Qty: 120  Last seen: 10/13/20   RTC: 3 MOS  Cancel: 0  No-show: 0  Next appt: 1/12/21  Refill pended and routed to the provider for review/determination due to   LAST NOTE  10/13   UNSIGNED / INCOMPLETE

## 2020-10-16 DIAGNOSIS — F25.0 SCHIZOAFFECTIVE DISORDER, BIPOLAR TYPE (H): ICD-10-CM

## 2020-10-16 RX ORDER — OLANZAPINE 5 MG/1
5 TABLET ORAL 2 TIMES DAILY PRN
Qty: 60 TABLET | Refills: 2 | Status: SHIPPED | OUTPATIENT
Start: 2020-10-16 | End: 2021-01-12

## 2020-10-19 RX ORDER — BENZTROPINE MESYLATE 0.5 MG/1
TABLET ORAL
Qty: 84 TABLET | Refills: 1 | OUTPATIENT
Start: 2020-10-19

## 2020-12-11 DIAGNOSIS — R46.89 AGGRESSION: ICD-10-CM

## 2020-12-11 DIAGNOSIS — F25.1 SCHIZOAFFECTIVE DISORDER, DEPRESSIVE TYPE (H): ICD-10-CM

## 2020-12-11 DIAGNOSIS — F25.0 SCHIZOAFFECTIVE DISORDER, BIPOLAR TYPE (H): ICD-10-CM

## 2020-12-14 RX ORDER — BUPROPION HYDROCHLORIDE 150 MG/1
450 TABLET ORAL EVERY MORNING
Qty: 90 TABLET | Refills: 0 | Status: SHIPPED | OUTPATIENT
Start: 2020-12-14 | End: 2021-01-12

## 2020-12-14 RX ORDER — BENZTROPINE MESYLATE 0.5 MG/1
TABLET ORAL
Qty: 90 TABLET | Refills: 0 | Status: SHIPPED | OUTPATIENT
Start: 2020-12-14 | End: 2021-01-12

## 2020-12-14 RX ORDER — PERPHENAZINE 16 MG/1
16 TABLET ORAL 2 TIMES DAILY
Qty: 60 TABLET | Refills: 0 | Status: SHIPPED | OUTPATIENT
Start: 2020-12-14 | End: 2021-01-12

## 2020-12-14 RX ORDER — LURASIDONE HYDROCHLORIDE 80 MG/1
160 TABLET, FILM COATED ORAL
Qty: 60 TABLET | Refills: 0 | Status: SHIPPED | OUTPATIENT
Start: 2020-12-14 | End: 2021-01-12

## 2020-12-14 NOTE — TELEPHONE ENCOUNTER
Medication requested:   benztropine (COGENTIN) 0.5 MG tablet  buPROPion (WELLBUTRIN XL) 150 MG 24 hr tablet  LATUDA 80 MG TABS tablet  perphenazine 16 MG tablet  Last refilled: 11/10/20  Qty:   90  90  60  60      Last seen: 10/13/20  RTC: 3 months  Cancel: 0  No-show: 0  Next appt: 1/12/21    Refill decision:   Refilled for 30 days per protocol.    Warnings Override History for perphenazine 16 MG tablet [[id number]]    Overridden by Meme Murcia MD on Sep 16, 2020 3:38 PM   Drug-Drug   1. SELECTIVE SEROTONIN REUPTAKE INHIBITORS AND BUPROPION / PHENOTHIAZINES [Level: Major]   Other Orders: buPROPion (WELLBUTRIN XL) 150 MG 24 hr tablet         Overridden by Meme Murcia MD on Sep 16, 2020 3:38 PM   Drug-Drug   1. SELECTIVE SEROTONIN REUPTAKE INHIBITORS AND BUPROPION / PHENOTHIAZINES [Level: Major]   Other Orders: buPROPion (WELLBUTRIN XL) 150 MG 24 hr tablet      Allergy/Contraindication   1. CLOZAPINE [Level: Drug Class Match]

## 2021-01-07 DIAGNOSIS — F25.1 SCHIZOAFFECTIVE DISORDER, DEPRESSIVE TYPE (H): ICD-10-CM

## 2021-01-07 DIAGNOSIS — F25.0 SCHIZOAFFECTIVE DISORDER, BIPOLAR TYPE (H): ICD-10-CM

## 2021-01-07 DIAGNOSIS — R46.89 AGGRESSION: ICD-10-CM

## 2021-01-11 RX ORDER — PERPHENAZINE 16 MG/1
TABLET ORAL
Qty: 60 TABLET | Refills: 0 | OUTPATIENT
Start: 2021-01-11

## 2021-01-11 RX ORDER — BUPROPION HYDROCHLORIDE 150 MG/1
TABLET ORAL
Qty: 90 TABLET | Refills: 0 | OUTPATIENT
Start: 2021-01-11

## 2021-01-11 RX ORDER — BENZTROPINE MESYLATE 0.5 MG/1
TABLET ORAL
Qty: 90 TABLET | Refills: 0 | OUTPATIENT
Start: 2021-01-11

## 2021-01-11 RX ORDER — LURASIDONE HYDROCHLORIDE 80 MG/1
TABLET, FILM COATED ORAL
Qty: 60 TABLET | Refills: 0 | OUTPATIENT
Start: 2021-01-11

## 2021-01-12 ENCOUNTER — VIRTUAL VISIT (OUTPATIENT)
Dept: PSYCHIATRY | Facility: CLINIC | Age: 36
End: 2021-01-12
Attending: PSYCHIATRY & NEUROLOGY
Payer: MEDICAID

## 2021-01-12 DIAGNOSIS — F25.1 SCHIZOAFFECTIVE DISORDER, DEPRESSIVE TYPE (H): ICD-10-CM

## 2021-01-12 DIAGNOSIS — R46.89 AGGRESSION: ICD-10-CM

## 2021-01-12 DIAGNOSIS — R45.851 SUICIDAL IDEATION: ICD-10-CM

## 2021-01-12 DIAGNOSIS — R41.9 NEUROCOGNITIVE DISORDER: ICD-10-CM

## 2021-01-12 DIAGNOSIS — F60.3 BORDERLINE PERSONALITY DISORDER (H): ICD-10-CM

## 2021-01-12 DIAGNOSIS — F25.0 SCHIZOAFFECTIVE DISORDER, BIPOLAR TYPE (H): Primary | ICD-10-CM

## 2021-01-12 PROCEDURE — 99441 PR PHYSICIAN TELEPHONE EVALUATION 5-10 MIN: CPT | Mod: GC | Performed by: STUDENT IN AN ORGANIZED HEALTH CARE EDUCATION/TRAINING PROGRAM

## 2021-01-12 RX ORDER — BUPROPION HYDROCHLORIDE 150 MG/1
450 TABLET ORAL EVERY MORNING
Qty: 90 TABLET | Refills: 2 | Status: SHIPPED | OUTPATIENT
Start: 2021-01-12 | End: 2021-04-01

## 2021-01-12 RX ORDER — BENZTROPINE MESYLATE 0.5 MG/1
TABLET ORAL
Qty: 90 TABLET | Refills: 2 | Status: SHIPPED | OUTPATIENT
Start: 2021-01-12 | End: 2021-04-01

## 2021-01-12 RX ORDER — PERPHENAZINE 16 MG/1
16 TABLET ORAL 2 TIMES DAILY
Qty: 60 TABLET | Refills: 2 | Status: SHIPPED | OUTPATIENT
Start: 2021-01-12 | End: 2021-04-01

## 2021-01-12 RX ORDER — LURASIDONE HYDROCHLORIDE 80 MG/1
160 TABLET, FILM COATED ORAL
Qty: 60 TABLET | Refills: 2 | Status: SHIPPED | OUTPATIENT
Start: 2021-01-12 | End: 2021-04-01

## 2021-01-12 RX ORDER — OLANZAPINE 5 MG/1
5 TABLET ORAL 2 TIMES DAILY PRN
Qty: 60 TABLET | Refills: 2 | Status: SHIPPED | OUTPATIENT
Start: 2021-01-12 | End: 2021-04-01

## 2021-01-12 NOTE — PATIENT INSTRUCTIONS
Thank you for coming to the [facility] MENTAL HEALTH & ADDI[facility] [place] [facility].    Lab Testing:  If you had lab testing today and your results are reassuring or normal they will be mailed to you or sent through [facility] within 7 days. If the lab tests need quick action we will call you with the results. The phone number we will call with results is # [phone] (home) . If this is not the best number please call our clinic and change the number.    Medication Refills:  If you need any refills please call your pharmacy and they will contact us. Our fax number for refills is [phone]. Please allow three business for refill processing. If you need to  your refill at a new pharmacy, please contact the new pharmacy directly. The new pharmacy will help you get your medications transferred.     Scheduling:  If you have any concerns about today's visit or wish to schedule another appointment please call our office during normal business hours [phone] (8-5:00 M-F)    Contact Us:  Please call [phone] during business hours (8-5:00 M-F).  If after clinic hours, or on the weekend, please call  [phone].    Financial Assistance [phone]  [facility] Billing [phone]  Central Billing Office, [facility]: [phone]  [facility] Billing [phone]  Medical Records [phone]  [facility] Patient Bill of Rights [email or web address]       MENTAL HEALTH CRISIS NUMBERS:  For a medical emergency please call  911 or go to the nearest ER.     [facility]:   [facility] -[phone]   Crisis Residence [facility] -[phone]   Walk-In Counseling Center [facility] -[phone]   COPE 24/7 [place] Mobile Team -[phone] (adults)/[phone] (child)  CHILD: [facility] needs assessment team - [phone]      [facility]:   [facility] - [phone]   Walk-in counseling [facility]  [facility] - [phone]   Walk-in counseling [facility] - [phone]   Crisis Residence [facility] - [phone]  Urgent Care Adult Mental [facility]-[phone] mobile unit/ 24/7 crisis line    National Crisis Numbers:   National Suicide Prevention Lifeline: 8-947-036-TALK (717-400-6555)  Poison Control Center - 1-691.923.5194  Gift Card Combo/resources for a list of additional resources (SOS)  Trans Lifeline a hotline for transgender people 5-774-375-1233  The [employer] a hotline for LGBT youth 1-877.972.1018  Crisis Text Line: For any crisis 24/7   To: 234559  see www.crisistextline.org  - IF MAKING A CALL FEELS TOO HARD, send a text!         Again thank you for choosing [facility] MENTAL HEALTH & ADDICTION [facility] and please let us know how we can best partner with you to improve you and your family's health.    You may be receiving a survey regarding this appointment. We would love to have your feedback, both positive and negative. The survey is done by an external company, so your answers are anonymous.

## 2021-01-12 NOTE — PROGRESS NOTES
"TELEPHONE VISIT  Karolyn Banerjee is a 35 year old pt. who is being evaluated via a billable telephone visit.      The patient has been notified of the following:    We have found that certain health care needs can be provided without the need for a physical exam. This service lets us provide the care you need with a short phone conversation. If a prescription is necessary we can send it directly to your pharmacy. If lab work is needed we can place an order for that and you can then stop by our lab to have the test done at a later time. Insurers are generally covering virtual visits as they would in-office visits so billing should not be different than normal.  If for some reason you do get billed incorrectly, you should contact the billing office to correct it and that number is in the AVS .    Patient has given verbal consent for a telephone visit?:  Yes   How would the pt like to obtain the AVS?:  [facility]  AVS SmartPhrase [PsychAVS] has been placed in 'Patient Instructions':  Yes     Start Time:  10:30 AM          End Time:  11:00AM         [facility]  Psychiatry Clinic  PSYCHIATRIC PROGRESS NOTE     CARE TEAM:    PCP- Suzie Mariscal   Specialty Providers- none      Therapist- none     Community  Team- [facility] worker, , group home staff.   CADI : Charmaine Isabel [phone].   Mental Health : Bre [phone]        Guardian: Mary \"Swetha\" Chriss (mother)- [phone] (okay to leave message) or [phone]  assisted, [facility] 1: [phone], [phone]. , : [phone].    Karolyn Banerjee is a 35 year old patient who prefers the name Karolyn and uses pronouns she, her, hers.     PERTINENT BACKGROUND                           [most recent eval 10/12/20]     This patient first experienced mental health issues as a child and has received treatment for schizoaffective disorder, bipolar type, borderline personality " "disorder, and unspecified neurocognitive disorder. Notably, the patient's symptoms have historically included psychosis (auditory hallucinations, delusions related to pregnancy, agitation) and suicide attempts/threats to run into traffic or drown herself in a pond, often in reaction to perceived slights or difficult interactions with group home staff/residents. She has been tried on many different medication trials in the past, including two trials of clozapine that caused neutropenia. ECT along with medication has been the most helpful treatment for maintaining stability. ECT with Dr. Amos was discontinued in fall 2017 after patient remained relatively stable without it for 3 months (last hospitalization February 2018). Her mother is her guardian. Karolyn fell and broke her leg in 12/2018 which resulted in multiple surgeries, a long transitional care stay, and ongoing use of a walker.     Psych critical item history includes suicide attempt [multiple], suicidal ideation, SIB [per chart], psychosis [sxs include auditory hallucinations, delusions], mutiple psychotropic trials , severe med reaction, violent behavior, psych hosp (>5), commitment and ECT    INTERIM HISTORY                                                                                    [4, 4]   History was provided by the patient and NK (group home owner) who were fair and good historians respectively. Treatment adherence is good.  Since the last visit:   - Dharmesh reports she is doing \"okay\".   - She continues to do well with no behavior problems at the group home.  - Plays board games with staff and has gotten into beading.  - Only other thing Kraolyn reports is that she feels tired during the day. She feels well rested on waking in the morning but will will take a couple of naps during the day. She does not feel that needing to nap is an issue for her.  - No reports of hallucinations, depression or david symptoms. No acute concerns for safety.  - No " "reported side effects to medications.  - Karolyn continues to have L leg pain, reporting \"my left leg hurts when I step on it.\"   - Had a L knee XR which NK reports suggested arthritis.  - The pain does not bother her if she is sitting or laying down and has been generally tolerable.    - Group home owner, CALDERON was on the call too and reported that there have been no recent issues since last visit. No reported suicidal ideation, behavior, or self-injurious behavior.  - Only request from NK was for a refill of medications.       RECENT PSYCH ROS:   Depression:  none reported  Elevated:  none   Psychosis:  none  Anxiety:  none reported  Trauma Related:  none  Dysregulation:  none  Eating Disorder: no     Adverse Effects:  None reported.  Pertinent Negative Symptoms: No self-injurious behavior/urges, suicidal and violent ideation, aggression, psychosis, hallucinations and delusions    RECENT SUBSTANCE USE:     None reported     SOCIAL HISTORY                                   [1ea, 1ea]       pt reported        Financial/ Work- social security disability       Partner/ - none  Children- none      Living situation- Lives at group [place] [facility]         Social/ Spiritual Support- boyfriend Remy, family members, mental health support staff. Before pandemic, attended [employer] with her parents and attended [employer] day program in [place] twice a week.     Legal- no  FEELS SAFE AT HOME- yes     Trauma History (self-report)- no  Early History/Education- Born in [place], moved to [place] when she was a baby due to her asthma, lived in [place] until starting living in group homes, completed HS in [place]; has 8 brothers and 2 sisters--she is second oldest. One of her brothers  by suicide in 2017. Closest with sister, Opal.      PSYCH and SUBSTANCE USE Critical Summary Points since 2020           10/13/2020: Initial transfer evaluation. No medication changes.  2021: No " medication changes.    MEDICAL HISTORY and ALLERGY     ALLERGIES: Clozapine, Liquid adhesive, Risperdal, and Adhesive tape     Patient Active Problem List   Diagnosis     Mild cognitive impairment     Borderline personality disorder (H)     Asthma     Nonorganic enuresis     Schizoaffective disorder, depressive type (H)     Fx humerus shaft-closed     Port catheter in place     MENTAL HEALTH     Suicidal ideation     Hx of psychiatric care     DVT (deep vein thrombosis) in pregnancy     Suicide attempt (H)     Suicidal ideations     JOSE (obstructive sleep apnea)     Closed fibular fracture     Tibial plateau fracture     Morbid obesity (H)         MEDICAL REVIEW OF SYSTEMS                                                               [2, 10]   Pregnant or breastfeeding- no      Contraception- OCP (Microgestin 1/20)    A comprehensive review of systems was performed and is negative other than noted in the HPI.    MEDICATIONS        Current Outpatient Medications   Medication Sig Dispense Refill     ACETAMINOPHEN PO Take 650 mg by mouth every 4 hours as needed        benztropine (COGENTIN) 0.5 MG tablet Take 3 tablets (1.5 mg) by mouth daily. May also take 1 tablet (0.5 mg) daily as needed for agitation. 90 tablet 0     buPROPion (WELLBUTRIN XL) 150 MG 24 hr tablet Take 3 tablets (450 mg) by mouth every morning 90 tablet 0     cetirizine (ZYRTEC) 10 MG tablet Take 10 mg by mouth daily       lurasidone (LATUDA) 80 MG TABS tablet Take 2 tablets (160 mg) by mouth daily (with dinner) 60 tablet 0     norethindrone-ethinyl estradiol (JUNEL FE 1/20) 1-20 MG-MCG tablet Take 1 tablet by mouth       norethindrone-ethinyl estradiol (MICROGESTIN 1/20) 1-20 MG-MCG per tablet Take 1 tablet by mouth daily        OLANZapine (ZYPREXA) 5 MG tablet Take 1 tablet (5 mg) by mouth 2 times daily as needed (agitation, intense suicidality) 60 tablet 2     Omega-3 Fatty Acids (FISH OIL PO) Take 1,000 mg by mouth daily HOLD WHILE IN TCU        perphenazine 16 MG tablet Take 1 tablet (16 mg) by mouth 2 times daily 60 tablet 0     senna-docusate (STOOL SOFTENER/LAXATIVE) 8.6-50 MG tablet Take 8.6 mg by mouth       sennosides (SENOKOT) 8.6 MG tablet Take 1 tablet by mouth 2 times daily as needed        VITALS                                                                                                                              [3, 3]   There were no vitals taken for this visit.   MENTAL STATUS EXAM                                                             [9, 14 cog gs]     Alertness: alert  and oriented  Appearance: N/A (phone visit)  Behavior/Demeanor: cooperative, pleasant and calm, with N/A (phone visit) eye contact   Speech: increased latency of response and regular rate and rhythm  Language: intact and no obvious problem  Psychomotor: N/A (phone visit)  Mood: description consistent with euthymia  Affect: N/A (phone visit); congruent to: mood- yes, content- yes  Thought Process/Associations: unremarkable  Thought Content:  Reports none;  Denies suicidal & violent ideation and delusions  Perception:  Reports none;  Denies auditory hallucinations and visual hallucinations  Insight: fair  Judgment: adequate for safety  Cognition: (6) oriented: time, person, and place  attention span: fair  concentration: intact  recent memory: fair  remote memory: limited  fund of knowledge: delayed  Gait and Station: N/A (telehealth)    LABS and DATA     PHQ9 TODAY = not done today  PHQ 8/31/2018 11/2/2018 11/6/2019   PHQ-9 Total Score 5 6 3   Q9: Thoughts of better off dead/self-harm past 2 weeks Several days Several days Several days     ANTIPSYCHOTIC LABS  [glu, A1C, lipids (sven LDL), liver enzymes, WBC, ANEU, Hgb, plts]  q12 mo  Recent Labs   Lab Test 06/12/19 01/21/19  0719 12/23/18  0628 12/22/18  0642 04/12/17  1024 04/12/17  1024   GLC 52* 82 98 150*   < > 83   A1C 5.0  --   --   --   --  5.0    < > = values in this interval not displayed.     Recent Labs    Lab Test 06/12/19 09/21/17  0750 04/12/17  1024 10/20/15  0853   CHOL 168 181 207* 185   TRIG 94 144 143 134   LDL 97 108* 135* 115   HDL 52 44* 43 43*     Recent Labs   Lab Test 06/12/19 12/21/18  0720 10/05/17  1108 09/21/17  0750   AST 12 21 18 18   ALT 20 23 22 21   ALKPHOS 67 74 64 76     Recent Labs   Lab Test 06/12/19 01/23/19  0611 01/21/19  0719 01/20/19  0650 01/16/19  0635 01/16/19  0635 01/15/19  1350 12/23/18  0628 12/23/18  0628 12/22/18  0642 12/20/18  1749 12/20/18  1749 02/06/18  1655 10/05/17  1108 09/21/17  0750   WBC 6.2  --  4.2  --   --   --   --   --  7.6 9.3   < > 11.2* 6.0 7.0 6.3   ANEU  --   --   --   --   --   --   --   --   --   --   --  9.7* 3.5 4.7 4.2   HGB 13.7  --  13.1  --   --  11.8 12.7  --  11.4* 12.8   < > 14.1 13.8 15.5 15.3    198 195 173   < >  --   --    < > 126* 152   < > 170 Platelets clumped, platelet count unavailable 154 118*    < > = values in this interval not displayed.         Recent Labs   Lab Test 06/12/19 01/21/19  0719 01/14/19  1654   CR 0.84 0.68 0.76   GFRESTIMATED >90 >90 >90     PSYCHOTROPIC DRUG INTERACTIONS     CETIRIZINE and PERPHENAZINE AND OLANZAPINE may result in increased risk of CNS depression.   BUPROPION and AGENTS LOWERING SEIZURE THRESHOLD may result in lower seizure threshold.   PERPHENAZINE and BENZTROPINE may result in decreased phenothiazine serum concentrations, decreased phenothiazine effectiveness, enhanced anticholinergic effects (ileus, hyperpyrexia, sedation, dry mouth).     MANAGEMENT:  Monitoring for adverse effects, routine vitals and patient is aware of risks    RISK STATEMENT for SAFETY     Karolyn BAXTER Helppi Karolyn R Helppi did not appear to be an imminent safety risk to self or others.    DIAGNOSES                                                                                         [m2, h3]      Schizoaffective Disorder, bipolar type, depressed episode, moderate  Unspecified Neurocognitive Disorder    ASSESSMENT                                                                                       [m2, h3]          Karolyn Banerjee presents to the [facility] via telephone visit for medication management of schizoaffective disorder, bipolar type. She and her group home owner continue to report mood stability since last visit with no symptoms of psychosis, depression, or suicidal behavior. Current medication regimen is reportedly working well, and PRN use is minimal as she has not had any significant behavioral issues. There are also no significant environmental stressors (getting along well with current peers at group home), which was a main  of her previous behavioral/suicidal behaviors. Medication regimen and supportive environment from staff continue to provide stability, therefore will not make any changes to medications today.    Future considerations:  - reduction of neuroleptic dose to decrease daytime sedation.    [place] review was not needed today.    PLAN                                                                                                               [m2, h3]        1) Meds  - Continue lurasidone 160 mg daily w/ dinner  - Continue perphenazine 16 mg BID  - Continue benztropine 1.5 mg at 5 PM  - Continue bupropion  mg daily    - Continue olanzapine 5 mg BID PRN for psychosis, agitation    2) Therapy  - none. Pt. Not interested at this time    3) Next Due   Labs- AP labs due 6/2020, ordered [facility] labs at last visit, but may have them done when it is safer post-pandemic.  EKG- last 1/2019 Qtc 428, recheck as needed   Rating scales- AIMS: determine next due (11/2020)    4) Referrals  No Referrals needed     5) RTC: 3 months    6) Crisis Numbers:   Provided routinely in AVS     After hours:  [phone]      TREATMENT RISK STATEMENT:  The risks, benefits, alternatives and potential adverse effects have been discussed and are understood by the pt. The pt understands the risks of using street drugs  or alcohol. There are no medical contraindications, the pt agrees to treatment with the ability to do so. The pt knows to call the clinic for any problems or to access emergency care if needed.  Medical and substance use concerns are documented above.  Psychotropic drug interaction check was done, including changes made today.    PROVIDER: Job Hoff MD     Patient staffed in clinic with Dr. So who will sign the note.  Supervisor is Dr. Kerr.    TELEHEALTH ATTENDING ATTESTATION  Following the ACGME guidelines on telemedicine and direct supervision due to COVID-19, I was concurrently participating in and/or monitoring the patient care through appropriate telecommunication technology.  I discussed the key portions of the service with the resident, including the mental status examination and developing the plan of care. I reviewed key portions of the history with the resident. I agree with the findings and plan as documented in this note.   Jonas So MD

## 2021-03-30 DIAGNOSIS — F25.1 SCHIZOAFFECTIVE DISORDER, DEPRESSIVE TYPE (H): ICD-10-CM

## 2021-03-30 DIAGNOSIS — R46.89 AGGRESSION: ICD-10-CM

## 2021-03-30 DIAGNOSIS — F25.0 SCHIZOAFFECTIVE DISORDER, BIPOLAR TYPE (H): ICD-10-CM

## 2021-04-01 ENCOUNTER — VIRTUAL VISIT (OUTPATIENT)
Dept: PSYCHIATRY | Facility: CLINIC | Age: 36
End: 2021-04-01
Attending: PSYCHIATRY & NEUROLOGY
Payer: MEDICAID

## 2021-04-01 DIAGNOSIS — R46.89 AGGRESSION: ICD-10-CM

## 2021-04-01 DIAGNOSIS — F25.0 SCHIZOAFFECTIVE DISORDER, BIPOLAR TYPE (H): ICD-10-CM

## 2021-04-01 PROCEDURE — 99441 PR PHYSICIAN TELEPHONE EVALUATION 5-10 MIN: CPT | Mod: GC | Performed by: STUDENT IN AN ORGANIZED HEALTH CARE EDUCATION/TRAINING PROGRAM

## 2021-04-01 RX ORDER — PERPHENAZINE 16 MG/1
16 TABLET ORAL 2 TIMES DAILY
Qty: 60 TABLET | Refills: 2 | Status: SHIPPED | OUTPATIENT
Start: 2021-04-01 | End: 2021-06-28

## 2021-04-01 RX ORDER — LURASIDONE HYDROCHLORIDE 80 MG/1
160 TABLET, FILM COATED ORAL
Qty: 60 TABLET | Refills: 2 | Status: SHIPPED | OUTPATIENT
Start: 2021-04-01 | End: 2021-06-28

## 2021-04-01 RX ORDER — BENZTROPINE MESYLATE 0.5 MG/1
TABLET ORAL
Qty: 100 TABLET | Refills: 2 | Status: SHIPPED | OUTPATIENT
Start: 2021-04-01 | End: 2021-04-08

## 2021-04-01 RX ORDER — BUPROPION HYDROCHLORIDE 150 MG/1
450 TABLET ORAL EVERY MORNING
Qty: 90 TABLET | Refills: 2 | Status: SHIPPED | OUTPATIENT
Start: 2021-04-01 | End: 2021-06-28

## 2021-04-01 NOTE — PROGRESS NOTES
"TELEPHONE VISIT  Karolyn Banerjee is a 35 year old pt. who is being evaluated via a billable telephone visit.      The patient has been notified of the following:    We have found that certain health care needs can be provided without the need for a physical exam. This service lets us provide the care you need with a short phone conversation. If a prescription is necessary we can send it directly to your pharmacy. If lab work is needed we can place an order for that and you can then stop by our lab to have the test done at a later time. Insurers are generally covering virtual visits as they would in-office visits so billing should not be different than normal.  If for some reason you do get billed incorrectly, you should contact the billing office to correct it and that number is in the AVS .    Patient has given verbal consent for a telephone visit?:  Yes   How would the pt like to obtain the AVS?:  hi5  AVS SmartPhrase [PsychAVS] has been placed in 'Patient Instructions':  Yes     Start Time:  10:30 AM          End Time:  11:00AM       [facility]  Psychiatry Clinic  PSYCHIATRY PROGRESS NOTE     CARE TEAM:    PCP- Suzie Mariscal   Specialty Providers- none      Therapist- none     Community  Team- [facility] worker, , group home staff.   CADI : Charmaine Isabel [phone].   Mental Health : Bre [phone]        Guardian: Mary \"Swetha\" Chriss (mother)- [phone] (okay to leave message) or [phone]  residential, [facility]: [phone], [phone]. , : [phone].    Karolyn Banerjee is a 35 year old patient who prefers the name Karolyn and uses pronouns she, her, hers.     PERTINENT BACKGROUND                           [most recent eval 10/12/20]     This patient first experienced mental health issues as a child and has received treatment for schizoaffective disorder, bipolar type, borderline personality " "disorder, and unspecified neurocognitive disorder. Notably, the patient's symptoms have historically included psychosis (auditory hallucinations, delusions related to pregnancy, agitation) and suicide attempts/threats to run into traffic or drown herself in a pond, often in reaction to perceived slights or difficult interactions with group home staff/residents. She has been tried on many different medication trials in the past, including two trials of clozapine that caused neutropenia. ECT along with medication has been the most helpful treatment for maintaining stability. ECT with Dr. Amos was discontinued in fall 2017 after patient remained relatively stable without it for 3 months (last hospitalization February 2018). Her mother is her guardian. Karolyn fell and broke her leg in 12/2018 which resulted in multiple surgeries, a long transitional care stay, and ongoing use of a walker.     Psych pertinent item history includes suicide attempt [multiple], suicidal ideation, SIB [per chart], psychosis [sxs include auditory hallucinations, delusions], mutiple psychotropic trials , severe med reaction, violent behavior, psych hosp (>5), commitment and ECT    INTERIM HISTORY                 History was provided by the patient and S (group home owner) who were fair and good historians respectively. Treatment adherence is good.  Since the last visit:   - Dharmesh reports she is doing \"okay\".   - She continues to do well with no behavior problems at the group home.  - Still plays board games with staff and is still into beading.  - She continues to feel tired during the day, but has been consistent. Is able to take naps and feels she gets adequate sleep.  - No reports of hallucinations, depression or david symptoms. No acute concerns for safety.  - No reported side effects to medications.  - Karolyn continues to have L leg pain, will take OTC pain medications, which provide some relief.  - Had a L knee XR which suggested " arthritis.    - Spoke with group home staff, Sonal, who reported that there have been no recent issues since last visit. No reported suicidal ideation, behavior, or self-injurious behavior. Has not seen anything like this from Karolyn since Sonal started working for the group [facility].  - Sonal noted that Karolyn is very nice and gets along with all of the other residents. She is able to attend to her own needs, bathing, toileting, and has no physical or behavioral concerns.     - Also spoke with group home owner CALDERON to discuss treatment plan.  - Recommended discontinuing PRN olanzapine, which NK agreed has not been necessary. Contact clinic if agitation becomes an issue.  - Have BP evaluated by primary care due to hypertensive readings over past 2 years. Recommend having her BP under 150/100, and will defer to PCP to manage if BP remains elevated.  - Also have L knee evaluated for chronic pain, given her age. NK said they had already sent her for evaluation two times in the past, but this writer suggested she continue to have this issue followed.  - No other changes to medications, SGA lab orders will be faxed directly to NK.    - No acute concerns for safety.     RECENT PSYCH ROS:   Depression:  low energy  Elevated:  none   Psychosis:  none  Anxiety:  none reported  Trauma Related:  none  Dysregulation:  none  Eating Disorder: no     Adverse Effects:  None reported.  Pertinent Negative Symptoms: No self-injurious behavior/urges, suicidal and violent ideation, aggression, psychosis, hallucinations and delusions    Recent Substance Use:     None reported     SOCIAL HISTORY                                                                  pt reported        Financial/ Work- social security disability       Partner/ - none  Children- none      Living situation- Lives at group [facility] - [facility]         Social/ Spiritual Support- boyfriend Remy, family members, mental health support  staff. Before pandemic, attended [employer] with her parents and attended [facility] [employer] day program in [place] twice a week.     Legal- no  FEELS SAFE AT HOME- yes     Trauma History (self-report)- no  Early History/Education- Born in [place], moved to [place] when she was a baby due to her asthma, lived in [place] until starting living in group homes, completed HS in [place]; has 8 brothers and 2 sisters--she is second oldest. One of her brothers  by suicide in 2017. Closest with sister, Opal.      PSYCH and SUBSTANCE USE Critical Summary Points since 2020           10/13/2020: Initial transfer evaluation. No medication changes.  2021: No medication changes.  2021: Discontinued PRN olanzapine to minimize polypharmacy. No other medication changes.    MEDICAL HISTORY and ALLERGY     ALLERGIES: Clozapine, Liquid adhesive, Risperdal, and Adhesive tape     Patient Active Problem List   Diagnosis     Mild cognitive impairment     Borderline personality disorder (H)     Asthma     Nonorganic enuresis     Schizoaffective disorder, depressive type (H)     Fx humerus shaft-closed     Port catheter in place     MENTAL HEALTH     Suicidal ideation     Hx of psychiatric care     DVT (deep vein thrombosis) in pregnancy     Suicide attempt (H)     Suicidal ideations     JOSE (obstructive sleep apnea)     Closed fibular fracture     Tibial plateau fracture     Morbid obesity (H)         MEDICAL REVIEW OF SYSTEMS                                                            Pregnant or breastfeeding- no      Contraception- OCP (Microgestin )    A comprehensive review of systems was performed and is negative other than noted in the HPI.    MEDICATIONS        Current Outpatient Medications   Medication Sig Dispense Refill     ACETAMINOPHEN PO Take 650 mg by mouth every 4 hours as needed        benztropine (COGENTIN) 0.5 MG tablet Take 3 tablets (1.5 mg) by mouth daily. May also take 1 tablet (0.5 mg) daily  as needed for agitation. 90 tablet 2     buPROPion (WELLBUTRIN XL) 150 MG 24 hr tablet Take 3 tablets (450 mg) by mouth every morning 90 tablet 2     cetirizine (ZYRTEC) 10 MG tablet Take 10 mg by mouth daily       lurasidone (LATUDA) 80 MG TABS tablet Take 2 tablets (160 mg) by mouth daily (with dinner) 60 tablet 2     norethindrone-ethinyl estradiol (JUNEL FE 1/20) 1-20 MG-MCG tablet Take 1 tablet by mouth       norethindrone-ethinyl estradiol (MICROGESTIN 1/20) 1-20 MG-MCG per tablet Take 1 tablet by mouth daily        OLANZapine (ZYPREXA) 5 MG tablet Take 1 tablet (5 mg) by mouth 2 times daily as needed (agitation, intense suicidality) 60 tablet 2     Omega-3 Fatty Acids (FISH OIL PO) Take 1,000 mg by mouth daily HOLD WHILE IN TCU       perphenazine 16 MG tablet Take 1 tablet (16 mg) by mouth 2 times daily 60 tablet 2     senna-docusate (STOOL SOFTENER/LAXATIVE) 8.6-50 MG tablet Take 8.6 mg by mouth       sennosides (SENOKOT) 8.6 MG tablet Take 1 tablet by mouth 2 times daily as needed        VITALS                                                                                                                              There were no vitals taken for this visit.   MENTAL STATUS EXAM                                                                 Alertness: alert  and oriented  Appearance: N/A (phone visit)  Behavior/Demeanor: cooperative, pleasant and calm, with N/A (phone visit) eye contact   Speech: increased latency of response and regular rate and rhythm  Language: intact and no obvious problem  Psychomotor: N/A (phone visit)  Mood: description consistent with euthymia  Affect: N/A (phone visit); congruent to: mood- yes, content- yes  Thought Process/Associations: unremarkable  Thought Content:  Reports none;  Denies suicidal & violent ideation and delusions  Perception:  Reports none;  Denies auditory hallucinations and visual hallucinations  Insight: fair  Judgment: adequate for safety  Cognition: (6)  oriented: time, person, and place  attention span: fair  concentration: intact  recent memory: fair  remote memory: limited  fund of knowledge: delayed  Gait and Station: N/A (telehealth)    LABS and DATA     PHQ9 TODAY = not done today  PHQ 8/31/2018 11/2/2018 11/6/2019   PHQ-9 Total Score 5 6 3   Q9: Thoughts of better off dead/self-harm past 2 weeks Several days Several days Several days       Recent Labs   Lab Test 06/12/19 01/21/19  0719 12/23/18  0628 12/22/18  0642 04/12/17  1024 04/12/17  1024   GLC 52* 82 98 150*   < > 83   A1C 5.0  --   --   --   --  5.0    < > = values in this interval not displayed.     Recent Labs   Lab Test 06/12/19 09/21/17  0750 04/12/17  1024 10/20/15  0853   CHOL 168 181 207* 185   TRIG 94 144 143 134   LDL 97 108* 135* 115   HDL 52 44* 43 43*     Recent Labs   Lab Test 06/12/19 12/21/18  0720 10/05/17  1108 09/21/17  0750   AST 12 21 18 18   ALT 20 23 22 21   ALKPHOS 67 74 64 76     Recent Labs   Lab Test 06/12/19 01/23/19  0611 01/21/19  0719 01/20/19  0650 01/16/19  0635 01/16/19  0635 01/15/19  1350 12/23/18  0628 12/23/18  0628 12/22/18  0642 12/20/18  1749 12/20/18  1749 02/06/18  1655 10/05/17  1108 09/21/17  0750   WBC 6.2  --  4.2  --   --   --   --   --  7.6 9.3   < > 11.2* 6.0 7.0 6.3   ANEU  --   --   --   --   --   --   --   --   --   --   --  9.7* 3.5 4.7 4.2   HGB 13.7  --  13.1  --   --  11.8 12.7  --  11.4* 12.8   < > 14.1 13.8 15.5 15.3    198 195 173   < >  --   --    < > 126* 152   < > 170 Platelets clumped, platelet count unavailable 154 118*    < > = values in this interval not displayed.       Recent Labs   Lab Test 06/12/19 01/21/19  0719 01/14/19  1654   CR 0.84 0.68 0.76   GFRESTIMATED >90 >90 >90     PSYCHOTROPIC DRUG INTERACTIONS     CETIRIZINE and PERPHENAZINE AND OLANZAPINE may result in increased risk of CNS depression.   BUPROPION and AGENTS LOWERING SEIZURE THRESHOLD may result in lower seizure threshold.   PERPHENAZINE and BENZTROPINE may  result in decreased phenothiazine serum concentrations, decreased phenothiazine effectiveness, enhanced anticholinergic effects (ileus, hyperpyrexia, sedation, dry mouth).     MANAGEMENT:  Monitoring for adverse effects, routine vitals and patient is aware of risks    RISK STATEMENT for SAFETY     Karolyn Banerjee did not appear to be an imminent safety risk to self or others.    DIAGNOSES                                                       Schizoaffective Disorder, bipolar type, depressed episode, moderate  Unspecified Neurocognitive Disorder    ASSESSMENT                                                                 Karolyn Banerjee [facility] to the [facility] via telephone visit for medication management of schizoaffective disorder, bipolar type. She and her group home owner continue to report mood stability since last visit with no symptoms of psychosis, depression, or suicidal behavior. Current medication regimen is reportedly working well, and PRN use is minimal as she has not had any significant behavioral issues. For this reason, will discontinue PRN olanzapine to reduce polypharmacy of neuroleptics. There are also no significant environmental stressors (antagonistic peers), which was a main  of her previous behavioral/suicidal behaviors. Karolyn is getting along very well with current peers at group home. Medication regimen and supportive environment from staff continue to provide stability, therefore will not make any changes to scheduled medications today.    Karolyn does have continued L leg pain, which has reportedly been evaluated twice this past year, though recommend further evaluation to ensure there are other ways this could be addressed to prevent her from being sedentary and gaining weight (gained !30 lbs in past 2 years). Should also have BP evaluated, as BP appears to be rising with systolic in the 140's and last diastolic 102. Will also have annual SGA labs  ordered as she is overdue and has an upcoming PCP visit.    Future considerations:  - reduction of neuroleptic dose to decrease daytime sedation.    MNPMP review was not needed today.    PLAN                                                            1) Meds  - Continue lurasidone 160 mg daily w/ dinner  - Continue perphenazine 16 mg BID  - Continue bupropion  mg daily  - Continue benztropine 1.5 mg at 5 PM    - Discontinue olanzapine 5 mg BID PRN for psychosis, agitation    2) Therapy  - none. Pt. Not interested at this time    3) Next Due   Labs- AP labs due 6/2020, ordered SGA labs (BMP, CBC, FLP, A1c). Fax to group home owner (CALDERON) [phone]  EKG- last 1/2019 Qtc 428, recheck as needed   Rating scales- AIMS: determine next due (11/2020)    4) Referrals  No Referrals needed     5) RTC: 3 months    6) Crisis Numbers:   Provided routinely in AVS     After hours:  [phone]    TREATMENT RISK STATEMENT:  The risks, benefits, alternatives and potential adverse effects have been discussed and are understood by the pt. The pt understands the risks of using street drugs or alcohol. There are no medical contraindications, the pt agrees to treatment with the ability to do so. The pt knows to call the clinic for any problems or to access emergency care if needed.  Medical and substance use concerns are documented above.  Psychotropic drug interaction check was done, including changes made today.    PROVIDER: Job Hoff MD     Patient staffed in clinic with Dr. Durant who will sign the note.  Supervisor is Dr. Kerr.

## 2021-04-02 ENCOUNTER — TELEPHONE (OUTPATIENT)
Dept: PSYCHIATRY | Facility: CLINIC | Age: 36
End: 2021-04-02

## 2021-04-02 DIAGNOSIS — Z79.899 ENCOUNTER FOR LONG-TERM (CURRENT) USE OF MEDICATIONS: ICD-10-CM

## 2021-04-02 DIAGNOSIS — F25.0 SCHIZOAFFECTIVE DISORDER, BIPOLAR TYPE (H): Primary | ICD-10-CM

## 2021-04-02 RX ORDER — PERPHENAZINE 16 MG/1
TABLET ORAL
Qty: 60 TABLET | Refills: 2 | OUTPATIENT
Start: 2021-04-02

## 2021-04-02 RX ORDER — LURASIDONE HYDROCHLORIDE 80 MG/1
TABLET, FILM COATED ORAL
Qty: 60 TABLET | Refills: 2 | OUTPATIENT
Start: 2021-04-02

## 2021-04-02 RX ORDER — BENZTROPINE MESYLATE 0.5 MG/1
TABLET ORAL
Qty: 90 TABLET | Refills: 2 | OUTPATIENT
Start: 2021-04-02

## 2021-04-02 RX ORDER — BUPROPION HYDROCHLORIDE 150 MG/1
TABLET ORAL
Qty: 90 TABLET | Refills: 2 | OUTPATIENT
Start: 2021-04-02

## 2021-04-02 NOTE — PATIENT INSTRUCTIONS
**For crisis resources, please see the information at the end of this document**     Patient Education      Thank you for coming to the [facility] MENTAL HEALTH & ADDICTION [place] [facility].    Lab Testing:  If you had lab testing today and your results are reassuring or normal they will be mailed to you or sent through [facility] within 7 days. If the lab tests need quick action we will call you with the results. The phone number we will call with results is # [phone] (home) . If this is not the best number please call our clinic and change the number.    Medication Refills:  If you need any refills please call your pharmacy and they will contact us. Our fax number for refills is [phone]. Please allow three business for refill processing. If you need to  your refill at a new pharmacy, please contact the new pharmacy directly. The new pharmacy will help you get your medications transferred.     Scheduling:  If you have any concerns about today's visit or wish to schedule another appointment please call our office during normal business hours [phone] (8-5:00 M-F)    Contact Us:  Please call [phone] during business hours (8-5:00 M-F).  If after clinic hours, or on the weekend, please call  [phone].    Financial Assistance [phone]  [facility] Billing [phone]  Central Billing Office, [facility]: [phone]  [facility] Billing [phone]  Medical Records [phone]  [facility] [facility] Patient Bill of Rights [email or web address]       MENTAL HEALTH CRISIS NUMBERS:  For a medical emergency please call  911 or go to the nearest ER.     [facility]:   [facility] -[phone]   Crisis Residence [facility]idence -[phone]   Walk-In Counseling Center [facility] -[phone]   COPE 24/7 [place] Mobile Team -[phone] (adults)/[phone] (child)  CHILD: [facility] needs assessment  team - [phone]      [facility]:   [facility] - [phone]   Walk-in counseling [facility] - [phone]   Walk-in counseling [facility] - [phone]   Crisis Residence [facility] - [phone]  Urgent Care Adult Mental Tqfvgw-[phone] mobile unit/ 24/7 crisis line    National Crisis Numbers:   National Suicide Prevention Lifeline: 1-904-918-TALK (685-197-7041)  Poison Control Center - 9-349-084-1770  [facility]/resources for a list of additional resources (SOS)  Trans Lifeline a hotline for transgender people 1-166.338.6263  The Ra Project a hotline for LGBT youth 9-448-726-9116  Crisis Text Line: For any crisis 24/7   To: 533947  see www.crisistextline.org  - IF MAKING A CALL FEELS TOO HARD, send a text!         Again thank you for choosing [facility] MENTAL HEALTH & ADDICTION [facility] and please let us know how we can best partner with you to improve you and your family's health.    You may be receiving a survey regarding this appointment. We would love to have your feedback, both positive and negative. The survey is done by an external company, so your answers are anonymous.

## 2021-04-02 NOTE — TELEPHONE ENCOUNTER
Dup:   Benztropine Mesylate 0.5MG TABS-last rx: 4-1-21 for 100 tb w/2 RF  buPROPion HCl ER (XL) 150MG TB24-last rx: 4-1-21 for 90 tb w/2 RF  Latuda 80MG TABS-last rx: 4-1-21 for 60 tb w/2 RF  Perphenazine 16MG TABS-last rx: 4-1-21 for 60 tb w/2 RF

## 2021-04-02 NOTE — TELEPHONE ENCOUNTER
Job Hoff MD Labossiere, Laura, RN Hi Laura,     Can we have some antipsychotic labs faxed over to Karolyn's ? Manager goes by the name CALDERON.     - BMP   - CBC w/ platelets   - FLP   - A1c     The number to fax to is [phone].     Thanks,     Job          Writer entered lab orders and faxed to the above number, ATTN: NK electronically.

## 2021-04-07 DIAGNOSIS — F25.0 SCHIZOAFFECTIVE DISORDER, BIPOLAR TYPE (H): ICD-10-CM

## 2021-04-08 RX ORDER — BENZTROPINE MESYLATE 0.5 MG/1
TABLET ORAL
Qty: 90 TABLET | OUTPATIENT
Start: 2021-04-08

## 2021-04-08 RX ORDER — BENZTROPINE MESYLATE 0.5 MG/1
TABLET ORAL
Qty: 100 TABLET | Refills: 2 | Status: SHIPPED | OUTPATIENT
Start: 2021-04-08 | End: 2021-04-08

## 2021-04-08 RX ORDER — BENZTROPINE MESYLATE 0.5 MG/1
TABLET ORAL
Qty: 100 TABLET | Refills: 2 | Status: SHIPPED | OUTPATIENT
Start: 2021-04-08 | End: 2021-06-28

## 2021-04-08 NOTE — TELEPHONE ENCOUNTER
Meme Gavin, RN  Meme Gavin, RN   Phone Number: [phone]          Previous Messages    ----- Message -----   From: Adeline Sanchez   Sent: 4/8/2021  12:27 PM CDT   To: [facility] Psychiatry [facility]   Subject: Rx Denial - Barbara Gonzales at [facility] had a question about the denial for Benztropine that came through today. Please call back when available.     Thank you,   [place]        Reviewed pt's chart. Appears script was inadvertently printed on 4/1 due to sig being too long.    Called [facility] and informed Barbara that writer will resend Rx with shortened sig. She also asked that Rx is sent under Dr. Toussaint.    Rx sig shortened and sent under Dr. Toussaint. Provider notified.

## 2021-05-19 ENCOUNTER — VIRTUAL VISIT (OUTPATIENT)
Dept: PSYCHIATRY | Facility: CLINIC | Age: 36
End: 2021-05-19
Attending: PSYCHIATRY & NEUROLOGY
Payer: MEDICAID

## 2021-05-19 DIAGNOSIS — R41.9 NEUROCOGNITIVE DISORDER: ICD-10-CM

## 2021-05-19 DIAGNOSIS — F25.0 SCHIZOAFFECTIVE DISORDER, BIPOLAR TYPE (H): Primary | ICD-10-CM

## 2021-05-19 PROCEDURE — 99214 OFFICE O/P EST MOD 30 MIN: CPT | Mod: 95 | Performed by: STUDENT IN AN ORGANIZED HEALTH CARE EDUCATION/TRAINING PROGRAM

## 2021-05-19 NOTE — PROGRESS NOTES
"TELEPHONE VISIT  Karolyn Banerjee is a 35 year old pt. who is being evaluated via a billable telephone visit.      The patient has been notified of the following:    We have found that certain health care needs can be provided without the need for a physical exam. This service lets us provide the care you need with a short phone conversation. If a prescription is necessary we can send it directly to your pharmacy. If lab work is needed we can place an order for that and you can then stop by our lab to have the test done at a later time. Insurers are generally covering virtual visits as they would in-office visits so billing should not be different than normal.  If for some reason you do get billed incorrectly, you should contact the billing office to correct it and that number is in the AVS .    Patient has given verbal consent for a telephone visit?:  Yes   How would the pt like to obtain the AVS?:  [employer]  AVS SmartPhrase [PsychAVS] has been placed in 'Patient Instructions':  Yes     Start Time:  02:00 PM          End Time:  02:30 PM       [facility]  Psychiatry Clinic  PSYCHIATRY PROGRESS NOTE     CARE TEAM:    PCP- Suzie Mariscal   Specialty Providers- none      Therapist- none     Community  Team- [facility] worker, , group home staff.   CADI : Charmaine Isabel [phone].   Mental Health : Bre [phone]        Guardian: Mary \"Swetha\" Chriss (mother)- [phone] (okay to leave message) or [phone]  skilled nursing, [facility] 1: [phone], [phone]. , : [phone].    Karolyn Banerjee is a 35 year old patient who prefers the name Karolyn and uses pronouns she, her, hers.     PERTINENT BACKGROUND                           [most recent eval 10/12/20]     This patient first experienced mental health issues as a child and has received treatment for schizoaffective disorder, bipolar type, borderline personality " "disorder, and unspecified neurocognitive disorder. Notably, the patient's symptoms have historically included psychosis (auditory hallucinations, delusions related to pregnancy, agitation) and suicide attempts/threats to run into traffic or drown herself in a pond, often in reaction to perceived slights or difficult interactions with group home staff/residents. She has been tried on many different medication trials in the past, including two trials of clozapine that caused neutropenia. ECT along with medication has been the most helpful treatment for maintaining stability. ECT with Dr. Amos was discontinued in fall 2017 after patient remained relatively stable without it for 3 months (last hospitalization February 2018). Her mother is her guardian. Karolyn fell and broke her leg in 12/2018 which resulted in multiple surgeries, a long transitional care stay, and ongoing use of a walker.     Psych pertinent item history includes suicide attempt [multiple], suicidal ideation, SIB [per chart], psychosis [sxs include auditory hallucinations, delusions], mutiple psychotropic trials , severe med reaction, violent behavior, psych hosp (>5), commitment and ECT    INTERIM HISTORY                 History was provided by the patient and S (group home owner) who were fair and good historians respectively. Treatment adherence is good.  Since the last visit:   - Dharmesh reports she is doing \"okay\".   - She continues to have L leg pain, has not had another appointment for it as we had discussed at last visit. Pain reportedly unchanged, not worsening.  - Continues to feel tired during the day, but this has been consistent.  - No reports of hallucinations, depression or david symptoms. No acute concerns for safety.  - No reported side effects to medications, and no acute concerns for safety.    - Spoke with group home staff and group home owner NK, who reported that pt continues to do well with no behavior problems at the group " home.  - Karolyn gets along with all of the other residents, is able to attend to her own needs, bathing, toileting, and has no physical or behavioral concerns.   - No reported suicidal ideation, behavior, or self-injurious behavior.    - No other changes to medications, SGA lab orders reportedly were not received, will be faxed again directly to NK. Verified fax number: [phone]     RECENT PSYCH ROS:   Depression:  low energy  Elevated:  none   Psychosis:  none  Anxiety:  none reported  Trauma Related:  none  Dysregulation:  none  Eating Disorder: no     Adverse Effects:  None reported.  Pertinent Negative Symptoms: No self-injurious behavior/urges, suicidal and violent ideation, aggression, psychosis, hallucinations and delusions    Recent Substance Use:     None reported     SOCIAL HISTORY                                                                  pt reported        Financial/ Work- social security disability       Partner/ - none  Children- none      Living situation- Lives at group home - [facility]         Social/ Spiritual Support- boyfriend Remy, family members, mental health support staff. Before pandemic, attended [employer] with her parents and attended [facility] day program in [place] twice a week.     Legal- no  FEELS SAFE AT HOME- yes     Trauma History (self-report)- no  Early History/Education- Born in [place], moved to [place] when she was a baby due to her asthma, lived in [place] until starting living in group homes, completed  in [place]; has 8 brothers and 2 sisters--she is second oldest. One of her brothers  by suicide in 2017. Closest with sister, Opal.      PSYCH and SUBSTANCE USE Critical Summary Points since 2020           10/13/2020: Initial transfer evaluation. No medication changes.  2021: No medication changes.  2021: Discontinued PRN olanzapine to minimize polypharmacy. No other medication changes.  2021: No  medication changes.    MEDICAL HISTORY and ALLERGY     ALLERGIES: Clozapine, Liquid adhesive, Risperdal, and Adhesive tape     Patient Active Problem List   Diagnosis     Mild cognitive impairment     Borderline personality disorder (H)     Asthma     Nonorganic enuresis     Schizoaffective disorder, depressive type (H)     Fx humerus shaft-closed     Port catheter in place     MENTAL HEALTH     Suicidal ideation     Hx of psychiatric care     DVT (deep vein thrombosis) in pregnancy     Suicide attempt (H)     Suicidal ideations     JOSE (obstructive sleep apnea)     Closed fibular fracture     Tibial plateau fracture     Morbid obesity (H)         MEDICAL REVIEW OF SYSTEMS                                                            Pregnant or breastfeeding- no      Contraception- OCP (Microgestin 1/20)    A comprehensive review of systems was performed and is negative other than noted in the HPI.    MEDICATIONS        Current Outpatient Medications   Medication Sig Dispense Refill     ACETAMINOPHEN PO Take 650 mg by mouth every 4 hours as needed        benztropine (COGENTIN) 0.5 MG tablet Take 3 tabs PO daily. May also take 1 tab daily PRN for agitation -Do not exceed 3x/week. If agitation uncontrolled, call [phone] 100 tablet 2     buPROPion (WELLBUTRIN XL) 150 MG 24 hr tablet Take 3 tablets (450 mg) by mouth every morning 90 tablet 2     cetirizine (ZYRTEC) 10 MG tablet Take 10 mg by mouth daily       lurasidone (LATUDA) 80 MG TABS tablet Take 2 tablets (160 mg) by mouth daily (with dinner) 60 tablet 2     norethindrone-ethinyl estradiol (JUNEL FE 1/20) 1-20 MG-MCG tablet Take 1 tablet by mouth       norethindrone-ethinyl estradiol (MICROGESTIN 1/20) 1-20 MG-MCG per tablet Take 1 tablet by mouth daily        Omega-3 Fatty Acids (FISH OIL PO) Take 1,000 mg by mouth daily HOLD WHILE IN TCU       perphenazine 16 MG tablet Take 1 tablet (16 mg) by mouth 2 times daily 60 tablet 2     senna-docusate (STOOL  SOFTENER/LAXATIVE) 8.6-50 MG tablet Take 8.6 mg by mouth       sennosides (SENOKOT) 8.6 MG tablet Take 1 tablet by mouth 2 times daily as needed        VITALS                                                                                                                              There were no vitals taken for this visit.   MENTAL STATUS EXAM                                                                 Alertness: alert  and oriented  Appearance: N/A (phone visit)  Behavior/Demeanor: cooperative, pleasant and calm, with N/A (phone visit) eye contact   Speech: increased latency of response and regular rate and rhythm  Language: intact and no obvious problem  Psychomotor: N/A (phone visit)  Mood: description consistent with euthymia  Affect: N/A (phone visit); congruent to: mood- yes, content- yes  Thought Process/Associations: unremarkable  Thought Content:  Reports none;  Denies suicidal & violent ideation and delusions  Perception:  Reports none;  Denies auditory hallucinations and visual hallucinations  Insight: fair  Judgment: adequate for safety  Cognition: (6) oriented: time, person, and place  attention span: fair  concentration: intact  recent memory: fair  remote memory: limited  fund of knowledge: delayed  Gait and Station: N/A (telehealth)    LABS and DATA     PHQ9 TODAY = not done today  PHQ 8/31/2018 11/2/2018 11/6/2019   PHQ-9 Total Score 5 6 3   Q9: Thoughts of better off dead/self-harm past 2 weeks Several days Several days Several days       Recent Labs   Lab Test 06/12/19 01/21/19  0719 12/23/18  0628 12/22/18  0642 04/12/17  1024 04/12/17  1024   GLC 52* 82 98 150*   < > 83   A1C 5.0  --   --   --   --  5.0    < > = values in this interval not displayed.     Recent Labs   Lab Test 06/12/19 09/21/17  0750 04/12/17  1024 10/20/15  0853   CHOL 168 181 207* 185   TRIG 94 144 143 134   LDL 97 108* 135* 115   HDL 52 44* 43 43*     Recent Labs   Lab Test 06/12/19 12/21/18  0720 10/05/17  1108  09/21/17  0750   AST 12 21 18 18   ALT 20 23 22 21   ALKPHOS 67 74 64 76     Recent Labs   Lab Test 06/12/19 01/23/19  0611 01/21/19  0719 01/20/19  0650 01/16/19  0635 01/16/19  0635 01/15/19  1350 12/23/18  0628 12/23/18  0628 12/22/18  0642 12/20/18  1749 12/20/18  1749 02/06/18  1655 10/05/17  1108 09/21/17  0750   WBC 6.2  --  4.2  --   --   --   --   --  7.6 9.3   < > 11.2* 6.0 7.0 6.3   ANEU  --   --   --   --   --   --   --   --   --   --   --  9.7* 3.5 4.7 4.2   HGB 13.7  --  13.1  --   --  11.8 12.7  --  11.4* 12.8   < > 14.1 13.8 15.5 15.3    198 195 173   < >  --   --    < > 126* 152   < > 170 Platelets clumped, platelet count unavailable 154 118*    < > = values in this interval not displayed.       Recent Labs   Lab Test 06/12/19 01/21/19  0719 01/14/19  1654   CR 0.84 0.68 0.76   GFRESTIMATED >90 >90 >90     PSYCHOTROPIC DRUG INTERACTIONS     CETIRIZINE and PERPHENAZINE may result in increased risk of CNS depression.   PERPHENAZINE and BENZTROPINE may result in decreased phenothiazine serum concentrations, decreased phenothiazine effectiveness, enhanced anticholinergic effects (ileus, hyperpyrexia, sedation, dry mouth).     MANAGEMENT:  Monitoring for adverse effects, routine vitals and patient is aware of risks    RISK STATEMENT for SAFETY     Karolyn Banerjee did not appear to be an imminent safety risk to self or others.    DIAGNOSES                                                       Schizoaffective Disorder, bipolar type, mre depressed  Unspecified Neurocognitive Disorder    ASSESSMENT                                                                 Karolyn Banerjee presents to the [facility] via telephone visit for medication management of schizoaffective disorder, bipolar type. She and her group home owner continue to report mood stability since last visit with no symptoms of psychosis, depression, or suicidal behavior. Current medication regimen is reportedly  working well, and PRN use is minimal as she has not had any significant behavioral issues.  There are also no significant environmental stressors (antagonistic peers), which was a main  of her previous behavioral/suicidal behaviors. Karolyn is getting along very well with current peers at group home. Medication regimen and supportive environment from staff continue to provide stability, therefore will not make any changes to scheduled medications today.    Karolyn does have continued L leg pain, which has reportedly been evaluated twice this past year, though recommend further evaluation to ensure there are other ways this could be addressed to prevent her from being sedentary and gaining weight (gained 30 lbs in past 2 years). Will also have annual SGA labs ordered and re-faxed, as routine labs have been overdue.    Future considerations:  - reduction of neuroleptic dose (recommend trial of tapering perphenazine in the future) to decrease daytime sedation.    [place] review was not needed today.    PLAN                                                            1) Meds  - Continue lurasidone 160 mg daily w/ dinner  - Continue perphenazine 16 mg BID  - Continue bupropion  mg daily  - Continue benztropine 1.5 mg at 5 PM    2) Therapy  - none. Pt. Not interested at this time    3) Next Due   Labs- AP labs due 6/2020, ordered SGA labs (BMP, CBC, FLP, A1c). Fax to group home owner (NK)   EKG- last 1/2019 Qtc 428, recheck as needed   Rating scales- AIMS: determine next due (11/2020)    4) Referrals  No Referrals needed     5) RTC: 3 months, with incoming PGY-3 resident, starting July 2021.    6) Crisis Numbers:   Provided routinely in AVS     After hours:  [phone]    TREATMENT RISK STATEMENT:  The risks, benefits, alternatives and potential adverse effects have been discussed and are understood by the pt. The pt understands the risks of using street drugs or alcohol. There are no medical contraindications,  the pt agrees to treatment with the ability to do so. The pt knows to call the clinic for any problems or to access emergency care if needed.  Medical and substance use concerns are documented above.  Psychotropic drug interaction check was done, including changes made today.    PROVIDER: Job Hoff MD     Patient staffed in clinic via telephone with Dr. Ruano who will sign the note.  Supervisor is Dr. Kerr.    TELEHEALTH ATTENDING ATTESTATION  Following the ACGME guidelines on telemedicine and direct supervision due to COVID-19, I was concurrently participating in and/or monitoring the patient care through appropriate telecommunication technology.  I discussed the key portions of the service with the resident, including the mental status examination and developing the plan of care. I reviewed key portions of the history with the resident. I agree with the findings and plan as documented in this note.   Vanessa Ruano MD

## 2021-06-08 ENCOUNTER — OFFICE VISIT (OUTPATIENT)
Dept: OPTOMETRY | Facility: CLINIC | Age: 36
End: 2021-06-08
Payer: MEDICAID

## 2021-06-08 DIAGNOSIS — H52.13 HIGH MYOPIA, BOTH EYES: ICD-10-CM

## 2021-06-08 DIAGNOSIS — Z01.01 ENCOUNTER FOR EXAMINATION OF EYES AND VISION WITH ABNORMAL FINDINGS: Primary | ICD-10-CM

## 2021-06-08 DIAGNOSIS — H52.223 REGULAR ASTIGMATISM OF BOTH EYES: ICD-10-CM

## 2021-06-08 PROCEDURE — 92004 COMPRE OPH EXAM NEW PT 1/>: CPT | Performed by: OPTOMETRIST

## 2021-06-08 ASSESSMENT — CONF VISUAL FIELD
OD_NORMAL: 1
OS_NORMAL: 1

## 2021-06-08 ASSESSMENT — CUP TO DISC RATIO
OS_RATIO: 0.25
OD_RATIO: 0.2

## 2021-06-08 ASSESSMENT — REFRACTION_MANIFEST
OS_AXIS: 073
OD_CYLINDER: +4.50
OS_SPHERE: -9.75
OD_SPHERE: -10.50
OS_CYLINDER: +4.50
OS_AXIS: 070
OD_AXIS: 103
OS_CYLINDER: +5.25
OD_SPHERE: -10.50
OD_AXIS: 099
OS_SPHERE: -9.25
OD_CYLINDER: +5.00

## 2021-06-08 ASSESSMENT — SLIT LAMP EXAM - LIDS
COMMENTS: NORMAL
COMMENTS: NORMAL

## 2021-06-08 ASSESSMENT — TONOMETRY
OD_IOP_MMHG: 20
IOP_METHOD: APPLANATION
OS_IOP_MMHG: 21

## 2021-06-08 ASSESSMENT — VISUAL ACUITY
METHOD: SNELLEN - LINEAR
OD_SC: 20/500
OS_SC: 20/500

## 2021-06-08 ASSESSMENT — EXTERNAL EXAM - LEFT EYE: OS_EXAM: NORMAL

## 2021-06-08 ASSESSMENT — EXTERNAL EXAM - RIGHT EYE: OD_EXAM: NORMAL

## 2021-06-08 NOTE — PROGRESS NOTES
Chief Complaint   Patient presents with     Annual Eye Exam      Accompanied by Worker  Last Eye Exam: 1 year  Dilated Previously: Yes, side effects of dilation explained today    What are you currently using to see?  Glasses, Broke a while ago has not been wearing any       Distance Vision Acuity: Noticed gradual change in both eyes    Near Vision Acuity: Satisfied with vision while reading  unaided    Eye Comfort: good  Do you use eye drops? : No  Occupation or Hobbies:     Chery Perez       Medical, surgical and family histories reviewed and updated 6/8/2021.       OBJECTIVE: See Ophthalmology exam    ASSESSMENT:    ICD-10-CM    1. Encounter for examination of eyes and vision with abnormal findings  Z01.01    2. High myopia, both eyes  H52.13    3. Regular astigmatism of both eyes  H52.223       PLAN:     Patient Instructions   Karolyn was advised of today's exam findings.  Fill glasses prescription  Allow 2 weeks to adapt to change in glasses  Wear glasses full time  Copy of glasses Rx provided today.    Return in 1-2 years for eye exam, or sooner if needed.    The effects of the dilating drops last for 4- 6 hours.  You will be more sensitive to light and vision will be blurry up close.  Mydriatic sunglasses were given if needed.      Sharif Nguyen O.D.  [facility]  [address] [place] [address]. [place]  [address]    [phone]

## 2021-06-08 NOTE — PATIENT INSTRUCTIONS
Kaorlyn was advised of today's exam findings.  Fill glasses prescription  Allow 2 weeks to adapt to change in glasses  Wear glasses full time  Copy of glasses Rx provided today.    Return in 1-2 years for eye exam, or sooner if needed.    The effects of the dilating drops last for 4- 6 hours.  You will be more sensitive to light and vision will be blurry up close.  Mydriatic sunglasses were given if needed.      Sharif Nguyen O.D.  [facility]  [address] [place] [address]. [place]  [place]  [address]    [phone]

## 2021-06-08 NOTE — LETTER
6/8/2021         RE: Karolyn Banerjee  [address] [address]        Dear Colleague,    Thank you for referring your patient, Karolyn Banerjee, to the [facility]. Please see a copy of my visit note below.    Chief Complaint   Patient presents with     Annual Eye Exam      Accompanied by Worker  Last Eye Exam: 1 year  Dilated Previously: Yes, side effects of dilation explained today    What are you currently using to see?  Glasses, Broke a while ago has not been wearing any       Distance Vision Acuity: Noticed gradual change in both eyes    Near Vision Acuity: Satisfied with vision while reading  unaided    Eye Comfort: good  Do you use eye drops? : No  Occupation or Hobbies:     Chery [employer]       Medical, surgical and family histories reviewed and updated 6/8/2021.       OBJECTIVE: See Ophthalmology exam    ASSESSMENT:    ICD-10-CM    1. Encounter for examination of eyes and vision with abnormal findings  Z01.01    2. High myopia, both eyes  H52.13    3. Regular astigmatism of both eyes  H52.223       PLAN:     Patient Instructions   Karolyn was advised of today's exam findings.  Fill glasses prescription  Allow 2 weeks to adapt to change in glasses  Wear glasses full time  Copy of glasses Rx provided today.    Return in 1-2 years for eye exam, or sooner if needed.    The effects of the dilating drops last for 4- 6 hours.  You will be more sensitive to light and vision will be blurry up close.  Mydriatic sunglasses were given if needed.      Sharif Nguyen O.D.  [address]. [place]  [address]    [phone]             Again, thank you for allowing me to participate in the care of your patient.        Sincerely,        Sharif Nguyen, OD

## 2021-06-24 ENCOUNTER — APPOINTMENT (OUTPATIENT)
Dept: OPTOMETRY | Facility: CLINIC | Age: 36
End: 2021-06-24
Payer: MEDICAID

## 2021-06-24 PROCEDURE — 92340 FIT SPECTACLES MONOFOCAL: CPT | Performed by: OPTOMETRIST

## 2021-06-25 DIAGNOSIS — F25.0 SCHIZOAFFECTIVE DISORDER, BIPOLAR TYPE (H): ICD-10-CM

## 2021-06-25 DIAGNOSIS — R46.89 AGGRESSION: ICD-10-CM

## 2021-06-28 NOTE — PATIENT INSTRUCTIONS
**For crisis resources, please see the information at the end of this document**     Patient Education      Thank you for coming to the [facility] [place] [facility].    Lab Testing:  If you had lab testing today and your results are reassuring or normal they will be mailed to you or sent through [employer] within 7 days. If the lab tests need quick action we will call you with the results. The phone number we will call with results is # [phone] (home) . If this is not the best number please call our clinic and change the number.    Medication Refills:  If you need any refills please call your pharmacy and they will contact us. Our fax number for refills is [phone]. Please allow three business for refill processing. If you need to  your refill at a new pharmacy, please contact the new pharmacy directly. The new pharmacy will help you get your medications transferred.     Scheduling:  If you have any concerns about today's visit or wish to schedule another appointment please call our office during normal business hours [phone] (8-5:00 M-F)    Contact Us:  Please call [phone] during business hours (8-5:00 M-F).  If after clinic hours, or on the weekend, please call  [phone].    Financial Assistance [phone]  [facility] Billing [phone]  Central Billing Office, [facility]: [phone]  [facility] Billing [phone]  Medical Records [phone]  [facility] Patient Bill of Rights [email or web address]       MENTAL HEALTH CRISIS NUMBERS:  For a medical emergency please call  911 or go to the nearest ER.     [facility]:   [facility] -[phone]   Crisis Residence [facility] Residence -[phone]   Walk-In Counseling Center [facility] -[phone]   COPE 24/7 [facility] Mobile Team -[phone] (adults)/[phone] (child)  CHILD: [facility] needs assessment  team - [phone]      [facility]:   [facility] - [phone]   Walk-in counseling [facility] - [phone]   Walk-in counseling [facility] - [phone]   Crisis Residence [facility] Residence - [phone]  Urgent Care Adult Mental J[facility]-[phone] mobile unit/ 24/7 crisis line    National Crisis Numbers:   National Suicide Prevention Lifeline: 6-340-978-TALK (382-697-6047)  Poison Control Center - 3-306-147-9391  [employer]/resources for a list of additional resources (SOS)  Trans Lifeline a hotline for transgender people 1-313.488.1632  The Ra Project a hotline for LGBT youth 4-297-872-0714  Crisis Text Line: For any crisis 24/7   To: 039[address]  see www.crisistextline.org  - IF MAKING A CALL FEELS TOO HARD, send a text!         Again thank you for choosing [facility] and please let us know how we can best partner with you to improve you and your family's health.    You may be receiving a survey regarding this appointment. We would love to have your feedback, both positive and negative. The survey is done by an external company, so your answers are anonymous.

## 2021-06-29 NOTE — TELEPHONE ENCOUNTER
Medication requested:   lurasidone (LATUDA) 80 MG TABS tablet  benztropine (COGENTIN) 0.5 MG tablet  perphenazine 16 MG tablet  buPROPion (WELLBUTRIN XL) 150 MG 24 hr tablet  Last refilled: 6/1/21  Qty: months supply      Last seen: 5/19/21  RTC: 3 months  Cancel: 0  No-show: 0  Next appt: 0    Refill decision:   Refill pended and routed to the provider for review/determination due to   Last note is not signed from 5/19/21  Scheduling has been notified to contact the pt for appointment.

## 2021-07-01 RX ORDER — BUPROPION HYDROCHLORIDE 150 MG/1
450 TABLET ORAL EVERY MORNING
Qty: 90 TABLET | Refills: 0 | Status: SHIPPED | OUTPATIENT
Start: 2021-07-01 | End: 2021-07-30

## 2021-07-01 RX ORDER — BENZTROPINE MESYLATE 0.5 MG/1
TABLET ORAL
Qty: 90 TABLET | Refills: 0 | Status: SHIPPED | OUTPATIENT
Start: 2021-07-01 | End: 2021-07-30

## 2021-07-01 RX ORDER — LURASIDONE HYDROCHLORIDE 80 MG/1
160 TABLET, FILM COATED ORAL
Qty: 60 TABLET | Refills: 0 | Status: SHIPPED | OUTPATIENT
Start: 2021-07-01 | End: 2021-07-30

## 2021-07-01 RX ORDER — PERPHENAZINE 16 MG/1
16 TABLET ORAL 2 TIMES DAILY
Qty: 60 TABLET | Refills: 0 | Status: SHIPPED | OUTPATIENT
Start: 2021-07-01 | End: 2021-07-30

## 2021-07-18 NOTE — BRIEF OP NOTE
[facility]    Brief Operative Note    Pre-operative diagnosis: TIBIAL FRACTURE.  Post-operative diagnosis Right tibial plateau fracture and right fibular head fracture  Procedure: Procedure(s):  OPEN REDUCTION INTERNAL FIXATION RIGHT TIBIAL PLATEAU FRACTURE  Surgeon: Surgeon(s) and Role:     * Marc Meadows MD - Primary     * Marisela Mendez PA-C - Assisting  Anesthesia: General   Estimated blood loss: 100 mL  Drains: None  Specimens: * No specimens in log *  Findings:   see full operative note.  Complications: None.  Implants: Materials Involved:  Metalic  Grafts:  None.    Plan:  NWB x12 weeks  Knee immobilizer in place   DVT prophylaxis - Lovenox in hospital then  mg BID x 4 weeks   Ancef x 24 hours  PACU XRs  PT/OT    F/U at clinic in 2 weeks        No

## 2021-08-05 ENCOUNTER — TELEPHONE (OUTPATIENT)
Dept: PSYCHIATRY | Facility: CLINIC | Age: 36
End: 2021-08-05

## 2021-08-05 NOTE — TELEPHONE ENCOUNTER
On 8/5/2021 the patient's mother/legal guardian signed an SILVER authorizing medical records to be released from [facility] to [facility] for the purpose of continuing care. I faxed the request to [facility] at [phone]. The original SILVER was sent to scanning and is being held until scanning is confirmed.  Radha Falcon, EMT

## 2021-08-16 ENCOUNTER — TELEPHONE (OUTPATIENT)
Dept: PSYCHIATRY | Facility: CLINIC | Age: 36
End: 2021-08-16

## 2021-08-16 NOTE — TELEPHONE ENCOUNTER
Called Karolyn at time of appointment. She reports she forgot about the appointment. She denied any urgent concerns, SI/SIB, HI, and reported no safety concerns. Patient requested to call NK (group home director) in order to reschedule. Called NK - states early AM generally does not work for video appointments. States she will work with scheduling to find a time that works for her and the patient.    Celine Pina DO, MPH  PGY-3 Psychiatry Resident

## 2021-09-01 NOTE — PROGRESS NOTES
"Video- Visit Details  Type of service:  video visit for diagnostic assessment  Time of service:    Date:  09/03/2021  Video Start Time:  9:15AM     Video End Time: 10:15AM    Reason for video visit:  Patient unable to travel due to Covid-19  Originating Site (patient location):  [facility]   Location- long term  Distant Site (provider location):  [facility]  Mode of Communication:  Video Conference via AmWell  Consent:  Patient has given verbal consent for video visit?: Yes        [facility]  Psychiatry Clinic  TRANSFER of CARE DIAGNOSTIC ASSESSMENT     CARE TEAM:  PCP- Suzie Mariscal   Specialty Providers- none      Therapist- none     Community  Team- [facility] worker, CM, group home staff.   CADI : Charmaine Isabel [phone].   Mental Health : Bre [phone]        Guardian: Mary \"Swetha\" Chriss (mother)- [phone] (okay to leave message) or [phone]  custodial, KIMBERLEE [facility] 1: [phone], [phone]. CALDERON, : [phone].     Karolyn Banerjee is a 35 year old patient who uses the name Karolyn and pronouns she, her, hers     DIAGNOSIS     Schizoaffective Disorder, bipolar type, most recent depressed  Unspecified Neurocognitive Disorder     ASSESSMENT   Karolyn reports excessive daytime somnolence. Her mood has been stable and she occasionally hears voices when listening to music, but is not distressed by this. Discussed daytime sedation with her guardian & mother, Swetha as well as her  manager. Swetha agrees to decreasing perphenazine by 2 mg qAM and watching closely on changes in behavior. Discussed with  manager NK who will call the clinic if she notes worsening behaviors/symptoms.     MNPMP review was not needed today.     PLAN                                                                                                                1) Meds-  - Continue lurasidone 160 mg daily w/dinner  - " Continue perphenazine 16 mg BID  - Continue bupropion  mg daily  - Continue benztropine 1.5 mg at 5 PM    2) Psychotherapy- no current therapist    3) Next due-  Labs- AP labs due 6/2020, ordered [facility] labs (BMP, CBC, FLP, A1c). Fax to group home owner (NK)   EKG- last 1/2019 Qtc 428, recheck as needed   Rating scales- AIMS: determine next due (11/2020) - will do at next in-person visit    4) Referrals-  None    5) Dispo- RTC 4-6 weeks     PERTINENT BACKGROUND                           [most recent eval 08/15/21]     This patient first experienced mental health issues as a child and has received treatment for schizoaffective disorder, bipolar type, borderline personality disorder, and unspecified neurocognitive disorder. Notably, the patient's symptoms have historically included psychosis (auditory hallucinations, delusions related to pregnancy, agitation) and suicide attempts/threats to run into traffic or drown herself in a pond, often in reaction to perceived slights or difficult interactions with group home staff/residents. She has been tried on many different medication trials in the past, including two trials of clozapine that caused neutropenia. ECT along with medication has been the most helpful treatment for maintaining stability. ECT with Dr. Amos was discontinued in fall 2017 after patient remained relatively stable without it for 3 months (last hospitalization February 2018). Her mother is her guardian. Karolyn fell and broke her leg in 12/2018 which resulted in multiple surgeries, a long transitional care stay, and ongoing use of a walker.      Psych pertinent item history includes suicide attempt [multiple], suicidal ideation, SIB [per chart], psychosis [sxs include auditory hallucinations, delusions], mutiple psychotropic trials , severe med reaction, violent behavior, psych hosp (>5), commitment and ECT     SUBJECTIVE   - Karolyn states her mood has been okay  - Reports she is sleeping well  -  Says she plays cards, coloring, nap, beads - makes necklaces, bracelets; says she reads romance novels sometimes, listens to CDs in her room  - Reports she goes to the store every Wednesday  - Reports she is in a home with 1 woman and 2 men - likes living there  - States she is not having SI/SIB or HI for the last 4-5 months  - Karolyn says she hears some voices, but they aren't bad - hears them when she listens to music but aren't bothersome  - States she sees her parents every Sunday  - Says she fell and hurt her leg - states she mentioned to NK who will schedule an appointment with her PCP  - Denies any fever, chills, SOB, palpitations, racing heart, palpitations, nausea, vomiting - says she has been having diarrhea since Sunday, advised patient to discuss this with PCP as well  - Says appetite is good - has been eating well  - Karolyn reports things have been going well but does feel tired during the day - says she could try to do more activities during the day    Called patient's guardian, Swetha Banerjee (pt's mother):  - Swetha has noticed patient is very sleepy in the day  - States when she visits home on the weekends, patient has a hard time staying awake  - Agrees to cautiously decreasing perphenazine - will decrease from 16 mg BID to 14 mg qAM and 16 mg at bedtime    Discussed plan with NK, pt's GH manager who agreed with this plan as well. Will call clinic if she notices worsening behaviors or symptoms.     RECENT PSYCH ROS:   Depression:  low energy and hypersomnia  Elevated:  none  Psychosis:  auditory hallucinations  Anxiety:  none  Trauma Related:  none  Sleep: no difficulty with night sleep, excessive daytime sleepiness  Other: N/A    Adverse Effects: hypersomnolence  Pertinent Negative Symptoms: No suicidal ideation, self-injurious behavior/urges, violent ideation or aggression  Recent Substance Use:     None reported     FAMILY and SOCIAL HISTORY                                 pt reported     Family  Hx: Per chart review pts father with mild developmental disorder, she has an uncle with schizophrenia. One of her brothers  by suicide in 2017.    Social Hx:  Financial/ Work- social security disability       Partner/ - none  Children- none      Living situation- Lives at group home - [facility]         Social/ Spiritual Support- boyfriend Remy, family members, mental health support staff. Before pandemic, attended [employer] with her parents and attended [facility] day program in [place] twice a week.     Legal- no    Feels Safe at Home- yes   Legal- None     Trauma History (self-report)- unknown and None  Early History/Education- Per chart review, patient was born in [place], moved to [place] when she was a baby due to her asthma, lived in [place] until starting living in group homes, completed  in [place]; has 8 brothers and 2 sisters--she is second oldest. One of her brothers  by suicide in 2017. Closest with sister, Opal.       PSYCHIATRIC HISTORY     SIB-patient denies, yes per chart review  Suicidal Ideation Hx- Hx of both active and passive SI, often in response to perceived slights or minor stressors. Plans in the past have included drowning herself in pool by group home or running into traffic.  Suicide Attempt- #- yes, several, walking toward pond or into traffic, most recent- 2019    Violence/Aggression Hx- yes, Aggression during hospitalizations.  Psychosis Hx- yes, Command auditory hallucinations, delusions related to pregnancy  Eating Disorder Hx- none     Psych Hosp- #- yes, several, most recently Sep 2017, Oct 2017, 2018, most recent- 2018   Commitment- no  ECT- yes  Outpatient Programs - day treatment at [facility] in [place]     PAST MED TRIALS       Medication  Dose (mg) Effect  Dates of Use   lurasidone 160 mg helpful current   Bupropion  mg helpful current   perphenazine 16 mg BID helpful Current   clozapine   2  trials that resulted with agranulocytosis (not eligible for retrial)     olanzapine         quetiapine         ziprasidone         risperidone   dystonia     aripiprazole   ineffective     haloperidol   Developed parkinsonism     divalproex         sertraline         venlafaxine              SUBSTANCE USE HISTORY     Past Use- none reported  Treatment- # none, most recent- N/A  Medical Consequences- none  HIV/Hepatitis- none  Legal Consequences- none     MEDICAL HISTORY and ALLERGY     ALLERGIES: Clozapine, Liquid adhesive, Risperdal, and Adhesive tape    Patient Active Problem List   Diagnosis     Mild cognitive impairment     Borderline personality disorder (H)     Asthma     Nonorganic enuresis     Schizoaffective disorder, depressive type (H)     Fx humerus shaft-closed     Port catheter in place     MENTAL HEALTH     Suicidal ideation     Hx of psychiatric care     DVT (deep vein thrombosis) in pregnancy     Suicide attempt (H)     Suicidal ideations     JOSE (obstructive sleep apnea)     Closed fibular fracture     Tibial plateau fracture     Morbid obesity (H)      MEDICAL REVIEW OF SYSTEMS   Contraception- did not discuss    HEENT: no headache, no dizziness  RESPIRATORY: no shortness of breath  CARDIOVASCULAR: no palpitations, no racing heart  GI: +diarrhea, no nausea or vomiting  MUSCULOSKELETAL: +left leg pain 2/2 fall  The remainder of the review of systems is noncontributory     MEDICATIONS     Current Outpatient Medications   Medication Sig Dispense Refill     ACETAMINOPHEN PO Take 650 mg by mouth every 4 hours as needed        benztropine (COGENTIN) 0.5 MG tablet TAKE 3 TABLETS BY MOUTH DAILY AND TAKE 1 TABLET BY MOUTH DAILY AS NEEDED FOR AGITATION 90 tablet 0     buPROPion (WELLBUTRIN XL) 150 MG 24 hr tablet Take three tabs (450 mg) by mouth every morning. Call [phone] to schedule an appointment for refills. 90 tablet 0     cetirizine (ZYRTEC) 10 MG tablet Take 10 mg by mouth daily        lurasidone (LATUDA) 80 MG TABS tablet Take 2 tabs (160 mg) by mouth every evening with dinner. Call [phone] to schedule an appointment for refills. 60 tablet 0     norethindrone-ethinyl estradiol (JUNEL FE 1/20) 1-20 MG-MCG tablet Take 1 tablet by mouth       norethindrone-ethinyl estradiol (MICROGESTIN 1/20) 1-20 MG-MCG per tablet Take 1 tablet by mouth daily        Omega-3 Fatty Acids (FISH OIL PO) Take 1,000 mg by mouth daily HOLD WHILE IN U       perphenazine 16 MG tablet Take 1 tab (16 mg) by mouth twice a day. Call [phone] to schedule an appointment for refills. 60 tablet 0     senna-docusate (STOOL SOFTENER/LAXATIVE) 8.6-50 MG tablet Take 8.6 mg by mouth       sennosides (SENOKOT) 8.6 MG tablet Take 1 tablet by mouth 2 times daily as needed         VITALS   There were no vitals taken for this visit.    MENTAL STATUS EXAM     Alertness: alert  and oriented  Appearance: adequately groomed  Behavior/Demeanor: cooperative, pleasant and calm, with fair  eye contact   Speech: normal  Language: no obvious problem  Psychomotor: normal or unremarkable  Mood: description consistent with euthymia  Affect: blunted; congruent to: mood- yes, content- yes  Thought Process/Associations: unremarkable  Thought Content:  Reports none;  Denies suicidal & violent ideation and delusions  Perception:  Reports auditory hallucinations;  Denies visual hallucinations  Insight: fair  Judgment: fair  Cognition: does  appear grossly intact; formal cognitive testing was not done  Gait and Station: N/A ([facility])     LABS and DATA     PHQ9 TODAY = NA  PHQ 8/31/2018 11/2/2018 11/6/2019   PHQ-9 Total Score 5 6 3   Q9: Thoughts of better off dead/self-harm past 2 weeks Several days Several days Several days     Recent Labs   Lab Test 06/12/19  0000 01/21/19  0719   CR 0.84 0.68   GFRESTIMATED >90 >90     Recent Labs   Lab Test 06/12/19  0000 12/21/18  0720   AST 12 21   ALT 20 23   ALKPHOS 67 74     ECG 2008 QTc = 447 ms      PSYCHOTROPIC DRUG INTERACTIONS     CETIRIZINE and PERPHENAZINE may result in increased risk of CNS depression.   PERPHENAZINE and BENZTROPINE may result in decreased phenothiazine serum concentrations, decreased phenothiazine effectiveness, enhanced anticholinergic effects (ileus, hyperpyrexia, sedation, dry mouth).     MANAGEMENT:  Monitoring for adverse effects     RISK STATEMENT for SAFETY     Karolyn Banerjee did not appear to be an imminent safety risk to self or others.    TREATMENT RISK STATEMENT: The risks, benefits, alternatives and potential adverse effects have been discussed and are understood by the pt. The pt understands the risks of using street drugs or alcohol. There are no medical contraindications, the pt agrees to treatment with the ability to do so. The pt knows to call the clinic for any problems or to access emergency care if needed.  Medical and substance use concerns are documented above.  Psychotropic drug interaction check was done, including changes made today.     PROVIDER: Celine Pina MD    Patient staffed in clinic with Dr. Durant who will sign the note.  Supervisor is Dr. Toussaint.

## 2021-09-03 ENCOUNTER — VIRTUAL VISIT (OUTPATIENT)
Dept: PSYCHIATRY | Facility: CLINIC | Age: 36
End: 2021-09-03
Attending: PSYCHIATRY & NEUROLOGY
Payer: MEDICAID

## 2021-09-03 ENCOUNTER — TELEPHONE (OUTPATIENT)
Dept: PSYCHIATRY | Facility: CLINIC | Age: 36
End: 2021-09-03

## 2021-09-03 DIAGNOSIS — R46.89 AGGRESSION: ICD-10-CM

## 2021-09-03 DIAGNOSIS — F25.0 SCHIZOAFFECTIVE DISORDER, BIPOLAR TYPE (H): ICD-10-CM

## 2021-09-03 PROCEDURE — 99215 OFFICE O/P EST HI 40 MIN: CPT | Mod: 95 | Performed by: STUDENT IN AN ORGANIZED HEALTH CARE EDUCATION/TRAINING PROGRAM

## 2021-09-03 RX ORDER — PERPHENAZINE 16 MG/1
16 TABLET ORAL AT BEDTIME
Qty: 30 TABLET | Refills: 1 | Status: SHIPPED | OUTPATIENT
Start: 2021-09-03 | End: 2021-10-21

## 2021-09-03 RX ORDER — PERPHENAZINE 2 MG/1
2 TABLET ORAL EVERY MORNING
Qty: 30 TABLET | Refills: 1 | Status: SHIPPED | OUTPATIENT
Start: 2021-09-03 | End: 2021-10-25

## 2021-09-03 RX ORDER — LURASIDONE HYDROCHLORIDE 80 MG/1
TABLET, FILM COATED ORAL
Qty: 60 TABLET | Refills: 1 | Status: SHIPPED | OUTPATIENT
Start: 2021-09-03 | End: 2021-10-21

## 2021-09-03 RX ORDER — PERPHENAZINE 8 MG/1
12 TABLET ORAL EVERY MORNING
Qty: 45 TABLET | Refills: 1 | Status: SHIPPED | OUTPATIENT
Start: 2021-09-03 | End: 2021-10-21

## 2021-09-03 RX ORDER — BENZTROPINE MESYLATE 0.5 MG/1
1.5 TABLET ORAL EVERY EVENING
Qty: 45 TABLET | Refills: 1 | Status: SHIPPED | OUTPATIENT
Start: 2021-09-03 | End: 2021-09-03

## 2021-09-03 RX ORDER — BENZTROPINE MESYLATE 0.5 MG/1
1.5 TABLET ORAL EVERY EVENING
Qty: 90 TABLET | Refills: 1 | Status: SHIPPED | OUTPATIENT
Start: 2021-09-03 | End: 2021-10-21

## 2021-09-03 RX ORDER — BUPROPION HYDROCHLORIDE 150 MG/1
TABLET ORAL
Qty: 90 TABLET | Refills: 1 | Status: SHIPPED | OUTPATIENT
Start: 2021-09-03 | End: 2021-10-21

## 2021-09-03 ASSESSMENT — PAIN SCALES - GENERAL: PAINLEVEL: NO PAIN (0)

## 2021-09-03 NOTE — PROGRESS NOTES
"VIDEO VISIT  Karolyn Banerjee is a 36 year old patient who is being evaluated via a billable video visit.      The patient has been notified of following:   \"This video visit will be conducted via a call between you and your physician/provider. We have found that certain health care needs can be provided without the need for an in-person physical exam. This service lets us provide the care you need with a video conversation. If a prescription is necessary we can send it directly to your pharmacy. If lab work is needed we can place an order for that and you can then stop by our lab to have the test done at a later time. Insurers are generally covering virtual visits as they would in-office visits so billing should not be different than normal.  If for some reason you do get billed incorrectly, you should contact the billing office to correct it and that number is in the AVS .    Video Conference to be completed via:  Brooke    Patient has given verbal consent for video visit?:  Yes    Patient would prefer that any video invitations be sent by: Send to e-mail at: [email or web address]      How would patient like to obtain AVS?:  declined    AVS SmartPhrase [PsychAVS] has been placed in 'Patient Instructions':  Yes    "

## 2021-09-03 NOTE — TELEPHONE ENCOUNTER
Received a call from Edwige at [facility]. She is calling to verify the quantity on the benztropine Rx sent earlier today. They prepackage medications by the month, and the benztropine Rx was only for a 15 ds.

## 2021-09-03 NOTE — PATIENT INSTRUCTIONS
Treatment Plan Today:     1) Medications-  - Decrease perphenazine to 14 mg every morning and 16 mg at bedtime   - Continue lurasidone 160 mg daily w/dinner  - Continue bupropion  mg daily  - Continue benztropine 1.5 mg at 5 PM    2) Please get the following labs from your PCP:  - CBC  - CMP  - Fasting lipid panel  - A1c    3) Follow-up appt with Dr Corcoran in 4-6 weeks    4) Crisis numbers are below and clinic after hours number is [phone]        **For crisis resources, please see the information at the end of this document**     Patient Education      Thank you for coming to the [facility].    Lab Testing:  If you had lab testing today and your results are reassuring or normal they will be mailed to you or sent through [employer] within 7 days. If the lab tests need quick action we will call you with the results. The phone number we will call with results is # [phone] (home) . If this is not the best number please call our clinic and change the number.    Medication Refills:  If you need any refills please call your pharmacy and they will contact us. Our fax number for refills is [phone]. Please allow three business for refill processing. If you need to  your refill at a new pharmacy, please contact the new pharmacy directly. The new pharmacy will help you get your medications transferred.     Scheduling:  If you have any concerns about today's visit or wish to schedule another appointment please call our office during normal business hours [phone] (8-5:00 M-F)    Contact Us:  Please call [phone] during business hours (8-5:00 M-F).  If after clinic hours, or on the weekend, please call  [phone].    Financial Assistance [phone]  [facility] Billing [phone]  Central Billing Office, [facility]: [phone]  [facility] Billing [phone]  Medical Records [phone]  [facility] Patient Bill of Rights  [email or web address]       MENTAL HEALTH CRISIS NUMBERS:  For a medical emergency please call  911 or go to the nearest ER.     [facility]:   [facility] -[phone]   Crisis Residence [facility] -[phone]   Walk-In Counseling Center [facility] -[phone]   COPE 24/7 [facility] Mobile Team -[phone] (adults)/[phone] (child)  CHILD: [facility] needs assessment team - [phone]      [facility]:   [facility] - [phone]   Walk-in counseling [facility] - [phone]   Walk-in counseling [facility] - [phone]   Crisis Residence [facility] - [phone]  Urgent Care Adult Mental [facility]-[phone] mobile unit/ 24/7 crisis line    National Crisis Numbers:   National Suicide Prevention Lifeline: 9-503-097-TALK (446-326-1880)  Poison Control Center - 1-281.137.6000  [employer]/resources for a list of additional resources (SOS)  Trans Lifeline a hotline for transgender people 5-781-956-1140  [employer] a hotline for LGBT youth 1-823-380-7292  Crisis Text Line: For any crisis 24/7   To: 526207  see www.crisistextline.org  - IF MAKING A CALL FEELS TOO HARD, send a text!         Again thank you for choosing [facility] MENTAL HEALTH & ADDICTION [facility] and please let us know how we can best partner with you to improve you and your family's health.    You may be receiving a survey regarding this appointment. We would love to have your feedback, both positive and negative. The survey is done by an external company, so your answers are anonymous.       '

## 2021-09-20 ENCOUNTER — HOSPITAL ENCOUNTER (EMERGENCY)
Facility: CLINIC | Age: 36
Discharge: HOME | End: 2021-09-20
Attending: PSYCHIATRY & NEUROLOGY | Admitting: PSYCHIATRY & NEUROLOGY
Payer: MEDICAID

## 2021-09-20 VITALS
RESPIRATION RATE: 16 BRPM | HEART RATE: 97 BPM | DIASTOLIC BLOOD PRESSURE: 77 MMHG | OXYGEN SATURATION: 96 % | SYSTOLIC BLOOD PRESSURE: 132 MMHG | TEMPERATURE: 97.8 F

## 2021-09-20 DIAGNOSIS — F25.0 SCHIZOAFFECTIVE DISORDER, BIPOLAR TYPE (H): ICD-10-CM

## 2021-09-20 DIAGNOSIS — F69 BEHAVIOR CONCERN IN ADULT: ICD-10-CM

## 2021-09-20 PROCEDURE — 99284 EMERGENCY DEPT VISIT MOD MDM: CPT | Performed by: PSYCHIATRY & NEUROLOGY

## 2021-09-20 PROCEDURE — 99285 EMERGENCY DEPT VISIT HI MDM: CPT | Mod: 25 | Performed by: PSYCHIATRY & NEUROLOGY

## 2021-09-20 PROCEDURE — 90791 PSYCH DIAGNOSTIC EVALUATION: CPT

## 2021-09-20 ASSESSMENT — ENCOUNTER SYMPTOMS
HYPERACTIVE: 0
CARDIOVASCULAR NEGATIVE: 1
MUSCULOSKELETAL NEGATIVE: 1
CONSTITUTIONAL NEGATIVE: 1
RESPIRATORY NEGATIVE: 1
EYES NEGATIVE: 1
NEUROLOGICAL NEGATIVE: 1
AGITATION: 1
GASTROINTESTINAL NEGATIVE: 1

## 2021-09-20 NOTE — ED TRIAGE NOTES
Today patient attempted to run from her group home when she tripped and hurt her left ankle. Denies any other injury. Patient states she was attempting to run into traffic.

## 2021-09-20 NOTE — ED NOTES
Pt seen by the ED provider and DEC .  Pt cleared to be discharge and was walked out by the ED PA to the  staff persons car.

## 2021-09-20 NOTE — ED NOTES
Received report on the Pt from Alex JAY RN. Pt resting in room at present waiting to be seen by the .

## 2021-09-20 NOTE — DISCHARGE INSTRUCTIONS
You are being discharged back to the care of your group home staff. Follow up with your psychiatry provider as soon as possible to have your anti-depressant medication increased back to it's previous level.     If you are in crisis your group home staff or you can call the [facility] Mental Health Team:   [phone]

## 2021-09-20 NOTE — ED NOTES
I was asked by nursing to evaluate patient's left ankle.  She says she fell and injured it today. Denies other injuries. She has a vertical healing scab anterior shin.  No evidence of infection of the prior wound.  No deformity on exam.  No pain with palpation/exam of leg/ankle/foot.  She is able to walk without difficulty.  Dx: ankle strain.      Nancy De Luna MD  09/20/21 1753

## 2021-09-20 NOTE — ED PROVIDER NOTES
ED Provider Note  [facility]      History     Chief Complaint   Patient presents with     Suicidal     Patient attempted to run from her Group home when she tripped and hurt her left ankle. Patient states she was attempting to run into traffic.      Ankle Pain     HPI  Karolyn Banerjee is a 36 year old female who is here via EMS from her group home where she tried to runaway as she was upset. Patient ended up falling and injuring her ankle and shin. She reports feeling suicidal. She was seen in the ED and medically cleared. Patient had calmed down and remained in emotional and behavioral control during her stay in the ED and upon transfer to [facility]. She now feels safe and would like to return to her group home.    Patient is well-known to us due to her previous ED visits and hospitalizations. She is followed through the [facility]. She has been taking her meds. She denies drug use. There is no psychosis or altered mental status presently.    Group home is comfortable having patient return.    Please see DEC Crisis Assessment on 9/20/21 in Epic for further details.    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 Gardner     Hypertension      Mild developmental delay      Myopia      Neuroleptic-induced tardive dyskinesia      Nonorganic enuresis      Refractive amblyopia      Schizoaffective disorder, bipolar type (H)     Follows with Dr. Cooley at her group home.      Sleep disorder      PAST SURGICAL HISTORY  Past Surgical History:   Procedure Laterality Date     HRW PORT A CATH Right      NO HISTORY OF SURGERY       OPEN REDUCTION INTERNAL FIXATION TIBIAL PLATEAU Right 12/21/2018    Procedure: OPEN REDUCTION  INTERNAL FIXATION RIGHT TIBIAL PLATEAU FRACTURE;  Surgeon: Marc Meadows MD;  Location: [facility] OR     OPEN REDUCTION INTERNAL FIXATION TIBIAL PLATEAU Right 1/14/2019    Procedure: Open reduction internal fixation right tibial plateau fracture;  Surgeon: Tarik Panda MD;  Location: [facility] OR     FAMILY HISTORY  Family History   Problem Relation Age of Onset     Mental Illness Father         mild developmental delay     Schizophrenia Other         uncle     SOCIAL HISTORY  Social History     Tobacco Use     Smoking status: Never Smoker     Smokeless tobacco: Never Used   Substance Use Topics     Alcohol use: No     MEDICATIONS  No current facility-administered medications for this encounter.     Current Outpatient Medications   Medication     ACETAMINOPHEN PO     benztropine (COGENTIN) 0.5 MG tablet     buPROPion (WELLBUTRIN XL) 150 MG 24 hr tablet     cetirizine (ZYRTEC) 10 MG tablet     lurasidone (LATUDA) 80 MG TABS tablet     norethindrone-ethinyl estradiol (JUNEL FE 1/20) 1-20 MG-MCG tablet     norethindrone-ethinyl estradiol (MICROGESTIN 1/20) 1-20 MG-MCG per tablet     Omega-3 Fatty Acids (FISH OIL PO)     perphenazine 16 MG tablet     perphenazine 2 MG tablet     perphenazine 8 MG tablet     senna-docusate (STOOL SOFTENER/LAXATIVE) 8.6-50 MG tablet     sennosides (SENOKOT) 8.6 MG tablet     ALLERGIES  Allergies   Allergen Reactions     Clozapine      Agranulocytosis x 2, cannot be re trialed      Liquid Adhesive      tape     Risperdal Other (See Comments)     dystonia     Adhesive Tape Rash     Plastic tape          Review of Systems   Constitutional: Negative.    HENT: Negative.    Eyes: Negative.    Respiratory: Negative.    Cardiovascular: Negative.    Gastrointestinal: Negative.    Genitourinary: Negative.    Musculoskeletal: Negative.    Skin: Negative.    Neurological: Negative.    Psychiatric/Behavioral: Positive for agitation, behavioral problems and suicidal ideas. The patient is not hyperactive.     All other systems reviewed and are negative.        Physical Exam   BP: 132/77  Pulse: 97  Temp: 97.8  F (36.6  C)  Resp: 16  SpO2: 96 %  Physical Exam  Vitals and nursing note reviewed.   HENT:      Head: Normocephalic.   Eyes:      Pupils: Pupils are equal, round, and reactive to light.   Pulmonary:      Effort: Pulmonary effort is normal.   Musculoskeletal:         General: Normal range of motion.      Cervical back: Normal range of motion.   Neurological:      General: No focal deficit present.      Mental Status: She is alert.   Psychiatric:         Mood and Affect: Mood normal.         Behavior: Behavior normal.         Thought Content: Thought content normal.         Judgment: Judgment normal.         ED Course      Procedures            No results found for any visits on 09/20/21.  Medications - No data to display     Assessments & Plan (with Medical Decision Making)   Patient with schizoaffective disorder who resides in a group home. She got upset today and tried to leave the group home, threatening to run into traffic. She now feels calmer and no longer has suicidal thoughts. She feels safe going back to her group home as is her preference. Group home is comfortable with her returning there and staff plans to pick her up. Patient can be discharged. She is to follow-up established care and services.    I have reviewed the nursing notes. I have reviewed the findings, diagnosis, plan and need for follow up with the patient.    New Prescriptions    No medications on file       Final diagnoses:   Schizoaffective disorder, bipolar type (H)   Behavior concern in adult       --  Chester Lou MD  [facility] EMERGENCY DEPARTMENT  9/20/2021     Chester Lou MD  09/20/21 1811

## 2021-09-20 NOTE — ED NOTES
Bed: HW01  Expected date: 9/20/21  Expected time: 1:16 PM  Means of arrival:   Comments:  [address]. 36yo F, SI attempt, ankle injury

## 2021-09-20 NOTE — ED NOTES
"9/20/2021  Karolyn BAXTER Chriss 1985     [facility] Crisis Assessment:    Started at: 4:15 PM  Completed at: 5:00 PM  Patient was assessed via virtually ([employer] cart or other teleconferencing device).    Chief Complaint and History of Presenting Problem:    Patient is a 36 year old  female who was sent to the ED related to concerns for suicidal ideation. Assessment and intervention involved meeting with pt, obtaining collateral from [facility] and Care Everywhere records and employing crisis psychotherapy including: Establishing rapport, Active listening, Assess dimensions of crisis and Establish a discharge plan.     Triage notes indicate the patient was having a hard day at her group home when she attempted to run away and then walk into traffic while making suicidal statements. The patient has a history of Intellectual disability, Schizoaffective disorder, depressive type, and Borderline personality disorder. She has a history of ER visits and hospitalizations mostly within the [place] system. When asked what happened tonight the patient states \"I went out the door and I fell out the doorway outside. I hurt my ankle.\" When asked if something upsetting happened the patient states \"yeah, I don't even know how to explain it.\" Writer encouraged the patient to elaborate to which she replied \"the staff said not to talk about her behind her back.\" The patient continued to decline to provide details. The patient denies having any current suicidal ideation, plans or intent. She appropriately contracts for safety and states that she would like to return home to her group home. The patient denies having any homicidal ideation or thoughts of self harm. She states that she is having some psychosis including auditory hallucinations consisting of \"nothing bad, just this mohsen talking to me that goes to my Tenriism.\"     This writer spoke with the patient's group home staff member CALDERON Roche ([phone]). This staff member states " that she is a nurse practitioner who runs the group home. She expresses understanding of the patient's feelings and attributes this episode to a medication change that happened a couple of weeks ago in which the patient asked her psychiatrist to decrease her anti-depressant dosage. NK states that she feels there is no reason the patient needs to be hospitalized today. She is agreeable to the patient returning to the group home. It is this writer's determination that the patient is at her baseline and likely reported feelings of suicidal ideation today because she was upset. This is consistent with past ER visits as well as consistent with her diagnoses.     Biopsychosocial Background, Demographic Information, Mental Health History and Current Symptoms     The patient's guardian is her mother Mary ([phone]). The patient has a history of Intellectual disability, Schizoaffective disorder, depressive type, and Borderline personality disorder.    Mental Health History (prior psychiatric hospitalizations, civil commitments, programmatic care, etc):Patient has a history of numerous hospitalizations. Records indicate the most recent being in 2018.   Family Mental and Chemical Health History: Unknown  Current and Historic Psychotropic Medications: See medication list  Medication Adherent: Yes  Recent medication changes? Yes patient recently requested a decrease in her antidepressant    Relevant Medical Concerns  Patient identifies concerns with completing ADLs? No  Patient can ambulate independently? Yes  Other medical health concerns? Yes  History of concussion or TBI? No     Trauma History   Physical, Emotional, or Sexual abuse: No  Loss of a friend or family member to suicide: No  Other identified traumatic event or significant stressor: No    Substance Use History and Treatments    Drug screen/BAL/Breathalyzer Completed? No     History of Suicidal Ideation, Suicide Attempts, Non-Suicidal Self Injury, and Risk  Formulation:   Details of Current Ideation, Attempt(s), Plan(s): Patient denies having any current suicidal ideation, plan or intent  Risk factors: poor interpersonal relationships, helplessness/hopelessness and impulsivity/recklessness.   Protective factors:  positive working relationship with existing medical/mental health providers and future oriented towards goals, hopes, dreams.  History and Prior Methods of Self-injury: History of head banging  History of Suicide Attempts: History of walking into traffic and yelling at cars to hit her    ESS-6  1.a. Over the past 2 weeks, have you had thoughts of killing yourself? Yes   1.b. Have you ever attempted to kill yourself and, if yes, when did this last happen? Yes  2. Recent or current suicide plan? No  3. Recent or current intent to act on ideation? No  4. Lifetime psychiatric hospitalization? Yes  5. Pattern of excessive substance use? No  6. Current irritability, agitation, or aggression? No  ESS-6 Score: 2/6    Other Risk Areas  Aggressive/assumptive/homicidal risk factors: No   Sexually inappropriate behavior? No      Vulnerability to sexual exploitation? Yes patient has an intellectual disability     Clinical Presentation and Current Symptoms     Attention, Hyperactivity, and Impulsivity: Yes: Impulsive   Anxiety: No  Behavioral Difficulties: Yes: Displaces Blame, Impulsivity/Disinhibition and Withdrawal/Isolation   Mood Symptoms: Yes: Crying or feels like crying, Feelings of helplessness , Impaired concentration, Impaired decision making  and Sad, depressed mood    Appetite: No   Feeding and Eating: No  Interpersonal Functioning: Yes: Displaces Blame, Emotional Deregulation, Impaired Impulse Control and Impaired Interpersonal Functioning  Learning Disabilities/Cognitive/Developmental Disorders: No   General Cognitive Impairments: Yes: Decision-Making, Judgment/Insight and Memory  Sleep: No   Psychosis: Yes: Hallucinations: Auditory    Trauma: No     Mental  Status Exam:  Affect: Flat  Appearance: Appropriate   Attention Span/Concentration: Attentive    Eye Contact: Avoidant  Fund of Knowledge: Delayed   Language /Speech Content: Fluent  Language /Speech Volume: Soft   Language /Speech Rate/Productions: Slow   Recent Memory: Variable  Remote Memory: Variable  Mood: Sad   Orientation:   Person: Yes   Place: Yes  Time of Day: No   Date: Yes   Situation (Do they understand why they are here?): Yes   Psychomotor Behavior: Normal   Thought Content: Clear  Thought Form: Intact    Current Providers and Contact Information   Legal guardian is Parent(s): Mary (Mother) [phone]     Has an SILVER been signed? No guardian not present     Clinical Summary and Recommendations    Clinical summary of assessment (include strengths, protective factors, community resources, and assessment of vulnerability/risk): The patient has a history of an intellectual disability, Schizoaffective disorder, and Borderline personality disorder. The patient previously left her group home and stated that she intended to go walk in front of traffic. Tonight she now denies feeling suicidal and appropriately contracts for safety. Her group home attributes these recent depressive symptoms to a medication change and plans to get her back in with her psychiatrist as soon as possible.     Diagnosis with F Codes:  Schizoaffective disorder (F25.1)  Intellectual disability (F71)  Borderline personality disorder (F60.3)    Disposition  Attending provider, Dr. Lou consulted and does  agree with recommended disposition which includes Individual Therapy and Medication Management. Patient agrees with recommended level of care as does her group home.      Details of final disposition include: Return to group home    Duration of assessment time: .75 hrs    CPT code(s) utilized: 74348, up to 74 minutes    Anat Montano [facility]

## 2021-09-20 NOTE — ED NOTES
CALDERON Roche RN ([phone]) at patient's group home called to request an update. Writer advised that someone would call with an update when able.     HAROLDO Michelle on 9/20/2021 at 3:41 PM

## 2021-10-19 DIAGNOSIS — R46.89 AGGRESSION: ICD-10-CM

## 2021-10-19 DIAGNOSIS — F25.0 SCHIZOAFFECTIVE DISORDER, BIPOLAR TYPE (H): ICD-10-CM

## 2021-10-21 RX ORDER — PERPHENAZINE 16 MG/1
16 TABLET ORAL AT BEDTIME
Qty: 30 TABLET | Refills: 0 | Status: SHIPPED | OUTPATIENT
Start: 2021-10-21 | End: 2021-11-09

## 2021-10-21 RX ORDER — BUPROPION HYDROCHLORIDE 150 MG/1
450 TABLET ORAL EVERY MORNING
Qty: 90 TABLET | Refills: 0 | Status: SHIPPED | OUTPATIENT
Start: 2021-10-21 | End: 2021-11-09

## 2021-10-21 RX ORDER — PERPHENAZINE 8 MG/1
12 TABLET ORAL EVERY MORNING
Qty: 45 TABLET | Refills: 0 | Status: SHIPPED | OUTPATIENT
Start: 2021-10-21 | End: 2021-11-09

## 2021-10-21 RX ORDER — BENZTROPINE MESYLATE 0.5 MG/1
1.5 TABLET ORAL EVERY EVENING
Qty: 90 TABLET | Refills: 0 | Status: SHIPPED | OUTPATIENT
Start: 2021-10-21 | End: 2021-11-09

## 2021-10-21 RX ORDER — LURASIDONE HYDROCHLORIDE 80 MG/1
TABLET, FILM COATED ORAL
Qty: 60 TABLET | Refills: 0 | Status: SHIPPED | OUTPATIENT
Start: 2021-10-21 | End: 2021-11-09

## 2021-10-21 NOTE — TELEPHONE ENCOUNTER
Medication requested:   benztropine (COGENTIN) 0.5 MG tablet  buPROPion (WELLBUTRIN XL) 150 MG 24 hr tablet  lurasidone (LATUDA) 80 MG TABS tablet  perphenazine 16 MG tablet  perphenazine 8 MG tablet  Last refilled: 9/29/21  Qty:   90  90  60  30  45      Last seen: 9/3/21  RTC: 4-6 weeks  Cancel: 0  No-show: 0  Next appt: 0    Refill decision:   30 day piter refill sent to the pharmacy - including instructions for patient to call the clinic and schedule an appointment.  Scheduling has been notified to contact the pt for appointment.    Warnings Override History for perphenazine 8 MG tablet [[id number]]    Overridden by Celine Melara MD on Sep 3, 2021 1:40 PM   Drug-Drug   1. SELECTIVE SEROTONIN REUPTAKE INHIBITORS AND BUPROPION / PHENOTHIAZINES [Level: Major]   Other Orders: buPROPion (WELLBUTRIN XL) 150 MG 24 hr tablet

## 2021-10-25 DIAGNOSIS — F25.0 SCHIZOAFFECTIVE DISORDER, BIPOLAR TYPE (H): ICD-10-CM

## 2021-10-25 RX ORDER — PERPHENAZINE 2 MG/1
2 TABLET ORAL EVERY MORNING
Qty: 30 TABLET | Refills: 0 | Status: SHIPPED | OUTPATIENT
Start: 2021-10-25 | End: 2021-11-09

## 2021-10-25 NOTE — TELEPHONE ENCOUNTER
perphenazine 2 MG    Last refilled: 9/3/21  Qty: 30 : 1    Last seen: 9/3/21  RTC: 4-6 WEEKS  Cancel: 0  No-show: 0  Next appt: NONE  Scheduling has been notified to contact the pt for appointment.  30 day piter refill sent to the pharmacy - including instructions for patient to call the clinic and schedule an appointment.

## 2021-11-01 ENCOUNTER — VIRTUAL VISIT (OUTPATIENT)
Dept: PSYCHIATRY | Facility: CLINIC | Age: 36
End: 2021-11-01
Attending: PSYCHIATRY & NEUROLOGY
Payer: MEDICAID

## 2021-11-01 DIAGNOSIS — F25.0 SCHIZOAFFECTIVE DISORDER, BIPOLAR TYPE (H): ICD-10-CM

## 2021-11-01 DIAGNOSIS — R46.89 AGGRESSION: ICD-10-CM

## 2021-11-01 PROCEDURE — 99214 OFFICE O/P EST MOD 30 MIN: CPT | Mod: GC | Performed by: STUDENT IN AN ORGANIZED HEALTH CARE EDUCATION/TRAINING PROGRAM

## 2021-11-01 NOTE — PROGRESS NOTES
"Video- Visit Details  Type of service:  video visit for medication management  Time of service:    Date:  11/01/2021  Video Start Time:  2:30PM     Video End Time: 3PM    Reason for video visit:  Patient unable to travel due to Covid-19  Originating Site (patient location):  [facility] [place]   Location- senior living  Distant Site (provider location):  [facility]  Mode of Communication:  Video Conference via AmWell  Consent:  Patient has given verbal consent for video visit?: Yes        [facility]  Psychiatry Clinic  MEDICAL PROGRESS NOTE     CARE TEAM:  PCP- Suzie Mariscal   Specialty Providers- none      Therapist- none     Community  Team- [facility] worker, CM, group home staff.   CADI : Charmaine Isabel [phone].   Mental Health : Bre [phone]        Guardian: Mary \"Swetha\" Chriss (mother)- [phone] (okay to leave message) or [phone]  penitentiary, [facility] 1: [phone], [phone]. , : [phone].     Karolyn Banerjee is a 35 year old patient who uses the name Karolyn and pronouns she, her, hers     DIAGNOSIS     Schizoaffective Disorder, bipolar type, most recent depressed  Unspecified Neurocognitive Disorder     ASSESSMENT     Karolyn reports less daytime sedation. She had a recent ED visit which she describes as a situation where she dismissed it as a \"stupid situation\" - notes suggest she was dysregulated at her group home and tried to run into traffic, however patient does not endorse that at this time.  worker did not have any concerns. Karolyn denied any SI/SIB or HI - she had no safety concerns. No medication changes were made at this time.     [place]MP review was not needed today.     PLAN                                                                                                                1) Meds-  - Continue lurasidone 160 mg daily w/dinner  - Continue perphenazine 14 mg qAM and " 16 mg QHS  - Continue bupropion  mg daily  - Continue benztropine 1.5 mg at 5 PM    2) Psychotherapy- no current therapist    3) Next due-  Labs- AP labs due 9/2022 - will need to fax order to group home owner (NK)   EKG- last 1/2019 Qtc 428, recheck as needed   Rating scales- AIMS: determine next due (11/2020) - will do at next in-person visit    4) Referrals-  None    5) Dispo- RTC 4-6 weeks     PERTINENT BACKGROUND                           [most recent eval 08/15/21]     This patient first experienced mental health issues as a child and has received treatment for schizoaffective disorder, bipolar type, borderline personality disorder, and unspecified neurocognitive disorder. Notably, the patient's symptoms have historically included psychosis (auditory hallucinations, delusions related to pregnancy, agitation) and suicide attempts/threats to run into traffic or drown herself in a pond, often in reaction to perceived slights or difficult interactions with group home staff/residents. She has been tried on many different medication trials in the past, including two trials of clozapine that caused neutropenia. ECT along with medication has been the most helpful treatment for maintaining stability. ECT with Dr. Amos was discontinued in fall 2017 after patient remained relatively stable without it for 3 months (last hospitalization February 2018). Her mother is her guardian. Karolyn fell and broke her leg in 12/2018 which resulted in multiple surgeries, a long transitional care stay, and ongoing use of a walker.      Psych pertinent item history includes suicide attempt [multiple], suicidal ideation, SIB [per chart], psychosis [sxs include auditory hallucinations, delusions], mutiple psychotropic trials , severe med reaction, violent behavior, psych hosp (>5), commitment and ECT     SUBJECTIVE   - Karolyn states her mood has been okay, states she tried running out of her group home and tripped - which is why she  "had to go to the ED, says it was \"stupid\" - reports she does not have safety concerns, denies SI/SIB or HI  - States she is less sleepy during the day - states she used to take 2 naps and now will take 1 nape when she is bored  - Reports a group home resident is leaving soon and will miss this person  - Denies any physical health concerns, denies any side effects to medications    Called patient's guardian, Swetha Chriss (pt's mother). Left voicemail that no changes were made.     RECENT PSYCH ROS:   Depression:  low energy and hypersomnia  Elevated:  none  Psychosis:  auditory hallucinations  Anxiety:  none  Trauma Related:  none  Sleep: no difficulty with night sleep, excessive daytime sleepiness  Other: N/A    Adverse Effects: hypersomnolence  Pertinent Negative Symptoms: No suicidal ideation, self-injurious behavior/urges, violent ideation or aggression  Recent Substance Use:     None reported     FAMILY and SOCIAL HISTORY                                 pt reported     Family Hx: Per chart review pts father with mild developmental disorder, she has an uncle with schizophrenia. One of her brothers  by suicide in 2017.    Social Hx:  Financial/ Work- social security disability       Partner/ - none  Children- none      Living situation- Lives at group home - [facility]         Social/ Spiritual Support- boyfriend Remy, family members, mental health support staff. Before pandemic, attended [employer] with her parents and attended [facility] in [place] twice a week.     Legal- no    Feels Safe at Home- yes   Legal- None     Trauma History (self-report)- unknown and None  Early History/Education- Per chart review, patient was born in [place], moved to [place] when she was a baby due to her asthma, lived in [place] until starting living in group homes, completed  in [place]; has 8 brothers and 2 sisters--she is second oldest. One of her brothers  by suicide in " September 2017. Closest with sister, Opal.      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 (not eligible for retrial)     olanzapine         quetiapine         ziprasidone         risperidone   dystonia     aripiprazole   ineffective     haloperidol   Developed parkinsonism     divalproex         sertraline         venlafaxine            MEDICAL HISTORY and ALLERGY     ALLERGIES: Clozapine, Liquid adhesive, Risperdal, and Adhesive tape    Patient Active Problem List   Diagnosis     Mild cognitive impairment     Borderline personality disorder (H)     Asthma     Nonorganic enuresis     Schizoaffective disorder, depressive type (H)     Fx humerus shaft-closed     Port catheter in place     MENTAL HEALTH     Suicidal ideation     Hx of psychiatric care     DVT (deep vein thrombosis) in pregnancy     Suicide attempt (H)     Suicidal ideations     JOSE (obstructive sleep apnea)     Closed fibular fracture     Tibial plateau fracture     Morbid obesity (H)      MEDICAL REVIEW OF SYSTEMS   Contraception- did not discuss    HEENT: no headache, no dizziness  RESPIRATORY: no shortness of breath  CARDIOVASCULAR: no palpitations, no racing heart  GI: no diarrhea, no nausea or vomiting    The remainder of the review of systems is noncontributory     MEDICATIONS     Current Outpatient Medications   Medication Sig Dispense Refill     ACETAMINOPHEN PO Take 650 mg by mouth every 4 hours as needed        benztropine (COGENTIN) 0.5 MG tablet Take 3 tablets (1.5 mg) by mouth every evening For more refills,schedule an appointment at [phone] 90 tablet 0     buPROPion (WELLBUTRIN XL) 150 MG 24 hr tablet Take 3 tablets (450 mg) by mouth every morning For more refills,schedule an appointment at [phone] 90 tablet 0     cetirizine  (ZYRTEC) 10 MG tablet Take 10 mg by mouth daily       lurasidone (LATUDA) 80 MG TABS tablet Take 2 tablets by mouth daily with dinner. For more refills,schedule an avpr-[phone] 60 tablet 0     norethindrone-ethinyl estradiol (JUNEL FE 1/20) 1-20 MG-MCG tablet Take 1 tablet by mouth       norethindrone-ethinyl estradiol (MICROGESTIN 1/20) 1-20 MG-MCG per tablet Take 1 tablet by mouth daily        Omega-3 Fatty Acids (FISH OIL PO) Take 1,000 mg by mouth daily HOLD WHILE IN TCU       perphenazine 16 MG tablet Take 1 tablet (16 mg) by mouth At Bedtime For more refills,schedule an appointment at [phone] 30 tablet 0     perphenazine 2 MG tablet Take 1 tablet (2 mg) by mouth every morning *SCHEDULE APPT. FOR REFILLS [phone] 30 tablet 0     perphenazine 8 MG tablet Take 1.5 tablets (12 mg) by mouth every morning With 2mg for morning total of 14mg    For more refills,schedule an appt at [phone] 45 tablet 0     senna-docusate (STOOL SOFTENER/LAXATIVE) 8.6-50 MG tablet Take 8.6 mg by mouth       sennosides (SENOKOT) 8.6 MG tablet Take 1 tablet by mouth 2 times daily as needed         VITALS   There were no vitals taken for this visit.    MENTAL STATUS EXAM     Alertness: alert  and oriented  Appearance: adequately groomed  Behavior/Demeanor: cooperative, pleasant and calm, with fair  eye contact   Speech: normal  Language: no obvious problem  Psychomotor: normal or unremarkable  Mood: description consistent with euthymia  Affect: blunted; congruent to: mood- yes, content- yes  Thought Process/Associations: unremarkable  Thought Content:  Reports none;  Denies suicidal & violent ideation and delusions  Perception:  Reports auditory hallucinations;  Denies visual hallucinations  Insight: fair  Judgment: fair  Cognition: does  appear grossly intact; formal cognitive testing was not done  Gait and Station: N/A ([facility])     LABS and DATA     PHQ9 TODAY = NA  PHQ 8/31/2018 11/2/2018 11/6/2019   PHQ-9 Total  Score 5 6 3   Q9: Thoughts of better off dead/self-harm past 2 weeks Several days Several days Several days     Recent Labs   Lab Test 06/12/19  0000 01/21/19  0719   CR 0.84 0.68   GFRESTIMATED >90 >90     Recent Labs   Lab Test 06/12/19  0000 12/21/18  0720   AST 12 21   ALT 20 23   ALKPHOS 67 74     From Care Everywhere  9/17/2021      WHITE BLOOD COUNT  8.1   RED BLOOD COUNT  4.98   HEMOGLOBIN  15.0   HEMATOCRIT  44.5   MCV  89   MCH  30.1   MCHC  33.7   RDW  12.6   PLATELET COUNT   221   MPV  9.8   NEUTROPHILS   66.0   LYMPHOCYTES  21.9   MONOCYTES   8.5   EOSINOPHILS   3.1   BASOPHILS  0.5   ABSOLUTE NEUTROPHILS   5.4   ABSOLUTE LYMPHOCYTES   1.8   ABSOLUTE MONOCYTES   0.7   ABSOLUTE EOSINOPHILS  0.3    ABSOLUTE BASOPHILS  0.0           SODIUM   140   POTASSIUM    4.2   CHLORIDE 106   CO2,TOTAL 23   ANION GAP  11   GLUCOSE 97    CALCIUM  8.9   BUN  13       CREATININE 0.77   BUN/CREAT RATIO 17     CHOLESTEROL,TOTAL 195  TRIGLYCERIDES   143  HDL CHOLESTEROL  52  NON-HDL CHOLESTEROL 143  CHOL/HDL RATIO 3.75             LDL CHOLESTEROL 113      ECG 2008 QTc = 447 ms     PSYCHOTROPIC DRUG INTERACTIONS     CETIRIZINE and PERPHENAZINE may result in increased risk of CNS depression.   PERPHENAZINE and BENZTROPINE may result in decreased phenothiazine serum concentrations, decreased phenothiazine effectiveness, enhanced anticholinergic effects (ileus, hyperpyrexia, sedation, dry mouth).     MANAGEMENT:  Monitoring for adverse effects     RISK STATEMENT for SAFETY     Karolyn Banerjee did not appear to be an imminent safety risk to self or others.    TREATMENT RISK STATEMENT: The risks, benefits, alternatives and potential adverse effects have been discussed and are understood by the pt. The pt understands the risks of using street drugs or alcohol. There are no medical contraindications, the pt agrees to treatment with the ability to do so. The pt knows to call the clinic for any problems or to access emergency care if  needed.  Medical and substance use concerns are documented above.  Psychotropic drug interaction check was done, including changes made today.     PROVIDER: Celine Pina DO, MPH    Patient staffed with Dr. Kerr who will sign the note.  Supervisor is Dr. Tousasint.      TELEHEALTH ATTENDING ATTESTATION  Following the ACGME guidelines on telemedicine and direct supervision due to COVID-19, I was concurrently participating in and/or monitoring the patient care through appropriate telecommunication technology.  I discussed the key portions of the service with the resident, including the mental status examination and developing the plan of care. I reviewed key portions of the history with the resident. I agree with the findings and plan as documented in this note.   Freddy Kerr MD

## 2021-11-01 NOTE — PROGRESS NOTES
"VIDEO VISIT  Karolyn Banerjee is a 36 year old patient that has consented to receive services via billable video visit.      The patient has been notified of following:   \"This video visit will be conducted via a call between you and your physician/provider. We have found that certain health care needs can be provided without the need for an in-person physical exam. This service lets us provide the care you need with a video conversation. If a prescription is necessary we can send it directly to your pharmacy. If lab work is needed we can place an order for that and you can then stop by our lab to have the test done at a later time. Insurers are generally covering virtual visits as they would in-office visits so billing should not be different than normal.  If for some reason you do get billed incorrectly, you should contact the billing office to correct it and that number is in the AVS .    Patient will join video visit via:  email invite was sent ([employer] is preferred, but patient is unable to join via [employer])    If patient attempts to join the video via [employer] at appointment start time, but is unable to, they would prefer that the provider send them a video invitation via:   Send to preferred e-mail: [email or web address]      How would patient like to obtain AVS?:  MyChart    "

## 2021-11-01 NOTE — PATIENT INSTRUCTIONS
**For crisis resources, please see the information at the end of this document**     Patient Education      Thank you for coming to the [facility] [place] [facility].    Lab Testing:  If you had lab testing today and your results are reassuring or normal they will be mailed to you or sent through [employer] within 7 days. If the lab tests need quick action we will call you with the results. The phone number we will call with results is # [phone] (home) . If this is not the best number please call our clinic and change the number.    Medication Refills:  If you need any refills please call your pharmacy and they will contact us. Our fax number for refills is [phone]. Please allow three business for refill processing. If you need to  your refill at a new pharmacy, please contact the new pharmacy directly. The new pharmacy will help you get your medications transferred.     Scheduling:  If you have any concerns about today's visit or wish to schedule another appointment please call our office during normal business hours [phone] (8-5:00 M-F)    Contact Us:  Please call [phone] during business hours (8-5:00 M-F).  If after clinic hours, or on the weekend, please call  [phone].    Financial Assistance [phone]  [facility] Billing [phone]  Central Billing Office, [facility]: [phone]  [facility] Billing [phone]  Medical Records [phone]  [facility] Patient Bill of Rights [email or web address]       MENTAL HEALTH CRISIS NUMBERS:  For a medical emergency please call  911 or go to the nearest ER.     [facility]:   [facility] -[phone]   Crisis Residence [facility] Residence -[phone]   Walk-In Counseling Center [facility] -[phone]   COPE 24/7 [place] Mobile Team -[phone] (adults)/[phone] (child)  CHILD: [facility] needs assessment  team - [phone]      [facility]:   [facility] - [phone]   Walk-in counseling [facility] - [phone]   Walk-in counseling [facility] - [phone]   Crisis Residence [facility] - [phone]  Urgent Care Adult Mental [place]-[phone] mobile unit/ 24/7 crisis line    National Crisis Numbers:   National Suicide Prevention Lifeline: 8-282-128-TALK (164-789-6351)  Poison Control Center - 8-536-506-7290  KUBOO/resources for a list of additional resources (SOS)  Trans Lifeline a hotline for transgender people 1-959.870.6969  The Ra Project a hotline for LGBT youth 8-841-412-1688  Crisis Text Line: For any crisis 24/7   To: 038590  see www.crisistextline.org  - IF MAKING A CALL FEELS TOO HARD, send a text!         Again thank you for choosing [facility] M[facility] & ADDICTION [facility] and please let us know how we can best partner with you to improve you and your family's health.    You may be receiving a survey regarding this appointment. We would love to have your feedback, both positive and negative. The survey is done by an external company, so your answers are anonymous.

## 2021-11-09 RX ORDER — BUPROPION HYDROCHLORIDE 150 MG/1
450 TABLET ORAL EVERY MORNING
Qty: 90 TABLET | Refills: 1 | Status: SHIPPED | OUTPATIENT
Start: 2021-11-09 | End: 2021-12-14

## 2021-11-09 RX ORDER — BENZTROPINE MESYLATE 0.5 MG/1
1.5 TABLET ORAL EVERY EVENING
Qty: 90 TABLET | Refills: 1 | Status: SHIPPED | OUTPATIENT
Start: 2021-11-09 | End: 2021-12-14

## 2021-11-09 RX ORDER — PERPHENAZINE 16 MG/1
16 TABLET ORAL AT BEDTIME
Qty: 30 TABLET | Refills: 1 | Status: SHIPPED | OUTPATIENT
Start: 2021-11-09 | End: 2021-12-14

## 2021-11-09 RX ORDER — PERPHENAZINE 8 MG/1
12 TABLET ORAL EVERY MORNING
Qty: 45 TABLET | Refills: 1 | Status: SHIPPED | OUTPATIENT
Start: 2021-11-09 | End: 2021-12-14

## 2021-11-09 RX ORDER — LURASIDONE HYDROCHLORIDE 80 MG/1
TABLET, FILM COATED ORAL
Qty: 60 TABLET | Refills: 1 | Status: SHIPPED | OUTPATIENT
Start: 2021-11-09 | End: 2021-12-14

## 2021-11-09 RX ORDER — PERPHENAZINE 2 MG/1
2 TABLET ORAL EVERY MORNING
Qty: 30 TABLET | Refills: 1 | Status: SHIPPED | OUTPATIENT
Start: 2021-11-09 | End: 2021-12-14 | Stop reason: DRUGHIGH

## 2021-12-13 ENCOUNTER — VIRTUAL VISIT (OUTPATIENT)
Dept: PSYCHIATRY | Facility: CLINIC | Age: 36
End: 2021-12-13
Attending: PSYCHIATRY & NEUROLOGY
Payer: MEDICAID

## 2021-12-13 DIAGNOSIS — R46.89 AGGRESSION: ICD-10-CM

## 2021-12-13 DIAGNOSIS — F25.0 SCHIZOAFFECTIVE DISORDER, BIPOLAR TYPE (H): ICD-10-CM

## 2021-12-13 PROCEDURE — 99214 OFFICE O/P EST MOD 30 MIN: CPT | Mod: 95 | Performed by: STUDENT IN AN ORGANIZED HEALTH CARE EDUCATION/TRAINING PROGRAM

## 2021-12-13 ASSESSMENT — PAIN SCALES - GENERAL: PAINLEVEL: NO PAIN (0)

## 2021-12-13 NOTE — PROGRESS NOTES
"Video- Visit Details  Type of service:  video visit for medication management  Time of service:    Date:  12/13/2021  Video Start Time:  10AM     Video End Time: 10:30AM    Reason for video visit:  Patient unable to travel due to Covid-19  Originating Site (patient location):  [facility] [place]   Location- snf  Distant Site (provider location):  [facility]  Mode of Communication:  Video Conference via [employer]  Consent:  Patient has given verbal consent for video visit?: Yes        [facility]  Psychiatry Clinic  MEDICAL PROGRESS NOTE     CARE TEAM:  PCP- Suzie Mariscal   Specialty Providers- none      Therapist- none     Community  Team- formerly [facility] worker, CM, group home staff.   CADI : Charmaine Isabel [phone].   Mental Health : Bre [phone]        Guardian: Mary \"Swetha\" Chriss (mother)- [phone] (okay to leave message) or [phone]  [facility], [facility]: [phone], [phone]. , : [phone].     Karolyn Banerjee is a 35 year old patient who uses the name Karolyn and pronouns she, her, hers     DIAGNOSIS     Schizoaffective Disorder, bipolar type, most recent depressed  Unspecified Neurocognitive Disorder     ASSESSMENT     Karolyn reports less daytime sedation. She had a recent ED visit which she describes as a situation where she dismissed it as a \"stupid situation\" - notes suggest she was dysregulated at her group home and tried to run into traffic, however patient does not endorse that at this time.  worker did not have any current concerns. Karolyn denied any SI/SIB or HI - she had no safety concerns. No medication changes were made at this time.     [place]MP review was not needed today.     PLAN                                                                                                                1) Meds-  - Continue lurasidone 160 mg daily w/dinner  - Decrease to perphenazine 12 " mg qAM and 16 mg QHS  - Continue bupropion  mg daily  - Continue benztropine 1.5 mg at 5 PM    2) Psychotherapy- no current therapist    3) Next due-  Labs- AP labs due 9/2022 - will need to fax order to group home owner (NK)   EKG- last 1/2019 Qtc 428, recheck as needed   Rating scales- AIMS: determine next due (11/2020) - will do at next in-person visit    4) Referrals-  None    5) Dispo- RTC 4-6 weeks     PERTINENT BACKGROUND                           [most recent eval 08/15/21]     This patient first experienced mental health issues as a child and has received treatment for schizoaffective disorder, bipolar type, borderline personality disorder, and unspecified neurocognitive disorder. Notably, the patient's symptoms have historically included psychosis (auditory hallucinations, delusions related to pregnancy, agitation) and suicide attempts/threats to run into traffic or drown herself in a pond, often in reaction to perceived slights or difficult interactions with group home staff/residents. She has been tried on many different medication trials in the past, including two trials of clozapine that caused neutropenia. ECT along with medication has been the most helpful treatment for maintaining stability. ECT with Dr. Amos was discontinued in fall 2017 after patient remained relatively stable without it for 3 months (last hospitalization February 2018). Her mother is her guardian. Karolyn fell and broke her leg in 12/2018 which resulted in multiple surgeries, a long transitional care stay, and ongoing use of a walker.      Psych pertinent item history includes suicide attempt [multiple], suicidal ideation, SIB [per chart], psychosis [sxs include auditory hallucinations, delusions], mutiple psychotropic trials , severe med reaction, violent behavior, psych hosp (>5), commitment and ECT     SUBJECTIVE   - Karolyn states her mood has been okay - says things are fine in her group home  - States she is still  "sleepy during the day, says she naps often  - Per patient's  staff member, Luz, naps multiple times a day - will have breakfast, then nap again and may sleep later in the afternoon  - Karolyn reports that she does hear AH when listening to music - reports \"It's the voice of the mohsen that likes me at [employer]\" says he wants to date her but said \"you have to lose weight first\"  - Patient states she gained weight when she had 3 babies but reports she is not currently pregnant  - Asked patient if she would be in agreement of decreasing her perphenazine if her mother was okay with this - patient agreed  - Karolyn denies any SI/SIB or HI -  staff had no safety concerns    Called patient's guardian, Swetha Banerjee (pt's mother) to discuss further decreasing perphenazine due to daytime sedation  - Swetha states patient comes home on Sundays and usually does nap multiple times in the day  - Says when they watch movies, patient will fall asleep  - States patient does talk about \"guys at [employer]\" who like her - states sometimes she will go up to people and say \"I know you like me\" however this has improved from before  - Reports that patient will bring up that she was pregnant but isn't as fixated on this delusion as she was in the past - says Karolyn has another GH hosuemate that has similar delusions, but this has not worsened patient's fixation on this delusion  - Discussed with Swetha the option of further decreasing perphenazine to 12 mg qAM and 16 mg at bedtime to decrease daytime sedation, also counseled that this could lead to worsening hallucinations and delusions and if that is evident, to reach out to the clinic - Swetha agreed to the decrease and medication and expressed understanding that she will reach out if she notices worsening symptoms    Called CALDERON,  manager:  - Discussed medication change, advised NK to reach out if patient has worsening AH and delusions about pregnancy - she agreed to this plan    RECENT " PSYCH ROS:   Depression:  low energy and hypersomnia  Elevated:  none  Psychosis:  auditory hallucinations, delusions about previous pregnancy  Anxiety:  none  Trauma Related:  none  Sleep: no difficulty with night sleep, excessive daytime sleepiness  Other: N/A    Adverse Effects: hypersomnolence  Pertinent Negative Symptoms: No suicidal ideation, self-injurious behavior/urges, violent ideation or aggression  Recent Substance Use:     None reported     FAMILY and SOCIAL HISTORY                                 pt reported     Family Hx: Per chart review pts father with mild developmental disorder, she has an uncle with schizophrenia. One of her brothers  by suicide in 2017.    Social Hx:  Financial/ Work- social security disability       Partner/ - none  Children- none      Living situation- Lives at group home - [facility]         Social/ Spiritual Support- boyfriend Remy, family members, mental health support staff. Before pandemic, attended [employer] with her parents and attended [facility] [employer] day program in [place] twice a week.     Legal- no    Feels Safe at Home- yes   Legal- None        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 (not eligible for retrial)     olanzapine         quetiapine         ziprasidone         risperidone   dystonia     aripiprazole   ineffective     haloperidol   Developed parkinsonism     divalproex         sertraline         venlafaxine            MEDICAL HISTORY and ALLERGY     ALLERGIES: Clozapine, Liquid adhesive, Risperdal, and Adhesive tape    Patient Active Problem List   Diagnosis     Mild cognitive impairment     Borderline personality disorder (H)     Asthma     Nonorganic enuresis     Schizoaffective disorder, depressive type (H)     Fx  humerus shaft-closed     Port catheter in place     MENTAL HEALTH     Suicidal ideation     Hx of psychiatric care     DVT (deep vein thrombosis) in pregnancy     Suicide attempt (H)     Suicidal ideations     JOSE (obstructive sleep apnea)     Closed fibular fracture     Tibial plateau fracture     Morbid obesity (H)      MEDICAL REVIEW OF SYSTEMS   Contraception- oral contraception    HEENT: no headache, no dizziness  RESPIRATORY: no shortness of breath  CARDIOVASCULAR: no palpitations, no racing heart  GI: no diarrhea, no nausea or vomiting    The remainder of the review of systems is noncontributory     MEDICATIONS     Current Outpatient Medications   Medication Sig Dispense Refill     ACETAMINOPHEN PO Take 650 mg by mouth every 4 hours as needed        benztropine (COGENTIN) 0.5 MG tablet Take 3 tablets (1.5 mg) by mouth every evening 90 tablet 1     buPROPion (WELLBUTRIN XL) 150 MG 24 hr tablet Take 3 tablets (450 mg) by mouth every morning 90 tablet 1     cetirizine (ZYRTEC) 10 MG tablet Take 10 mg by mouth daily       lurasidone (LATUDA) 80 MG TABS tablet Take 2 tablets by mouth daily with dinner. 60 tablet 1     norethindrone-ethinyl estradiol (JUNEL FE 1/20) 1-20 MG-MCG tablet Take 1 tablet by mouth       norethindrone-ethinyl estradiol (MICROGESTIN 1/20) 1-20 MG-MCG per tablet Take 1 tablet by mouth daily        Omega-3 Fatty Acids (FISH OIL PO) Take 1,000 mg by mouth daily HOLD WHILE IN TCU       perphenazine 16 MG tablet Take 1 tablet (16 mg) by mouth At Bedtime 30 tablet 1     perphenazine 2 MG tablet Take 1 tablet (2 mg) by mouth every morning 30 tablet 1     perphenazine 8 MG tablet Take 1.5 tablets (12 mg) by mouth every morning With 2mg for morning total of 14mg 45 tablet 1     senna-docusate (STOOL SOFTENER/LAXATIVE) 8.6-50 MG tablet Take 8.6 mg by mouth       sennosides (SENOKOT) 8.6 MG tablet Take 1 tablet by mouth 2 times daily as needed         VITALS   There were no vitals taken for this  visit.    MENTAL STATUS EXAM     Alertness: alert  and oriented  Appearance: adequately groomed  Behavior/Demeanor: cooperative, pleasant and calm, with fair  eye contact   Speech: normal  Language: no obvious problem  Psychomotor: normal or unremarkable  Mood: description consistent with euthymia  Affect: blunted; congruent to: mood- yes, content- yes  Thought Process/Associations: unremarkable  Thought Content:  Reports delusions [details in history];  Denies suicidal & violent ideation and delusions  Perception:  Reports auditory hallucinations;  Denies visual hallucinations  Insight: fair  Judgment: fair  Cognition: does  appear grossly intact; formal cognitive testing was not done  Gait and Station: N/A (telehealth)     LABS and DATA     PHQ9 TODAY = NA  PHQ 8/31/2018 11/2/2018 11/6/2019   PHQ-9 Total Score 5 6 3   Q9: Thoughts of better off dead/self-harm past 2 weeks Several days Several days Several days     Recent Labs   Lab Test 06/12/19  0000 01/21/19  0719   CR 0.84 0.68   GFRESTIMATED >90 >90     Recent Labs   Lab Test 06/12/19  0000 12/21/18  0720   AST 12 21   ALT 20 23   ALKPHOS 67 74     From Care Everywhere  9/17/2021      WHITE BLOOD COUNT  8.1   RED BLOOD COUNT  4.98   HEMOGLOBIN  15.0   HEMATOCRIT  44.5   MCV  89   MCH  30.1   MCHC  33.7   RDW  12.6   PLATELET COUNT   221   MPV  9.8   NEUTROPHILS   66.0   LYMPHOCYTES  21.9   MONOCYTES   8.5   EOSINOPHILS   3.1   BASOPHILS  0.5   ABSOLUTE NEUTROPHILS   5.4   ABSOLUTE LYMPHOCYTES   1.8   ABSOLUTE MONOCYTES   0.7   ABSOLUTE EOSINOPHILS  0.3    ABSOLUTE BASOPHILS  0.0           SODIUM   140   POTASSIUM    4.2   CHLORIDE 106   CO2,TOTAL 23   ANION GAP  11   GLUCOSE 97    CALCIUM  8.9   BUN  13       CREATININE 0.77   BUN/CREAT RATIO 17     CHOLESTEROL,TOTAL 195  TRIGLYCERIDES   143  HDL CHOLESTEROL  52  NON-HDL CHOLESTEROL 143  CHOL/HDL RATIO 3.75             LDL CHOLESTEROL 113      ECG 2008 QTc = 447 ms     PSYCHOTROPIC DRUG INTERACTIONS      CETIRIZINE and PERPHENAZINE may result in increased risk of CNS depression.   PERPHENAZINE and BENZTROPINE may result in decreased phenothiazine serum concentrations, decreased phenothiazine effectiveness, enhanced anticholinergic effects (ileus, hyperpyrexia, sedation, dry mouth).     MANAGEMENT:  Monitoring for adverse effects     RISK STATEMENT for SAFETY     Karolyn Banerjee did not appear to be an imminent safety risk to self or others.    TREATMENT RISK STATEMENT: The risks, benefits, alternatives and potential adverse effects have been discussed and are understood by the pt. The pt understands the risks of using street drugs or alcohol. There are no medical contraindications, the pt agrees to treatment with the ability to do so. The pt knows to call the clinic for any problems or to access emergency care if needed.  Medical and substance use concerns are documented above.  Psychotropic drug interaction check was done, including changes made today.     PROVIDER: Celine Pina DO, MPH    Patient staffed with Dr. Rodgers who will sign the note.  Supervisor is Dr. Toussaint.      TELEHEALTH ATTENDING ATTESTATION  Following the ACGME guidelines on telemedicine and direct supervision due to COVID-19, I was concurrently participating in and/or monitoring the patient care through appropriate telecommunication technology.  I discussed the key portions of the service with the resident, including the mental status examination and developing the plan of care. I reviewed key portions of the history with the resident. I agree with the findings and plan as documented in this note.   Loyd Rodgers MD

## 2021-12-13 NOTE — PROGRESS NOTES
"VIDEO VISIT  Karolyn Banerjee is a 36 year old patient that has consented to receive services via billable video visit.      The patient has been notified of following:   \"This video visit will be conducted via a call between you and your physician/provider. We have found that certain health care needs can be provided without the need for an in-person physical exam. This service lets us provide the care you need with a video conversation. If a prescription is necessary we can send it directly to your pharmacy. If lab work is needed we can place an order for that and you can then stop by our lab to have the test done at a later time. Insurers are generally covering virtual visits as they would in-office visits so billing should not be different than normal.  If for some reason you do get billed incorrectly, you should contact the billing office to correct it and that number is in the AVS .    Patient will join video visit via:  email invite was sent ([employer] is preferred, but patient is unable to join via [employer]    If patient attempts to join the video via [employer] at appointment start time, but is unable to, they would prefer that the provider send them a video invitation via:   Send to preferred e-mail: [email or web address]      How would patient like to obtain AVS?:  MyChart    "

## 2021-12-14 RX ORDER — BENZTROPINE MESYLATE 0.5 MG/1
1.5 TABLET ORAL EVERY EVENING
Qty: 90 TABLET | Refills: 1 | Status: SHIPPED | OUTPATIENT
Start: 2021-12-14 | End: 2022-03-09

## 2021-12-14 RX ORDER — BUPROPION HYDROCHLORIDE 150 MG/1
450 TABLET ORAL EVERY MORNING
Qty: 90 TABLET | Refills: 1 | Status: SHIPPED | OUTPATIENT
Start: 2021-12-14 | End: 2022-03-09

## 2021-12-14 RX ORDER — PERPHENAZINE 16 MG/1
16 TABLET ORAL AT BEDTIME
Qty: 30 TABLET | Refills: 1 | Status: SHIPPED | OUTPATIENT
Start: 2021-12-14 | End: 2022-03-09

## 2021-12-14 RX ORDER — PERPHENAZINE 8 MG/1
12 TABLET ORAL EVERY MORNING
Qty: 45 TABLET | Refills: 1 | Status: SHIPPED | OUTPATIENT
Start: 2021-12-14 | End: 2022-03-09

## 2021-12-14 RX ORDER — LURASIDONE HYDROCHLORIDE 80 MG/1
TABLET, FILM COATED ORAL
Qty: 60 TABLET | Refills: 1 | Status: SHIPPED | OUTPATIENT
Start: 2021-12-14 | End: 2022-03-09

## 2022-01-01 NOTE — PROGRESS NOTES
Orthopedic Surgery  Karolyn Banerjee  1/15/2019  Admit Date:  2019  POD # 1  S/P Right tibial plateau fracture ORIF revision    Patient resting comfortably in bed.    Pain controlled.  Tolerating oral intake.    Denies nausea or vomiting  Denies chest pain or shortness of breath  No events overnight.     Alert and orient to person, place, and time.  Vital Sign Ranges  Temperature Temp  Av.4  F (36.3  C)  Min: 96.9  F (36.1  C)  Max: 97.8  F (36.6  C)   Blood pressure Systolic (24hrs), Av , Min:138 , Max:168        Diastolic (24hrs), Av, Min:74, Max:103      Pulse Pulse  Av.1  Min: 69  Max: 81   Respirations Resp  Avg: 15.8  Min: 11  Max: 18   Pulse oximetry SpO2  Av.1 %  Min: 91 %  Max: 100 %       Dressing/ACE is clean, dry, and intact. TROM brace locked at 45 degrees  Minimal erythema of the surrounding skin.   Bilateral calves are soft, non-tender.  Bilateral lower extremity is NVI.  Sensation intact bilateral lower extremities  Unable to dorsi flex right ankle.  Able to plantar flex and flex/extend toes.  +Dp pulse    Labs:  Recent Labs   Lab Test 18  0628 18  0642 18  0720   POTASSIUM 3.9 4.2 4.2     Recent Labs   Lab Test 18  0628 18  0642 18  0720   HGB 11.4* 12.8 13.6     Recent Labs   Lab Test 18  1749 16  1716 16  0639   INR 1.02 2.97* 1.86*     Recent Labs   Lab Test 19  1654 18  0628 18  0642    126* 152       A/P  1. Plan   Continue Lovenox for DVT prophylaxis.     Mobilize with PT/OT, May transfer bed to chair, otherwise wheelchair/Bed   Non-WB right LE.   Keep TROM bace locked at 45 degrees     Continue current pain regiment.   Leave dressing intact   Will order wheelchair for discharge as well    2. Disposition   Anticipate d/c to TCU based on bed placement - tomorrow?.    Anat Cuello PA-C   (2) well flexed

## 2022-01-06 NOTE — DISCHARGE INSTRUCTIONS
66 YO female with no PMH who was admitted to [facility] on 12/11/21  with c/o HA/vomiting since 12/10 found to have SAH c/b hydrocephalus. On 12/11 s/p coiling of L P comm aneurysm, found to have a partial left m2 thrombus now s/p thrombectomy with TICI 3 reperfusion. On 12/14,  s/p R EVD for hydro c/b sizable tract hemorrhage with IVH now with L EVD, removed on 12/29. 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 Patient now with gait Instability, ADL impairments and Functional impairments.    # SAH  - On 12/11 s/p coiling of L Pcomm aneurysm, found to have a partial left m2 thrombus now s/p thrombectomy with TICI 3 reperfusion  - s/p R EVD for hydro ON 12/14 c/b sizable tract hemorrhage with IVH now with L EVD, removed on 12/29.  - CTH report 1/6 reviewed and CTH reports from 12/30 and 1/2 reviewed (from [facility]), CTH findings stable.   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  - Apixaban 5 mg BID  - monitor H/H    #DVT ppx  not on chemical dvt ppx due to ICH  - SCD, TEDs    #GI ppx  - Protonix      IMPROVE VTE Individual Risk Assessment    RISK                                                                Points    [  ] Previous VTE                                                  3    [  ] Thrombophilia                                               2    [  ] Lower limb paralysis                                      2        (unable to hold up >15 seconds)      [  ] Current Cancer                                              2         (within 6 months)    [  ] Immobilization > 24 hrs                                1    [  ] ICU/CCU stay > 24 hours                              1    [x  ] Age > 60                                                      1    IMPROVE VTE Score ___1_____    IMPROVE Score 0-1: Low Risk, No VTE prophylaxis required for most patients, encourage ambulation.   IMPROVE Score 2-3: At risk, pharmacologic VTE prophylaxis is indicated for most patients (in the absence of a contraindication)  IMPROVE Score > or = 4: High Risk, pharmacologic VTE prophylaxis is indicated for most patients (in the absence of a contraindication) ECT Discharge Instructions      During your ECT series:      Do not drive or work heavy equipment until 7 days after your last treatment.    Do not drink alcohol or use street drugs (illicit drugs) while you are having treatments.    Do not make important decisions, including legal decisions.    After each treatment:      Get plenty of rest. A responsible adult must stay with your for at least 6 hours.    Avoid heavy or risky activities for 24 hours.    If you feel light-headed, sit for a few minutes before standing. Have someone help you get up.    If you have nausea (feel sick to your stomach): Drink only clear liquids such as apple juice, ginger ale, broth or 7UP, Be sure to drink plenty of liquids. Move to a normal diet as you feel able.     If you received Toradol, wait 6 hours before taking ibuprofen.    Call your doctor if:     You have a fever over 100F (37.7 C) (taken under the tongue), or a fever that last more than 24 hours.    Your IV site is red, swollen, very painful or is getting more tender.    You have nausea that gets very bad or does not improve.      If you have any symptoms after ECT, tell our staff before your next treatment.    The ECT Department can be reached at [phone].  The ECT Department is open Mondays, Wednesdays and Fridays from 7:00 AM to 2:00 PM.    Your next ECT treatment will be: in 6 weeks on Monday 4/17/17 at 9:45 AM    To speak to a doctor, call: Dr. Osiel Amos: Office # [phone] and fax # [phone]

## 2022-01-18 NOTE — PATIENT INSTRUCTIONS
-continue current medications no changes  -continue ECT with Dr. Amos  -get lab work completed in October (before next appointment) you will need to be fasting for this lab work (nothing by mouth except for water after midnight) and this can be completed at any [place] facility   -return to clinic in 2 months or sooner if needed     Vinicio Mcgarry

## 2022-03-15 ENCOUNTER — VIRTUAL VISIT (OUTPATIENT)
Dept: PSYCHIATRY | Facility: CLINIC | Age: 37
End: 2022-03-15
Attending: PSYCHIATRY & NEUROLOGY
Payer: MEDICAID

## 2022-03-15 DIAGNOSIS — F25.0 SCHIZOAFFECTIVE DISORDER, BIPOLAR TYPE (H): ICD-10-CM

## 2022-03-15 DIAGNOSIS — R46.89 AGGRESSION: ICD-10-CM

## 2022-03-15 PROCEDURE — 99443 PR PHYSICIAN TELEPHONE EVALUATION 21-30 MIN: CPT | Mod: GC | Performed by: STUDENT IN AN ORGANIZED HEALTH CARE EDUCATION/TRAINING PROGRAM

## 2022-03-15 RX ORDER — LURASIDONE HYDROCHLORIDE 80 MG/1
TABLET, FILM COATED ORAL
Qty: 60 TABLET | Refills: 2 | Status: SHIPPED | OUTPATIENT
Start: 2022-03-15 | End: 2022-06-17

## 2022-03-15 RX ORDER — PERPHENAZINE 16 MG/1
16 TABLET ORAL AT BEDTIME
Qty: 30 TABLET | Refills: 2 | Status: SHIPPED | OUTPATIENT
Start: 2022-03-15 | End: 2022-06-17

## 2022-03-15 RX ORDER — BUPROPION HYDROCHLORIDE 150 MG/1
450 TABLET ORAL EVERY MORNING
Qty: 90 TABLET | Refills: 2 | Status: SHIPPED | OUTPATIENT
Start: 2022-03-15 | End: 2022-06-17

## 2022-03-15 RX ORDER — PERPHENAZINE 8 MG/1
TABLET ORAL
Qty: 45 TABLET | Refills: 2 | Status: SHIPPED | OUTPATIENT
Start: 2022-03-15 | End: 2022-06-17

## 2022-03-15 RX ORDER — BENZTROPINE MESYLATE 0.5 MG/1
1.5 TABLET ORAL AT BEDTIME
Qty: 90 TABLET | Refills: 2 | Status: SHIPPED | OUTPATIENT
Start: 2022-03-15 | End: 2022-06-17

## 2022-03-15 NOTE — PROGRESS NOTES
Karolyn Banerjee is a 36 year old who has consented to receive services via billable phone visit.      What phone number would you prefer to be contacted at?: [phone]  How would you prefer to obtain AVS?: Mail a copy     Admission

## 2022-03-15 NOTE — PROGRESS NOTES
"TELEPHONE VISIT  Karolyn Banerjee is a 36 year old pt. who is being evaluated via a billable telephone visit.      The patient has been notified of the following:    We have found that certain health care needs can be provided without the need for a physical exam. This service lets us provide the care you need with a short phone conversation. If a prescription is necessary we can send it directly to your pharmacy. If lab work is needed we can place an order for that and you can then stop by our lab to have the test done at a later time. Insurers are generally covering virtual visits as they would in-office visits so billing should not be different than normal.  If for some reason you do get billed incorrectly, you should contact the billing office to correct it and that number is in the AVS .    Patient has given verbal consent for a telephone visit?:  Yes   How would the pt like to obtain the AVS?:  Fax to   AVS SmartPhrase [PsychAVS] has been placed in 'Patient Instructions':  Yes     Start Time:  10:45          End Time:  11:15         [facility]  Psychiatry Clinic  MEDICAL PROGRESS NOTE     CARE TEAM:  PCP- Suzie Mariscal   Specialty Providers- none      Therapist- none     Community  Team- ARM worker, , group home staff.   CADI : Charmaine Isabel [phone].   Mental Health : Bre [phone]        Guardian: Mary \"Swetha\" Chriss (mother)- [phone] (okay to leave message) or [phone]  custodial, [facility]: [phone], [phone]. , : [phone].     Karolyn Banerjee is a 35 year old patient who uses the name Karolyn and pronouns she, her, hers     DIAGNOSIS     Schizoaffective Disorder, bipolar type, most recent depressed, in remission re: mood and psychotic features  Unspecified Neurocognitive Disorder     ASSESSMENT     Karolyn has been stable since last visit. She has noticed decreased daytime " sedation since lowering her fluphenazine dose. She had no safety concerns.  staff and pt's mother, who is her legal guardian, also had no concerns.    [place] review was not needed today.     PLAN                                                                                                                1) Meds-   - Continue lurasidone 160 mg daily w/dinner  - Continue perphenazine 12 mg qAM and 16 mg QHS  - Continue bupropion  mg daily  - Continue benztropine 1.5 mg at 5 PM    2) Psychotherapy- no current therapist    3) Next due-  Labs- AP labs due 9/2022 - will need to fax order to group home owner (her name is N.K.)   EKG- last 1/2019 Qtc 428, recheck as needed   Rating scales- AIMS: determine next due (11/2020) - will do at next in-person visit    4) Referrals-  None    5) Dispo- RTC 4-6 weeks     PERTINENT BACKGROUND                           [most recent eval 08/15/21]     This patient first experienced mental health issues as a child and has received treatment for schizoaffective disorder, bipolar type, borderline personality disorder, and unspecified neurocognitive disorder. Notably, the patient's symptoms have historically included psychosis (auditory hallucinations, delusions related to pregnancy, agitation) and suicide attempts/threats to run into traffic or drown herself in a pond, often in reaction to perceived slights or difficult interactions with group home staff/residents. She has been tried on many different medication trials in the past, including two trials of clozapine that caused neutropenia. ECT along with medication has been the most helpful treatment for maintaining stability. ECT with Dr. Amos was discontinued in fall 2017 after patient remained relatively stable without it for 3 months (last hospitalization February 2018). Her mother is her guardian. Karolyn fell and broke her leg in 12/2018 which resulted in multiple surgeries, a long transitional care stay, and ongoing use of a  walker.      Psych pertinent item history includes suicide attempt [multiple], suicidal ideation, SIB [per chart], psychosis [sxs include auditory hallucinations, delusions], mutiple psychotropic trials , severe med reaction, violent behavior, psych hosp (>5), commitment and ECT     SUBJECTIVE   - Karolyn states she has been doing fine, says she continues to do crafts and play games with Isabella (another resident)  - Reports she is less sleepy - takes only 1 nap instead of 2 like she did before  - Says she feels safe in the group home and has no concerns  - States she has been having headaches for the last 2 days in the morning, but they go away - agrees to tell  staff  - Karolyn denies any SI/SIB or HI -  staff had no safety concerns    Called patient's guardian, Swetha Chriss (pt's mother):   - Swetha had no concerns today - reports Karolyn has been stable, says she has not had increased delusions since the medication change      RECENT PSYCH ROS:   Depression:  none  Elevated:  none  Psychosis:  none  Anxiety:  none  Trauma Related:  none  Sleep: no difficulty with night sleep, excessive daytime sleepiness  Other: N/A    Adverse Effects: hypersomnolence  Pertinent Negative Symptoms: No suicidal ideation, self-injurious behavior/urges, violent ideation or aggression  Recent Substance Use:     None reported     FAMILY and SOCIAL HISTORY                                 pt reported     Family Hx: Per chart review pts father with mild developmental disorder, she has an uncle with schizophrenia. One of her brothers  by suicide in 2017.    Social Hx:  Financial/ Work- social security disability       Partner/ - none  Children- none      Living situation- Lives at group home - [facility]         Social/ Spiritual Support- boyfriend Remy, family members, mental health support staff. Before pandemic, attended [employer] with her parents and attended [facility] day program in  [place] twice a week.     Legal- no    Feels Safe at Home- yes   Legal- None        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 (not eligible for retrial)     olanzapine         quetiapine         ziprasidone         risperidone   dystonia     aripiprazole   ineffective     haloperidol   Developed parkinsonism     divalproex         sertraline         venlafaxine            MEDICAL HISTORY and ALLERGY     ALLERGIES: Clozapine, Liquid adhesive, Risperdal, and Adhesive tape    Patient Active Problem List   Diagnosis     Mild cognitive impairment     Borderline personality disorder (H)     Asthma     Nonorganic enuresis     Schizoaffective disorder, depressive type (H)     Fx humerus shaft-closed     Port catheter in place     MENTAL HEALTH     Suicidal ideation     Hx of psychiatric care     DVT (deep vein thrombosis) in pregnancy     Suicide attempt (H)     Suicidal ideations     JOSE (obstructive sleep apnea)     Closed fibular fracture     Tibial plateau fracture     Morbid obesity (H)      MEDICAL REVIEW OF SYSTEMS   Contraception- oral contraception    HEENT: + headache, no dizziness  RESPIRATORY: no shortness of breath  CARDIOVASCULAR: no palpitations, no racing heart  GI: no diarrhea, no nausea or vomiting    The remainder of the review of systems is noncontributory     MEDICATIONS     Current Outpatient Medications   Medication Sig Dispense Refill     ACETAMINOPHEN PO Take 650 mg by mouth every 4 hours as needed        benztropine (COGENTIN) 0.5 MG tablet TAKE 3 TABLETS BY MOUTH EVERY EVENING 90 tablet 0     buPROPion (WELLBUTRIN XL) 150 MG 24 hr tablet TAKE 3 TABLETS (450MG) BY MOUTH DAILY IN THE MORNING 90 tablet 0     cetirizine (ZYRTEC) 10 MG tablet Take 10 mg by mouth daily       lurasidone (LATUDA) 80 MG TABS  tablet TAKE 2 TABLETS BY MOUTH DAILY WITH DINNER 60 tablet 0     norethindrone-ethinyl estradiol (JUNEL FE 1/20) 1-20 MG-MCG tablet Take 1 tablet by mouth       norethindrone-ethinyl estradiol (MICROGESTIN 1/20) 1-20 MG-MCG per tablet Take 1 tablet by mouth daily        Omega-3 Fatty Acids (FISH OIL PO) Take 1,000 mg by mouth daily HOLD WHILE IN TCU       perphenazine 16 MG tablet TAKE 1 TABLET (16 MG) BY MOUTH AT BEDTIME 30 tablet 0     perphenazine 8 MG tablet TAKE 1 & 1/2 TABLETS BY MOUTH EVERY MORNING 45 tablet 0     senna-docusate (STOOL SOFTENER/LAXATIVE) 8.6-50 MG tablet Take 8.6 mg by mouth       sennosides (SENOKOT) 8.6 MG tablet Take 1 tablet by mouth 2 times daily as needed         VITALS   There were no vitals taken for this visit.    MENTAL STATUS EXAM     Alertness: alert  and oriented  Appearance: N/A (phone visit)  Behavior/Demeanor: cooperative and calm, with N/A (phone visit) eye contact   Speech: normal  Language: no obvious problem  Psychomotor: N/A (phone visit)  Mood: description consistent with euthymia  Affect: N/A (phone visit); congruent to: mood- yes, content- yes  Thought Process/Associations: unremarkable  Thought Content:  Reports none;  Denies suicidal & violent ideation and delusions  Perception:  Reports none;  Denies auditory hallucinations and visual hallucinations  Insight: fair  Judgment: fair  Cognition: does  appear grossly intact; formal cognitive testing was not done  Gait and Station: N/A (telehealth)     LABS and DATA     PHQ9 TODAY = NA  PHQ 8/31/2018 11/2/2018 11/6/2019   PHQ-9 Total Score 5 6 3   Q9: Thoughts of better off dead/self-harm past 2 weeks Several days Several days Several days     Recent Labs   Lab Test 06/12/19  0000 01/21/19  0719   CR 0.84 0.68   GFRESTIMATED >90 >90     Recent Labs   Lab Test 06/12/19  0000 12/21/18  0720   AST 12 21   ALT 20 23   ALKPHOS 67 74     From Care Everywhere  9/17/2021      WHITE BLOOD COUNT  8.1   RED BLOOD COUNT   4.98   HEMOGLOBIN  15.0   HEMATOCRIT  44.5   MCV  89   MCH  30.1   MCHC  33.7   RDW  12.6   PLATELET COUNT   221   MPV  9.8   NEUTROPHILS   66.0   LYMPHOCYTES  21.9   MONOCYTES   8.5   EOSINOPHILS   3.1   BASOPHILS  0.5   ABSOLUTE NEUTROPHILS   5.4   ABSOLUTE LYMPHOCYTES   1.8   ABSOLUTE MONOCYTES   0.7   ABSOLUTE EOSINOPHILS  0.3    ABSOLUTE BASOPHILS  0.0           SODIUM   140   POTASSIUM    4.2   CHLORIDE 106   CO2,TOTAL 23   ANION GAP  11   GLUCOSE 97    CALCIUM  8.9   BUN  13       CREATININE 0.77   BUN/CREAT RATIO 17     CHOLESTEROL,TOTAL 195  TRIGLYCERIDES   143  HDL CHOLESTEROL  52  NON-HDL CHOLESTEROL 143  CHOL/HDL RATIO 3.75             LDL CHOLESTEROL 113      ECG 2008 QTc = 447 ms     PSYCHOTROPIC DRUG INTERACTIONS     CETIRIZINE and PERPHENAZINE may result in increased risk of CNS depression.   PERPHENAZINE and BENZTROPINE may result in decreased phenothiazine serum concentrations, decreased phenothiazine effectiveness, enhanced anticholinergic effects (ileus, hyperpyrexia, sedation, dry mouth).     MANAGEMENT:  Monitoring for adverse effects     RISK STATEMENT for SAFETY     Karolyngeeta Taylorpi did not appear to be an imminent safety risk to self or others.    TREATMENT RISK STATEMENT: The risks, benefits, alternatives and potential adverse effects have been discussed and are understood by the pt. The pt understands the risks of using street drugs or alcohol. There are no medical contraindications, the pt agrees to treatment with the ability to do so. The pt knows to call the clinic for any problems or to access emergency care if needed.  Medical and substance use concerns are documented above.  Psychotropic drug interaction check was done, including changes made today.     PROVIDER: Celine Pina DO, MPH    Patient staffed with Dr. Kerr who will sign the note.  Supervisor is Dr. Toussaint.

## 2022-07-13 DIAGNOSIS — F25.0 SCHIZOAFFECTIVE DISORDER, BIPOLAR TYPE (H): ICD-10-CM

## 2022-07-15 RX ORDER — BENZTROPINE MESYLATE 0.5 MG/1
TABLET ORAL
Qty: 90 TABLET | Refills: 0 | Status: SHIPPED | OUTPATIENT
Start: 2022-07-15 | End: 2022-07-30

## 2022-07-15 RX ORDER — LURASIDONE HYDROCHLORIDE 80 MG/1
TABLET, FILM COATED ORAL
Qty: 60 TABLET | Refills: 0 | Status: SHIPPED | OUTPATIENT
Start: 2022-07-15 | End: 2022-07-30

## 2022-07-15 RX ORDER — BUPROPION HYDROCHLORIDE 150 MG/1
TABLET ORAL
Qty: 90 TABLET | Refills: 0 | Status: SHIPPED | OUTPATIENT
Start: 2022-07-15 | End: 2022-07-30

## 2022-07-15 RX ORDER — PERPHENAZINE 16 MG/1
16 TABLET ORAL AT BEDTIME
Qty: 30 TABLET | Refills: 0 | Status: SHIPPED | OUTPATIENT
Start: 2022-07-15 | End: 2022-07-30

## 2022-07-15 RX ORDER — PERPHENAZINE 8 MG/1
TABLET ORAL
Qty: 45 TABLET | Refills: 0 | Status: SHIPPED | OUTPATIENT
Start: 2022-07-15 | End: 2022-07-30

## 2022-07-15 NOTE — TELEPHONE ENCOUNTER
perphenazine 16 MG  Last refilled: 6/17/22  Qty: 30  perphenazine 8 MG   Last refilled: 6/17/22  Qty: 45  lurasidone (LATUDA) 80 MG  Last refilled: 6/17/22  Qty: 60  buPROPion (WELLBUTRIN XL) 150 MG   benztropine (COGENTIN) 0.5 MG   Last refilled: 6/17/22  Qty: 90    Last seen:  3/15/22  RTC: 4-6 weeks  Cancel: 0  No-show: 0  Next appt: 7/28/22  Refilled for 30 days per protocol.

## 2022-07-28 ENCOUNTER — VIRTUAL VISIT (OUTPATIENT)
Dept: PSYCHIATRY | Facility: CLINIC | Age: 37
End: 2022-07-28
Attending: PSYCHIATRY & NEUROLOGY
Payer: MEDICAID

## 2022-07-28 DIAGNOSIS — F25.0 SCHIZOAFFECTIVE DISORDER, BIPOLAR TYPE (H): ICD-10-CM

## 2022-07-28 PROCEDURE — 90792 PSYCH DIAG EVAL W/MED SRVCS: CPT | Mod: 95 | Performed by: STUDENT IN AN ORGANIZED HEALTH CARE EDUCATION/TRAINING PROGRAM

## 2022-07-28 NOTE — PATIENT INSTRUCTIONS
Treatment Plan Today:     1) Medications-  Continue current medications:   - Continue lurasidone 160 mg daily w/dinner  - Continue perphenazine 12 mg each morning and 16 mg at bedtime  - Continue bupropion  mg daily  - Continue benztropine 1.5 mg at 5 PM  2) Follow-up appt with Dr Lepe in 2 months    3) Please go to any [facility] lab to complete labwork    4) Crisis numbers are below and clinic after hours number is [phone]     **For crisis resources, please see the information at the end of this document**   Patient Education    Thank you for coming to the [facility] [place] [facility].     Lab Testing:  If you had lab testing today and your results are reassuring or normal they will be mailed to you or sent through [email or web address] within 7 days. If the lab tests need quick action we will call you with the results. The phone number we will call with results is # [phone]. If this is not the best number please call our clinic and change the number.     Medication Refills:  If you need any refills please call your pharmacy and they will contact us. Our fax number for refills is [phone].   Three business days of notice are needed for general medication refill requests.   Five business days of notice are needed for controlled substance refill requests.   If you need to change to a different pharmacy, please contact the new pharmacy directly. The new pharmacy will help you get your medications transferred.     Contact Us:  Please call [phone] during business hours (8-5:00 M-F).   If you have medication related questions after clinic hours, or on the weekend, please call [phone].     Financial Assistance [phone]   Medical Records [phone]       MENTAL HEALTH CRISIS RESOURCES:  For a emergency help, please call 911 or go to the nearest Emergency Department.     Emergency Walk-In Options:   EmPATH Unit @ [facility]): [phone] - Specialized  mental health emergency area designed to be calming  [facility] ([place]): [phone]  [facility] ([place]): [phone]  [facility] ([place]): [phone]    [facility] Crisis Information:   Megan: [phone]  Eric: [phone]  Kendrick (SHANNON) - Adult: [phone]     Child: [phone]  [place] - Adult: [phone]     Child: [phone]  [place]: [phone]  List of all [facility] resources:   https://[place].gov/dhs/people-we-serve/adults/health-care/mental-health/resources/crisis-contacts.jsp    National Crisis Information:   Crisis Text Line: Text  [place]  to 757272  Suicide & Crisis Lifeline: 988  National Suicide Prevention Lifeline: 1-453-017-TALK (1-208.842.8426)       For online chat options, visit https://suicidepreventionlifeline.org/chat/  Poison Control Center: 1-147.125.7539  Trans Lifeline: 1-478.387.9130 - Hotline for transgender people of all ages  The Ra Project: 7-073-781-4467 - Hotline for LGBT youth     For Non-Emergency Support:   Fast Tracker: Mental Health & Substance Use Disorder Resources -   https://www.[employer].org/

## 2022-07-28 NOTE — PROGRESS NOTES
Karolyn Banerjee is a 36 year old who has consented to receive services via billable video visit.      Pt will join video visit via: [employer]  If there are problems joining the visit, send backup video invite via: Send to preferred e-mail: [email or web address]      Originating Location (patient location): [facility]  Distant Location (provider location): [facility] [place] [facility]    Will anyone else be joining the video visit? No

## 2022-07-28 NOTE — PROGRESS NOTES
"Video- Visit Details  Type of service:  video visit for diagnostic assessment  Time of service:    Video Start Time:  1:00 PM        Video End Time:  2:00PM  Reason for video visit:  Patient unable to travel due to Covid-19  Originating Site (patient location):  [facility]   Location- [facility]  Distant Site (provider location):  [facility]  Mode of Communication:  Video Conference via [employer]       [facility]  Psychiatry Clinic  TRANSFER of CARE DIAGNOSTIC ASSESSMENT     CARE TEAM:  PCP- Suzie Mariscal    Psychotherapist- unknown by patient  Community  Team- Formerly [facility] worker, CM, group home staff.   CADI : Charmaine Isabel [phone].   Mental Health : Bre [phone]        Guardian: Mary \"Swetha\" Chriss (mother)- [phone]   Group Home: [phone]. , : [phone].     Karolyn Banerjee is a 36 year old who uses the name Karolyn and pronouns she, her, herself.      DIAGNOSIS   Schizoaffective Disorder, bipolar type, most recent depressed, in remission re: mood and psychotic features  Unspecified Neurocognitive Disorder     ASSESSMENT   Karolyn is doing well overall. She has a diagnosis of schizoaffective disorder and neurocognitive disorder and has been living at her current group home for about 3 years. She likes it there (the activities, the peers, the staff). She, group home staff, and mother/guardian all agree that she is doing well right now (no behavioral outbursts, not responding to internal stimuli). Aiming to reduce medications due to daytime sedation (sleeps 12+ hours at night, and naps during day); however, due to ongoing AH, will defer reduction until later visits. No safety concerns, no SI, HI, SIB.    Issues addressed today are below.    -Psychosis:   -Sedation:  Karolyn reports ongoing auditory hallucination of [employer] peer professing love for her. Per chart review, this has been a longstanding " experience. She also reports excessive daytime sedation, which is most likely related to the AM perphenizine. It is currently unclear if these AH have worsened since the decrease in AM dose 6 months ago. Therefore, we elected to defer further reduction in AM perphenizine. GH and mother are in agreement that behaviors have been stable on current dose.       MNPMP review was not needed today.     PLAN                                                                                                                1) Meds-   - Continue lurasidone 160 mg daily w/dinner  - Continue perphenazine 12 mg each morning and 16 mg at bedtime  - Continue bupropion  mg daily  - Continue benztropine 1.5 mg at 5 PM    2) Psychotherapy-continue current therapy    3) Next due-  Labs- AP labs due 9/2022 - will need to fax order to group home owner (her name is N.K.)   EKG- last 1/2019 Qtc 428, recheck as needed   Rating scales- AIMS: determine next due (11/2020) - will do at next in-person visit    4) Referrals-  none    5) Dispo-return to clinic in 2 months     PERTINENT BACKGROUND                                                    [most recent eval 07/28/22]   Karolyn first experienced mental health issues as a child and has received treatment for schizoaffective disorder, bipolar type, borderline personality disorder, and unspecified neurocognitive disorder. Notably, the patient's symptoms have historically included psychosis (auditory hallucinations, delusions related to pregnancy, agitation) and suicide attempts/threats to run into traffic or drown herself in a pond, often in reaction to perceived slights or difficult interactions with group home staff/residents. She has been tried on many different medication trials in the past, including two trials of clozapine that caused neutropenia. ECT along with medication has been the most helpful treatment for maintaining stability. ECT with Dr. Amos was discontinued in fall 2017 after  "patient remained relatively stable without it for 3 months (last hospitalization February 2018). Her mother is her guardian. Karolyn fell and broke her leg in 12/2018 which resulted in multiple surgeries, a long transitional care stay, and ongoing use of a walker.      Psych pertinent item history includes suicide attempt [multiple], suicidal ideation, SIB [per chart], psychosis [sxs include auditory hallucinations, delusions], mutiple psychotropic trials , severe med reaction, violent behavior, psych hosp (>5), commitment and ECT     SUBJECTIVE     Most recent history began 3 months ago when Karolyn was last seen in clinic by Dr. Corcoran and all medications were continued without change.    Since that time:  -Karolyn reports she has been doing \"good\"  -Been having shorts bouts of sadness, mainly due to frustration at being confined to group home and rarely going outside.  -No depression, no SI or HI  -AH: still experiencing voice of man from [employer] group (\"Remy and others\") who expresses love for her. He is not around when she hears the voice. Reports that this has been going on for quite some time and has not been getting worse. It is quieter than in past.   -Enjoys going to mother's home on Sundays; also enjoys working on [employer], listening to music, and playing cards with Isabella (peer at ).  - is \"ok\". She has been there for almost 3 years. There are 4 other residents. Gets along with all of them, but Akbar is more challenging (for everyone) to get along with.  -No behavioral outbursts since shortly after moving to residence.  -Sleep is good. Goes to bed after dinner in the evening and wakes up well rested around 8-830AM.  Naps during the day  -Reports that she might have a therapist (she is not sure). She talks to someone on a regular basis that she thinks is a therapist but she does not know the name of the person.      Group Home staff,  (present in backgroud during visit), reached seperately for " collateral:   - reports that Karolyn has been doing well, as the patient described. She is sleeping a lot. Loves participating in activities like beading, cards, and games. Went shopping on Monday with a worker. Only PRN medication required is tylenol    Mother reached for collateral:  -Mother reports that she has been doing well and not displaying any behaviors of responding to internal stimuli. She states that medication may be making her too sedated and endorses long term goal of decreasing medication burden.     Recent Social History: see below    Recent Psych Symptoms:   Depression:  none  Elevated:  none  Psychosis:  auditory hallucinations without commands [details in Interim History]  Anxiety:  none  Trauma Related:  none  Sleep: hypersomnia  Other: N/A    Recent Substance Use:     -alcohol: No   -cannabis: No  -tobacco: No  -caffeine:  No   -opioids: No   Narcan Kit currrent: No   -other: none    Pertinent Negatives: No suicidal or violent ideation, self-injury, delusions, david, hypomania, aggression and harmful substance use  Adverse Effects: sedation     FAMILY and SOCIAL HISTORY                                 pt reported       Family Hx: Per chart review pts father with mild developmental disorder, she has an uncle with schizophrenia. One of her brothers  by suicide in 2017.     Social Hx:  Financial/ Work- social security disability       Partner/ - none  Children- none      Living situation- Lives at group home - [facility]         Social/ Spiritual Support- boyfriend Remy, family members, mental health support staff. Before pandemic, attended [employer] with her parents and attended [facility] day program in [place] twice a week.     Legal- no     Feels Safe at Home- yes   Legal- None          PAST PSYCHIATRIC HISTORY     SIB-patient denies, yes per chart review  Suicidal Ideation Hx- Hx of both active and passive SI, often in response to perceived  slights or minor stressors. Plans in the past have included drowning herself in pool by group home or running into traffic.  Suicide Attempt- #- yes, several, walking toward pond or into traffic, most recent- Nov 2019    Violence/Aggression Hx- yes, Aggression during hospitalizations.  Psychosis Hx- yes, Command auditory hallucinations, delusions related to pregnancy  Eating Disorder Hx- none     Psych Hosp- #- yes, several, most recently Sep 2017, Oct 2017, Feb 2018, most recent- Feb 2018   Commitment- no  ECT- yes  Outpatient Programs - day treatment at [facility] in [place]       PAST MED TRIALS        Medication  Dose (mg) Effect  Dates of Use   lurasidone 160 mg helpful current   Bupropion  mg helpful current   perphenazine 16 mg BID helpful Current   clozapine   2 trials that resulted with agranulocytosis (not eligible for retrial)     olanzapine         quetiapine         ziprasidone         risperidone   dystonia     aripiprazole   ineffective     haloperidol   Developed parkinsonism     divalproex         sertraline         venlafaxine              PAST SUBSTANCE USE HISTORY     Past Use- none reported  Treatment- # none, most recent- N/A  Medical Consequences- none  HIV/Hepatitis- none  Legal Consequences- none     MEDICAL HISTORY and ALLERGY     ALLERGIES: Clozapine, Liquid adhesive, Risperdal, and Adhesive tape    Patient Active Problem List   Diagnosis     Mild cognitive impairment     Borderline personality disorder (H)     Asthma     Nonorganic enuresis     Schizoaffective disorder, depressive type (H)     Fx humerus shaft-closed     Port catheter in place     MENTAL HEALTH     Suicidal ideation     Hx of psychiatric care     DVT (deep vein thrombosis) in pregnancy     Suicide attempt (H)     Suicidal ideations     JOSE (obstructive sleep apnea)     Closed fibular fracture     Tibial plateau fracture     Morbid obesity (H)        MEDICAL REVIEW OF SYSTEMS   Contraception- did not  discuss    none in addition to that documented above     MEDICATIONS     Current Outpatient Medications   Medication Sig Dispense Refill     ACETAMINOPHEN PO Take 650 mg by mouth every 4 hours as needed        benztropine (COGENTIN) 0.5 MG tablet TAKE 3 TABLETS BY MOUTH EVERY EVENING 90 tablet 0     buPROPion (WELLBUTRIN XL) 150 MG 24 hr tablet TAKE 3 TABLETS (450MG) BY MOUTH DAILY IN THE MORNING 90 tablet 0     cetirizine (ZYRTEC) 10 MG tablet Take 10 mg by mouth daily       lurasidone (LATUDA) 80 MG TABS tablet TAKE 2 TABLETS BY MOUTH DAILY WITH DINNER *PLEASE SCHEDULE APPT. FOR FURTHER  REFILLS* 60 tablet 0     norethindrone-ethinyl estradiol (JUNEL FE 1/20) 1-20 MG-MCG tablet Take 1 tablet by mouth       norethindrone-ethinyl estradiol (MICROGESTIN 1/20) 1-20 MG-MCG per tablet Take 1 tablet by mouth daily        Omega-3 Fatty Acids (FISH OIL PO) Take 1,000 mg by mouth daily HOLD WHILE IN TCU       perphenazine 16 MG tablet Take 1 tablet (16 mg) by mouth At Bedtime PLEASE SCHEDULE APPT. FOR FURTHER  REFILLS* 30 tablet 0     perphenazine 8 MG tablet TAKE 1 & 1/2 TABLETS BY MOUTH EVERY MORNING 45 tablet 0     senna-docusate (SENOKOT-S/PERICOLACE) 8.6-50 MG tablet Take 8.6 mg by mouth       sennosides (SENOKOT) 8.6 MG tablet Take 1 tablet by mouth 2 times daily as needed         VITALS   There were no vitals taken for this visit.    MENTAL STATUS EXAM     Alertness: alert  and oriented  Appearance: adequately groomed  Behavior/Demeanor: cooperative and calm, with fair  eye contact   Speech: increased latency of response and slowed, normal prosody  Language: intact and no problems  Psychomotor: normal or unremarkable  Mood: description consistent with euthymia  Affect: restricted; congruent to: mood- yes, content- yes  Thought Process/Associations: linear, goal oriented  Thought Content:  Reports none;  Denies suicidal & violent ideation and delusions  Perception:  Reports auditory hallucinations without commands  [details in history];  Denies visual hallucinations  Insight: good  Judgment: fair  Cognition: baseline  Gait and Station: N/A (telehealth)     LABS and DATA     PHQ 8/31/2018 11/2/2018 11/6/2019   PHQ-9 Total Score 5 6 3   Q9: Thoughts of better off dead/self-harm past 2 weeks Several days Several days Several days       Recent Labs   Lab Test 06/12/19  0000 01/21/19  0719   CR 0.84 0.68   GFRESTIMATED >90 >90     Recent Labs   Lab Test 06/12/19  0000 01/21/19  0719 09/21/17  0750 04/12/17  1024   GLC 52* 82   < > 83   A1C 5.0  --   --  5.0    < > = values in this interval not displayed.     Recent Labs   Lab Test 06/12/19  0000 09/21/17  0750   CHOL 168 181   TRIG 94 144   LDL 97 108*   HDL 52 44*     Recent Labs   Lab Test 06/12/19  0000 12/21/18  0720   AST 12 21   ALT 20 23   ALKPHOS 67 74     Recent Labs   Lab Test 06/12/19  0000 01/23/19  0611 01/21/19  0719 12/21/18  0720 12/20/18  1749 02/06/18  1655   WBC 6.2  --  4.2   < > 11.2* 6.0   ANEU  --   --   --   --  9.7* 3.5   HGB 13.7  --  13.1   < > 14.1 13.8    198 195   < > 170 Platelets clumped, platelet count unavailable    < > = values in this interval not displayed.     ECG 2008 QTc = 447 ms     PSYCHOTROPIC DRUG INTERACTIONS                                                       PSYCHCLINICDDI     CETIRIZINE and PERPHENAZINE may result in increased risk of CNS depression.   PERPHENAZINE and BENZTROPINE may result in decreased phenothiazine serum concentrations, decreased phenothiazine effectiveness, enhanced anticholinergic effects (ileus, hyperpyrexia, sedation, dry mouth).      MANAGEMENT:  Monitoring for adverse effects     RISK STATEMENT for SAFETY     Karolyn did not appear to be an imminent safety risk to self or others.    TREATMENT RISK STATEMENT: The risks, benefits, alternatives and potential adverse effects have been discussed and are understood by the pt. The pt understands the risks of using street drugs or alcohol. There are no  medical contraindications, the pt agrees to treatment with the ability to do so. The pt knows to call the clinic for any problems or to access emergency care if needed.  Medical and substance use concerns are documented above.  Psychotropic drug interaction check was done, including changes made today.     PROVIDER: Casimiro Lepe MD    Patient staffed in clinic with Dr. Kerr who will sign the note.  Supervisor is Dr. Kerr.

## 2022-07-30 RX ORDER — LURASIDONE HYDROCHLORIDE 80 MG/1
TABLET, FILM COATED ORAL
Qty: 60 TABLET | Refills: 1 | Status: SHIPPED | OUTPATIENT
Start: 2022-07-30 | End: 2022-10-19

## 2022-07-30 RX ORDER — BENZTROPINE MESYLATE 0.5 MG/1
TABLET ORAL
Qty: 90 TABLET | Refills: 1 | Status: SHIPPED | OUTPATIENT
Start: 2022-07-30 | End: 2022-10-19

## 2022-07-30 RX ORDER — PERPHENAZINE 8 MG/1
TABLET ORAL
Qty: 45 TABLET | Refills: 1 | Status: SHIPPED | OUTPATIENT
Start: 2022-07-30 | End: 2022-10-19

## 2022-07-30 RX ORDER — BUPROPION HYDROCHLORIDE 150 MG/1
450 TABLET ORAL EVERY MORNING
Qty: 90 TABLET | Refills: 1 | Status: SHIPPED | OUTPATIENT
Start: 2022-07-30 | End: 2022-10-19

## 2022-07-30 RX ORDER — PERPHENAZINE 16 MG/1
16 TABLET ORAL AT BEDTIME
Qty: 30 TABLET | Refills: 1 | Status: SHIPPED | OUTPATIENT
Start: 2022-07-30 | End: 2022-10-19

## 2022-09-02 NOTE — H&P
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    Mechanical fall with fibular head and tibial plateau fracture  -ED physician had contacted orthopedics who had recommended MRI, results pending at this time.  -Per ED physician's discussion with orthopedics likely will have outpatient procedure.  Will consult orthopedic for formal recommendation  -Patient reports that her pain is currently under control, however ED physician mentions that the swelling has gotten worse since arrival  -Her knee is not tense currently and her pain is currently under control with Norco that she got in the ED, able to move her knee slightly, however limited secondary to pain, however given worsening swelling will monitor for compartment syndrome with Q 2-hour Doppler of her pulses, and nurses to evaluate for any possible signs of worsening swelling/compartment syndrome.  -Pain control with Tylenol/Norco and IV morphine, however if the pain gets out of proportion and uncontrolled with current regimen may need evaluation by MD.  Check CPK level  -Patient currently unable to ambulate secondary to pain and wheelchair arranged for discharge by ED physician however will not be available until tomorrow.   -Will wait for orthopedic evaluation, MRI result PT OT to evaluate for safe disposition plan    History of seizoaffective disorder, bipolar type  -Resume her PTA medication once verified by pharmacy    Leukocytosis  -Mild likely from acute distress, no evidence of obvious infection.  Monitor    History of sleep apnea intolerant to CPAP      Diet: Regular, n.p.o. after midnight for anticipated  procedure  DVT Prophylaxis: Enoxaparin (Lovenox) SQ  Saenz Catheter: not present  Code Status: Full code.  Discussed with patient    Disposition Plan   Expected discharge: Tomorrow, recommended to prior living arrangement once adequate pain management/ tolerating PO medications and safe disposition plan/ TCU bed available.  Entered: Gaye Iverson MD 12/20/2018, 6:40 PM     The patient's care was discussed with the Patient.    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    Patient reports that she had a fall earlier today when she slipped in ice and fell outside her group home.  There is no history of head trauma.  She fell on her right knee and started noticing pain immediately.  There is no history of loss of consciousness, chest pain, palpitation, dizziness, lightheadedness.    When she presented to the ED, temperature was 98.6, pulse rate 96, blood pressure 156/96, respiratory rate 18, O2 sat 97% on room air.  Labs showed unremarkable BMP, serum hCG negative, WBC count of 11.2, hemoglobin 14.1, platelet 170, INR 1.02.  X-ray ankle of the right side showed flattening of talar dome with associated sclerosis, consistent with talar dome impaction fracture.  There is slightly irregularity of the medial tibial metaphysis, lateral meta physis, posterior malleolus and anterior tibial pad plafond which could represent additional fracture.  Slight asymmetric widening of lateral mortise with slight medial  tilt of the talus.  No displaced acute distal fibula fracture is identified.  No evidence of dislocation.  X-ray of tibia and fibula shows fibular head and medial tibial plateau fracture with flattening of talar dome consistent with fracture.  Orthopedics was contacted by ED and recommended MRI of the knee which has been done, results pending at this time.  Per ED physician her right knee is more swollen than at arrival.  Patient was given 50 mcg of fentanyl nasally and received a tablet of Norco.  Patient reports her pain is currently under control.    Review of Systems    The 10 point Review of Systems is negative other than noted in the HPI or here.     Past Medical History    I have reviewed this patient's medical history and updated it with pertinent information if needed.   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 Gardner     Mild developmental delay      Myopia      Neuroleptic-induced tardive dyskinesia      Nonorganic enuresis      Refractive amblyopia      Schizoaffective disorder, bipolar type (H)     Follows with Dr. Cooley at her group home.      Sleep disorder        Past Surgical History   I have reviewed this patient's surgical history and updated it with pertinent information if needed.  Past Surgical History:   Procedure Laterality Date     HRW PORT A CATH Right      NO HISTORY OF SURGERY         Social History   I have reviewed this patient's social history and updated it with pertinent information if needed.  Social History     Tobacco Use     Smoking status: Never Smoker     Smokeless tobacco: Never Used   Substance Use Topics     Alcohol use: No     Drug use: No       Family History   I have reviewed this patient's family history and updated it  with pertinent information if needed.   Family History   Problem Relation Age of Onset     Mental Illness Father         mild developmental delay     Schizophrenia Other         uncle       Prior to Admission Medications   Prior to Admission Medications   Prescriptions Last Dose Informant Patient Reported? Taking?   ACETAMINOPHEN PO   Yes Yes   Sig: Take 500 mg by mouth as needed for pain   BuPROPion HCl ER, XL, 450 MG TB24   No Yes   Sig: Take 450 mg by mouth daily   OLANZapine (ZYPREXA) 10 MG tablet   No Yes   Sig: Take 1 tablet (10 mg) by mouth 2 times daily as needed   Omega-3 Fatty Acids (FISH OIL PO)   Yes Yes   Sig: Take 1,000 mg by mouth daily    albuterol (PROAIR HFA/PROVENTIL HFA/VENTOLIN HFA) 108 (90 BASE) MCG/ACT Inhaler   Yes Yes   Sig: Inhale 2 puffs into the lungs every 6 hours as needed for shortness of breath / dyspnea or wheezing   benztropine (COGENTIN) 0.5 MG tablet   No Yes   Sig: Take three tabs (1.5 mg) at 5 PM.   calcium carbonate (TUMS) 500 MG chewable tablet   Yes Yes   Sig: Take 1 chew tab by mouth as needed for heartburn   cetirizine (ZYRTEC) 10 MG tablet  Care Giver Yes Yes   Sig: Take 10 mg by mouth daily   diphenhydrAMINE-acetaminophen (TYLENOL PM)  MG tablet   Yes Yes   Sig: Take 1 tablet by mouth nightly as needed for sleep   hydrOXYzine (ATARAX) 25 MG tablet   No Yes   Sig: Take 1-2 tablets (25-50 mg) by mouth every 4 hours as needed for anxiety   lurasidone (LATUDA) 80 MG TABS tablet   No Yes   Sig: Take 2 tablets (160 mg) by mouth daily (with dinner)   norethindrone-ethinyl estradiol (MICROGESTIN 1/20) 1-20 MG-MCG per tablet   Yes Yes   Sig: Take 1 tablet by mouth daily   perphenazine 16 MG tablet   No Yes   Sig: Take 1 tablet (16 mg) by mouth 2 times daily      Facility-Administered Medications: None     Allergies   Allergies   Allergen Reactions     Clozapine      Agranulocytosis x 2, cannot be re trialed      Risperdal Other (See Comments)     dystonia     Tape [Adhesive  Tape] Rash     Plastic tape       Physical Exam   Vital Signs: Temp: 97.6  F (36.4  C) Temp src: Oral BP: (!) 156/96   Heart Rate: 96 Resp: 18 SpO2: 97 %      Weight: 260 lbs 0 oz    Constitutional: awake, alert, cooperative, no apparent distress, and appears stated age  ENT: normocepalic, without obvious abnormality, atramatic  Respiratory: No increased work of breathing, good air exchange, clear to auscultation bilaterally, no crackles or wheezing  Cardiovascular: Normal apical impulse, regular rate and rhythm, normal S1 and S2, no S3 or S4, and no murmur noted  GI: No scars, normal bowel sounds, soft, non-distended, non-tender, no masses palpated, no hepatosplenomegally  Skin: no rashes and ecchymosis scattered and on right knee(s)  Musculoskeletal: RIGHT KNEE:  swelling present  tenderness present  range of motion limited due to pain.  However does not appear tense.  RIGHT ANKLE: Normal range of motion, no swelling, no pain  Neuropsychiatric: General: normal, calm and normal eye contact  Level of consciousness: alert / normal    Data   Data reviewed today: I reviewed all medications, new labs and imaging results over the last 24 hours. I personally reviewed the Lower extremity image(s) showing Fractures as below.    Recent Labs   Lab 12/20/18  1749   WBC 11.2*   HGB 14.1   MCV 89      INR 1.02      POTASSIUM 3.9   CHLORIDE 107   CO2 25   BUN 5*   CR 0.83   ANIONGAP 8   GIOVANY 8.7   *     Recent Results (from the past 24 hour(s))   XR Knee Right 1/2 Views    Narrative    RIGHT KNEE ONE TO TWO VIEWS   12/20/2018 2:10 PM     HISTORY:  Pain on/slightly below knee, fell on ice.      Impression    IMPRESSION:   1. Distracted fracture of the fibular head.  2. Fracture of the medial tibial plateau which extends to the  metadiaphyseal region. There is comminution. There is at least 5 mm of  depression at the medial tibial plateau.    DOUGLAS PAIGE MD   XR Tibia & Fibula Right 2 Views    Narrative    TIBIA  AND FIBULA RIGHT TWO VIEWS   12/20/2018 5:23 PM     HISTORY: Fall, has knee fracture.    COMPARISON: None.      Impression    IMPRESSION: Fibular head and medial tibial plateau fractures are  present. There is flattening of the talar dome consistent with  fracture. Refer to ankle report for additional details.    SUNSHINE QUINONES MD   Ankle XR, G/E 3 views, right    Narrative    RIGHT ANKLE THREE OR MORE VIEWS  12/20/2018 5:24 PM     HISTORY: Has knee pain, fracture.    COMPARISON: None.      Impression    IMPRESSION: There is flattening of the talar dome with associated  sclerosis, consistent with talar dome impaction fracture. There is  slight irregularity of the medial tibial metaphysis, lateral  metaphysis, posterior malleolus, and anterior tibial plafond which  could represent additional fractures. There is slight asymmetric  widening of the lateral mortise with slight medial tilt of the talus.  There is slight irregularity of the distal fibula; however, no  displaced acute distal fibula fracture is identified. No evidence of  dislocation. A 3 mm corticated ossification along the tip of the  lateral malleolus likely represents sequelae of old injury.    SUNSHINE QUINONES MD      [id number]

## 2022-10-17 DIAGNOSIS — F25.0 SCHIZOAFFECTIVE DISORDER, BIPOLAR TYPE (H): ICD-10-CM

## 2022-10-19 RX ORDER — PERPHENAZINE 8 MG/1
TABLET ORAL
Qty: 45 TABLET | Refills: 0 | Status: SHIPPED | OUTPATIENT
Start: 2022-10-19 | End: 2022-11-08

## 2022-10-19 RX ORDER — BENZTROPINE MESYLATE 0.5 MG/1
TABLET ORAL
Qty: 90 TABLET | Refills: 0 | Status: SHIPPED | OUTPATIENT
Start: 2022-10-19 | End: 2022-11-08

## 2022-10-19 RX ORDER — LURASIDONE HYDROCHLORIDE 80 MG/1
TABLET, FILM COATED ORAL
Qty: 60 TABLET | Refills: 0 | Status: SHIPPED | OUTPATIENT
Start: 2022-10-19 | End: 2022-11-08

## 2022-10-19 RX ORDER — BUPROPION HYDROCHLORIDE 150 MG/1
450 TABLET ORAL EVERY MORNING
Qty: 90 TABLET | Refills: 0 | Status: SHIPPED | OUTPATIENT
Start: 2022-10-19 | End: 2022-11-08

## 2022-10-19 RX ORDER — PERPHENAZINE 16 MG/1
16 TABLET ORAL AT BEDTIME
Qty: 30 TABLET | Refills: 0 | Status: SHIPPED | OUTPATIENT
Start: 2022-10-19 | End: 2022-11-08

## 2022-10-19 NOTE — TELEPHONE ENCOUNTER
Medication requested:   perphenazine 8 MG tablet  perphenazine 16 MG tablet  benztropine (COGENTIN) 0.5 MG tablet  lurasidone (LATUDA) 80 MG TABS tablet  Last refilled: 7/30/22  Qty: month supply -1 refill      Last seen: 7/28/22  RTC: 2 months  Cancel: 0  No-show: 0  Next appt: 0    Refill decision:   30 day piter refill sent to the pharmacy - including instructions for patient to call the clinic and schedule an appointment.  Scheduling has been notified to contact the pt for appointment.

## 2022-11-08 ENCOUNTER — VIRTUAL VISIT (OUTPATIENT)
Dept: PSYCHIATRY | Facility: CLINIC | Age: 37
End: 2022-11-08
Attending: PHYSICAL MEDICINE & REHABILITATION
Payer: MEDICAID

## 2022-11-08 DIAGNOSIS — F25.0 SCHIZOAFFECTIVE DISORDER, BIPOLAR TYPE (H): ICD-10-CM

## 2022-11-08 PROCEDURE — 99214 OFFICE O/P EST MOD 30 MIN: CPT | Mod: GC | Performed by: STUDENT IN AN ORGANIZED HEALTH CARE EDUCATION/TRAINING PROGRAM

## 2022-11-08 RX ORDER — PERPHENAZINE 16 MG/1
16 TABLET ORAL AT BEDTIME
Qty: 30 TABLET | Refills: 2 | Status: SHIPPED | OUTPATIENT
Start: 2022-11-08 | End: 2023-01-17

## 2022-11-08 RX ORDER — BUPROPION HYDROCHLORIDE 150 MG/1
450 TABLET ORAL EVERY MORNING
Qty: 90 TABLET | Refills: 2 | Status: SHIPPED | OUTPATIENT
Start: 2022-11-08 | End: 2023-01-17

## 2022-11-08 RX ORDER — BENZTROPINE MESYLATE 0.5 MG/1
1.5 TABLET ORAL EVERY EVENING
Qty: 90 TABLET | Refills: 2 | Status: SHIPPED | OUTPATIENT
Start: 2022-11-08 | End: 2023-01-17

## 2022-11-08 RX ORDER — LURASIDONE HYDROCHLORIDE 80 MG/1
TABLET, FILM COATED ORAL
Qty: 60 TABLET | Refills: 2 | Status: SHIPPED | OUTPATIENT
Start: 2022-11-08 | End: 2023-01-17

## 2022-11-08 RX ORDER — PERPHENAZINE 8 MG/1
TABLET ORAL
Qty: 45 TABLET | Refills: 2 | Status: SHIPPED | OUTPATIENT
Start: 2022-11-08 | End: 2023-01-17

## 2022-11-08 NOTE — PATIENT INSTRUCTIONS
It was nice seeing you today    Treatment Plan Today:     1) Medications-   - Continue lurasidone 160 mg daily w/dinner  - Continue perphenazine 12 mg each morning and 16 mg at bedtime  - Continue bupropion  mg daily  - Continue benztropine 1.5 mg at 5 PM    2) Follow-up appt with Dr Lepe in 2-3 months    3) Crisis numbers are below and clinic after hours number is [phone]     **For crisis resources, please see the information at the end of this document**   Patient Education    Thank you for coming to the [facility] [place] [facility].     Lab Testing:  If you had lab testing today and your results are reassuring or normal they will be mailed to you or sent through theBench within 7 days. If the lab tests need quick action we will call you with the results. The phone number we will call with results is # [phone]. If this is not the best number please call our clinic and change the number.     Medication Refills:  If you need any refills please call your pharmacy and they will contact us. Our fax number for refills is [phone].   Three business days of notice are needed for general medication refill requests.   Five business days of notice are needed for controlled substance refill requests.   If you need to change to a different pharmacy, please contact the new pharmacy directly. The new pharmacy will help you get your medications transferred.     Contact Us:  Please call [phone] during business hours (8-5:00 M-F).   If you have medication related questions after clinic hours, or on the weekend, please call [phone].     Financial Assistance [phone]   Medical Records [phone]       MENTAL HEALTH CRISIS RESOURCES:  For a emergency help, please call 911 or go to the nearest Emergency Department.     Emergency Walk-In Options:   [facility] Unit @ [facility] ([place]): [phone] - Specialized mental health emergency area designed to be  [facility] ([place]): [phone]  [facility] ([place]): [phone]  [facility] ([place]): [phone]    County Crisis Information:   [place]: [phone]  [place]: [phone]  [place] ([place]) - Adult: [phone]     Child: [phone]  [place] - Adult: [phone]     Child: [phone]  [place]: [phone]  List of all [facility] resources:   https://[place].gov/dhs/people-we-serve/adults/health-care/mental-health/resources/crisis-contacts.jsp    National Crisis Information:   Crisis Text Line: Text  [place]  to 643085  Suicide & Crisis Lifeline: 988  National Suicide Prevention Lifeline: 9-994-690-TALK (1-680.793.5975)       For online chat options, visit https://suicidepreventionlifeline.org/chat/  Poison Control Center: 1-854.817.6909  Trans Lifeline: 1-597.205.5487 - Hotline for transgender people of all ages  [employer]: 0-243-387-1591 - Hotline for LGBT youth     For Non-Emergency Support:   Fast Tracker: Mental Health & Substance Use Disorder Resources -   https://www.[employer].org/

## 2022-11-08 NOTE — PROGRESS NOTES
"Video- Visit Details  Type of service:  video visit for medication management  Time of service:    Video Start Time:  1:00 AM        Video End Time:  10:45 AM  Reason for video visit:  Patient unable to travel due to Covid-19  Originating Site (patient location):  [facility]   Location- skilled nursing  Distant Site (provider location):  [facility]  Mode of Communication:  Video Conference via [employer]       [facility]  Psychiatry Clinic  MEDICAL PROGRESS NOTE     CARE TEAM:  PCP- Suzie Mariscal    Psychotherapist- unknown by patient    Community  Team- [facility] worker, CM, group home staff.   CADI : Charmaine Isabel [phone].   Mental Health : Bre [phone]        Guardian: Mary \"Swetha\" Chriss (mother)- [phone]   Group Home: [phone]. , : [phone].     Karolyn Banerjee is a 37 year old who uses the name Karolyn and pronouns she, her, herself.      DIAGNOSIS   # Schizoaffective Disorder, bipolar type, MRE depressed  # Unspecified Neurocognitive Disorder     ASSESSMENT   Karolyn is doing well overall. She has diagnoses of schizoaffective disorder and neurocognitive disorder and has been living at her current group home for approximately 3 years. She likes it there--the activities, the peers, the staff. Karolyn and group home staff agree that she is doing well right now (no behavioral outbursts, not responding to internal stimuli). No safety concerns, no SI, HI, or SIB.    Issues addressed today are below.    -Psychosis:   -Sedation:  Karolyn reports baseline auditory hallucinations with voices telling her comments like, \"I love you, please lose weight\". Per chart review, this has been a longstanding experience. Group home and Karolyn report that psychosis is well managed on current dose of perphenazine. Daytime sedation has improved with reduction in perphenazine about 11 months ago. Will continue current dose of " medications. Guardian was not reachable during the visit. Will continue to explore further reduction in antipsychotics at future visits.      MNPMP review was not needed today.     PLAN                                                                                                                1) Meds-   - Continue lurasidone 160 mg daily w/dinner  - Continue perphenazine 12 mg each morning and 16 mg at bedtime  - Continue bupropion  mg daily  - Continue benztropine 1.5 mg at 5 PM    2) Psychotherapy-continue current therapy    3) Next due-  Labs- AP labs completed 10/2022 and availble in CareEverywhere (all WNL - CBC, CMP, lipids, A1c)  EKG- last 1/2019 Qtc 428, recheck as needed   Rating scales- AIMS: due at next in-person visit    4) Referrals-  none    5) Dispo-return to clinic in 2-3 months     PERTINENT BACKGROUND                                                    [most recent eval 07/28/22]   Karolyn first experienced mental health issues as a child and has received treatment for schizoaffective disorder, bipolar type, borderline personality disorder, and unspecified neurocognitive disorder. Notably, the patient's symptoms have historically included psychosis (auditory hallucinations, delusions related to pregnancy, agitation) and suicide attempts/threats to run into traffic or drown herself in a pond, often in reaction to perceived slights or difficult interactions with group home staff/residents. She has been tried on many different medication trials in the past, including two trials of clozapine that caused neutropenia. ECT along with medication has been the most helpful treatment for maintaining stability. ECT with Dr. Amos was discontinued in fall 2017 after patient remained relatively stable without it for 3 months (last hospitalization February 2018). Her mother is her guardian. Karolyn fell and broke her leg in 12/2018 which resulted in multiple surgeries, a long transitional care stay, and  "ongoing use of a walker.      Psych pertinent item history includes suicide attempt [multiple], suicidal ideation, SIB [per chart], psychosis [sxs include auditory hallucinations, delusions], mutiple psychotropic trials , severe med reaction, violent behavior, psych hosp (>5), commitment and ECT     SUBJECTIVE     Most recent history began 2.5 months ago when Karolyn was last seen in clinic by this provider and all medications were continued without change.    Since that time:  -Karolyn reports that she is doing \"good\"  -endorses a little sadness, about being overweight  -no SI or HI  -hallucinations:    AH: a mohsen talking to her, \"I love you, please lose weight\". Remy and another mohsen. No commands. No recent change.    VH: none  -anxiety: baseline  -energy is ok, sleep: good - 10-11 hr  -no side effects, no muscle stiffness  -only physical concerns is leg cracks when going down stairs  -Karolyn laughs at her misspeaking and calling her male peers ladies  -wants power port taken out. Worried that when she loses weight it wont work as well  -Group home: going \"pretty good\", mostly watching TV or coloring, going to [employer] today   -looking forward to [employer], going to buy fish oil    -talked to NK from group home who reports that Karolyn is \"doing good\", has not demonstrated increased MH symptoms or requested/required MH PRN medications.    Recent Social History: see below    Recent Psych Symptoms:   Depression:  none  Elevated:  none  Psychosis:  auditory hallucinations without commands [details in Interim History]  Anxiety:  none  Trauma Related:  none  Sleep: good, goes to sleep early and wakes up early  Other: N/A    Recent Substance Use:     -alcohol: No   -cannabis: No  -tobacco: No  -caffeine:  No   -opioids: No   Narcan Kit currrent: No   -other: none    Pertinent Negatives: No suicidal or violent ideation, self-injury, delusions, david, hypomania, aggression and harmful substance use  Adverse Effects: sedation     " FAMILY and SOCIAL HISTORY                                 pt reported       Family Hx: Per chart review pts father with mild developmental disorder, she has an uncle with schizophrenia. One of her brothers  by suicide in 2017.     Social Hx:  Financial/ Work- social security disability       Partner/ - none  Children- none      Living situation- Lives at group home - [facility]         Social/ Spiritual Support- boyfriend Remy, family members, mental health support staff. Before pandemic, attended [employer] with her parents and attended [facility] day program in [place] twice a week.     Legal- no     Feels Safe at Home- yes   Legal- None          PAST PSYCHIATRIC HISTORY     SIB-patient denies, yes per chart review  Suicidal Ideation Hx- Hx of both active and passive SI, often in response to perceived slights or minor stressors. Plans in the past have included drowning herself in pool by group home or running into traffic.  Suicide Attempt- #- yes, several, walking toward pond or into traffic, most recent- 2019    Violence/Aggression Hx- yes, Aggression during hospitalizations.  Psychosis Hx- yes, Command auditory hallucinations, delusions related to pregnancy  Eating Disorder Hx- none     Psych Hosp- #- yes, several, most recently Sep 2017, Oct 2017, 2018, most recent- 2018   Commitment- no  ECT- yes  Outpatient Programs - day treatment at [facility] [facility] in [place]       PAST MED TRIALS        Medication  Dose (mg) Effect  Dates of Use   lurasidone 160 mg helpful current   Bupropion  mg helpful current   perphenazine 16 mg BID helpful Current   clozapine   2 trials that resulted with agranulocytosis (not eligible for retrial)     olanzapine         quetiapine         ziprasidone         risperidone   dystonia     aripiprazole   ineffective     haloperidol   Developed parkinsonism     divalproex         sertraline         venlafaxine               MEDICAL HISTORY and ALLERGY     ALLERGIES: Clozapine, Liquid adhesive, Risperdal, and Adhesive tape    Patient Active Problem List   Diagnosis     Mild cognitive impairment     Borderline personality disorder (H)     Asthma     Nonorganic enuresis     Schizoaffective disorder, depressive type (H)     Fx humerus shaft-closed     Port catheter in place     MENTAL HEALTH     Suicidal ideation     Hx of psychiatric care     DVT (deep vein thrombosis) in pregnancy     Suicide attempt (H)     Suicidal ideations     JOSE (obstructive sleep apnea)     Closed fibular fracture     Tibial plateau fracture     Morbid obesity (H)        MEDICAL REVIEW OF SYSTEMS   Contraception- did not discuss    none in addition to that documented above     MEDICATIONS     Current Outpatient Medications   Medication Sig Dispense Refill     ACETAMINOPHEN PO Take 650 mg by mouth every 4 hours as needed        benztropine (COGENTIN) 0.5 MG tablet TAKE 3 TABLETS BY MOUTH EVERY EVENING 90 tablet 0     buPROPion (WELLBUTRIN XL) 150 MG 24 hr tablet Take 3 tablets (450 mg) by mouth every morning For more refills,schedule an appointment at [phone] 90 tablet 0     cetirizine (ZYRTEC) 10 MG tablet Take 10 mg by mouth daily       lurasidone (LATUDA) 80 MG TABS tablet TAKE 2 TABLETS BY MOUTH DAILY WITH DINNER For more refills,schedule an qqhw-[phone] 60 tablet 0     norethindrone-ethinyl estradiol (JUNEL FE 1/20) 1-20 MG-MCG tablet Take 1 tablet by mouth       norethindrone-ethinyl estradiol (MICROGESTIN 1/20) 1-20 MG-MCG per tablet Take 1 tablet by mouth daily        Omega-3 Fatty Acids (FISH OIL PO) Take 1,000 mg by mouth daily HOLD WHILE IN [facility]       perphenazine 16 MG tablet Take 1 tablet (16 mg) by mouth At Bedtime For more refills,schedule an appointment at [phone] 30 tablet 0     perphenazine 8 MG tablet TAKE 1 & 1/2 TABLETS BY MOUTH EVERY MORNING 45 tablet 0     senna-docusate (SENOKOT-S/PERICOLACE) 8.6-50 MG tablet Take 8.6 mg by  "mouth       sennosides (SENOKOT) 8.6 MG tablet Take 1 tablet by mouth 2 times daily as needed         VITALS   There were no vitals taken for this visit.     Pulse Readings from Last 3 Encounters:   09/20/21 97   02/06/20 106   11/06/19 82     Wt Readings from Last 3 Encounters:   02/06/20 134.5 kg (296 lb 9.6 oz)   11/06/19 134.3 kg (296 lb)   07/10/19 133.8 kg (295 lb)     BP Readings from Last 3 Encounters:   09/20/21 132/77   02/06/20 (!) 142/102   11/06/19 (!) 145/97        MENTAL STATUS EXAM     Alertness: alert  and oriented  Appearance: adequately groomed  Behavior/Demeanor: cooperative and calm, with fair  eye contact   Speech: increased latency of response and slowed, normal prosody  Language: intact and no problems  Psychomotor: normal or unremarkable  Mood: \"good\"  Affect: restricted; congruent to: mood- yes, content- yes  Thought Process/Associations: linear, goal oriented  Thought Content:  Reports none;  Denies suicidal & violent ideation and delusions  Perception:  Reports auditory hallucinations without commands [details in history];  Denies visual hallucinations  Insight: good  Judgment: fair  Cognition: baseline  Gait and Station: N/A (telehealth)     LABS and DATA     PHQ 8/31/2018 11/2/2018 11/6/2019   PHQ-9 Total Score 5 6 3   Q9: Thoughts of better off dead/self-harm past 2 weeks Several days Several days Several days     WHITE BLOOD COUNT         4.5 - 11.0 thou/cu mm 7.6    RED BLOOD COUNT           4.00 - 5.20 mil/cu mm 4.78    HEMOGLOBIN                12.0 - 16.0 g/dL 14.1    HEMATOCRIT                33.0 - 51.0 % 42.3    MCV                       80 - 100 fL 89    MCH                       26.0 - 34.0 pg 29.5    MCHC                      32.0 - 36.0 g/dL 33.3    RDW                       11.5 - 15.5 % 13.0    PLATELET COUNT            140 - 440 thou/cu mm 196    MPV                       6.5 - 11.0 fL 9.6    Resulting Agency  [facility]   Specimen Collected: " 10/03/22  3:14 PM Last Resulted: 10/03/22  3:18 PM      Ref Range & Units 1 mo ago Comments   SODIUM 135 - 145 mmol/L 140     POTASSIUM 3.5 - 5.0 mmol/L 4.0     CHLORIDE 98 - 110 mmol/L 105     CO2,TOTAL 21 - 31 mmol/L 23     ANION GAP 5 - 18 12     GLUCOSE 65 - 100 mg/dL 134 High      CALCIUM 8.5 - 10.5 mg/dL 8.5     BUN 8 - 25 mg/dL 12     CREATININE 0.57 - 1.11 mg/dL 0.87     BUN/CREAT RATIO           10 - 20 14     ALBUMIN 3.5 - 5.2 g/dL 3.9     PROTEIN,TOTAL 6.0 - 8.0 g/dL 6.7     GLOBULIN                  2.0 - 3.7 g/dL 2.8     A/G RATIO 1.0 - 2.0 1.4     BILIRUBIN,TOTAL 0.2 - 1.2 mg/dL 0.4     ALK PHOSPHATASE 50 - 136 IU/L 90     ALT (SGPT) 8 - 45 IU/L 21     AST (SGOT) 2 - 40 IU/L 18     eGFR >90 mL/min/1.73m2 88 Low      Resulting [facility] LABORATORY-CENTRAL LABORATORY    Specimen Collected: 10/03/22  3:14 PM Last Resulted: 10/03/22  9:51 PM      Ref Range & Units 1 mo ago   TSH 0.35 - 4.94 uIU/mL 2.72    Resulting [facility] LABORATORY-CENTRAL LABORATORY      Ref Range & Units 1 mo ago   CHOLESTEROL,TOTAL 100 - 199 mg/dL 191    TRIGLYCERIDES <150 mg/dL 181 High     HDL CHOLESTEROL >40 mg/dL 47    NON-HDL CHOLESTEROL <145 mg/dl 144    CHOL/HDL RATIO            <4.50                 4.06    LDL CHOLESTEROL <=130 mg/dL 108    VLDL CHOLESTEROL <=30 mg/dL 36 High     PROVIDER ORDERED STATUS  RANDOM    Resulting [facility] LABORATORY-CENTRAL LABORATORY      Ref Range & Units 1 mo ago   HEMOGLOBIN A1C SCREENING <=6.4 % 5.2    Resulting [facility] LABORATORY-CENTRAL LABORATORY     Specimen Collected: 10/03/22  3:14 PM Last Resulted: 10/05/22  7:15 AM         ECG 2008 QTc = 447 ms     PSYCHOTROPIC DRUG INTERACTIONS                                                       PSYCHCLINICDDI     CETIRIZINE and PERPHENAZINE may result in increased risk of CNS depression.   PERPHENAZINE and BENZTROPINE may result in decreased phenothiazine serum concentrations, decreased  phenothiazine effectiveness, enhanced anticholinergic effects (ileus, hyperpyrexia, sedation, dry mouth).      MANAGEMENT:  Monitoring for adverse effects     RISK STATEMENT for SAFETY     Karolyn did not appear to be an imminent safety risk to self or others.    TREATMENT RISK STATEMENT: The risks, benefits, alternatives and potential adverse effects have been discussed and are understood by the pt. The pt understands the risks of using street drugs or alcohol. There are no medical contraindications, the pt agrees to treatment with the ability to do so. The pt knows to call the clinic for any problems or to access emergency care if needed.  Medical and substance use concerns are documented above.  Psychotropic drug interaction check was done, including changes made today.     PROVIDER: Casimiro Lepe MD    Patient staffed in clinic with Dr. Rodarte who will sign the note.  Supervisor is Dr. Kerr.

## 2022-11-08 NOTE — NURSING NOTE
Patient declined individual allergy and medication review by support staff because She doesn't know any of this stuff per patient.

## 2022-11-08 NOTE — PROGRESS NOTES
Karolyn Banerjee is a 37 year old who is being evaluated via a billable telephone visit.      What phone number would you prefer to be contacted at?: Mobile phone number on file:   Telephone Information:[phone]     Reason for telehealth visit: Patient has requested telehealth visit    Originating location (patient location): Patient's home    Will anyone else be joining the visit? No

## 2022-11-22 ENCOUNTER — OFFICE VISIT (OUTPATIENT)
Dept: OPTOMETRY | Facility: CLINIC | Age: 37
End: 2022-11-22
Payer: MEDICAID

## 2022-11-22 DIAGNOSIS — H52.223 REGULAR ASTIGMATISM OF BOTH EYES: ICD-10-CM

## 2022-11-22 DIAGNOSIS — Z01.01 ENCOUNTER FOR EXAMINATION OF EYES AND VISION WITH ABNORMAL FINDINGS: Primary | ICD-10-CM

## 2022-11-22 DIAGNOSIS — H52.13 HIGH MYOPIA, BOTH EYES: ICD-10-CM

## 2022-11-22 PROCEDURE — 92015 DETERMINE REFRACTIVE STATE: CPT | Performed by: OPTOMETRIST

## 2022-11-22 PROCEDURE — 92014 COMPRE OPH EXAM EST PT 1/>: CPT | Performed by: OPTOMETRIST

## 2022-11-22 ASSESSMENT — VISUAL ACUITY
OS_SC: 20/60-1
OD_SC: 20/500
OD_SC: 20/80-1
OS_SC: 20/500
METHOD: SNELLEN - LINEAR

## 2022-11-22 ASSESSMENT — REFRACTION_MANIFEST
OD_CYLINDER: +5.00
OD_AXIS: 105
OD_SPHERE: -10.50
OS_CYLINDER: +4.00
OS_AXIS: 074
OS_SPHERE: -9.00

## 2022-11-22 ASSESSMENT — CONF VISUAL FIELD
OD_INFERIOR_NASAL_RESTRICTION: 0
OS_INFERIOR_NASAL_RESTRICTION: 0
OD_INFERIOR_TEMPORAL_RESTRICTION: 0
OS_NORMAL: 1
OS_SUPERIOR_TEMPORAL_RESTRICTION: 0
OS_SUPERIOR_NASAL_RESTRICTION: 0
OD_SUPERIOR_TEMPORAL_RESTRICTION: 0
OD_SUPERIOR_NASAL_RESTRICTION: 0
OD_NORMAL: 1
OS_INFERIOR_TEMPORAL_RESTRICTION: 0
METHOD: COUNTING FINGERS

## 2022-11-22 ASSESSMENT — TONOMETRY
OD_IOP_MMHG: 17
OS_IOP_MMHG: 17
IOP_METHOD: ICARE

## 2022-11-22 ASSESSMENT — CUP TO DISC RATIO
OD_RATIO: 0.2
OS_RATIO: 0.25

## 2022-11-22 ASSESSMENT — EXTERNAL EXAM - RIGHT EYE: OD_EXAM: NORMAL

## 2022-11-22 ASSESSMENT — EXTERNAL EXAM - LEFT EYE: OS_EXAM: NORMAL

## 2022-11-22 ASSESSMENT — SLIT LAMP EXAM - LIDS
COMMENTS: NORMAL
COMMENTS: NORMAL

## 2022-11-22 NOTE — LETTER
11/22/2022         RE: Karolyn Banerjee  [address] [address]        Dear Colleague,    Thank you for referring your patient, Karolyn Banerjee, to the [facility]. Please see a copy of my visit note below.    Chief Complaint   Patient presents with     Annual Eye Exam         Last Eye Exam: 6/8/2021  Dilated Previously: Yes, side effects of dilation explained today - will need temp suns     What are you currently using to see?  Glasses - did not bring with today - have been broken for ~1 month       Distance Vision Acuity: Satisfied with vision with glasses on prior to them breaking     Near Vision Acuity: Satisfied with vision while reading and using computer with glasses - does okay with cell phone without glasses but has trouble reading     Eye Comfort: watery daily  Do you use eye drops? : No  Occupation or Hobbies: Home - TV/Reading     [facility]        Medical, surgical and family histories reviewed and updated 11/22/2022.       OBJECTIVE: See Ophthalmology exam    ASSESSMENT:    ICD-10-CM    1. Encounter for examination of eyes and vision with abnormal findings  Z01.01       2. High myopia, both eyes  H52.13       3. Regular astigmatism of both eyes  H52.223           PLAN:     Patient Instructions   Karolyn was advised of today's exam findings.  Fill glasses prescription  Allow 2 weeks to adapt to change in glasses  Wear glasses full time  Copy of glasses Rx provided today.    Return in 1-2 years for eye exam, or sooner if needed.    The effects of the dilating drops last for 4- 6 hours.  You will be more sensitive to light and vision will be blurry up close.  Mydriatic sunglasses were given if needed.       Sharif Nguyen O.D.  [facility]  [address] [place] [address]. [place]  [address]    [phone]             Again, thank you for allowing me to participate in the care of your patient.        Sincerely,        Sharif Nguyen, KAROLINA

## 2022-11-22 NOTE — PATIENT INSTRUCTIONS
Karolyn was advised of today's exam findings.  Fill glasses prescription  Allow 2 weeks to adapt to change in glasses  Wear glasses full time  Copy of glasses Rx provided today.    Return in 1-2 years for eye exam, or sooner if needed.    The effects of the dilating drops last for 4- 6 hours.  You will be more sensitive to light and vision will be blurry up close.  Mydriatic sunglasses were given if needed.       Sharif Nguyen O.D.  [facility]  [address] [place] [address]. [place]  [place]  [address]    [phone]

## 2022-11-22 NOTE — PROGRESS NOTES
Chief Complaint   Patient presents with     Annual Eye Exam         Last Eye Exam: 6/8/2021  Dilated Previously: Yes, side effects of dilation explained today - will need temp suns     What are you currently using to see?  Glasses - did not bring with today - have been broken for ~1 month       Distance Vision Acuity: Satisfied with vision with glasses on prior to them breaking     Near Vision Acuity: Satisfied with vision while reading and using computer with glasses - does okay with cell phone without glasses but has trouble reading     Eye Comfort: watery daily  Do you use eye drops? : No  Occupation or Hobbies: Home - TV/Reading     Julia Hewitt        Medical, surgical and family histories reviewed and updated 11/22/2022.       OBJECTIVE: See Ophthalmology exam    ASSESSMENT:    ICD-10-CM    1. Encounter for examination of eyes and vision with abnormal findings  Z01.01       2. High myopia, both eyes  H52.13       3. Regular astigmatism of both eyes  H52.223           PLAN:     Patient Instructions   Karolyn was advised of today's exam findings.  Fill glasses prescription  Allow 2 weeks to adapt to change in glasses  Wear glasses full time  Copy of glasses Rx provided today.    Return in 1-2 years for eye exam, or sooner if needed.    The effects of the dilating drops last for 4- 6 hours.  You will be more sensitive to light and vision will be blurry up close.  Mydriatic sunglasses were given if needed.       Sharif Nguyen O.D.  [address] [place]  [address]    [phone]

## 2022-12-09 ENCOUNTER — APPOINTMENT (OUTPATIENT)
Dept: OPTOMETRY | Facility: CLINIC | Age: 37
End: 2022-12-09
Payer: MEDICAID

## 2022-12-09 PROCEDURE — 92340 FIT SPECTACLES MONOFOCAL: CPT | Performed by: OPTOMETRIST

## 2023-01-17 ENCOUNTER — VIRTUAL VISIT (OUTPATIENT)
Dept: PSYCHIATRY | Facility: CLINIC | Age: 38
End: 2023-01-17
Attending: PSYCHIATRY & NEUROLOGY
Payer: MEDICAID

## 2023-01-17 DIAGNOSIS — F25.0 SCHIZOAFFECTIVE DISORDER, BIPOLAR TYPE (H): ICD-10-CM

## 2023-01-17 PROCEDURE — 99214 OFFICE O/P EST MOD 30 MIN: CPT | Mod: 95 | Performed by: STUDENT IN AN ORGANIZED HEALTH CARE EDUCATION/TRAINING PROGRAM

## 2023-01-17 RX ORDER — BUPROPION HYDROCHLORIDE 150 MG/1
450 TABLET ORAL EVERY MORNING
Qty: 90 TABLET | Refills: 3 | Status: SHIPPED | OUTPATIENT
Start: 2023-01-17 | End: 2023-04-19

## 2023-01-17 RX ORDER — PERPHENAZINE 8 MG/1
TABLET ORAL
Qty: 45 TABLET | Refills: 3 | Status: SHIPPED | OUTPATIENT
Start: 2023-01-17 | End: 2023-04-19

## 2023-01-17 RX ORDER — PERPHENAZINE 16 MG/1
16 TABLET ORAL AT BEDTIME
Qty: 30 TABLET | Refills: 3 | Status: SHIPPED | OUTPATIENT
Start: 2023-01-17 | End: 2023-04-19

## 2023-01-17 RX ORDER — BENZTROPINE MESYLATE 0.5 MG/1
1.5 TABLET ORAL EVERY EVENING
Qty: 90 TABLET | Refills: 3 | Status: SHIPPED | OUTPATIENT
Start: 2023-01-17 | End: 2023-04-19

## 2023-01-17 RX ORDER — LURASIDONE HYDROCHLORIDE 80 MG/1
TABLET, FILM COATED ORAL
Qty: 60 TABLET | Refills: 3 | Status: SHIPPED | OUTPATIENT
Start: 2023-01-17 | End: 2023-04-19

## 2023-01-17 NOTE — PROGRESS NOTES
Karolyn Banerjee is a 37 year old who is being evaluated via a billable telephone visit.      What phone number would you prefer to be contacted at?: Mobile phone number on file:   Telephone Information:   Mobile [phone]   Mobile [phone]       Reason for telehealth visit: Patient has requested telehealth visit    Originating location (patient location): Group Home    Will anyone else be joining the visit? No

## 2023-01-17 NOTE — PATIENT INSTRUCTIONS
It was nice seeing you today    Treatment Plan Today:     1) Medications-  - Continue lurasidone 160 mg daily w/dinner  - Continue perphenazine 12 mg each morning and 16 mg at bedtime  - Continue bupropion  mg daily  - Continue benztropine 1.5 mg at 5 PM    2) Follow-up appt with Dr Lepe in 10-12 weeks    3) Crisis numbers are below and clinic after hours number is [phone]      **For crisis resources, please see the information at the end of this document**   Patient Education    Thank you for coming to the [facility] [place] [facility].     Lab Testing:  If you had lab testing today and your results are reassuring or normal they will be mailed to you or sent through [employer] within 7 days. If the lab tests need quick action we will call you with the results. The phone number we will call with results is # [phone]. If this is not the best number please call our clinic and change the number.     Medication Refills:  If you need any refills please call your pharmacy and they will contact us. Our fax number for refills is [phone].   Three business days of notice are needed for general medication refill requests.   Five business days of notice are needed for controlled substance refill requests.   If you need to change to a different pharmacy, please contact the new pharmacy directly. The new pharmacy will help you get your medications transferred.     Contact Us:  Please call [phone] during business hours (8-5:00 M-F).   If you have medication related questions after clinic hours, or on the weekend, please call [phone].     Financial Assistance [phone]   Medical Records [phone]       MENTAL HEALTH CRISIS RESOURCES:  For a emergency help, please call 911 or go to the nearest Emergency Department.     Emergency Walk-In Options:   [facility] Unit @ [facility] ([place]): [phone] - Specialized mental health emergency area designed to be  [facility] ([place]): [phone]  [facility] ([place]): [phone]  [facility] ([place]): [phone]    County Crisis Information:   [place]: [phone]  [place]: [phone]  [place] ([place]) - Adult: [phone]     Child: [phone]  [place] - Adult: [phone]     Child: [phone]  [place]: [phone]  List of all [facility] resources:   https://[place].gov/dhs/people-we-serve/adults/health-care/mental-health/resources/crisis-contacts.jsp    National Crisis Information:   Crisis Text Line: Text  [place]  to 596420  Suicide & Crisis Lifeline: 988  National Suicide Prevention Lifeline: 5-789-203-TALK (1-120.993.7501)       For online chat options, visit https://suicidepreventionlifeline.org/chat/  Poison Control Center: 1-372.832.1659  Trans Lifeline: 1-302.479.2061 - Hotline for transgender people of all ages  The Ra Project: 9-812-533-4949 - Hotline for LGBT youth     For Non-Emergency Support:   Fast Tracker: Mental Health & Substance Use Disorder Resources -   https://www.[employer].org/
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
## 2023-02-25 ENCOUNTER — HOSPITAL ENCOUNTER (EMERGENCY)
Facility: CLINIC | Age: 38
Discharge: HOME | End: 2023-02-25
Attending: EMERGENCY MEDICINE | Admitting: EMERGENCY MEDICINE
Payer: MEDICAID

## 2023-02-25 ENCOUNTER — APPOINTMENT (OUTPATIENT)
Dept: GENERAL RADIOLOGY | Facility: CLINIC | Age: 38
End: 2023-02-25
Attending: EMERGENCY MEDICINE
Payer: MEDICAID

## 2023-02-25 VITALS
RESPIRATION RATE: 14 BRPM | DIASTOLIC BLOOD PRESSURE: 105 MMHG | OXYGEN SATURATION: 94 % | HEART RATE: 99 BPM | TEMPERATURE: 98.2 F | SYSTOLIC BLOOD PRESSURE: 149 MMHG

## 2023-02-25 DIAGNOSIS — R10.13 DYSPEPSIA: ICD-10-CM

## 2023-02-25 DIAGNOSIS — R07.89 CHEST DISCOMFORT: ICD-10-CM

## 2023-02-25 LAB
ALBUMIN SERPL BCG-MCNC: 4 G/DL (ref 3.5–5.2)
ALP SERPL-CCNC: 83 U/L (ref 35–104)
ALT SERPL W P-5'-P-CCNC: 13 U/L (ref 10–35)
ANION GAP SERPL CALCULATED.3IONS-SCNC: 13 MMOL/L (ref 7–15)
AST SERPL W P-5'-P-CCNC: 27 U/L (ref 10–35)
ATRIAL RATE - MUSE: 106 BPM
BASOPHILS # BLD AUTO: 0.1 10E3/UL (ref 0–0.2)
BASOPHILS NFR BLD AUTO: 1 %
BILIRUB SERPL-MCNC: 0.4 MG/DL
BUN SERPL-MCNC: 11.4 MG/DL (ref 6–20)
CALCIUM SERPL-MCNC: 8.9 MG/DL (ref 8.6–10)
CHLORIDE SERPL-SCNC: 98 MMOL/L (ref 98–107)
CREAT SERPL-MCNC: 0.8 MG/DL (ref 0.51–0.95)
D DIMER PPP FEU-MCNC: <0.27 UG/ML FEU (ref 0–0.5)
DEPRECATED HCO3 PLAS-SCNC: 25 MMOL/L (ref 22–29)
DIASTOLIC BLOOD PRESSURE - MUSE: NORMAL MMHG
EOSINOPHIL # BLD AUTO: 0.1 10E3/UL (ref 0–0.7)
EOSINOPHIL NFR BLD AUTO: 2 %
ERYTHROCYTE [DISTWIDTH] IN BLOOD BY AUTOMATED COUNT: 12.7 % (ref 10–15)
GFR SERPL CREATININE-BSD FRML MDRD: >90 ML/MIN/1.73M2
GLUCOSE SERPL-MCNC: 102 MG/DL (ref 70–99)
HCG INTACT+B SERPL-ACNC: <1 MIU/ML
HCT VFR BLD AUTO: 46.5 % (ref 35–47)
HGB BLD-MCNC: 15 G/DL (ref 11.7–15.7)
IMM GRANULOCYTES # BLD: 0 10E3/UL
IMM GRANULOCYTES NFR BLD: 1 %
INTERPRETATION ECG - MUSE: NORMAL
LYMPHOCYTES # BLD AUTO: 1.7 10E3/UL (ref 0.8–5.3)
LYMPHOCYTES NFR BLD AUTO: 21 %
MCH RBC QN AUTO: 29 PG (ref 26.5–33)
MCHC RBC AUTO-ENTMCNC: 32.3 G/DL (ref 31.5–36.5)
MCV RBC AUTO: 90 FL (ref 78–100)
MONOCYTES # BLD AUTO: 0.7 10E3/UL (ref 0–1.3)
MONOCYTES NFR BLD AUTO: 8 %
NEUTROPHILS # BLD AUTO: 5.7 10E3/UL (ref 1.6–8.3)
NEUTROPHILS NFR BLD AUTO: 67 %
NRBC # BLD AUTO: 0 10E3/UL
NRBC BLD AUTO-RTO: 0 /100
P AXIS - MUSE: 23 DEGREES
PLATELET # BLD AUTO: 218 10E3/UL (ref 150–450)
POTASSIUM SERPL-SCNC: 4.1 MMOL/L (ref 3.4–5.3)
PR INTERVAL - MUSE: 186 MS
PROT SERPL-MCNC: 7.1 G/DL (ref 6.4–8.3)
QRS DURATION - MUSE: 78 MS
QT - MUSE: 328 MS
QTC - MUSE: 435 MS
R AXIS - MUSE: -18 DEGREES
RBC # BLD AUTO: 5.17 10E6/UL (ref 3.8–5.2)
SODIUM SERPL-SCNC: 136 MMOL/L (ref 136–145)
SYSTOLIC BLOOD PRESSURE - MUSE: NORMAL MMHG
T AXIS - MUSE: 18 DEGREES
TROPONIN T SERPL HS-MCNC: <6 NG/L
VENTRICULAR RATE- MUSE: 106 BPM
WBC # BLD AUTO: 8.4 10E3/UL (ref 4–11)

## 2023-02-25 PROCEDURE — 93005 ELECTROCARDIOGRAM TRACING: CPT

## 2023-02-25 PROCEDURE — 85025 COMPLETE CBC W/AUTO DIFF WBC: CPT | Performed by: EMERGENCY MEDICINE

## 2023-02-25 PROCEDURE — 99285 EMERGENCY DEPT VISIT HI MDM: CPT | Mod: 25

## 2023-02-25 PROCEDURE — 71046 X-RAY EXAM CHEST 2 VIEWS: CPT

## 2023-02-25 PROCEDURE — 84484 ASSAY OF TROPONIN QUANT: CPT | Performed by: EMERGENCY MEDICINE

## 2023-02-25 PROCEDURE — 80053 COMPREHEN METABOLIC PANEL: CPT | Performed by: EMERGENCY MEDICINE

## 2023-02-25 PROCEDURE — 85379 FIBRIN DEGRADATION QUANT: CPT | Performed by: EMERGENCY MEDICINE

## 2023-02-25 PROCEDURE — 84702 CHORIONIC GONADOTROPIN TEST: CPT | Performed by: EMERGENCY MEDICINE

## 2023-02-25 PROCEDURE — 36415 COLL VENOUS BLD VENIPUNCTURE: CPT | Performed by: EMERGENCY MEDICINE

## 2023-02-25 RX ORDER — FAMOTIDINE 40 MG/1
40 TABLET, FILM COATED ORAL DAILY
Qty: 30 TABLET | Refills: 0 | Status: SHIPPED | OUTPATIENT
Start: 2023-02-25 | End: 2023-03-27

## 2023-02-25 ASSESSMENT — ACTIVITIES OF DAILY LIVING (ADL)
ADLS_ACUITY_SCORE: 37
ADLS_ACUITY_SCORE: 37

## 2023-02-25 NOTE — ED NOTES
"Pt given courtesy meal. Pt has been informed that she is not pregnant. She has become very quiet and does not answer many of the questions staff asks. She eventually said \"My mom told you to lie to me didn't she? I know I am pregnant. How come I always hear noises in my stomach?\" The pt was then shown her HCG results on the computer to show that staff were not lying to her.   "

## 2023-02-25 NOTE — ED TRIAGE NOTES
Pt from [facility]. For more than a week the pt has had chest pain. Yesterday the pain became much worse when eating and drinking which continued again today while eating and drinking. Now describes 5/10 midsternal burning pain.
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
## 2023-02-26 NOTE — ED NOTES
Mother was called and asked about how the pt should be transported back to . She agreed to come pick her up.

## 2023-02-26 NOTE — DISCHARGE INSTRUCTIONS
As we discussed, your labs are reassuring today, please come back to the ER with any other concerns you have, or with any worsening pain.

## 2023-02-27 PROBLEM — T14.91XA SUICIDE ATTEMPT (H): Status: RESOLVED | Noted: 2017-09-20 | Resolved: 2023-02-26

## 2023-02-27 PROBLEM — S82.409A CLOSED FIBULAR FRACTURE: Status: RESOLVED | Noted: 2018-12-20 | Resolved: 2023-02-26

## 2023-02-27 PROBLEM — S82.143A TIBIAL PLATEAU FRACTURE: Status: RESOLVED | Noted: 2019-01-15 | Resolved: 2023-02-27

## 2023-02-27 RX ORDER — ALBUTEROL SULFATE 90 UG/1
1-2 AEROSOL, METERED RESPIRATORY (INHALATION) EVERY 4 HOURS PRN
COMMUNITY
Start: 2023-02-22

## 2023-02-27 RX ORDER — FAMOTIDINE 20 MG/1
20 TABLET, FILM COATED ORAL 2 TIMES DAILY
COMMUNITY
Start: 2022-04-20

## 2023-02-27 RX ORDER — SALIVA STIMULANT COMB. NO.5
1 LOZENGE MUCOUS MEMBRANE EVERY 4 HOURS PRN
COMMUNITY
Start: 2022-04-20

## 2023-03-01 NOTE — ED NOTES
Bed: ED04  Expected date:   Expected time:   Means of arrival:   Comments:  harjinder - 518 - 33F fall leg pain eta 1300   Rogerio Rogers is a 87 year old female here for  Chief Complaint   Patient presents with   • Follow-up     Multiple myeloma not having achieved remission (CMS/HCC)     Visit Vitals  /56   Pulse 66   Temp 97.3 °F (36.3 °C) (Oral)   Resp 16   Wt 76 kg (167 lb 9.6 oz)   SpO2 97%   BMI 36.27 kg/m²       Denies latex allergy or sensitivity.    Medication verified and med list updated.  PCP and Pharmacy verified.    Social History     Tobacco Use   Smoking Status Never   Smokeless Tobacco Never     Advance Directives Filed: Yes    ECOG:   ECOG [03/01/23 0923]   ECOG Performance Status 1       Height: No.  Ht Readings from Last 1 Encounters:   08/22/22 4' 9\" (1.448 m)     Weight:Yes, shoes on.  Wt Readings from Last 3 Encounters:   03/01/23 76 kg (167 lb 9.6 oz)   02/01/23 75.3 kg (166 lb 1.6 oz)   01/04/23 74.3 kg (163 lb 11.2 oz)       BMI: Body mass index is 36.27 kg/m².    PSYCHIATRIC: Patient denies insomnia, depression, anxiety

## 2023-04-18 ENCOUNTER — OFFICE VISIT (OUTPATIENT)
Dept: PSYCHIATRY | Facility: CLINIC | Age: 38
End: 2023-04-18
Attending: PSYCHIATRY & NEUROLOGY
Payer: MEDICAID

## 2023-04-18 VITALS
SYSTOLIC BLOOD PRESSURE: 156 MMHG | DIASTOLIC BLOOD PRESSURE: 113 MMHG | HEART RATE: 105 BPM | WEIGHT: 293 LBS | BODY MASS INDEX: 50.43 KG/M2

## 2023-04-18 DIAGNOSIS — F25.0 SCHIZOAFFECTIVE DISORDER, BIPOLAR TYPE (H): ICD-10-CM

## 2023-04-18 PROCEDURE — G0463 HOSPITAL OUTPT CLINIC VISIT: HCPCS | Performed by: STUDENT IN AN ORGANIZED HEALTH CARE EDUCATION/TRAINING PROGRAM

## 2023-04-18 PROCEDURE — 99214 OFFICE O/P EST MOD 30 MIN: CPT | Mod: GC | Performed by: STUDENT IN AN ORGANIZED HEALTH CARE EDUCATION/TRAINING PROGRAM

## 2023-04-18 ASSESSMENT — ABNORMAL INVOLUNTARY MOVEMENT SCALE (AIMS)
EDENTIA: NO
PATIENT_WEARS_DENTURES: NO
AIMS_PATIENT_AWARENESS: NO AWARENESS
TONGUE: 0
CURRENT_PROBLEMS_TEETH_DENTURES: NO

## 2023-04-18 ASSESSMENT — PAIN SCALES - GENERAL: PAINLEVEL: NO PAIN (0)

## 2023-04-18 NOTE — PROGRESS NOTES
"     [facility]  Psychiatry Clinic  MEDICAL PROGRESS NOTE     CARE TEAM:  PCP- Suzie Mariscal    Psychotherapist- unknown by patient    Community MH Team- ARM worker, CM, group home staff.   CADI : Charmaine Redmand [phone].   Mental Health : Bre [phone]        Guardian: Mary \"Swetha\" Chriss (mother)- [phone]   Group Home: [phone]. , : [phone].     Karolyn Banerjee is a 37 year old who uses the name Karolyn and pronouns she, her, herself.      DIAGNOSIS   # Schizoaffective Disorder, bipolar type, MRE depressed  # Unspecified Neurocognitive Disorder     ASSESSMENT    Karolyn Banerjee has diagnoses of schizoaffective disorder and neurocognitive disorder and has been living at her current group home for approximately 3 years. Karolyn and group home staff report that she is doing well overall. No behavioral outbursts and she socializes with peers, spends the days working on crafts. Karolyn continues to report persistent AH of guys (3 seperate individuals) proclaiming their interest in her. This experience gives her anxiety because now she feels torn about which one to choose. Karolyn reports some sedation she attributes to medications, but decreasing antipsychotic would likely exacerbate psychosis at this point. Tried to reach guardian/mother Swetha, but phone went to voicemail. AIMS completed today and no abnormal movements detected. Karolyn will follow up in 3 months after resident transition. No acute safety concerns identified, no SI or HI reported      MNPMP review was not needed today.     PLAN                                                                                                                1) Meds-   - Continue lurasidone 160 mg daily w/dinner  - Continue perphenazine 12 mg each morning and 16 mg at bedtime  - Continue bupropion  mg daily  - Continue benztropine 1.5 mg at 5 PM    2) " "Psychotherapy-continue current therapy    3) Next due-  Labs- AP labs completed 10/2022 and availble in CareEverywhere (all WNL - CBC, CMP, lipids, A1c)  EKG- last 1/2019 Qtc 428, recheck as needed   Rating scales- AIMS: completed today (4/18/23) = 0.    4) Referrals-  none    5) Dispo-return to clinic in 3 months with new resident, call for sooner appt if needed.     PERTINENT BACKGROUND                                                    [most recent eval 07/28/22]   Karolyn first experienced mental health issues as a child and has received treatment for schizoaffective disorder, bipolar type, borderline personality disorder, and unspecified neurocognitive disorder. Notably, the patient's symptoms have historically included psychosis (auditory hallucinations, delusions related to pregnancy, agitation) and suicide attempts/threats to run into traffic or drown herself in a pond, often in reaction to perceived slights or difficult interactions with group home staff/residents. She has been tried on many different medication trials in the past, including two trials of clozapine that caused neutropenia. ECT along with medication has been the most helpful treatment for maintaining stability. ECT with Dr. Amos was discontinued in fall 2017 after patient remained relatively stable without it for 3 months (last hospitalization February 2018). Her mother is her guardian. Karolyn fell and broke her leg in 12/2018 which resulted in multiple surgeries, a long transitional care stay, and ongoing use of a walker.      Psych pertinent item history includes suicide attempt [multiple], suicidal ideation, SIB [per chart], psychosis [sxs include auditory hallucinations, delusions], mutiple psychotropic trials , severe med reaction, violent behavior, psych hosp (>5), commitment and ECT     SUBJECTIVE   Karolyn Banerjee was last seen in clinic 3 months ago when medications were continued.    -Updates:    \"too tired most of the time\" - 2 " "naps for at least an hour each day   -still living at , feels well supported  -Social: playing cards with Isabella (peer), lives with 4 other people, feels safe and taken care of   -Mood: \"okay\"  -Depression:  \"A little bit but not much\" - about Remy and relationship issues  -Anxiety: yes, worries about doing the right thing regarding love interests  -SI/HI: no  -Sleep: around 7PM until 7AM, \"feel tired all the time\"  -Psychosis: can hear 3 voices (1. Mohsne from [employer], \"he likes me but says I'm too fat\", will help her lose weight. 2. Remy. 3. \"Other mohsen\" from [employer] who is interested in dating her).  -Substances: no  -Therapy: no  -Medications: administered by staff    -Collateral:  staff reports that Karolyn is doing 'fine'. She is doing well and comes out of room to work on beads and coloring. They have no concerns about her.    Pertinent Negatives: No suicidal or  violent ideation, self-injury, david, hypomania, aggression or harmful substance use  Adverse Effects: sedation     FAMILY and SOCIAL HISTORY                                 pt reported       Family Hx: Per chart review pts father with mild developmental disorder, she has an uncle with schizophrenia. One of her brothers  by suicide in 2017.     Social Hx:  Financial/ Work- social security disability       Partner/ - none  Children- none      Living situation- Lives at group home - [facility]         Social/ Spiritual Support- boyfriend Remy, family members, mental health support staff. Before pandemic, attended [employer] with her parents and attended [facility] [email or web address] day program in [place] twice a week.     Legal- no     Feels Safe at Home- yes   Legal- None          PAST PSYCHIATRIC HISTORY     SIB-patient denies, yes per chart review  Suicidal Ideation Hx- Hx of both active and passive SI, often in response to perceived slights or minor stressors. Plans in the past have included drowning herself in pool by group " home or running into traffic.  Suicide Attempt- #- yes, several, walking toward pond or into traffic, most recent- Nov 2019    Violence/Aggression Hx- yes, Aggression during hospitalizations.  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 (not eligible for retrial)     olanzapine         quetiapine         ziprasidone         risperidone   dystonia     aripiprazole   ineffective     haloperidol   Developed parkinsonism     divalproex         sertraline         venlafaxine              MEDICAL HISTORY and ALLERGY     ALLERGIES: Clozapine, Liquid adhesive, Risperdal, and Adhesive tape    Patient Active Problem List   Diagnosis     Schizoaffective disorder, bipolar type (H)     Mild cognitive impairment     Borderline personality disorder (H)     Asthma     Nonorganic enuresis     Suicidal ideation     Port catheter in place     Hx of psychiatric care     DVT (deep vein thrombosis) in pregnancy     JOSE (obstructive sleep apnea)     Morbid obesity (H)        MEDICAL REVIEW OF SYSTEMS   Contraception- did not discuss    none in addition to that documented above     MEDICATIONS     Current Outpatient Medications   Medication Sig Dispense Refill     ACETAMINOPHEN PO Take 650 mg by mouth every 4 hours as needed        albuterol (PROAIR HFA/PROVENTIL HFA/VENTOLIN HFA) 108 (90 Base) MCG/ACT inhaler Inhale 1-2 puffs into the lungs every 4 hours as needed       Artificial Saliva (SALIVASURE) LOZG 1 lozenge by Transmucosal route every 4  "hours as needed for dry mouth       benztropine (COGENTIN) 0.5 MG tablet Take 3 tablets (1.5 mg) by mouth every evening 90 tablet 3     buPROPion (WELLBUTRIN XL) 150 MG 24 hr tablet Take 3 tablets (450 mg) by mouth every morning 90 tablet 3     cetirizine (ZYRTEC) 10 MG tablet Take 10 mg by mouth daily       famotidine (PEPCID) 20 MG tablet Take 20 mg by mouth 2 times daily       lurasidone (LATUDA) 80 MG TABS tablet TAKE 2 TABLETS BY MOUTH DAILY WITH DINNER 60 tablet 3     norethindrone-ethinyl estradiol (JUNEL FE 1/20) 1-20 MG-MCG tablet Take 1 tablet by mouth       norethindrone-ethinyl estradiol (MICROGESTIN 1/20) 1-20 MG-MCG per tablet Take 1 tablet by mouth daily        Omega-3 Fatty Acids (FISH OIL PO) Take 1,000 mg by mouth daily HOLD WHILE IN U       perphenazine 16 MG tablet Take 1 tablet (16 mg) by mouth At Bedtime 30 tablet 3     perphenazine 8 MG tablet TAKE 1 & 1/2 TABLETS BY MOUTH EVERY MORNING 45 tablet 3     senna-docusate (SENOKOT-S/PERICOLACE) 8.6-50 MG tablet Take 8.6 mg by mouth       sennosides (SENOKOT) 8.6 MG tablet Take 1 tablet by mouth 2 times daily as needed         VITALS   LMP  (LMP Unknown)      Pulse Readings from Last 3 Encounters:   02/25/23 99   09/20/21 97   02/06/20 106     Wt Readings from Last 3 Encounters:   02/06/20 134.5 kg (296 lb 9.6 oz)   11/06/19 134.3 kg (296 lb)   07/10/19 133.8 kg (295 lb)     BP Readings from Last 3 Encounters:   02/25/23 (!) 149/105   09/20/21 132/77   02/06/20 (!) 142/102        MENTAL STATUS EXAM     Alertness: alert  and oriented  Appearance: casually groomed and obese  Behavior/Demeanor: cooperative and calm, with appropriate eye contact   Speech: increased latency of response and slowed, normal prosody  Language: intact and no obvious problem  Psychomotor: normal or unremarkable  Mood: \"okay\"  Affect: flat; congruent to: mood- yes, content- yes  Thought Process/Associations: linear, goal oriented, concrete  Thought Content:  Reports delusions " [details in history];  Denies suicidal ideation and violent ideation  Perception:  Reports auditory hallucinations without commands [details in history];  Denies visual hallucinations  Insight: limited  Judgment: fair and adequate for safety  Cognition: baseline  Gait and Station: unremarkable     LABS and DATA         8/31/2018     1:10 PM 11/2/2018     3:10 PM 11/6/2019     1:00 PM   PHQ   PHQ-9 Total Score 5 6 3   Q9: Thoughts of better off dead/self-harm past 2 weeks Several days Several days Several days     WHITE BLOOD COUNT         4.5 - 11.0 thou/cu mm 7.6    RED BLOOD COUNT           4.00 - 5.20 mil/cu mm 4.78    HEMOGLOBIN                12.0 - 16.0 g/dL 14.1    HEMATOCRIT                33.0 - 51.0 % 42.3    MCV                       80 - 100 fL 89    MCH                       26.0 - 34.0 pg 29.5    MCHC                      32.0 - 36.0 g/dL 33.3    RDW                       11.5 - 15.5 % 13.0    PLATELET COUNT            140 - 440 thou/cu mm 196    MPV                       6.5 - 11.0 fL 9.6    Resulting [facility]   Specimen Collected: 10/03/22  3:14 PM Last Resulted: 10/03/22  3:18 PM      Ref Range & Units 1 mo ago Comments   SODIUM 135 - 145 mmol/L 140     POTASSIUM 3.5 - 5.0 mmol/L 4.0     CHLORIDE 98 - 110 mmol/L 105     CO2,TOTAL 21 - 31 mmol/L 23     ANION GAP 5 - 18 12     GLUCOSE 65 - 100 mg/dL 134 High      CALCIUM 8.5 - 10.5 mg/dL 8.5     BUN 8 - 25 mg/dL 12     CREATININE 0.57 - 1.11 mg/dL 0.87     BUN/CREAT RATIO           10 - 20 14     ALBUMIN 3.5 - 5.2 g/dL 3.9     PROTEIN,TOTAL 6.0 - 8.0 g/dL 6.7     GLOBULIN                  2.0 - 3.7 g/dL 2.8     A/G RATIO 1.0 - 2.0 1.4     BILIRUBIN,TOTAL 0.2 - 1.2 mg/dL 0.4     ALK PHOSPHATASE 50 - 136 IU/L 90     ALT (SGPT) 8 - 45 IU/L 21     AST (SGOT) 2 - 40 IU/L 18     eGFR >90 mL/min/1.73m2 88 Low      Resulting [facility] LABORATORY-CENTRAL LABORATORY    Specimen Collected: 10/03/22  3:14 PM Last  Resulted: 10/03/22  9:51 PM      Ref Range & Units 1 mo ago   TSH 0.35 - 4.94 uIU/mL 2.72    Resulting Agency  [facility] LABORATORY-CENTRAL LABORATORY      Ref Range & Units 1 mo ago   CHOLESTEROL,TOTAL 100 - 199 mg/dL 191    TRIGLYCERIDES <150 mg/dL 181 High     HDL CHOLESTEROL >40 mg/dL 47    NON-HDL CHOLESTEROL <145 mg/dl 144    CHOL/HDL RATIO            <4.50                 4.06    LDL CHOLESTEROL <=130 mg/dL 108    VLDL CHOLESTEROL <=30 mg/dL 36 High     PROVIDER ORDERED STATUS  RANDOM    Resulting Agency  [facility] LABORATORY-CENTRAL LABORATORY      Ref Range & Units 1 mo ago   HEMOGLOBIN A1C SCREENING <=6.4 % 5.2    Resulting [facility] LABORATORY-CENTRAL LABORATORY     Specimen Collected: 10/03/22  3:14 PM Last Resulted: 10/05/22  7:15 AM         ECG 2008 QTc = 447 ms     PSYCHOTROPIC DRUG INTERACTIONS                                                       PSYCHCLINICDDI     CETIRIZINE and PERPHENAZINE may result in increased risk of CNS depression.   PERPHENAZINE and BENZTROPINE may result in decreased phenothiazine serum concentrations, decreased phenothiazine effectiveness, enhanced anticholinergic effects (ileus, hyperpyrexia, sedation, dry mouth).      MANAGEMENT:  Monitoring for adverse effects     RISK STATEMENT for SAFETY     Karolyn did not appear to be an imminent safety risk to self or others.    TREATMENT RISK STATEMENT: The risks, benefits, alternatives and potential adverse effects have been discussed and are understood by the pt. The pt understands the risks of using street drugs or alcohol. There are no medical contraindications, the pt agrees to treatment with the ability to do so. The pt knows to call the clinic for any problems or to access emergency care if needed.  Medical and substance use concerns are documented above.  Psychotropic drug interaction check was done, including changes made today.     PROVIDER: Casimiro Lepe MD    Patient staffed in clinic with   Cayden who will sign the note.  Supervisor is Dr. Kerr.

## 2023-04-18 NOTE — NURSING NOTE
Chief Complaint   Patient presents with     Recheck Medication     Schizoaffective disorder, bipolar type (H)

## 2023-04-18 NOTE — PATIENT INSTRUCTIONS
It was nice seeing you today    Treatment Plan Today:     1) Medications-  - Continue lurasidone 160 mg daily w/dinner  - Continue perphenazine 12 mg each morning and 16 mg at bedtime  - Continue bupropion  mg daily  - Continue benztropine 1.5 mg at 5 PM    2) Follow-up appt with new resident in 3 months    3) Crisis numbers are below and clinic after hours number is [phone]       **For crisis resources, please see the information at the end of this document**   Patient Education    Thank you for coming to the [facility] [place] [facility].     Lab Testing:  If you had lab testing today and your results are reassuring or normal they will be mailed to you or sent through [employer] within 7 days. If the lab tests need quick action we will call you with the results. The phone number we will call with results is # [phone]. If this is not the best number please call our clinic and change the number.     Medication Refills:  If you need any refills please call your pharmacy and they will contact us. Our fax number for refills is [phone].   Three business days of notice are needed for general medication refill requests.   Five business days of notice are needed for controlled substance refill requests.   If you need to change to a different pharmacy, please contact the new pharmacy directly. The new pharmacy will help you get your medications transferred.     Contact Us:  Please call [phone] during business hours (8-5:00 M-F).   If you have medication related questions after clinic hours, or on the weekend, please call [phone].     Financial Assistance [phone]   Medical Records [phone]       MENTAL HEALTH CRISIS RESOURCES:  For a emergency help, please call 911 or go to the nearest Emergency Department.     Emergency Walk-In Options:   Em[facility] Unit @ [facility] ([place]): [phone] - Specialized mental health emergency area designed to be  [facility] ([place]): [phone]  [facility] ([place]): [phone]  [facility] ([place]): [phone]    County Crisis Information:   [place]: [phone]  [place]: [phone]  [place] ([place]) - Adult: [phone]     Child: [phone]  [place] - Adult: [phone]     Child: [phone]  [place]: [phone]  List of all [facility] resources:   https://[place].gov/dhs/people-we-serve/adults/health-care/mental-health/resources/crisis-contacts.jsp    National Crisis Information:   Crisis Text Line: Text  [place]  to 661033  Suicide & Crisis Lifeline: 988  National Suicide Prevention Lifeline: 2-183-034-TALK (1-186.700.1037)       For online chat options, visit https://suicidepreventionlifeline.org/chat/  Poison Control Center: 1-458.637.2004  Trans Lifeline: 1-686.510.3937 - Hotline for transgender people of all ages  The Ra Project: 4-389-285-5747 - Hotline for LGBT youth     For Non-Emergency Support:   Fast Tracker: Mental Health & Substance Use Disorder Resources -   https://www.GeneriMedtrackVisiern.org/

## 2023-04-19 RX ORDER — BUPROPION HYDROCHLORIDE 150 MG/1
450 TABLET ORAL EVERY MORNING
Qty: 90 TABLET | Refills: 3 | Status: SHIPPED | OUTPATIENT
Start: 2023-04-19 | End: 2023-08-17

## 2023-04-19 RX ORDER — PERPHENAZINE 16 MG/1
16 TABLET ORAL AT BEDTIME
Qty: 30 TABLET | Refills: 3 | Status: SHIPPED | OUTPATIENT
Start: 2023-04-19 | End: 2023-08-17

## 2023-04-19 RX ORDER — LURASIDONE HYDROCHLORIDE 80 MG/1
TABLET, FILM COATED ORAL
Qty: 60 TABLET | Refills: 3 | Status: SHIPPED | OUTPATIENT
Start: 2023-04-19 | End: 2023-08-17

## 2023-04-19 RX ORDER — PERPHENAZINE 8 MG/1
TABLET ORAL
Qty: 45 TABLET | Refills: 3 | Status: SHIPPED | OUTPATIENT
Start: 2023-04-19 | End: 2023-08-17

## 2023-04-19 RX ORDER — BENZTROPINE MESYLATE 0.5 MG/1
1.5 TABLET ORAL EVERY EVENING
Qty: 90 TABLET | Refills: 3 | Status: SHIPPED | OUTPATIENT
Start: 2023-04-19 | End: 2023-08-17

## 2023-07-12 NOTE — TELEPHONE ENCOUNTER
Social Work Note: Left Message  [facility]    Left Message for Karolyn, pt   Content of Message:  SW called and spoke to the  who said Karolyn was not here, but offered to take a message. SW calling to inquire if Karolyn would be open to meeting with SW before her next psych appt in clinic on 6/2/17 to discuss [facility] referral.  stated Karolyn will arrive 10-15 minutes early on 6/2 to meet with SW.     Included SW's direct contact information.   Will route to patient's current psychiatric provider(s) as an FYI.   Please call or page with any immediate needs or concerns.    MARCELLE Barrett, [facility]  Clinic   Direct: [phone]  Fax: [phone]      
[[phone]]

## 2023-08-16 ENCOUNTER — TELEPHONE (OUTPATIENT)
Dept: PSYCHIATRY | Facility: CLINIC | Age: 38
End: 2023-08-16
Payer: MEDICAID

## 2023-08-16 NOTE — TELEPHONE ENCOUNTER
"Signed \"Diagnostic Assessment\" from 8/17/23 (10 pages) was faxed to [facility] tashi Sidhu at [phone]. A Quick Disclosure was entered.Lily Sandhu LPN Lead    9 pages, including signed SILVER, was received from [facility] requesting completion of Diagnostic Assessment. The forms are being held in Psych until patient next appointment on 8/17/23.Lily Sandhu LPN Lead    "

## 2023-08-17 ENCOUNTER — TELEPHONE (OUTPATIENT)
Dept: PSYCHIATRY | Facility: CLINIC | Age: 38
End: 2023-08-17
Payer: MEDICAID

## 2023-08-17 ENCOUNTER — VIRTUAL VISIT (OUTPATIENT)
Dept: PSYCHIATRY | Facility: CLINIC | Age: 38
End: 2023-08-17
Attending: PSYCHIATRY & NEUROLOGY
Payer: MEDICAID

## 2023-08-17 DIAGNOSIS — F25.0 SCHIZOAFFECTIVE DISORDER, BIPOLAR TYPE (H): Primary | ICD-10-CM

## 2023-08-17 PROCEDURE — 90792 PSYCH DIAG EVAL W/MED SRVCS: CPT | Mod: GC

## 2023-08-17 RX ORDER — PERPHENAZINE 16 MG/1
16 TABLET ORAL AT BEDTIME
Qty: 90 TABLET | Refills: 1 | Status: SHIPPED | OUTPATIENT
Start: 2023-08-17 | End: 2024-03-04

## 2023-08-17 RX ORDER — LURASIDONE HYDROCHLORIDE 80 MG/1
TABLET, FILM COATED ORAL
Qty: 180 TABLET | Refills: 1 | Status: SHIPPED | OUTPATIENT
Start: 2023-08-17 | End: 2024-03-04

## 2023-08-17 RX ORDER — BUPROPION HYDROCHLORIDE 150 MG/1
450 TABLET ORAL EVERY MORNING
Qty: 90 TABLET | Refills: 3 | Status: SHIPPED | OUTPATIENT
Start: 2023-08-17 | End: 2024-01-06

## 2023-08-17 RX ORDER — BENZTROPINE MESYLATE 0.5 MG/1
1.5 TABLET ORAL EVERY EVENING
Qty: 180 TABLET | Refills: 1 | Status: SHIPPED | OUTPATIENT
Start: 2023-08-17 | End: 2024-01-06

## 2023-08-17 RX ORDER — PERPHENAZINE 8 MG/1
TABLET ORAL
Qty: 135 TABLET | Refills: 1 | Status: SHIPPED | OUTPATIENT
Start: 2023-08-17 | End: 2024-03-04
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
## 2023-08-17 NOTE — TELEPHONE ENCOUNTER
Writer called NK ( at Karolyns ) to follow up after her visit with Dr Bahena today.    NK reports they take her BP monthly. The last four readings were 8/7/23: 115/100, July: 116/75, June:125/80, May:122/78.   Per NK, Karolyn was seen recently for complaints of chest pain, however they determined Karolyn was experiencing acid reflux. They prescribed Tums, and Karolyn has not had any complaints of chest discomfort recently. Karolyn has not had any complaints of headache to  staff recently.  We reviewed that if Karolyn has any further complaints of chest discomfort not alleviated by Tums, it would be best to discuss this with her PCP. Encouraged them to share BP readings with PCP as well, which they plan to do.     Will update Dr Bahena and Dr Toussaint.

## 2023-08-17 NOTE — PROGRESS NOTES
Virtual Visit Details    Type of service:  Video Visit     Distant Location (provider location):  On-site  Platform used for Video Visit: Pop

## 2023-08-17 NOTE — NURSING NOTE
Is the patient currently in the state of [place]? YES    Visit mode:VIDEO    If the visit is dropped, the patient can be reconnected by: VIDEO VISIT: Text to cell phone:   Telephone Information:   Mobile [phone]   Mobile [phone]       Will anyone else be joining the visit? NO  (If patient encounters technical issues they should call [phone])    How would you like to obtain your AVS? MyChart    Are changes needed to the allergy or medication list? No    Reason for visit: ALEXANDRIA ROBBINS

## 2023-08-18 NOTE — PATIENT INSTRUCTIONS
**For crisis resources, please see the information at the end of this document**   Patient Education    Thank you for coming to the [facility] [facility].     Lab Testing:  If you had lab testing today and your results are reassuring or normal they will be mailed to you or sent through [facility] within 7 days. If the lab tests need quick action we will call you with the results. The phone number we will call with results is # [phone]. If this is not the best number please call our clinic and change the number.     Medication Refills:  If you need any refills please call your pharmacy and they will contact us. Our fax number for refills is [phone].   Three business days of notice are needed for general medication refill requests.   Five business days of notice are needed for controlled substance refill requests.   If you need to change to a different pharmacy, please contact the new pharmacy directly. The new pharmacy will help you get your medications transferred.     Contact Us:  Please call [phone] during business hours (8-5:00 M-F).   If you have medication related questions after clinic hours, or on the weekend, please call [phone].     Financial Assistance [phone]   Medical Records [phone]       MENTAL HEALTH CRISIS RESOURCES:  For a emergency help, please call 911 or go to the nearest Emergency Department.     Emergency Walk-In Options:   [facility] Unit @ [facility] ([place]): [phone] - Specialized mental health emergency area designed to be calming  [facility] ([place]): [phone]  [facility] ([place]): [phone]  [facility]): [phone]    [facility] Crisis Information:   [place]: [phone]  [place]: [phone]  [place] ([place]ANN[place]) - Adult: [phone]     Child: [phone]  [place] - Adult: [phone]     Child: [phone]  [place]:  [phone]  List of all [facility] resources:   https://[place].gov/dhs/people-we-serve/adults/health-care/mental-health/resources/crisis-contacts.jsp    National Crisis Information:   Crisis Text Line: Text  [place]  to 914565  Suicide & Crisis Lifeline: 988  National Suicide Prevention Lifeline: 9-865-033-TALK (1-512.168.2628)       For online chat options, visit https://suicidepreventionlifeline.org/chat/  Poison Control Center: 9-667-585-8014  Trans Lifeline: 1-127-596-2733 - Hotline for transgender people of all ages  The Ra Project: 7-627-166-8371 - Hotline for LGBT youth     For Non-Emergency Support:   Fast Tracker: Mental Health & Substance Use Disorder Resources -   https://www.[facility]elligencen.org/

## 2023-11-25 ENCOUNTER — HEALTH MAINTENANCE LETTER (OUTPATIENT)
Age: 38
End: 2023-11-25

## 2024-01-05 DIAGNOSIS — F25.0 SCHIZOAFFECTIVE DISORDER, BIPOLAR TYPE (H): ICD-10-CM

## 2024-01-06 RX ORDER — BENZTROPINE MESYLATE 0.5 MG/1
1.5 TABLET ORAL EVERY EVENING
Qty: 90 TABLET | Refills: 0 | Status: SHIPPED | OUTPATIENT
Start: 2024-01-06 | End: 2024-02-16

## 2024-01-06 RX ORDER — BUPROPION HYDROCHLORIDE 150 MG/1
450 TABLET ORAL EVERY MORNING
Qty: 90 TABLET | Refills: 0 | Status: SHIPPED | OUTPATIENT
Start: 2024-01-06 | End: 2024-02-02

## 2024-01-07 NOTE — TELEPHONE ENCOUNTER
Medication requested: benztropine (COGENTIN) 0.5 MG tablet   Last refilled: 8/17/23  Qty: 180/1    Medication requested: buPROPion (WELLBUTRIN XL) 150 MG 24 hr tablet   Last refilled: 8/17/23  Qty: 90/3      Last seen: 8/17/23  RTC: 3 months  Cancel: 0  No-show: 0  Next appt: 0    Refill decision:   30 day piter refill sent to the pharmacy - including instructions for patient to call the clinic and schedule an appointment.  Scheduling has been notified to contact the pt for appointment.

## 2024-02-02 DIAGNOSIS — F25.0 SCHIZOAFFECTIVE DISORDER, BIPOLAR TYPE (H): ICD-10-CM

## 2024-02-02 RX ORDER — BUPROPION HYDROCHLORIDE 150 MG/1
450 TABLET ORAL EVERY MORNING
Qty: 43 TABLET | Refills: 0 | Status: SHIPPED | OUTPATIENT
Start: 2024-02-02 | End: 2024-02-05

## 2024-02-02 NOTE — TELEPHONE ENCOUNTER
Date of Last Office Visit: 8/17/2023  [facility]     Tamika Bass MD     Date of Next Office Visit: 0  No shows since last visit: 0  Cancellations since last visit: 0  ------------------------------  Medication requested:  buPROPion (WELLBUTRIN XL) 150 MG 24 hr tablet  Date last ordered: 1/6/2024 Qty: 90 Refills: 0           Sig - Route: Take 3 tablets (450 mg) by mouth every morning For more refills,schedule an appointment at [phone] - Oral  Sent to pharmacy as: buPROPion HCl ER (XL) 150 MG Oral Tablet Extended Release 24 Hour (WELLBUTRIN XL)  Class: E-Prescribe  ------------------------------     Lapse in medication adherence greater than 5 days?: no  If yes, call patient and gather details: -  Medication refill request verified as identical to current order?: yes       Last Visit Treatment Plan     1) Meds-               - Continue lurasidone 160 mg daily w/dinner  - Continue perphenazine 12 mg each morning and 16 mg at bedtime  - Continue bupropion  mg daily  - Continue benztropine 1.5 mg at 5 PM     2) Psychotherapy-continue current therapy     3) Next due-  Labs- AP labs completed 10/2022, next due in 10/2023  EKG- last 1/2019 Qtc 428, not indicated   Rating scales- AIMS next due in April 2024     4) Referrals-  none     5) Other   Continue current case management services, including any future services as needed.     6) Dispo-return to clinic in 3 months       Refill decision: Refill pended and routed to the provider for review/determination due to the following criteria not met: Patient is outside of RTC timeframe with no future appt scheduled. Already given 30 day piter refill - 14 days pended. Scheduling has been notified to contact the pt for appointment.      Medication unable to be refilled by RN due to criteria not met as indicated.                 []Eligibility - not seen in the last year              [x]Supervision - no  future appointment              []Compliance - no shows, cancellations or lapse in therapy              []Verification - order discrepancy              []Controlled medication              []Medication not included in policy              []90-day supply request              []Other:

## 2024-02-05 DIAGNOSIS — F25.0 SCHIZOAFFECTIVE DISORDER, BIPOLAR TYPE (H): ICD-10-CM

## 2024-02-05 RX ORDER — BUPROPION HYDROCHLORIDE 150 MG/1
450 TABLET ORAL EVERY MORNING
Qty: 90 TABLET | Refills: 0 | Status: SHIPPED | OUTPATIENT
Start: 2024-02-05 | End: 2024-03-04

## 2024-02-05 NOTE — TELEPHONE ENCOUNTER
Received incoming call from staff, CALDERON ([phone]) from a group home wanting to speak with a nurse to discuss mediations. She shared that patient's insurance will not cover 2 week supply of Wellbutrin as the patient gets the mediation in pill pack forms. CALDERON is requesting we send over a 30 day supply so patient can get her mediations. Will reach out to provider for approval as the patient is outside RTC timeframe and therefore only 14 day supply was sent.

## 2024-02-12 ENCOUNTER — VIRTUAL VISIT (OUTPATIENT)
Dept: PSYCHIATRY | Facility: CLINIC | Age: 39
End: 2024-02-12
Attending: PSYCHIATRY & NEUROLOGY
Payer: MEDICAID

## 2024-02-12 DIAGNOSIS — F25.0 SCHIZOAFFECTIVE DISORDER, BIPOLAR TYPE (H): Primary | ICD-10-CM

## 2024-02-12 PROCEDURE — 99207 PR NO BILLABLE SERVICE THIS VISIT: CPT | Mod: 95

## 2024-02-12 NOTE — NURSING NOTE
Is the patient currently in the state of [place]? YES    Visit mode:VIDEO    If the visit is dropped, the patient can be reconnected by: VIDEO VISIT: Text to cell phone:   Telephone Information:   Mobile [phone]   Mobile [phone]    and VIDEO VISIT: Send to e-mail at: [email or web address]    Will anyone else be joining the visit? NO  (If patient encounters technical issues they should call [phone] :[phone])    How would you like to obtain your AVS? MyChart    Are changes needed to the allergy or medication list? Pt stated no changes to allergies and Pt stated no med changes    Reason for visit: ALEXANDRIA STF

## 2024-02-13 DIAGNOSIS — F25.0 SCHIZOAFFECTIVE DISORDER, BIPOLAR TYPE (H): ICD-10-CM

## 2024-02-16 ENCOUNTER — TELEPHONE (OUTPATIENT)
Dept: PSYCHIATRY | Facility: CLINIC | Age: 39
End: 2024-02-16
Payer: MEDICAID

## 2024-02-16 RX ORDER — BENZTROPINE MESYLATE 0.5 MG/1
TABLET ORAL
Qty: 90 TABLET | Refills: 0 | Status: SHIPPED | OUTPATIENT
Start: 2024-02-16 | End: 2024-03-04

## 2024-02-16 NOTE — TELEPHONE ENCOUNTER
See 2/13 refill encounter for further information. Will monitor that encounter to make sure medication is filled before 2 pm today.    Meme Salcedo RN on 2/16/2024 at 10:34 AM

## 2024-02-16 NOTE — TELEPHONE ENCOUNTER
[facility] Call Center    Phone Message    May a detailed message be left on voicemail: yes     Reason for Call: Other: Pharmacy called to check the status of the refill for benzotropine. Pt will be out over the weekend. They are requesting the refill is sent by 2pm today.      Action Taken: Other: [facility] [employer] psych pool    Travel Screening: Not Applicable

## 2024-02-16 NOTE — TELEPHONE ENCOUNTER
Patient seen on 2/12/24. Request forwarded to provider as high priority, as patient will be out of medication this weekend.    Meme Salcedo RN on 2/16/2024 at 10:33 AM

## 2024-03-01 DIAGNOSIS — F25.0 SCHIZOAFFECTIVE DISORDER, BIPOLAR TYPE (H): ICD-10-CM

## 2024-03-03 NOTE — TELEPHONE ENCOUNTER
Date of Last Office Visit: 8/17/2023  [facility]     Tamika Bass MD       Date of Next Office Visit: 0  No shows since last visit: 0  Cancellations since last visit: x1 (2/12- technical difficulties)  ------------------------------  perphenazine 16 MG tablet 90 tablet 1 8/17/2023   Sig - Route: Take 1 tablet (16 mg) by mouth At Bedtime - Oral  Sent to pharmacy as: Perphenazine 16 MG Oral Tablet  ------------------------------    perphenazine 8 MG tablet 135 tablet 1 8/17/2023   Sig: TAKE 1 & 1/2 TABLETS BY MOUTH EVERY MORNING  ------------------------------  lurasidone (LATUDA) 80 MG TABS tablet 180 tablet 1 8/17/2023  ------------------------------     Lapse in medication adherence greater than 5 days?:  no  Medication refill request verified as identical to current order?: yes       Last Visit Treatment Plan     1) Meds-               - Continue lurasidone 160 mg daily w/dinner  - Continue perphenazine 12 mg each morning and 16 mg at bedtime  - Continue bupropion  mg daily  - Continue benztropine 1.5 mg at 5 PM     2) Psychotherapy-continue current therapy     3) Next due-  Labs- AP labs completed 10/2022, next due in 10/2023  EKG- last 1/2019 Qtc 428, not indicated   Rating scales- AIMS next due in April 2024     4) Referrals-  none     5) Other   Continue current case management services, including any future services as needed.     6) Dispo-return to clinic in 3 months       Refill decision: Refill pended and routed to the provider for review/determination due to the following criteria not met: Cxl x1 - missed appointment due to tech difficulties, no future appt scheduled. Scheduling has been notified to contact the pt for appointment.      Medication unable to be refilled by RN due to criteria not met as indicated.                 []Eligibility - not seen in the last year              [x]Supervision - no future appointment               [x]Compliance - no shows, cancellations or lapse in therapy              []Verification - order discrepancy              []Controlled medication              []Medication not included in policy              []90-day supply request              []Other:

## 2024-03-04 RX ORDER — LURASIDONE HYDROCHLORIDE 80 MG/1
TABLET, FILM COATED ORAL
Qty: 60 TABLET | Refills: 0 | Status: SHIPPED | OUTPATIENT
Start: 2024-03-04 | End: 2024-03-26

## 2024-03-04 RX ORDER — PERPHENAZINE 8 MG/1
12 TABLET ORAL EVERY MORNING
Qty: 45 TABLET | Refills: 0 | Status: SHIPPED | OUTPATIENT
Start: 2024-03-04 | End: 2024-03-26

## 2024-03-04 RX ORDER — PERPHENAZINE 16 MG/1
16 TABLET ORAL AT BEDTIME
Qty: 30 TABLET | Refills: 0 | Status: SHIPPED | OUTPATIENT
Start: 2024-03-04 | End: 2024-03-26

## 2024-03-04 RX ORDER — BUPROPION HYDROCHLORIDE 150 MG/1
450 TABLET ORAL EVERY MORNING
Qty: 90 TABLET | Refills: 0 | Status: SHIPPED | OUTPATIENT
Start: 2024-03-04 | End: 2024-03-26

## 2024-03-04 RX ORDER — BENZTROPINE MESYLATE 0.5 MG/1
1.5 TABLET ORAL EVERY EVENING
Qty: 90 TABLET | Refills: 0 | Status: SHIPPED | OUTPATIENT
Start: 2024-03-04 | End: 2024-03-26

## 2024-03-04 NOTE — TELEPHONE ENCOUNTER
Date of Last Office Visit:8/17/2023  [facility]  Tamika Bass MD       Date of Next Office Visit: 0  No shows since last visit: 0  Cancellations since last visit: x1 2/12  ------------------------------  benztropine (COGENTIN) 0.5 MG tablet 90 tablet 0 2/16/2024 - No  Sig: TAKE 3 TABLETS BY MOUTH EVERY EVENING  Sent to pharmacy as: Benztropine Mesylate 0.5 MG Oral Tablet (COGENTIN)      buPROPion (WELLBUTRIN XL) 150 MG 24 hr tablet 90 tablet 0 2/5/2024 - No  Sig - Route: Take 3 tablets (450 mg) by mouth every morning For more refills,schedule an appointment at [phone] - Oral  Sent to pharmacy as: buPROPion HCl ER (XL) 150 MG Oral Tablet Extended Release 24 Hour (WELLBUTRIN XL)  Class: E-Prescribe  ------------------------------     Lapse in medication adherence greater than 5 days?: no   Medication refill request verified as identical to current order?: yes             Refill decision: Refill pended and routed to the provider for review/determination due to the following criteria not met: Last visit not completed, cxl/no show x1. No future appt.    Medication unable to be refilled by RN due to criteria not met as indicated.                 []Eligibility - not seen in the last year              [x]Supervision - no future appointment              [x]Compliance - no shows, cancellations or lapse in therapy              []Verification - order discrepancy              []Controlled medication              []Medication not included in policy              []90-day supply request              []Other:

## 2024-03-26 DIAGNOSIS — F25.0 SCHIZOAFFECTIVE DISORDER, BIPOLAR TYPE (H): ICD-10-CM

## 2024-03-26 RX ORDER — PERPHENAZINE 8 MG/1
TABLET ORAL
Qty: 21 TABLET | Refills: 0 | Status: SHIPPED | OUTPATIENT
Start: 2024-03-26 | End: 2024-04-16

## 2024-03-26 RX ORDER — PERPHENAZINE 16 MG/1
TABLET ORAL
Qty: 14 TABLET | Refills: 0 | Status: SHIPPED | OUTPATIENT
Start: 2024-03-26 | End: 2024-04-16

## 2024-03-26 RX ORDER — LURASIDONE HYDROCHLORIDE 80 MG/1
TABLET, FILM COATED ORAL
Qty: 28 TABLET | Refills: 0 | Status: SHIPPED | OUTPATIENT
Start: 2024-03-26 | End: 2024-04-16

## 2024-03-26 RX ORDER — BUPROPION HYDROCHLORIDE 150 MG/1
TABLET ORAL
Qty: 42 TABLET | Refills: 0 | Status: SHIPPED | OUTPATIENT
Start: 2024-03-26 | End: 2024-04-16

## 2024-03-26 RX ORDER — BENZTROPINE MESYLATE 0.5 MG/1
TABLET ORAL
Qty: 42 TABLET | Refills: 0 | Status: SHIPPED | OUTPATIENT
Start: 2024-03-26 | End: 2024-04-16

## 2024-03-27 NOTE — TELEPHONE ENCOUNTER
buPROPion (WELLBUTRIN XL) 150 MG   benztropine (COGENTIN) 0.5 MG   Last Written Prescription Date:  3/4/24  Last Fill Quantity: 90,   # refills: 0     lurasidone (LATUDA) 80 MG   Last Written Prescription Date:  3/4/24  Last Fill Quantity: 60,   # refills: 0    perphenazine 8 MG   Last Written Prescription Date:  3/4/24  Last Fill Quantity:45  # refills: 0    perphenazine 16 MG   Last Written Prescription Date:  3/4/24  Last Fill Quantity:30  # refills: 0    Last seen: 2/12/24  RTC: 3 MOS  Cancel: 2/12/24  DROPPED CALL  No-show: 0  Next appt: 0     14 DAY REFILL SENT   Scheduling has been notified to contact the pt for appointment.

## 2024-04-16 DIAGNOSIS — F25.0 SCHIZOAFFECTIVE DISORDER, BIPOLAR TYPE (H): ICD-10-CM

## 2024-04-16 RX ORDER — BENZTROPINE MESYLATE 0.5 MG/1
TABLET ORAL
Qty: 84 TABLET | Refills: 0 | Status: SHIPPED | OUTPATIENT
Start: 2024-04-16 | End: 2024-04-16

## 2024-04-16 RX ORDER — PERPHENAZINE 8 MG/1
TABLET ORAL
Qty: 42 TABLET | Refills: 0 | Status: SHIPPED | OUTPATIENT
Start: 2024-04-16 | End: 2024-04-16

## 2024-04-16 RX ORDER — BENZTROPINE MESYLATE 0.5 MG/1
TABLET ORAL
Qty: 90 TABLET | Refills: 0 | Status: SHIPPED | OUTPATIENT
Start: 2024-04-16 | End: 2024-04-26

## 2024-04-16 RX ORDER — PERPHENAZINE 8 MG/1
TABLET ORAL
Qty: 45 TABLET | Refills: 0 | Status: SHIPPED | OUTPATIENT
Start: 2024-04-16 | End: 2024-08-30

## 2024-04-16 RX ORDER — BUPROPION HYDROCHLORIDE 150 MG/1
TABLET ORAL
Qty: 90 TABLET | Refills: 0 | Status: SHIPPED | OUTPATIENT
Start: 2024-04-16 | End: 2024-04-26

## 2024-04-16 RX ORDER — PERPHENAZINE 16 MG/1
TABLET ORAL
Qty: 28 TABLET | Refills: 0 | Status: SHIPPED | OUTPATIENT
Start: 2024-04-16 | End: 2024-04-16

## 2024-04-16 RX ORDER — LURASIDONE HYDROCHLORIDE 80 MG/1
TABLET, FILM COATED ORAL
Qty: 56 TABLET | Refills: 0 | Status: SHIPPED | OUTPATIENT
Start: 2024-04-16 | End: 2024-04-16

## 2024-04-16 RX ORDER — BUPROPION HYDROCHLORIDE 150 MG/1
TABLET ORAL
Qty: 84 TABLET | Refills: 0 | Status: SHIPPED | OUTPATIENT
Start: 2024-04-16 | End: 2024-04-16

## 2024-04-16 RX ORDER — PERPHENAZINE 16 MG/1
TABLET ORAL
Qty: 30 TABLET | Refills: 0 | Status: SHIPPED | OUTPATIENT
Start: 2024-04-16 | End: 2024-04-26

## 2024-04-16 RX ORDER — LURASIDONE HYDROCHLORIDE 80 MG/1
TABLET, FILM COATED ORAL
Qty: 60 TABLET | Refills: 0 | Status: SHIPPED | OUTPATIENT
Start: 2024-04-16 | End: 2024-04-26

## 2024-04-16 NOTE — TELEPHONE ENCOUNTER
Health Call Center    Phone Message    May a detailed message be left on voicemail: no    Reason for Call: Medication Refill Request    Has the patient contacted the pharmacy for the refill? Yes   Name of medication being requested: All listed prescriptions  Provider who prescribed the medication: Dr. Bahena  Pharmacy: [facility] [place] [id number] - [place], [place] - [address]   Date medication is needed: 4/16/24      Patient's group home called requesting refills for all the patient's listed prescriptions. The patient will be running out of some of their prescriptions by tonight, and so the group home was hoping to have them refilled ASAP.    Action Taken: Message routed to:  Other: [facility] psychiatry nurse [place]    Travel Screening: Not Applicable

## 2024-04-16 NOTE — TELEPHONE ENCOUNTER
[facility]    Phone Message    May a detailed message be left on voicemail: yes     Reason for Call: Medication Question or concern regarding medication   Prescription Clarification  Name of Medication: All meds  Prescribing Provider:    Pharmacy: [facility]   What on the order needs clarification? Pharmacy received a 28 day quantity for medication, but since the pt has MA, she is wondering if the pt can get a 30 day supply.       Action Taken: Other: [facility] [place] [facility]    Travel Screening: Not Applicable

## 2024-04-16 NOTE — TELEPHONE ENCOUNTER
Last seen: 02/12/2024  RTC: 3 months  Cancel: None  No-show: None  Next appt: 04/25/2024     Incoming refill from Caregiver via phone    Medication requested:   Pending Prescriptions:                       Disp   Refills    lurasidone (LATUDA) 80 MG TABS tablet     28 tab*0            Sig: TAKE 2 TABLETS BY MOUTH DAILY WITH DINNER           **PLEASE SCHEDULE APPT. FOR REFILLS    perphenazine 16 MG tablet                 14 tab*0            Sig: TAKE 1 TABLET (16 MG) BY MOUTH AT BEDTIME           **PLEASE SCHEDULE APPT.FOR REFILLS    perphenazine 8 MG tablet                  21 tab*0            Sig: TAKE 1 & 1/2 TABLETS BY MOUTH EVERY MORNING           **PLEASE SCHEDULE APPT.FOR REFILLS    buPROPion (WELLBUTRIN XL) 150 MG 24 hr ta*42 tab*0            Sig: TAKE 3 TABLETS (450MG) BY MOUTH DAILY IN THE           MORNING  * PLEASE SCHEDULE APPT. FOR REFILLS    benztropine (COGENTIN) 0.5 MG tablet      42 tab*0            Sig: TAKE 3 TABLETS BY MOUTH EVERY EVENING * PLEASE           SCHEDULE APPT.FOR REFILLS      From chart note:   1) Meds-               - Continue lurasidone 160 mg daily w/dinner  - Continue perphenazine 12 mg each morning and 16 mg at bedtime  - Continue bupropion  mg daily  - Continue benztropine 1.5 mg at 5 PM     Medication unable to be refilled by RN due to criteria not met as indicated.                 []Eligibility - not seen in the last year              []Supervision - no future appointment              []Compliance - no shows, cancellations or lapse in therapy              []Verification - order discrepancy              []Controlled medication              []Medication not included in policy              []90-day supply request              [x]Other:

## 2024-04-23 ENCOUNTER — TELEPHONE (OUTPATIENT)
Dept: PSYCHIATRY | Facility: CLINIC | Age: 39
End: 2024-04-23
Payer: MEDICAID

## 2024-04-23 NOTE — TELEPHONE ENCOUNTER
On 04/17/2024 the patient signed a consent to communicate authorizing [facility] to speak with MIKA [employer].  I sent the document to scanning on 04/23/2024.    - Ruiz Andrews, Visit Facilitator

## 2024-04-25 ENCOUNTER — OFFICE VISIT (OUTPATIENT)
Dept: PSYCHIATRY | Facility: CLINIC | Age: 39
End: 2024-04-25
Attending: PSYCHIATRY & NEUROLOGY
Payer: MEDICAID

## 2024-04-25 VITALS
SYSTOLIC BLOOD PRESSURE: 139 MMHG | WEIGHT: 291.8 LBS | HEART RATE: 103 BPM | DIASTOLIC BLOOD PRESSURE: 102 MMHG | BODY MASS INDEX: 50.09 KG/M2

## 2024-04-25 DIAGNOSIS — F25.0 SCHIZOAFFECTIVE DISORDER, BIPOLAR TYPE (H): ICD-10-CM

## 2024-04-25 PROCEDURE — G0463 HOSPITAL OUTPT CLINIC VISIT: HCPCS

## 2024-04-25 PROCEDURE — 99214 OFFICE O/P EST MOD 30 MIN: CPT | Mod: GC

## 2024-04-25 ASSESSMENT — PATIENT HEALTH QUESTIONNAIRE - PHQ9
SUM OF ALL RESPONSES TO PHQ QUESTIONS 1-9: 3
SUM OF ALL RESPONSES TO PHQ QUESTIONS 1-9: 3

## 2024-04-25 ASSESSMENT — PAIN SCALES - GENERAL: PAINLEVEL: NO PAIN (0)

## 2024-04-25 NOTE — NURSING NOTE
Chief Complaint   Patient presents with    Recheck Medication     Schizoaffective disorder, bipolar type     - Ruiz Andrews, Visit Facilitator

## 2024-04-25 NOTE — PATIENT INSTRUCTIONS
**For crisis resources, please see the information at the end of this document**   Patient Education    Thank you for coming to the [facility] [place] [facility].     Lab Testing:  If you had lab testing today and your results are reassuring or normal they will be mailed to you or sent through [employer] within 7 days. If the lab tests need quick action we will call you with the results. The phone number we will call with results is # [phone]. If this is not the best number please call our clinic and change the number.     Medication Refills:  If you need any refills please call your pharmacy and they will contact us. Our fax number for refills is [phone].   Three business days of notice are needed for general medication refill requests.   Five business days of notice are needed for controlled substance refill requests.   If you need to change to a different pharmacy, please contact the new pharmacy directly. The new pharmacy will help you get your medications transferred.     Contact Us:  Please call [phone] during business hours (8-5:00 M-F).   If you have medication related questions after clinic hours, or on the weekend, please call [phone].     Financial Assistance [phone]   Medical Records [phone]       MENTAL HEALTH CRISIS RESOURCES:  For a emergency help, please call 911 or go to the nearest Emergency Department.     Emergency Walk-In Options:   [facility] Unit @ [facility] ([place]): [phone] - Specialized mental health emergency area designed to be calming  [facility] ([place]): [phone]  [facility] ([place]): [phone]  [facility]): [phone]    [facility] Crisis Information:   [place]: [phone]  [place]: [phone]  [place] ([place]) - Adult: [phone]     Child: [phone]  [place] - Adult: [phone]     Child: [phone]  [place]:  [phone]  List of all [facility] resources:   https://[place].gov/dhs/people-we-serve/adults/health-care/mental-health/resources/crisis-contacts.jsp    National Crisis Information:   Crisis Text Line: Text  [place]  to 120482  Suicide & Crisis Lifeline: 988  National Suicide Prevention Lifeline: 8-819-153-TALK (1-817.676.3045)       For online chat options, visit https://suicidepreventionlifeline.org/chat/  Poison Control Center: 4-026-170-2066  Trans Lifeline: 4-518-006-6988 - Hotline for transgender people of all ages  [employer]: 9-983-968-4865 - Hotline for LGBT youth     For Non-Emergency Support:   Fast Tracker: Mental Health & Substance Use Disorder Resources -   https://www.[employer].org/

## 2024-04-25 NOTE — PROGRESS NOTES
"     [facility]  Psychiatry Clinic  MEDICAL PROGRESS NOTE     CARE TEAM:  PCP- Suzie Mariscal    Psychotherapist- unknown by patient     Community  Team  [facility] , [place] [phone]  [facility] worker, CM, group home staff.   CADI : Charmaine Isabel [phone].   Mental Health : Bre [phone]     Guardian: Mary \"Swetha\" Chriss (mother)  Group Home: [phone]. NK, : [phone].     Karolyn Banerjee is a 37 year old who uses the name Karolyn and pronouns she, her, herself.    DIAGNOSIS   Schizoaffective Disorder, bipolar type, MRE depressed  Unspecified Neurocognitive Disorder      ASSESSMENT   Karolyn Banerjee is a 38 year old with diagnoses of schizoaffective disorder and neurocognitive disorder who has been living at her current group home for approximately 5 years.     #psychosis  #mood  Karolyn was seen in-person with  staff present for the last portion of the visit. She has been doing well. Denies depressed mood or anhedonia. No SI/SIB/HI. Her current psychosis is hearing the voice of an acquaintance expressing romantic affection to her. This happens usually when listening to music. She denies being bothered by the AH. She is not sure if the voices are related to her schizophrenia or not. No command AH. There is occasional AH of multiple unknown male individuals competing for her romantically. She does not find the voices distressing and does not mind them. AIMS=0 updated today. Per  staff, Karolyn has not had a behavioral outburst since last visit. She socializes with peers, spends the days working on coloring and beading.    #increased sedation  #tired  Sleeps 12 hours at night-time and has two 1-2 hour naps through the day for a total of 14 to 16 hours. She feels tired through the waking hours. Per  staff, she sometimes misses attending the group activities due to being sleep. The current total " dose of perphenazine at 28 mg is identified to be the main contributor. The recommendation would be to decrease the morning dose while monitoring her closely for worsening of psychotic symptoms and returning of command  or SI. The decision needs to be run by and approved by guardian before implementing.    #high BP  She has been having consistently high blood pressure readings over the last visits. She denies any current chest pain, SOB, headache, dizziness, palpitation. It is recommended for her to follow up with her PCP regularly.    [place] review was not needed today.      PLAN                                                                                                                 1) Meds-               - Decrease perphenazine from 12 to 8 mg qAM (once approved by guardian)  - Continue perphenazine 16 mg at bedtime  - Continue lurasidone 160 mg daily w/dinner  - Continue bupropion  mg daily  - Continue benztropine 1.5 mg at 5 PM     2) Psychotherapy- None     3) Next due-  Labs- AP labs overdue- ordered today  EKG- last 1/2019 Qtc 428, not indicated   Rating scales- AIMS=0 April 2024     4) Referrals-  none    5) Other- None    6) Dispo-return to clinic in 4 weeks     PERTINENT BACKGROUND                                                    [most recent eval 07/28/22]   The following section contains information copied from previous note by Dr Lepe on 7/28/22.     Karolyn first experienced mental health issues as a child and has received treatment for schizoaffective disorder, bipolar type, borderline personality disorder, and unspecified neurocognitive disorder. Notably, the patient's symptoms have historically included psychosis (auditory hallucinations, delusions related to pregnancy, agitation) and suicide attempts/threats to run into traffic or drown herself in a pond, often in reaction to perceived slights or difficult interactions with group home staff/residents. She has been tried on many  "different medication trials in the past, including two trials of clozapine that caused neutropenia. ECT along with medication has been the most helpful treatment for maintaining stability. ECT with Dr. Amos was discontinued in fall 2017 after patient remained relatively stable without it. (last hospitalization February 2018). Her mother is her guardian. Karolyn fell and broke her leg in 12/2018 which resulted in multiple surgeries, a long transitional care stay, and ongoing use of a walker.      Psych pertinent item history includes suicide attempt [multiple], suicidal ideation, SIB [per chart], psychosis [sxs include auditory hallucinations, delusions], mutiple psychotropic trials , severe med reaction, violent behavior, psych hosp (>5), commitment and ECT     INTERIM HISTORY / SUBJECTIVE     Since last visit:    -Feels tired, takes two naps 1-2 hours each during the day.  -Goes to bed at 6 PM and falls asleep at 7 PM and sleeps until 7 AM.  -Mood is \"fine\". No depression, no SI or HI. Feels safe at   -AH: hears voices of Pedro Pablo who she refers to as her aunt's acquaintance when she listens to music. Pedro Pablo tells her that he loves her and wants to be with her. Hears a few male voices who want to be with her but she always says no to them. Voices do not bother her.  -She states she knows that Pedro Pablo told his friends that Kraolyn hears Pedro Pablo's voice sometimes. This did not bother Karolyn however she learned that Pedro Pablo said despite Karolyn being \"fat\" he loves her.   -She did not bring up concerns about being pregnant, having kids or having had kids today.  -Continues to enjoy working on Gr[employer], listening to music, and playing cards with Isabella (peer at ).    Answers submitted by the patient for this visit:  Patient Health Questionnaire (Submitted on 4/25/2024)  PHQ9 TOTAL SCORE: 3      Recent Social History: see below    Recent Psych Symptoms:   Depression:   denies  Elevated:  none  Psychosis:  auditory " hallucinations without commands [details in Interim History]  Anxiety:   none  Trauma Related:  none  Sleep:  hypersomnia  Other: N/A    Recent Substance Use:     -alcohol: No   -cannabis: No  -tobacco: No  -caffeine:  No   -opioids: No   Narcan Kit currrent: No   -other: none    Pertinent Negatives: No suicidal or violent ideation, self-injury, delusions, david, hypomania, aggression and harmful substance use  Adverse Effects: sedation     PAST MED TRIALS        Medication  Dose (mg) Effect  Dates of Use   lurasidone 160 mg helpful current   Bupropion  mg helpful current   perphenazine 16 mg BID helpful Current   clozapine   2 trials that resulted with agranulocytosis (not eligible for retrial)     olanzapine         quetiapine         ziprasidone         risperidone   dystonia     aripiprazole   ineffective     haloperidol   Developed parkinsonism     divalproex         sertraline         venlafaxine            MEDICAL HISTORY and ALLERGY     ALLERGIES: Clozapine, Liquid adhesive, Risperidone, and Adhesive tape    Patient Active Problem List   Diagnosis    Schizoaffective disorder, bipolar type (H)    Mild cognitive impairment    Borderline personality disorder (H)    Asthma    Nonorganic enuresis    Suicidal ideation    Port catheter in place    Hx of psychiatric care    DVT (deep vein thrombosis) in pregnancy    JOSE (obstructive sleep apnea)    Morbid obesity (H)        MEDICAL REVIEW OF SYSTEMS   Contraception- did not discuss    none in addition to that documented above     MEDICATIONS     Current Outpatient Medications   Medication Sig Dispense Refill    ACETAMINOPHEN PO Take 650 mg by mouth every 4 hours as needed       albuterol (PROAIR HFA/PROVENTIL HFA/VENTOLIN HFA) 108 (90 Base) MCG/ACT inhaler Inhale 1-2 puffs into the lungs every 4 hours as needed      Artificial Saliva (SALIVASURE) LOZG 1 lozenge by Transmucosal route every 4 hours as needed for dry mouth      benztropine (COGENTIN) 0.5 MG  tablet TAKE 3 TABLETS BY MOUTH EVERY EVENING 90 tablet 0    buPROPion (WELLBUTRIN XL) 150 MG 24 hr tablet TAKE 3 TABLETS (450MG) BY MOUTH DAILY IN THE MORNING 90 tablet 0    cetirizine (ZYRTEC) 10 MG tablet Take 10 mg by mouth daily      famotidine (PEPCID) 20 MG tablet Take 20 mg by mouth 2 times daily      lurasidone (LATUDA) 80 MG TABS tablet TAKE 2 TABLETS BY MOUTH DAILY WITH DINNER 60 tablet 0    norethindrone-ethinyl estradiol (JUNEL FE 1/20) 1-20 MG-MCG tablet Take 1 tablet by mouth      norethindrone-ethinyl estradiol (MICROGESTIN 1/20) 1-20 MG-MCG per tablet Take 1 tablet by mouth daily       Omega-3 Fatty Acids (FISH OIL PO) Take 1,000 mg by mouth daily HOLD WHILE IN U      perphenazine 16 MG tablet TAKE 1 TABLET (16 MG) BY MOUTH AT BEDTIME 30 tablet 0    perphenazine 8 MG tablet TAKE 1 & 1/2 TABLETS BY MOUTH EVERY MORNING 45 tablet 0    senna-docusate (SENOKOT-S/PERICOLACE) 8.6-50 MG tablet Take 8.6 mg by mouth      sennosides (SENOKOT) 8.6 MG tablet Take 1 tablet by mouth 2 times daily as needed         VITALS     Pulse Readings from Last 3 Encounters:   04/25/24 103   04/18/23 105   02/25/23 99     Wt Readings from Last 3 Encounters:   04/25/24 132.4 kg (291 lb 12.8 oz)   04/18/23 133.3 kg (293 lb 12.8 oz)   02/06/20 134.5 kg (296 lb 9.6 oz)     BP Readings from Last 3 Encounters:   04/25/24 (!) 139/102   04/18/23 (!) 156/113   02/25/23 (!) 149/105         MENTAL STATUS EXAM     Alertness: alert  and oriented  Appearance: casually groomed, obese  Behavior/Demeanor: cooperative and calm, with fair  eye contact   Speech: increased latency of response and slowed, normal prosody  Language: intact and no problems  Psychomotor: normal or unremarkable  Mood: description consistent with euthymia  Affect: restricted; congruent to: mood- yes, content- yes  Thought Process/Associations:  linear, goal oriented  Thought Content:  Reports none;  Denies suicidal & violent ideation and delusions  Perception:  Reports  auditory hallucinations without commands [details in history];  Denies visual hallucinations  Insight: limited  Judgment: limited and adequate for safety  Cognition: baseline  Gait and Station: unremarkable     LABS and DATA         11/2/2018     3:10 PM 11/6/2019     1:00 PM 4/25/2024     2:29 PM   PHQ   PHQ-9 Total Score 6 3 3   Q9: Thoughts of better off dead/self-harm past 2 weeks Several days Several days Several days   F/U: Thoughts of suicide or self-harm   Yes   F/U: Self harm-plan   Yes   F/U: Self-harm action   No   F/U: Safety concerns   Yes       Recent Labs   Lab Test 02/25/23  1615 06/12/19  0000   CR 0.80 0.84   GFRESTIMATED >90 >90     Recent Labs   Lab Test 02/25/23  1615 06/12/19  0000 09/21/17  0750 04/12/17  1024   * 52*   < > 83   A1C  --  5.0  --  5.0    < > = values in this interval not displayed.     Recent Labs   Lab Test 06/12/19  0000 09/21/17  0750   CHOL 168 181   TRIG 94 144   LDL 97 108*   HDL 52 44*     Recent Labs   Lab Test 02/25/23  1615 06/12/19  0000   AST 27 12   ALT 13 20   ALKPHOS 83 67     Recent Labs   Lab Test 02/25/23  1615 06/12/19  0000 12/21/18  0720 12/20/18  1749 02/06/18  1655   WBC 8.4 6.2   < > 11.2* 6.0   ANEU  --   --   --  9.7* 3.5   HGB 15.0 13.7   < > 14.1 13.8    175   < > 170 Platelets clumped, platelet count unavailable    < > = values in this interval not displayed.     ECG 1/2019 Qtc 428     PSYCHOTROPIC DRUG INTERACTIONS                                                       PSYCHCLINICDDI     CETIRIZINE and PERPHENAZINE may result in increased risk of CNS depression.   PERPHENAZINE and BENZTROPINE may result in decreased phenothiazine serum concentrations, decreased phenothiazine effectiveness, enhanced anticholinergic effects (ileus, hyperpyrexia, sedation, dry mouth).      MANAGEMENT:  Monitoring for adverse effects     RISK STATEMENT for SAFETY     Karolyn did not appear to be an imminent safety risk to self or others.    TREATMENT RISK  STATEMENT: The risks, benefits, alternatives and potential adverse effects have been discussed and are understood by the pt. The pt understands the risks of using street drugs or alcohol. There are no medical contraindications, the pt agrees to treatment with the ability to do so. The pt knows to call the clinic for any problems or to access emergency care if needed.  Medical and substance use concerns are documented above.  Psychotropic drug interaction check was done, including changes made today.     PROVIDER: Alfred Bahena MD      Patient staffed in clinic with Dr. Toussaint who will sign the note.  Supervisor is Dr. Kerr.

## 2024-04-26 ENCOUNTER — TELEPHONE (OUTPATIENT)
Dept: PSYCHIATRY | Facility: CLINIC | Age: 39
End: 2024-04-26
Payer: MEDICAID

## 2024-04-26 RX ORDER — PERPHENAZINE 16 MG/1
16 TABLET ORAL AT BEDTIME
Qty: 30 TABLET | Refills: 1 | Status: SHIPPED | OUTPATIENT
Start: 2024-04-26 | End: 2024-07-02

## 2024-04-26 RX ORDER — BENZTROPINE MESYLATE 0.5 MG/1
TABLET ORAL
Qty: 90 TABLET | Refills: 1 | Status: SHIPPED | OUTPATIENT
Start: 2024-04-26 | End: 2024-07-01

## 2024-04-26 RX ORDER — BUPROPION HYDROCHLORIDE 450 MG/1
450 TABLET, FILM COATED, EXTENDED RELEASE ORAL EVERY MORNING
Qty: 30 TABLET | Refills: 1 | Status: SHIPPED | OUTPATIENT
Start: 2024-04-26 | End: 2024-07-01

## 2024-04-26 RX ORDER — LURASIDONE HYDROCHLORIDE 80 MG/1
160 TABLET, FILM COATED ORAL
Qty: 60 TABLET | Refills: 1 | Status: SHIPPED | OUTPATIENT
Start: 2024-04-26 | End: 2024-07-01

## 2024-04-26 NOTE — TELEPHONE ENCOUNTER
"Tamika Bass MD  P Psychiatry Nurse-[facility]  HiKarolyn is overdue for antipsychotic labs. I ordered and was hoping we could coordinate that with  staff so she gets them done fasting within the next few weeks. Thanks.    PS: Do blood draws need to be approved by guardian? If so, then we are ирина stuck. I have not been able to reach them.    Alfred    Follow up:     Reached out to HAYDEEMary to obtain approval for patient to complete blood draw. Writer received a message \" the number you have dialed is not in service.\"     Reached out to  at [phone] and spoke with staff who shared that patient is currently sleeping. Writer asked to confirm the number for Mary HUBBARD. They asked writer reach out to  owner, CALDERON at [phone].     Reached out to NK at [phone], who shared that legal guardian had reached out to the clinic and was told nothing was needed. Writer updated her that we are trying to get hold of her to get consent for patient to get labs done. She asked writer reach out to  but asked writer leave a message as she is busy. Writer agreed with the plan.     Writer reached out to Mary HUBBARD at [phone] to obtain consent for patient to get blood work done. No answer at number provided. Unable to LVM due to mailbox full.        manager: [phone] (NK)  Legal guardian, Mary- [phone]  "

## 2024-04-29 NOTE — TELEPHONE ENCOUNTER
Second attempt made to reach out to Mary HUBBARD to obtain permission for patient to complete labs and also to go over recent medication change. No answer at number provided. Unable to LVM due to mailbox full.

## 2024-05-02 NOTE — TELEPHONE ENCOUNTER
Reached out to Mental Health  at [phone] to connect regarding care. No answer at number provided. Left general message requesting a call back. Clinic number provided.

## 2024-05-03 NOTE — TELEPHONE ENCOUNTER
Writer received incoming call from James LAST from [facility] who is returning writers call. She confirmed that patient has not established care with them yet. Patient is scheduled for an intake on  5/23.     Reached out to [facility] : Charmaine Isabel [phone]. No answer at number provided. LVM, requesting a call back. Clinic number provided.  [phone]

## 2024-05-07 NOTE — TELEPHONE ENCOUNTER
Second attempt made to reach out to [facility] Charmaine LAST [phone]. No answer at number provided. LVM, requesting a call back. Clinic number provided.     Reached out to [facility] Esperanza LAST, manager at [phone]. No answer at number provided. LVM, requesting a call back. Clinic number provided.

## 2024-05-15 NOTE — TELEPHONE ENCOUNTER
Returned a call to CADI CM at the number below. No answer at number provided. LVM, requesting a call back. Clinic number provided.     Reached out to Mary HUBBARD to connect regarding med change and lab approval. No answer number provided. Unable to LVM due to mailbox full.

## 2024-06-28 DIAGNOSIS — F25.0 SCHIZOAFFECTIVE DISORDER, BIPOLAR TYPE (H): ICD-10-CM

## 2024-07-01 DIAGNOSIS — F25.0 SCHIZOAFFECTIVE DISORDER, BIPOLAR TYPE (H): ICD-10-CM

## 2024-07-01 RX ORDER — BENZTROPINE MESYLATE 0.5 MG/1
TABLET ORAL
Qty: 90 TABLET | Refills: 0 | Status: SHIPPED | OUTPATIENT
Start: 2024-07-01 | End: 2024-08-01

## 2024-07-01 RX ORDER — BUPROPION HYDROCHLORIDE 150 MG/1
450 TABLET ORAL EVERY MORNING
Qty: 90 TABLET | Refills: 0 | Status: SHIPPED | OUTPATIENT
Start: 2024-07-01 | End: 2024-08-01

## 2024-07-01 RX ORDER — LURASIDONE HYDROCHLORIDE 80 MG/1
160 TABLET, FILM COATED ORAL
Qty: 60 TABLET | Refills: 0 | Status: SHIPPED | OUTPATIENT
Start: 2024-07-01 | End: 2024-08-01

## 2024-07-01 NOTE — TELEPHONE ENCOUNTER
benztropine (COGENTIN) 0.5 MG tablet 90 tablet 1 4/26/2024     buPROPion 450 MG TB24 30 tablet 1 4/26/2024     lurasidone (LATUDA) 80 MG TABS tablet 60 tablet 1 4/26/2024         Last seen: 4/25/24  RTC: 4 weeks  Cancel: 0  No-show: x 1 on 5/23/24  Next appt: 0    Refill decision:   Refill pended and routed to the provider for review/determination due to   NO SHOW X 1  Pt outside of RTC timeframe.  Scheduling has been notified to contact the pt for appointment.

## 2024-07-02 RX ORDER — PERPHENAZINE 16 MG/1
16 TABLET ORAL AT BEDTIME
Qty: 30 TABLET | Refills: 0 | Status: SHIPPED | OUTPATIENT
Start: 2024-07-02 | End: 2024-08-01

## 2024-07-02 NOTE — TELEPHONE ENCOUNTER
Last Seen 4/25  RTC 4 weeks  Cancel None  No-Show 5/23    Next Appt 8/6     Incoming Refill From [facility] via fax/interface    Medication Requested   perphenazine 16 MG tablet     Directions   Take 1 tablet (16 mg) by mouth at bedtime     Qty 30    Last Refill 6/6      Medication pended and routed to provider for approval.

## 2024-08-01 DIAGNOSIS — F25.0 SCHIZOAFFECTIVE DISORDER, BIPOLAR TYPE (H): ICD-10-CM

## 2024-08-01 RX ORDER — PERPHENAZINE 16 MG/1
16 TABLET ORAL AT BEDTIME
Qty: 30 TABLET | Refills: 0 | Status: SHIPPED | OUTPATIENT
Start: 2024-08-01 | End: 2024-08-30

## 2024-08-01 RX ORDER — BUPROPION HYDROCHLORIDE 150 MG/1
450 TABLET ORAL EVERY MORNING
Qty: 90 TABLET | Refills: 0 | Status: SHIPPED | OUTPATIENT
Start: 2024-08-01 | End: 2024-08-30

## 2024-08-01 RX ORDER — LURASIDONE HYDROCHLORIDE 80 MG/1
160 TABLET, FILM COATED ORAL
Qty: 60 TABLET | Refills: 0 | Status: SHIPPED | OUTPATIENT
Start: 2024-08-01 | End: 2024-08-30

## 2024-08-01 RX ORDER — BENZTROPINE MESYLATE 0.5 MG/1
TABLET ORAL
Qty: 90 TABLET | Refills: 0 | Status: SHIPPED | OUTPATIENT
Start: 2024-08-01 | End: 2024-08-30

## 2024-08-01 NOTE — TELEPHONE ENCOUNTER
Last seen: 4/25  RTC: 4 weeks  Cancel: 8/6  No-show: 5/23  Next appt: 8/27       Medication requested:   Pending Prescriptions:                       Disp   Refills    perphenazine 16 MG tablet                 30 tab*0            Sig: Take 1 tablet (16 mg) by mouth at bedtime    buPROPion (WELLBUTRIN XL) 150 MG 24 hr ta*90 tab*0            Sig: Take 3 tablets (450 mg) by mouth every morning           For more refills,schedule an appointment at           [phone]    benztropine (COGENTIN) 0.5 MG tablet      90 tab*0            Sig: TAKE 3 TABLETS BY MOUTH EVERY EVENING For more           refills,schedule an appointment at [phone]    lurasidone (LATUDA) 80 MG TABS tablet     60 tab*0            Sig: Take 2 tablets (160 mg) by mouth daily (with           dinner) For more refills,schedule an appointment           at [phone]        From chart note:   1) Meds-               - Decrease perphenazine from 12 to 8 mg qAM (once approved by guardian)  - Continue perphenazine 16 mg at bedtime  - Continue lurasidone 160 mg daily w/dinner  - Continue bupropion  mg daily  - Continue benztropine 1.5 mg at 5 PM      Refills sent to RN for final review

## 2024-08-27 ENCOUNTER — VIRTUAL VISIT (OUTPATIENT)
Dept: PSYCHIATRY | Facility: CLINIC | Age: 39
End: 2024-08-27
Attending: STUDENT IN AN ORGANIZED HEALTH CARE EDUCATION/TRAINING PROGRAM
Payer: MEDICAID

## 2024-08-27 DIAGNOSIS — F25.0 SCHIZOAFFECTIVE DISORDER, BIPOLAR TYPE (H): ICD-10-CM

## 2024-08-27 PROCEDURE — 90792 PSYCH DIAG EVAL W/MED SRVCS: CPT | Mod: 95

## 2024-08-27 ASSESSMENT — PAIN SCALES - GENERAL: PAINLEVEL: NO PAIN (0)

## 2024-08-27 NOTE — PATIENT INSTRUCTIONS
**For crisis resources, please see the information at the end of this document**   Patient Education    Thank you for coming to the [facility] [place] [facility].     Lab Testing:  If you had lab testing today and your results are reassuring or normal they will be mailed to you or sent through [employer] within 7 days. If the lab tests need quick action we will call you with the results. The phone number we will call with results is # [phone]. If this is not the best number please call our clinic and change the number.     Medication Refills:  If you need any refills please call your pharmacy and they will contact us. Our fax number for refills is [phone].   Three business days of notice are needed for general medication refill requests.   Five business days of notice are needed for controlled substance refill requests.   If you need to change to a different pharmacy, please contact the new pharmacy directly. The new pharmacy will help you get your medications transferred.     Contact Us:  Please call [phone] during business hours (8-5:00 M-F).   If you have medication related questions after clinic hours, or on the weekend, please call [phone].     Financial Assistance [phone]   Medical Records [phone]       MENTAL HEALTH CRISIS RESOURCES:  For a emergency help, please call 911 or go to the nearest Emergency Department.     Emergency Walk-In Options:   EmPATH Unit @ [facility] ([place]): [phone] - Specialized mental health emergency area designed to be calming  [facility] ([place]): [phone]  [facility] ([place]): [phone]  [facility]): [phone]    [facility] Crisis Information:   [place]: [phone]  [place]: [phone]  [place] (SHANNON) - Adult: [phone]     Child: [phone]  [place] - Adult: [phone]     Child: [phone]  [place]:  [phone]  List of all [facility] resources:   https://[place].gov/dhs/people-we-serve/adults/health-care/mental-health/resources/crisis-contacts.jsp    National Crisis Information:   Crisis Text Line: Text  [place]  to 385934  Suicide & Crisis Lifeline: 988  National Suicide Prevention Lifeline: 3-381-801-TALK (1-992.751.1455)       For online chat options, visit https://suicidepreventionlifeline.org/chat/  Poison Control Center: 4-197-031-8031  Trans Lifeline: 5-806-126-4107 - Hotline for transgender people of all ages  The Ra Project: 5-063-841-5720 - Hotline for LGBT youth     For Non-Emergency Support:   Fast Tracker: Mental Health & Substance Use Disorder Resources -   https://www.[employer] [employer].org/

## 2024-08-27 NOTE — NURSING NOTE
Current patient location: [address] [place] [id number]    Is the patient currently in the state of [place]? YES    Visit mode:VIDEO    If the visit is dropped, the patient can be reconnected by: VIDEO VISIT: Text to cell phone:   Telephone Information:   Mobile [phone]       Will anyone else be joining the visit? NO  (If patient encounters technical issues they should call [phone])    How would you like to obtain your AVS? MyChart    Are changes needed to the allergy or medication list? No    Are refills needed on medications prescribed by this physician? NO    Rooming Documentation:  Unable to complete questionnaire(s) due to time      Reason for visit: [place]A STF

## 2024-08-27 NOTE — PROGRESS NOTES
"Virtual Visit Details    Type of service:  Video Visit     Originating Location (pt. Location): Other jail    Distant Location (provider location):  On-site  Platform used for Video Visit: [facility]  General Clinic Team  TRANSFER of CARE DIAGNOSTIC ASSESSMENT       CARE TEAM:    PCP- Suzie Mariscal    Psychotherapist- unknown by patient     Community  Team  [facility] , Thea Lindseyt [phone]  [facility] worker, CM, group home staff.   CADI : Charmaine Isabel [phone].   Mental Health : Bre [phone]     Guardian: Mary \"Swetha\" Chriss (mother)  Group Home: [phone]. , : [phone].     Karolyn Banerjee is a 37 year old who uses the name Karolyn and pronouns she, her, herself.                 Chief Concern    Transfer of Care                 Assessment & Plan   Karolyn Banerjee is a 38 year old with diagnoses of schizoaffective disorder and neurocognitive disorder who has been living at her current group home for approximately 5 years. She was previously following with Dr. Bahena, with their last visit being on 4/25/24 at which time she was reported to be doing well but was found to have increased sedation. The plan at that visit was to decrease the morning dose of perphenazine from 12mg to 8am and keep the bedtime perphenazine dose the same at 16mg. At that visit it was also noted that Karolyn has been having consistently high blood pressures though no notable physical symptoms. It was recommended that she follow up with her PCP regularly.     Today, Karolyn continues to remain stable. She continues to report intermittent AH though no command AH and overall these appear to be close to her baseline. She denies any SI/HI. She does report having a couple days a week where her mood is lower and she goes to her room earlier. We will continue to " monitor this and also re-add in her morning perphenazine 8mg as that was discontinued for an unknown reason since her last visit. This may also help with mood stability. I talked with mom who is agreeable with the plan. Karolyn's blood pressure continues to remain stable (last one on 8.5 was 136/89) and she reports post-prandial sternal area chest pain and diffuse headache though she said these are not new and have been going on for years. Recommendation is continued and regular follow up with PCP.    Psychotropic Drug Interactions:  {  CETIRIZINE and PERPHENAZINE may result in increased risk of CNS depression.   PERPHENAZINE and BENZTROPINE may result in decreased phenothiazine serum concentrations, decreased phenothiazine effectiveness, enhanced anticholinergic effects (ileus, hyperpyrexia, sedation, dry mouth).      MANAGEMENT:  Monitoring for adverse effects    MNPMP was not checked today: not indicated at this time.    Risk Statements:   Treatment Risk: Risks, benefits, alternatives and potential adverse effects have been discussed and are understood.   Safety Risk: Karolyn did not appear to be an imminent safety risk to self or others.    PLAN    1) Meds-               - Added in perphenazine 8mg qam. Group home only had 16mg qpm order. -Mom agreeable with this.   - Continue perphenazine 16 mg at bedtime  - Continue lurasidone 160 mg daily w/dinner  - Continue bupropion  mg daily  - Continue benztropine 1.5 mg at 5 PM     2) Psychotherapy- None     3) Next due-  Labs- AP labs overdue- ordered  at last visit. Re-ordered today.   EKG- last 1/2019 Qtc 428, not indicated   Rating scales- AIMS=0 April 2024     4) Referrals-  none     5) Other- None     6) Dispo-return to clinic in 6 weeks                  Pertinent Background  The following section contains information copied from previous note by Dr Lepe on 7/28/22.               Karolyn first experienced mental health issues as a child and has received  treatment for schizoaffective disorder, bipolar type, borderline personality disorder, and unspecified neurocognitive disorder. Notably, the patient's symptoms have historically included psychosis (auditory hallucinations, delusions related to pregnancy, agitation) and suicide attempts/threats to run into traffic or drown herself in a pond, often in reaction to perceived slights or difficult interactions with group home staff/residents. She has been tried on many different medication trials in the past, including two trials of clozapine that caused neutropenia. ECT along with medication has been the most helpful treatment for maintaining stability. ECT with Dr. Amos was discontinued in fall 2017 after patient remained relatively stable without it. (last hospitalization February 2018). Her mother is her guardian. Karolyn fell and broke her leg in 12/2018 which resulted in multiple surgeries, a long transitional care stay, and ongoing use of a walker.      Psych pertinent item history includes suicide attempt [multiple], suicidal ideation, SIB [per chart], psychosis [sxs include auditory hallucinations, delusions], mutiple psychotropic trials , severe med reaction, violent behavior, psych hosp (>5), commitment and ECT                 History of Present Illness   Per Karolyn:  -Things have been ok since last.   -2-3 days/week, doesn't feel as good. On these days doesn't feel like doing anything, mood is low. .   -Sleep: On days that she is not feeling well, will go to room and sleep. Not sure of amount.   -2-3 days, doesn't feel as good. On those days doesn't feel like doing anything.   -AVH:    -Once in while on the days where she is not feeling as well. The voice says, nobody cares about you. This makes her feel bad.    -Things that help with making her feel better: seeing mom, talking to boyfriend.   -Reports that last year or the year before, she saw one mohsen at [facility] and he said that he can hear her talking  to him. He said that ban'll fall in love with him. Karolyn describes that now she will her his voice talking to her when she listens to music. Hears his voice singing along with the love song.    -Denied command  AH.    -Denies VH.   -Anxiety: Ok lately. Voices sometimes cause anxiety.   -Depression: Down on those couple days mentioned. Sleep more isolate more. Not sure how long.   --Hasn't had thoughts of SI/HI.   -Last suicide attempt: 2 years ago. No plans since then.   -Going to group: Goes to group on Mondays.    -  Per Nurse:   -Nurse has been with her since 2019.   -Nurse didn't notice any significnat changes from last time.   -Karolyn has some days where she likes to be on her own.   -No safety concerns about SI/HI.   -If she wants to harm herself, she'll normally verbalize it, and will try to leave the house. This year only it only happened once-she had an issue with another resident, she tried to leave and staff stopped her. Better shortly afterwards.   -Karolyn will often go to her room early, but she comes out sleeps, go to sleep early. Goes around 6:30/7 and stays for a while. Has been like that since she moved-2019.     -Reviewed Meds with nursing.    -Only nighttime perphenazine is listed now the 8am dose.       Medical ROS and Side Effects:   -Hasn't noticed any new or concerning side effects, abnormal muscle movements.    -Bowel movements, 1-2x/day.    -Chest Pain: Yes-a lot. Chest pain. Sternal area. Usually has it. Usually after lunch until the end of the day-not sure if it's burning. Going on for a couple years.    -Headaches:Most of the day. All over head. Not new since last visit. Going on for years.   -Muscle Pain, Twitching, Stiffness: pain in legs since breaking legs.    -Blurry Vision: No     -Blood Pressure Readings: Checks Monthly:    -Last One: 136/89. Sunday, 8/5.     Conversation with Mom/Guardian:  Mom/Guardian:   -Agrees she has been stable.    -She will sit in rocking chair sometimes  "but when they are doing an acitivity she will join. Karolyn told her she's been making a lot of bracelets.   -Was not aware of am dose being stopped. Ok with 8mg in the morning being added back in. I let her know that we'd monitor for side effects and sedation.      -I later talked with Karolyn who is ok with this plan though she said  no nurse was available to speak with at the time    -Confirmed genmelissa receive the am dose for perphenazine.     Current Social History:  *Some information carried forward from Dr. Bahena's previous notes*  -Living: Lives in a group home. Likes it there.   -Interests: Likes beading, makes bracelets and things. On Sundays, gets up at 7am and goes to [employer] .   -Supports: Mom, boyfriend, group home. Will go to mom's sometimes.   -Children: None  -Financial: SSDI    Pertinent Substance Use:  Per Dr. Bahena 4/2024 Psychiatry Note:  \"-alcohol: No   -cannabis: No  -tobacco: No  -caffeine:  No   -opioids: No   Narcan Kit currrent: No   -other: none\"    Psych and Medical ROS per HPI.                   Physical Exam  (Vitals Only)    There were no vitals taken for this visit.  Pulse Readings from Last 3 Encounters:   04/25/24 103   04/18/23 105   02/25/23 99     Wt Readings from Last 3 Encounters:   04/25/24 132.4 kg (291 lb 12.8 oz)   04/18/23 133.3 kg (293 lb 12.8 oz)   02/06/20 134.5 kg (296 lb 9.6 oz)     BP Readings from Last 3 Encounters:   04/25/24 (!) 139/102   04/18/23 (!) 156/113   02/25/23 (!) 149/105                      Mental Status Exam    Alertness: alert  and oriented  Appearance: casually groomed  Behavior/Demeanor: cooperative and calm, with fair  eye contact   Speech: increased latency of response and slowed, normal prosody  Language: intact and no problems  Psychomotor: no psychomotor agitation, retardation or abnormal, involuntary movements appreciated.  Mood: ok  Affect: restricted; congruent to: mood- yes, content- yes  Thought Process/Associations:  linear, goal " oriented  Thought Content:  Reports none;  Denies suicidal & violent ideation and delusions  Perception:  Reports intermittent AH (see HPI) reports intermittent AH (see subjective), no current AVH,  does not appear to be responding to internal stimuli Denies visual hallucinations  Insight: limited  Judgment: limited and adequate for safety  Cognition: baseline  Gait and Station: unremarkable                  Family History     Per chart review pts father with mild developmental disorder, she has an uncle with schizophrenia. One of her brothers  by suicide in 2017.                 Past Psychiatric History   Per Dr. Bahena Transfer of Care Note 2023: No Major Updates since per chart review:   SIB-patient denies, yes per chart review  Suicidal Ideation Hx- Hx of both active and passive SI, often in response to perceived slights or minor stressors. Plans in the past have included drowning herself in pool by group home or running into traffic.  Suicide Attempt- #- yes, several, walking toward pond or into traffic, most recent- 2019    Violence/Aggression Hx- yes, Aggression during hospitalizations.  Psychosis Hx- yes, Command auditory hallucinations, delusions related to pregnancy  Eating Disorder Hx- none     Psych Hosp- #- yes, several, most recently Sep 2017, Oct 2017, 2018, most recent- 2018   Commitment- no  ECT- yes  Outpatient Programs - day treatment at [facility] in [place]    SUBSTANCE USE HISTORY   Past Use- none reported  Treatment- # none, most recent- N/A  Medical Consequences- none  HIV/Hepatitis- none  Legal Consequences- none                  Past Psychotropic Medication Trials       Medication  Dose (mg) Effect  Dates of Use   lurasidone 160 mg helpful current   Bupropion  mg helpful current   perphenazine 16 mg BID helpful Current   clozapine   2 trials that resulted with agranulocytosis (not eligible for retrial)     olanzapine         quetiapine          ziprasidone         risperidone   dystonia     aripiprazole   ineffective     haloperidol   Developed parkinsonism     divalproex         sertraline         venlafaxine                      Past Medical History       Patient Active Problem List   Diagnosis    Schizoaffective disorder, bipolar type (H)    Mild cognitive impairment    Borderline personality disorder (H)    Asthma    Nonorganic enuresis    Suicidal ideation    Port catheter in place    Hx of psychiatric care    DVT (deep vein thrombosis) in pregnancy    JOSE (obstructive sleep apnea)    Morbid obesity (H)                     Allergies     Clozapine, Liquid adhesive, Risperidone, and Adhesive tape                Medications     Current Outpatient Medications   Medication Sig Dispense Refill    ACETAMINOPHEN PO Take 650 mg by mouth every 4 hours as needed       albuterol (PROAIR HFA/PROVENTIL HFA/VENTOLIN HFA) 108 (90 Base) MCG/ACT inhaler Inhale 1-2 puffs into the lungs every 4 hours as needed      Artificial Saliva (SALIVASURE) LOZG 1 lozenge by Transmucosal route every 4 hours as needed for dry mouth      benztropine (COGENTIN) 0.5 MG tablet TAKE 3 TABLETS BY MOUTH EVERY EVENING For more refills,schedule an appointment at [phone] 90 tablet 1    buPROPion (WELLBUTRIN XL) 150 MG 24 hr tablet Take 3 tablets (450 mg) by mouth every morning. For more refills,schedule an appointment at [phone] 90 tablet 1    cetirizine (ZYRTEC) 10 MG tablet Take 10 mg by mouth daily      famotidine (PEPCID) 20 MG tablet Take 20 mg by mouth 2 times daily      lurasidone (LATUDA) 80 MG TABS tablet Take 2 tablets (160 mg) by mouth daily (with dinner). For more refills,schedule an appointment at [phone] 60 tablet 1    norethindrone-ethinyl estradiol (JUNEL FE 1/20) 1-20 MG-MCG tablet Take 1 tablet by mouth      norethindrone-ethinyl estradiol (MICROGESTIN 1/20) 1-20 MG-MCG per tablet Take 1 tablet by mouth daily       Omega-3 Fatty Acids (FISH OIL PO) Take 1,000  mg by mouth daily HOLD WHILE IN TCU      perphenazine 16 MG tablet Take 1 tablet (16 mg) by mouth at bedtime. 30 tablet 1    perphenazine 8 MG tablet Take 1 tablet (8 mg) by mouth daily. TAKE 1 TABLET BY MOUTH EVERY MORNING. TAKE IN ADDITION TO 16MG DOSE AT BEDTIME 30 tablet 1    senna-docusate (SENOKOT-S/PERICOLACE) 8.6-50 MG tablet Take 8.6 mg by mouth      sennosides (SENOKOT) 8.6 MG tablet Take 1 tablet by mouth 2 times daily as needed                        Data         4/25/2024     2:33 PM   PROMIS-10 Total Score w/o Sub Scores   PROMIS TOTAL - SUBSCORES 24         11/13/2013     4:00 PM   CAGE-AID Total Score   Total Score 0         11/2/2018     3:10 PM 11/6/2019     1:00 PM 4/25/2024     2:29 PM   PHQ-9 SCORE   PHQ-9 Total Score MyChart   3 (Minimal depression)   PHQ-9 Total Score 6 3 3          No data to display                Liver/Kidney Function, TSH Metabolic Blood counts   Recent Labs   Lab Test 02/25/23  1615 06/12/19  0000   AST 27 12   ALT 13 20   ALKPHOS 83 67   CR 0.80 0.84     Recent Labs   Lab Test 09/21/17  0750   TSH 2.55    Recent Labs   Lab Test 06/12/19  0000   CHOL 168   TRIG 94   LDL 97   HDL 52     Recent Labs   Lab Test 06/12/19  0000   A1C 5.0     Recent Labs   Lab Test 02/25/23  1615   *    Recent Labs   Lab Test 02/25/23  1615   WBC 8.4   HGB 15.0   HCT 46.5   MCV 90            QT: 435 in 2/2023      PROVIDER:   Giuliana Buck MD  PGY-3 Psychiatry  [facility]    Patient staffed in clinic with Dr. Smith who will sign the note.  Supervisor is Dr. Leroy.

## 2024-08-30 RX ORDER — LURASIDONE HYDROCHLORIDE 80 MG/1
160 TABLET, FILM COATED ORAL
Qty: 60 TABLET | Refills: 1 | Status: SHIPPED | OUTPATIENT
Start: 2024-08-30

## 2024-08-30 RX ORDER — PERPHENAZINE 8 MG/1
8 TABLET ORAL DAILY
Qty: 30 TABLET | Refills: 1 | Status: SHIPPED | OUTPATIENT
Start: 2024-08-30 | End: 2024-08-30

## 2024-08-30 RX ORDER — BUPROPION HYDROCHLORIDE 150 MG/1
450 TABLET ORAL EVERY MORNING
Qty: 90 TABLET | Refills: 1 | Status: SHIPPED | OUTPATIENT
Start: 2024-08-30

## 2024-08-30 RX ORDER — PERPHENAZINE 8 MG/1
8 TABLET ORAL DAILY
Qty: 30 TABLET | Refills: 1 | Status: SHIPPED | OUTPATIENT
Start: 2024-08-30

## 2024-08-30 RX ORDER — PERPHENAZINE 16 MG/1
16 TABLET ORAL AT BEDTIME
Qty: 30 TABLET | Refills: 1 | Status: SHIPPED | OUTPATIENT
Start: 2024-08-30

## 2024-08-30 RX ORDER — BENZTROPINE MESYLATE 0.5 MG/1
TABLET ORAL
Qty: 90 TABLET | Refills: 1 | Status: SHIPPED | OUTPATIENT
Start: 2024-08-30

## 2024-09-03 DIAGNOSIS — F25.0 SCHIZOAFFECTIVE DISORDER, BIPOLAR TYPE (H): ICD-10-CM

## 2024-09-03 RX ORDER — PERPHENAZINE 16 MG/1
16 TABLET ORAL AT BEDTIME
Qty: 30 TABLET | Refills: 1 | OUTPATIENT
Start: 2024-09-03

## 2024-09-03 RX ORDER — BUPROPION HYDROCHLORIDE 150 MG/1
TABLET ORAL
Qty: 90 TABLET | Refills: 1 | OUTPATIENT
Start: 2024-09-03

## 2024-09-03 RX ORDER — LURASIDONE HYDROCHLORIDE 80 MG/1
160 TABLET, FILM COATED ORAL
Qty: 60 TABLET | Refills: 1 | OUTPATIENT
Start: 2024-09-03

## 2024-09-03 RX ORDER — BENZTROPINE MESYLATE 0.5 MG/1
TABLET ORAL
Qty: 90 TABLET | Refills: 1 | OUTPATIENT
Start: 2024-09-03

## 2024-09-04 NOTE — TELEPHONE ENCOUNTER
perphenazine 16 MG tablet 30 tablet 1 8/30/2024 -- No   Sig - Route: Take 1 tablet (16 mg) by mouth at bedtime. - Oral     buPROPion (WELLBUTRIN XL) 150 MG 24 hr tablet 90 tablet 1 8/30/2024 -- No   Sig - Route: Take 3 tablets (450 mg) by mouth every morning.     benztropine (COGENTIN) 0.5 MG tablet 90 tablet 1 8/30/2024 -- No   Sig: TAKE 3 TABLETS BY MOUTH EVERY EVENING      lurasidone (LATUDA) 80 MG TABS tablet 60 tablet 1 8/30/2024 -- No   Sig - Route: Take 2 tablets (160 mg) by mouth daily (with dinner).

## 2024-10-16 DIAGNOSIS — F25.0 SCHIZOAFFECTIVE DISORDER, BIPOLAR TYPE (H): ICD-10-CM

## 2024-10-21 RX ORDER — PERPHENAZINE 16 MG/1
16 TABLET ORAL AT BEDTIME
Qty: 30 TABLET | Refills: 0 | Status: SHIPPED | OUTPATIENT
Start: 2024-10-21 | End: 2024-10-25

## 2024-10-21 RX ORDER — BUPROPION HYDROCHLORIDE 150 MG/1
450 TABLET ORAL EVERY MORNING
Qty: 90 TABLET | Refills: 0 | Status: SHIPPED | OUTPATIENT
Start: 2024-10-21 | End: 2024-10-25

## 2024-10-21 RX ORDER — BENZTROPINE MESYLATE 0.5 MG/1
TABLET ORAL
Qty: 90 TABLET | Refills: 0 | Status: SHIPPED | OUTPATIENT
Start: 2024-10-21 | End: 2024-10-25

## 2024-10-21 NOTE — TELEPHONE ENCOUNTER
"Date of Last Office Visit: 8/27/2024  [facility]      RTC: 6wks  No shows: 0  Cancellations: 0  Date of Next Office Visit: 0  ------------------------------    Medication requested:    perphenazine 16 MG tablet 30 tablet 1 8/30/2024 -- No   Sig - Route: Take 1 tablet (16 mg) by mouth at bedtime. - Oral     buPROPion (WELLBUTRIN XL) 150 MG 24 hr tablet 90 tablet 1 8/30/2024 -- No   Sig - Route: Take 3 tablets (450 mg) by mouth every morning. For more refills,schedule an appointment at [phone] - Oral     benztropine (COGENTIN) 0.5 MG tablet 90 tablet 1 8/30/2024 -- No   Sig: TAKE 3 TABLETS BY MOUTH EVERY EVENING For more refills,schedule an appointment at [phone]     ------------------------------  From last visit note:   \"    - Added in perphenazine 8mg qam. Group home only had 16mg qpm order. -Mom agreeable with this.   - Continue perphenazine 16 mg at bedtime  - Continue lurasidone 160 mg daily w/dinner  - Continue bupropion  mg daily  - Continue benztropine 1.5 mg at 5 PM\"     Refill Decision:    [x]Medication refilled per  Medication Refill in Ambulatory Care  policy.         [x] Supervision: no future appointment scheduled. Scheduling has been notified to contact the pt for appointment.    []Medication unable to be refilled by RN due to criteria not met as indicated below:          [] Eligibility - Pt not seen within past 12 months, no future appointment       [] Supervision: no future appointment scheduled. Scheduling has been notified to contact the pt for appointment.       [] Compliance - lapse in therapy/gap in refills; No Shows; Cancellations       [] Verification - order discrepancy, clarification needed, modification needed       [] Randi refills given. Pt did not follow-up, pended to care team for decision       [] Controlled medication       [] Medication not included in refill protocol policy       [] Medication is " Reported/Historical       [] Medication not active on Pt's med list       [] > 30-day supply request       [] Advanced refill request: > 7 days before refill date       [] Review is needed: new med; med adjusted ? 30 days; Safety Alert; requires lab monitoring            Warnings Override History for perphenazine 16 MG tablet [[id number]]    Overridden by Giuliana Buck MD on Aug 30, 2024 11:58 AM   Allergy/Contraindication   1. CLOZAPINE [Level: Drug Class Match] [Reason: Will monitor drug levels/drug effects ]

## 2024-10-22 ENCOUNTER — VIRTUAL VISIT (OUTPATIENT)
Dept: PSYCHIATRY | Facility: CLINIC | Age: 39
End: 2024-10-22
Attending: STUDENT IN AN ORGANIZED HEALTH CARE EDUCATION/TRAINING PROGRAM
Payer: MEDICAID

## 2024-10-22 DIAGNOSIS — F25.0 SCHIZOAFFECTIVE DISORDER, BIPOLAR TYPE (H): ICD-10-CM

## 2024-10-22 DIAGNOSIS — Z79.899 LONG TERM CURRENT USE OF ANTIPSYCHOTIC MEDICATION: ICD-10-CM

## 2024-10-22 PROCEDURE — 99214 OFFICE O/P EST MOD 30 MIN: CPT | Mod: 95

## 2024-10-22 PROCEDURE — G2211 COMPLEX E/M VISIT ADD ON: HCPCS | Mod: 95

## 2024-10-22 NOTE — NURSING NOTE
Current patient location: Patient declined to provide     Is the patient currently in the state of [place]? YES    Visit mode:TELEPHONE    If the visit is dropped, the patient can be reconnected by: TELEPHONE VISIT: Phone number: [phone]  Or email : [email or web address]     Will anyone else be joining the visit? NO  (If patient encounters technical issues they should call [phone]    Are changes needed to the allergy or medication list? No    Are refills needed on medications prescribed by this physician? NO    Rooming Documentation:  No qnrs completed as patient checked in by staff at her group home    Reason for visit: RECHECK    Zoraida STF

## 2024-10-22 NOTE — PROGRESS NOTES
"Virtual Visit Details    Type of service:  Video Visit     Originating Location (pt. Location): Other long-term    Distant Location (provider location):  On-site  Platform used for Video Visit: [facility]  MEDICAL PROGRESS NOTE  General Clinic Team       CARE TEAM:    PCP- Suzie Mariscal    Psychotherapist- unknown by patient     Community  Team  [facility] , Thea Hall [phone]  [facility] worker, CM, group home staff.   CADI : Charmaine Isabel [phone].   Mental Health : Bre [phone]     Guardian: Mary \"Swetha\" Chriss (mother)  Group Home: [phone]. NK, : [phone].     Karolyn Banerjee is a 37 year old who uses the name Karolyn and pronouns she, her, herself.                   Diagnoses  Schizoaffective Disorder, bipolar type, MRE depressed  Unspecified Neurocognitive Disorder    Assessment & Plan   Karolyn Banerjee is a 38 year old with diagnoses of schizoaffective disorder and neurocognitive disorder who has been living at her current group home for approximately 5 years. I first met with Karolyn on 8/27/24. At this time Karolyn continued to remain stable. She reported having intermittent AH though no command AH and overall these appear to be close to her baseline. At this visit we re-added her morning perphenazine 8mg as that was discontinued for an unknown reason since her last visit and to see if that would further help with mood.     Today,  Karolyn continues to remain stable. No notable improvement or worsening of psychosis or mood symptoms. Mom and group home staff did not notice any significant change with the additional 8mg added from before. Additionally, Karolyn reported increased sedation-particularly after taking medications. Considering this, and per discussion with mom and NK (see conversation in HPI) we agreed to change " perphenazine to 8mg BID. We will monitor closely for recurrence of psychosis symptom.   This visit, Karolyn also expressed some concern with weight. We discussed the possibility of starting metformin (which mom consented to) once labs are back. I also talked with NK about having staff help Karolyn pick out healthier snacks. Karolyn did also report reflux symptoms which I encouraged her and talked with mom and NK about setting up a PCP appointment. It has been noted previously that Karolyn has been having consistently high blood pressures though no notable physical symptoms. It was recommended that she follow up with her PCP regularly for this.     Psychotropic Drug Interactions:  {  CETIRIZINE and PERPHENAZINE may result in increased risk of CNS depression.   PERPHENAZINE and BENZTROPINE may result in decreased phenothiazine serum concentrations, decreased phenothiazine effectiveness, enhanced anticholinergic effects (ileus, hyperpyrexia, sedation, dry mouth).      MANAGEMENT:  Monitoring for adverse effects     MNPMP was not checked today: not indicated at this time.     Risk Statements:   Treatment Risk: Risks, benefits, alternatives and potential adverse effects have been discussed and are understood.   Safety Risk: Karolyn did not appear to be an imminent safety risk to self or others.    PLAN    1) Meds-               - Decrease Perphenazine from 8mg qam and 16mg qpm to 8mg BID-mom consented to this   - Continue lurasidone 160 mg daily w/dinner  - Continue bupropion  mg daily  - Continue benztropine 1.5 mg at 5 PM     2) Psychotherapy- None     3) Next due-  Labs- [facility] labs overdue- ordered  at last visit.    -Labs sent to NK to have collected at [facility]. Once labs are collected then we will consider starting Metformin.   EKG- last 1/2019 Qtc 428, not indicated   Rating scales- AIMS=0 April 2024     4) Referrals-  none     5) Other-      6) Dispo-return to clinic in 6 weeks                 Interval History    Interval Events:  Good. Things are ok at the group home. Continuing to color, make bracelets and play [employer]. Feels safe at the group home.   Concerns: Weight to go down. Wants healthier snacks. Discussed talking to group home about this. Discussed potentially starting metformin.   Sharp pain in chest, same as last time. Has it all day long. Has been going on for a few years.  Some days it's really bad and has to go to room and go to sleep. Thinks pop makes it worse. Burning. Discussed cutting down pop, drinking more water and seeing how that goes.     Psych ROS:  -Mood: ok.  -Depression: A little. Sometimes doesn't feel like doing anything. Able to go to groups  -SI, HI: No  -SIB: No:  -Anxiety: Feels likes it's manageable.   -Psychosis:   -AVH: When listening to music (love CDs), hear this mohsen talking to her. Hears:  I love you, I want you I need you, that she's his. Thinks It is about the same as her last visit.    -Paranoia: No  -Sleep: Sleeping too much during the day, during the night not tired. Go to bed too early, Gets up at 7:30/8am. Goes to bed at 5pm/6pm. Feels tired during the day. Feels more tired after taking morning meds. Takes meds in morning and at 5pm.     -Therapy: Yes. Sees 1x/month. Going ok.     -Medications, Side Effects, Medical ROS:    -Reviewed medications with NK at group home as Karolyn doesn't remember them.    -Having bowel movements regularly.    -When wakes up in the am, will feel pain in shoulder. Happens almost every day.     -After falling down steps of mom's and dad's house. Injured left leg, left elbow and hand.     Conversation with Mom and group staff member, NK:   -Mom and NK haven't noticed any improvement or worsening of mood over the past few months. Mom has noticed that she dozes off. Both are on board with perphenzine 8mg BID. Also offered the option of decreasing Latuda as well.   -Discussed healthy snacks with NK. They can help her chose healthier snacks at the  "store.   -Discussed Metformin with mom and that we would need labs first. Mom ok with us starting metform 500 mg daily with meal if labs are ok.   -NK said that she will need labs faxed to her and she can get them to [facility]: [phone]  - I also let them know that she needs a PCP, her symptoms do sound like reflux.     Current Social History:  No changes today -10/22/24  -Living: Lives in a group home. Likes it there.   -Interests: Likes beading, makes bracelets and things. On Sundays, gets up at 7am and goes to [employer] .   -Supports: Mom, boyfriend, group home. Will go to mom's sometimes.   -Children: None  -Financial: SSDI     Pertinent Substance Use:  No changes today -10/22/24  \"-alcohol: No   -cannabis: No  -tobacco: No  -caffeine:  No   -opioids: No   Narcan Kit currrent: No   -other: none\"     Psych and Medical ROS per HPI.                    Pertinent Background  The following section contains information copied from previous note by Dr Lepe on 7/28/22.               Karolyn first experienced mental health issues as a child and has received treatment for schizoaffective disorder, bipolar type, borderline personality disorder, and unspecified neurocognitive disorder. Notably, the patient's symptoms have historically included psychosis (auditory hallucinations, delusions related to pregnancy, agitation) and suicide attempts/threats to run into traffic or drown herself in a pond, often in reaction to perceived slights or difficult interactions with group home staff/residents. She has been tried on many different medication trials in the past, including two trials of clozapine that caused neutropenia. ECT along with medication has been the most helpful treatment for maintaining stability. ECT with Dr. Amos was discontinued in fall 2017 after patient remained relatively stable without it. (last hospitalization February 2018). Her mother is her guardian. Karolyn fell and broke her leg in 12/2018 which resulted " in multiple surgeries, a long transitional care stay, and ongoing use of a walker.      Psych pertinent item history includes suicide attempt [multiple], suicidal ideation, SIB [per chart], psychosis [sxs include auditory hallucinations, delusions], mutiple psychotropic trials , severe med reaction, violent behavior, psych hosp (>5), commitment and ECT                  Physical Exam  (Vitals Only)    There were no vitals taken for this visit.  Pulse Readings from Last 3 Encounters:   04/25/24 103   04/18/23 105   02/25/23 99     Wt Readings from Last 3 Encounters:   04/25/24 132.4 kg (291 lb 12.8 oz)   04/18/23 133.3 kg (293 lb 12.8 oz)   02/06/20 134.5 kg (296 lb 9.6 oz)     BP Readings from Last 3 Encounters:   04/25/24 (!) 139/102   04/18/23 (!) 156/113   02/25/23 (!) 149/105                      Mental Status Exam    Alertness: alert  and oriented  Appearance: casually groomed  Behavior/Demeanor: cooperative and calm, with fair  eye contact   Speech: increased latency of response and slowed, normal prosody  Language: intact and no problems  Psychomotor: no psychomotor agitation, retardation or abnormal, involuntary movements appreciated.  Mood: ok  Affect: restricted; congruent to: mood- yes, content- yes  Thought Process/Associations:  linear, goal oriented  Thought Content:  Reports none;  Denies suicidal & violent ideation and delusions  Perception:  Reports intermittent AH (see HPI) reports intermittent AH (see subjective), no current AVH,  does not appear to be responding to internal stimuli Denies visual hallucinations  Insight: limited  Judgment: limited and adequate for safety  Cognition: baseline  Gait and Station: unremarkable                  Past Psychotropic Medication Trials      Medication  Dose (mg) Effect  Dates of Use   lurasidone 160 mg helpful current   Bupropion  mg helpful current   perphenazine 16 mg BID helpful Current   clozapine   2 trials that resulted with agranulocytosis (not  eligible for retrial)     olanzapine         quetiapine         ziprasidone         risperidone   dystonia     aripiprazole   ineffective     haloperidol   Developed parkinsonism     divalproex         sertraline         venlafaxine                             Past Medical History     Patient Active Problem List   Diagnosis    Schizoaffective disorder, bipolar type (H)    Mild cognitive impairment    Borderline personality disorder (H)    Asthma    Nonorganic enuresis    Suicidal ideation    Port catheter in place    Hx of psychiatric care    DVT (deep vein thrombosis) in pregnancy    JOSE (obstructive sleep apnea)    Morbid obesity (H)                     Medications     Current Outpatient Medications   Medication Sig Dispense Refill    ACETAMINOPHEN PO Take 650 mg by mouth every 4 hours as needed       albuterol (PROAIR HFA/PROVENTIL HFA/VENTOLIN HFA) 108 (90 Base) MCG/ACT inhaler Inhale 1-2 puffs into the lungs every 4 hours as needed      Artificial Saliva (SALIVASURE) LOZG 1 lozenge by Transmucosal route every 4 hours as needed for dry mouth      benztropine (COGENTIN) 0.5 MG tablet TAKE 3 TABLETS BY MOUTH EVERY EVENING **Overdue for visit, appt needed for further refills to be given** 90 tablet 0    buPROPion (WELLBUTRIN XL) 150 MG 24 hr tablet Take 3 tablets (450 mg) by mouth every morning. **Overdue for visit, appt needed for further refills to be given** 90 tablet 0    cetirizine (ZYRTEC) 10 MG tablet Take 10 mg by mouth daily      famotidine (PEPCID) 20 MG tablet Take 20 mg by mouth 2 times daily      lurasidone (LATUDA) 80 MG TABS tablet Take 2 tablets (160 mg) by mouth daily (with dinner). For more refills,schedule an appointment at [phone] 60 tablet 1    norethindrone-ethinyl estradiol (JUNEL FE 1/20) 1-20 MG-MCG tablet Take 1 tablet by mouth      norethindrone-ethinyl estradiol (MICROGESTIN 1/20) 1-20 MG-MCG per tablet Take 1 tablet by mouth daily       Omega-3 Fatty Acids (FISH OIL PO) Take 1,000  mg by mouth daily HOLD WHILE IN TCU      perphenazine 16 MG tablet Take 1 tablet (16 mg) by mouth at bedtime. **Overdue for visit, appt needed for further refills to be given** 30 tablet 0    perphenazine 8 MG tablet Take 1 tablet (8 mg) by mouth daily. TAKE 1 TABLET BY MOUTH EVERY MORNING. TAKE IN ADDITION TO 16MG DOSE AT BEDTIME 30 tablet 1    senna-docusate (SENOKOT-S/PERICOLACE) 8.6-50 MG tablet Take 8.6 mg by mouth      sennosides (SENOKOT) 8.6 MG tablet Take 1 tablet by mouth 2 times daily as needed                        Data         4/25/2024     2:33 PM   PROMIS-10 Total Score w/o Sub Scores   PROMIS TOTAL - SUBSCORES 24         11/2/2018     3:10 PM 11/6/2019     1:00 PM 4/25/2024     2:29 PM   PHQ-9 SCORE   PHQ-9 Total Score MyChart   3 (Minimal depression)   PHQ-9 Total Score 6 3 3          No data to display                Liver/Kidney Function, TSH Metabolic Blood counts   Recent Labs   Lab Test 02/25/23  1615 06/12/19  0000   AST 27 12   ALT 13 20   ALKPHOS 83 67   CR 0.80 0.84     Recent Labs   Lab Test 09/21/17  0750   TSH 2.55    Recent Labs   Lab Test 06/12/19  0000   CHOL 168   TRIG 94   LDL 97   HDL 52     Recent Labs   Lab Test 06/12/19  0000   A1C 5.0     Recent Labs   Lab Test 02/25/23  1615   *    Recent Labs   Lab Test 02/25/23  1615   WBC 8.4   HGB 15.0   HCT 46.5   MCV 90           QT: 435 in 2/2023          Level of Medical Decision Making:   - At least 1 chronic problem that is not stable  - Engaged in prescription drug management during visit (discussed any medication benefits, side effects, alternatives, etc.)      The longitudinal plan of care for the diagnosis(es)/condition(s) as documented were addressed during this visit. Due to the added complexity in care, I will continue to support Karolyn in the subsequent management and with ongoing continuity of care.      PROVIDER:   Giuliana Buck MD  PGY-3 Psychiatry  [facility]     Patient staffed in  clinic with Dr. Smith who will sign the note.  Supervisor is Dr. Leroy.

## 2024-10-25 ENCOUNTER — TELEPHONE (OUTPATIENT)
Dept: PSYCHIATRY | Facility: CLINIC | Age: 39
End: 2024-10-25
Payer: MEDICAID

## 2024-10-25 RX ORDER — LURASIDONE HYDROCHLORIDE 80 MG/1
160 TABLET, FILM COATED ORAL
Qty: 60 TABLET | Refills: 1 | Status: SHIPPED | OUTPATIENT
Start: 2024-10-25 | End: 2024-11-15

## 2024-10-25 RX ORDER — BUPROPION HYDROCHLORIDE 150 MG/1
450 TABLET ORAL EVERY MORNING
Qty: 90 TABLET | Refills: 0 | Status: SHIPPED | OUTPATIENT
Start: 2024-10-25 | End: 2024-11-15

## 2024-10-25 RX ORDER — BENZTROPINE MESYLATE 0.5 MG/1
TABLET ORAL
Qty: 90 TABLET | Refills: 0 | Status: SHIPPED | OUTPATIENT
Start: 2024-10-25 | End: 2024-11-15

## 2024-10-25 RX ORDER — PERPHENAZINE 8 MG/1
8 TABLET ORAL 2 TIMES DAILY
Qty: 60 TABLET | Refills: 1 | Status: SHIPPED | OUTPATIENT
Start: 2024-10-25 | End: 2024-11-15

## 2024-10-25 NOTE — TELEPHONE ENCOUNTER
Giuliana Buck MD  P Psychiatry Nurse-[facility]  Hello,    I there are some lab orders that I placed back in August and also added in another one just now: CBC, CMP, A1c, Lipid, TSH.  I am hoping to for Karolyn to have these collected but her nurse at the group home (NK) would like them faxed to her and she will send them order to [facility].    Can you please fax these over to NK? The fax number she gave me is [phone].    Thank You,  Giuliana    *The above lab orders were manually faxed to the nurse at [phone].    Meme Salcedo RN on 10/25/2024 at 8:12 AM

## 2024-11-14 ENCOUNTER — OFFICE VISIT (OUTPATIENT)
Dept: PSYCHIATRY | Facility: CLINIC | Age: 39
End: 2024-11-14
Attending: STUDENT IN AN ORGANIZED HEALTH CARE EDUCATION/TRAINING PROGRAM
Payer: MEDICAID

## 2024-11-14 ENCOUNTER — LAB (OUTPATIENT)
Dept: LAB | Facility: CLINIC | Age: 39
End: 2024-11-14
Attending: STUDENT IN AN ORGANIZED HEALTH CARE EDUCATION/TRAINING PROGRAM
Payer: MEDICAID

## 2024-11-14 VITALS — WEIGHT: 293 LBS | BODY MASS INDEX: 52.9 KG/M2 | SYSTOLIC BLOOD PRESSURE: 144 MMHG | DIASTOLIC BLOOD PRESSURE: 92 MMHG

## 2024-11-14 DIAGNOSIS — T50.905A WEIGHT GAIN DUE TO MEDICATION: ICD-10-CM

## 2024-11-14 DIAGNOSIS — R63.5 WEIGHT GAIN DUE TO MEDICATION: ICD-10-CM

## 2024-11-14 DIAGNOSIS — F25.0 SCHIZOAFFECTIVE DISORDER, BIPOLAR TYPE (H): ICD-10-CM

## 2024-11-14 DIAGNOSIS — Z79.899 LONG TERM CURRENT USE OF ANTIPSYCHOTIC MEDICATION: Primary | ICD-10-CM

## 2024-11-14 DIAGNOSIS — Z79.899 LONG TERM CURRENT USE OF ANTIPSYCHOTIC MEDICATION: ICD-10-CM

## 2024-11-14 LAB
ALBUMIN SERPL BCG-MCNC: 4 G/DL (ref 3.5–5.2)
ALP SERPL-CCNC: 78 U/L (ref 40–150)
ALT SERPL W P-5'-P-CCNC: 9 U/L (ref 0–50)
ANION GAP SERPL CALCULATED.3IONS-SCNC: 12 MMOL/L (ref 7–15)
AST SERPL W P-5'-P-CCNC: 13 U/L (ref 0–45)
BASOPHILS # BLD AUTO: 0.1 10E3/UL (ref 0–0.2)
BASOPHILS NFR BLD AUTO: 1 %
BILIRUB SERPL-MCNC: 0.3 MG/DL
BUN SERPL-MCNC: 11.1 MG/DL (ref 6–20)
CALCIUM SERPL-MCNC: 9.1 MG/DL (ref 8.8–10.4)
CHLORIDE SERPL-SCNC: 100 MMOL/L (ref 98–107)
CHOLEST SERPL-MCNC: 192 MG/DL
CREAT SERPL-MCNC: 0.8 MG/DL (ref 0.51–0.95)
EGFRCR SERPLBLD CKD-EPI 2021: >90 ML/MIN/1.73M2
EOSINOPHIL # BLD AUTO: 0.3 10E3/UL (ref 0–0.7)
EOSINOPHIL NFR BLD AUTO: 3 %
ERYTHROCYTE [DISTWIDTH] IN BLOOD BY AUTOMATED COUNT: 13 % (ref 10–15)
EST. AVERAGE GLUCOSE BLD GHB EST-MCNC: 114 MG/DL
FASTING STATUS PATIENT QL REPORTED: NO
FASTING STATUS PATIENT QL REPORTED: NO
GLUCOSE SERPL-MCNC: 112 MG/DL (ref 70–99)
HBA1C MFR BLD: 5.6 %
HCO3 SERPL-SCNC: 24 MMOL/L (ref 22–29)
HCT VFR BLD AUTO: 43.8 % (ref 35–47)
HDLC SERPL-MCNC: 46 MG/DL
HGB BLD-MCNC: 14.8 G/DL (ref 11.7–15.7)
IMM GRANULOCYTES # BLD: 0 10E3/UL
IMM GRANULOCYTES NFR BLD: 1 %
LDLC SERPL CALC-MCNC: 118 MG/DL
LYMPHOCYTES # BLD AUTO: 2 10E3/UL (ref 0.8–5.3)
LYMPHOCYTES NFR BLD AUTO: 23 %
MCH RBC QN AUTO: 29.5 PG (ref 26.5–33)
MCHC RBC AUTO-ENTMCNC: 33.8 G/DL (ref 31.5–36.5)
MCV RBC AUTO: 87 FL (ref 78–100)
MONOCYTES # BLD AUTO: 0.6 10E3/UL (ref 0–1.3)
MONOCYTES NFR BLD AUTO: 7 %
NEUTROPHILS # BLD AUTO: 5.7 10E3/UL (ref 1.6–8.3)
NEUTROPHILS NFR BLD AUTO: 66 %
NONHDLC SERPL-MCNC: 146 MG/DL
NRBC # BLD AUTO: 0 10E3/UL
NRBC BLD AUTO-RTO: 0 /100
PLATELET # BLD AUTO: 220 10E3/UL (ref 150–450)
POTASSIUM SERPL-SCNC: 3.4 MMOL/L (ref 3.4–5.3)
PROT SERPL-MCNC: 6.9 G/DL (ref 6.4–8.3)
RBC # BLD AUTO: 5.02 10E6/UL (ref 3.8–5.2)
SODIUM SERPL-SCNC: 136 MMOL/L (ref 135–145)
TRIGL SERPL-MCNC: 142 MG/DL
TSH SERPL DL<=0.005 MIU/L-ACNC: 3.04 UIU/ML (ref 0.3–4.2)
WBC # BLD AUTO: 8.6 10E3/UL (ref 4–11)

## 2024-11-14 PROCEDURE — 80061 LIPID PANEL: CPT

## 2024-11-14 PROCEDURE — 85004 AUTOMATED DIFF WBC COUNT: CPT

## 2024-11-14 PROCEDURE — 36415 COLL VENOUS BLD VENIPUNCTURE: CPT

## 2024-11-14 PROCEDURE — 80053 COMPREHEN METABOLIC PANEL: CPT

## 2024-11-14 PROCEDURE — 83036 HEMOGLOBIN GLYCOSYLATED A1C: CPT

## 2024-11-14 PROCEDURE — 84443 ASSAY THYROID STIM HORMONE: CPT

## 2024-11-14 PROCEDURE — 85049 AUTOMATED PLATELET COUNT: CPT

## 2024-11-14 PROCEDURE — G0463 HOSPITAL OUTPT CLINIC VISIT: HCPCS

## 2024-11-14 ASSESSMENT — PAIN SCALES - GENERAL: PAINLEVEL_OUTOF10: NO PAIN (0)

## 2024-11-14 ASSESSMENT — PATIENT HEALTH QUESTIONNAIRE - PHQ9
SUM OF ALL RESPONSES TO PHQ QUESTIONS 1-9: 4
SUM OF ALL RESPONSES TO PHQ QUESTIONS 1-9: 4
10. IF YOU CHECKED OFF ANY PROBLEMS, HOW DIFFICULT HAVE THESE PROBLEMS MADE IT FOR YOU TO DO YOUR WORK, TAKE CARE OF THINGS AT HOME, OR GET ALONG WITH OTHER PEOPLE: NOT DIFFICULT AT ALL

## 2024-11-14 NOTE — Clinical Note
When you have time, ask me about TD, withdrawal TD, and general time course of TD in relation to med dosing.  Colt

## 2024-11-14 NOTE — PROGRESS NOTES
"Virtual Visit Details    Type of service:  Video Visit     Originating Location (pt. Location): Other Group Home  {PROVIDER LOCATION On-site should be selected for visits conducted from your clinic location or adjoining [facility] hospital, academic office, or other nearby [facility] building. Off-site should be selected for all other provider locations, including home:[id number]}  Distant Location (provider location):  On-site  Platform used for Video Visit: [facility]  MEDICAL PROGRESS NOTE  General Clinic Team       CARE TEAM:    PCP- Suzie Mariscal    Psychotherapist- unknown by patient     Community  Team  [facility] , Thea Landerosoit [phone]  [facility] worker, CM, group home staff.   CADI : Charmaine Isabel [phone].   Mental Health : Bre [phone]     Guardian: Mary \"Swetha\" Chriss (mother)  Group Home: [phone]. , : [phone].     Karolyn Banerjee is a 37 year old who uses the name Karolyn and pronouns she, her, herself.                   Diagnoses  Schizoaffective Disorder, bipolar type, MRE depressed  Unspecified Neurocognitive Disorder    Assessment & Plan   Karolyn Banerjee is a 38 year old with diagnoses of schizoaffective disorder and neurocognitive disorder who has been living at her current group home for approximately 5 years. I first met with Karolyn on 8/27/24. At this time Karolyn continued to remain stable. She reported having intermittent AH though no command AH and overall these appear to be close to her baseline. At this visit we re-added her morning perphenazine 8mg as that was discontinued for an unknown reason since her last visit and to see if that would further help with mood.     Today,  Karolyn continues to remain stable. No notable improvement or worsening of psychosis or mood symptoms. Mom and group home staff did not notice any " significant change with the additional 8mg added from before. Additionally, Karolyn reported increased sedation-particularly after taking medications. Considering this, and per discussion with mom and NK (see conversation in HPI) we agreed to change perphenazine to 8mg BID. We will monitor closely for recurrence of psychosis symptom.   This visit, Karolyn also expressed some concern with weight. We discussed the possibility of starting metformin (which mom consented to) once labs are back. I also talked with NK about having staff help Karolyn pick out healthier snacks. Karolyn did also report reflux symptoms which I encouraged her and talked with mom and NK about setting up a PCP appointment. It has been noted previously that Karolyn has been having consistently high blood pressures though no notable physical symptoms. It was recommended that she follow up with her PCP regularly for this.     Psychotropic Drug Interactions:  {  CETIRIZINE and PERPHENAZINE may result in increased risk of CNS depression.   PERPHENAZINE and BENZTROPINE may result in decreased phenothiazine serum concentrations, decreased phenothiazine effectiveness, enhanced anticholinergic effects (ileus, hyperpyrexia, sedation, dry mouth).      MANAGEMENT:  Monitoring for adverse effects     MNPMP was not checked today: not indicated at this time.     Risk Statements:   Treatment Risk: Risks, benefits, alternatives and potential adverse effects have been discussed and are understood.   Safety Risk: Karolyn did not appear to be an imminent safety risk to self or others.    PLAN    1) Meds-               - Decrease Perphenazine from 8mg qam and 16mg qpm to 8mg BID-mom consented to this   - Continue lurasidone 160 mg daily w/dinner  - Continue bupropion  mg daily  - Continue benztropine 1.5 mg at 5 PM     2) Psychotherapy- None     3) Next due-  Labs- AP labs overdue- ordered  at last visit.    -Labs sent to NK to have collected at [facility]. Once labs are  collected then we will consider starting Metformin.   EKG- last 1/2019 Qtc 428, not indicated   Rating scales- AIMS=0 April 2024     4) Referrals-  none     5) Other-      6) Dispo-return to clinic in 6 weeks                 Interval History   Interval Events:  -Good. Feeling safe at group home.   -Doesn't like the ice and snow for fall/winter.   -Just goes out when she needs to.   -Continuing to make bracelets. Makes a lot of different kinds.   -Continues to see mom 1x/week.   -With medication decrease: Feeling better. Doesn't have to take any naps. No worsening of voices.     Psych ROS:  -Mood: ok.  -Depression: A little. Sometimes doesn't feel like doing anything. Able to go to groups  -SI, HI: No  -SIB: No:  -Anxiety: Ok. Feels likes it's manageable.   -Psychosis:   -AVH: When listening to music (love CDs), hear this mohsen talking to her. Hears:  I love you, I want you I need you, that she's his. Thinks It is about the same as her last visit. -hasn't changed from last visit. Denies any others.    -Paranoia: No  -Sleep: Napping less during the day with medication decreasing. Getting about 8 hours of sleep at night.   -Therapy: Yes. Sees 1x/month. Going ok.     -Medications, Side Effects, Medical ROS:    -Bowel movements: 1x/day    -Lightheaded: Yes-headache. Hasn't changed with decreasing medication.    -Other side effects: no.    -TD: 0, some writhing hand movements on hand that was dooing fingertapping.     -Mirror movements.-may be associated     -Movement overlap- less likely TD.     -Some lip movements.     Plan:  No changes today.   Call NK.   Let her know about headache.   -Down the road can consider cutting down on antipsychotic.    -If tapering off perphenazine-take between 6-12 months, 10-25% every 3-6 months.   -    Current Social History:  No changes today -10/22/24  -Living: Lives in a group home. Likes it there.   -Interests: Likes beading, makes bracelets and things. On Sundays, gets up at 7am and  "goes to [employer] .   -Supports: Mom, boyfriend, group home. Will go to mom's sometimes.   -Children: None  -Financial: SSDI     Pertinent Substance Use:  No changes today -10/22/24  \"-alcohol: No   -cannabis: No  -tobacco: No  -caffeine:  No   -opioids: No   Narcan Kit currrent: No   -other: none\"     Psych and Medical ROS per HPI.                    Pertinent Background  The following section contains information copied from previous note by Dr Lepe on 7/28/22.               Karolyn first experienced mental health issues as a child and has received treatment for schizoaffective disorder, bipolar type, borderline personality disorder, and unspecified neurocognitive disorder. Notably, the patient's symptoms have historically included psychosis (auditory hallucinations, delusions related to pregnancy, agitation) and suicide attempts/threats to run into traffic or drown herself in a pond, often in reaction to perceived slights or difficult interactions with group home staff/residents. She has been tried on many different medication trials in the past, including two trials of clozapine that caused neutropenia. ECT along with medication has been the most helpful treatment for maintaining stability. ECT with Dr. Amos was discontinued in fall 2017 after patient remained relatively stable without it. (last hospitalization February 2018). Her mother is her guardian. Karolyn fell and broke her leg in 12/2018 which resulted in multiple surgeries, a long transitional care stay, and ongoing use of a walker.      Psych pertinent item history includes suicide attempt [multiple], suicidal ideation, SIB [per chart], psychosis [sxs include auditory hallucinations, delusions], mutiple psychotropic trials , severe med reaction, violent behavior, psych hosp (>5), commitment and ECT                  Physical Exam  (Vitals Only)    BP (!) 144/92   Wt 139.8 kg (308 lb 3.2 oz)   BMI 52.90 kg/m    Pulse Readings from Last 3 Encounters: "   04/25/24 103   04/18/23 105   02/25/23 99     Wt Readings from Last 3 Encounters:   11/14/24 139.8 kg (308 lb 3.2 oz)   04/25/24 132.4 kg (291 lb 12.8 oz)   04/18/23 133.3 kg (293 lb 12.8 oz)     BP Readings from Last 3 Encounters:   11/14/24 (!) 144/92   04/25/24 (!) 139/102   04/18/23 (!) 156/113                      Mental Status Exam    Alertness: alert  and oriented  Appearance: casually groomed  Behavior/Demeanor: cooperative and calm, with fair  eye contact   Speech: increased latency of response and slowed, normal prosody  Language: intact and no problems  Psychomotor: no psychomotor agitation, retardation or abnormal, involuntary movements appreciated.  Mood: ok  Affect: restricted; congruent to: mood- yes, content- yes  Thought Process/Associations:  linear, goal oriented  Thought Content:  Reports none;  Denies suicidal & violent ideation and delusions  Perception:  Reports intermittent AH (see HPI) reports intermittent AH (see subjective), no current AVH,  does not appear to be responding to internal stimuli Denies visual hallucinations  Insight: limited  Judgment: limited and adequate for safety  Cognition: baseline  Gait and Station: unremarkable                  Past Psychotropic Medication Trials      Medication  Dose (mg) Effect  Dates of Use   lurasidone 160 mg helpful current   Bupropion  mg helpful current   perphenazine 16 mg BID helpful Current   clozapine   2 trials that resulted with agranulocytosis (not eligible for retrial)     olanzapine         quetiapine         ziprasidone         risperidone   dystonia     aripiprazole   ineffective     haloperidol   Developed parkinsonism     divalproex         sertraline         venlafaxine                             Past Medical History     Patient Active Problem List   Diagnosis    Schizoaffective disorder, bipolar type (H)    Mild cognitive impairment    Borderline personality disorder (H)    Asthma    Nonorganic enuresis    Suicidal  ideation    Port catheter in place    Hx of psychiatric care    DVT (deep vein thrombosis) in pregnancy    JOSE (obstructive sleep apnea)    Morbid obesity (H)                     Medications     Current Outpatient Medications   Medication Sig Dispense Refill    ACETAMINOPHEN PO Take 650 mg by mouth every 4 hours as needed       albuterol (PROAIR HFA/PROVENTIL HFA/VENTOLIN HFA) 108 (90 Base) MCG/ACT inhaler Inhale 1-2 puffs into the lungs every 4 hours as needed      Artificial Saliva (SALIVASURE) LOZG 1 lozenge by Transmucosal route every 4 hours as needed for dry mouth      benztropine (COGENTIN) 0.5 MG tablet TAKE 3 TABLETS BY MOUTH EVERY EVENING 90 tablet 0    buPROPion (WELLBUTRIN XL) 150 MG 24 hr tablet Take 3 tablets (450 mg) by mouth every morning. 90 tablet 0    cetirizine (ZYRTEC) 10 MG tablet Take 10 mg by mouth daily      famotidine (PEPCID) 20 MG tablet Take 20 mg by mouth 2 times daily      lurasidone (LATUDA) 80 MG TABS tablet Take 2 tablets (160 mg) by mouth daily (with dinner). For more refills,schedule an appointment at [phone] 60 tablet 1    norethindrone-ethinyl estradiol (JUNEL FE 1/20) 1-20 MG-MCG tablet Take 1 tablet by mouth      norethindrone-ethinyl estradiol (MICROGESTIN 1/20) 1-20 MG-MCG per tablet Take 1 tablet by mouth daily       Omega-3 Fatty Acids (FISH OIL PO) Take 1,000 mg by mouth daily HOLD WHILE IN U      perphenazine 8 MG tablet Take 1 tablet (8 mg) by mouth 2 times daily. 60 tablet 1    senna-docusate (SENOKOT-S/PERICOLACE) 8.6-50 MG tablet Take 8.6 mg by mouth      sennosides (SENOKOT) 8.6 MG tablet Take 1 tablet by mouth 2 times daily as needed                        Data         4/25/2024     2:33 PM   PROMIS-10 Total Score w/o Sub Scores   PROMIS TOTAL - SUBSCORES 24 11/6/2019     1:00 PM 4/25/2024     2:29 PM 11/14/2024     2:05 PM   PHQ-9 SCORE   PHQ-9 Total Score MyChart  3 (Minimal depression) 4 (Minimal depression)   PHQ-9 Total Score 3 3 4         Patient-reported          No data to display                Liver/Kidney Function, TSH Metabolic Blood counts   Recent Labs   Lab Test 02/25/23  1615 06/12/19  0000   AST 27 12   ALT 13 20   ALKPHOS 83 67   CR 0.80 0.84     Recent Labs   Lab Test 09/21/17  0750   TSH 2.55    Recent Labs   Lab Test 06/12/19  0000   CHOL 168   TRIG 94   LDL 97   HDL 52     Recent Labs   Lab Test 06/12/19  0000   A1C 5.0     Recent Labs   Lab Test 02/25/23  1615   *    Recent Labs   Lab Test 02/25/23  1615   WBC 8.4   HGB 15.0   HCT 46.5   MCV 90           QT: 435 in 2/2023          Level of Medical Decision Making:   - At least 1 chronic problem that is not stable  - Engaged in prescription drug management during visit (discussed any medication benefits, side effects, alternatives, etc.)      The longitudinal plan of care for the diagnosis(es)/condition(s) as documented were addressed during this visit. Due to the added complexity in care, I will continue to support Karolyn in the subsequent management and with ongoing continuity of care.      PROVIDER:   Giuliana Buck MD  PGY-3 Psychiatry  [facility]     Patient staffed in clinic with Dr. Smith who will sign the note.  Supervisor is Dr. Leroy.

## 2024-11-15 RX ORDER — LURASIDONE HYDROCHLORIDE 80 MG/1
160 TABLET, FILM COATED ORAL
Qty: 60 TABLET | Refills: 1 | Status: SHIPPED | OUTPATIENT
Start: 2024-11-15

## 2024-11-15 RX ORDER — BENZTROPINE MESYLATE 0.5 MG/1
TABLET ORAL
Qty: 90 TABLET | Refills: 0 | Status: SHIPPED | OUTPATIENT
Start: 2024-11-15

## 2024-11-15 RX ORDER — PERPHENAZINE 8 MG/1
8 TABLET ORAL 2 TIMES DAILY
Qty: 60 TABLET | Refills: 1 | Status: SHIPPED | OUTPATIENT
Start: 2024-11-15

## 2024-11-15 RX ORDER — BUPROPION HYDROCHLORIDE 150 MG/1
450 TABLET ORAL EVERY MORNING
Qty: 90 TABLET | Refills: 0 | Status: SHIPPED | OUTPATIENT
Start: 2024-11-15

## 2024-11-15 NOTE — PATIENT INSTRUCTIONS
Medication Changes: None today    Other:   -Please have labs collected today.   -Please let your PCP know about your headache.    Follow Up: 6 weeks

## 2024-12-16 ENCOUNTER — VIRTUAL VISIT (OUTPATIENT)
Dept: PSYCHIATRY | Facility: CLINIC | Age: 39
End: 2024-12-16
Attending: STUDENT IN AN ORGANIZED HEALTH CARE EDUCATION/TRAINING PROGRAM
Payer: MEDICAID

## 2024-12-16 DIAGNOSIS — Z79.899 LONG TERM CURRENT USE OF ANTIPSYCHOTIC MEDICATION: ICD-10-CM

## 2024-12-16 DIAGNOSIS — R63.5 DRUG-INDUCED WEIGHT GAIN: Primary | ICD-10-CM

## 2024-12-16 DIAGNOSIS — F25.0 SCHIZOAFFECTIVE DISORDER, BIPOLAR TYPE (H): ICD-10-CM

## 2024-12-16 DIAGNOSIS — T50.905A DRUG-INDUCED WEIGHT GAIN: Primary | ICD-10-CM

## 2024-12-16 DIAGNOSIS — K59.00 CONSTIPATION, UNSPECIFIED CONSTIPATION TYPE: ICD-10-CM

## 2024-12-16 PROCEDURE — G2211 COMPLEX E/M VISIT ADD ON: HCPCS | Mod: 95

## 2024-12-16 PROCEDURE — 99214 OFFICE O/P EST MOD 30 MIN: CPT | Mod: 95

## 2024-12-16 RX ORDER — PERPHENAZINE 8 MG/1
8 TABLET ORAL 2 TIMES DAILY
Qty: 60 TABLET | Refills: 1 | Status: SHIPPED | OUTPATIENT
Start: 2024-12-16

## 2024-12-16 RX ORDER — BUPROPION HYDROCHLORIDE 150 MG/1
450 TABLET ORAL EVERY MORNING
Qty: 90 TABLET | Refills: 1 | Status: SHIPPED | OUTPATIENT
Start: 2024-12-16

## 2024-12-16 RX ORDER — LURASIDONE HYDROCHLORIDE 80 MG/1
160 TABLET, FILM COATED ORAL
Qty: 60 TABLET | Refills: 1 | Status: SHIPPED | OUTPATIENT
Start: 2024-12-16

## 2024-12-16 RX ORDER — BENZTROPINE MESYLATE 0.5 MG/1
TABLET ORAL
Qty: 90 TABLET | Refills: 1 | Status: SHIPPED | OUTPATIENT
Start: 2024-12-16

## 2024-12-16 NOTE — PROGRESS NOTES
Karolyn Banerjee is a 39 year old who is being evaluated via a billable video visit.      Pt will join video visit via: "[employer]  If there are problems joining the visit, send backup video invite via: Text to preferred phone: [phone]    Originating location (patient location): Patient's home    Will anyone else be joining the visit? Group home staff

## 2024-12-16 NOTE — PATIENT INSTRUCTIONS
Medication Changes:  Start Metformin 500mg daily with breakfast for weight gain related to medication.     Follow Up: 6 weeks     **For crisis resources, please see the information at the end of this document**   Patient Education    Thank you for coming to the [facility] [place] [facility].     Lab Testing:  If you had lab testing today and your results are reassuring or normal they will be mailed to you or sent through [employer] within 7 days. If the lab tests need quick action we will call you with the results. The phone number we will call with results is # [phone]. If this is not the best number please call our clinic and change the number.     Medication Refills:  If you need any refills please call your pharmacy and they will contact us. Our fax number for refills is [phone].   Three business days of notice are needed for general medication refill requests.   Five business days of notice are needed for controlled substance refill requests.   If you need to change to a different pharmacy, please contact the new pharmacy directly. The new pharmacy will help you get your medications transferred.     Contact Us:  Please call [phone] during business hours (8-5:00 M-F).   If you have medication related questions after clinic hours, or on the weekend, please call [phone].     Financial Assistance [phone]   Medical Records [phone]       MENTAL HEALTH CRISIS RESOURCES:  For a emergency help, please call 911 or go to the nearest Emergency Department.     Emergency Walk-In Options:   Em[facility] Unit @ [facility] ([place]): [phone] - Specialized mental health emergency area designed to be calming  [facility] ([place]): [phone]  [facility] ([place]): [phone]  [facility]): [phone]    [facility] Crisis Information:   [place]: [phone]  [place]: [phone]  Kendrick HARDIN)  - Adult: [phone]     Child: [phone]  [place] - Adult: [phone]     Child: [phone]  [place]: [phone]  List of all [facility] resources:   https://[place].gov/dhs/people-we-serve/adults/health-care/mental-health/resources/crisis-contacts.jsp    National Crisis Information:   Crisis Text Line: Text  [place]  to 253968  Suicide & Crisis Lifeline: 988  National Suicide Prevention Lifeline: 3-351-639-TALK (1-853.384.7002)       For online chat options, visit https://suicidepreventionlifeline.org/chat/  Poison Control Center: 1-146.936.2002  Trans Lifeline: 1-560.462.1541 - Hotline for transgender people of all ages  [employer]: 2-512-829-7654 - Hotline for LGBT youth     For Non-Emergency Support:   Fast Tracker: Mental Health & Substance Use Disorder Resources -   https://www.[employer]rapeutic[employer]ermn.org/

## 2024-12-16 NOTE — PROGRESS NOTES
"     [facility]  MEDICAL PROGRESS NOTE  General Clinic Team       CARE TEAM:    PCP- Suzie Mariscal    Psychotherapist- unknown by patient     Community  Team  [facility] , [place]a [place] [phone]  [facility] worker, CM, group home staff.   CADI : Charmaine Hensonbird [phone].   Mental Health : Bre [phone]     Guardian: Mary \"Swetha\" Chriss (mother)  Group Home: [phone]. NK, : [phone].     Karolyn Banerjee is a 37 year old who uses the name Karolyn and pronouns she, her, herself.                 Diagnoses  Schizoaffective Disorder, bipolar type, MRE depressed  Unspecified Neurocognitive Disorder  R/O Tardive Dyskinesia     Assessment & Plan   Karolyn Banerjee is a 38 year old with diagnoses of schizoaffective disorder and neurocognitive disorder who has been living at her current group home for approximately 5 years. I last met with Karolyn on 10/22/2024. We have been working to optimize her perphenazine to maintain treatment for psychosis while also minimizing sedation. At her last visit we decreased her perphenazine to 8mg BID. At this last visit, Karolyn also expressed some concern with weight gain. I did discuss with Karolyn and her mom/guardian afterwards the possibility of starting metformin once we have Karolyn's routine labs back. Mom did consent to this. I did also talk to Karolyn's group home staff member, CALDERON, who knows Karolyn well about this and encouraged helping Karolyn pick out healthier snacks. I have also encouraged Karolyn/CALDERON to follow up with her PCP about reflux like symptoms, headaches and higher blood pressures.     Since this last visit, Karolyn did meet with her PCP who started her on Prilosec  for reflux symptoms. Karolyn continues to  reporti ongoing headaches, though of note, she has reported this at previous visits as well and has said this is " not new and they have been going on for years. I did encourage Karolyn to follow up with PCP regarding this. In regards to her mental health, Karolyn did not report any worsening psychosis symptoms with the decreased perphenazine dose. She continues to have her baseline auditory hallucination symptoms that occur when she hears love songs. Karolyn did endorse improvement in sedation and said that she has been taking less naps during the day. She did have some minor involuntary lip movements and toe wriggling while distracted while completing AIMS today. Most likely this represents a mild withdrawal dyskinesia and unmasking of longstanding TD after the dose reduction but may improve gradually over time. We agreed to not make any changes today.     Psychotropic Drug Interactions:  {  CETIRIZINE and PERPHENAZINE may result in increased risk of CNS depression.   PERPHENAZINE and BENZTROPINE may result in decreased phenothiazine serum concentrations, decreased phenothiazine effectiveness, enhanced anticholinergic effects (ileus, hyperpyrexia, sedation, dry mouth).      MANAGEMENT:  Monitoring for adverse effects     MNPMP was not checked today: not indicated at this time.     Risk Statements:   Treatment Risk: Risks, benefits, alternatives and potential adverse effects have been discussed and are understood.   Safety Risk: Karolyn did not appear to be an imminent safety risk to self or others.    PLAN    1) Meds-  No medication changes today.                - Continue Perphenazine 8mg BID   - Continue lurasidone 160 mg daily w/dinner  - Continue bupropion  mg daily  - Continue benztropine 1.5 mg at 5 PM   -Can consider starting metformin at next visit once labs completed.     2) Psychotherapy- None     3) Next due-  Labs- AP labs overdue- ordered  at last visit. Reminded Karolyn and group home staff member today-encouraged to get done right after visit since they are at the hospital already.   EKG- last 1/2019 Qtc 428,  not indicated   Rating scales- AIMS=3-4 11/2024 (see data section below)     4) Referrals-  none     5) Other-      6) Dispo-return to clinic in 6 weeks                 Interval History   Interval Events:  -Things going ok. Feeling safe.   -Seeing mom.   - Still making bracelets sometimes.   -Playing kings kings in the corner.   -Holidays: Like them. Not sure about plans yet. Goes to mom's place.     Mental Health:   -No concerns.   -Mood: Fairly stable.   -Depression: A little. Some days feels down. Not more than before. On these still able to do adls.    -Not sure how much.   -Anxiety: Ok  -AVH: Hasn't changed. Continues to be during long songs. Hasn't inrease or decreased.   -Si/Hi: no.   -Daytime Sleepiness; About the same. Doesn't think the decreased dose has helped. Taking 1-2 naps a day. 1-2hr naps.    -Goes to bed 6/7pm. Goes to sleep right away. Wakes up at 3/4am.    -Discussed trying to go to bed at 8pm.   -Therapy:    -How are things going with therapist:things are good.   -Medications, Side Effects, Medical ROS:    -Taking medications.    -Review medications:   -Bowel movements: a couple times a day. These are harder , bleeds a little once a week with bowel movements. Has been going on for months.    -Lightheaded: Feels lightheaded. When waking up. No falls. Gets better. This is not new.    -Other side effects: No.     NK:   -No worsening AVH  -PCP managing blood in stool. Has a follow up. Also reminded to let them know about headaches-haven't gotten   Benzotropine: 1x/day  Wellbutrin 450mg in the am  -lurasidone 160mg 1x/day  Perphenazine 8mg BID.       Plan:  -Start Metformin 500mg daily-previously talked to mom about this. May help with harder stools.   -Call NK to update and to clarify bowel regimen.  -Otherwise no changes.   -Follow Up: 6 weeks    Phone Call with NK:  -Updated her on metformin.   -She has Senna as needed (from 2019) for constipation. We discussed not scheduling anything at this time  "as we are starting metformin and that may cause some loose stools. I discussed switching from the senna as needed to miralax as needed which may help with softening stools if she needs it (as darrell is having regular bowel movements but it having hard stools that may be contributing to bleeding). We will discontinue the previous senna order.       Current Social History:  No changes today -10/22/24  -Living: Lives in a group home. Likes it there.   -Interests: Likes beading, makes bracelets and things. On Sundays, gets up at 7am and goes to [employer] .   -Supports: Mom, boyfriend, group home. Will go to mom's sometimes.   -Children: None  -Financial: SSDI     Pertinent Substance Use:  No changes today -10/22/24  \"-alcohol: No   -cannabis: No  -tobacco: No  -caffeine:  No   -opioids: No   Narcan Kit currrent: No   -other: none\"     Psych and Medical ROS per HPI.                    Pertinent Background  The following section contains information copied from previous note by Dr Lepe on 7/28/22.               Darrell first experienced mental health issues as a child and has received treatment for schizoaffective disorder, bipolar type, borderline personality disorder, and unspecified neurocognitive disorder. Notably, the patient's symptoms have historically included psychosis (auditory hallucinations, delusions related to pregnancy, agitation) and suicide attempts/threats to run into traffic or drown herself in a pond, often in reaction to perceived slights or difficult interactions with group home staff/residents. She has been tried on many different medication trials in the past, including two trials of clozapine that caused neutropenia. ECT along with medication has been the most helpful treatment for maintaining stability. ECT with Dr. Amos was discontinued in fall 2017 after patient remained relatively stable without it. (last hospitalization February 2018). Her mother is her guardian. Darrell fell and broke her leg " in 12/2018 which resulted in multiple surgeries, a long transitional care stay, and ongoing use of a walker.      Psych pertinent item history includes suicide attempt [multiple], suicidal ideation, SIB [per chart], psychosis [sxs include auditory hallucinations, delusions], mutiple psychotropic trials , severe med reaction, violent behavior, psych hosp (>5), commitment and ECT                  Physical Exam  (Vitals Only)    There were no vitals taken for this visit.  Pulse Readings from Last 3 Encounters:   04/25/24 103   04/18/23 105   02/25/23 99     Wt Readings from Last 3 Encounters:   11/14/24 139.8 kg (308 lb 3.2 oz)   04/25/24 132.4 kg (291 lb 12.8 oz)   04/18/23 133.3 kg (293 lb 12.8 oz)     BP Readings from Last 3 Encounters:   11/14/24 (!) 144/92   04/25/24 (!) 139/102   04/18/23 (!) 156/113                      Mental Status Exam    *Presented in person this visit.*    Alertness: alert  and oriented  Appearance: casually groomed  Behavior/Demeanor: cooperative and calm, with fair  eye contact   Speech: increased latency of response and slowed, normal prosody  Language: intact and no problems  Psychomotor: no psychomotor agitation, retardation or abnormal, see TD exam (in data section below)   Mood: good  Affect: restricted; brightens on topics she finds funny; congruent to: mood- yes, content- yes  Thought Process/Associations:  linear, goal oriented  Thought Content:  Reports none;  Denies suicidal & violent ideation and delusions  Perception:  Reports intermittent AH (see HPI) reports intermittent AH (see subjective), no current AVH,  does not appear to be responding to internal stimuli Denies visual hallucinations  Insight: limited  Judgment: limited and adequate for safety  Cognition: baseline  Gait and Station: unremarkable                Past Psychotropic Medication Trials      Medication  Dose (mg) Effect  Dates of Use   lurasidone 160 mg helpful current   Bupropion  mg helpful current    perphenazine 16 mg BID helpful Current   clozapine   2 trials that resulted with agranulocytosis (not eligible for retrial)     olanzapine         quetiapine         ziprasidone         risperidone   dystonia     aripiprazole   ineffective     haloperidol   Developed parkinsonism     divalproex         sertraline         venlafaxine                             Past Medical History     Patient Active Problem List   Diagnosis    Schizoaffective disorder, bipolar type (H)    Mild cognitive impairment    Borderline personality disorder (H)    Asthma    Nonorganic enuresis    Suicidal ideation    Port catheter in place    Hx of psychiatric care    DVT (deep vein thrombosis) in pregnancy    JOSE (obstructive sleep apnea)    Morbid obesity (H)                     Medications     Current Outpatient Medications   Medication Sig Dispense Refill    ACETAMINOPHEN PO Take 650 mg by mouth every 4 hours as needed       albuterol (PROAIR HFA/PROVENTIL HFA/VENTOLIN HFA) 108 (90 Base) MCG/ACT inhaler Inhale 1-2 puffs into the lungs every 4 hours as needed      Artificial Saliva (SALIVASURE) LOZG 1 lozenge by Transmucosal route every 4 hours as needed for dry mouth      benztropine (COGENTIN) 0.5 MG tablet TAKE 3 TABLETS BY MOUTH EVERY EVENING 90 tablet 0    buPROPion (WELLBUTRIN XL) 150 MG 24 hr tablet Take 3 tablets (450 mg) by mouth every morning. 90 tablet 0    cetirizine (ZYRTEC) 10 MG tablet Take 10 mg by mouth daily      famotidine (PEPCID) 20 MG tablet Take 20 mg by mouth 2 times daily      lurasidone (LATUDA) 80 MG TABS tablet Take 2 tablets (160 mg) by mouth daily (with dinner). For more refills,schedule an appointment at [phone] 60 tablet 1    norethindrone-ethinyl estradiol (JUNEL FE 1/20) 1-20 MG-MCG tablet Take 1 tablet by mouth      norethindrone-ethinyl estradiol (MICROGESTIN 1/20) 1-20 MG-MCG per tablet Take 1 tablet by mouth daily       Omega-3 Fatty Acids (FISH OIL PO) Take 1,000 mg by mouth daily HOLD WHILE  IN TCU      perphenazine 8 MG tablet Take 1 tablet (8 mg) by mouth 2 times daily. 60 tablet 1    senna-docusate (SENOKOT-S/PERICOLACE) 8.6-50 MG tablet Take 8.6 mg by mouth      sennosides (SENOKOT) 8.6 MG tablet Take 1 tablet by mouth 2 times daily as needed                        Data         4/25/2024     2:33 PM   PROMIS-10 Total Score w/o Sub Scores   PROMIS TOTAL - SUBSCORES 24 11/6/2019     1:00 PM 4/25/2024     2:29 PM 11/14/2024     2:05 PM   PHQ-9 SCORE   PHQ-9 Total Score MyChart  3 (Minimal depression) 4 (Minimal depression)   PHQ-9 Total Score 3 3 4        Patient-reported          No data to display                Liver/Kidney Function, TSH Metabolic Blood counts   Recent Labs   Lab Test 11/14/24  1515 02/25/23  1615   AST 13 27   ALT 9 13   ALKPHOS 78 83   CR 0.80 0.80     Recent Labs   Lab Test 11/14/24  1515   TSH 3.04    Recent Labs   Lab Test 11/14/24  1515   CHOL 192   TRIG 142   *   HDL 46*     Recent Labs   Lab Test 11/14/24  1515   A1C 5.6     Recent Labs   Lab Test 11/14/24  1515   *    Recent Labs   Lab Test 11/14/24  1515   WBC 8.6   HGB 14.8   HCT 43.8   MCV 87           QT: 435 in 2/2023    AIMS Exam: 11/2024; Score: 3-4: Possible evidence of TD. Will continue to monitor.     -Some lip movements and toe wriggling when distracted     Level of Medical Decision Making:   - At least 1 chronic problem that is not stable  - Engaged in prescription drug management during visit (discussed any medication benefits, side effects, alternatives, etc.)      The longitudinal plan of care for the diagnosis(es)/condition(s) as documented were addressed during this visit. Due to the added complexity in care, I will continue to support Karolyn in the subsequent management and with ongoing continuity of care.      PROVIDER:   Giuliana Buck MD  PGY-3 Psychiatry  [facility]     Patient staffed in clinic with Dr. Amos who will sign the note.  Supervisor is   Rad.

## 2024-12-18 RX ORDER — POLYETHYLENE GLYCOL 3350 17 G/17G
17 POWDER, FOR SOLUTION ORAL DAILY PRN
Qty: 510 G | Refills: 2 | Status: SHIPPED | OUTPATIENT
Start: 2024-12-18

## 2025-01-18 DIAGNOSIS — R63.5 DRUG-INDUCED WEIGHT GAIN: ICD-10-CM

## 2025-01-18 DIAGNOSIS — T50.905A DRUG-INDUCED WEIGHT GAIN: ICD-10-CM

## 2025-02-06 DIAGNOSIS — R63.5 DRUG-INDUCED WEIGHT GAIN: ICD-10-CM

## 2025-02-06 DIAGNOSIS — T50.905A DRUG-INDUCED WEIGHT GAIN: ICD-10-CM

## 2025-02-06 NOTE — TELEPHONE ENCOUNTER
"Date of Last Office Visit: 12/16/2024  [facility]    Giuliana Buck MD  Psychiatry       RTC: 6 weeks  No shows: 1  Cancellations: 0  Date of Next Office Visit: none  ------------------------------    Medication requested:     Disp Refills Start End ADELINA   metFORMIN (GLUCOPHAGE) 500 MG tablet 30 tablet 1 12/16/2024 -- No   Sig - Route: Take 1 tablet (500 mg) by mouth daily (with breakfast). - Oral     ------------------------------  From last visit note:   \" -Start Metformin 500mg daily with breakfast for weight gain related to antipsychotic \"    Lapse in medication adherence greater than 5 days?:  yes    Refill Decision:    []Medication refilled per  Medication Refill in Ambulatory Care  policy.         C Supervision: no future appointment scheduled. Scheduling has been notified to contact the pt for appointment.    [x]Medication unable to be refilled by RN due to criteria not met as indicated below:          [] Eligibility - Pt not seen within past 12 months, no future appointment       [x] Supervision: no future appointment scheduled. Scheduling has been notified to contact the pt for appointment.       [x] Compliance - lapse in therapy/gap in refills; No Shows; Cancellations       [] Verification - order discrepancy, clarification needed, modification needed       [] Randi refills given. Pt did not follow-up, pended to care team for decision       [] Controlled medication       [] Medication not included in refill protocol policy       [] Medication is Reported/Historical       [] Medication not active on Pt's med list       [] > 30-day supply request       [] Advanced refill request: > 7 days before refill date       [] Review is needed: new med; med adjusted <= 30 days; Safety Alert; requires lab monitoring       [] Last visit treatment plan \"Open/Pended/Unsigned\" - routing for review.       [] OTHER:          Request from pharmacy:  Requested " Prescriptions   Pending Prescriptions Disp Refills    metFORMIN (GLUCOPHAGE) 500 MG tablet 30 tablet 1     Sig: Take 1 tablet (500 mg) by mouth daily (with breakfast).       Biguanide Agents Failed - 2/6/2025  9:26 AM        Failed - Medication indicated for associated diagnosis     Medication is associated with one or more of the following diagnoses:     Gestational diabetes mellitus     Hyperinsulinar obesity     Hypersecretion of ovarian androgens    Non-alcoholic fatty liver    Polycystic ovarian syndrome               Pre-diabetes (DM 2 prevention)    Type 2 diabetes mellitus     Weight gain, antipsychotic therapy-induced    Impaired fasting glucose          Passed - Patient is age 10 or older        Passed - Patient does NOT have a diagnosis of CHF.        Passed - Medication is active on med list        Passed - Has GFR on file in past 12 months and most recent value is normal        Passed - Recent (6 mo) or future (90 days) visit within the authorizing provider's specialty     The patient must have completed an in-person or virtual visit within the past 6 months or has a future visit scheduled within the next 90 days with the authorizing provider s specialty.  Urgent care and e-visits do not quality as an office visit for this protocol.          Passed - Patient is not pregnant        Passed - Patient has not had a positive pregnancy test within the past 12 mos.

## 2025-02-25 DIAGNOSIS — F25.0 SCHIZOAFFECTIVE DISORDER, BIPOLAR TYPE (H): ICD-10-CM

## 2025-02-25 RX ORDER — PERPHENAZINE 8 MG/1
8 TABLET ORAL 2 TIMES DAILY
Qty: 60 TABLET | Refills: 0 | Status: SHIPPED | OUTPATIENT
Start: 2025-02-25

## 2025-02-25 RX ORDER — LURASIDONE HYDROCHLORIDE 80 MG/1
160 TABLET, FILM COATED ORAL
Qty: 60 TABLET | Refills: 0 | Status: SHIPPED | OUTPATIENT
Start: 2025-02-25

## 2025-02-25 RX ORDER — BENZTROPINE MESYLATE 0.5 MG/1
TABLET ORAL
Qty: 90 TABLET | Refills: 0 | Status: SHIPPED | OUTPATIENT
Start: 2025-02-25

## 2025-02-25 RX ORDER — BUPROPION HYDROCHLORIDE 150 MG/1
450 TABLET ORAL EVERY MORNING
Qty: 90 TABLET | Refills: 0 | Status: SHIPPED | OUTPATIENT
Start: 2025-02-25

## 2025-02-25 NOTE — TELEPHONE ENCOUNTER
All refill orders have been approved and signed by POD.     No further action needed at this time.    Aylin Campuzano, EMT

## 2025-02-25 NOTE — TELEPHONE ENCOUNTER
Health Call Center    Phone Message    May a detailed message be left on voicemail: yes     Reason for Call: Medication Refill Request    Has the patient contacted the pharmacy for the refill? Yes   Name of medication being requested: Benztropine         Bupropion         Lurasidone         Perphenazine            Provider who prescribed the medication: Dr. Buck  Pharmacy: [facility] [address] - [place], [place] - [address]   Date medication is needed: 2/26/2025      Patient's pharmacy called requesting refills for the patient's prescriptions listed above. The pharmacist stated they had faxed over this request a few days ago, and wanted to see whether we had received it or not.    Action Taken: Message routed to:  Other: [facility] psychiatry nurse pool    Travel Screening: Not Applicable     Date of Service: 2/25/2025

## 2025-02-25 NOTE — TELEPHONE ENCOUNTER
Last seen: 12/16  RTC: 6 weeks  Any patient initiated cancellations or no shows since last visit? YES - No show on 1/27  Next appt: 3/21  Last filled: Benztropine 0.5 mg = 2/7 for 30 day supply, Bupropion 150 mg = 2/7 for 30 day supply, Lurasidone 80 mg = 2/7 for 30 day supply, Perphenazine 8 mg = 2/7 for 30 day supply     Incoming refill from pharmacy via phone from pharmacy     Medication requested:   benztropine (COGENTIN) 0.5 MG tablet   buPROPion (WELLBUTRIN XL) 150 MG 24 hr tablet   lurasidone (LATUDA) 80 MG TABS tablet   perphenazine 8 MG tablet     From chart note:   1) Meds-   -Start Metformin 500mg daily with breakfast for weight gain related to antipsychotic. Discussed this with mom/guardian previously who was agreeable. Group home staff informed.   -Discontinue Senna PRN and start Miralax PRN   - Continue Perphenazine 8mg BID   - Continue lurasidone 160 mg daily w/dinner  - Continue bupropion  mg daily  - Continue benztropine 1.5 mg at 5 PM    Is note signed/closed? Yes    Is this a 90 day request for a psych medication? No    LPNs - Please check  if controlled and send to provider  Others - Send to RNs

## 2025-03-02 ENCOUNTER — HEALTH MAINTENANCE LETTER (OUTPATIENT)
Age: 40
End: 2025-03-02

## 2025-03-21 ENCOUNTER — VIRTUAL VISIT (OUTPATIENT)
Dept: PSYCHIATRY | Facility: CLINIC | Age: 40
End: 2025-03-21
Attending: STUDENT IN AN ORGANIZED HEALTH CARE EDUCATION/TRAINING PROGRAM

## 2025-03-21 DIAGNOSIS — R63.5 DRUG-INDUCED WEIGHT GAIN: ICD-10-CM

## 2025-03-21 DIAGNOSIS — K59.00 CONSTIPATION, UNSPECIFIED CONSTIPATION TYPE: ICD-10-CM

## 2025-03-21 DIAGNOSIS — F25.0 SCHIZOAFFECTIVE DISORDER, BIPOLAR TYPE (H): ICD-10-CM

## 2025-03-21 DIAGNOSIS — Z79.899 LONG TERM CURRENT USE OF ANTIPSYCHOTIC MEDICATION: ICD-10-CM

## 2025-03-21 DIAGNOSIS — G47.33 OSA (OBSTRUCTIVE SLEEP APNEA): Primary | ICD-10-CM

## 2025-03-21 DIAGNOSIS — T50.905A DRUG-INDUCED WEIGHT GAIN: ICD-10-CM

## 2025-03-21 PROCEDURE — G2211 COMPLEX E/M VISIT ADD ON: HCPCS | Mod: 95

## 2025-03-21 PROCEDURE — 98006 SYNCH AUDIO-VIDEO EST MOD 30: CPT | Mod: GC

## 2025-03-21 RX ORDER — LURASIDONE HYDROCHLORIDE 80 MG/1
160 TABLET, FILM COATED ORAL
Qty: 60 TABLET | Refills: 2 | Status: SHIPPED | OUTPATIENT
Start: 2025-03-21

## 2025-03-21 RX ORDER — POLYETHYLENE GLYCOL 3350 17 G/17G
17 POWDER, FOR SOLUTION ORAL DAILY PRN
Qty: 510 G | Refills: 2 | Status: SHIPPED | OUTPATIENT
Start: 2025-03-21

## 2025-03-21 RX ORDER — PERPHENAZINE 8 MG/1
8 TABLET ORAL 2 TIMES DAILY
Qty: 60 TABLET | Refills: 2 | Status: SHIPPED | OUTPATIENT
Start: 2025-03-21

## 2025-03-21 RX ORDER — BENZTROPINE MESYLATE 0.5 MG/1
TABLET ORAL
Qty: 90 TABLET | Refills: 2 | Status: SHIPPED | OUTPATIENT
Start: 2025-03-21

## 2025-03-21 RX ORDER — BUPROPION HYDROCHLORIDE 150 MG/1
450 TABLET ORAL EVERY MORNING
Qty: 90 TABLET | Refills: 2 | Status: SHIPPED | OUTPATIENT
Start: 2025-03-21

## 2025-03-21 ASSESSMENT — PATIENT HEALTH QUESTIONNAIRE - PHQ9
SUM OF ALL RESPONSES TO PHQ QUESTIONS 1-9: 7
SUM OF ALL RESPONSES TO PHQ QUESTIONS 1-9: 7

## 2025-03-21 ASSESSMENT — PAIN SCALES - GENERAL: PAINLEVEL_OUTOF10: NO PAIN (0)

## 2025-03-21 NOTE — NURSING NOTE
Current patient location: [address] [facility] [address]    Is the patient currently in the state of [place]? YES    Visit mode: VIDEO    If the visit is dropped, the patient can be reconnected by:VIDEO VISIT: Send to e-mail at: [email or web address]    Will anyone else be joining the visit? Group Home Staff Candido  (If patient encounters technical issues they should call [phone])    Are changes needed to the allergy or medication list? No    Are refills needed on medications prescribed by this physician? NO    Rooming Documentation:  Questionnaire(s) completed    Reason for visit: RECHECK    Violeta STF

## 2025-03-21 NOTE — PATIENT INSTRUCTIONS
Medication Changes:   -Increase Metformin to 500mg twice a day.     Other:   -Recommend increasing water intake.   -I placed a sleep medicine referral for sleep apnea evaluation and management. If you do not hear back, please call: [phone].     Follow up: 6 weeks.     **For crisis resources, please see the information at the end of this document**   Patient Education    Thank you for coming to the [facility] [place] [facility].     Lab Testing:  If you had lab testing today and your results are reassuring or normal they will be mailed to you or sent through [employer] within 7 days. If the lab tests need quick action we will call you with the results. The phone number we will call with results is # [phone]. If this is not the best number please call our clinic and change the number.     Medication Refills:  If you need any refills please call your pharmacy and they will contact us. Our fax number for refills is [phone].   Three business days of notice are needed for general medication refill requests.   Five business days of notice are needed for controlled substance refill requests.   If you need to change to a different pharmacy, please contact the new pharmacy directly. The new pharmacy will help you get your medications transferred.     Contact Us:  Please call [phone] during business hours (8-5:00 M-F).   If you have medication related questions after clinic hours, or on the weekend, please call [phone].     Financial Assistance [phone]   Medical Records [phone]       MENTAL HEALTH CRISIS RESOURCES:  For a emergency help, please call 911 or go to the nearest Emergency Department.     Emergency Walk-In Options:   [facility] Unit @ [facility] ([place]): [phone] - Specialized mental health emergency area designed to be calming  [facility] ([place]): [phone]  [facility] ([place]):  [phone]  [facility] ([place]): [phone]    [facility] Crisis Information:   [place]: [phone]  [place]: [phone]  [place] H[place]) - Adult: [phone]     Child: [phone]  [place] - Adult: [phone]     Child: [phone]  [place]: [phone]  List of all [facility] resources:   https://[place].[facility]/dhs/people-we-serve/adults/health-care/mental-health/resources/crisis-contacts.jsp    National Crisis Information:   Crisis Text Line: Text  [place]  to 502684  Suicide & Crisis Lifeline: 988  National Suicide Prevention Lifeline: 0-336-773-TALK (1-247.244.7338)       For online chat options, visit https://suicidepreventionlifeline.org/chat/  Poison Control Center: 1-730.177.2325  Trans Lifeline: 6-496-917-6965 - Hotline for transgender people of all ages  [employer]: 0-982-710-2055 - Hotline for LGBT youth     For Non-Emergency Support:   Fast Tracker: Mental Health & Substance Use Disorder Resources -   https://www.[employer]n.org/

## 2025-03-21 NOTE — PROGRESS NOTES
"Virtual Visit Details    Type of service:  Video Visit     Originating Location (pt. Location): {video visit patient location:100067::\"Home\"}  {PROVIDER LOCATION On-site should be selected for visits conducted from your clinic location or adjoining [facility] hospital, academic office, or other nearby [facility] building. Off-site should be selected for all other provider locations, including home:165583}  Distant Location (provider location):  {virtual location provider:572442}  Platform used for Video Visit: {Virtual Visit Platforms:607833::\"AmWell\"}           [facility]  MEDICAL PROGRESS NOTE  General Clinic Team       CARE TEAM:    PCP- Suzie Mariscal    Psychotherapist- unknown by patient     Community  Team  [facility] , Thea Hall [phone]  [facility] worker, CM, group home staff.   CADI : Charmaine Isabel [phone].   Mental Health : Bre [phone]     Guardian: Mary \"Swetha\" Chriss (mother)  Group Home: [phone]. , : [phone].     Karolyn Banerjee is a 37 year old who uses the name Karolyn and pronouns she, her, herself.                 Diagnoses  Schizoaffective Disorder, bipolar type, MRE depressed  Unspecified Neurocognitive Disorder  R/O Tardive Dyskinesia     Assessment & Plan   Karolyn Banerjee is a 38 year old with diagnoses of schizoaffective disorder and neurocognitive disorder who has been living at her current group home for approximately 5 years.  We have been working to optimize her perphenazine to maintain treatment for psychosis while also minimizing sedation. Recently, we decreased her perphenazine to 8mg BID. She seems to have tolerated this dose reduction well without worsening psychosis symptoms and with improvement in sedation. We will continue to monitor closely. Karolyn also has expressed some concern with weight gain. I did discuss with " Karolyn and her mom/guardian previously about the possibility of starting metformin once we have Karolyn's routine labs back. Mom did consent to this. I did also talk to Karolyn's group home staff member, CALDERON, who knows Karolyn well about this and encouraged helping Karolyn pick out healthier snacks. I have also encouraged Karolyn/CALDERON to follow up with her PCP about reflux like symptoms, headaches and higher blood pressures. Of note, Karolyn did present in person at her last visit with me and she did have some minor lip movements and toe wriggling while distracted during AIMS.  Most likely this represents a mild withdrawal dyskinesia or unmasking of longstanding TD after the dose reduction but may improve gradually over time.    Today, Karolyn did not endorse any changes in mood or psychosis symptoms. She continues to have occasional AH during love songs. She does endorse continuing to feel down intermittently though she said this has not changed from before. We will continue to monitor this. No imminent safety concerns this visit. She did endorse having regular bowel movements but harder stools  and occasionally having blood in stool. I did talk with her group home staff about following up with PCP and also discussed switching Senna prn to miralax prn which may help with softening the stools. Additionally, we are planning to start metformin for antipsychotic related weight gain given we have received and reviewed [facility] labs. These may also help with loosening stools.     Psychotropic Drug Interactions:  {  CETIRIZINE and PERPHENAZINE may result in increased risk of CNS depression.   PERPHENAZINE and BENZTROPINE may result in decreased phenothiazine serum concentrations, decreased phenothiazine effectiveness, enhanced anticholinergic effects (ileus, hyperpyrexia, sedation, dry mouth).      MANAGEMENT:  Monitoring for adverse effects     MNPMP was not checked today: not indicated at this time.     Risk Statements:    Treatment Risk: Risks, benefits, alternatives and potential adverse effects have been discussed and are understood.   Safety Risk: Karolyn did not appear to be an imminent safety risk to self or others.    PLAN    1) Meds-   -Start Metformin 500mg daily with breakfast for weight gain related to antipsychotic. Discussed this with mom/guardian previously who was agreeable. Group home staff informed.   -Discontinue Senna PRN and start Miralax PRN   - Continue Perphenazine 8mg BID   - Continue lurasidone 160 mg daily w/dinner  - Continue bupropion  mg daily  - Continue benztropine 1.5 mg at 5 PM      2) Psychotherapy- None     3) Next due-  Labs- AP labs overdue- ordered  at last visit. Reminded Karolyn and group home staff member today-encouraged to get done right after visit since they are at the hospital already.   EKG- last 1/2019 Qtc 428, not indicated   Rating scales- AIMS=3-4 11/2024 (see data section below)     4) Referrals-  none     5) Other-  Continue to monitor for constipation and blood in stool. Discussed bowel regimen with group home staff-recommended PCP follow up.      6) Dispo-return to clinic in 6 weeks                 Interval History   Today:   -Things are ok.   -Very busy during the day.   -Started a program called [facility]. Goes there day every day.: Play games and go to places. Today went bowling and the had pizza and pop .    -Program Monday-Friday. 8-2. Year round.    -Not where she lives at. In [place].  connected her to this.    -Overall positive  experience. Made a few friends there.     -Things at home ok.   -Looking forward to swimming and taking walks.     -Mood: Ok.   -Depression: No. Has gotten better. [facility] helped.   -Anxiety: Ok.   -AVH: Voices during love songs. Otherwise no. No increase in voices.   -Feeling safe: Yes.   -SI/HI: no.   -Sleep: Wakes up early, like 4am so goes to bed early.    -Tried a CPAP in the past-didn't know how to use the machine.    -Has  "sleep apnea diagnosis.   -Daytime sleepiness; Yes. .Hasn't gotten any worse lately,. Sometimes distressing.     Medications, Side Effects:  -Metformin, Appetite:    -Hasn't noticed anything with the metformin. No changes in appetite, no side effects.   -Denies any side effects.   -Bowel Movements: Hard stools-a little softer with the metformin. Going 2-3 times a day.   -Lightheaded: Sometimes in the mornings.    -Fluid intake: \"Not very much water at all\" . Drinks pop tea, juice.  .       [facility]  staff:   -More active, alive since starting  the program.  Has been going to the program for over a month.   -When she is sitting down on the couch she will fall asleep.       -NK: [phone]  -[facility] program as been good .   -Reviewed medications  -Has been at this group home since 2019.     Mom;  -Not sure how long ago she was on CPAP. Karolyn     Plan;   Mom ok with plan.   -Increase metformin to 500BID  -Increase water intake .   -Recommend follow up with sleep medicine.    -Mom will also try to talk with Karolyn about using a CPAP.   -NK sets up the appointments: Give NK's number.    -Give Nks number in referral. Include call  back number in AVS.     Last Visit;   Interval Events:  -Things going ok. Feeling safe.   -Continues to see mom.   - Still making bracelets sometimes.   -Playing kings in the corner.   -Holidays: Like them. Not sure about plans yet. Goes to mom's place.     Mental Health:   -No concerns.   -Mood: Fairly stable.   -Depression: A little. Some days feels down. Not more than before. On these still able to do adls.    -Not sure how much.   -Anxiety: Ok  -AVH: Hasn't changed. Continues to be during long songs. Hasn't increase or decreased.   -Si/Hi: no.   -Daytime Sleepiness; About the same. Doesn't think the decreased dose has helped. Taking 1-2 naps a day.   -Goes to bed 6/7pm. Goes to sleep right away. Wakes up at 3/4am.    -Discussed trying to go to bed at 8pm.   -Therapy:    -How are things going " "with therapist:things are good.   -Medications, Side Effects, Medical ROS:    -Taking medications.    -Bowel movements: a couple times a day. These are harder , bleeds a little once a week with bowel movements. Has been going on for months.    -Lightheaded: Feels lightheaded. When waking up. No falls. Gets better. This is not new.    -Other side effects: No.     NK (group home staff):   -No worsening AVH  Benzotropine: 1x/day  Wellbutrin 450mg in the am  -lurasidone 160mg 1x/day  Perphenazine 8mg BID.   -Later updated her on metformin.   -She has Senna as needed (from 2019) for constipation. We discussed not scheduling anything at this time as we are starting metformin and that may cause some loose stools. I discussed switching from the senna as needed to miralax as needed which may help with softening stools if she needs it (as darrell is having regular bowel movements but it having hard stools that may be contributing to bleeding). We will discontinue the previous senna order. Darrell also has a PCP follow up.       Current Social History:  No changes today -12/16/24  -Living: Lives in a group home. Likes it there.   -Interests: Likes beading, makes bracelets and things. On Sundays, gets up at 7am and goes to [employer] .   -Supports: Mom, boyfriend, group home. Will go to mom's sometimes.   -Children: None  -Financial: SSDI     Pertinent Substance Use:  No changes today -12/16/24  \"-alcohol: No   -cannabis: No  -tobacco: No  -caffeine:  No   -opioids: No   Narcan Kit currrent: No   -other: none\"     Psych and Medical ROS per HPI.                    Pertinent Background  The following section contains information copied from previous note by Dr Lepe on 7/28/22.               Darrell first experienced mental health issues as a child and has received treatment for schizoaffective disorder, bipolar type, borderline personality disorder, and unspecified neurocognitive disorder. Notably, the patient's symptoms have " historically included psychosis (auditory hallucinations, delusions related to pregnancy, agitation) and suicide attempts/threats to run into traffic or drown herself in a pond, often in reaction to perceived slights or difficult interactions with group home staff/residents. She has been tried on many different medication trials in the past, including two trials of clozapine that caused neutropenia. ECT along with medication has been the most helpful treatment for maintaining stability. ECT with Dr. Amos was discontinued in fall 2017 after patient remained relatively stable without it. (last hospitalization February 2018). Her mother is her guardian. Karolyn fell and broke her leg in 12/2018 which resulted in multiple surgeries, a long transitional care stay, and ongoing use of a walker.      Psych pertinent item history includes suicide attempt [multiple], suicidal ideation, SIB [per chart], psychosis [sxs include auditory hallucinations, delusions], mutiple psychotropic trials , severe med reaction, violent behavior, psych hosp (>5), commitment and ECT                  Physical Exam  (Vitals Only)    There were no vitals taken for this visit.  Pulse Readings from Last 3 Encounters:   04/25/24 103   04/18/23 105   02/25/23 99     Wt Readings from Last 3 Encounters:   11/14/24 139.8 kg (308 lb 3.2 oz)   04/25/24 132.4 kg (291 lb 12.8 oz)   04/18/23 133.3 kg (293 lb 12.8 oz)     BP Readings from Last 3 Encounters:   11/14/24 (!) 144/92   04/25/24 (!) 139/102   04/18/23 (!) 156/113                      Mental Status Exam    Alertness: alert  and oriented  Appearance: casually groomed  Behavior/Demeanor: cooperative and calm, with fair  eye contact   Speech: increased latency of response and slowed, normal prosody  Language: intact and no problems  Psychomotor: no psychomotor agitation, retardation or abnormal  Mood: good  Affect: restricted; congruent to: mood- yes, content- yes  Thought Process/Associations:  linear,  goal oriented  Thought Content:  Reports none;  Denies suicidal & violent ideation and delusions  Perception:  Reports intermittent AH (see HPI) reports intermittent AH (see subjective), no current AVH,  does not appear to be responding to internal stimuli Denies visual hallucinations  Insight: limited  Judgment: limited and adequate for safety  Cognition: baseline  Gait and Station: not assessed this visit-telehealth                Past Psychotropic Medication Trials      Medication  Dose (mg) Effect  Dates of Use   lurasidone 160 mg helpful current   Bupropion  mg helpful current   perphenazine 16 mg BID helpful Current   clozapine   2 trials that resulted with agranulocytosis (not eligible for retrial)     olanzapine         quetiapine         ziprasidone         risperidone   dystonia     aripiprazole   ineffective     haloperidol   Developed parkinsonism     divalproex         sertraline         venlafaxine                             Past Medical History     Patient Active Problem List   Diagnosis    Schizoaffective disorder, bipolar type (H)    Mild cognitive impairment    Borderline personality disorder (H)    Asthma    Nonorganic enuresis    Suicidal ideation    Port catheter in place    Hx of psychiatric care    DVT (deep vein thrombosis) in pregnancy    JOSE (obstructive sleep apnea)    Morbid obesity (H)                     Medications     Current Outpatient Medications   Medication Sig Dispense Refill    ACETAMINOPHEN PO Take 650 mg by mouth every 4 hours as needed       albuterol (PROAIR HFA/PROVENTIL HFA/VENTOLIN HFA) 108 (90 Base) MCG/ACT inhaler Inhale 1-2 puffs into the lungs every 4 hours as needed      Artificial Saliva (SALIVASURE) LOZG 1 lozenge by Transmucosal route every 4 hours as needed for dry mouth      benztropine (COGENTIN) 0.5 MG tablet TAKE 3 TABLETS BY MOUTH EVERY EVENING 90 tablet 0    buPROPion (WELLBUTRIN XL) 150 MG 24 hr tablet Take 3 tablets (450 mg) by mouth every morning.  90 tablet 0    cetirizine (ZYRTEC) 10 MG tablet Take 10 mg by mouth daily      famotidine (PEPCID) 20 MG tablet Take 20 mg by mouth 2 times daily      lurasidone (LATUDA) 80 MG TABS tablet Take 2 tablets (160 mg) by mouth daily (with dinner). 60 tablet 0    metFORMIN (GLUCOPHAGE) 500 MG tablet Take 1 tablet (500 mg) by mouth daily (with breakfast). 30 tablet 1    norethindrone-ethinyl estradiol (JUNEL FE 1/20) 1-20 MG-MCG tablet Take 1 tablet by mouth      norethindrone-ethinyl estradiol (MICROGESTIN 1/20) 1-20 MG-MCG per tablet Take 1 tablet by mouth daily       Omega-3 Fatty Acids (FISH OIL PO) Take 1,000 mg by mouth daily HOLD WHILE IN TCU      perphenazine 8 MG tablet Take 1 tablet (8 mg) by mouth 2 times daily. 60 tablet 0    polyethylene glycol (MIRALAX) 17 GM/Dose powder Take 17 g by mouth daily as needed for constipation. 510 g 2                     Data         4/25/2024     2:33 PM 3/21/2025     3:41 PM   PROMIS-10 Total Score w/o Sub Scores   PROMIS TOTAL - SUBSCORES 24 22        Proxy-reported         4/25/2024     2:29 PM 11/14/2024     2:05 PM 3/21/2025     3:38 PM   PHQ-9 SCORE   PHQ-9 Total Score MyChart 3 (Minimal depression) 4 (Minimal depression) 7 (Mild depression)   PHQ-9 Total Score 3 4  7        Patient-reported    Proxy-reported          No data to display                Liver/Kidney Function, TSH Metabolic Blood counts   Recent Labs   Lab Test 11/14/24  1515 02/25/23  1615   AST 13 27   ALT 9 13   ALKPHOS 78 83   CR 0.80 0.80     Recent Labs   Lab Test 11/14/24  1515   TSH 3.04    Recent Labs   Lab Test 11/14/24  1515   CHOL 192   TRIG 142   *   HDL 46*     Recent Labs   Lab Test 11/14/24  1515   A1C 5.6     Recent Labs   Lab Test 11/14/24  1515   *    Recent Labs   Lab Test 11/14/24  1515   WBC 8.6   HGB 14.8   HCT 43.8   MCV 87           QT: 435 in 2/2023    AIMS Exam: 11/2024; Score: 3-4: Possible evidence of TD. Will continue to monitor.     -Some lip movements and  toe wriggling when distracted     Level of Medical Decision Making:   - At least 1 chronic problem that is not stable  - Engaged in prescription drug management during visit (discussed any medication benefits, side effects, alternatives, etc.)      The longitudinal plan of care for the diagnosis(es)/condition(s) as documented were addressed during this visit. Due to the added complexity in care, I will continue to support Karolyn in the subsequent management and with ongoing continuity of care.      PROVIDER:   Giuliana Buck MD  PGY-3 Psychiatry  [facility]     Patient staffed in clinic with Dr. Smith who will sign the note.  Supervisor is Dr. Leroy.

## 2025-05-02 ENCOUNTER — VIRTUAL VISIT (OUTPATIENT)
Dept: PSYCHIATRY | Facility: CLINIC | Age: 40
End: 2025-05-02
Attending: STUDENT IN AN ORGANIZED HEALTH CARE EDUCATION/TRAINING PROGRAM
Payer: MEDICAID

## 2025-05-02 DIAGNOSIS — T50.905A DRUG-INDUCED WEIGHT GAIN: ICD-10-CM

## 2025-05-02 DIAGNOSIS — F25.0 SCHIZOAFFECTIVE DISORDER, BIPOLAR TYPE (H): ICD-10-CM

## 2025-05-02 DIAGNOSIS — Z79.899 LONG TERM CURRENT USE OF ANTIPSYCHOTIC MEDICATION: Primary | ICD-10-CM

## 2025-05-02 DIAGNOSIS — K59.00 CONSTIPATION, UNSPECIFIED CONSTIPATION TYPE: ICD-10-CM

## 2025-05-02 DIAGNOSIS — R63.5 DRUG-INDUCED WEIGHT GAIN: ICD-10-CM

## 2025-05-02 PROCEDURE — 98006 SYNCH AUDIO-VIDEO EST MOD 30: CPT | Mod: GC

## 2025-05-02 PROCEDURE — G2211 COMPLEX E/M VISIT ADD ON: HCPCS | Mod: 95

## 2025-05-02 RX ORDER — PERPHENAZINE 8 MG/1
8 TABLET ORAL 2 TIMES DAILY
Qty: 60 TABLET | Refills: 2 | Status: SHIPPED | OUTPATIENT
Start: 2025-05-02

## 2025-05-02 RX ORDER — BUPROPION HYDROCHLORIDE 150 MG/1
450 TABLET ORAL EVERY MORNING
Qty: 90 TABLET | Refills: 2 | Status: SHIPPED | OUTPATIENT
Start: 2025-05-02

## 2025-05-02 RX ORDER — POLYETHYLENE GLYCOL 3350 17 G/17G
17 POWDER, FOR SOLUTION ORAL DAILY PRN
Qty: 510 G | Refills: 2 | Status: SHIPPED | OUTPATIENT
Start: 2025-05-02

## 2025-05-02 RX ORDER — BENZTROPINE MESYLATE 0.5 MG/1
TABLET ORAL
Qty: 90 TABLET | Refills: 2 | Status: SHIPPED | OUTPATIENT
Start: 2025-05-02

## 2025-05-02 RX ORDER — LURASIDONE HYDROCHLORIDE 80 MG/1
160 TABLET, FILM COATED ORAL
Qty: 60 TABLET | Refills: 2 | Status: SHIPPED | OUTPATIENT
Start: 2025-05-02

## 2025-05-02 ASSESSMENT — PATIENT HEALTH QUESTIONNAIRE - PHQ9
SUM OF ALL RESPONSES TO PHQ QUESTIONS 1-9: 4
SUM OF ALL RESPONSES TO PHQ QUESTIONS 1-9: 4

## 2025-05-02 NOTE — NURSING NOTE
Current patient location: [address] [place] [address]    Is the patient currently in the state of [place]? YES    Visit mode: VIDEO    If the visit is dropped, the patient can be reconnected by:VIDEO VISIT: Send to e-mail at: [email or web address]    Will anyone else be joining the visit? NO  (If patient encounters technical issues they should call [phone])    Are changes needed to the allergy or medication list? No    Are refills needed on medications prescribed by this physician? Patient not sure if refills are needed     Rooming Documentation:  Questionnaire(s) completed    Reason for visit: RECHECK    Violeta STF

## 2025-05-02 NOTE — PATIENT INSTRUCTIONS
Medication Change:   None today.    Other:   -I will order a prolactin level to monitor for possible side effects related to the Perphenazine.   -I recommend scheduling the OBGYN ultrasound as previously recommended.     Follow Up: 5 weeks    **For crisis resources, please see the information at the end of this document**   Patient Education    Thank you for coming to the [facility] [place] [facility].     Lab Testing:  If you had lab testing today and your results are reassuring or normal they will be mailed to you or sent through [employer] within 7 days. If the lab tests need quick action we will call you with the results. The phone number we will call with results is # [phone]. If this is not the best number please call our clinic and change the number.     Medication Refills:  If you need any refills please call your pharmacy and they will contact us. Our fax number for refills is [phone].   Three business days of notice are needed for general medication refill requests.   Five business days of notice are needed for controlled substance refill requests.   If you need to change to a different pharmacy, please contact the new pharmacy directly. The new pharmacy will help you get your medications transferred.     Contact Us:  Please call [phone] during business hours (8-5:00 M-F).   If you have medication related questions after clinic hours, or on the weekend, please call [phone].     Financial Assistance [phone]   Medical Records [phone]       MENTAL HEALTH CRISIS RESOURCES:  For a emergency help, please call 911 or go to the nearest Emergency Department.     Emergency Walk-In Options:   [facility] Unit @ [facility] ([place]): [phone] - Specialized mental health emergency area designed to be calming  [facility] ([place]): [phone]  [facility] ([place]): [phone]  [facility]  [facility]): [phone]    [facility] Crisis Information:   [place]: [phone]  [place]: [phone]  [place] ([place]) - Adult: [phone]     Child: [phone]  [place] - Adult: [phone]     Child: [phone]  [place]: [phone]  List of all [facility] resources:   https://[place].[facility]/dhs/people-we-serve/adults/health-care/mental-health/resources/crisis-contacts.jsp    National Crisis Information:   Crisis Text Line: Text  [place]  to 936449  Suicide & Crisis Lifeline: 988  National Suicide Prevention Lifeline: 5-284-084-SMXQ (1-412.177.1203)       For online chat options, visit https://suicidepreventionlifeline.org/chat/  Poison Control Center: 1-890.153.7565  Trans Lifeline: 1-773.806.3588 - Hotline for transgender people of all ages  [employer]: 7-807-574-4965 - Hotline for LGBT youth     For Non-Emergency Support:   Fast Tracker: Mental Health & Substance Use Disorder Resources -   https://www.[employer].org/

## 2025-05-02 NOTE — PROGRESS NOTES
"Virtual Visit Details    Type of service:  Video Visit   Video Start Time: {video visit start/end time for provider to select:909033}  Video End Time:{video visit start/end time for provider to select:221981}    Originating Location (pt. Location): {video visit patient location:831755::\"Home\"}  {PROVIDER LOCATION On-site should be selected for visits conducted from your clinic location or adjoining [facility] hospital, academic office, or other nearby [facility] building. Off-site should be selected for all other provider locations, including home:246638}  Distant Location (provider location):  {virtual location provider:701075}  Platform used for Video Visit: {Virtual Visit Platforms:307775::\"AmWell\"}    Virtual Visit Details    Type of service:  Video Visit     Originating Location (pt. Location): Home  {PROVIDER LOCATION On-site should be selected for visits conducted from your clinic location or adjoining [facility] hospital, academic office, or other nearby [facility] building. Off-site should be selected for all other provider locations, including home:084278}  Distant Location (provider location):  On-site  Platform used for Video Visit: AlertaPhone           [facility]  MEDICAL PROGRESS NOTE  General Clinic Team       CARE TEAM:    PCP- Suzie Mariscal    Psychotherapist- unknown by patient     Community  Team  [facility] [place] , [place]a [place] [phone]  [facility] worker, CM, group home staff.   CADI : Charmaine Isabel [phone].   Mental Health : Bre [phone]     Guardian: Mary \"Swetha\" Chriss (mother)  Group Home: [phone]. , : [phone].     Karolyn Banerjee is a 39 year old who uses the name Karolyn and pronouns she, her, herself.                 Diagnoses  Schizoaffective Disorder, bipolar type, MRE depressed  Unspecified Neurocognitive Disorder  R/O Tardive Dyskinesia     Assessment " & Plan   Karolyn Banerjee is a 39 year old with diagnoses of schizoaffective disorder and neurocognitive disorder who has been living at her current group home for approximately 5 years.  We have been working to optimize her perphenazine to maintain treatment for psychosis while also minimizing sedation. Recently, we decreased her perphenazine to 8mg BID. She seems to have tolerated this dose reduction well without worsening psychosis symptoms and with improvement in sedation. We will continue to monitor closely. Karolyn also has expressed some concern with weight gain and we started metformin for this. Mom did consent to this. I did also talk to Karolyn's group home staff member, CALDERON, who knows Karolyn well about this and encouraged helping Karolyn pick out healthier snacks. I have also encouraged Karolyn/CALDERON to follow up with her PCP about reflux like symptoms, headaches and higher blood pressures. Of note, Karolyn did present in person at her last visit with me and she did have some minor lip movements and toe wriggling while distracted during AIMS.  Most likely this represents a mild withdrawal dyskinesia or unmasking of longstanding TD after the dose reduction but may improve gradually over time. We will continue to monitor.    Today, Karolyn appears and reports to be doing better. She started a day program, M-F, 8-2 (year round) which she has been enjoying and has also made friends through this. Mood has improved and psychosis symptoms have not changed. She continues to have daytime fatigue. Karolyn has historically been on CPAP for sleep apnea though this was some time ago. We agreed to put in a referral for sleep medicine for re-evaluation. We will also increase metformin for weight management while on antipsychotic medication. This may also help with softening stools. Karolyn's mom/guardian is aware and on board with the plan.     Psychotropic Drug Interactions:  {  CETIRIZINE and PERPHENAZINE may result in increased  "risk of CNS depression.   PERPHENAZINE and BENZTROPINE may result in decreased phenothiazine serum concentrations, decreased phenothiazine effectiveness, enhanced anticholinergic effects (ileus, hyperpyrexia, sedation, dry mouth).      MANAGEMENT:  Monitoring for adverse effects     MNPMP was not checked today: not indicated at this time.     Risk Statements:   Treatment Risk: Risks, benefits, alternatives and potential adverse effects have been discussed and are understood.   Safety Risk: Karolyn did not appear to be an imminent safety risk to self or others.    PLAN    1) Meds-   *Plan discussed with mom/guardian, who is agreeable*  - Increase Metformin from 500mg daily to 500mg BID with meals. For weight gain related to antipsychotic.    - Continue Perphenazine 8mg BID   - Continue lurasidone 160 mg daily w/dinner  - Continue bupropion  mg daily  - Continue benztropine 1.5 mg at 5 PM  - Continue Miralax PRN      2) Psychotherapy- None     3) Next due-  Labs- AP labs completed 11/2024   EKG- last 1/2019  Rating scales- AIMS: Last done: 11/2024      4) Referrals-  Sleep medicine referral placed this visit for evaluation for sleep apnea     5) Other-    Continue to monitor for constipation and blood in stool.   Recommend increasing water intake.     6) Dispo-return to clinic in 6 weeks                 Interval History     Today:  -Ok  -Still going to [employer], M-F, 8-2   -It's good.    -Playing games, color.   -Had a party with one of the other [employer] groups.    -Has made friends.   -Went to [employer]. Got some beads.    Mental health:   -Ok.   -Tension with roommate. She won't go near her.    -Some worsening mood related to roommate.   -No changes with the voices.   -Feeling safe.   -Is having thoughts of not wanting to be around.     -[place] there now because no one else to play games with.    -No plans to harm self.    -When I asked if she could let NK know, she said \"I don't care to\". When I asked if I couuld " "have NK check in more, she said I don't need to. \"Theres nothing I can do\".    -No access to guns.     Medications, Side Effects:  -Metformin, higher dose   -Hasn't noticed anything with the metformin. No changes in appetite, no side effects.   -Denies any side effects.   -Bowel Movements: once a day.    -Abdominal pain: \"are you sure that I am not pregnant\".     -[place] a sound in stomach. Worried about this for a few months. Had really bad vaginal bleeding for 3-4 days. Had this twice a couple months ago. This was not period.    -Denied sexual activity in the last several months. Hasn't had one one for a few. .   -Has had some abdominal pain,-had a couple days ago.     -Lightheaded: Sometimes in the mornings.  No falls.    -Fluid intake: \"Not very much water at all\" . Drinks pop tea, juice.  .     -Breast tenderness: no.    -Not sure why she got the ultrasound.     -Peripheral  vision changes:     -NK: [phone]    Plan:  -Prolactin level.   -Call NK-schedule follow up with primary   -Reminded of OB ultrasound  -Call mom with updates and to check in on her.   Follow Up: 5 weeks.     Current Social History:  -Living: Lives in a group home. Likes it there.   -Interests: Likes beading, makes bracelets and things. On Sundays, gets up at 7am and goes to [employer] . Started [employer] [facility] M-F,8-2, year round (like adult day program)  -Supports: Mom, boyfriend, group home. Will go to mom's sometimes.   -Children: None  -Financial: SSDI     Pertinent Substance Use:  No changes today -12/16/24  \"-alcohol: No   -cannabis: No  -tobacco: No  -caffeine:  No   -opioids: No   Narcan Kit currrent: No   -other: none\"     Psych and Medical ROS per HPI.                    Pertinent Background  The following section contains information copied from previous note by Dr Lepe on 7/28/22.               Karolyn first experienced mental health issues as a child and has received treatment for schizoaffective disorder, bipolar type, " borderline personality disorder, and unspecified neurocognitive disorder. Notably, the patient's symptoms have historically included psychosis (auditory hallucinations, delusions related to pregnancy, agitation) and suicide attempts/threats to run into traffic or drown herself in a pond, often in reaction to perceived slights or difficult interactions with group home staff/residents. She has been tried on many different medication trials in the past, including two trials of clozapine that caused neutropenia. ECT along with medication has been the most helpful treatment for maintaining stability. ECT with Dr. Amos was discontinued in fall 2017 after patient remained relatively stable without it. (last hospitalization February 2018). Her mother is her guardian. Karolyn fell and broke her leg in 12/2018 which resulted in multiple surgeries, a long transitional care stay, and ongoing use of a walker.      Psych pertinent item history includes suicide attempt [multiple], suicidal ideation, SIB [per chart], psychosis [sxs include auditory hallucinations, delusions], mutiple psychotropic trials , severe med reaction, violent behavior, psych hosp (>5), commitment and ECT                  Physical Exam  (Vitals Only)    There were no vitals taken for this visit.  Pulse Readings from Last 3 Encounters:   04/25/24 103   04/18/23 105   02/25/23 99     Wt Readings from Last 3 Encounters:   11/14/24 139.8 kg (308 lb 3.2 oz)   04/25/24 132.4 kg (291 lb 12.8 oz)   04/18/23 133.3 kg (293 lb 12.8 oz)     BP Readings from Last 3 Encounters:   11/14/24 (!) 144/92   04/25/24 (!) 139/102   04/18/23 (!) 156/113                      Mental Status Exam    Alertness: alert  and oriented  Appearance: casually groomed  Behavior/Demeanor: cooperative and calm, with fair  eye contact   Speech: increased latency of response and slowed, normal prosody  Language: intact and no problems  Psychomotor: no psychomotor agitation, retardation or  abnormal  Mood: ok  Affect: restricted; congruent to: mood- yes, content- yes  Thought Process/Associations:  linear, goal oriented  Thought Content:  Reports none;  Denies suicidal & violent ideation and delusions  Perception:  Reports intermittent AH (see HPI) reports intermittent AH (see subjective), no current AVH,  does not appear to be responding to internal stimuli Denies visual hallucinations  Insight: limited  Judgment: limited and adequate for safety  Cognition: baseline  Gait and Station: not assessed this visit-telehealth                Past Psychotropic Medication Trials      Medication  Dose (mg) Effect  Dates of Use   lurasidone 160 mg helpful current   Bupropion  mg helpful current   perphenazine 16 mg BID helpful Current   clozapine   2 trials that resulted with agranulocytosis (not eligible for retrial)     olanzapine         quetiapine         ziprasidone         risperidone   dystonia     aripiprazole   ineffective     haloperidol   Developed parkinsonism     divalproex         sertraline         venlafaxine                             Past Medical History     Patient Active Problem List   Diagnosis    Schizoaffective disorder, bipolar type (H)    Mild cognitive impairment    Borderline personality disorder (H)    Asthma    Nonorganic enuresis    Suicidal ideation    Port catheter in place    Hx of psychiatric care    DVT (deep vein thrombosis) in pregnancy    JOSE (obstructive sleep apnea)    Morbid obesity (H)                     Medications     Current Outpatient Medications   Medication Sig Dispense Refill    ACETAMINOPHEN PO Take 650 mg by mouth every 4 hours as needed       albuterol (PROAIR HFA/PROVENTIL HFA/VENTOLIN HFA) 108 (90 Base) MCG/ACT inhaler Inhale 1-2 puffs into the lungs every 4 hours as needed      Artificial Saliva (SALIVASURE) LOZG 1 lozenge by Transmucosal route every 4 hours as needed for dry mouth      benztropine (COGENTIN) 0.5 MG tablet TAKE 3 TABLETS BY MOUTH  EVERY EVENING 90 tablet 2    buPROPion (WELLBUTRIN XL) 150 MG 24 hr tablet Take 3 tablets (450 mg) by mouth every morning. 90 tablet 2    cetirizine (ZYRTEC) 10 MG tablet Take 10 mg by mouth daily      famotidine (PEPCID) 20 MG tablet Take 20 mg by mouth 2 times daily      lurasidone (LATUDA) 80 MG TABS tablet Take 2 tablets (160 mg) by mouth daily (with dinner). 60 tablet 2    metFORMIN (GLUCOPHAGE) 500 MG tablet Take 1 tablet (500 mg) by mouth 2 times daily (with meals). 60 tablet 2    norethindrone-ethinyl estradiol (JUNEL FE 1/20) 1-20 MG-MCG tablet Take 1 tablet by mouth      norethindrone-ethinyl estradiol (MICROGESTIN 1/20) 1-20 MG-MCG per tablet Take 1 tablet by mouth daily       Omega-3 Fatty Acids (FISH OIL PO) Take 1,000 mg by mouth daily HOLD WHILE IN TCU      perphenazine 8 MG tablet Take 1 tablet (8 mg) by mouth 2 times daily. 60 tablet 2    polyethylene glycol (MIRALAX) 17 GM/Dose powder Take 17 g by mouth daily as needed for constipation. 510 g 2                     Data         4/25/2024     2:33 PM 3/21/2025     3:41 PM   PROMIS-10 Total Score w/o Sub Scores   PROMIS TOTAL - SUBSCORES 24 22        Proxy-reported         11/14/2024     2:05 PM 3/21/2025     3:38 PM 5/2/2025     3:07 PM   PHQ-9 SCORE   PHQ-9 Total Score MyChart 4 (Minimal depression) 7 (Mild depression) 4 (Minimal depression)   PHQ-9 Total Score 4  7  4        Patient-reported    Proxy-reported          No data to display                Liver/Kidney Function, TSH Metabolic Blood counts   Recent Labs   Lab Test 11/14/24  1515 02/25/23  1615   AST 13 27   ALT 9 13   ALKPHOS 78 83   CR 0.80 0.80     Recent Labs   Lab Test 11/14/24  1515   TSH 3.04    Recent Labs   Lab Test 11/14/24  1515   CHOL 192   TRIG 142   *   HDL 46*     Recent Labs   Lab Test 11/14/24  1515   A1C 5.6     Recent Labs   Lab Test 11/14/24  1515   *    Recent Labs   Lab Test 11/14/24  1515   WBC 8.6   HGB 14.8   HCT 43.8   MCV 87           QT:  435 in 2/2023    AIMS Exam: 11/2024; Score: 3-4: Possible evidence of TD. Will continue to monitor.     -Some lip movements and toe wriggling when distracted     Level of Medical Decision Making:   - At least 1 chronic problem that is not stable  - Engaged in prescription drug management during visit (discussed any medication benefits, side effects, alternatives, etc.)      The longitudinal plan of care for the diagnosis(es)/condition(s) as documented were addressed during this visit. Due to the added complexity in care, I will continue to support Karolyn in the subsequent management and with ongoing continuity of care.      PROVIDER:   Giuliana Buck MD  PGY-3 Psychiatry  [facility]     Patient staffed in clinic with Dr. Leroy who will sign the note.  Supervisor is Dr. Leroy.

## (undated) DEVICE — CAST PADDING 6" STERILE 9046S

## (undated) DEVICE — SU VICRYL 0 CT-1 36" J346H

## (undated) DEVICE — SU PDS II 0 CT 36" Z358T

## (undated) DEVICE — CAST PADDING 6" UNSTERILE 9046

## (undated) DEVICE — Device

## (undated) DEVICE — SU VICRYL 1 CT-1 27" J341H

## (undated) DEVICE — SU VICRYL 2-0 CT-1 27" UND J259H

## (undated) DEVICE — DRSG XEROFORM 5X9" 8884431605

## (undated) DEVICE — GLOVE PROTEXIS POWDER FREE 6.5 ORTHOPEDIC 2D73ET65

## (undated) DEVICE — VESSEL LOOPS RED MAXI

## (undated) DEVICE — BAG CLEAR TRASH 1.3M 39X33" P4040C

## (undated) DEVICE — DRSG GAUZE 4X4" TRAY

## (undated) DEVICE — SU PDS II 0 CT-2 27" Z334H

## (undated) DEVICE — TOURNIQUET CUFF 30" REPRO BLUE 60-7070-105

## (undated) DEVICE — ESU GROUND PAD UNIVERSAL W/O CORD

## (undated) DEVICE — SU PROLENE 0 CT-2 30" 8412H

## (undated) DEVICE — SU ETHILON 3-0 FS-1 18" 669H

## (undated) DEVICE — DRAPE C-ARM 60X42" 1013

## (undated) DEVICE — SU VICRYL 2-0 CP-1 27" J466H

## (undated) DEVICE — LINEN TOWEL PACK X5 5464

## (undated) DEVICE — LINEN FULL SHEET 5511

## (undated) DEVICE — BNDG ELASTIC 4" DBL LENGTH UNSTERILE 6611-14

## (undated) DEVICE — SU VICRYL 0 CP-1 27" J467H

## (undated) DEVICE — DRAPE SHEET REV FOLD 3/4 9349

## (undated) DEVICE — PREP CHLORAPREP 26ML TINTED ORANGE  260815

## (undated) DEVICE — MANIFOLD NEPTUNE 4 PORT 700-20

## (undated) DEVICE — SUCTION CANISTER MEDIVAC LINER 3000ML W/LID 65651-530

## (undated) DEVICE — GLOVE PROTEXIS BLUE W/NEU-THERA 7.0  2D73EB70

## (undated) DEVICE — DRILL BIT QUICK COUPLING 2.5X110MM GOLD 310.25

## (undated) DEVICE — LINEN ORTHO ACL PACK 5447

## (undated) DEVICE — CAST PADDING 4" STERILE 9044S

## (undated) DEVICE — GLOVE PROTEXIS W/NEU-THERA 8.5  2D73TE85

## (undated) DEVICE — SU ETHIBOND 0 CTX CR  8X18" CX31D

## (undated) DEVICE — ESU GROUND PAD ADULT W/CORD E7507

## (undated) DEVICE — GUIDE WIRE 1.6X150MM 4.0MM DRILL TIP 900.601

## (undated) DEVICE — SOL WATER IRRIG 1000ML BOTTLE 2F7114

## (undated) DEVICE — SU FIBERWIRE 2 38"  AR-7200

## (undated) DEVICE — DRSG ADAPTIC 3X8" 6113

## (undated) DEVICE — SU PDS II 1 CT MONOFIL Z353H

## (undated) DEVICE — DRAPE C-ARMOR 5 SIDED 5523

## (undated) DEVICE — DRILL BIT MINI 170MM QC 3.2MM 310.65

## (undated) DEVICE — GLOVE PROTEXIS POWDER FREE 8.0 ORTHOPEDIC 2D73ET80

## (undated) DEVICE — DRSG STERI STRIP 1/2X4" R1547

## (undated) DEVICE — PACK TOTAL KNEE SOP15TKFSD

## (undated) DEVICE — PACK TOTAL KNEE BOXED LATEX FREE PO15TKFCT

## (undated) DEVICE — GLOVE PROTEXIS BLUE W/NEU-THERA 8.0  2D73EB80

## (undated) DEVICE — LINEN HALF SHEET 5512

## (undated) DEVICE — GLOVE PROTEXIS POWDER FREE 8.5 ORTHOPEDIC 2D73ET85

## (undated) RX ORDER — HEPARIN SODIUM (PORCINE) LOCK FLUSH IV SOLN 100 UNIT/ML 100 UNIT/ML
SOLUTION INTRAVENOUS
Status: DISPENSED
Start: 2018-01-10

## (undated) RX ORDER — PROPOFOL 10 MG/ML
INJECTION, EMULSION INTRAVENOUS
Status: DISPENSED
Start: 2018-12-21

## (undated) RX ORDER — HYDRALAZINE HYDROCHLORIDE 20 MG/ML
INJECTION INTRAMUSCULAR; INTRAVENOUS
Status: DISPENSED
Start: 2019-01-14

## (undated) RX ORDER — ONDANSETRON 2 MG/ML
INJECTION INTRAMUSCULAR; INTRAVENOUS
Status: DISPENSED
Start: 2019-01-14

## (undated) RX ORDER — NEOSTIGMINE METHYLSULFATE 1 MG/ML
VIAL (ML) INJECTION
Status: DISPENSED
Start: 2019-01-14

## (undated) RX ORDER — CEFAZOLIN SODIUM 2 G/100ML
INJECTION, SOLUTION INTRAVENOUS
Status: DISPENSED
Start: 2018-12-21

## (undated) RX ORDER — HEPARIN SODIUM (PORCINE) LOCK FLUSH IV SOLN 100 UNIT/ML 100 UNIT/ML
SOLUTION INTRAVENOUS
Status: DISPENSED
Start: 2017-05-26

## (undated) RX ORDER — DEXAMETHASONE SODIUM PHOSPHATE 4 MG/ML
INJECTION, SOLUTION INTRA-ARTICULAR; INTRALESIONAL; INTRAMUSCULAR; INTRAVENOUS; SOFT TISSUE
Status: DISPENSED
Start: 2019-01-14

## (undated) RX ORDER — HEPARIN SODIUM (PORCINE) LOCK FLUSH IV SOLN 100 UNIT/ML 100 UNIT/ML
SOLUTION INTRAVENOUS
Status: DISPENSED
Start: 2017-08-25

## (undated) RX ORDER — HEPARIN SODIUM (PORCINE) LOCK FLUSH IV SOLN 100 UNIT/ML 100 UNIT/ML
SOLUTION INTRAVENOUS
Status: DISPENSED
Start: 2017-04-14

## (undated) RX ORDER — HYDROMORPHONE HYDROCHLORIDE 1 MG/ML
INJECTION, SOLUTION INTRAMUSCULAR; INTRAVENOUS; SUBCUTANEOUS
Status: DISPENSED
Start: 2018-12-21

## (undated) RX ORDER — LABETALOL HYDROCHLORIDE 5 MG/ML
INJECTION, SOLUTION INTRAVENOUS
Status: DISPENSED
Start: 2017-04-14

## (undated) RX ORDER — CEFAZOLIN SODIUM 1 G/3ML
INJECTION, POWDER, FOR SOLUTION INTRAMUSCULAR; INTRAVENOUS
Status: DISPENSED
Start: 2018-12-21

## (undated) RX ORDER — LIDOCAINE HYDROCHLORIDE 20 MG/ML
INJECTION, SOLUTION EPIDURAL; INFILTRATION; INTRACAUDAL; PERINEURAL
Status: DISPENSED
Start: 2018-12-21

## (undated) RX ORDER — HEPARIN SODIUM (PORCINE) LOCK FLUSH IV SOLN 100 UNIT/ML 100 UNIT/ML
SOLUTION INTRAVENOUS
Status: DISPENSED
Start: 2017-10-10

## (undated) RX ORDER — HEPARIN SODIUM (PORCINE) LOCK FLUSH IV SOLN 100 UNIT/ML 100 UNIT/ML
SOLUTION INTRAVENOUS
Status: DISPENSED
Start: 2017-07-14

## (undated) RX ORDER — PROPOFOL 10 MG/ML
INJECTION, EMULSION INTRAVENOUS
Status: DISPENSED
Start: 2019-01-14

## (undated) RX ORDER — HEPARIN SODIUM (PORCINE) LOCK FLUSH IV SOLN 100 UNIT/ML 100 UNIT/ML
SOLUTION INTRAVENOUS
Status: DISPENSED
Start: 2017-02-03

## (undated) RX ORDER — FENTANYL CITRATE 50 UG/ML
INJECTION, SOLUTION INTRAMUSCULAR; INTRAVENOUS
Status: DISPENSED
Start: 2019-01-14

## (undated) RX ORDER — VANCOMYCIN HYDROCHLORIDE 1 G/20ML
INJECTION, POWDER, LYOPHILIZED, FOR SOLUTION INTRAVENOUS
Status: DISPENSED
Start: 2019-01-14

## (undated) RX ORDER — HEPARIN SODIUM (PORCINE) LOCK FLUSH IV SOLN 100 UNIT/ML 100 UNIT/ML
SOLUTION INTRAVENOUS
Status: DISPENSED
Start: 2017-10-11

## (undated) RX ORDER — HEPARIN SODIUM (PORCINE) LOCK FLUSH IV SOLN 100 UNIT/ML 100 UNIT/ML
SOLUTION INTRAVENOUS
Status: DISPENSED
Start: 2017-03-10

## (undated) RX ORDER — LIDOCAINE HYDROCHLORIDE 10 MG/ML
INJECTION, SOLUTION EPIDURAL; INFILTRATION; INTRACAUDAL; PERINEURAL
Status: DISPENSED
Start: 2019-01-14

## (undated) RX ORDER — HEPARIN SODIUM (PORCINE) LOCK FLUSH IV SOLN 100 UNIT/ML 100 UNIT/ML
SOLUTION INTRAVENOUS
Status: DISPENSED
Start: 2017-10-06

## (undated) RX ORDER — GLYCOPYRROLATE 0.2 MG/ML
INJECTION INTRAMUSCULAR; INTRAVENOUS
Status: DISPENSED
Start: 2019-01-14

## (undated) RX ORDER — CEFAZOLIN SODIUM 2 G/100ML
INJECTION, SOLUTION INTRAVENOUS
Status: DISPENSED
Start: 2019-01-14

## (undated) RX ORDER — FENTANYL CITRATE 50 UG/ML
INJECTION, SOLUTION INTRAMUSCULAR; INTRAVENOUS
Status: DISPENSED
Start: 2018-12-21

## (undated) RX ORDER — HEPARIN SODIUM (PORCINE) LOCK FLUSH IV SOLN 100 UNIT/ML 100 UNIT/ML
SOLUTION INTRAVENOUS
Status: DISPENSED
Start: 2017-12-11

## (undated) RX ORDER — NEOSTIGMINE METHYLSULFATE 1 MG/ML
VIAL (ML) INJECTION
Status: DISPENSED
Start: 2018-12-21